# Patient Record
Sex: FEMALE | Race: WHITE | Employment: FULL TIME | URBAN - METROPOLITAN AREA
[De-identification: names, ages, dates, MRNs, and addresses within clinical notes are randomized per-mention and may not be internally consistent; named-entity substitution may affect disease eponyms.]

---

## 2017-03-07 ENCOUNTER — TELEPHONE (OUTPATIENT)
Dept: FAMILY MEDICINE | Facility: CLINIC | Age: 57
End: 2017-03-07

## 2017-03-07 NOTE — TELEPHONE ENCOUNTER
Panel Management Review        Composite cancer screening  Chart review shows that this patient is due/due soon for the following Pap Smear, Colonoscopy or Fecal Colorectal (FIT)  Summary:    Patient is due/failing the following:   FIT, PAP and PHYSICAL    Action needed:   Patient needs office visit for Physical.    Type of outreach:    Sent Mailsuite message.    Questions for provider review:    None                                                                                                                                    ALONZO Petit

## 2017-05-03 DIAGNOSIS — E78.5 HYPERLIPIDEMIA LDL GOAL <130: ICD-10-CM

## 2017-05-03 NOTE — TELEPHONE ENCOUNTER
Routing refill request to provider for review/approval because:  Drug interaction warning  Labs not current:  DERICK Jiang RN

## 2017-05-03 NOTE — TELEPHONE ENCOUNTER
atorvastatin (LIPITOR) 10 MG tablet     Last Written Prescription Date: 10/31/16  Last Fill Quantity: 90, # refills: 1  Last Office Visit with G, P or [facility] prescribing provider: 10/31/16 Nila       Lab Results   Component Value Date    CHOL 278 04/27/2016     Lab Results   Component Value Date    HDL 62 04/27/2016     Lab Results   Component Value Date     04/27/2016     Lab Results   Component Value Date    TRIG 102 04/27/2016     Lab Results   Component Value Date    CHOLHDLRATIO 2.9 05/29/2014     Jessica Pace

## 2017-05-04 RX ORDER — ATORVASTATIN CALCIUM 10 MG/1
10 TABLET, FILM COATED ORAL EVERY EVENING
Qty: 30 TABLET | Refills: 0 | Status: SHIPPED | OUTPATIENT
Start: 2017-05-04 | End: 2017-11-08 | Stop reason: ALTCHOICE

## 2017-06-03 DIAGNOSIS — I10 ESSENTIAL HYPERTENSION WITH GOAL BLOOD PRESSURE LESS THAN 140/90: ICD-10-CM

## 2017-06-03 NOTE — TELEPHONE ENCOUNTER
hydrochlorothiazide (HYDRODIURIL) 25 MG tablet      Last Written Prescription Date: 10/31/16  Last Fill Quantity: 90, # refills: 1  Last Office Visit with [facility], [facility] or [facility] prescribing provider: 10/31/16       Potassium   Date Value Ref Range Status   10/31/2016 3.8 3.4 - 5.3 mmol/L Final     Creatinine   Date Value Ref Range Status   10/31/2016 0.81 0.52 - 1.04 mg/dL Final     BP Readings from Last 3 Encounters:   10/31/16 138/86   04/27/16 134/82   03/04/15 122/82         Inessa Ibarra  [facility] Radiology

## 2017-06-06 RX ORDER — HYDROCHLOROTHIAZIDE 25 MG/1
TABLET ORAL
Qty: 30 TABLET | Refills: 0 | Status: SHIPPED | OUTPATIENT
Start: 2017-06-06 | End: 2017-06-16

## 2017-06-06 NOTE — TELEPHONE ENCOUNTER
Prescription approved per [facility]ill [facility].  ANNELISE Jewell, Clinical RN Madyson Gallo.

## 2017-06-10 ENCOUNTER — HEALTH MAINTENANCE LETTER (OUTPATIENT)
Age: 57
End: 2017-06-10

## 2017-06-16 ENCOUNTER — ALLIED HEALTH/NURSE VISIT (OUTPATIENT)
Dept: NURSING | Facility: CLINIC | Age: 57
End: 2017-06-16
Payer: COMMERCIAL

## 2017-06-16 VITALS — DIASTOLIC BLOOD PRESSURE: 82 MMHG | RESPIRATION RATE: 16 BRPM | SYSTOLIC BLOOD PRESSURE: 130 MMHG | HEART RATE: 72 BPM

## 2017-06-16 DIAGNOSIS — E78.5 HYPERLIPIDEMIA LDL GOAL <130: ICD-10-CM

## 2017-06-16 DIAGNOSIS — I10 ESSENTIAL HYPERTENSION WITH GOAL BLOOD PRESSURE LESS THAN 140/90: ICD-10-CM

## 2017-06-16 DIAGNOSIS — Z12.11 SCREEN FOR COLON CANCER: Primary | ICD-10-CM

## 2017-06-16 PROCEDURE — 99207 ZZC NO CHARGE NURSE ONLY: CPT

## 2017-06-16 RX ORDER — HYDROCHLOROTHIAZIDE 25 MG/1
25 TABLET ORAL EVERY MORNING
Qty: 90 TABLET | Refills: 0 | Status: SHIPPED | OUTPATIENT
Start: 2017-06-16 | End: 2017-11-08

## 2017-06-16 RX ORDER — HYDROCHLOROTHIAZIDE 25 MG/1
TABLET ORAL
Qty: 30 TABLET | Refills: 0 | Status: CANCELLED | OUTPATIENT
Start: 2017-06-16

## 2017-06-16 NOTE — TELEPHONE ENCOUNTER
Pending Prescriptions:                       Disp   Refills    hydrochlorothiazide (HYDRODIURIL) 25 MG t*30 tab*0           Patient was in clinc for a blood pressure check this am, reading is 130/82, with pulse of 72.   Pt. Does have only 1 tablet left for tomorrow.       Routing to provider, for review.

## 2017-06-16 NOTE — PROGRESS NOTES
Ancillary BP check    HTN Guidelines:  Age 18-59 BP range:  Less than 140/90  Age 60-85 with Diabetes:  Less than 140/90  Age 60-85 without Diabetes:  less than 150/90        Di Evnas is a 56 year old female who comes in today for a Blood Pressure check.    Patient is taking medication as prescribed  Patient is tolerating medications well.  Patient is monitoring Blood Pressure at home.  Average readings if yes are 114/78 before coming in today     BP goal: < 140/90mmHg (patient 18+ years of age with or without diabetes)    BP reading today: Passed     BP Readings from Last 1 Encounters:   06/16/17 130/82     A large cuff was used.  Pulse: 72    Vitals reviewed     Each reading recorded in vital signs section of chart: yes    Symptoms: none    Need for urgent appointment, based on criteria in ancillary BP workflow (elevated blood pressure and any of the following: dizziness, blurry vision, lightheadedness, severe shortness of breath, chest pain or visual disturbance): No       Plan: At goal follow up as directed by provider.      Completed by: Kami [facility]

## 2017-06-16 NOTE — MR AVS SNAPSHOT
After Visit Summary   6/16/2017    Di Evans    MRN: [id number]           Patient Information     Date Of Birth          1960        Visit Information        Provider Department      6/16/2017 11:00 AM BK ANCILLARY [facility]        Today's Diagnoses     Essential hypertension with goal blood pressure less than 140/90           Follow-ups after your visit        Who to contact     If you have questions or need follow up information about today's clinic visit or your schedule please contact [facility] directly at [phone].  Normal or non-critical lab and imaging results will be communicated to you by Navitahart, letter or phone within 4 business days after the clinic has received the results. If you do not hear from us within 7 days, please contact the clinic through [employer] or phone. If you have a critical or abnormal lab result, we will notify you by phone as soon as possible.  Submit refill requests through [employer] or call your pharmacy and they will forward the refill request to us. Please allow 3 business days for your refill to be completed.          Additional Information About Your Visit        MyChart Information     [employer] gives you secure access to your electronic health record. If you see a primary care provider, you can also send messages to your care team and make appointments. If you have questions, please call your primary care clinic.  If you do not have a primary care provider, please call [phone] and they will assist you.        Care EveryWhere ID     This is your Care EveryWhere ID. This could be used by other organizations to access your [place] medical records  [id number]        Your Vitals Were     Pulse Respirations                72 16           Blood Pressure from Last 3 Encounters:   06/16/17 130/82   10/31/16 138/86   04/27/16 134/82    Weight from Last 3 Encounters:   10/31/16 81.4 kg (179 lb 6.4 oz)   04/27/16  80.1 kg (176 lb 9.6 oz)   03/04/15 76.4 kg (168 lb 6.4 oz)              Today, you had the following     No orders found for display         Today's Medication Changes          These changes are accurate as of: 6/16/17 11:59 PM.  If you have any questions, ask your nurse or doctor.               These medicines have changed or have updated prescriptions.        Dose/Directions    hydrochlorothiazide 25 MG tablet   Commonly known as:  HYDRODIURIL   This may have changed:  See the new instructions.   Used for:  Essential hypertension with goal blood pressure less than 140/90   Changed by:  Sonja Davies MD        Dose:  25 mg   Take 1 tablet (25 mg) by mouth every morning   Quantity:  90 tablet   Refills:  0            Where to get your medicines      These medications were sent to [facility] [address] [place] [address] [place] - [address] [place] AT [facility] [address]  [address] [place] [address]     Phone:  [phone]     hydrochlorothiazide 25 MG tablet                Primary Care Provider Office Phone # Fax #    Sonja Hernandez [phone]       [facility] [address] [place]  [facility] [address]        Thank you!     Thank you for choosing [facility]  for your care. Our goal is always to provide you with excellent care. Hearing back from our patients is one way we can continue to improve our services. Please take a few minutes to complete the written survey that you may receive in the mail after your visit with us. Thank you!             Your Updated Medication List - Protect others around you: Learn how to safely use, store and throw away your medicines at www.disposemymeds.org.          This list is accurate as of: 6/16/17 11:59 PM.  Always use your most recent med list.                   Brand Name Dispense Instructions for use    aspirin 81 MG tablet      Take 1 tablet by mouth daily. *        atorvastatin 10 MG tablet    LIPITOR    30 tablet    Take 1 tablet (10 mg) by mouth every evening Needs to be seen for more.       hydrochlorothiazide 25 MG tablet    HYDRODIURIL    90 tablet    Take 1 tablet (25 mg) by mouth every morning       STATIN NOT PRESCRIBED (INTENTIONAL)     0 each    Statin not prescribed intentionally due to Allergy to statin

## 2017-06-19 ENCOUNTER — MYC MEDICAL ADVICE (OUTPATIENT)
Dept: FAMILY MEDICINE | Facility: CLINIC | Age: 57
End: 2017-06-19

## 2017-06-20 NOTE — TELEPHONE ENCOUNTER
Patient notified via [employer] RX was sent for 90 days on 6/16/17 so should check with her pharmacy.  Stephan JOY

## 2017-07-16 DIAGNOSIS — I10 ESSENTIAL HYPERTENSION WITH GOAL BLOOD PRESSURE LESS THAN 140/90: ICD-10-CM

## 2017-07-16 NOTE — TELEPHONE ENCOUNTER
hydrochlorothiazide (HYDRODIURIL) 25 MG tablet      Last Written Prescription Date: 6/16/17  Last Fill Quantity: 90, # refills: 0  Last Office Visit with [facility], [facility] or [facility] prescribing provider: 10/31/16       Potassium   Date Value Ref Range Status   10/31/2016 3.8 3.4 - 5.3 mmol/L Final     Creatinine   Date Value Ref Range Status   10/31/2016 0.81 0.52 - 1.04 mg/dL Final     BP Readings from Last 3 Encounters:   06/16/17 130/82   10/31/16 138/86   04/27/16 134/82         Inessa Ibarra  [facility] Radiology

## 2017-07-18 RX ORDER — HYDROCHLOROTHIAZIDE 25 MG/1
TABLET ORAL
Qty: 30 TABLET | Refills: 0 | Status: SHIPPED | OUTPATIENT
Start: 2017-07-18 | End: 2017-10-24

## 2017-07-18 NOTE — TELEPHONE ENCOUNTER
Medication is being filled for 1 time refill only due to:  Patient needs to be seen because overdue for 6 mo visit.   Cyndie Daniels RN

## 2017-10-19 DIAGNOSIS — I10 ESSENTIAL HYPERTENSION WITH GOAL BLOOD PRESSURE LESS THAN 140/90: ICD-10-CM

## 2017-10-23 ENCOUNTER — MYC MEDICAL ADVICE (OUTPATIENT)
Dept: FAMILY MEDICINE | Facility: CLINIC | Age: 57
End: 2017-10-23

## 2017-10-23 DIAGNOSIS — I10 ESSENTIAL HYPERTENSION WITH GOAL BLOOD PRESSURE LESS THAN 140/90: ICD-10-CM

## 2017-10-24 RX ORDER — HYDROCHLOROTHIAZIDE 25 MG/1
25 TABLET ORAL EVERY MORNING
Qty: 15 TABLET | Refills: 0 | Status: SHIPPED | OUTPATIENT
Start: 2017-10-24 | End: 2017-11-08

## 2017-10-24 RX ORDER — HYDROCHLOROTHIAZIDE 25 MG/1
TABLET ORAL
Qty: 90 TABLET | Refills: 0 | OUTPATIENT
Start: 2017-10-24

## 2017-11-06 DIAGNOSIS — I10 ESSENTIAL HYPERTENSION WITH GOAL BLOOD PRESSURE LESS THAN 140/90: ICD-10-CM

## 2017-11-07 ENCOUNTER — ALLIED HEALTH/NURSE VISIT (OUTPATIENT)
Dept: NURSING | Facility: CLINIC | Age: 57
End: 2017-11-07
Payer: COMMERCIAL

## 2017-11-07 VITALS — HEART RATE: 82 BPM | OXYGEN SATURATION: 98 % | DIASTOLIC BLOOD PRESSURE: 81 MMHG | SYSTOLIC BLOOD PRESSURE: 127 MMHG

## 2017-11-07 DIAGNOSIS — E78.5 HYPERLIPIDEMIA LDL GOAL <130: ICD-10-CM

## 2017-11-07 DIAGNOSIS — I10 ESSENTIAL HYPERTENSION WITH GOAL BLOOD PRESSURE LESS THAN 140/90: ICD-10-CM

## 2017-11-07 DIAGNOSIS — I10 HTN (HYPERTENSION): Primary | ICD-10-CM

## 2017-11-07 LAB
ANION GAP SERPL CALCULATED.3IONS-SCNC: 6 MMOL/L (ref 3–14)
BUN SERPL-MCNC: 13 MG/DL (ref 7–30)
CALCIUM SERPL-MCNC: 9.2 MG/DL (ref 8.5–10.1)
CHLORIDE SERPL-SCNC: 103 MMOL/L (ref 94–109)
CHOLEST SERPL-MCNC: 263 MG/DL
CO2 SERPL-SCNC: 31 MMOL/L (ref 20–32)
CREAT SERPL-MCNC: 0.77 MG/DL (ref 0.52–1.04)
GFR SERPL CREATININE-BSD FRML MDRD: 77 ML/MIN/1.7M2
GLUCOSE SERPL-MCNC: 95 MG/DL (ref 70–99)
HDLC SERPL-MCNC: 66 MG/DL
LDLC SERPL CALC-MCNC: 176 MG/DL
NONHDLC SERPL-MCNC: 197 MG/DL
POTASSIUM SERPL-SCNC: 3.7 MMOL/L (ref 3.4–5.3)
SODIUM SERPL-SCNC: 140 MMOL/L (ref 133–144)
TRIGL SERPL-MCNC: 107 MG/DL

## 2017-11-07 PROCEDURE — 99207 ZZC NO CHARGE NURSE ONLY: CPT

## 2017-11-07 PROCEDURE — 80048 BASIC METABOLIC PNL TOTAL CA: CPT | Performed by: FAMILY MEDICINE

## 2017-11-07 PROCEDURE — 36415 COLL VENOUS BLD VENIPUNCTURE: CPT | Performed by: FAMILY MEDICINE

## 2017-11-07 PROCEDURE — 80061 LIPID PANEL: CPT | Performed by: FAMILY MEDICINE

## 2017-11-07 RX ORDER — HYDROCHLOROTHIAZIDE 25 MG/1
TABLET ORAL
Qty: 15 TABLET | Refills: 0 | OUTPATIENT
Start: 2017-11-07

## 2017-11-07 NOTE — NURSING NOTE
"Pt came in for blood pressure check and it was 127/81.      Chief Complaint   Patient presents with     Hypertension       Initial /81 (BP Location: Left arm, Patient Position: Chair, Cuff Size: Adult Large)  Pulse 82  SpO2 98%  Breastfeeding? No Estimated body mass index is 28.83 kg/(m^2) as calculated from the following:    Height as of 10/31/16: 5' 6.14\" (1.68 m).    Weight as of 10/31/16: 179 lb 6.4 oz (81.4 kg).  Medication Reconciliation: complete   Christina Whyte MA        "

## 2017-11-07 NOTE — TELEPHONE ENCOUNTER
Called and spoke to patient and gave her Dr Dylan Hernandez's   Message. Scheduled an appointment with the provider for tomorrow.  Juleit Lujan MA/  For Teams Spirit and Indu

## 2017-11-07 NOTE — MR AVS SNAPSHOT
After Visit Summary   11/7/2017    Di Evans    MRN: [id number]           Patient Information     Date Of Birth          1960        Visit Information        Provider Department      11/7/2017 10:20 AM BK ANCILLARY [facility]        Today's Diagnoses     HTN (hypertension)    -  1       Follow-ups after your visit        Follow-up notes from your care team     Return for BP Recheck.      Who to contact     If you have questions or need follow up information about today's clinic visit or your schedule please contact [facility] directly at [phone].  Normal or non-critical lab and imaging results will be communicated to you by Astley Clarkehart, letter or phone within 4 business days after the clinic has received the results. If you do not hear from us within 7 days, please contact the clinic through [employer]t or phone. If you have a critical or abnormal lab result, we will notify you by phone as soon as possible.  Submit refill requests through [employer] or call your pharmacy and they will forward the refill request to us. Please allow 3 business days for your refill to be completed.          Additional Information About Your Visit        MyChart Information     [employer] gives you secure access to your electronic health record. If you see a primary care provider, you can also send messages to your care team and make appointments. If you have questions, please call your primary care clinic.  If you do not have a primary care provider, please call [phone] and they will assist you.        Care EveryWhere ID     This is your Care EveryWhere ID. This could be used by other organizations to access your [place] medical records  [id number]        Your Vitals Were     Pulse Pulse Oximetry Breastfeeding?             82 98% No          Blood Pressure from Last 3 Encounters:   11/07/17 127/81   06/16/17 130/82   10/31/16 138/86    Weight from Last 3 Encounters:    10/31/16 179 lb 6.4 oz (81.4 kg)   04/27/16 176 lb 9.6 oz (80.1 kg)   03/04/15 168 lb 6.4 oz (76.4 kg)              Today, you had the following     No orders found for display       Primary Care Provider Office Phone # Fax #    Sonja Collazoshahnaz Hernandez [phone]       [address]  [facility] [address]        Equal Access to Services     ROMAN MILLARD : Hadii aad ku hadasho Soomaali, waaxda luqadaha, qaybta kaalmada adeegyada, waxay idiin hayaan adeeg kharash lavipul . So [facility] [phone].    ATENCIÓN: Si habla español, tiene a perez disposición servicios gratuitos de asistencia lingüística. Llame al [phone].    We comply with applicable federal civil rights laws and [place] laws. We do not discriminate on the basis of race, color, national origin, age, disability, sex, sexual orientation, or gender identity.            Thank you!     Thank you for choosing [facility]  for your care. Our goal is always to provide you with excellent care. Hearing back from our patients is one way we can continue to improve our services. Please take a few minutes to complete the written survey that you may receive in the mail after your visit with us. Thank you!             Your Updated Medication List - Protect others around you: Learn how to safely use, store and throw away your medicines at www.disposemymeds.org.          This list is accurate as of: 11/7/17 11:14 AM.  Always use your most recent med list.                   Brand Name Dispense Instructions for use Diagnosis    aspirin 81 MG tablet      Take 1 tablet by mouth daily. *        atorvastatin 10 MG tablet    LIPITOR    30 tablet    Take 1 tablet (10 mg) by mouth every evening Needs to be seen for more.    Hyperlipidemia LDL goal <130       * hydrochlorothiazide 25 MG tablet    HYDRODIURIL    90 tablet    Take 1 tablet (25 mg) by mouth every morning    Essential hypertension with goal blood pressure less than  140/90       * hydrochlorothiazide 25 MG tablet    HYDRODIURIL    15 tablet    Take 1 tablet (25 mg) by mouth every morning Needs to be seen for more.    Essential hypertension with goal blood pressure less than 140/90       STATIN NOT PRESCRIBED (INTENTIONAL)     0 each    Statin not prescribed intentionally due to Allergy to statin    Hyperlipidemia LDL goal <130       * Notice:  This list has 2 medication(s) that are the same as other medications prescribed for you. Read the directions carefully, and ask your doctor or other care provider to review them with you.

## 2017-11-08 ENCOUNTER — OFFICE VISIT (OUTPATIENT)
Dept: FAMILY MEDICINE | Facility: CLINIC | Age: 57
End: 2017-11-08
Payer: COMMERCIAL

## 2017-11-08 VITALS
OXYGEN SATURATION: 97 % | BODY MASS INDEX: 28.45 KG/M2 | HEIGHT: 66 IN | WEIGHT: 177 LBS | SYSTOLIC BLOOD PRESSURE: 126 MMHG | DIASTOLIC BLOOD PRESSURE: 82 MMHG | HEART RATE: 78 BPM | TEMPERATURE: 97.6 F

## 2017-11-08 DIAGNOSIS — Z12.11 SCREEN FOR COLON CANCER: ICD-10-CM

## 2017-11-08 DIAGNOSIS — E78.5 HYPERLIPIDEMIA LDL GOAL <130: ICD-10-CM

## 2017-11-08 DIAGNOSIS — Z23 NEED FOR PROPHYLACTIC VACCINATION WITH TETANUS-DIPHTHERIA (TD): ICD-10-CM

## 2017-11-08 DIAGNOSIS — I10 HYPERTENSION GOAL BP (BLOOD PRESSURE) < 140/90: Primary | ICD-10-CM

## 2017-11-08 PROCEDURE — 99214 OFFICE O/P EST MOD 30 MIN: CPT | Mod: 25 | Performed by: FAMILY MEDICINE

## 2017-11-08 PROCEDURE — 90471 IMMUNIZATION ADMIN: CPT | Performed by: FAMILY MEDICINE

## 2017-11-08 PROCEDURE — 90715 TDAP VACCINE 7 YRS/> IM: CPT | Performed by: FAMILY MEDICINE

## 2017-11-08 RX ORDER — HYDROCHLOROTHIAZIDE 25 MG/1
25 TABLET ORAL EVERY MORNING
Qty: 90 TABLET | Refills: 1 | Status: SHIPPED | OUTPATIENT
Start: 2017-11-08 | End: 2018-05-02

## 2017-11-08 RX ORDER — PRAVASTATIN SODIUM 20 MG
20 TABLET ORAL EVERY EVENING
Qty: 90 TABLET | Refills: 3 | Status: SHIPPED | OUTPATIENT
Start: 2017-11-08 | End: 2018-01-01

## 2017-11-08 NOTE — MR AVS SNAPSHOT
After Visit Summary   11/8/2017    Di Evans    MRN: [id number]           Patient Information     Date Of Birth          1960        Visit Information        Provider Department      11/8/2017 9:20 AM Sonja Davies MD [facility]        Today's Diagnoses     Hypertension goal BP (blood pressure) < 140/90    -  1    Hyperlipidemia LDL goal <130        Screen for colon cancer        Need for prophylactic vaccination with tetanus-diphtheria (TD)          Care Instructions    Based on your medical history and these are the current health maintenance or preventive care services that you are due for (some may have been done at this visit)  Health Maintenance Due   Topic Date Due     ADVANCE DIRECTIVE PLANNING Q5 YRS  06/20/2014     FIT Q1 YR  03/16/2015     PAP SCREENING Q3 YR (SYSTEM ASSIGNED)  03/13/2016     TETANUS IMMUNIZATION (SYSTEM ASSIGNED)  01/01/2017     INFLUENZA VACCINE (SYSTEM ASSIGNED)  09/01/2017         At [facility], we strive to deliver an exceptional experience to you, every time we see you.    If you receive a survey in the mail, please send us back your thoughts. We really do value your feedback.    Your care team's suggested websites for health information:  Www.[employer].org : Up to date and easily searchable information on multiple topics.  Www.medlineplus.gov : medication info, interactive tutorials, watch real surgeries online  Www.familydoctor.org : good info from the Academy of Family Physicians  Www.cdc.gov : public health info, travel advisories, epidemics (H1N1)  Www.aap.org : children's health info, normal development, vaccinations  Www.health.[facility].[place].us : [place] dept of health, public health issues in [place], N1N1    How to contact your care team:   Team Indu/Spirit [phone]         Pharmacy [phone]    Dr. Dotson, Luz Marina Bethea PA-C, Kellee Beltran CNP, Rosamaria Manzo PA-C,  Dr. Forrest, and SULMA Pelaez CNP    Team RN: Millie      Clinic hours  M-Th 7 am-7 pm   Fri 7 am-5 pm.   Urgent care M-F 11 am-9 pm,   Sat/Sun 9 am-5 pm.  Pharmacy M-Th 8 am-8 pm Fri 8 am-6 pm  Sat/Sun 9 am-5 pm.     All password changes, disabled accounts, or ID changes in [employer]/MyHealth will be done by our Access Services Department.    If you need help with your account or password, call: [phone]. Clinic staff no longer has the ability to change passwords.             Follow-ups after your visit        Future tests that were ordered for you today     Open Future Orders        Priority Expected Expires Ordered    Fecal colorectal cancer screen (FIT) Routine 11/29/2017 1/31/2018 11/8/2017            Who to contact     If you have questions or need follow up information about today's clinic visit or your schedule please contact [facility] directly at [phone].  Normal or non-critical lab and imaging results will be communicated to you by Revaluatehart, letter or phone within 4 business days after the clinic has received the results. If you do not hear from us within 7 days, please contact the clinic through servtagt or phone. If you have a critical or abnormal lab result, we will notify you by phone as soon as possible.  Submit refill requests through [employer] or call your pharmacy and they will forward the refill request to us. Please allow 3 business days for your refill to be completed.          Additional Information About Your Visit        Cirrascale Information     [employer] gives you secure access to your electronic health record. If you see a primary care provider, you can also send messages to your care team and make appointments. If you have questions, please call your primary care clinic.  If you do not have a primary care provider, please call [phone] and they will assist you.        Care EveryWhere ID     This is your Care EveryWhere ID. This could be used by other  "organizations to access your [place] medical records  [id number]        Your Vitals Were     Pulse Temperature Height Pulse Oximetry Breastfeeding? BMI (Body Mass Index)    78 97.6  F (36.4  C) (Oral) 5' 5.75\" (1.67 m) 97% No 28.79 kg/m2       Blood Pressure from Last 3 Encounters:   11/08/17 126/82   11/07/17 127/81   06/16/17 130/82    Weight from Last 3 Encounters:   11/08/17 177 lb (80.3 kg)   10/31/16 179 lb 6.4 oz (81.4 kg)   04/27/16 176 lb 9.6 oz (80.1 kg)              We Performed the Following     TDAP (ADACEL)          Today's Medication Changes          These changes are accurate as of: 11/8/17  9:49 AM.  If you have any questions, ask your nurse or doctor.               Start taking these medicines.        Dose/Directions    pravastatin 20 MG tablet   Commonly known as:  PRAVACHOL   Used for:  Hyperlipidemia LDL goal <130   Started by:  Sonja Davies MD        Dose:  20 mg   Take 1 tablet (20 mg) by mouth every evening   Quantity:  90 tablet   Refills:  3         Stop taking these medicines if you haven't already. Please contact your care team if you have questions.     atorvastatin 10 MG tablet   Commonly known as:  LIPITOR   Stopped by:  Sonja Davies MD                Where to get your medicines      These medications were sent to [facility] - [place], [place] - [address]  [address], [facility] [address]     Phone:  [phone]     hydrochlorothiazide 25 MG tablet    pravastatin 20 MG tablet                Primary Care Provider Office Phone # Fax #    Sonja Hernandez [phone]       [address]  [facility] [address]        Equal Access to Services     ROMAN MILLARD : Lele restrepo Somari, waaxda luqadaha, qaybta kaalmada adeegyada, mae schulz. [facility] [phone].    ATENCIÓN: Si habla español, tiene a perez disposición servicios gratuitos de " asistencia lingüística. Sirena al [phone].    We comply with applicable federal civil rights laws and [place] laws. We do not discriminate on the basis of race, color, national origin, age, disability, sex, sexual orientation, or gender identity.            Thank you!     Thank you for choosing [facility]  for your care. Our goal is always to provide you with excellent care. Hearing back from our patients is one way we can continue to improve our services. Please take a few minutes to complete the written survey that you may receive in the mail after your visit with us. Thank you!             Your Updated Medication List - Protect others around you: Learn how to safely use, store and throw away your medicines at www.disposemymeds.org.          This list is accurate as of: 11/8/17  9:49 AM.  Always use your most recent med list.                   Brand Name Dispense Instructions for use Diagnosis    aspirin 81 MG tablet      Take 1 tablet by mouth daily. *        hydrochlorothiazide 25 MG tablet    HYDRODIURIL    90 tablet    Take 1 tablet (25 mg) by mouth every morning    Hypertension goal BP (blood pressure) < 140/90       pravastatin 20 MG tablet    PRAVACHOL    90 tablet    Take 1 tablet (20 mg) by mouth every evening    Hyperlipidemia LDL goal <130

## 2017-11-08 NOTE — PROGRESS NOTES
"  SUBJECTIVE:   Di Evans is a 57 year old female who presents to clinic today for the following health issues:      Hypertension Follow-up      Outpatient blood pressures are being checked at home.  Results are 123/79.    Low Salt Diet: no added salt        Amount of exercise or physical activity: 2-3 days/week for an average of 15-30 minutes    Problems taking medications regularly: No    Medication side effects: none    Diet: regular (no restrictions)        Hyperlipidemia Follow-Up      Rate your low fat/cholesterol diet?: good    Taking statin?  No    Other lipid medications/supplements?:  none          Problem list and histories reviewed & adjusted, as indicated.  Additional history: as documented    Patient Active Problem List   Diagnosis     CARDIOVASCULAR SCREENING; LDL GOAL LESS THAN 130     Hypertension goal BP (blood pressure) < 140/90     Hyperlipidemia LDL goal <130     Overweight     Changing skin lesion     Past Surgical History:   Procedure Laterality Date     SURGICAL HISTORY OF -   1980    left ankle surgery       Social History   Substance Use Topics     Smoking status: Never Smoker     Smokeless tobacco: Never Used     Alcohol use Yes      Comment: occasional     Family History   Problem Relation Age of Onset     Hypertension Mother      Hypertension Father      CEREBROVASCULAR DISEASE Father      polycythemia             Reviewed and updated as needed this visit by clinical staff     Reviewed and updated as needed this visit by Provider         ROS:  Constitutional, HEENT, cardiovascular, pulmonary, gi and gu systems are negative, except as otherwise noted.      OBJECTIVE:   /82 (BP Location: Left arm, Patient Position: Chair, Cuff Size: Adult Large)  Pulse 78  Temp 97.6  F (36.4  C) (Oral)  Ht 5' 5.75\" (1.67 m)  Wt 177 lb (80.3 kg)  SpO2 97%  Breastfeeding? No  BMI 28.79 kg/m2  Body mass index is 28.79 kg/(m^2).  GENERAL: healthy, alert and no distress  NECK: no adenopathy, " no asymmetry, masses, or scars and thyroid normal to palpation  RESP: lungs clear to auscultation - no rales, rhonchi or wheezes  CV: regular rate and rhythm, normal S1 S2, no S3 or S4, no murmur, click or rub, no peripheral edema and peripheral pulses strong  ABDOMEN: soft, nontender, no hepatosplenomegaly, no masses and bowel sounds normal  MS: no gross musculoskeletal defects noted, no edema  PSYCH: mentation appears normal, affect normal/bright    Diagnostic Test Results:  Results for orders placed or performed in visit on 11/07/17   Basic metabolic panel   Result Value Ref Range    Sodium 140 133 - 144 mmol/L    Potassium 3.7 3.4 - 5.3 mmol/L    Chloride 103 94 - 109 mmol/L    Carbon Dioxide 31 20 - 32 mmol/L    Anion Gap 6 3 - 14 mmol/L    Glucose 95 70 - 99 mg/dL    Urea Nitrogen 13 7 - 30 mg/dL    Creatinine 0.77 0.52 - 1.04 mg/dL    GFR Estimate 77 >60 mL/min/1.7m2    GFR Estimate If Black >90 >60 mL/min/1.7m2    Calcium 9.2 8.5 - 10.1 mg/dL   Lipid panel reflex to direct LDL   Result Value Ref Range    Cholesterol 263 (H) <200 mg/dL    Triglycerides 107 <150 mg/dL    HDL Cholesterol 66 >49 mg/dL    LDL Cholesterol Calculated 176 (H) <100 mg/dL    Non HDL Cholesterol 197 (H) <130 mg/dL       ASSESSMENT/PLAN:     1. Hypertension goal BP (blood pressure) < 140/90  Controlled - continue current treatment  - hydrochlorothiazide (HYDRODIURIL) 25 MG tablet; Take 1 tablet (25 mg) by mouth every morning  Dispense: 90 tablet; Refill: 1    2. Hyperlipidemia LDL goal <130  Not at goal - trial of pravastatin and recommended low carb eating.  - pravastatin (PRAVACHOL) 20 MG tablet; Take 1 tablet (20 mg) by mouth every evening  Dispense: 90 tablet; Refill: 3    3. Screen for colon cancer  Screening recommended  - Fecal colorectal cancer screen (FIT); Future    4. Need for prophylactic vaccination with tetanus-diphtheria (TD)    - TDAP (ADACEL)  - ADMIN 1st VACCINE    FUTURE APPOINTMENTS:       - Follow-up visit in 6  months but sooner for AFE.    Sonja Hernandez MD  [facility]

## 2017-11-08 NOTE — NURSING NOTE
"Chief Complaint   Patient presents with     Hypertension       Initial /82 (BP Location: Left arm, Patient Position: Chair, Cuff Size: Adult Large)  Pulse 78  Temp 97.6  F (36.4  C) (Oral)  Ht 5' 5.75\" (1.67 m)  Wt 177 lb (80.3 kg)  SpO2 97%  Breastfeeding? No  BMI 28.79 kg/m2 Estimated body mass index is 28.79 kg/(m^2) as calculated from the following:    Height as of this encounter: 5' 5.75\" (1.67 m).    Weight as of this encounter: 177 lb (80.3 kg).  Medication Reconciliation: complete   An,CMA (AMAA)      "

## 2017-11-08 NOTE — NURSING NOTE
Screening Questionnaire for Adult Immunization    Are you sick today?   Yes   Do you have allergies to medications, food, a vaccine component or latex?   No   Have you ever had a serious reaction after receiving a vaccination?   No   Do you have a long-term health problem with heart disease, lung disease, asthma, kidney disease, metabolic disease (e.g. diabetes), anemia, or other blood disorder?   No   Do you have cancer, leukemia, HIV/AIDS, or any other immune system problem?   No   In the past 3 months, have you taken medications that affect  your immune system, such as prednisone, other steroids, or anticancer drugs; drugs for the treatment of rheumatoid arthritis, Crohn s disease, or psoriasis; or have you had radiation treatments?   No   Have you had a seizure, or a brain or other nervous system problem?   No   During the past year, have you received a transfusion of blood or blood     products, or been given immune (gamma) globulin or antiviral drug?   No   For women: Are you pregnant or is there a chance you could become        pregnant during the next month?   No   Have you received any vaccinations in the past 4 weeks?   No     Immunization questionnaire answers were all negative.        Per orders of Dr. Dylan Hernandez, injection of Adacel given by Amalia Ortega. Patient instructed to remain in clinic for 15 minutes afterwards, and to report any adverse reaction to me immediately.       Screening performed by Amalia Ortega on 11/8/2017 at 9:51 AM.

## 2017-11-08 NOTE — PATIENT INSTRUCTIONS
Based on your medical history and these are the current health maintenance or preventive care services that you are due for (some may have been done at this visit)  Health Maintenance Due   Topic Date Due     ADVANCE DIRECTIVE PLANNING Q5 YRS  06/20/2014     FIT Q1 YR  03/16/2015     PAP SCREENING Q3 YR (SYSTEM ASSIGNED)  03/13/2016     TETANUS IMMUNIZATION (SYSTEM ASSIGNED)  01/01/2017     INFLUENZA VACCINE (SYSTEM ASSIGNED)  09/01/2017         At [facility], we strive to deliver an exceptional experience to you, every time we see you.    If you receive a survey in the mail, please send us back your thoughts. We really do value your feedback.    Your care team's suggested websites for health information:  Www.[employer].org : Up to date and easily searchable information on multiple topics.  Www.medlineplus.gov : medication info, interactive tutorials, watch real surgeries online  Www.familydoctor.org : good info from the Academy of Family Physicians  Www.cdc.gov : public health info, travel advisories, epidemics (H1N1)  Www.aap.org : children's health info, normal development, vaccinations  Www.health.[facility].[place].us : [place] dept of health, public health issues in [place], N1N1    How to contact your care team:   Team [employer] [phone]         Pharmacy [phone]    Dr. Dotson, Luz Marina Bethea PA-C, Dr. Madden, Kellee OZNUA CNP, Rosamaria Manzo PA-C, Dr. Forrest, and SULMA Pelaez CNP    Team RN: Millie      Clinic hours  M-Th 7 am-7 pm   Fri 7 am-5 pm.   Urgent care M-F 11 am-9 pm,   Sat/Sun 9 am-5 pm.  Pharmacy M-Th 8 am-8 pm Fri 8 am-6 pm  Sat/Sun 9 am-5 pm.     All password changes, disabled accounts, or ID changes in [employer]/MyHealth will be done by our Access Services Department.    If you need help with your account or password, call: [phone]. Clinic staff no longer has the ability to change passwords.

## 2017-12-05 ENCOUNTER — RADIANT APPOINTMENT (OUTPATIENT)
Dept: GENERAL RADIOLOGY | Facility: CLINIC | Age: 57
End: 2017-12-05
Attending: PHYSICIAN ASSISTANT
Payer: COMMERCIAL

## 2017-12-05 ENCOUNTER — OFFICE VISIT (OUTPATIENT)
Dept: FAMILY MEDICINE | Facility: CLINIC | Age: 57
End: 2017-12-05
Payer: COMMERCIAL

## 2017-12-05 VITALS
DIASTOLIC BLOOD PRESSURE: 89 MMHG | BODY MASS INDEX: 28.63 KG/M2 | TEMPERATURE: 97.9 F | WEIGHT: 176 LBS | SYSTOLIC BLOOD PRESSURE: 145 MMHG

## 2017-12-05 DIAGNOSIS — R14.3 FLATULENCE, ERUCTATION, AND GAS PAIN: ICD-10-CM

## 2017-12-05 DIAGNOSIS — R14.2 FLATULENCE, ERUCTATION, AND GAS PAIN: ICD-10-CM

## 2017-12-05 DIAGNOSIS — R14.2 FLATULENCE, ERUCTATION, AND GAS PAIN: Primary | ICD-10-CM

## 2017-12-05 DIAGNOSIS — R14.1 FLATULENCE, ERUCTATION, AND GAS PAIN: Primary | ICD-10-CM

## 2017-12-05 DIAGNOSIS — R14.3 FLATULENCE, ERUCTATION, AND GAS PAIN: Primary | ICD-10-CM

## 2017-12-05 DIAGNOSIS — Z12.11 SCREEN FOR COLON CANCER: ICD-10-CM

## 2017-12-05 DIAGNOSIS — K59.01 SLOW TRANSIT CONSTIPATION: ICD-10-CM

## 2017-12-05 DIAGNOSIS — R14.1 FLATULENCE, ERUCTATION, AND GAS PAIN: ICD-10-CM

## 2017-12-05 PROCEDURE — 99214 OFFICE O/P EST MOD 30 MIN: CPT | Performed by: PHYSICIAN ASSISTANT

## 2017-12-05 PROCEDURE — 74020 XR ABDOMEN 2 VW: CPT

## 2017-12-05 RX ORDER — POLYETHYLENE GLYCOL 3350 17 G/17G
1 POWDER, FOR SOLUTION ORAL DAILY
Qty: 510 G | Refills: 1 | Status: SHIPPED | OUTPATIENT
Start: 2017-12-05 | End: 2018-05-02

## 2017-12-05 NOTE — NURSING NOTE
"Chief Complaint   Patient presents with     Abdominal Pain     Stomach issues for about 3 weeks       Initial /89 (BP Location: Left arm, Patient Position: Chair, Cuff Size: Adult Regular)  Temp 97.9  F (36.6  C) (Oral)  Wt 176 lb (79.8 kg)  BMI 28.63 kg/m2 Estimated body mass index is 28.63 kg/(m^2) as calculated from the following:    Height as of 11/8/17: 5' 5.75\" (1.67 m).    Weight as of this encounter: 176 lb (79.8 kg).  Medication Reconciliation: complete   Barbara Foster MA      "

## 2017-12-05 NOTE — PROGRESS NOTES
SUBJECTIVE:   Di Evans is a 57 year old female who presents to clinic today for the following health issues:      ABDOMINAL   PAIN     Onset: 3 weeks    Description:   Character: Dull ache  Location: whole stomach  Radiation: None and Back    Intensity: moderate    Progression of Symptoms:  worsening    Accompanying Signs & Symptoms:  Fever/Chills?: no   Gas/Bloating: YES  Nausea: no   Vomitting: no   Diarrhea?: no   Constipation:YES  Dysuria or Hematuria: no    History:   Trauma: no   Previous similar pain: no    Previous tests done: none    Precipitating factors:   Does the pain change with:     Food: no      BM: no     Urination: no     Alleviating factors:      Therapies Tried and outcome:     LMP:       Patient goes from constipation to loser stools. No blood in stool, no vomiting, no diarrhea, no point tenderness.   Patient gets gas pains that move from side to side and stomach gurgles.         Problem list and histories reviewed & adjusted, as indicated.  Additional history: as documented    Patient Active Problem List   Diagnosis     CARDIOVASCULAR SCREENING; LDL GOAL LESS THAN 130     Hypertension goal BP (blood pressure) < 140/90     Hyperlipidemia LDL goal <130     Overweight     Changing skin lesion     Past Surgical History:   Procedure Laterality Date     SURGICAL HISTORY OF -   1980    left ankle surgery       Social History   Substance Use Topics     Smoking status: Never Smoker     Smokeless tobacco: Never Used     Alcohol use Yes      Comment: occasional     Family History   Problem Relation Age of Onset     Hypertension Mother      Hypertension Father      CEREBROVASCULAR DISEASE Father      polycythemia         Current Outpatient Prescriptions   Medication Sig Dispense Refill     polyethylene glycol (MIRALAX) powder Take 17 g (1 capful) by mouth daily 510 g 1     pravastatin (PRAVACHOL) 20 MG tablet Take 1 tablet (20 mg) by mouth every evening 90 tablet 3     hydrochlorothiazide  (HYDRODIURIL) 25 MG tablet Take 1 tablet (25 mg) by mouth every morning 90 tablet 1     aspirin 81 MG tablet Take 1 tablet by mouth daily. *       Allergies   Allergen Reactions     Gemfibrozil Muscle Pain (Myalgia)     Lovastatin      headaches     Pcn [Bicillin C-R,]          Reviewed and updated as needed this visit by clinical staffTobacco  Allergies  Meds  Med Hx  Surg Hx  Soc Hx      Reviewed and updated as needed this visit by Provider         ROS:  Constitutional, HEENT, cardiovascular, pulmonary, gi and gu systems are negative, except as otherwise noted.      OBJECTIVE:   /89 (BP Location: Left arm, Patient Position: Chair, Cuff Size: Adult Regular)  Temp 97.9  F (36.6  C) (Oral)  Wt 176 lb (79.8 kg)  BMI 28.63 kg/m2  Body mass index is 28.63 kg/(m^2).  GENERAL: healthy, alert and no distress  NECK: no adenopathy, no asymmetry, masses, or scars and thyroid normal to palpation  RESP: lungs clear to auscultation - no rales, rhonchi or wheezes  CV: regular rate and rhythm, normal S1 S2, no S3 or S4, no murmur, click or rub, no peripheral edema and peripheral pulses strong  ABDOMEN: bloated abdomen, tenderness mild generalized, no organomegaly or masses and bowel sounds normal. Negative Mcburney point and hunt sign  MS: no gross musculoskeletal defects noted, no edema    Diagnostic Test Results:  Results for orders placed or performed in visit on 11/07/17   Basic metabolic panel   Result Value Ref Range    Sodium 140 133 - 144 mmol/L    Potassium 3.7 3.4 - 5.3 mmol/L    Chloride 103 94 - 109 mmol/L    Carbon Dioxide 31 20 - 32 mmol/L    Anion Gap 6 3 - 14 mmol/L    Glucose 95 70 - 99 mg/dL    Urea Nitrogen 13 7 - 30 mg/dL    Creatinine 0.77 0.52 - 1.04 mg/dL    GFR Estimate 77 >60 mL/min/1.7m2    GFR Estimate If Black >90 >60 mL/min/1.7m2    Calcium 9.2 8.5 - 10.1 mg/dL   Lipid panel reflex to direct LDL   Result Value Ref Range    Cholesterol 263 (H) <200 mg/dL    Triglycerides 107 <150 mg/dL     HDL Cholesterol 66 >49 mg/dL    LDL Cholesterol Calculated 176 (H) <100 mg/dL    Non HDL Cholesterol 197 (H) <130 mg/dL     Abdominal xray- no air fluid levels, no free air. Moderate amount of gas and stool in colon.   ASSESSMENT/PLAN:       ICD-10-CM    1. Flatulence, eructation, and gas pain R14.3 XR Abdomen 2 Views    R14.1     R14.2    2. Slow transit constipation K59.01 polyethylene glycol (MIRALAX) powder   3. Screen for colon cancer Z12.11 Fecal colorectal cancer screen FIT - Future (S+30)   1. Xray   Miralax 17 grams daily for 3-5 days until stools are soft   Increase water   Try Metamucil fiber supplement or other fiber supplement  Eat more fruits and veggies   Follow up in 5 days if not better  Follow up sooner or go to ED if develop severe pain, vomiting or fever.     Jordana Bethea PA-C  [facility]

## 2017-12-05 NOTE — PATIENT INSTRUCTIONS
Miralax 17 grams daily for 3-5 days until stools are soft   Increase water     Try Metamucil fiber supplement or other fiber supplement  Eat more fruits and veggies   Constipation (Adult)  Constipation means that you have bowel movements that are less frequent than usual. Stools often become very hard and difficult to pass.  Constipation is very common. At some point in life it affects almost everyone. Since everyone's bowel habits are different, what is constipation to one person may not be to another. Your healthcare provider may do tests to diagnose constipation. It depends on what he or she finds when evaluating you.    Symptoms of constipation include:    Abdominal pain    Bloating    Vomiting    Painful bowel movements    Itching, swelling, bleeding, or pain around the anus  Causes  Constipation can have many causes. These include:    Diet low in fiber    Too much dairy    Not drinking enough liquids    Lack of exercise or physical activity. This is especially true for older adults.    Changes in lifestyle or daily routine, including pregnancy, aging, work, and travel    Frequent use or misuse of laxatives    Ignoring the urge to have a bowel movement or delaying it until later    Medicines, such as certain prescription pain medicines, iron supplements, antacids, certain antidepressants, and calcium supplements    Diseases like irritable bowel syndrome, bowel obstructions, stroke, diabetes, thyroid disease, Parkinson disease, hemorrhoids, and colon cancer  Complications  Potential complications of constipation can include:    Hemorrhoids    Rectal bleeding from hemorrhoids or anal fissures (skin tears)    Hernias    Dependency on laxatives    Chronic constipation    Fecal impaction    Bowel obstruction or perforation  Home care  All treatment should be done after talking with your healthcare provider. This is especially true if you have another medical problems, are taking prescription medicines, or are an  older adult. Treatment most often involves lifestyle changes. You may also need medicines. Your healthcare provider will tell you which will work best for you. Follow the advice below to help avoid this problem in the future.  Lifestyle changes  These lifestyle changes can help prevent constipation:    Diet. Eat a high-fiber diet, with fresh fruit and vegetables, and reduce dairy intake, meats, and processed foods    Fluids. It's important to get enough fluids each day. Drink plenty of water when you eat more fiber. If you are on diet that limits the amount of fluid you can have, talk about this with your healthcare provider.    Regular exercise. Check with your healthcare provider first.  Medications  Take any medicines as directed. Some laxatives are safe to use only every now and then. Others can be taken on a regular basis. Talk with your doctor or pharmacist if you have questions.  Prescription pain medicines can cause constipation. If you are taking this kind of medicine, ask your healthcare provider if you should also take a stool softener.  Medicines you may take to treat constipation include:    Fiber supplements    Stool softeners    Laxatives    Enemas    Rectal suppositories  Follow-up care  Follow up with your healthcare provider if symptoms don't get better in the next few days. You may need to have more tests or see a specialist.  Call 911  Call 911 if any of these occur:    Trouble breathing    Stiff, rigid abdomen that is severely painful to touch    Confusion    Fainting or loss of consciousness    Rapid heart rate    Chest pain  When to seek medical advice  Call your healthcare provider right away if any of these occur:    Fever over 100.4 F (38 C)    Failure to resume normal bowel movements    Pain in your abdomen or back gets worse    Nausea or vomiting    Swelling in your abdomen    Blood in the stool    Black, tarry stool    Involuntary weight loss    Weakness  Date Last Reviewed:  12/30/2015 2000-2017 [employer]. [address] [place] [address], [place] [address]. All rights reserved. This information is not intended as a substitute for professional medical care. Always follow your healthcare professional's instructions.        Treating Constipation    Constipation is a common and often uncomfortable problem. Constipation means you have bowel movements fewer than 3 times per week, or strain to pass hard, dry stool. It can last a short time. Or it can be a problem that never seems to go away. The good news is that it can often be treated and controlled.  Eat more fiber  One of the best ways to help treat constipation is to increase your fiber intake. You can do this either through diet or by using fiber supplements. Fiber (in whole grains, fruits, and vegetables) adds bulk and absorbs water to soften the stool. This helps the stool pass through the colon more easily. When you increase your fiber intake, do it slowly to avoid side effects such as bloating. Also increase the amount of water that you drink. Eating more of the following foods can add fiber to your diet.    High-fiber cereals    Whole grains, bran, and brown rice    Vegetables such as carrots, broccoli, and greens    Fresh fruits (especially apples, pears, and dried fruits like raisins and apricots)    Nuts and legumes (especially beans such as lentils, kidney beans, and lima beans)  Get physically active  Exercise helps improve the working of your colon which helps ease constipation. Try to get some physical activity every day. If you haven t been active for a while, talk to your healthcare provider before starting again.  Laxatives  Your healthcare provider may suggest an over-the-counter product to help ease your constipation. He or she may suggest the use of bulk-forming agents or laxatives. The use of laxatives, if used as directed, is common and safe. Follow directions carefully when using them. See your healthcare  provider for new-onset constipation, or long-term constipation, to rule out other causes such as medicines or thyroid disease.  Date Last Reviewed: 7/1/2016 2000-2017 [employer]. [address] [place] [address], [place] [address]. All rights reserved. This information is not intended as a substitute for professional medical care. Always follow your healthcare professional's instructions.

## 2017-12-05 NOTE — MR AVS SNAPSHOT
After Visit Summary   12/5/2017    Di Evans    MRN: [id number]           Patient Information     Date Of Birth          1960        Visit Information        Provider Department      12/5/2017 9:00 AM Jordana Bethea PA-C [facility]        Today's Diagnoses     Screen for colon cancer    -  1    Flatulence, eructation, and gas pain        Slow transit constipation          Care Instructions    Miralax 17 grams daily for 3-5 days until stools are soft   Increase water     Try Metamucil fiber supplement or other fiber supplement  Eat more fruits and veggies   Constipation (Adult)  Constipation means that you have bowel movements that are less frequent than usual. Stools often become very hard and difficult to pass.  Constipation is very common. At some point in life it affects almost everyone. Since everyone's bowel habits are different, what is constipation to one person may not be to another. Your healthcare provider may do tests to diagnose constipation. It depends on what he or she finds when evaluating you.    Symptoms of constipation include:    Abdominal pain    Bloating    Vomiting    Painful bowel movements    Itching, swelling, bleeding, or pain around the anus  Causes  Constipation can have many causes. These include:    Diet low in fiber    Too much dairy    Not drinking enough liquids    Lack of exercise or physical activity. This is especially true for older adults.    Changes in lifestyle or daily routine, including pregnancy, aging, work, and travel    Frequent use or misuse of laxatives    Ignoring the urge to have a bowel movement or delaying it until later    Medicines, such as certain prescription pain medicines, iron supplements, antacids, certain antidepressants, and calcium supplements    Diseases like irritable bowel syndrome, bowel obstructions, stroke, diabetes, thyroid disease, Parkinson disease, hemorrhoids, and colon  cancer  Complications  Potential complications of constipation can include:    Hemorrhoids    Rectal bleeding from hemorrhoids or anal fissures (skin tears)    Hernias    Dependency on laxatives    Chronic constipation    Fecal impaction    Bowel obstruction or perforation  Home care  All treatment should be done after talking with your healthcare provider. This is especially true if you have another medical problems, are taking prescription medicines, or are an older adult. Treatment most often involves lifestyle changes. You may also need medicines. Your healthcare provider will tell you which will work best for you. Follow the advice below to help avoid this problem in the future.  Lifestyle changes  These lifestyle changes can help prevent constipation:    Diet. Eat a high-fiber diet, with fresh fruit and vegetables, and reduce dairy intake, meats, and processed foods    Fluids. It's important to get enough fluids each day. Drink plenty of water when you eat more fiber. If you are on diet that limits the amount of fluid you can have, talk about this with your healthcare provider.    Regular exercise. Check with your healthcare provider first.  Medications  Take any medicines as directed. Some laxatives are safe to use only every now and then. Others can be taken on a regular basis. Talk with your doctor or pharmacist if you have questions.  Prescription pain medicines can cause constipation. If you are taking this kind of medicine, ask your healthcare provider if you should also take a stool softener.  Medicines you may take to treat constipation include:    Fiber supplements    Stool softeners    Laxatives    Enemas    Rectal suppositories  Follow-up care  Follow up with your healthcare provider if symptoms don't get better in the next few days. You may need to have more tests or see a specialist.  Call 911  Call 911 if any of these occur:    Trouble breathing    Stiff, rigid abdomen that is severely painful to  touch    Confusion    Fainting or loss of consciousness    Rapid heart rate    Chest pain  When to seek medical advice  Call your healthcare provider right away if any of these occur:    Fever over 100.4 F (38 C)    Failure to resume normal bowel movements    Pain in your abdomen or back gets worse    Nausea or vomiting    Swelling in your abdomen    Blood in the stool    Black, tarry stool    Involuntary weight loss    Weakness  Date Last Reviewed: 12/30/2015 2000-2017 [employer]. [address] [place] [address], [place] [address]. All rights reserved. This information is not intended as a substitute for professional medical care. Always follow your healthcare professional's instructions.        Treating Constipation    Constipation is a common and often uncomfortable problem. Constipation means you have bowel movements fewer than 3 times per week, or strain to pass hard, dry stool. It can last a short time. Or it can be a problem that never seems to go away. The good news is that it can often be treated and controlled.  Eat more fiber  One of the best ways to help treat constipation is to increase your fiber intake. You can do this either through diet or by using fiber supplements. Fiber (in whole grains, fruits, and vegetables) adds bulk and absorbs water to soften the stool. This helps the stool pass through the colon more easily. When you increase your fiber intake, do it slowly to avoid side effects such as bloating. Also increase the amount of water that you drink. Eating more of the following foods can add fiber to your diet.    High-fiber cereals    Whole grains, bran, and brown rice    Vegetables such as carrots, broccoli, and greens    Fresh fruits (especially apples, pears, and dried fruits like raisins and apricots)    Nuts and legumes (especially beans such as lentils, kidney beans, and lima beans)  Get physically active  Exercise helps improve the working of your colon which helps ease  constipation. Try to get some physical activity every day. If you haven t been active for a while, talk to your healthcare provider before starting again.  Laxatives  Your healthcare provider may suggest an over-the-counter product to help ease your constipation. He or she may suggest the use of bulk-forming agents or laxatives. The use of laxatives, if used as directed, is common and safe. Follow directions carefully when using them. See your healthcare provider for new-onset constipation, or long-term constipation, to rule out other causes such as medicines or thyroid disease.  Date Last Reviewed: 7/1/2016 2000-2017 [employer]. [address] [place] [address], [place] [address]. All rights reserved. This information is not intended as a substitute for professional medical care. Always follow your healthcare professional's instructions.                Follow-ups after your visit        Future tests that were ordered for you today     Open Future Orders        Priority Expected Expires Ordered    Fecal colorectal cancer screen FIT - Future (S+30) Routine 12/26/2017 1/4/2018 12/5/2017            Who to contact     If you have questions or need follow up information about today's clinic visit or your schedule please contact [facility] directly at [phone].  Normal or non-critical lab and imaging results will be communicated to you by Matchbookhart, letter or phone within 4 business days after the clinic has received the results. If you do not hear from us within 7 days, please contact the clinic through Matchbookhart or phone. If you have a critical or abnormal lab result, we will notify you by phone as soon as possible.  Submit refill requests through [employer] or call your pharmacy and they will forward the refill request to us. Please allow 3 business days for your refill to be completed.          Additional Information About Your Visit        [employer] Information     [employer] gives you secure  access to your electronic health record. If you see a primary care provider, you can also send messages to your care team and make appointments. If you have questions, please call your primary care clinic.  If you do not have a primary care provider, please call [phone] and they will assist you.        Care EveryWhere ID     This is your Care EveryWhere ID. This could be used by other organizations to access your [facility] medical records  [id number]        Your Vitals Were     Temperature BMI (Body Mass Index)                97.9  F (36.6  C) (Oral) 28.63 kg/m2           Blood Pressure from Last 3 Encounters:   12/05/17 145/89   11/08/17 126/82   11/07/17 127/81    Weight from Last 3 Encounters:   12/05/17 176 lb (79.8 kg)   11/08/17 177 lb (80.3 kg)   10/31/16 179 lb 6.4 oz (81.4 kg)                 Today's Medication Changes          These changes are accurate as of: 12/5/17 10:06 AM.  If you have any questions, ask your nurse or doctor.               Start taking these medicines.        Dose/Directions    polyethylene glycol powder   Commonly known as:  MIRALAX   Used for:  Slow transit constipation   Started by:  Jordana Bethea PA-C        Dose:  1 capful   Take 17 g (1 capful) by mouth daily   Quantity:  510 g   Refills:  1            Where to get your medicines      These medications were sent to [facility] [address] [place] [address] [address] AT [facility] [address]  [address] [place] [address]     Phone:  [phone]     polyethylene glycol powder                Primary Care Provider Office Phone # Fax #    Sonja Jeni Hernandez [phone] [address]  [place] [address]        Equal Access to Services     [facility] AH: Lele pendletono Sobelkisali, waaxda luqadaha, qaybta kaalmada adeegyada, waxay vahid schulz. So [facility] [phone].    ATENCIÓN: Si beverly jo  disposición servicios gratuitos de asistencia lingüística. Sirena nolasco [phone].    We comply with applicable federal civil rights laws and [place] laws. We do not discriminate on the basis of race, color, national origin, age, disability, sex, sexual orientation, or gender identity.            Thank you!     Thank you for choosing [facility]  for your care. Our goal is always to provide you with excellent care. Hearing back from our patients is one way we can continue to improve our services. Please take a few minutes to complete the written survey that you may receive in the mail after your visit with us. Thank you!             Your Updated Medication List - Protect others around you: Learn how to safely use, store and throw away your medicines at www.disposemymeds.org.          This list is accurate as of: 12/5/17 10:06 AM.  Always use your most recent med list.                   Brand Name Dispense Instructions for use Diagnosis    aspirin 81 MG tablet      Take 1 tablet by mouth daily. *        hydrochlorothiazide 25 MG tablet    HYDRODIURIL    90 tablet    Take 1 tablet (25 mg) by mouth every morning    Hypertension goal BP (blood pressure) < 140/90       polyethylene glycol powder    MIRALAX    510 g    Take 17 g (1 capful) by mouth daily    Slow transit constipation       pravastatin 20 MG tablet    PRAVACHOL    90 tablet    Take 1 tablet (20 mg) by mouth every evening    Hyperlipidemia LDL goal <130

## 2018-01-01 ENCOUNTER — INFUSION THERAPY VISIT (OUTPATIENT)
Dept: INFUSION THERAPY | Facility: CLINIC | Age: 58
End: 2018-01-01
Payer: COMMERCIAL

## 2018-01-01 ENCOUNTER — RADIANT APPOINTMENT (OUTPATIENT)
Dept: MAMMOGRAPHY | Facility: CLINIC | Age: 58
End: 2018-01-01
Payer: COMMERCIAL

## 2018-01-01 ENCOUNTER — CARE COORDINATION (OUTPATIENT)
Dept: ONCOLOGY | Facility: CLINIC | Age: 58
End: 2018-01-01

## 2018-01-01 ENCOUNTER — TELEPHONE (OUTPATIENT)
Dept: INFUSION THERAPY | Facility: CLINIC | Age: 58
End: 2018-01-01

## 2018-01-01 ENCOUNTER — PRE VISIT (OUTPATIENT)
Dept: ONCOLOGY | Facility: CLINIC | Age: 58
End: 2018-01-01

## 2018-01-01 ENCOUNTER — ONCOLOGY VISIT (OUTPATIENT)
Dept: ONCOLOGY | Facility: CLINIC | Age: 58
End: 2018-01-01
Payer: COMMERCIAL

## 2018-01-01 ENCOUNTER — TELEPHONE (OUTPATIENT)
Dept: ONCOLOGY | Facility: CLINIC | Age: 58
End: 2018-01-01

## 2018-01-01 ENCOUNTER — ANCILLARY PROCEDURE (OUTPATIENT)
Dept: GENERAL RADIOLOGY | Facility: CLINIC | Age: 58
End: 2018-01-01
Payer: COMMERCIAL

## 2018-01-01 ENCOUNTER — OFFICE VISIT (OUTPATIENT)
Dept: FAMILY MEDICINE | Facility: CLINIC | Age: 58
End: 2018-01-01
Payer: COMMERCIAL

## 2018-01-01 ENCOUNTER — MYC MEDICAL ADVICE (OUTPATIENT)
Dept: ONCOLOGY | Facility: CLINIC | Age: 58
End: 2018-01-01

## 2018-01-01 ENCOUNTER — TELEPHONE (OUTPATIENT)
Dept: FAMILY MEDICINE | Facility: CLINIC | Age: 58
End: 2018-01-01

## 2018-01-01 ENCOUNTER — RADIANT APPOINTMENT (OUTPATIENT)
Dept: CT IMAGING | Facility: CLINIC | Age: 58
End: 2018-01-01
Attending: OBSTETRICS & GYNECOLOGY
Payer: COMMERCIAL

## 2018-01-01 ENCOUNTER — MYC MEDICAL ADVICE (OUTPATIENT)
Dept: FAMILY MEDICINE | Facility: CLINIC | Age: 58
End: 2018-01-01

## 2018-01-01 ENCOUNTER — TRANSFERRED RECORDS (OUTPATIENT)
Dept: HEALTH INFORMATION MANAGEMENT | Facility: CLINIC | Age: 58
End: 2018-01-01

## 2018-01-01 VITALS — SYSTOLIC BLOOD PRESSURE: 166 MMHG | DIASTOLIC BLOOD PRESSURE: 102 MMHG

## 2018-01-01 VITALS
HEART RATE: 100 BPM | SYSTOLIC BLOOD PRESSURE: 150 MMHG | OXYGEN SATURATION: 97 % | BODY MASS INDEX: 25.56 KG/M2 | OXYGEN SATURATION: 97 % | HEART RATE: 99 BPM | TEMPERATURE: 98.1 F | WEIGHT: 153.44 LBS | WEIGHT: 155.13 LBS | HEIGHT: 65 IN | DIASTOLIC BLOOD PRESSURE: 88 MMHG | DIASTOLIC BLOOD PRESSURE: 84 MMHG | HEIGHT: 65 IN | BODY MASS INDEX: 25.85 KG/M2 | TEMPERATURE: 98.3 F | SYSTOLIC BLOOD PRESSURE: 150 MMHG | RESPIRATION RATE: 16 BRPM | RESPIRATION RATE: 18 BRPM

## 2018-01-01 VITALS
HEIGHT: 65 IN | WEIGHT: 152.13 LBS | TEMPERATURE: 98.4 F | BODY MASS INDEX: 25.34 KG/M2 | RESPIRATION RATE: 16 BRPM | OXYGEN SATURATION: 97 % | HEART RATE: 100 BPM | SYSTOLIC BLOOD PRESSURE: 168 MMHG | DIASTOLIC BLOOD PRESSURE: 100 MMHG

## 2018-01-01 VITALS
HEART RATE: 84 BPM | WEIGHT: 153.8 LBS | BODY MASS INDEX: 25.59 KG/M2 | SYSTOLIC BLOOD PRESSURE: 161 MMHG | TEMPERATURE: 97.1 F | DIASTOLIC BLOOD PRESSURE: 84 MMHG | OXYGEN SATURATION: 98 %

## 2018-01-01 VITALS — HEART RATE: 82 BPM | SYSTOLIC BLOOD PRESSURE: 167 MMHG | DIASTOLIC BLOOD PRESSURE: 91 MMHG

## 2018-01-01 VITALS
HEART RATE: 95 BPM | RESPIRATION RATE: 16 BRPM | TEMPERATURE: 97.8 F | WEIGHT: 145 LBS | SYSTOLIC BLOOD PRESSURE: 130 MMHG | OXYGEN SATURATION: 99 % | BODY MASS INDEX: 24.16 KG/M2 | HEIGHT: 65 IN | DIASTOLIC BLOOD PRESSURE: 85 MMHG

## 2018-01-01 VITALS
WEIGHT: 151 LBS | TEMPERATURE: 98 F | DIASTOLIC BLOOD PRESSURE: 86 MMHG | SYSTOLIC BLOOD PRESSURE: 134 MMHG | HEIGHT: 65 IN | HEART RATE: 102 BPM | BODY MASS INDEX: 25.16 KG/M2 | OXYGEN SATURATION: 98 %

## 2018-01-01 VITALS
DIASTOLIC BLOOD PRESSURE: 89 MMHG | SYSTOLIC BLOOD PRESSURE: 139 MMHG | OXYGEN SATURATION: 96 % | RESPIRATION RATE: 16 BRPM | HEART RATE: 80 BPM | TEMPERATURE: 98.3 F

## 2018-01-01 VITALS
TEMPERATURE: 98.2 F | DIASTOLIC BLOOD PRESSURE: 72 MMHG | SYSTOLIC BLOOD PRESSURE: 155 MMHG | HEART RATE: 119 BPM | BODY MASS INDEX: 25.43 KG/M2 | WEIGHT: 152.8 LBS | OXYGEN SATURATION: 98 %

## 2018-01-01 VITALS
TEMPERATURE: 98.2 F | WEIGHT: 153.5 LBS | SYSTOLIC BLOOD PRESSURE: 156 MMHG | DIASTOLIC BLOOD PRESSURE: 91 MMHG | BODY MASS INDEX: 25.54 KG/M2 | HEART RATE: 90 BPM | OXYGEN SATURATION: 98 %

## 2018-01-01 DIAGNOSIS — E78.5 HYPERLIPIDEMIA LDL GOAL <130: ICD-10-CM

## 2018-01-01 DIAGNOSIS — R73.09 ELEVATED GLUCOSE: ICD-10-CM

## 2018-01-01 DIAGNOSIS — E83.42 HYPOMAGNESEMIA: ICD-10-CM

## 2018-01-01 DIAGNOSIS — K59.03 DRUG-INDUCED CONSTIPATION: ICD-10-CM

## 2018-01-01 DIAGNOSIS — C56.9 OVARIAN CANCER, UNSPECIFIED LATERALITY (H): Primary | ICD-10-CM

## 2018-01-01 DIAGNOSIS — T45.1X5A ADVERSE EFFECT OF PACLITAXEL, INITIAL ENCOUNTER: Primary | ICD-10-CM

## 2018-01-01 DIAGNOSIS — I10 ESSENTIAL HYPERTENSION WITH GOAL BLOOD PRESSURE LESS THAN 140/90: Primary | ICD-10-CM

## 2018-01-01 DIAGNOSIS — I10 ESSENTIAL HYPERTENSION WITH GOAL BLOOD PRESSURE LESS THAN 140/90: ICD-10-CM

## 2018-01-01 DIAGNOSIS — R11.0 NAUSEA: ICD-10-CM

## 2018-01-01 DIAGNOSIS — Z80.3 FAMILY HISTORY OF MALIGNANT NEOPLASM OF BREAST: Primary | ICD-10-CM

## 2018-01-01 DIAGNOSIS — C56.9 OVARIAN CANCER, UNSPECIFIED LATERALITY (H): ICD-10-CM

## 2018-01-01 DIAGNOSIS — I10 HYPERTENSION GOAL BP (BLOOD PRESSURE) < 140/90: ICD-10-CM

## 2018-01-01 DIAGNOSIS — E87.6 HYPOKALEMIA: ICD-10-CM

## 2018-01-01 DIAGNOSIS — Z12.31 ENCOUNTER FOR SCREENING MAMMOGRAM FOR BREAST CANCER: ICD-10-CM

## 2018-01-01 DIAGNOSIS — K59.00 CONSTIPATION, UNSPECIFIED CONSTIPATION TYPE: ICD-10-CM

## 2018-01-01 LAB
ALBUMIN SERPL-MCNC: 3.2 G/DL (ref 3.4–5)
ALBUMIN SERPL-MCNC: 3.3 G/DL (ref 3.4–5)
ALBUMIN SERPL-MCNC: 3.3 G/DL (ref 3.4–5)
ALBUMIN SERPL-MCNC: 3.4 G/DL (ref 3.4–5)
ALP SERPL-CCNC: 68 U/L (ref 40–150)
ALP SERPL-CCNC: 72 U/L (ref 40–150)
ALP SERPL-CCNC: 88 U/L (ref 40–150)
ALP SERPL-CCNC: 90 U/L (ref 40–150)
ALP SERPL-CCNC: 91 U/L (ref 40–150)
ALP SERPL-CCNC: 93 U/L (ref 40–150)
ALT SERPL W P-5'-P-CCNC: 24 U/L (ref 0–50)
ALT SERPL W P-5'-P-CCNC: 24 U/L (ref 0–50)
ALT SERPL W P-5'-P-CCNC: 25 U/L (ref 0–50)
ALT SERPL W P-5'-P-CCNC: 27 U/L (ref 0–50)
ALT SERPL W P-5'-P-CCNC: 30 U/L (ref 0–50)
ALT SERPL W P-5'-P-CCNC: 36 U/L (ref 0–50)
ANION GAP SERPL CALCULATED.3IONS-SCNC: 5 MMOL/L (ref 3–14)
ANION GAP SERPL CALCULATED.3IONS-SCNC: 6 MMOL/L (ref 3–14)
ANION GAP SERPL CALCULATED.3IONS-SCNC: 6 MMOL/L (ref 3–14)
ANION GAP SERPL CALCULATED.3IONS-SCNC: 7 MMOL/L (ref 3–14)
ANION GAP SERPL CALCULATED.3IONS-SCNC: 8 MMOL/L (ref 3–14)
ANION GAP SERPL CALCULATED.3IONS-SCNC: 9 MMOL/L (ref 3–14)
AST SERPL W P-5'-P-CCNC: 20 U/L (ref 0–45)
AST SERPL W P-5'-P-CCNC: 22 U/L (ref 0–45)
AST SERPL W P-5'-P-CCNC: 23 U/L (ref 0–45)
AST SERPL W P-5'-P-CCNC: 24 U/L (ref 0–45)
AST SERPL W P-5'-P-CCNC: 24 U/L (ref 0–45)
AST SERPL W P-5'-P-CCNC: 27 U/L (ref 0–45)
BASOPHILS # BLD AUTO: 0 10E9/L (ref 0–0.2)
BASOPHILS # BLD AUTO: 0.1 10E9/L (ref 0–0.2)
BASOPHILS NFR BLD AUTO: 0 %
BASOPHILS NFR BLD AUTO: 0 %
BASOPHILS NFR BLD AUTO: 0.2 %
BASOPHILS NFR BLD AUTO: 0.8 %
BASOPHILS NFR BLD AUTO: 1.3 %
BILIRUB SERPL-MCNC: 0.2 MG/DL (ref 0.2–1.3)
BILIRUB SERPL-MCNC: 0.3 MG/DL (ref 0.2–1.3)
BILIRUB SERPL-MCNC: 0.4 MG/DL (ref 0.2–1.3)
BUN SERPL-MCNC: 11 MG/DL (ref 7–30)
BUN SERPL-MCNC: 14 MG/DL (ref 7–30)
BUN SERPL-MCNC: 14 MG/DL (ref 7–30)
BUN SERPL-MCNC: 15 MG/DL (ref 7–30)
BUN SERPL-MCNC: 20 MG/DL (ref 7–30)
BUN SERPL-MCNC: 21 MG/DL (ref 7–30)
CALCIUM SERPL-MCNC: 9 MG/DL (ref 8.5–10.1)
CALCIUM SERPL-MCNC: 9.2 MG/DL (ref 8.5–10.1)
CALCIUM SERPL-MCNC: 9.3 MG/DL (ref 8.5–10.1)
CALCIUM SERPL-MCNC: 9.5 MG/DL (ref 8.5–10.1)
CALCIUM SERPL-MCNC: 9.7 MG/DL (ref 8.5–10.1)
CALCIUM SERPL-MCNC: 9.9 MG/DL (ref 8.5–10.1)
CANCER AG125 SERPL-ACNC: 110 U/ML (ref 0–30)
CANCER AG125 SERPL-ACNC: 48 U/ML (ref 0–30)
CANCER AG125 SERPL-ACNC: 59 U/ML (ref 0–30)
CANCER AG125 SERPL-ACNC: 71 U/ML (ref 0–30)
CANCER AG125 SERPL-ACNC: 71 U/ML (ref 0–30)
CANCER AG125 SERPL-ACNC: 75 U/ML (ref 0–30)
CHLORIDE SERPL-SCNC: 100 MMOL/L (ref 94–109)
CHLORIDE SERPL-SCNC: 100 MMOL/L (ref 94–109)
CHLORIDE SERPL-SCNC: 101 MMOL/L (ref 94–109)
CHLORIDE SERPL-SCNC: 102 MMOL/L (ref 94–109)
CHLORIDE SERPL-SCNC: 104 MMOL/L (ref 94–109)
CHLORIDE SERPL-SCNC: 104 MMOL/L (ref 94–109)
CHOLEST SERPL-MCNC: 179 MG/DL
CO2 SERPL-SCNC: 28 MMOL/L (ref 20–32)
CO2 SERPL-SCNC: 28 MMOL/L (ref 20–32)
CO2 SERPL-SCNC: 29 MMOL/L (ref 20–32)
CO2 SERPL-SCNC: 30 MMOL/L (ref 20–32)
CO2 SERPL-SCNC: 30 MMOL/L (ref 20–32)
CO2 SERPL-SCNC: 31 MMOL/L (ref 20–32)
CREAT BLD-MCNC: 0.5 MG/DL (ref 0.52–1.04)
CREAT SERPL-MCNC: 0.54 MG/DL (ref 0.52–1.04)
CREAT SERPL-MCNC: 0.54 MG/DL (ref 0.52–1.04)
CREAT SERPL-MCNC: 0.55 MG/DL (ref 0.52–1.04)
CREAT SERPL-MCNC: 0.56 MG/DL (ref 0.52–1.04)
CREAT SERPL-MCNC: 0.6 MG/DL (ref 0.52–1.04)
CREAT SERPL-MCNC: 0.64 MG/DL (ref 0.52–1.04)
DIFFERENTIAL METHOD BLD: ABNORMAL
EOSINOPHIL # BLD AUTO: 0 10E9/L (ref 0–0.7)
EOSINOPHIL # BLD AUTO: 0.1 10E9/L (ref 0–0.7)
EOSINOPHIL # BLD AUTO: 0.2 10E9/L (ref 0–0.7)
EOSINOPHIL NFR BLD AUTO: 0 %
EOSINOPHIL NFR BLD AUTO: 2.3 %
EOSINOPHIL NFR BLD AUTO: 4.1 %
ERYTHROCYTE [DISTWIDTH] IN BLOOD BY AUTOMATED COUNT: 15.4 % (ref 10–15)
ERYTHROCYTE [DISTWIDTH] IN BLOOD BY AUTOMATED COUNT: 15.7 % (ref 10–15)
ERYTHROCYTE [DISTWIDTH] IN BLOOD BY AUTOMATED COUNT: 15.9 % (ref 10–15)
ERYTHROCYTE [DISTWIDTH] IN BLOOD BY AUTOMATED COUNT: 16.1 % (ref 10–15)
ERYTHROCYTE [DISTWIDTH] IN BLOOD BY AUTOMATED COUNT: 16.9 % (ref 10–15)
ERYTHROCYTE [DISTWIDTH] IN BLOOD BY AUTOMATED COUNT: 18.2 % (ref 10–15)
GFR SERPL CREATININE-BSD FRML MDRD: >90 ML/MIN/1.7M2
GLUCOSE SERPL-MCNC: 101 MG/DL (ref 70–99)
GLUCOSE SERPL-MCNC: 115 MG/DL (ref 70–99)
GLUCOSE SERPL-MCNC: 152 MG/DL (ref 70–99)
GLUCOSE SERPL-MCNC: 174 MG/DL (ref 70–99)
GLUCOSE SERPL-MCNC: 176 MG/DL (ref 70–99)
GLUCOSE SERPL-MCNC: 197 MG/DL (ref 70–99)
HBA1C MFR BLD: 6.3 % (ref 0–5.6)
HCT VFR BLD AUTO: 30.7 % (ref 35–47)
HCT VFR BLD AUTO: 31 % (ref 35–47)
HCT VFR BLD AUTO: 35.3 % (ref 35–47)
HCT VFR BLD AUTO: 35.3 % (ref 35–47)
HCT VFR BLD AUTO: 35.8 % (ref 35–47)
HCT VFR BLD AUTO: 36.3 % (ref 35–47)
HDLC SERPL-MCNC: 68 MG/DL
HGB BLD-MCNC: 10.2 G/DL (ref 11.7–15.7)
HGB BLD-MCNC: 10.2 G/DL (ref 11.7–15.7)
HGB BLD-MCNC: 11 G/DL (ref 11.7–15.7)
HGB BLD-MCNC: 11.6 G/DL (ref 11.7–15.7)
HGB BLD-MCNC: 11.6 G/DL (ref 11.7–15.7)
HGB BLD-MCNC: 12.2 G/DL (ref 11.7–15.7)
IMM GRANULOCYTES # BLD: 0 10E9/L (ref 0–0.4)
IMM GRANULOCYTES NFR BLD: 0.3 %
IMM GRANULOCYTES NFR BLD: 0.3 %
IMM GRANULOCYTES NFR BLD: 0.4 %
IMM GRANULOCYTES NFR BLD: 0.4 %
IMM GRANULOCYTES NFR BLD: 0.5 %
LAB SCANNED RESULT: NORMAL
LDLC SERPL CALC-MCNC: 96 MG/DL
LYMPHOCYTES # BLD AUTO: 0.4 10E9/L (ref 0.8–5.3)
LYMPHOCYTES # BLD AUTO: 0.5 10E9/L (ref 0.8–5.3)
LYMPHOCYTES # BLD AUTO: 0.5 10E9/L (ref 0.8–5.3)
LYMPHOCYTES # BLD AUTO: 0.6 10E9/L (ref 0.8–5.3)
LYMPHOCYTES # BLD AUTO: 1.2 10E9/L (ref 0.8–5.3)
LYMPHOCYTES # BLD AUTO: 1.5 10E9/L (ref 0.8–5.3)
LYMPHOCYTES NFR BLD AUTO: 11.1 %
LYMPHOCYTES NFR BLD AUTO: 12.5 %
LYMPHOCYTES NFR BLD AUTO: 16 %
LYMPHOCYTES NFR BLD AUTO: 23.7 %
LYMPHOCYTES NFR BLD AUTO: 37.1 %
LYMPHOCYTES NFR BLD AUTO: 8.7 %
MAGNESIUM SERPL-MCNC: 1.3 MG/DL (ref 1.6–2.3)
MAGNESIUM SERPL-MCNC: 1.4 MG/DL (ref 1.6–2.3)
MAGNESIUM SERPL-MCNC: 1.6 MG/DL (ref 1.6–2.3)
MAGNESIUM SERPL-MCNC: 1.7 MG/DL (ref 1.6–2.3)
MAGNESIUM SERPL-MCNC: 2 MG/DL (ref 1.6–2.3)
MAGNESIUM SERPL-MCNC: 2.1 MG/DL (ref 1.6–2.3)
MCH RBC QN AUTO: 27.3 PG (ref 26.5–33)
MCH RBC QN AUTO: 28.2 PG (ref 26.5–33)
MCH RBC QN AUTO: 28.6 PG (ref 26.5–33)
MCH RBC QN AUTO: 28.8 PG (ref 26.5–33)
MCH RBC QN AUTO: 29.4 PG (ref 26.5–33)
MCH RBC QN AUTO: 29.7 PG (ref 26.5–33)
MCHC RBC AUTO-ENTMCNC: 31.2 G/DL (ref 31.5–36.5)
MCHC RBC AUTO-ENTMCNC: 32.4 G/DL (ref 31.5–36.5)
MCHC RBC AUTO-ENTMCNC: 32.9 G/DL (ref 31.5–36.5)
MCHC RBC AUTO-ENTMCNC: 32.9 G/DL (ref 31.5–36.5)
MCHC RBC AUTO-ENTMCNC: 33.2 G/DL (ref 31.5–36.5)
MCHC RBC AUTO-ENTMCNC: 33.6 G/DL (ref 31.5–36.5)
MCV RBC AUTO: 86 FL (ref 78–100)
MCV RBC AUTO: 87 FL (ref 78–100)
MCV RBC AUTO: 87 FL (ref 78–100)
MCV RBC AUTO: 88 FL (ref 78–100)
MCV RBC AUTO: 89 FL (ref 78–100)
MCV RBC AUTO: 90 FL (ref 78–100)
MISCELLANEOUS TEST: NORMAL
MONOCYTES # BLD AUTO: 0 10E9/L (ref 0–1.3)
MONOCYTES # BLD AUTO: 0.1 10E9/L (ref 0–1.3)
MONOCYTES # BLD AUTO: 0.6 10E9/L (ref 0–1.3)
MONOCYTES # BLD AUTO: 0.6 10E9/L (ref 0–1.3)
MONOCYTES NFR BLD AUTO: 0.8 %
MONOCYTES NFR BLD AUTO: 0.9 %
MONOCYTES NFR BLD AUTO: 1 %
MONOCYTES NFR BLD AUTO: 1 %
MONOCYTES NFR BLD AUTO: 11.2 %
MONOCYTES NFR BLD AUTO: 15 %
NEUTROPHILS # BLD AUTO: 1.7 10E9/L (ref 1.6–8.3)
NEUTROPHILS # BLD AUTO: 2.7 10E9/L (ref 1.6–8.3)
NEUTROPHILS # BLD AUTO: 2.8 10E9/L (ref 1.6–8.3)
NEUTROPHILS # BLD AUTO: 3 10E9/L (ref 1.6–8.3)
NEUTROPHILS # BLD AUTO: 4.3 10E9/L (ref 1.6–8.3)
NEUTROPHILS # BLD AUTO: 5.7 10E9/L (ref 1.6–8.3)
NEUTROPHILS NFR BLD AUTO: 44 %
NEUTROPHILS NFR BLD AUTO: 59.8 %
NEUTROPHILS NFR BLD AUTO: 83 %
NEUTROPHILS NFR BLD AUTO: 86.3 %
NEUTROPHILS NFR BLD AUTO: 87.7 %
NEUTROPHILS NFR BLD AUTO: 89.6 %
NONHDLC SERPL-MCNC: 111 MG/DL
PLATELET # BLD AUTO: 112 10E9/L (ref 150–450)
PLATELET # BLD AUTO: 137 10E9/L (ref 150–450)
PLATELET # BLD AUTO: 151 10E9/L (ref 150–450)
PLATELET # BLD AUTO: 243 10E9/L (ref 150–450)
PLATELET # BLD AUTO: 415 10E9/L (ref 150–450)
PLATELET # BLD AUTO: 433 10E9/L (ref 150–450)
PLATELET # BLD EST: ABNORMAL 10*3/UL
POTASSIUM SERPL-SCNC: 3 MMOL/L (ref 3.4–5.3)
POTASSIUM SERPL-SCNC: 3.2 MMOL/L (ref 3.4–5.3)
POTASSIUM SERPL-SCNC: 3.4 MMOL/L (ref 3.4–5.3)
POTASSIUM SERPL-SCNC: 3.4 MMOL/L (ref 3.4–5.3)
POTASSIUM SERPL-SCNC: 3.5 MMOL/L (ref 3.4–5.3)
POTASSIUM SERPL-SCNC: 3.6 MMOL/L (ref 3.4–5.3)
PROT SERPL-MCNC: 6.8 G/DL (ref 6.8–8.8)
PROT SERPL-MCNC: 7.1 G/DL (ref 6.8–8.8)
PROT SERPL-MCNC: 7.7 G/DL (ref 6.8–8.8)
PROT SERPL-MCNC: 8.1 G/DL (ref 6.8–8.8)
PROT SERPL-MCNC: 8.3 G/DL (ref 6.8–8.8)
PROT SERPL-MCNC: 8.4 G/DL (ref 6.8–8.8)
RBC # BLD AUTO: 3.44 10E12/L (ref 3.8–5.2)
RBC # BLD AUTO: 3.47 10E12/L (ref 3.8–5.2)
RBC # BLD AUTO: 4.03 10E12/L (ref 3.8–5.2)
RBC # BLD AUTO: 4.06 10E12/L (ref 3.8–5.2)
RBC # BLD AUTO: 4.12 10E12/L (ref 3.8–5.2)
RBC # BLD AUTO: 4.23 10E12/L (ref 3.8–5.2)
RBC MORPH BLD: NORMAL
SODIUM SERPL-SCNC: 137 MMOL/L (ref 133–144)
SODIUM SERPL-SCNC: 138 MMOL/L (ref 133–144)
SODIUM SERPL-SCNC: 139 MMOL/L (ref 133–144)
SODIUM SERPL-SCNC: 140 MMOL/L (ref 133–144)
TRIGL SERPL-MCNC: 74 MG/DL
WBC # BLD AUTO: 3.2 10E9/L (ref 4–11)
WBC # BLD AUTO: 3.3 10E9/L (ref 4–11)
WBC # BLD AUTO: 3.9 10E9/L (ref 4–11)
WBC # BLD AUTO: 4.9 10E9/L (ref 4–11)
WBC # BLD AUTO: 5.1 10E9/L (ref 4–11)
WBC # BLD AUTO: 6.3 10E9/L (ref 4–11)

## 2018-01-01 PROCEDURE — 80053 COMPREHEN METABOLIC PANEL: CPT | Performed by: OBSTETRICS & GYNECOLOGY

## 2018-01-01 PROCEDURE — 83735 ASSAY OF MAGNESIUM: CPT | Performed by: OBSTETRICS & GYNECOLOGY

## 2018-01-01 PROCEDURE — 90472 IMMUNIZATION ADMIN EACH ADD: CPT | Performed by: OBSTETRICS & GYNECOLOGY

## 2018-01-01 PROCEDURE — 96413 CHEMO IV INFUSION 1 HR: CPT | Performed by: NURSE PRACTITIONER

## 2018-01-01 PROCEDURE — 99214 OFFICE O/P EST MOD 30 MIN: CPT | Mod: 25 | Performed by: NURSE PRACTITIONER

## 2018-01-01 PROCEDURE — 85025 COMPLETE CBC W/AUTO DIFF WBC: CPT | Performed by: OBSTETRICS & GYNECOLOGY

## 2018-01-01 PROCEDURE — 96415 CHEMO IV INFUSION ADDL HR: CPT | Performed by: NURSE PRACTITIONER

## 2018-01-01 PROCEDURE — 96413 CHEMO IV INFUSION 1 HR: CPT | Performed by: OBSTETRICS & GYNECOLOGY

## 2018-01-01 PROCEDURE — 99207 ZZC NO CHARGE LOS: CPT

## 2018-01-01 PROCEDURE — 96367 TX/PROPH/DG ADDL SEQ IV INF: CPT | Performed by: OBSTETRICS & GYNECOLOGY

## 2018-01-01 PROCEDURE — 83735 ASSAY OF MAGNESIUM: CPT | Performed by: NURSE PRACTITIONER

## 2018-01-01 PROCEDURE — 96417 CHEMO IV INFUS EACH ADDL SEQ: CPT | Performed by: OBSTETRICS & GYNECOLOGY

## 2018-01-01 PROCEDURE — 96375 TX/PRO/DX INJ NEW DRUG ADDON: CPT | Performed by: OBSTETRICS & GYNECOLOGY

## 2018-01-01 PROCEDURE — 96415 CHEMO IV INFUSION ADDL HR: CPT | Performed by: INTERNAL MEDICINE

## 2018-01-01 PROCEDURE — 99207 ZZC NO CHARGE NURSE ONLY: CPT

## 2018-01-01 PROCEDURE — 96367 TX/PROPH/DG ADDL SEQ IV INF: CPT | Performed by: NURSE PRACTITIONER

## 2018-01-01 PROCEDURE — 96415 CHEMO IV INFUSION ADDL HR: CPT | Performed by: OBSTETRICS & GYNECOLOGY

## 2018-01-01 PROCEDURE — 86304 IMMUNOASSAY TUMOR CA 125: CPT | Performed by: OBSTETRICS & GYNECOLOGY

## 2018-01-01 PROCEDURE — 90471 IMMUNIZATION ADMIN: CPT | Performed by: OBSTETRICS & GYNECOLOGY

## 2018-01-01 PROCEDURE — 90732 PPSV23 VACC 2 YRS+ SUBQ/IM: CPT | Performed by: OBSTETRICS & GYNECOLOGY

## 2018-01-01 PROCEDURE — 71260 CT THORAX DX C+: CPT | Performed by: RADIOLOGY

## 2018-01-01 PROCEDURE — 96375 TX/PRO/DX INJ NEW DRUG ADDON: CPT | Performed by: NURSE PRACTITIONER

## 2018-01-01 PROCEDURE — S0028 INJECTION, FAMOTIDINE, 20 MG: HCPCS | Performed by: INTERNAL MEDICINE

## 2018-01-01 PROCEDURE — 96040 ZZC GENETIC COUNSELING, EACH 30 MIN: CPT | Performed by: GENETIC COUNSELOR, MS

## 2018-01-01 PROCEDURE — 99214 OFFICE O/P EST MOD 30 MIN: CPT | Mod: 24 | Performed by: OBSTETRICS & GYNECOLOGY

## 2018-01-01 PROCEDURE — 83036 HEMOGLOBIN GLYCOSYLATED A1C: CPT | Performed by: NURSE PRACTITIONER

## 2018-01-01 PROCEDURE — 96368 THER/DIAG CONCURRENT INF: CPT | Performed by: NURSE PRACTITIONER

## 2018-01-01 PROCEDURE — 99214 OFFICE O/P EST MOD 30 MIN: CPT | Mod: 25 | Performed by: OBSTETRICS & GYNECOLOGY

## 2018-01-01 PROCEDURE — 85025 COMPLETE CBC W/AUTO DIFF WBC: CPT | Performed by: NURSE PRACTITIONER

## 2018-01-01 PROCEDURE — 99214 OFFICE O/P EST MOD 30 MIN: CPT | Performed by: NURSE PRACTITIONER

## 2018-01-01 PROCEDURE — 82565 ASSAY OF CREATININE: CPT | Performed by: RADIOLOGY

## 2018-01-01 PROCEDURE — 96413 CHEMO IV INFUSION 1 HR: CPT | Performed by: INTERNAL MEDICINE

## 2018-01-01 PROCEDURE — S0028 INJECTION, FAMOTIDINE, 20 MG: HCPCS | Performed by: OBSTETRICS & GYNECOLOGY

## 2018-01-01 PROCEDURE — 74177 CT ABD & PELVIS W/CONTRAST: CPT | Performed by: RADIOLOGY

## 2018-01-01 PROCEDURE — 99214 OFFICE O/P EST MOD 30 MIN: CPT | Performed by: FAMILY MEDICINE

## 2018-01-01 PROCEDURE — 86304 IMMUNOASSAY TUMOR CA 125: CPT | Performed by: NURSE PRACTITIONER

## 2018-01-01 PROCEDURE — 96417 CHEMO IV INFUS EACH ADDL SEQ: CPT | Performed by: INTERNAL MEDICINE

## 2018-01-01 PROCEDURE — S0028 INJECTION, FAMOTIDINE, 20 MG: HCPCS | Performed by: NURSE PRACTITIONER

## 2018-01-01 PROCEDURE — 77067 SCR MAMMO BI INCL CAD: CPT | Mod: TC

## 2018-01-01 PROCEDURE — 90686 IIV4 VACC NO PRSV 0.5 ML IM: CPT | Performed by: OBSTETRICS & GYNECOLOGY

## 2018-01-01 PROCEDURE — 96417 CHEMO IV INFUS EACH ADDL SEQ: CPT | Performed by: NURSE PRACTITIONER

## 2018-01-01 PROCEDURE — 96375 TX/PRO/DX INJ NEW DRUG ADDON: CPT | Performed by: INTERNAL MEDICINE

## 2018-01-01 PROCEDURE — 96367 TX/PROPH/DG ADDL SEQ IV INF: CPT | Performed by: INTERNAL MEDICINE

## 2018-01-01 PROCEDURE — 96368 THER/DIAG CONCURRENT INF: CPT | Performed by: OBSTETRICS & GYNECOLOGY

## 2018-01-01 PROCEDURE — 80053 COMPREHEN METABOLIC PANEL: CPT | Performed by: NURSE PRACTITIONER

## 2018-01-01 PROCEDURE — 80061 LIPID PANEL: CPT | Performed by: OBSTETRICS & GYNECOLOGY

## 2018-01-01 RX ORDER — HEPARIN SODIUM (PORCINE) LOCK FLUSH IV SOLN 100 UNIT/ML 100 UNIT/ML
500 SOLUTION INTRAVENOUS DAILY PRN
Status: DISCONTINUED | OUTPATIENT
Start: 2018-01-01 | End: 2018-01-01 | Stop reason: HOSPADM

## 2018-01-01 RX ORDER — PALONOSETRON 0.05 MG/ML
0.25 INJECTION, SOLUTION INTRAVENOUS ONCE
Status: COMPLETED | OUTPATIENT
Start: 2018-01-01 | End: 2018-01-01

## 2018-01-01 RX ORDER — LORAZEPAM 2 MG/ML
1 INJECTION INTRAMUSCULAR EVERY 6 HOURS PRN
Status: CANCELLED
Start: 2018-01-01

## 2018-01-01 RX ORDER — HEPARIN SODIUM (PORCINE) LOCK FLUSH IV SOLN 100 UNIT/ML 100 UNIT/ML
5 SOLUTION INTRAVENOUS ONCE
Status: CANCELLED
Start: 2018-01-01 | End: 2018-01-01

## 2018-01-01 RX ORDER — SODIUM CHLORIDE 9 MG/ML
1000 INJECTION, SOLUTION INTRAVENOUS CONTINUOUS PRN
Status: CANCELLED
Start: 2018-01-01

## 2018-01-01 RX ORDER — HEPARIN SODIUM (PORCINE) LOCK FLUSH IV SOLN 100 UNIT/ML 100 UNIT/ML
5 SOLUTION INTRAVENOUS
Status: DISCONTINUED | OUTPATIENT
Start: 2018-01-01 | End: 2018-01-01 | Stop reason: HOSPADM

## 2018-01-01 RX ORDER — POTASSIUM CHLORIDE 750 MG/1
40 TABLET, EXTENDED RELEASE ORAL ONCE
Status: COMPLETED | OUTPATIENT
Start: 2018-01-01 | End: 2018-01-01

## 2018-01-01 RX ORDER — MEPERIDINE HYDROCHLORIDE 25 MG/ML
25 INJECTION INTRAMUSCULAR; INTRAVENOUS; SUBCUTANEOUS EVERY 30 MIN PRN
Status: CANCELLED | OUTPATIENT
Start: 2018-01-01

## 2018-01-01 RX ORDER — ALBUTEROL SULFATE 0.83 MG/ML
2.5 SOLUTION RESPIRATORY (INHALATION)
Status: CANCELLED | OUTPATIENT
Start: 2018-01-01

## 2018-01-01 RX ORDER — PALONOSETRON 0.05 MG/ML
0.25 INJECTION, SOLUTION INTRAVENOUS ONCE
Status: CANCELLED
Start: 2018-01-01

## 2018-01-01 RX ORDER — METHYLPREDNISOLONE SODIUM SUCCINATE 125 MG/2ML
125 INJECTION, POWDER, LYOPHILIZED, FOR SOLUTION INTRAMUSCULAR; INTRAVENOUS
Status: CANCELLED
Start: 2018-01-01

## 2018-01-01 RX ORDER — EPINEPHRINE 1 MG/ML
0.3 INJECTION, SOLUTION INTRAMUSCULAR; SUBCUTANEOUS EVERY 5 MIN PRN
Status: CANCELLED | OUTPATIENT
Start: 2018-01-01

## 2018-01-01 RX ORDER — PROCHLORPERAZINE MALEATE 10 MG
10 TABLET ORAL EVERY 6 HOURS PRN
Qty: 30 TABLET | Refills: 5 | Status: SHIPPED | OUTPATIENT
Start: 2018-01-01 | End: 2019-01-01

## 2018-01-01 RX ORDER — DIPHENHYDRAMINE HYDROCHLORIDE 50 MG/ML
50 INJECTION INTRAMUSCULAR; INTRAVENOUS
Status: COMPLETED | OUTPATIENT
Start: 2018-01-01 | End: 2018-01-01

## 2018-01-01 RX ORDER — HYDROCHLOROTHIAZIDE 25 MG/1
25 TABLET ORAL EVERY MORNING
Qty: 90 TABLET | Refills: 1 | Status: SHIPPED | OUTPATIENT
Start: 2018-01-01 | End: 2019-01-01

## 2018-01-01 RX ORDER — METHYLPREDNISOLONE SODIUM SUCCINATE 125 MG/2ML
125 INJECTION, POWDER, LYOPHILIZED, FOR SOLUTION INTRAMUSCULAR; INTRAVENOUS ONCE
Status: COMPLETED | OUTPATIENT
Start: 2018-01-01 | End: 2018-01-01

## 2018-01-01 RX ORDER — DIPHENHYDRAMINE HYDROCHLORIDE 50 MG/ML
50 INJECTION INTRAMUSCULAR; INTRAVENOUS
Status: CANCELLED
Start: 2018-01-01

## 2018-01-01 RX ORDER — EPINEPHRINE 0.3 MG/.3ML
0.3 INJECTION SUBCUTANEOUS EVERY 5 MIN PRN
Status: CANCELLED | OUTPATIENT
Start: 2018-01-01

## 2018-01-01 RX ORDER — DIPHENHYDRAMINE HCL 25 MG
50 CAPSULE ORAL ONCE
Status: CANCELLED
Start: 2018-01-01

## 2018-01-01 RX ORDER — HEPARIN SODIUM (PORCINE) LOCK FLUSH IV SOLN 100 UNIT/ML 100 UNIT/ML
5 SOLUTION INTRAVENOUS ONCE
Status: COMPLETED | OUTPATIENT
Start: 2018-01-01 | End: 2018-01-01

## 2018-01-01 RX ORDER — ALBUTEROL SULFATE 90 UG/1
1-2 AEROSOL, METERED RESPIRATORY (INHALATION)
Status: CANCELLED
Start: 2018-01-01

## 2018-01-01 RX ORDER — LIDOCAINE/PRILOCAINE 2.5 %-2.5%
CREAM (GRAM) TOPICAL PRN
Qty: 30 G | Refills: 3 | Status: SHIPPED | OUTPATIENT
Start: 2018-01-01 | End: 2018-01-01

## 2018-01-01 RX ORDER — IOPAMIDOL 755 MG/ML
92 INJECTION, SOLUTION INTRAVASCULAR ONCE
Status: COMPLETED | OUTPATIENT
Start: 2018-01-01 | End: 2018-01-01

## 2018-01-01 RX ORDER — LORAZEPAM 1 MG/1
1 TABLET ORAL EVERY 6 HOURS PRN
Qty: 30 TABLET | Refills: 5 | Status: SHIPPED | OUTPATIENT
Start: 2018-01-01 | End: 2018-01-01

## 2018-01-01 RX ORDER — LIDOCAINE/PRILOCAINE 2.5 %-2.5%
CREAM (GRAM) TOPICAL PRN
Qty: 30 G | Refills: 1 | Status: SHIPPED | OUTPATIENT
Start: 2018-01-01 | End: 2018-01-01

## 2018-01-01 RX ORDER — PRAVASTATIN SODIUM 20 MG
20 TABLET ORAL EVERY EVENING
Qty: 90 TABLET | Refills: 1 | Status: SHIPPED | OUTPATIENT
Start: 2018-01-01

## 2018-01-01 RX ORDER — PROCHLORPERAZINE MALEATE 10 MG
10 TABLET ORAL EVERY 6 HOURS PRN
Qty: 30 TABLET | Refills: 5 | Status: SHIPPED | OUTPATIENT
Start: 2018-01-01 | End: 2018-01-01

## 2018-01-01 RX ORDER — IBUPROFEN 600 MG/1
600 TABLET, FILM COATED ORAL EVERY 6 HOURS PRN
Qty: 120 TABLET | Refills: 3 | Status: SHIPPED | OUTPATIENT
Start: 2018-01-01 | End: 2019-01-01

## 2018-01-01 RX ORDER — LORAZEPAM 1 MG/1
1 TABLET ORAL EVERY 6 HOURS PRN
Qty: 30 TABLET | Refills: 5 | Status: SHIPPED | OUTPATIENT
Start: 2018-01-01 | End: 2019-01-01

## 2018-01-01 RX ORDER — AMOXICILLIN 250 MG
2 CAPSULE ORAL DAILY
Qty: 100 TABLET | Refills: 3 | Status: SHIPPED | OUTPATIENT
Start: 2018-01-01 | End: 2019-01-01

## 2018-01-01 RX ORDER — HYDROCHLOROTHIAZIDE 25 MG/1
TABLET ORAL
Qty: 90 TABLET | Refills: 0 | OUTPATIENT
Start: 2018-01-01

## 2018-01-01 RX ORDER — DEXAMETHASONE 4 MG/1
20 TABLET ORAL SEE ADMIN INSTRUCTIONS
Qty: 60 TABLET | Refills: 0 | Status: SHIPPED | OUTPATIENT
Start: 2018-01-01 | End: 2019-01-01

## 2018-01-01 RX ORDER — HEPARIN SODIUM (PORCINE) LOCK FLUSH IV SOLN 100 UNIT/ML 100 UNIT/ML
5 SOLUTION INTRAVENOUS ONCE
Status: DISCONTINUED | OUTPATIENT
Start: 2018-01-01 | End: 2018-01-01 | Stop reason: HOSPADM

## 2018-01-01 RX ORDER — AMOXICILLIN 250 MG
2 CAPSULE ORAL DAILY
Qty: 100 TABLET | Refills: 3 | Status: SHIPPED | OUTPATIENT
Start: 2018-01-01 | End: 2018-01-01

## 2018-01-01 RX ORDER — AMOXICILLIN 250 MG
CAPSULE ORAL DAILY
COMMUNITY
End: 2018-01-01

## 2018-01-01 RX ADMIN — HEPARIN SODIUM (PORCINE) LOCK FLUSH IV SOLN 100 UNIT/ML 5 ML: 100 SOLUTION at 07:46

## 2018-01-01 RX ADMIN — Medication 250 ML: at 08:50

## 2018-01-01 RX ADMIN — POTASSIUM CHLORIDE 40 MEQ: 750 TABLET, EXTENDED RELEASE ORAL at 08:52

## 2018-01-01 RX ADMIN — Medication 250 ML: at 09:36

## 2018-01-01 RX ADMIN — DIPHENHYDRAMINE HYDROCHLORIDE 50 MG: 50 INJECTION INTRAMUSCULAR; INTRAVENOUS at 09:43

## 2018-01-01 RX ADMIN — PALONOSETRON 0.25 MG: 0.05 INJECTION, SOLUTION INTRAVENOUS at 09:39

## 2018-01-01 RX ADMIN — METHYLPREDNISOLONE SODIUM SUCCINATE 125 MG: 125 INJECTION INTRAMUSCULAR; INTRAVENOUS at 09:42

## 2018-01-01 RX ADMIN — HEPARIN SODIUM (PORCINE) LOCK FLUSH IV SOLN 100 UNIT/ML 5 ML: 100 SOLUTION at 10:04

## 2018-01-01 RX ADMIN — Medication 250 ML: at 10:27

## 2018-01-01 RX ADMIN — PALONOSETRON 0.25 MG: 0.05 INJECTION, SOLUTION INTRAVENOUS at 08:55

## 2018-01-01 RX ADMIN — HEPARIN SODIUM (PORCINE) LOCK FLUSH IV SOLN 100 UNIT/ML 500 UNITS: 100 SOLUTION at 09:12

## 2018-01-01 RX ADMIN — Medication 250 ML: at 08:32

## 2018-01-01 RX ADMIN — HEPARIN SODIUM (PORCINE) LOCK FLUSH IV SOLN 100 UNIT/ML 5 ML: 100 SOLUTION at 14:41

## 2018-01-01 RX ADMIN — PALONOSETRON 0.25 MG: 0.05 INJECTION, SOLUTION INTRAVENOUS at 09:36

## 2018-01-01 RX ADMIN — PALONOSETRON 0.25 MG: 0.05 INJECTION, SOLUTION INTRAVENOUS at 08:49

## 2018-01-01 RX ADMIN — POTASSIUM CHLORIDE 40 MEQ: 750 TABLET, EXTENDED RELEASE ORAL at 09:52

## 2018-01-01 RX ADMIN — PALONOSETRON 0.25 MG: 0.05 INJECTION, SOLUTION INTRAVENOUS at 10:30

## 2018-01-01 RX ADMIN — IOPAMIDOL 92 ML: 755 INJECTION, SOLUTION INTRAVASCULAR at 10:57

## 2018-01-01 RX ADMIN — PALONOSETRON 0.25 MG: 0.05 INJECTION, SOLUTION INTRAVENOUS at 10:02

## 2018-01-01 RX ADMIN — Medication 250 ML: at 09:23

## 2018-01-01 RX ADMIN — HEPARIN SODIUM (PORCINE) LOCK FLUSH IV SOLN 100 UNIT/ML 500 UNITS: 100 SOLUTION at 08:28

## 2018-01-01 RX ADMIN — HEPARIN SODIUM (PORCINE) LOCK FLUSH IV SOLN 100 UNIT/ML 5 ML: 100 SOLUTION at 08:56

## 2018-01-01 RX ADMIN — HEPARIN SODIUM (PORCINE) LOCK FLUSH IV SOLN 100 UNIT/ML 5 ML: 100 SOLUTION at 14:04

## 2018-01-01 RX ADMIN — HEPARIN SODIUM (PORCINE) LOCK FLUSH IV SOLN 100 UNIT/ML 5 ML: 100 SOLUTION at 08:22

## 2018-01-01 RX ADMIN — HEPARIN SODIUM (PORCINE) LOCK FLUSH IV SOLN 100 UNIT/ML 5 ML: 100 SOLUTION at 13:53

## 2018-01-01 RX ADMIN — Medication 250 ML: at 08:57

## 2018-01-01 RX ADMIN — HEPARIN SODIUM (PORCINE) LOCK FLUSH IV SOLN 100 UNIT/ML 5 ML: 100 SOLUTION at 15:08

## 2018-01-01 RX ADMIN — HEPARIN SODIUM (PORCINE) LOCK FLUSH IV SOLN 100 UNIT/ML 5 ML: 100 SOLUTION at 07:49

## 2018-01-01 RX ADMIN — HEPARIN SODIUM (PORCINE) LOCK FLUSH IV SOLN 100 UNIT/ML 5 ML: 100 SOLUTION at 15:02

## 2018-01-01 RX ADMIN — HEPARIN SODIUM (PORCINE) LOCK FLUSH IV SOLN 100 UNIT/ML 5 ML: 100 SOLUTION at 13:44

## 2018-01-01 ASSESSMENT — PAIN SCALES - GENERAL
PAINLEVEL: NO PAIN (0)
PAINLEVEL: MILD PAIN (2)
PAINLEVEL: NO PAIN (0)

## 2018-05-02 ENCOUNTER — OFFICE VISIT (OUTPATIENT)
Dept: FAMILY MEDICINE | Facility: CLINIC | Age: 58
End: 2018-05-02
Payer: COMMERCIAL

## 2018-05-02 VITALS
HEART RATE: 97 BPM | OXYGEN SATURATION: 98 % | BODY MASS INDEX: 27.82 KG/M2 | TEMPERATURE: 98.3 F | DIASTOLIC BLOOD PRESSURE: 83 MMHG | SYSTOLIC BLOOD PRESSURE: 129 MMHG | HEIGHT: 65 IN | WEIGHT: 167 LBS

## 2018-05-02 DIAGNOSIS — R05.8 POST-VIRAL COUGH SYNDROME: ICD-10-CM

## 2018-05-02 DIAGNOSIS — Z12.11 SCREEN FOR COLON CANCER: ICD-10-CM

## 2018-05-02 DIAGNOSIS — Z12.4 SCREENING FOR MALIGNANT NEOPLASM OF CERVIX: ICD-10-CM

## 2018-05-02 DIAGNOSIS — I10 HYPERTENSION GOAL BP (BLOOD PRESSURE) < 140/90: Primary | ICD-10-CM

## 2018-05-02 DIAGNOSIS — E78.5 HYPERLIPIDEMIA LDL GOAL <130: ICD-10-CM

## 2018-05-02 PROCEDURE — 99214 OFFICE O/P EST MOD 30 MIN: CPT | Performed by: PREVENTIVE MEDICINE

## 2018-05-02 RX ORDER — HYDROCHLOROTHIAZIDE 25 MG/1
25 TABLET ORAL EVERY MORNING
Qty: 90 TABLET | Refills: 1 | Status: SHIPPED | OUTPATIENT
Start: 2018-05-02 | End: 2018-01-01

## 2018-05-02 ASSESSMENT — PAIN SCALES - GENERAL: PAINLEVEL: NO PAIN (0)

## 2018-05-02 NOTE — PATIENT INSTRUCTIONS
At [facility], we strive to deliver an exceptional experience to you, every time we see you.  If you receive a survey in the mail, please send us back your thoughts. We really do value your feedback.    Based on your medical history, these are the current health maintenance/preventive care services that you are due for (some may have been done at this visit.)  Health Maintenance Due   Topic Date Due     HIV SCREEN (SYSTEM ASSIGNED)  06/20/1977     FIT Q1 YR  03/16/2015     PAP SCREENING Q3 YR (SYSTEM ASSIGNED)  03/13/2016     BMP Q6 MOS  05/07/2018       Suggested websites for health information:  Www.Jamison.org : Up to date and easily searchable information on multiple topics.  Www.medlineplus.gov : medication info, interactive tutorials, watch real surgeries online  Www.familydoctor.org : good info from the Academy of Family Physicians  Www.cdc.gov : public health info, travel advisories, epidemics (H1N1)  Www.aap.org : children's health info, normal development, vaccinations  Www.health.[facility].[place].us : [place] dept of health, public health issues in [place], N1N1    Your care team:                            Family Medicine Internal Medicine   MD Edmund Crawford MD Shantel Branch-Fleming, MD Katya Georgiev PA-C Megan Hill, APRSOCRATES Rothman MD Pediatrics   ANDRIA Mayer, MD Kellee Waters APRN CNP   MD Sara Skinner MD Deborah Mielke, MD Kim Thein, APRN [facility]      Clinic hours: Monday - Thursday 7 am-7 pm; Fridays 7 am-5 pm.   Urgent care: Monday - Friday 11 am-9 pm; Saturday and Sunday 9 am-5 pm.  Pharmacy : Monday -Thursday 8 am-8 pm; Friday 8 am-6 pm; Saturday and Sunday 9 am-5 pm.     Clinic: [phone]   Pharmacy: [phone]

## 2018-05-02 NOTE — MR AVS SNAPSHOT
After Visit Summary   5/2/2018    Di Evans    MRN: [id number]           Patient Information     Date Of Birth          1960        Visit Information        Provider Department      5/2/2018 10:00 AM Jocelyn Madden MD [facility]        Today's Diagnoses     Hypertension goal BP (blood pressure) < 140/90    -  1    Hyperlipidemia LDL goal <130        Screen for colon cancer        Screening for malignant neoplasm of cervix        Post-viral cough syndrome          Care Instructions    At [facility], we strive to deliver an exceptional experience to you, every time we see you.  If you receive a survey in the mail, please send us back your thoughts. We really do value your feedback.    Based on your medical history, these are the current health maintenance/preventive care services that you are due for (some may have been done at this visit.)  Health Maintenance Due   Topic Date Due     HIV SCREEN (SYSTEM ASSIGNED)  06/20/1977     FIT Q1 YR  03/16/2015     PAP SCREENING Q3 YR (SYSTEM ASSIGNED)  03/13/2016     BMP Q6 MOS  05/07/2018       Suggested websites for health information:  Www.CardioFocus.WhatsOpen : Up to date and easily searchable information on multiple topics.  Www.medlineplus.gov : medication info, interactive tutorials, watch real surgeries online  Www.familydoctor.org : good info from the Academy of Family Physicians  Www.cdc.gov : public health info, travel advisories, epidemics (H1N1)  Www.aap.org : children's health info, normal development, vaccinations  Www.health.state.[place].us : [place] dept of health, public health issues in [place], N1N1    Your care team:                            Family Medicine Internal Medicine   MD Edmund Crawford MD Shantel Branch-Fleming, MD Katya Georgiev PA-C Megan Hill, APRN LATRELL Rothman MD Pediatrics   Nick Berry, ANDRIA Rosa, CNP MD Kellee Singh APRN CNP   Jocelyn Madden,  MD Carolynn Singh MD Kim Thein, SULMA CNP      Clinic hours: Monday - Thursday 7 am-7 pm; Fridays 7 am-5 pm.   Urgent care: Monday - Friday 11 am-9 pm; Saturday and Sunday 9 am-5 pm.  Pharmacy : Monday -Thursday 8 am-8 pm; Friday 8 am-6 pm; Saturday and Sunday 9 am-5 pm.     Clinic: [phone]   Pharmacy: [phone]              Follow-ups after your visit        Follow-up notes from your care team     Return in about 6 months (around 11/2/2018) for Routine Visit, Lab Work, Physical Exam, BP Recheck.      Future tests that were ordered for you today     Open Future Orders        Priority Expected Expires Ordered    Fecal colorectal cancer screen (FIT) Routine 5/23/2018 7/25/2018 5/2/2018            Who to contact     If you have questions or need follow up information about today's clinic visit or your schedule please contact [facility] directly at [phone].  Normal or non-critical lab and imaging results will be communicated to you by MyChart, letter or phone within 4 business days after the clinic has received the results. If you do not hear from us within 7 days, please contact the clinic through BetterYout or phone. If you have a critical or abnormal lab result, we will notify you by phone as soon as possible.  Submit refill requests through [employer] or call your pharmacy and they will forward the refill request to us. Please allow 3 business days for your refill to be completed.          Additional Information About Your Visit        MyChart Information     [employer] gives you secure access to your electronic health record. If you see a primary care provider, you can also send messages to your care team and make appointments. If you have questions, please call your primary care clinic.  If you do not have a primary care provider, please call [phone] and they will assist you.        Care EveryWhere ID     This is your Care EveryWhere ID. This could be used  "by other organizations to access your [facility] medical records  [id number]        Your Vitals Were     Pulse Temperature Height Pulse Oximetry Breastfeeding? BMI (Body Mass Index)    97 98.3  F (36.8  C) (Oral) 5' 5\" (1.651 m) 98% No 27.79 kg/m2       Blood Pressure from Last 3 Encounters:   05/02/18 129/83   12/05/17 145/89   11/08/17 126/82    Weight from Last 3 Encounters:   05/02/18 167 lb (75.8 kg)   12/05/17 176 lb (79.8 kg)   11/08/17 177 lb (80.3 kg)                 Today's Medication Changes          These changes are accurate as of 5/2/18 10:21 AM.  If you have any questions, ask your nurse or doctor.               Stop taking these medicines if you haven't already. Please contact your care team if you have questions.     polyethylene glycol powder   Commonly known as:  MIRALAX   Stopped by:  Jocelyn Madden MD                Where to get your medicines      These medications were sent to [facility] [address] [place] [address] [place] - [address] AT [facility] [address]  [address] [place] [address]     Phone:  [phone]     hydrochlorothiazide 25 MG tablet                Primary Care Provider Office Phone # Fax #    Sonja Jeni Hernandez [phone]       [address] [place]  [facility] [address]        Equal Access to Services     [facility] AH: Hadii aad ku hadasho Soomaali, waaxda luqadaha, qaybta kaalmada adeegyada, mae العلي . So [facility] [phone].    ATENCIÓN: Si habla terri, tiene a perez disposición servicios gratuitos de asistencia lingüística. Llame al [phone].    We comply with applicable federal civil rights laws and [place] laws. We do not discriminate on the basis of race, color, national origin, age, disability, sex, sexual orientation, or gender identity.            Thank you!     Thank you for choosing [facility]  for your care. Our goal is always to provide you with " excellent care. Hearing back from our patients is one way we can continue to improve our services. Please take a few minutes to complete the written survey that you may receive in the mail after your visit with us. Thank you!             Your Updated Medication List - Protect others around you: Learn how to safely use, store and throw away your medicines at www.disposemymeds.org.          This list is accurate as of 5/2/18 10:21 AM.  Always use your most recent med list.                   Brand Name Dispense Instructions for use Diagnosis    aspirin 81 MG tablet      Take 1 tablet by mouth daily. *        hydrochlorothiazide 25 MG tablet    HYDRODIURIL    90 tablet    Take 1 tablet (25 mg) by mouth every morning    Hypertension goal BP (blood pressure) < 140/90       pravastatin 20 MG tablet    PRAVACHOL    90 tablet    Take 1 tablet (20 mg) by mouth every evening    Hyperlipidemia LDL goal <130

## 2018-05-02 NOTE — PROGRESS NOTES
SUBJECTIVE:   Di Evans is a 57 year old female who presents to clinic today for the following health issues:      Hypertension Follow-up      Outpatient blood pressures are being checked at home.  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  Yes, no muscle aches from statin    Other lipid medications/supplements?:  none      Problem list and histories reviewed & adjusted, as indicated.  Additional history: as documented    Patient Active Problem List   Diagnosis     CARDIOVASCULAR SCREENING; LDL GOAL LESS THAN 130     Hypertension goal BP (blood pressure) < 140/90     Hyperlipidemia LDL goal <130     Overweight     Changing skin lesion     Past Surgical History:   Procedure Laterality Date     SURGICAL HISTORY OF -   1980    left ankle surgery       Social History   Substance Use Topics     Smoking status: Never Smoker     Smokeless tobacco: Never Used     Alcohol use Yes      Comment: occasional     Family History   Problem Relation Age of Onset     Hypertension Mother      Hypertension Father      CEREBROVASCULAR DISEASE Father      polycythemia         Current Outpatient Prescriptions   Medication Sig Dispense Refill     aspirin 81 MG tablet Take 1 tablet by mouth daily. *       hydrochlorothiazide (HYDRODIURIL)  "25 MG tablet Take 1 tablet (25 mg) by mouth every morning 90 tablet 1     pravastatin (PRAVACHOL) 20 MG tablet Take 1 tablet (20 mg) by mouth every evening 90 tablet 3     [DISCONTINUED] hydrochlorothiazide (HYDRODIURIL) 25 MG tablet Take 1 tablet (25 mg) by mouth every morning 90 tablet 1     Allergies   Allergen Reactions     Gemfibrozil Muscle Pain (Myalgia)     Lovastatin      headaches     Pcn [Bicillin C-R,]      BP Readings from Last 3 Encounters:   05/02/18 129/83   12/05/17 145/89   11/08/17 126/82    Wt Readings from Last 3 Encounters:   05/02/18 167 lb (75.8 kg)   12/05/17 176 lb (79.8 kg)   11/08/17 177 lb (80.3 kg)                  Labs reviewed in EPIC    Reviewed and updated as needed this visit by clinical staff  Allergies  Meds       Reviewed and updated as needed this visit by Provider         ROS:  Constitutional, HEENT, cardiovascular, pulmonary, gi and gu systems are negative, except as otherwise noted.    OBJECTIVE:                                                    /83  Pulse 97  Temp 98.3  F (36.8  C) (Oral)  Ht 5' 5\" (1.651 m)  Wt 167 lb (75.8 kg)  SpO2 98%  Breastfeeding? No  BMI 27.79 kg/m2  Body mass index is 27.79 kg/(m^2).  GENERAL APPEARANCE: healthy, alert and no distress  EYES: Eyes grossly normal to inspection, PERRL and conjunctivae and sclerae normal  NECK: trachea midline and normal to palpation  RESP: lungs clear to auscultation - no rales, rhonchi or wheezes  CV: regular rates and rhythm, normal S1 S2, no S3 or S4 and no murmur, click or rub  ABDOMEN: soft, non-tender  MS: extremities normal- no gross deformities noted and trace bilateral pedal edema noted  SKIN: no suspicious lesions or rashes  NEURO: Normal strength and tone, mentation intact and speech normal  PSYCH: mentation appears normal    Diagnostic test results:  Diagnostic Test Results:  No results found for this or any previous visit (from the past 24 hour(s)).     ASSESSMENT/PLAN:                     "                                1. Hypertension goal BP (blood pressure) < 140/90  -refills on medication provided  -Blood pressure at goal  -continue current medication  -Has lost weight since last check   -BMP was normal 11/17, defer rechecking at this time   - hydrochlorothiazide (HYDRODIURIL) 25 MG tablet; Take 1 tablet (25 mg) by mouth every morning  Dispense: 90 tablet; Refill: 1    2. Hyperlipidemia LDL goal <130  -Continue statin  -Last LDL was 176    The 10-year ASCVD risk score (Griffinjero CHAVIS Jr, et al., 2013) is: 3%    Values used to calculate the score:      Age: 57 years      Sex: Female      Is Non- : No      Diabetic: No      Tobacco smoker: No      Systolic Blood Pressure: 129 mmHg      Is BP treated: No      HDL Cholesterol: 66 mg/dL      Total Cholesterol: 263 mg/dL      3. Screen for colon cancer  - Fecal colorectal cancer screen (FIT); Future    Colon cancer screening guidelines and methods reviewed:    Colonoscopy. This test can be used to find and remove polyps anywhere in the colon or rectum. The day before the test, you will do a bowel prep. This is a liquid diet plus a strong laxative solution or an enema. The bowel prep will cleanse your colon. You will be given instructions for this. Just before the test, you are given a medicine to make you sleepy. Then, a long, flexible, lighted tube called a colonoscope is gently inserted into the rectum and guided through the entire colon. Images of the colon are viewed on a video screen. Any polyps that are found are removed and sent to a lab for testing. If a polyp can t be removed, a sample of tissue is taken and the polyp might be removed later during surgery. You will need to bring someone with you to drive you home after this test.   Colonoscopy is the only screening test that lets your healthcare provider see the entire colon and rectum. This test also lets your healthcare provider remove any pieces of tissue that need to be  looked at by a lab. If something suspicious is found using any other tests, you will likely need a colonoscopy.  Fecal occult blood test (FOBT) or fecal immunochemical test (FIT)These tests check for occult blood in stool (blood you can t see). Hidden blood may be a sign of colon polyps or cancer. A small sample of stool is tested for blood in a laboratory. Most often, you collect this sample at home using a kit your healthcare provider gives you. Follow the instructions carefully for using this kit. You might need to avoid certain foods and medicines before the test, as directed.      4. Screening for malignant neoplasm of cervix  -Due for pap smear  -will schedule at a later time    5. Post-viral cough syndrome  -Cough is improving  -Defer chest X ray at this time, exam normal  -If not resolved in the next 3-4 weeks then needs further evaluation       Follow up with Provider - 6 months      Jocelyn Madden MD MPH    [facility]

## 2018-05-21 ENCOUNTER — OFFICE VISIT (OUTPATIENT)
Dept: FAMILY MEDICINE | Facility: CLINIC | Age: 58
End: 2018-05-21
Payer: COMMERCIAL

## 2018-05-21 VITALS
BODY MASS INDEX: 27.46 KG/M2 | WEIGHT: 165 LBS | DIASTOLIC BLOOD PRESSURE: 80 MMHG | HEART RATE: 100 BPM | SYSTOLIC BLOOD PRESSURE: 140 MMHG | TEMPERATURE: 98.5 F | OXYGEN SATURATION: 98 %

## 2018-05-21 DIAGNOSIS — J30.2 ACUTE SEASONAL ALLERGIC RHINITIS, UNSPECIFIED TRIGGER: Primary | ICD-10-CM

## 2018-05-21 PROCEDURE — 99213 OFFICE O/P EST LOW 20 MIN: CPT | Performed by: NURSE PRACTITIONER

## 2018-05-21 RX ORDER — FLUTICASONE PROPIONATE 50 MCG
2 SPRAY, SUSPENSION (ML) NASAL DAILY
Qty: 1 BOTTLE | Refills: 11 | Status: SHIPPED | OUTPATIENT
Start: 2018-05-21 | End: 2018-07-16 | Stop reason: ALTCHOICE

## 2018-05-21 RX ORDER — LORATADINE 10 MG/1
10 TABLET ORAL DAILY
Qty: 30 TABLET | Refills: 3 | Status: SHIPPED | OUTPATIENT
Start: 2018-05-21 | End: 2018-07-16 | Stop reason: ALTCHOICE

## 2018-05-21 RX ORDER — GUAIFENESIN 600 MG/1
600 TABLET, EXTENDED RELEASE ORAL 2 TIMES DAILY
Qty: 30 TABLET | Refills: 0 | Status: SHIPPED | OUTPATIENT
Start: 2018-05-21 | End: 2018-07-16

## 2018-05-21 ASSESSMENT — PAIN SCALES - GENERAL: PAINLEVEL: NO PAIN (0)

## 2018-05-21 NOTE — MR AVS SNAPSHOT
After Visit Summary   5/21/2018    Di Evans    MRN: [id number]           Patient Information     Date Of Birth          1960        Visit Information        Provider Department      5/21/2018 11:20 AM Karen Rosa APRN CNP [facility]        Today's Diagnoses     Acute seasonal allergic rhinitis, unspecified trigger    -  1      Care Instructions    For your allergies:  -Take an antihistamine allegra (cetirizine) or loratadine (claritin) once daily  -Use a nasal steroid like Flonase (fluticasone) or Nasacort (triamcinolone) two sprays each side once daily  -Can use Mucinex (gauifenesin) 600-1200 mg twice a day for congestion (non-stimulating)  -Sudafed (pseudophedrine) can be purchased with your I.D. From the pharmacy without a prescription.  It is stimulating so try to avoid taking it at night or you may have trouble sleeping  -You can take Benadryl 12.5-50 mg(diphenhydramine) at night if allergies are severe.  Will also help with sleep.  It will make you very drowsy, however, so make sure you have at least 8 hours to sleep.  -Saline spray can be helpful as well to clear your nasal passages of irritating allergens  -I recommend avoiding nasal spray like Afrin as this can cause severe rebound congestion  -Please follow up if you have not noted improvement in 1-2 weeks.      At [facility], we strive to deliver an exceptional experience to you, every time we see you.  If you receive a survey in the mail, please send us back your thoughts. We really do value your feedback.    Based on your medical history, these are the current health maintenance/preventive care services that you are due for (some may have been done at this visit.)  Health Maintenance Due   Topic Date Due     HIV SCREEN (SYSTEM ASSIGNED)  06/20/1977     FIT Q1 YR  03/16/2015     PAP SCREENING Q3 YR (SYSTEM ASSIGNED)  03/13/2016     BMP Q6 MOS  05/07/2018       Suggested  websites for health information:  Www.[employer].org : Up to date and easily searchable information on multiple topics.  Www.medlineplus.gov : medication info, interactive tutorials, watch real surgeries online  Www.familydoctor.org : good info from the Academy of Family Physicians  Www.cdc.gov : public health info, travel advisories, epidemics (H1N1)  Www.aap.org : children's health info, normal development, vaccinations  Www.health.[facility].[place].us : [place] dept of health, public health issues in [place], N1N1    Your care team:                            Family Medicine Internal Medicine   MD Edmund Crawford MD Shantel Branch-Fleming, MD Katya Georgiev PA-C Megan Hill, APRN CNP    Demetrio Rothman MD Pediatrics   Nick Berry, ANDRIA Rosa, MD Kellee Waters APRN CNP   MD Sara Skinner MD Deborah Mielke, MD Hallie Fox, APRN [facility]      Clinic hours: Monday - Thursday 7 am-7 pm; Fridays 7 am-5 pm.   Urgent care: Monday - Friday 11 am-9 pm; Saturday and Sunday 9 am-5 pm.  Pharmacy : Monday -Thursday 8 am-8 pm; Friday 8 am-6 pm; Saturday and Sunday 9 am-5 pm.     Clinic: [phone]   Pharmacy: [phone]              Follow-ups after your visit        Who to contact     If you have questions or need follow up information about today's clinic visit or your schedule please contact [facility] directly at [phone].  Normal or non-critical lab and imaging results will be communicated to you by MyChart, letter or phone within 4 business days after the clinic has received the results. If you do not hear from us within 7 days, please contact the clinic through MyChart or phone. If you have a critical or abnormal lab result, we will notify you by phone as soon as possible.  Submit refill requests through [employer] or call your pharmacy and they will forward the refill request to us. Please allow 3 business days for your refill to be completed.           Additional Information About Your Visit        [employer] Information     [employer] gives you secure access to your electronic health record. If you see a primary care provider, you can also send messages to your care team and make appointments. If you have questions, please call your primary care clinic.  If you do not have a primary care provider, please call [phone] and they will assist you.        Care EveryWhere ID     This is your Care EveryWhere ID. This could be used by other organizations to access your [place] medical records  [id number]        Your Vitals Were     Pulse Temperature Pulse Oximetry BMI (Body Mass Index)          100 98.5  F (36.9  C) (Oral) 98% 27.46 kg/m2         Blood Pressure from Last 3 Encounters:   05/21/18 140/80   05/02/18 129/83   12/05/17 145/89    Weight from Last 3 Encounters:   05/21/18 165 lb (74.8 kg)   05/02/18 167 lb (75.8 kg)   12/05/17 176 lb (79.8 kg)              Today, you had the following     No orders found for display         Today's Medication Changes          These changes are accurate as of 5/21/18 11:38 AM.  If you have any questions, ask your nurse or doctor.               Start taking these medicines.        Dose/Directions    fluticasone 50 MCG/ACT spray   Commonly known as:  FLONASE   Used for:  Acute seasonal allergic rhinitis, unspecified trigger   Started by:  Karen Rosa APRN CNP        Dose:  2 spray   Spray 2 sprays into both nostrils daily   Quantity:  1 Bottle   Refills:  11       guaiFENesin 600 MG 12 hr tablet   Commonly known as:  MUCINEX   Used for:  Acute seasonal allergic rhinitis, unspecified trigger   Started by:  Karen Rosa APRN CNP        Dose:  600 mg   Take 1 tablet (600 mg) by mouth 2 times daily   Quantity:  30 tablet   Refills:  0       loratadine 10 MG tablet   Commonly known as:  CLARITIN   Used for:  Acute seasonal allergic rhinitis, unspecified trigger   Started by:  Karen Rosa  APRN CNP        Dose:  10 mg   Take 1 tablet (10 mg) by mouth daily   Quantity:  30 tablet   Refills:  3            Where to get your medicines      [place] [facility] [place] [address]  [address], [place] [address]     Phone:  [phone]     fluticasone 50 MCG/ACT spray    guaiFENesin 600 MG 12 hr tablet    loratadine 10 MG tablet                Primary Care Provider Office Phone # Fax #    Sonja Vilchiselkin Hernandez [phone]       [address]  [facility] [address]        Equal Access to Services     [facility] : Hadii nakita dubose hadasho Soomaali, waaxda luqadaha, qaybta kaalmada adeegyada, mae العلي . So [facility] [phone].    ATENCIÓN: Si habla español, tiene a perez disposición servicios gratuitos de asistencia lingüística. Llame al [phone].    We comply with applicable federal civil rights laws and [place] laws. We do not discriminate on the basis of race, color, national origin, age, disability, sex, sexual orientation, or gender identity.            Thank you!     Thank you for choosing [facility]  for your care. Our goal is always to provide you with excellent care. Hearing back from our patients is one way we can continue to improve our services. Please take a few minutes to complete the written survey that you may receive in the mail after your visit with us. Thank you!             Your Updated Medication List - Protect others around you: Learn how to safely use, store and throw away your medicines at www.disposemymeds.org.          This list is accurate as of 5/21/18 11:38 AM.  Always use your most recent med list.                   Brand Name Dispense Instructions for use Diagnosis    aspirin 81 MG tablet      Take 1 tablet by mouth daily. *        fluticasone 50 MCG/ACT spray    FLONASE    1 Bottle    Spray 2 sprays into both nostrils  daily    Acute seasonal allergic rhinitis, unspecified trigger       guaiFENesin 600 MG 12 hr tablet    MUCINEX    30 tablet    Take 1 tablet (600 mg) by mouth 2 times daily    Acute seasonal allergic rhinitis, unspecified trigger       hydrochlorothiazide 25 MG tablet    HYDRODIURIL    90 tablet    Take 1 tablet (25 mg) by mouth every morning    Hypertension goal BP (blood pressure) < 140/90       loratadine 10 MG tablet    CLARITIN    30 tablet    Take 1 tablet (10 mg) by mouth daily    Acute seasonal allergic rhinitis, unspecified trigger       pravastatin 20 MG tablet    PRAVACHOL    90 tablet    Take 1 tablet (20 mg) by mouth every evening    Hyperlipidemia LDL goal <130

## 2018-05-21 NOTE — PATIENT INSTRUCTIONS
For your allergies:  -Take an antihistamine allegra (cetirizine) or loratadine (claritin) once daily  -Use a nasal steroid like Flonase (fluticasone) or Nasacort (triamcinolone) two sprays each side once daily  -Can use Mucinex (gauifenesin) 600-1200 mg twice a day for congestion (non-stimulating)  -Sudafed (pseudophedrine) can be purchased with your I.D. From the pharmacy without a prescription.  It is stimulating so try to avoid taking it at night or you may have trouble sleeping  -You can take Benadryl 12.5-50 mg(diphenhydramine) at night if allergies are severe.  Will also help with sleep.  It will make you very drowsy, however, so make sure you have at least 8 hours to sleep.  -Saline spray can be helpful as well to clear your nasal passages of irritating allergens  -I recommend avoiding nasal spray like Afrin as this can cause severe rebound congestion  -Please follow up if you have not noted improvement in 1-2 weeks.      At [facility], we strive to deliver an exceptional experience to you, every time we see you.  If you receive a survey in the mail, please send us back your thoughts. We really do value your feedback.    Based on your medical history, these are the current health maintenance/preventive care services that you are due for (some may have been done at this visit.)  Health Maintenance Due   Topic Date Due     HIV SCREEN (SYSTEM ASSIGNED)  06/20/1977     FIT Q1 YR  03/16/2015     PAP SCREENING Q3 YR (SYSTEM ASSIGNED)  03/13/2016     BMP Q6 MOS  05/07/2018       Suggested websites for health information:  Www.[facility].org : Up to date and easily searchable information on multiple topics.  Www.medlineplus.gov : medication info, interactive tutorials, watch real surgeries online  Www.familydoctor.org : good info from the Academy of Family Physicians  Www.cdc.gov : public health info, travel advisories, epidemics (H1N1)  Www.aap.org : children's health info, normal development,  vaccinations  Www.health.[place].us : [place] dept of health, public health issues in [place], N1N1    Your care team:                            Family Medicine Internal Medicine   MD Edmund Crawford MD Shantel Branch-Fleming, MD Katya Georgiev PA-C Megan Hill, APRN LATRELL Rothman MD Pediatrics   ANDRIA Mayer, MD Kellee Waters APRN CNP   MD Sara Skinner MD Deborah Mielke, MD Kim Thein, APRN [facility]      Clinic hours: Monday - Thursday 7 am-7 pm; Fridays 7 am-5 pm.   Urgent care: Monday - Friday 11 am-9 pm; Saturday and Sunday 9 am-5 pm.  Pharmacy : Monday -Thursday 8 am-8 pm; Friday 8 am-6 pm; Saturday and Sunday 9 am-5 pm.     Clinic: [phone]   Pharmacy: [phone]

## 2018-06-06 ENCOUNTER — OFFICE VISIT (OUTPATIENT)
Dept: FAMILY MEDICINE | Facility: CLINIC | Age: 58
End: 2018-06-06
Payer: COMMERCIAL

## 2018-06-06 VITALS
HEIGHT: 65 IN | TEMPERATURE: 98.1 F | HEART RATE: 97 BPM | BODY MASS INDEX: 27.49 KG/M2 | WEIGHT: 165 LBS | OXYGEN SATURATION: 97 % | SYSTOLIC BLOOD PRESSURE: 136 MMHG | DIASTOLIC BLOOD PRESSURE: 80 MMHG

## 2018-06-06 DIAGNOSIS — Z12.4 SCREENING FOR MALIGNANT NEOPLASM OF CERVIX: ICD-10-CM

## 2018-06-06 DIAGNOSIS — I10 HYPERTENSION GOAL BP (BLOOD PRESSURE) < 140/90: ICD-10-CM

## 2018-06-06 DIAGNOSIS — Z00.00 ROUTINE GENERAL MEDICAL EXAMINATION AT A HEALTH CARE FACILITY: Primary | ICD-10-CM

## 2018-06-06 DIAGNOSIS — Z12.31 ENCOUNTER FOR SCREENING MAMMOGRAM FOR BREAST CANCER: ICD-10-CM

## 2018-06-06 DIAGNOSIS — Z11.4 SCREENING FOR HIV (HUMAN IMMUNODEFICIENCY VIRUS): ICD-10-CM

## 2018-06-06 DIAGNOSIS — Z13.29 SCREENING FOR THYROID DISORDER: ICD-10-CM

## 2018-06-06 DIAGNOSIS — Z12.11 SCREEN FOR COLON CANCER: ICD-10-CM

## 2018-06-06 DIAGNOSIS — E78.5 HYPERLIPIDEMIA LDL GOAL <130: ICD-10-CM

## 2018-06-06 LAB
ANION GAP SERPL CALCULATED.3IONS-SCNC: 8 MMOL/L (ref 3–14)
BUN SERPL-MCNC: 9 MG/DL (ref 7–30)
CALCIUM SERPL-MCNC: 9.4 MG/DL (ref 8.5–10.1)
CHLORIDE SERPL-SCNC: 99 MMOL/L (ref 94–109)
CHOLEST SERPL-MCNC: 161 MG/DL
CO2 SERPL-SCNC: 29 MMOL/L (ref 20–32)
CREAT SERPL-MCNC: 0.64 MG/DL (ref 0.52–1.04)
CREAT UR-MCNC: 86 MG/DL
GFR SERPL CREATININE-BSD FRML MDRD: >90 ML/MIN/1.7M2
GLUCOSE SERPL-MCNC: 108 MG/DL (ref 70–99)
HDLC SERPL-MCNC: 38 MG/DL
LDLC SERPL CALC-MCNC: 104 MG/DL
MICROALBUMIN UR-MCNC: 6 MG/L
MICROALBUMIN/CREAT UR: 7.28 MG/G CR (ref 0–25)
NONHDLC SERPL-MCNC: 123 MG/DL
POTASSIUM SERPL-SCNC: 3.6 MMOL/L (ref 3.4–5.3)
SODIUM SERPL-SCNC: 136 MMOL/L (ref 133–144)
T4 FREE SERPL-MCNC: 1.47 NG/DL (ref 0.76–1.46)
TRIGL SERPL-MCNC: 94 MG/DL
TSH SERPL DL<=0.005 MIU/L-ACNC: 4.95 MU/L (ref 0.4–4)

## 2018-06-06 PROCEDURE — 84439 ASSAY OF FREE THYROXINE: CPT | Performed by: PREVENTIVE MEDICINE

## 2018-06-06 PROCEDURE — 36415 COLL VENOUS BLD VENIPUNCTURE: CPT | Performed by: PREVENTIVE MEDICINE

## 2018-06-06 PROCEDURE — G0145 SCR C/V CYTO,THINLAYER,RESCR: HCPCS | Performed by: PREVENTIVE MEDICINE

## 2018-06-06 PROCEDURE — 80061 LIPID PANEL: CPT | Performed by: PREVENTIVE MEDICINE

## 2018-06-06 PROCEDURE — 82043 UR ALBUMIN QUANTITATIVE: CPT | Performed by: PREVENTIVE MEDICINE

## 2018-06-06 PROCEDURE — 87624 HPV HI-RISK TYP POOLED RSLT: CPT | Performed by: PREVENTIVE MEDICINE

## 2018-06-06 PROCEDURE — 87389 HIV-1 AG W/HIV-1&-2 AB AG IA: CPT | Performed by: PREVENTIVE MEDICINE

## 2018-06-06 PROCEDURE — 80048 BASIC METABOLIC PNL TOTAL CA: CPT | Performed by: PREVENTIVE MEDICINE

## 2018-06-06 PROCEDURE — 84443 ASSAY THYROID STIM HORMONE: CPT | Performed by: PREVENTIVE MEDICINE

## 2018-06-06 PROCEDURE — 99396 PREV VISIT EST AGE 40-64: CPT | Performed by: PREVENTIVE MEDICINE

## 2018-06-06 ASSESSMENT — PAIN SCALES - GENERAL: PAINLEVEL: NO PAIN (0)

## 2018-06-06 NOTE — PATIENT INSTRUCTIONS
At [facility], we strive to deliver an exceptional experience to you, every time we see you.  If you receive a survey in the mail, please send us back your thoughts. We really do value your feedback.    Based on your medical history, these are the current health maintenance/preventive care services that you are due for (some may have been done at this visit.)  Health Maintenance Due   Topic Date Due     HIV SCREEN (SYSTEM ASSIGNED)  06/20/1977     FIT Q1 YR  03/16/2015     PAP SCREENING Q3 YR (SYSTEM ASSIGNED)  03/13/2016     BMP Q6 MOS  05/07/2018       Suggested websites for health information:  Www.[place].org : Up to date and easily searchable information on multiple topics.  Www.medlineplus.gov : medication info, interactive tutorials, watch real surgeries online  Www.familydoctor.org : good info from the Academy of Family Physicians  Www.cdc.gov : public health info, travel advisories, epidemics (H1N1)  Www.aap.org : children's health info, normal development, vaccinations  Www.health.[facility].us : [place] dept of health, public health issues in [place], N1N1    Your care team:                            Family Medicine Internal Medicine   MD Edmund Crawford MD Shantel Branch-Fleming, MD Katya Georgiev PA-C Megan Hill, APRN LATRELL Rothman MD Pediatrics   ANDRIA Mayer, MD Kellee Waters APRN CNP   MD Sara Skinner MD Deborah Mielke, MD Kim Thein, APRN [facility]      Clinic hours: Monday - Thursday 7 am-7 pm; Fridays 7 am-5 pm.   Urgent care: Monday - Friday 11 am-9 pm; Saturday and Sunday 9 am-5 pm.  Pharmacy : Monday -Thursday 8 am-8 pm; Friday 8 am-6 pm; Saturday and Sunday 9 am-5 pm.     Clinic: [phone]   Pharmacy: [phone]        Preventive Health Recommendations  Female Ages 50 - 64    Yearly exam: See your health care provider every year in order to  o Review health changes.   o Discuss preventive care.     o Review your medicines if your doctor has prescribed any.      Get a Pap test every three years (unless you have an abnormal result and your provider advises testing more often).    If you get Pap tests with HPV test, you only need to test every 5 years, unless you have an abnormal result.     You do not need a Pap test if your uterus was removed (hysterectomy) and you have not had cancer.    You should be tested each year for STDs (sexually transmitted diseases) if you're at risk.     Have a mammogram every 1 to 2 years.    Have a colonoscopy at age 50, or have a yearly FIT test (stool test). These exams screen for colon cancer.      Have a cholesterol test every 5 years, or more often if advised.    Have a diabetes test (fasting glucose) every three years. If you are at risk for diabetes, you should have this test more often.     If you are at risk for osteoporosis (brittle bone disease), think about having a bone density scan (DEXA).    Shots: Get a flu shot each year. Get a tetanus shot every 10 years.    Nutrition:     Eat at least 5 servings of fruits and vegetables each day.    Eat whole-grain bread, whole-wheat pasta and brown rice instead of white grains and rice.    Talk to your provider about Calcium and Vitamin D.     Lifestyle    Exercise at least 150 minutes a week (30 minutes a day, 5 days a week). This will help you control your weight and prevent disease.    Limit alcohol to one drink per day.    No smoking.     Wear sunscreen to prevent skin cancer.     See your dentist every six months for an exam and cleaning.    See your eye doctor every 1 to 2 years.

## 2018-06-06 NOTE — PROGRESS NOTES
SUBJECTIVE:   CC: Di Evans is an 57 year old woman who presents for preventive health visit.     Healthy Habits:    Do you get at least three servings of calcium containing foods daily (dairy, green leafy vegetables, etc.)? yes    Amount of exercise or daily activities, outside of work: 2 day(s) per week    Problems taking medications regularly No    Medication side effects: No    Have you had an eye exam in the past two years? no    Do you see a dentist twice per year? yes    Do you have sleep apnea, excessive snoring or daytime drowsiness?no      Hypertension Follow-up      Outpatient blood pressures are being checked at home.  Results are less than 140/90.    Low Salt Diet: low salt      Today's PHQ-2 Score:   PHQ-2 ( 1999 Pfizer) 11/8/2017 4/27/2016   Q1: Little interest or pleasure in doing things 0 0   Q2: Feeling down, depressed or hopeless 0 0   PHQ-2 Score 0 0       Abuse: Current or Past(Physical, Sexual or Emotional)- No  Do you feel safe in your environment - Yes    Social History   Substance Use Topics     Smoking status: Never Smoker     Smokeless tobacco: Never Used     Alcohol use Yes      Comment: occasional     If you drink alcohol do you typically have >3 drinks per day or >7 drinks per week? No                     Reviewed orders with patient.  Reviewed health maintenance and updated orders accordingly - Yes  Labs reviewed in EPIC  BP Readings from Last 3 Encounters:   06/06/18 136/80   05/21/18 140/80   05/02/18 129/83    Wt Readings from Last 3 Encounters:   06/06/18 165 lb (74.8 kg)   05/21/18 165 lb (74.8 kg)   05/02/18 167 lb (75.8 kg)                  Patient Active Problem List   Diagnosis     CARDIOVASCULAR SCREENING; LDL GOAL LESS THAN 130     Hypertension goal BP (blood pressure) < 140/90     Hyperlipidemia LDL goal <130     Overweight     Changing skin lesion     Past Surgical History:   Procedure Laterality Date     SURGICAL HISTORY OF -   1980    left ankle surgery        Social History   Substance Use Topics     Smoking status: Never Smoker     Smokeless tobacco: Never Used     Alcohol use Yes      Comment: occasional     Family History   Problem Relation Age of Onset     Hypertension Mother      Hypertension Father      CEREBROVASCULAR DISEASE Father      polycythemia         Current Outpatient Prescriptions   Medication Sig Dispense Refill     aspirin 81 MG tablet Take 1 tablet by mouth daily. *       fluticasone (FLONASE) 50 MCG/ACT spray Spray 2 sprays into both nostrils daily 1 Bottle 11     guaiFENesin (MUCINEX) 600 MG 12 hr tablet Take 1 tablet (600 mg) by mouth 2 times daily 30 tablet 0     hydrochlorothiazide (HYDRODIURIL) 25 MG tablet Take 1 tablet (25 mg) by mouth every morning 90 tablet 1     loratadine (CLARITIN) 10 MG tablet Take 1 tablet (10 mg) by mouth daily 30 tablet 3     pravastatin (PRAVACHOL) 20 MG tablet Take 1 tablet (20 mg) by mouth every evening 90 tablet 3     Allergies   Allergen Reactions     Gemfibrozil Muscle Pain (Myalgia)     Lovastatin      headaches     Penicillins        Patient over age 50, mutual decision to screen reflected in health maintenance.    Pertinent mammograms are reviewed under the imaging tab.  History of abnormal Pap smear: NO - age 30-65 PAP every 5 years with negative HPV co-testing recommended    Reviewed and updated as needed this visit by clinical staff  Tobacco  Allergies  Meds         Reviewed and updated as needed this visit by Provider        Past Medical History:   Diagnosis Date     Hypertension      Hypertension goal BP (blood pressure) < 140/90 4/25/2011      Past Surgical History:   Procedure Laterality Date     SURGICAL HISTORY OF -   1980    left ankle surgery       ROS:  CONSTITUTIONAL: NEGATIVE for fever, chills, change in weight  INTEGUMENTARY/SKIN: NEGATIVE for worrisome rashes, moles or lesions  EYES: NEGATIVE for vision changes or irritation  ENT: NEGATIVE for ear, mouth and throat problems  RESP:  "NEGATIVE for significant cough or SOB  BREAST: NEGATIVE for masses, tenderness or discharge  : NEGATIVE for unusual urinary or vaginal symptoms. No vaginal bleeding.  MUSCULOSKELETAL: NEGATIVE for significant arthralgias or myalgia  NEURO: NEGATIVE for weakness, dizziness or paresthesias  ENDOCRINE: NEGATIVE for temperature intolerance, skin/hair changes  HEME/ALLERGY/IMMUNE: NEGATIVE for bleeding problems  PSYCHIATRIC: NEGATIVE for changes in mood or affect   Has noticed heart rate has been higher, per her FitBit  Chronic constipation    OBJECTIVE:   /87  Pulse 97  Temp 98.1  F (36.7  C) (Oral)  Ht 5' 5\" (1.651 m)  Wt 165 lb (74.8 kg)  SpO2 97%  Breastfeeding? No  BMI 27.46 kg/m2  EXAM:  GENERAL APPEARANCE: healthy, alert and no distress  EYES: Eyes grossly normal to inspection, PERRL and conjunctivae and sclerae normal  HENT: ear canals and TM's normal, nose and mouth without ulcers or lesions, oropharynx clear and oral mucous membranes moist  NECK: no adenopathy, no asymmetry, masses  RESP: lungs clear to auscultation - no rales, rhonchi or wheezes  BREAST: normal without masses, tenderness or nipple discharge and no palpable axillary masses or adenopathy  CV: regular rate and rhythm, normal S1 S2, no S3 or S4, no murmur, click or rub, no peripheral edema and peripheral pulses strong  ABDOMEN: No rebound or guarding    (female): normal female external genitalia, normal urethral meatus, vaginal mucosal atrophy noted, normal cervix, adnexae, and uterus without masses or abnormal discharge. External hemorrhoids+   MS: no musculoskeletal defects are noted and gait is age appropriate without ataxia  SKIN: no suspicious lesions or rashes  NEURO: Normal strength and tone, sensory exam grossly normal, mentation intact and speech normal  PSYCH: mentation appears normal and affect normal/bright    ASSESSMENT/PLAN:   Di was seen today for physical.    Diagnoses and all orders for this visit:    Routine " "general medical examination at a health care facility  -     Lipid panel reflex to direct LDL Fasting  -     TSH with free T4 reflex    Screen for colon cancer  -     GASTROENTEROLOGY ADULT REF PROCEDURE ONLY    Screening for malignant neoplasm of cervix  -     Pap imaged thin layer screen with HPV - recommended age 30 - 65 years (select HPV order below)  -     HPV High Risk Types DNA Cervical  Screening guidelines reviewed     Screening for HIV (human immunodeficiency virus)  -     HIV Screening    Hypertension goal BP (blood pressure) < 140/90  -     Albumin Random Urine Quantitative with Creat Ratio  -     Basic metabolic panel    Hyperlipidemia LDL goal <130    Encounter for screening mammogram for breast cancer  -     *MA Screening Digital Bilateral; Future    Screening for thyroid disorder  -     TSH with free T4 reflex        COUNSELING:   Reviewed preventive health counseling, as reflected in patient instructions       Regular exercise       Healthy diet/nutrition       Colon cancer screening       Consider Hep C screening for patients born between 1945 and 1965       HIV screeninx in teen years, 1x in adult years, and at intervals if high risk         reports that she has never smoked. She has never used smokeless tobacco.    Estimated body mass index is 27.46 kg/(m^2) as calculated from the following:    Height as of this encounter: 5' 5\" (1.651 m).    Weight as of this encounter: 165 lb (74.8 kg).   Weight management plan: Discussed healthy diet and exercise guidelines and patient will follow up in 12 months in clinic to re-evaluate.    Counseling Resources:  ATP IV Guidelines  Pooled Cohorts Equation Calculator  Breast Cancer Risk Calculator  FRAX Risk Assessment  ICSI Preventive Guidelines  Dietary Guidelines for Americans,   USDA's MyPlate  ASA Prophylaxis  Lung CA Screening    Jocelyn Madden MD MPH    [facility]  "

## 2018-06-06 NOTE — MR AVS SNAPSHOT
After Visit Summary   6/6/2018    Di Evans    MRN: [id number]           Patient Information     Date Of Birth          1960        Visit Information        Provider Department      6/6/2018 10:40 AM Jocelyn Madden MD [facility]        Today's Diagnoses     Routine general medical examination at a health care facility    -  1    Screen for colon cancer        Screening for malignant neoplasm of cervix        Screening for HIV (human immunodeficiency virus)        Hypertension goal BP (blood pressure) < 140/90        Hyperlipidemia LDL goal <130        Encounter for screening mammogram for breast cancer        Screening for thyroid disorder          Care Instructions    At [facility], we strive to deliver an exceptional experience to you, every time we see you.  If you receive a survey in the mail, please send us back your thoughts. We really do value your feedback.    Based on your medical history, these are the current health maintenance/preventive care services that you are due for (some may have been done at this visit.)  Health Maintenance Due   Topic Date Due     HIV SCREEN (SYSTEM ASSIGNED)  06/20/1977     FIT Q1 YR  03/16/2015     PAP SCREENING Q3 YR (SYSTEM ASSIGNED)  03/13/2016     BMP Q6 MOS  05/07/2018       Suggested websites for health information:  Www.Ztory.org : Up to date and easily searchable information on multiple topics.  Www.medlineplus.gov : medication info, interactive tutorials, watch real surgeries online  Www.familydoctor.org : good info from the Academy of Family Physicians  Www.cdc.gov : public health info, travel advisories, epidemics (H1N1)  Www.aap.org : children's health info, normal development, vaccinations  Www.health.state.[place].us : [place] dept of health, public health issues in [place], N1N1    Your care team:                            Family Medicine Internal Medicine   MD Edmund Crawford MD Shantel  MD Luz Marina Dotson PA-C, APRN [facility]    Demetrio Rothman MD Pediatrics   ANDRIA Mayer, MD Kellee Waters APRN CNP   MD Sara Skinner MD Deborah Mielke, MD Kim Thein, APRN [facility]      Clinic hours: Monday - Thursday 7 am-7 pm; Fridays 7 am-5 pm.   Urgent care: Monday - Friday 11 am-9 pm; Saturday and Sunday 9 am-5 pm.  Pharmacy : Monday -Thursday 8 am-8 pm; Friday 8 am-6 pm; Saturday and Sunday 9 am-5 pm.     Clinic: [phone]   Pharmacy: [phone]        Preventive Health Recommendations  Female Ages 50 - 64    Yearly exam: See your health care provider every year in order to  o Review health changes.   o Discuss preventive care.    o Review your medicines if your doctor has prescribed any.      Get a Pap test every three years (unless you have an abnormal result and your provider advises testing more often).    If you get Pap tests with HPV test, you only need to test every 5 years, unless you have an abnormal result.     You do not need a Pap test if your uterus was removed (hysterectomy) and you have not had cancer.    You should be tested each year for STDs (sexually transmitted diseases) if you're at risk.     Have a mammogram every 1 to 2 years.    Have a colonoscopy at age 50, or have a yearly FIT test (stool test). These exams screen for colon cancer.      Have a cholesterol test every 5 years, or more often if advised.    Have a diabetes test (fasting glucose) every three years. If you are at risk for diabetes, you should have this test more often.     If you are at risk for osteoporosis (brittle bone disease), think about having a bone density scan (DEXA).    Shots: Get a flu shot each year. Get a tetanus shot every 10 years.    Nutrition:     Eat at least 5 servings of fruits and vegetables each day.    Eat whole-grain bread, whole-wheat pasta and brown rice instead of white grains and rice.    Talk to your provider  about Calcium and Vitamin D.     Lifestyle    Exercise at least 150 minutes a week (30 minutes a day, 5 days a week). This will help you control your weight and prevent disease.    Limit alcohol to one drink per day.    No smoking.     Wear sunscreen to prevent skin cancer.     See your dentist every six months for an exam and cleaning.    See your eye doctor every 1 to 2 years.            Follow-ups after your visit        Additional Services     GASTROENTEROLOGY ADULT REF PROCEDURE ONLY       Last Lab Result: Creatinine (mg/dL)       Date                     Value                 11/07/2017               0.77             ----------  Body mass index is 27.46 kg/(m^2).     Needed:  No  Language:  English    Patient will be contacted to schedule procedure.     Please be aware that coverage of these services is subject to the terms and limitations of your health insurance plan.  Call member services at your health plan with any benefit or coverage questions.  Any procedures must be performed at a [place] facility OR coordinated by your clinic's referral office.    Please bring the following with you to your appointment:    (1) Any X-Rays, CTs or MRIs which have been performed.  Contact the facility where they were done to arrange for  prior to your scheduled appointment.    (2) List of current medications   (3) This referral request   (4) Any documents/labs given to you for this referral                  Follow-up notes from your care team     Return in about 1 year (around 6/6/2019) for Routine Visit.      Your next 10 appointments already scheduled     Nov 09, 2018 11:00 AM NILSA   MA SCREENING DIGITAL BILATERAL with [id number]   [facility] ([facility]    [address] [place] [id number] [phone]           Do not use any powder, lotion or deodorant under your arms or on your breast. If you do, we will ask you to remove it before your  "exam.  Wear comfortable, two-piece clothing.  If you have any allergies, tell your care team.  Bring any previous mammograms from other facilities or have them mailed to the breast center. Three-dimensional (3D) mammograms are available at [place] locations in [facility], [facility], [facility], [place], [place], and [place]. [facility] locations include [place] and [facility] in [place]. Benefits of 3D mammograms include: - Improved rate of cancer detection - Decreases your chance of having to go back for more tests, which means fewer: - \"False-positive\" results (This means that there is an abnormal area but it isn't cancer.) - Invasive testing procedures, such as a biopsy or surgery - Can provide clearer images of the breast if you have dense breast tissue. 3D mammography is an optional exam that anyone can have with a 2D mammogram. It doesn't replace or take the place of a 2D mammogram. 2D mammograms remain an effective screening test for all women.  Not all insurance companies cover the cost of a 3D mammogram. Check with your insurance.              Future tests that were ordered for you today     Open Future Orders        Priority Expected Expires Ordered    *MA Screening Digital Bilateral Routine  6/6/2019 6/6/2018            Who to contact     If you have questions or need follow up information about today's clinic visit or your schedule please contact [facility] directly at [phone].  Normal or non-critical lab and imaging results will be communicated to you by MyChart, letter or phone within 4 business days after the clinic has received the results. If you do not hear from us within 7 days, please contact the clinic through [employer] or phone. If you have a critical or abnormal lab result, we will notify you by phone as soon as possible.  Submit refill requests through [employer] or call your pharmacy and they will forward the refill request to us. " "Please allow 3 business days for your refill to be completed.          Additional Information About Your Visit        MyChart Information     [employer] gives you secure access to your electronic health record. If you see a primary care provider, you can also send messages to your care team and make appointments. If you have questions, please call your primary care clinic.  If you do not have a primary care provider, please call [phone] and they will assist you.        Care EveryWhere ID     This is your Care EveryWhere ID. This could be used by other organizations to access your [facility] medical records  [id number]        Your Vitals Were     Pulse Temperature Height Pulse Oximetry Breastfeeding? BMI (Body Mass Index)    97 98.1  F (36.7  C) (Oral) 5' 5\" (1.651 m) 97% No 27.46 kg/m2       Blood Pressure from Last 3 Encounters:   06/06/18 136/80   05/21/18 140/80   05/02/18 129/83    Weight from Last 3 Encounters:   06/06/18 165 lb (74.8 kg)   05/21/18 165 lb (74.8 kg)   05/02/18 167 lb (75.8 kg)              We Performed the Following     Albumin Random Urine Quantitative with Creat Ratio     Basic metabolic panel     GASTROENTEROLOGY ADULT REF PROCEDURE ONLY     HIV Screening     HPV High Risk Types DNA Cervical     Lipid panel reflex to direct LDL Fasting     Pap imaged thin layer screen with HPV - recommended age 30 - 65 years (select HPV order below)     TSH with free T4 reflex        Primary Care Provider Office Phone # Fax #    Sonja Jenielkin Hernandez [phone]       [address]  [facility] [place] [address]        Equal Access to Services     [facility]: Hadii aad ku hadasho Soomaali, waaxda luqadaha, qaybta kaalmada ademae jc . So [facility] [phone].    ATENCIÓN: Si habla español, tiene a perez disposición servicios gratuitos de asistencia lingüística. Llame al [phone].    We comply with applicable federal civil rights laws and " [place] laws. We do not discriminate on the basis of race, color, national origin, age, disability, sex, sexual orientation, or gender identity.            Thank you!     Thank you for choosing [facility]  for your care. Our goal is always to provide you with excellent care. Hearing back from our patients is one way we can continue to improve our services. Please take a few minutes to complete the written survey that you may receive in the mail after your visit with us. Thank you!             Your Updated Medication List - Protect others around you: Learn how to safely use, store and throw away your medicines at www.disposemymeds.org.          This list is accurate as of 6/6/18 11:32 AM.  Always use your most recent med list.                   Brand Name Dispense Instructions for use Diagnosis    aspirin 81 MG tablet      Take 1 tablet by mouth daily. *        fluticasone 50 MCG/ACT spray    FLONASE    1 Bottle    Spray 2 sprays into both nostrils daily    Acute seasonal allergic rhinitis, unspecified trigger       guaiFENesin 600 MG 12 hr tablet    MUCINEX    30 tablet    Take 1 tablet (600 mg) by mouth 2 times daily    Acute seasonal allergic rhinitis, unspecified trigger       hydrochlorothiazide 25 MG tablet    HYDRODIURIL    90 tablet    Take 1 tablet (25 mg) by mouth every morning    Hypertension goal BP (blood pressure) < 140/90       loratadine 10 MG tablet    CLARITIN    30 tablet    Take 1 tablet (10 mg) by mouth daily    Acute seasonal allergic rhinitis, unspecified trigger       pravastatin 20 MG tablet    PRAVACHOL    90 tablet    Take 1 tablet (20 mg) by mouth every evening    Hyperlipidemia LDL goal <130

## 2018-06-07 LAB — HIV 1+2 AB+HIV1 P24 AG SERPL QL IA: NONREACTIVE

## 2018-06-08 ENCOUNTER — HOSPITAL ENCOUNTER (OUTPATIENT)
Facility: AMBULATORY SURGERY CENTER | Age: 58
End: 2018-06-08
Attending: SURGERY | Admitting: SURGERY
Payer: COMMERCIAL

## 2018-06-08 LAB
COPATH REPORT: NORMAL
PAP: NORMAL

## 2018-06-12 LAB
FINAL DIAGNOSIS: NORMAL
HPV HR 12 DNA CVX QL NAA+PROBE: NEGATIVE
HPV16 DNA SPEC QL NAA+PROBE: NEGATIVE
HPV18 DNA SPEC QL NAA+PROBE: NEGATIVE
SPECIMEN DESCRIPTION: NORMAL
SPECIMEN SOURCE CVX/VAG CYTO: NORMAL

## 2018-06-12 NOTE — PROGRESS NOTES
Dear Di Evans    Here are your cholesterol results:    Your LDL is: Lab Results       Component                Value               Date                       LDL                      104                 06/06/2018          Your LDL goal is to be less than 130  Your HDL is: Lab Results       Component                Value               Date                       HDL                      38                  06/06/2018           Goal HDL is Greater than 40 (for men) or 50 (for women).  Your Triglycerides are: Lab Results       Component                Value               Date                       TRIG                     94                  06/06/2018          Goal TRIGLYCERIDES are less than 150.     Glucose is in the impaired range, this means that you do not have diabetes but are at an increased risk of developing diabetes.     Here are some ways to improve your glucose without medication:    Try to get at least 45 minutes of aerobic exercise 5-6 days a week  Maintain a healthy body weight  Eat less saturated fats  Buy lean cuts of meat, reduce your portions of red meat or substitute poultry or fish  Avoid fried or fast foods that are high in fat  Eat more fruits and vegetables    Urine sample did not show any abnormal protein.  Screening test for HIV is negative.  Electrolytes and kidney function are normal.  Thyroid function was borderline abnormal. I would suggest we recheck this in 1 month. I have placed future lab orders and you can come in anytime for this.     Please do not hesitate to call us at [phone] if you have any questions or concerns.    Thank you,    Jocelyn Madden MD MPH

## 2018-06-19 ENCOUNTER — MYC MEDICAL ADVICE (OUTPATIENT)
Dept: FAMILY MEDICINE | Facility: CLINIC | Age: 58
End: 2018-06-19

## 2018-06-22 DIAGNOSIS — R94.6 ABNORMAL FINDING ON THYROID FUNCTION TEST: ICD-10-CM

## 2018-06-22 LAB
T3FREE SERPL-MCNC: 2.2 PG/ML (ref 2.3–4.2)
T4 FREE SERPL-MCNC: 1.44 NG/DL (ref 0.76–1.46)
TSH SERPL DL<=0.005 MIU/L-ACNC: 4.64 MU/L (ref 0.4–4)

## 2018-06-22 PROCEDURE — 84439 ASSAY OF FREE THYROXINE: CPT | Performed by: PREVENTIVE MEDICINE

## 2018-06-22 PROCEDURE — 84443 ASSAY THYROID STIM HORMONE: CPT | Performed by: PREVENTIVE MEDICINE

## 2018-06-22 PROCEDURE — 36415 COLL VENOUS BLD VENIPUNCTURE: CPT | Performed by: PREVENTIVE MEDICINE

## 2018-06-22 PROCEDURE — 84481 FREE ASSAY (FT-3): CPT | Performed by: PREVENTIVE MEDICINE

## 2018-06-26 NOTE — PROGRESS NOTES
Di,     Thyroid function labs are improved from last check. I would recommend we recheck these with your next physical.    Please do not hesitate to call us at [phone] if you have any questions or concerns.    Thank you,    Jocelyn Madden MD MPH

## 2018-07-03 ENCOUNTER — OFFICE VISIT (OUTPATIENT)
Dept: FAMILY MEDICINE | Facility: CLINIC | Age: 58
End: 2018-07-03
Payer: COMMERCIAL

## 2018-07-03 ENCOUNTER — RADIANT APPOINTMENT (OUTPATIENT)
Dept: GENERAL RADIOLOGY | Facility: CLINIC | Age: 58
End: 2018-07-03
Attending: PHYSICIAN ASSISTANT
Payer: COMMERCIAL

## 2018-07-03 VITALS
SYSTOLIC BLOOD PRESSURE: 134 MMHG | TEMPERATURE: 97.7 F | HEIGHT: 65 IN | WEIGHT: 163.1 LBS | HEART RATE: 96 BPM | DIASTOLIC BLOOD PRESSURE: 84 MMHG | BODY MASS INDEX: 27.17 KG/M2 | OXYGEN SATURATION: 100 % | RESPIRATION RATE: 20 BRPM

## 2018-07-03 DIAGNOSIS — R05.9 COUGH: ICD-10-CM

## 2018-07-03 DIAGNOSIS — J01.90 ACUTE SINUSITIS WITH SYMPTOMS > 10 DAYS: Primary | ICD-10-CM

## 2018-07-03 DIAGNOSIS — R79.89 ELEVATED TSH: ICD-10-CM

## 2018-07-03 PROCEDURE — 71046 X-RAY EXAM CHEST 2 VIEWS: CPT | Mod: FY

## 2018-07-03 PROCEDURE — 99214 OFFICE O/P EST MOD 30 MIN: CPT | Performed by: PHYSICIAN ASSISTANT

## 2018-07-03 RX ORDER — AZITHROMYCIN 250 MG/1
TABLET, FILM COATED ORAL
Qty: 6 TABLET | Refills: 1 | Status: SHIPPED | OUTPATIENT
Start: 2018-07-03 | End: 2018-07-16

## 2018-07-03 ASSESSMENT — PAIN SCALES - GENERAL: PAINLEVEL: NO PAIN (0)

## 2018-07-03 NOTE — PATIENT INSTRUCTIONS
Azithromycin 2 tablet today then 1 tablet daily for 4 more days, may refill and treat again if not fully resolved after the first pack   Increase fluids  Nasal wash  Mucinex DM 1 tablet twice a day for 10-14 days  Follow up if not better after the antibiotic course.     Continue Flonase spray   Sinusitis (Antibiotic Treatment)    The sinuses are air-filled spaces within the bones of the face. They connect to the inside of the nose. Sinusitis is an inflammation of the tissue that lines the sinuses. Sinusitis can occur during a cold. It can also happen due to allergies to pollens and other particles in the air. Sinusitis can cause symptoms of sinus congestion and a feeling of fullness. A sinus infection causes fever, headache, and facial pain. There is often green or yellow fluid draining from the nose or into the back of the throat (post-nasal drip). You have been given antibiotics to treat this condition.  Home care    Take the full course of antibiotics as instructed. Do not stop taking them, even when you feel better.    Drink plenty of water, hot tea, and other liquids. This may help thin nasal mucus. It also may help your sinuses drain fluids.    Heat may help soothe painful areas of your face. Use a towel soaked in hot water. Or,  the shower and direct the warm spray onto your face. Using a vaporizer along with a menthol rub at night may also help soothe symptoms.     An expectorant with guaifenesin may help thin nasal mucus and help your sinuses drain fluids.    You can use an over-the-counter decongestant, unless a similar medicine was prescribed to you. Nasal sprays work the fastest. Use one that contains phenylephrine or oxymetazoline. First blow your nose gently. Then use the spray. Do not use these medicines more often than directed on the label. If you do, your symptoms may get worse. You may also take pills that contain pseudoephedrine. Don t use products that combine multiple medicines. This is  because side effects may be increased. Read labels. You can also ask the pharmacist for help. (People with high blood pressure should not use decongestants. They can raise blood pressure.)    Over-the-counter antihistamines may help if allergies contributed to your sinusitis.      Do not use nasal rinses or irrigation during an acute sinus infection, unless your healthcare provider tells you to. Rinsing may spread the infection to other areas in your sinuses.    Use acetaminophen or ibuprofen to control pain, unless another pain medicine was prescribed to you. If you have chronic liver or kidney disease or ever had a stomach ulcer, talk with your healthcare provider before using these medicines. (Aspirin should never be taken by anyone under age 18 who is ill with a fever. It may cause severe liver damage.)    Don't smoke. This can make symptoms worse.  Follow-up care  Follow up with your healthcare provider or our staff if you are better in 1 week.  When to seek medical advice  Call your healthcare provider if any of these occur:    Facial pain or headache that gets worse    Stiff neck    Unusual drowsiness or confusion    Swelling of your forehead or eyelids    Vision problems, such as blurred or double vision    Fever of 100.4 F (38 C) or higher, or as directed by your healthcare provider    Seizure    Breathing problems    Symptoms don't go away in 10 days  Prevention  Here are steps you can take to help prevent an infection:    Keep good hand washing habits.    Don t have close contact with people who have sore throats, colds, or other upper respiratory infections.    Don t smoke, and stay away from secondhand smoke.    Stay up to date with of your vaccines.  Date Last Reviewed: 11/1/2017 2000-2017 [employer] [address] [place] [address], [place] [address]. All rights reserved. This information is not intended as a substitute for professional medical care. Always follow your healthcare  professional's instructions.

## 2018-07-03 NOTE — MR AVS SNAPSHOT
After Visit Summary   7/3/2018    Di Evans    MRN: [id number]           Patient Information     Date Of Birth          1960        Visit Information        Provider Department      7/3/2018 11:20 AM Jordana Bethea PA-C [facility]        Today's Diagnoses     Acute sinusitis with symptoms > 10 days    -  1    Screen for colon cancer        Cough          Care Instructions    Azithromycin 2 tablet today then 1 tablet daily for 4 more days, may refill and treat again if not fully resolved after the first pack   Increase fluids  Nasal wash  Mucinex DM 1 tablet twice a day for 10-14 days  Follow up if not better after the antibiotic course.     Continue Flonase spray   Sinusitis (Antibiotic Treatment)    The sinuses are air-filled spaces within the bones of the face. They connect to the inside of the nose. Sinusitis is an inflammation of the tissue that lines the sinuses. Sinusitis can occur during a cold. It can also happen due to allergies to pollens and other particles in the air. Sinusitis can cause symptoms of sinus congestion and a feeling of fullness. A sinus infection causes fever, headache, and facial pain. There is often green or yellow fluid draining from the nose or into the back of the throat (post-nasal drip). You have been given antibiotics to treat this condition.  Home care    Take the full course of antibiotics as instructed. Do not stop taking them, even when you feel better.    Drink plenty of water, hot tea, and other liquids. This may help thin nasal mucus. It also may help your sinuses drain fluids.    Heat may help soothe painful areas of your face. Use a towel soaked in hot water. Or,  the shower and direct the warm spray onto your face. Using a vaporizer along with a menthol rub at night may also help soothe symptoms.     An expectorant with guaifenesin may help thin nasal mucus and help your sinuses drain fluids.    You can  use an over-the-counter decongestant, unless a similar medicine was prescribed to you. Nasal sprays work the fastest. Use one that contains phenylephrine or oxymetazoline. First blow your nose gently. Then use the spray. Do not use these medicines more often than directed on the label. If you do, your symptoms may get worse. You may also take pills that contain pseudoephedrine. Don t use products that combine multiple medicines. This is because side effects may be increased. Read labels. You can also ask the pharmacist for help. (People with high blood pressure should not use decongestants. They can raise blood pressure.)    Over-the-counter antihistamines may help if allergies contributed to your sinusitis.      Do not use nasal rinses or irrigation during an acute sinus infection, unless your healthcare provider tells you to. Rinsing may spread the infection to other areas in your sinuses.    Use acetaminophen or ibuprofen to control pain, unless another pain medicine was prescribed to you. If you have chronic liver or kidney disease or ever had a stomach ulcer, talk with your healthcare provider before using these medicines. (Aspirin should never be taken by anyone under age 18 who is ill with a fever. It may cause severe liver damage.)    Don't smoke. This can make symptoms worse.  Follow-up care  Follow up with your healthcare provider or our staff if you are better in 1 week.  When to seek medical advice  Call your healthcare provider if any of these occur:    Facial pain or headache that gets worse    Stiff neck    Unusual drowsiness or confusion    Swelling of your forehead or eyelids    Vision problems, such as blurred or double vision    Fever of 100.4 F (38 C) or higher, or as directed by your healthcare provider    Seizure    Breathing problems    Symptoms don't go away in 10 days  Prevention  Here are steps you can take to help prevent an infection:    Keep good hand washing habits.    Don t have close  "contact with people who have sore throats, colds, or other upper respiratory infections.    Don t smoke, and stay away from secondhand smoke.    Stay up to date with of your vaccines.  Date Last Reviewed: 11/1/2017 2000-2017 [employer] [address] [place] [address]. All rights reserved. This information is not intended as a substitute for professional medical care. Always follow your healthcare professional's instructions.                Follow-ups after your visit        Your next 10 appointments already scheduled     Jul 23, 2018   Procedure with Rayshawn Foreman, DO   [facility] (--)    [address]  [facility] [address]   [phone]            Nov 09, 2018 11:00 AM CST   MA SCREENING DIGITAL BILATERAL with [id number]   [facility]    [address] [facility] [id number] [phone]           Do not use any powder, lotion or deodorant under your arms or on your breast. If you do, we will ask you to remove it before your exam.  Wear comfortable, two-piece clothing.  If you have any allergies, tell your care team.  Bring any previous mammograms from other facilities or have them mailed to the breast center. Three-dimensional (3D) mammograms are available at [place] locations in [facility], [facility], [facility], and [place]. [facility] locations include [place] [facility] in [place]. Benefits of 3D mammograms include: - Improved rate of cancer detection - Decreases your chance of having to go back for more tests, which means fewer: - \"False-positive\" results (This means that there is an abnormal area but it isn't cancer.) - Invasive testing procedures, such as a biopsy or surgery - Can provide clearer images of the breast if you have dense breast tissue. 3D mammography is an optional exam that anyone can have with a 2D mammogram. " "It doesn't replace or take the place of a 2D mammogram. 2D mammograms remain an effective screening test for all women.  Not all insurance companies cover the cost of a 3D mammogram. Check with your insurance.              Who to contact     If you have questions or need follow up information about today's clinic visit or your schedule please contact [facility] directly at [phone].  Normal or non-critical lab and imaging results will be communicated to you by [employer]er[employer]art, letter or phone within 4 business days after the clinic has received the results. If you do not hear from us within 7 days, please contact the clinic through Eng[employer] or phone. If you have a critical or abnormal lab result, we will notify you by phone as soon as possible.  Submit refill requests through Engiver or call your pharmacy and they will forward the refill request to us. Please allow 3 business days for your refill to be completed.          Additional Information About Your Visit        Aut[employer]EverCloud Information     [employer] gives you secure access to your electronic health record. If you see a primary care provider, you can also send messages to your care team and make appointments. If you have questions, please call your primary care clinic.  If you do not have a primary care provider, please call [phone] and they will assist you.        Care EveryWhere ID     This is your Care EveryWhere ID. This could be used by other organizations to access your [place] medical records  [id number]        Your Vitals Were     Pulse Temperature Respirations Height Pulse Oximetry BMI (Body Mass Index)    101 97.7  F (36.5  C) (Oral) 20 5' 5\" (1.651 m) 100% 27.14 kg/m2       Blood Pressure from Last 3 Encounters:   07/03/18 134/84   06/06/18 136/80   05/21/18 140/80    Weight from Last 3 Encounters:   07/03/18 163 lb 1.6 oz (74 kg)   06/06/18 165 lb (74.8 kg)   05/21/18 165 lb (74.8 kg)                 Today's Medication Changes       "    These changes are accurate as of 7/3/18 12:16 PM.  If you have any questions, ask your nurse or doctor.               Start taking these medicines.        Dose/Directions    azithromycin 250 MG tablet   Commonly known as:  ZITHROMAX   Used for:  Acute sinusitis with symptoms > 10 days   Started by:  Jordana Bethea PA-C        Two tablets first day, then one tablet daily for four days.   Quantity:  6 tablet   Refills:  1            Where to get your medicines      These medications were sent to [facility] [address] [place] [address] [place] - [address] [place] AT [facility] [address]  [address] [place] [address]     Phone:  [phone]     azithromycin 250 MG tablet                Primary Care Provider Office Phone # Fax #    Sonja Jeni Hernandez [phone]       [address]  [facility] [address]        Equal Access to Services     VIANCA MILLARD AH: Hadii aad ku hadasho Soomaali, waaxda luqadaha, qaybta kaalmada adeegyada, waxay idiin hayaan isabelleg kharajessica العلي . So [facility] [phone].    ATENCIÓN: Si habla español, tiene a perez disposición servicios gratuitos de asistencia lingüística. Llame al [phone].    We comply with applicable federal civil rights laws and [place] laws. We do not discriminate on the basis of race, color, national origin, age, disability, sex, sexual orientation, or gender identity.            Thank you!     Thank you for choosing [facility]  for your care. Our goal is always to provide you with excellent care. Hearing back from our patients is one way we can continue to improve our services. Please take a few minutes to complete the written survey that you may receive in the mail after your visit with us. Thank you!             Your Updated Medication List - Protect others around you: Learn how to safely use, store and throw away your medicines at www.disposemymeds.org.          This list is  accurate as of 7/3/18 12:16 PM.  Always use your most recent med list.                   Brand Name Dispense Instructions for use Diagnosis    aspirin 81 MG tablet      Take 1 tablet by mouth daily. *        azithromycin 250 MG tablet    ZITHROMAX    6 tablet    Two tablets first day, then one tablet daily for four days.    Acute sinusitis with symptoms > 10 days       fluticasone 50 MCG/ACT spray    FLONASE    1 Bottle    Spray 2 sprays into both nostrils daily    Acute seasonal allergic rhinitis, unspecified trigger       guaiFENesin 600 MG 12 hr tablet    MUCINEX    30 tablet    Take 1 tablet (600 mg) by mouth 2 times daily    Acute seasonal allergic rhinitis, unspecified trigger       hydrochlorothiazide 25 MG tablet    HYDRODIURIL    90 tablet    Take 1 tablet (25 mg) by mouth every morning    Hypertension goal BP (blood pressure) < 140/90       loratadine 10 MG tablet    CLARITIN    30 tablet    Take 1 tablet (10 mg) by mouth daily    Acute seasonal allergic rhinitis, unspecified trigger       pravastatin 20 MG tablet    PRAVACHOL    90 tablet    Take 1 tablet (20 mg) by mouth every evening    Hyperlipidemia LDL goal <130

## 2018-07-03 NOTE — PROGRESS NOTES
SUBJECTIVE:   Di Evans is a 58 year old female who presents to clinic today for the following health issues:    Acute Illness   Acute illness concerns: cough  Onset: 1 month ago Dx allergies     Fever: no    Chills/Sweats: no    Headache (location?): no    Sinus Pressure:no    Conjunctivitis:  no    Ear Pain: no    Rhinorrhea: no    Congestion: no    Sore Throat: no    Heart rate going up: YES      Cough: YES-productive of clear sputum    Wheeze: no    Decreased Appetite: YES    Nausea: no    Vomiting: no    Diarrhea:  no    Dysuria/Freq.: no    Fatigue/Achiness: YES    Sick/Strep Exposure: no     Therapies Tried and outcome: Flonase, mucinex, and Claritin; no relief     TSH was  High on 6/22/18, patient wants recheck.         Problem list and histories reviewed & adjusted, as indicated.  Additional history: as documented    Patient Active Problem List   Diagnosis     CARDIOVASCULAR SCREENING; LDL GOAL LESS THAN 130     Hypertension goal BP (blood pressure) < 140/90     Hyperlipidemia LDL goal <130     Overweight     Changing skin lesion     Elevated TSH     Past Surgical History:   Procedure Laterality Date     SURGICAL HISTORY OF -   1980    left ankle surgery       Social History   Substance Use Topics     Smoking status: Never Smoker     Smokeless tobacco: Never Used     Alcohol use Yes      Comment: occasional     Family History   Problem Relation Age of Onset     Hypertension Mother      Hypertension Father      Cerebrovascular Disease Father      polycythemia         Current Outpatient Prescriptions   Medication Sig Dispense Refill     aspirin 81 MG tablet Take 1 tablet by mouth daily. *       azithromycin (ZITHROMAX) 250 MG tablet Two tablets first day, then one tablet daily for four days. 6 tablet 1     fluticasone (FLONASE) 50 MCG/ACT spray Spray 2 sprays into both nostrils daily 1 Bottle 11     guaiFENesin (MUCINEX) 600 MG 12 hr tablet Take 1 tablet (600 mg) by mouth 2 times daily 30 tablet 0  "    hydrochlorothiazide (HYDRODIURIL) 25 MG tablet Take 1 tablet (25 mg) by mouth every morning 90 tablet 1     loratadine (CLARITIN) 10 MG tablet Take 1 tablet (10 mg) by mouth daily 30 tablet 3     pravastatin (PRAVACHOL) 20 MG tablet Take 1 tablet (20 mg) by mouth every evening 90 tablet 3     Allergies   Allergen Reactions     Gemfibrozil Muscle Pain (Myalgia)     Lovastatin      headaches     Penicillins        Reviewed and updated as needed this visit by clinical staff  Tobacco  Allergies  Meds  Problems  Med Hx  Surg Hx  Fam Hx  Soc Hx        Reviewed and updated as needed this visit by Provider  Allergies  Meds  Problems         ROS:  Constitutional, HEENT, cardiovascular, pulmonary, GI, , musculoskeletal, neuro, skin, endocrine and psych systems are negative, except as otherwise noted.    OBJECTIVE:     /84 (BP Location: Right arm, Patient Position: Sitting, Cuff Size: Adult Large)  Pulse 96  Temp 97.7  F (36.5  C) (Oral)  Resp 20  Ht 5' 5\" (1.651 m)  Wt 163 lb 1.6 oz (74 kg)  SpO2 100%  BMI 27.14 kg/m2  Body mass index is 27.14 kg/(m^2).  GENERAL: healthy, alert and no distress  EYES: Eyes grossly normal to inspection, PERRL and conjunctivae and sclerae normal  HENT: normal cephalic/atraumatic, ear canals and TM's normal, nasal mucosa edematous , oral mucous membranes moist and there is yellow postnasal drainage present  NECK: no adenopathy, no asymmetry, masses, or scars and thyroid normal to palpation  RESP: lungs clear to auscultation - no rales, rhonchi or wheezes  CV: regular rate and rhythm, normal S1 S2, no S3 or S4, no murmur, click or rub, no peripheral edema and peripheral pulses strong  ABDOMEN: soft, nontender, no hepatosplenomegaly, no masses and bowel sounds normal  MS: no gross musculoskeletal defects noted, no edema    Diagnostic Test Results:  CXR - negative    ASSESSMENT/PLAN:       ICD-10-CM    1. Acute sinusitis with symptoms > 10 days J01.90 XR Chest 2 Views    "  azithromycin (ZITHROMAX) 250 MG tablet   2. Elevated TSH R94.6      1.Azithromycin 2 tablet today then 1 tablet daily for 4 more days, may refill and treat again if not fully resolved after the first pack   Increase fluids  Nasal wash  Mucinex DM 1 tablet twice a day for 10-14 days  Follow up if not better after the antibiotic course.     Continue Flonase spray     2.Repeat TSH in 6-8 weeks         Jordana Bethea PA-C  [facility]

## 2018-07-16 ENCOUNTER — OFFICE VISIT (OUTPATIENT)
Dept: FAMILY MEDICINE | Facility: CLINIC | Age: 58
End: 2018-07-16
Payer: COMMERCIAL

## 2018-07-16 VITALS
DIASTOLIC BLOOD PRESSURE: 70 MMHG | OXYGEN SATURATION: 98 % | WEIGHT: 165.2 LBS | TEMPERATURE: 98.1 F | SYSTOLIC BLOOD PRESSURE: 124 MMHG | BODY MASS INDEX: 27.52 KG/M2 | RESPIRATION RATE: 16 BRPM | HEART RATE: 110 BPM | HEIGHT: 65 IN

## 2018-07-16 DIAGNOSIS — R19.00 PELVIC MASS: Primary | ICD-10-CM

## 2018-07-16 DIAGNOSIS — J30.2 CHRONIC SEASONAL ALLERGIC RHINITIS, UNSPECIFIED TRIGGER: ICD-10-CM

## 2018-07-16 PROCEDURE — 99214 OFFICE O/P EST MOD 30 MIN: CPT | Performed by: FAMILY MEDICINE

## 2018-07-16 RX ORDER — MOMETASONE FUROATE MONOHYDRATE 50 UG/1
2 SPRAY, METERED NASAL DAILY
Qty: 1 BOX | Refills: 11 | Status: SHIPPED | OUTPATIENT
Start: 2018-07-16 | End: 2018-01-01

## 2018-07-16 RX ORDER — ONDANSETRON 4 MG/1
TABLET, ORALLY DISINTEGRATING ORAL
COMMUNITY
Start: 2018-07-16 | End: 2018-07-24

## 2018-07-16 ASSESSMENT — PAIN SCALES - GENERAL: PAINLEVEL: MILD PAIN (2)

## 2018-07-16 NOTE — MR AVS SNAPSHOT
After Visit Summary   7/16/2018    Di Evans    MRN: [id number]           Patient Information     Date Of Birth          1960        Visit Information        Provider Department      7/16/2018 2:00 PM Sonja Davies MD [facility]        Today's Diagnoses     Pelvic mass    -  1    Chronic seasonal allergic rhinitis, unspecified trigger          Care Instructions    Ketogenic eating plan    Take a look at the Paleo diet as it is a milder version of the ketogenic diet, also.    Watch this video to see why just eating less calories does not work: https://www.SofGenieube.com/watch?v=mNYlIcXynwE.    This eating plan is recommended to you to lower you bad cholesterol, diabetes risk, blood sugars and potential liver damage.    This plan resolves around low carbohydrate/starchy food intake with moderate protein intake and high fat intake.  I recommend natural, minimally processed foods.  You can have eggs, butter, chin, full fat cheese full fat cream cheese and heavy cream.  Most nuts and seeds are benecial as well.    If you use a calories track renate, like my fitness pal or similar, I recommend 70% of calories come from fat, 25% of calories come from protein and 5% of your calories come from non starchy veggies.  Non-starchy veggies would include the vegetables that grow above ground.  If you plan to take in fruit, stick to berries and treat as a dessert. Avoid sugar, high fructose corn syrup, and corn syrup sweeteners.  It should be okay to use Splenda and stevia sweeteners. Avoid corn (this is more of a grain than a veggie), regular soda, potatoes, rice, wheat and pasta.    Please look at www.lowIMImobilebdKDPOFunder.com.au, www.VoIP Logic.Button, KetogenicAdama MaterialsdietAdama Materialsresource.com, Google Ketogenic diets and check out smash the fat on YouTube.    Http://www.ncbi.nlm.nih.gov/pmc/articles/BJA6636307/ is a study which shows long term ketogenic diets are safe and work for weight  "loss and cholesterol improvement.    These recommendations are general and we should discuss your response to this on a regular basis so we can adjust these recommendations for you.    Note of caution: it will take about 2 weeks for the ketogenic diet to shift you into fat burning as a primary fuel source.  You may feel fatigued and have slight trouble thinking over the first 2 weeks as you shift from carbohydrate as a primary fuel source to fat as a primary source.  Taking 250 mg of Magnesium and increasing your salt intake every day will help.  (Yes one of the few times you provider will tell you to eat more salt!)            Follow-ups after your visit        Your next 10 appointments already scheduled     Jul 24, 2018  9:00 AM CDT   New Visit with Jammie Salgado MD   [facility] ([facility])    [facility]  [address]  [facility] [address]   [phone]            Nov 09, 2018 11:00 AM CST   MA SCREENING DIGITAL BILATERAL with BKMA1   [facility]    [address] [facility] [id number] [phone]           Do not use any powder, lotion or deodorant under your arms or on your breast. If you do, we will ask you to remove it before your exam.  Wear comfortable, two-piece clothing.  If you have any allergies, tell your care team.  Bring any previous mammograms from other facilities or have them mailed to the breast center. Three-dimensional (3D) mammograms are available at [place] locations in [facility], [facility], [facility], and [place]. [facility] locations include [place] and [facility] in [place]. Benefits of 3D mammograms include: - Improved rate of cancer detection - Decreases your chance of having to go back for more tests, which means fewer: - \"False-positive\" results (This means that there " "is an abnormal area but it isn't cancer.) - Invasive testing procedures, such as a biopsy or surgery - Can provide clearer images of the breast if you have dense breast tissue. 3D mammography is an optional exam that anyone can have with a 2D mammogram. It doesn't replace or take the place of a 2D mammogram. 2D mammograms remain an effective screening test for all women.  Not all insurance companies cover the cost of a 3D mammogram. Check with your insurance.              Who to contact     If you have questions or need follow up information about today's clinic visit or your schedule please contact [facility] directly at [phone].  Normal or non-critical lab and imaging results will be communicated to you by [employer] Capital Investment Technologyhart, letter or phone within 4 business days after the clinic has received the results. If you do not hear from us within 7 days, please contact the clinic through Utopiat or phone. If you have a critical or abnormal lab result, we will notify you by phone as soon as possible.  Submit refill requests through Casper or call your pharmacy and they will forward the refill request to us. Please allow 3 business days for your refill to be completed.          Additional Information About Your Visit        [employer] Technology[employer]ly Information     Casper gives you secure access to your electronic health record. If you see a primary care provider, you can also send messages to your care team and make appointments. If you have questions, please call your primary care clinic.  If you do not have a primary care provider, please call [phone] and they will assist you.        Care EveryWhere ID     This is your Care EveryWhere ID. This could be used by other organizations to access your [place] medical records  [id number]        Your Vitals Were     Pulse Temperature Respirations Height Pulse Oximetry Breastfeeding?    110 98.1  F (36.7  C) (Oral) 16 5' 5\" (1.651 m) 98% No    BMI (Body Mass Index)          "          27.49 kg/m2            Blood Pressure from Last 3 Encounters:   07/16/18 141/84   07/03/18 134/84   06/06/18 136/80    Weight from Last 3 Encounters:   07/16/18 165 lb 3.2 oz (74.9 kg)   07/03/18 163 lb 1.6 oz (74 kg)   06/06/18 165 lb (74.8 kg)              Today, you had the following     No orders found for display         Today's Medication Changes          These changes are accurate as of 7/16/18  2:48 PM.  If you have any questions, ask your nurse or doctor.               Start taking these medicines.        Dose/Directions    mometasone 50 MCG/ACT spray   Commonly known as:  NASONEX   Used for:  Chronic seasonal allergic rhinitis, unspecified trigger   Started by:  Sonja Davies MD        Dose:  2 spray   Spray 2 sprays into both nostrils daily   Quantity:  1 Box   Refills:  11            Where to get your medicines      These medications were sent to [facility] [address] [place] [address] [place] - [address] [place] AT [facility] [address]  [address] [place] [address]     Phone:  [phone]     mometasone 50 MCG/ACT spray                Primary Care Provider Office Phone # Fax #    Sonja Hernandez [phone]       [address] [place]  [facility] [address]        Equal Access to Services     [facility] AH: Hadii aad ku hadasho Soomaali, waaxda luqadaha, qaybta kaalmada adeegyada, mae redding hayelizabeth العلي . So [facility] [phone].    ATENCIÓN: Si habla español, tiene a perez disposición servicios gratuitos de asistencia lingüística. Llame al [phone].    We comply with applicable federal civil rights laws and [place] laws. We do not discriminate on the basis of race, color, national origin, age, disability, sex, sexual orientation, or gender identity.            Thank you!     Thank you for choosing [facility]  for your care. Our goal is always to provide you with excellent care. Hearing  back from our patients is one way we can continue to improve our services. Please take a few minutes to complete the written survey that you may receive in the mail after your visit with us. Thank you!             Your Updated Medication List - Protect others around you: Learn how to safely use, store and throw away your medicines at www.disposemymeds.org.          This list is accurate as of 7/16/18  2:48 PM.  Always use your most recent med list.                   Brand Name Dispense Instructions for use Diagnosis    aspirin 81 MG tablet      Take 1 tablet by mouth daily. *        azithromycin 250 MG tablet    ZITHROMAX    6 tablet    Two tablets first day, then one tablet daily for four days.    Acute sinusitis with symptoms > 10 days       fluticasone 50 MCG/ACT spray    FLONASE    1 Bottle    Spray 2 sprays into both nostrils daily    Acute seasonal allergic rhinitis, unspecified trigger       hydrochlorothiazide 25 MG tablet    HYDRODIURIL    90 tablet    Take 1 tablet (25 mg) by mouth every morning    Hypertension goal BP (blood pressure) < 140/90       loratadine 10 MG tablet    CLARITIN    30 tablet    Take 1 tablet (10 mg) by mouth daily    Acute seasonal allergic rhinitis, unspecified trigger       mometasone 50 MCG/ACT spray    NASONEX    1 Box    Spray 2 sprays into both nostrils daily    Chronic seasonal allergic rhinitis, unspecified trigger       ondansetron 4 MG ODT tab    ZOFRAN-ODT          pravastatin 20 MG tablet    PRAVACHOL    90 tablet    Take 1 tablet (20 mg) by mouth every evening    Hyperlipidemia LDL goal <130

## 2018-07-16 NOTE — TELEPHONE ENCOUNTER
1.  Date of consult: 8/16/18    2.  Reason for consult: Ovarian CA    3.  Referring provider/facility:  [facility]    4. Scheduled by: patient       5.  Outside records requested from:  [facility] (Care Everywhere)    6.  Additional Information:

## 2018-07-16 NOTE — PATIENT INSTRUCTIONS
Ketogenic eating plan    Take a look at the Paleo diet as it is a milder version of the ketogenic diet, also.    Watch this video to see why just eating less calories does not work: https://www.youKimerick Technologiesube.com/watch?v=mNYlIcXynwE.    This eating plan is recommended to you to lower you bad cholesterol, diabetes risk, blood sugars and potential liver damage.    This plan resolves around low carbohydrate/starchy food intake with moderate protein intake and high fat intake.  I recommend natural, minimally processed foods.  You can have eggs, butter, chin, full fat cheese full fat cream cheese and heavy cream.  Most nuts and seeds are benecial as well.    If you use a calories track renate, like my fitness pal or similar, I recommend 70% of calories come from fat, 25% of calories come from protein and 5% of your calories come from non starchy veggies.  Non-starchy veggies would include the vegetables that grow above ground.  If you plan to take in fruit, stick to berries and treat as a dessert. Avoid sugar, high fructose corn syrup, and corn syrup sweeteners.  It should be okay to use Splenda and stevia sweeteners. Avoid corn (this is more of a grain than a veggie), regular soda, potatoes, rice, wheat and pasta.    Please look at www.DeepDyve.Trellis Technology.au, www.Medudem, KetogenicSpark DiagnosticsdietSpark Diagnosticsresource.Trellis Technology, Google Ketogenic diets and check out smash the fat on YouTube.    Http://www.ncbi.nlm.nih.gov/pmc/articles/ZWO1218243/ is a study which shows long term ketogenic diets are safe and work for weight loss and cholesterol improvement.    These recommendations are general and we should discuss your response to this on a regular basis so we can adjust these recommendations for you.    Note of caution: it will take about 2 weeks for the ketogenic diet to shift you into fat burning as a primary fuel source.  You may feel fatigued and have slight trouble thinking over the first 2 weeks as you shift from carbohydrate as a primary  fuel source to fat as a primary source.  Taking 250 mg of Magnesium and increasing your salt intake every day will help.  (Yes one of the few times you provider will tell you to eat more salt!)

## 2018-07-16 NOTE — PROGRESS NOTES
SUBJECTIVE:   Di Evans is a 58 year old female who presents to clinic today for the following health issues:    ED/UC Followup:    Facility:  [facility]  Date of visit: 7/15/18  Reason for visit: Abdominal Pain  Current Status: Not very good. Nausea     ABDOMINAL PAIN     Onset: 9 months    Description:   Character: Dull ache  Location: right upper quadrant  Radiation: None    Intensity: mild    Progression of Symptoms:  worsening    Accompanying Signs & Symptoms:  Fever/Chills?: no   Gas/Bloating: YES  Nausea: YES  Vomitting: no   Diarrhea?: no   Constipation:YES  Dysuria or Hematuria: no     CT showed cyst and concerns for cancer.  Has appt with oncology next week.   History:   Trauma: no   Previous similar pain: no    Previous tests done: CT, and blood work at the ED    Precipitating factors:   Does the pain change with:     Food: no      BM: no     Urination: no     Alleviating factors:  n/a    Therapies Tried and outcome: Zofran 4mg    LMP:  not applicable        Runny nose, nasal congestion and post nasal drip causing cough for weeks. Not controlled with claritin and flonase.      Problem list and histories reviewed & adjusted, as indicated.  Additional history: as documented    Patient Active Problem List   Diagnosis     CARDIOVASCULAR SCREENING; LDL GOAL LESS THAN 130     Hypertension goal BP (blood pressure) < 140/90     Hyperlipidemia LDL goal <130     Overweight     Changing skin lesion     Elevated TSH     Pelvic mass     Past Surgical History:   Procedure Laterality Date     SURGICAL HISTORY OF -   1980    left ankle surgery       Social History   Substance Use Topics     Smoking status: Never Smoker     Smokeless tobacco: Never Used     Alcohol use Yes      Comment: occasional     Family History   Problem Relation Age of Onset     Hypertension Mother      Hypertension Father      Cerebrovascular Disease Father      polycythemia           Reviewed and updated as needed this visit by  "clinical staff  Tobacco  Allergies  Meds  Problems       Reviewed and updated as needed this visit by Provider  Allergies  Meds  Problems         ROS:  Constitutional, HEENT, cardiovascular, pulmonary, GI, , musculoskeletal, neuro, skin, endocrine and psych systems are negative, except as otherwise noted.    OBJECTIVE:     /70  Pulse 110  Temp 98.1  F (36.7  C) (Oral)  Resp 16  Ht 5' 5\" (1.651 m)  Wt 165 lb 3.2 oz (74.9 kg)  SpO2 98%  Breastfeeding? No  BMI 27.49 kg/m2  Body mass index is 27.49 kg/(m^2).  GENERAL: healthy, alert and no distress  EYES: Eyes grossly normal to inspection, PERRL and conjunctivae and sclerae normal  HENT: normal cephalic/atraumatic, ear canals and TM's normal, nasal mucosa edematous , oropharynx clear and oral mucous membranes moist  NECK: no adenopathy, no asymmetry, masses, or scars and thyroid normal to palpation  RESP: lungs clear to auscultation - no rales, rhonchi or wheezes  CV: regular rate and rhythm, normal S1 S2, no S3 or S4, no murmur, click or rub, no peripheral edema and peripheral pulses strong  ABDOMEN: soft, nontender, no hepatosplenomegaly, no masses and bowel sounds normal  MS: no gross musculoskeletal defects noted, no edema    Diagnostic Test Results:  Care Everywhere CT reviewed    ASSESSMENT/PLAN:     1. Pelvic mass  As per oncology and recommended ketogenic diet.    2. Chronic seasonal allergic rhinitis, unspecified trigger  Change flonase to nasonex and clartin to zyrtec.  - mometasone (NASONEX) 50 MCG/ACT spray; Spray 2 sprays into both nostrils daily  Dispense: 1 Box; Refill: 11    The uses and side effects, including black box warnings as appropriate, were discussed in detail.  All patient questions were answered.  The patient was instructed to call immediately if any side effects developed.     FUTURE APPOINTMENTS:       - Follow-up visit in 2 - 4 weeks if allergies not improved or as needed.    Sonja Hernandez MD  [place] " [facility]

## 2018-07-19 ENCOUNTER — TELEPHONE (OUTPATIENT)
Dept: FAMILY MEDICINE | Facility: CLINIC | Age: 58
End: 2018-07-19

## 2018-07-19 NOTE — TELEPHONE ENCOUNTER
Panel Management Review      BP Readings from Last 1 Encounters:   07/16/18 124/70    , No results found for: A1C, 7/16/2018  Last Office Visit with this department: 7/16/2018    Fail List measure: colon Cancer       Patient is due/failing the following:   COLONOSCOPY or FIT    Action needed:   none    Type of outreach:    Sent SupplyBettert message.    Questions for provider review:    None                                                                                                                                    Samantha Branch      Chart routed to n/a .

## 2018-07-20 ENCOUNTER — CARE COORDINATION (OUTPATIENT)
Dept: ONCOLOGY | Facility: CLINIC | Age: 58
End: 2018-07-20

## 2018-07-20 NOTE — PROGRESS NOTES
Phoned patient today for Pre-visit welcome call.  Patient is not available but left detailed message on personal VM.      Scheduled for:  Date:  7/24/18  Time: 9 am  with :  Neto    Patient requested:  Please check in at [address]  [facility].  Please Arrive 15 minutes before your appointment time.    Please Bring:  Insurance Card(s)  Photo ID      Pt records are in care everywhere from [facility].  If you have any questions, please don't hesitate to call and ask for the Patient Care Coordinator @ [phone] option 4.      Noa Dubon, RN, BSN, OCN

## 2018-07-24 ENCOUNTER — ONCOLOGY VISIT (OUTPATIENT)
Dept: ONCOLOGY | Facility: CLINIC | Age: 58
End: 2018-07-24
Attending: OBSTETRICS & GYNECOLOGY
Payer: COMMERCIAL

## 2018-07-24 ENCOUNTER — HOSPITAL ENCOUNTER (OUTPATIENT)
Facility: CLINIC | Age: 58
Setting detail: SPECIMEN
Discharge: HOME | End: 2018-07-24
Attending: OBSTETRICS & GYNECOLOGY | Admitting: OBSTETRICS & GYNECOLOGY
Payer: COMMERCIAL

## 2018-07-24 VITALS
RESPIRATION RATE: 16 BRPM | HEART RATE: 116 BPM | SYSTOLIC BLOOD PRESSURE: 148 MMHG | HEIGHT: 65 IN | OXYGEN SATURATION: 98 % | WEIGHT: 160 LBS | DIASTOLIC BLOOD PRESSURE: 86 MMHG | BODY MASS INDEX: 26.66 KG/M2 | TEMPERATURE: 97.5 F

## 2018-07-24 DIAGNOSIS — R19.00 PELVIC MASS: Primary | ICD-10-CM

## 2018-07-24 LAB
ABO + RH BLD: NORMAL
ABO + RH BLD: NORMAL
ALBUMIN SERPL-MCNC: 2.7 G/DL (ref 3.4–5)
ALP SERPL-CCNC: 63 U/L (ref 40–150)
ALT SERPL W P-5'-P-CCNC: 13 U/L (ref 0–50)
ANION GAP SERPL CALCULATED.3IONS-SCNC: 11 MMOL/L (ref 3–14)
AST SERPL W P-5'-P-CCNC: 15 U/L (ref 0–45)
BASOPHILS # BLD AUTO: 0 10E9/L (ref 0–0.2)
BASOPHILS NFR BLD AUTO: 0.6 %
BILIRUB SERPL-MCNC: 0.4 MG/DL (ref 0.2–1.3)
BLD GP AB SCN SERPL QL: NORMAL
BLOOD BANK CMNT PATIENT-IMP: NORMAL
BUN SERPL-MCNC: 7 MG/DL (ref 7–30)
CALCIUM SERPL-MCNC: 9.1 MG/DL (ref 8.5–10.1)
CANCER AG125 SERPL-ACNC: 598 U/ML (ref 0–30)
CEA SERPL-MCNC: 1.1 UG/L (ref 0–2.5)
CHLORIDE SERPL-SCNC: 91 MMOL/L (ref 94–109)
CO2 SERPL-SCNC: 30 MMOL/L (ref 20–32)
CREAT SERPL-MCNC: 0.57 MG/DL (ref 0.52–1.04)
DIFFERENTIAL METHOD BLD: ABNORMAL
EOSINOPHIL # BLD AUTO: 0 10E9/L (ref 0–0.7)
EOSINOPHIL NFR BLD AUTO: 0.1 %
ERYTHROCYTE [DISTWIDTH] IN BLOOD BY AUTOMATED COUNT: 13.5 % (ref 10–15)
GFR SERPL CREATININE-BSD FRML MDRD: >90 ML/MIN/1.7M2
GLUCOSE SERPL-MCNC: 99 MG/DL (ref 70–99)
HCT VFR BLD AUTO: 36.5 % (ref 35–47)
HGB BLD-MCNC: 11.4 G/DL (ref 11.7–15.7)
IMM GRANULOCYTES # BLD: 0 10E9/L (ref 0–0.4)
IMM GRANULOCYTES NFR BLD: 0.3 %
LYMPHOCYTES # BLD AUTO: 0.8 10E9/L (ref 0.8–5.3)
LYMPHOCYTES NFR BLD AUTO: 11.9 %
MCH RBC QN AUTO: 26 PG (ref 26.5–33)
MCHC RBC AUTO-ENTMCNC: 31.2 G/DL (ref 31.5–36.5)
MCV RBC AUTO: 83 FL (ref 78–100)
MONOCYTES # BLD AUTO: 0.7 10E9/L (ref 0–1.3)
MONOCYTES NFR BLD AUTO: 10.2 %
NEUTROPHILS # BLD AUTO: 5.3 10E9/L (ref 1.6–8.3)
NEUTROPHILS NFR BLD AUTO: 76.9 %
NRBC # BLD AUTO: 0 10*3/UL
NRBC BLD AUTO-RTO: 0 /100
PLATELET # BLD AUTO: 677 10E9/L (ref 150–450)
POTASSIUM SERPL-SCNC: 2.8 MMOL/L (ref 3.4–5.3)
PROT SERPL-MCNC: 7.4 G/DL (ref 6.8–8.8)
RBC # BLD AUTO: 4.39 10E12/L (ref 3.8–5.2)
SODIUM SERPL-SCNC: 132 MMOL/L (ref 133–144)
SPECIMEN EXP DATE BLD: NORMAL
WBC # BLD AUTO: 6.9 10E9/L (ref 4–11)

## 2018-07-24 PROCEDURE — 86900 BLOOD TYPING SEROLOGIC ABO: CPT | Performed by: OBSTETRICS & GYNECOLOGY

## 2018-07-24 PROCEDURE — 86850 RBC ANTIBODY SCREEN: CPT | Performed by: OBSTETRICS & GYNECOLOGY

## 2018-07-24 PROCEDURE — 82378 CARCINOEMBRYONIC ANTIGEN: CPT | Performed by: OBSTETRICS & GYNECOLOGY

## 2018-07-24 PROCEDURE — 36415 COLL VENOUS BLD VENIPUNCTURE: CPT

## 2018-07-24 PROCEDURE — 93005 ELECTROCARDIOGRAM TRACING: CPT | Performed by: OBSTETRICS & GYNECOLOGY

## 2018-07-24 PROCEDURE — 86304 IMMUNOASSAY TUMOR CA 125: CPT | Performed by: OBSTETRICS & GYNECOLOGY

## 2018-07-24 PROCEDURE — 85025 COMPLETE CBC W/AUTO DIFF WBC: CPT | Performed by: OBSTETRICS & GYNECOLOGY

## 2018-07-24 PROCEDURE — 80053 COMPREHEN METABOLIC PANEL: CPT | Performed by: OBSTETRICS & GYNECOLOGY

## 2018-07-24 PROCEDURE — 86901 BLOOD TYPING SEROLOGIC RH(D): CPT | Performed by: OBSTETRICS & GYNECOLOGY

## 2018-07-24 PROCEDURE — 99205 OFFICE O/P NEW HI 60 MIN: CPT | Mod: 57 | Performed by: OBSTETRICS & GYNECOLOGY

## 2018-07-24 PROCEDURE — G0463 HOSPITAL OUTPT CLINIC VISIT: HCPCS

## 2018-07-24 PROCEDURE — 93005 ELECTROCARDIOGRAM TRACING: CPT

## 2018-07-24 RX ORDER — CETIRIZINE HYDROCHLORIDE 10 MG/1
10 TABLET ORAL DAILY
COMMUNITY
End: 2018-01-01

## 2018-07-24 RX ORDER — HEPARIN SODIUM 10000 [USP'U]/ML
5000 INJECTION, SOLUTION INTRAVENOUS; SUBCUTANEOUS
Status: CANCELLED | OUTPATIENT
Start: 2018-07-24 | End: 2038-07-25

## 2018-07-24 RX ORDER — CEFAZOLIN SODIUM 1 G
1 VIAL (EA) INJECTION SEE ADMIN INSTRUCTIONS
Status: CANCELLED | OUTPATIENT
Start: 2018-07-24 | End: 2019-01-01

## 2018-07-24 RX ORDER — PHENAZOPYRIDINE HYDROCHLORIDE 100 MG/1
200 TABLET, FILM COATED ORAL ONCE
Status: CANCELLED | OUTPATIENT
Start: 2018-07-24 | End: 2018-07-24

## 2018-07-24 ASSESSMENT — PAIN SCALES - GENERAL: PAINLEVEL: NO PAIN (0)

## 2018-07-24 NOTE — NURSING NOTE
"Oncology Rooming Note    July 24, 2018 8:44 AM   Di Evans is a 58 year old female who presents for:    Chief Complaint   Patient presents with     Oncology Clinic Visit     New Patient - consult     Initial Vitals: /86  Pulse 116  Temp 97.5  F (36.4  C) (Tympanic)  Resp 16  Ht 1.651 m (5' 5\")  Wt 72.6 kg (160 lb)  SpO2 98%  BMI 26.63 kg/m2 Estimated body mass index is 26.63 kg/(m^2) as calculated from the following:    Height as of this encounter: 1.651 m (5' 5\").    Weight as of this encounter: 72.6 kg (160 lb). Body surface area is 1.82 meters squared.  No Pain (0) Comment: Data Unavailable   No LMP recorded. Patient is postmenopausal.  Allergies reviewed: Yes  Medications reviewed: Yes    Medications: Medication refills not needed today.  Pharm[facility] into [employer] [address] [place] [address] [address] AT [facility] [address]    Clinical concerns: New Patient consult     8 minutes for nursing intake (face to face time)     Millie Salazar CMA     DISCHARGE PLAN:  Next appointments: See patient instruction section  Departure Mode: Ambulatory  Accompanied by: self  0 minutes for nursing discharge (face to face time)   Millie Salazar CMA                  "

## 2018-07-24 NOTE — MR AVS SNAPSHOT
After Visit Summary   7/24/2018    Di Evans    MRN: [id number]           Patient Information     Date Of Birth          1960        Visit Information        Provider Department      7/24/2018 9:00 AM Jammie Dueñas MD [facility]        Today's Diagnoses     Pelvic mass    -  1      Care Instructions    You have been scheduled for surgery on: 7/30/18    Diagnosis:  Pelvic mass, carcinomatosis, concern for ovarian cancer    The surgical procedure is: Exploratory laparotomy, removal of uterus, cervix, both ovaries and fallopian tubes, possible cancer surgery, possible bowel resection, possible ostomy, possible IP port placement    Anticipated length of surgery: 2-5 hrs.  You will be in the recovery room for approximately 2-3 hrs after surgery.  Your family will not be able to see you until after you leave the recovery room.    Length of hospital stay:  You will need to be in the hospital 2-3 days.  ______________________________________________________________________    Preparation for Surgery:    To Schedule   1.  Labs:  CBC, CMP, T/S, , CEA, orders placed, please perform today - drawn ljt   2.  EKG:  Order placed, please perform today - done ljt   3.  CT Chest prior to surgery-Scheduled for July 25TH @ 1:15.  Nothing to eat or drink 2 hours prior to exam.  This will be done in [facility].  Karen Roach    4.  Post-operative exam with me 1-2 weeks following surgery at - You are scheduled for a followup on August here in [place] to see Dr Duque.  - Scheduled in [facility] on August 16th @ 8:45. Karen Roach        Postoperative Restrictions:  No heavy lifting >20lbs for six weeks, nothing in the vagina (no tampons, no intercourse, no douching) for eight weeks.     Postoperative visit:  Return to clinic 1-2 weeks after surgery for post operative visit.      Please contact the clinic with any questions or concerns.    Jammie Duque  MD  Gynecologic Oncology  [facility]               Follow-ups after your visit        Your next 10 appointments already scheduled     Jul 25, 2018  1:30 PM CDT   CT CHEST W/O & W CONTRAST with MGCT1   [facility]    [address] [place] [id number] [phone]           Please bring any scans or X-rays taken at other hospitals, if similar tests were done. Also bring a list of your medicines, including vitamins, minerals and over-the-counter drugs. It is safest to leave personal items at home.  Be sure to tell your doctor:   If you have any allergies.   If there s any chance you are pregnant.   If you are breastfeeding.    If you have diabetes as your medication may need to be adjusted for this exam.  You will have contrast for this exam. To prepare:   Do not eat or drink for 2 hours before your exam. If you need to take medicine, you may take it with small sips of water. (We may ask you to take liquid medicine as well.)   The day before your exam, drink extra fluids at least six 8-ounce glasses (unless your doctor tells you to restrict your fluids).  Patients over 70 or patients with diabetes or kidney problems:   If you haven t had a blood test (creatinine test) within the last 30 days, the Cardiologist/Radiologist may require you to get this test prior to your exam.  Please wear loose clothing, such as a sweat suit or jogging clothes. Avoid snaps, zippers and other metal. We may ask you to undress and put on a hospital gown.  If you have any questions, please call the Imaging Department where you will have your exam.            Aug 16, 2018  8:45 AM CDT   Post Op with Jammie Salgado MD   [facility]    [address] [address] [place] [id number] [phone]            Nov 09, 2018 11:00 AM CST   MA SCREENING DIGITAL BILATERAL with BKMA1   [facility]  "[facility]    [address] [facility] [id number] [phone]           Do not use any powder, lotion or deodorant under your arms or on your breast. If you do, we will ask you to remove it before your exam.  Wear comfortable, two-piece clothing.  If you have any allergies, tell your care team.  Bring any previous mammograms from other facilities or have them mailed to the breast center. Three-dimensional (3D) mammograms are available at [place] locations in [facility], [facility], [facility], and [place]. [facility] locations include [facility] in [place]. Benefits of 3D mammograms include: - Improved rate of cancer detection - Decreases your chance of having to go back for more tests, which means fewer: - \"False-positive\" results (This means that there is an abnormal area but it isn't cancer.) - Invasive testing procedures, such as a biopsy or surgery - Can provide clearer images of the breast if you have dense breast tissue. 3D mammography is an optional exam that anyone can have with a 2D mammogram. It doesn't replace or take the place of a 2D mammogram. 2D mammograms remain an effective screening test for all women.  Not all insurance companies cover the cost of a 3D mammogram. Check with your insurance.              Future tests that were ordered for you today     Open Future Orders        Priority Expected Expires Ordered    CT Chest w/o & w Contrast Routine  7/24/2019 7/24/2018            Who to contact     If you have questions or need follow up information about today's clinic visit or your schedule please contact [facility] directly at [phone].  Normal or non-critical lab and imaging results will be communicated to you by MyChart, letter or phone within 4 business days after the clinic has received the results. If you do not hear from us within 7 days, please " "contact the clinic through [employer] or phone. If you have a critical or abnormal lab result, we will notify you by phone as soon as possible.  Submit refill requests through [employer] or call your pharmacy and they will forward the refill request to us. Please allow 3 business days for your refill to be completed.          Additional Information About Your Visit        [employer] Information     [employer] gives you secure access to your electronic health record. If you see a primary care provider, you can also send messages to your care team and make appointments. If you have questions, please call your primary care clinic.  If you do not have a primary care provider, please call [phone] and they will assist you.        Care EveryWhere ID     This is your Care EveryWhere ID. This could be used by other organizations to access your [facility] medical records  [id number]        Your Vitals Were     Pulse Temperature Respirations Height Pulse Oximetry BMI (Body Mass Index)    116 97.5  F (36.4  C) (Tympanic) 16 1.651 m (5' 5\") 98% 26.63 kg/m2       Blood Pressure from Last 3 Encounters:   07/24/18 148/86   07/16/18 124/70   07/03/18 134/84    Weight from Last 3 Encounters:   07/24/18 72.6 kg (160 lb)   07/16/18 74.9 kg (165 lb 3.2 oz)   07/03/18 74 kg (163 lb 1.6 oz)              We Performed the Following     ABO/Rh Type and Screen          CBC with platelets differential     CEA     Comprehensive metabolic panel     EKG 12-Lead Complete w/read - Clinics     Aria-Operative Worksheet (Blank)          Today's Medication Changes          These changes are accurate as of 7/24/18 10:47 AM.  If you have any questions, ask your nurse or doctor.               Stop taking these medicines if you haven't already. Please contact your care team if you have questions.     ondansetron 4 MG ODT tab   Commonly known as:  ZOFRAN-ODT   Stopped by:  Jammie Dueñas MD                    Primary Care Provider Office Phone # " Fax #    Sonja Ngo Dylan Hernandez [phone]       [address] [facility] [address]        Equal Access to Services     ROMAN MILLARD : Hadii nakita ku hadsherrillo Soomaali, waaxda luqadaha, qaybta kaalmada adeegyada, mae cunninghamelkin whytejian gerber severiano schulz. So [facility] [phone].    ATENCIÓN: Si habla español, tiene a perez disposición servicios gratuitos de asistencia lingüística. Llame al [phone].    We comply with applicable federal civil rights laws and [place] laws. We do not discriminate on the basis of race, color, national origin, age, disability, sex, sexual orientation, or gender identity.            Thank you!     Thank you for choosing [facility]  for your care. Our goal is always to provide you with excellent care. Hearing back from our patients is one way we can continue to improve our services. Please take a few minutes to complete the written survey that you may receive in the mail after your visit with us. Thank you!             Your Updated Medication List - Protect others around you: Learn how to safely use, store and throw away your medicines at www.disposemymeds.org.          This list is accurate as of 7/24/18 10:47 AM.  Always use your most recent med list.                   Brand Name Dispense Instructions for use Diagnosis    aspirin 81 MG tablet      Take 1 tablet by mouth daily. *        cetirizine 10 MG tablet    zyrTEC     Take 10 mg by mouth daily        hydrochlorothiazide 25 MG tablet    HYDRODIURIL    90 tablet    Take 1 tablet (25 mg) by mouth every morning    Hypertension goal BP (blood pressure) < 140/90       mometasone 50 MCG/ACT spray    NASONEX    1 Box    Spray 2 sprays into both nostrils daily    Chronic seasonal allergic rhinitis, unspecified trigger       pravastatin 20 MG tablet    PRAVACHOL    90 tablet    Take 1 tablet (20 mg) by mouth every evening    Hyperlipidemia LDL goal <130

## 2018-07-24 NOTE — PATIENT INSTRUCTIONS
You have been scheduled for surgery on: 7/30/18    Diagnosis:  Pelvic mass, carcinomatosis, concern for ovarian cancer    The surgical procedure is: Exploratory laparotomy, removal of uterus, cervix, both ovaries and fallopian tubes, possible cancer surgery, possible bowel resection, possible ostomy, possible IP port placement    Anticipated length of surgery: 2-5 hrs.  You will be in the recovery room for approximately 2-3 hrs after surgery.  Your family will not be able to see you until after you leave the recovery room.    Length of hospital stay:  You will need to be in the hospital 2-3 days.  ______________________________________________________________________    Preparation for Surgery:    To Schedule   1.  Labs:  CBC, CMP, T/S, , CEA, orders placed, please perform today - drawn ljt   2.  EKG:  Order placed, please perform today - done ljt   3.  CT Chest prior to surgery-Scheduled for July 25TH @ 1:15.  Nothing to eat or drink 2 hours prior to exam.  This will be done in [facility].  Karen Roach    4.  Post-operative exam with me 1-2 weeks following surgery at - You are scheduled for a followup on August here in [place] to see Dr Duque.  - Scheduled in [facility] on August 16th @ 8:45. Karen Roach        Postoperative Restrictions:  No heavy lifting >20lbs for six weeks, nothing in the vagina (no tampons, no intercourse, no douching) for eight weeks.     Postoperative visit:  Return to clinic 1-2 weeks after surgery for post operative visit.      Please contact the clinic with any questions or concerns.    Jammie Duque MD  Gynecologic Oncology  [facility]

## 2018-07-24 NOTE — PROGRESS NOTES
Consult Notes on Referred Patient        Dr. Polk [facility]  [address] [place] [address]       RE: Di Evans  : 1960  ROMAN: 2018    Dear Dr. Jam [facility] *:    I had the pleasure of seeing your patient Di Evans [facility]  As you know she is a very pleasant 58 year old woman with a recent diagnosis of pelvic mass, carcinomatosis, concern for ovarian cancer.  Given these findings she was subsequently sent to the Gynecologic Cancer Clinic for new patient consultation.  She is accompanied today by three of her good friends, one of whom is her roommate.    She reports she has been having abdominal symptoms for the past 8 months.  She has been feeling bloated and distended for several months.  She has also had decreased appetite and early satiety.  She does not note any major changes in her bowel or bladder function.  She has not had significant pain.  She finally presented to the ED and demanded a CT which was suspicious for ovarian cancer.    18:  CT A/P:  Large cystic/solid mass within the pelvis highly suspicious for ovarian malignancy. 2.  Omental thickening suspicious for metastatic disease. 3.  Large volume of ascites.  Malignant ascites cannot be excluded. 4.  Small left pleural effusion and left lower lobe atelectasis. 5.  Diverticulosis, small hiatal hernia, and gallbladder sludge.      Review of Systems:  Systemic           no weight changes; no fever; no chills; no night sweats; + appetite changes  Skin           no rashes, or lesions  Eye           no irritation; no changes in vision  Janice-Laryngeal           no dysphagia; no hoarseness   Pulmonary    no cough; no shortness of breath  Cardiovascular    no chest pain; no palpitations  Gastrointestinal    no diarrhea; no constipation; no abdominal pain; no changes in bowel  habits; no blood in  stool  Genitourinary   no urinary frequency; no urinary urgency; no dysuria; no pain; no abnormal vaginal discharge; no abnormal vaginal bleeding  Breast    no breast discharge; no breast changes; no breast pain  Musculoskeletal    no myalgias; no arthralgias; no back pain  Psychiatric           no depressed mood; no anxiety    Hematologic            no tender lymph nodes; no noticeable swellings or lumps   Endocrine    no hot flashes; no heat/cold intolerance         Neurological   no tremor; no numbness and tingling; no headaches; no difficulty sleeping    Obstetrics and Gynecology History:  G0  Menopause age 44, no HRT      Past Medical History:  Past Medical History:   Diagnosis Date     Hypertension        Past Surgical History:  Past Surgical History:   Procedure Laterality Date     SURGICAL HISTORY OF -   1980    left ankle surgery       Health Maintenance:  Last Pap Smear: 6/6/18              Result: negative, HPV negative  She has not had a history of abnormal Pap smears.    Last Mammogram: 11/15/16              Result: normal      She has not had a history of abnormal mammograms.    Last Colonoscopy: Never      Current Medications:   has a current medication list which includes the following prescription(s): aspirin, cetirizine, hydrochlorothiazide, mometasone, and pravastatin.     Allergies:   Gemfibrozil; Lovastatin; and Penicillins-unknown reaction as a child      Social History:  Social History     Social History     Marital status: Single     Spouse name: N/A     Number of children: 0     Years of education: N/A     Occupational History     manufacturing circuit boards [employer]     Social History Main Topics     Smoking status: Never Smoker     Smokeless tobacco: Never Used     Alcohol use Yes      Comment: occasional     Drug use: No     Sexual activity: No     Other Topics Concern     Parent/Sibling W/ Cabg, Mi Or Angioplasty Before 65f 55m? No      Service No     Blood Transfusions  "No     Caffeine Concern Yes     Occupational Exposure No     Hobby Hazards No     Sleep Concern No     Stress Concern No     Weight Concern Yes     Special Diet No     Back Care No     Exercise Yes     Bike Helmet No     Yes in the future     Seat Belt Yes     Self-Exams Yes     Social History Narrative       Lives with a roommate, feels safe at home.  Works as a .  Enjoys gambling.  Does not have an advanced directive on file and would like her roommateAlivia to be her POA.  Would like full resuscitation if reversible cause is identified, however would not like to be kept on life sustaining measures long-term.     Family History:   The patient's family history is significant for.  Family History   Problem Relation Age of Onset     Hypertension Mother      Hypertension Father      Cerebrovascular Disease Father      polycythemia     Breast Cancer Maternal Aunt      older age         Physical Exam:   /86  Pulse 116  Temp 97.5  F (36.4  C) (Tympanic)  Resp 16  Ht 1.651 m (5' 5\")  Wt 72.6 kg (160 lb)  SpO2 98%  BMI 26.63 kg/m2  Body mass index is 26.63 kg/(m^2).    General Appearance: healthy and alert, no distress     HEENT:  no thyromegaly, no palpable nodules or masses        Cardiovascular: regular rate and rhythm, no gallops, rubs or murmurs     Respiratory: lungs clear, no rales, rhonchi or wheezes, normal diaphragmatic excursion    Musculoskeletal: extremities non tender and with 2+ pitting edema bilaterally    Skin: no lesions or rashes     Neurological: normal gait, no gross defects     Psychiatric: appropriate mood and affect                               Hematological: normal cervical, supraclavicular and inguinal lymph nodes     Gastrointestinal:       abdomen is markedly distended and tense to palpation.  There is not a discrete palpable mass and no tenderness to palpation    Genitourinary: External genitalia and urethral meatus appears normal.  Vagina is smooth without " nodularity or masses.  Cervix appears normal and without lesions.  Bimanual exam reveal no masses, nodularity or fullness.  Recto-vaginal exam confirms these findings.      Assessment:    Di Evans is a 58 year old woman with a new diagnosis of pelvic mass, carcinomatosis concerning for ovarian cancer.     A total of 60 minutes was spent with the patient, >50% of which were spent in counseling the patient and/or treatment planning.      Plan:     1.)    Pelvic mass and carcinomatosis concerning for ovarian cancer.  We reviewed her CT scan showing a pelvic mass, carcinomatosis and a large amount of ascites.  We discussed that this is highly concerning for ovarian cancer.  We discussed the  and will obtain this today. We will also obtain a CT chest to ensure no disease outside the abdomen.  We discussed the treatment of ovarian cancer that includes a debulking procedure followed by chemotherapy.  We discussed the role of frozen section analysis.  We discussed that the goal of surgery is to remove all visible tumor and that this may include bowel resection and even ostomy formation which may be permanent or reversible.  We discussed options for post-operative chemotherapy and my recommendation for IP chemotherapy if she has an optimal debulking. Risks, benefits, indications and alternatives were discussed with the patient.  All her questions were answered and consents were signed for Exploratory laparotomy, removal of uterus, cervix, both ovaries and fallopian tubes, possible cancer surgery, possible bowel resection, possible ostomy, possible IP port placement on 7/30     2.) Genetic risk factors were assessed and the patient does not meet the qualifications for a referral unless malignancy is confirmed.      3.) Labs and/or tests ordered include:  CBC, CMP, T/S, , CEA, EKG, CT Chest.     4.) Health maintenance issues addressed today include pt is due for a mammogram and colonoscopy which will be  addressed after treatment of her likely ovarian cancer.    5.) Pre-op teaching was completed today.        Thank you for allowing us to participate in the care of your patient.         Sincerely,    Jammie Duque MD  Gynecologic Oncology  [facility]         CENTER, [facility]

## 2018-07-24 NOTE — LETTER
2018         RE: Di Evans  [address] [place] [address]        Dear Colleague,    Thank you for referring your patient, Di Evans, to the [facility]. Please see a copy of my visit note below.    Consult Notes on Referred Patient        Dr. Polk [facility]  [address]  [place] [address]       RE: Di Evans  : 1960  ROMAN: 2018    Dear Dr. Jam Sandoval [facility] *:    I had the pleasure of seeing your patient Di Evans here at the Gynecologic Cancer Clinic at the [facility] on 2018.  As you know she is a very pleasant 58 year old woman with a recent diagnosis of pelvic mass, carcinomatosis, concern for ovarian cancer.  Given these findings she was subsequently sent to the Gynecologic Cancer Clinic for new patient consultation.  She is accompanied today by three of her good friends, one of whom is her roommate.    She reports she has been having abdominal symptoms for the past 8 months.  She has been feeling bloated and distended for several months.  She has also had decreased appetite and early satiety.  She does not note any major changes in her bowel or bladder function.  She has not had significant pain.  She finally presented to the ED and demanded a CT which was suspicious for ovarian cancer.    18:  CT A/P:  Large cystic/solid mass within the pelvis highly suspicious for ovarian malignancy. 2.  Omental thickening suspicious for metastatic disease. 3.  Large volume of ascites.  Malignant ascites cannot be excluded. 4.  Small left pleural effusion and left lower lobe atelectasis. 5.  Diverticulosis, small hiatal hernia, and gallbladder sludge.      Review of Systems:  Systemic           no weight changes; no fever; no chills; no night sweats; + appetite changes  Skin           no rashes, or lesions  Eye           no irritation; no changes in vision  Janice-Laryngeal            no dysphagia; no hoarseness   Pulmonary    no cough; no shortness of breath  Cardiovascular    no chest pain; no palpitations  Gastrointestinal    no diarrhea; no constipation; no abdominal pain; no changes in bowel  habits; no blood in stool  Genitourinary   no urinary frequency; no urinary urgency; no dysuria; no pain; no abnormal vaginal discharge; no abnormal vaginal bleeding  Breast    no breast discharge; no breast changes; no breast pain  Musculoskeletal    no myalgias; no arthralgias; no back pain  Psychiatric           no depressed mood; no anxiety    Hematologic            no tender lymph nodes; no noticeable swellings or lumps   Endocrine    no hot flashes; no heat/cold intolerance         Neurological   no tremor; no numbness and tingling; no headaches; no difficulty sleeping    Obstetrics and Gynecology History:  G0  Menopause age 44, no HRT      Past Medical History:  Past Medical History:   Diagnosis Date     Hypertension        Past Surgical History:  Past Surgical History:   Procedure Laterality Date     SURGICAL HISTORY OF -   1980    left ankle surgery       Health Maintenance:  Last Pap Smear: 6/6/18              Result: negative, HPV negative  She has not had a history of abnormal Pap smears.    Last Mammogram: 11/15/16              Result: normal      She has not had a history of abnormal mammograms.    Last Colonoscopy: Never      Current Medications:   has a current medication list which includes the following prescription(s): aspirin, cetirizine, hydrochlorothiazide, mometasone, and pravastatin.     Allergies:   Gemfibrozil; Lovastatin; and Penicillins-unknown reaction as a child      Social History:  Social History     Social History     Marital status: Single     Spouse name: N/A     Number of children: 0     Years of education: N/A     Occupational History     manufacturing circuit boards [employer]     Social History Main Topics     Smoking status: Never Smoker     Smokeless  "tobacco: Never Used     Alcohol use Yes      Comment: occasional     Drug use: No     Sexual activity: No     Other Topics Concern     Parent/Sibling W/ Cabg, Mi Or Angioplasty Before 65f 55m? No      Service No     Blood Transfusions No     Caffeine Concern Yes     Occupational Exposure No     Hobby Hazards No     Sleep Concern No     Stress Concern No     Weight Concern Yes     Special Diet No     Back Care No     Exercise Yes     Bike Helmet No     Yes in the future     Seat Belt Yes     Self-Exams Yes     Social History Narrative       Lives with a roommate, feels safe at home.  Works as a .  Enjoys gambling.  Does not have an advanced directive on file and would like her roommateAlivia to be her POA.  Would like full resuscitation if reversible cause is identified, however would not like to be kept on life sustaining measures long-term.     Family History:   The patient's family history is significant for.  Family History   Problem Relation Age of Onset     Hypertension Mother      Hypertension Father      Cerebrovascular Disease Father      polycythemia     Breast Cancer Maternal Aunt      older age         Physical Exam:   /86  Pulse 116  Temp 97.5  F (36.4  C) (Tympanic)  Resp 16  Ht 1.651 m (5' 5\")  Wt 72.6 kg (160 lb)  SpO2 98%  BMI 26.63 kg/m2  Body mass index is 26.63 kg/(m^2).    General Appearance: healthy and alert, no distress     HEENT:  no thyromegaly, no palpable nodules or masses        Cardiovascular: regular rate and rhythm, no gallops, rubs or murmurs     Respiratory: lungs clear, no rales, rhonchi or wheezes, normal diaphragmatic excursion    Musculoskeletal: extremities non tender and with 2+ pitting edema bilaterally    Skin: no lesions or rashes     Neurological: normal gait, no gross defects     Psychiatric: appropriate mood and affect                               Hematological: normal cervical, supraclavicular and inguinal lymph " nodes     Gastrointestinal:       abdomen is markedly distended and tense to palpation.  There is not a discrete palpable mass and no tenderness to palpation    Genitourinary: External genitalia and urethral meatus appears normal.  Vagina is smooth without nodularity or masses.  Cervix appears normal and without lesions.  Bimanual exam reveal no masses, nodularity or fullness.  Recto-vaginal exam confirms these findings.      Assessment:    Di Evans is a 58 year old woman with a new diagnosis of pelvic mass, carcinomatosis concerning for ovarian cancer.     A total of 60 minutes was spent with the patient, >50% of which were spent in counseling the patient and/or treatment planning.      Plan:     1.)    Pelvic mass and carcinomatosis concerning for ovarian cancer.  We reviewed her CT scan showing a pelvic mass, carcinomatosis and a large amount of ascites.  We discussed that this is highly concerning for ovarian cancer.  We discussed the  and will obtain this today. We will also obtain a CT chest to ensure no disease outside the abdomen.  We discussed the treatment of ovarian cancer that includes a debulking procedure followed by chemotherapy.  We discussed the role of frozen section analysis.  We discussed that the goal of surgery is to remove all visible tumor and that this may include bowel resection and even ostomy formation which may be permanent or reversible.  We discussed options for post-operative chemotherapy and my recommendation for IP chemotherapy if she has an optimal debulking. Risks, benefits, indications and alternatives were discussed with the patient.  All her questions were answered and consents were signed for Exploratory laparotomy, removal of uterus, cervix, both ovaries and fallopian tubes, possible cancer surgery, possible bowel resection, possible ostomy, possible IP port placement on 7/30     2.) Genetic risk factors were assessed and the patient does not meet the  qualifications for a referral unless malignancy is confirmed.      3.) Labs and/or tests ordered include:  CBC, CMP, T/S, , CEA, EKG, CT Chest.     4.) Health maintenance issues addressed today include pt is due for a mammogram and colonoscopy which will be addressed after treatment of her likely ovarian cancer.    5.) Pre-op teaching was completed today.        Thank you for allowing us to participate in the care of your patient.         Sincerely,    Jammie Duque MD  Gynecologic Oncology  [facility]       [facility], [facility], thank you for allowing me to participate in the care of your patient.        Sincerely,        Damian Duque MD

## 2018-07-25 ENCOUNTER — RADIANT APPOINTMENT (OUTPATIENT)
Dept: CT IMAGING | Facility: CLINIC | Age: 58
End: 2018-07-25
Attending: OBSTETRICS & GYNECOLOGY
Payer: COMMERCIAL

## 2018-07-25 DIAGNOSIS — R19.00 PELVIC MASS: ICD-10-CM

## 2018-07-25 PROCEDURE — 71260 CT THORAX DX C+: CPT | Performed by: OBSTETRICS & GYNECOLOGY

## 2018-07-25 RX ORDER — IOPAMIDOL 755 MG/ML
79 INJECTION, SOLUTION INTRAVASCULAR ONCE
Status: COMPLETED | OUTPATIENT
Start: 2018-07-25 | End: 2018-07-25

## 2018-07-25 RX ADMIN — IOPAMIDOL 79 ML: 755 INJECTION, SOLUTION INTRAVASCULAR at 13:34

## 2018-07-27 NOTE — NURSING NOTE
Late entry from 7/24/18:  Met with pt and family in clinic after visit with Dr Duque for surgical education.  Pt scheduled for XLAP, TLh, BSO, possible cancer surgery on 7/30/18.  See pt education for detailed note.  Noa Dubon, RN, BSN, OCN

## 2018-07-28 ENCOUNTER — ANESTHESIA EVENT (OUTPATIENT)
Dept: SURGERY | Facility: CLINIC | Age: 58
DRG: 737 | End: 2018-07-28
Payer: COMMERCIAL

## 2018-07-29 NOTE — ANESTHESIA PREPROCEDURE EVALUATION
Anesthesia Evaluation     . Pt has had prior anesthetic. Type: General    No history of anesthetic complications          ROS/MED HX    ENT/Pulmonary:  - neg pulmonary ROS     Neurologic:  - neg neurologic ROS     Cardiovascular:     (+) Dyslipidemia, hypertension----. : . . . :. . Previous cardiac testing date:results:date: results:ECG reviewed date:7/24/2018 results:Sinusal Tachycardia 105/min. date: results:          METS/Exercise Tolerance:     Hematologic:  - neg hematologic  ROS       Musculoskeletal:  - neg musculoskeletal ROS       GI/Hepatic:         Renal/Genitourinary:  - ROS Renal section negative       Endo:  - neg endo ROS       Psychiatric:  - neg psychiatric ROS       Infectious Disease:  - neg infectious disease ROS       Malignancy:   (+) Malignancy History of Other  Other CA Pelvic Mass highly suspicious for malignancy. status post         Other:    (+) No chance of pregnancy C-spine cleared: Yes, no H/O Chronic Pain,                   Physical Exam  Normal systems: cardiovascular and pulmonary    Airway   Mallampati: II  TM distance: >3 FB  Neck ROM: full    Dental   (+) caps and missing    Cardiovascular   Rhythm and rate: regular and normal      Pulmonary    breath sounds clear to auscultation                    Anesthesia Plan      History & Physical Review  History and physical reviewed and following examination; no interval change.    ASA Status:  3 .    NPO Status:  > 8 hours    Plan for General and ETT with Intravenous and Propofol induction. Maintenance will be Balanced.    PONV prophylaxis:  Ondansetron (or other 5HT-3) and Dexamethasone or Solumedrol  Additional equipment: 2nd IV, Videolaryngoscope, Arterial Line and Central Line      Postoperative Care  Postoperative pain management:  IV analgesics, Oral pain medications and Multi-modal analgesia.      Consents  Anesthetic plan, risks, benefits and alternatives discussed with:  Patient.  Use of blood products discussed: Yes.   Use of  blood products discussed with Patient.  Consented to blood products.  .                          .

## 2018-07-30 ENCOUNTER — HOSPITAL ENCOUNTER (INPATIENT)
Facility: CLINIC | Age: 58
LOS: 3 days | Discharge: HOME | DRG: 737 | End: 2018-08-02
Attending: OBSTETRICS & GYNECOLOGY | Admitting: OBSTETRICS & GYNECOLOGY
Payer: COMMERCIAL

## 2018-07-30 ENCOUNTER — ANESTHESIA (OUTPATIENT)
Dept: SURGERY | Facility: CLINIC | Age: 58
DRG: 737 | End: 2018-07-30
Payer: COMMERCIAL

## 2018-07-30 ENCOUNTER — SURGERY (OUTPATIENT)
Age: 58
End: 2018-07-30
Payer: COMMERCIAL

## 2018-07-30 DIAGNOSIS — Z90.710 S/P HYSTERECTOMY: Primary | ICD-10-CM

## 2018-07-30 LAB
ABO + RH BLD: NORMAL
ABO + RH BLD: NORMAL
ANION GAP SERPL CALCULATED.3IONS-SCNC: 8 MMOL/L (ref 3–14)
BASE EXCESS BLDV CALC-SCNC: 7.9 MMOL/L
BLD GP AB SCN SERPL QL: NORMAL
BLOOD BANK CMNT PATIENT-IMP: NORMAL
BUN SERPL-MCNC: 10 MG/DL (ref 7–30)
CA-I BLD-MCNC: 4.7 MG/DL (ref 4.4–5.2)
CALCIUM SERPL-MCNC: 8 MG/DL (ref 8.5–10.1)
CHLORIDE SERPL-SCNC: 96 MMOL/L (ref 94–109)
CO2 SERPL-SCNC: 29 MMOL/L (ref 20–32)
CREAT SERPL-MCNC: 0.66 MG/DL (ref 0.52–1.04)
GFR SERPL CREATININE-BSD FRML MDRD: >90 ML/MIN/1.7M2
GLUCOSE BLD-MCNC: 113 MG/DL (ref 70–99)
GLUCOSE BLDC GLUCOMTR-MCNC: 112 MG/DL (ref 70–99)
GLUCOSE SERPL-MCNC: 207 MG/DL (ref 70–99)
HCO3 BLDV-SCNC: 33 MMOL/L (ref 21–28)
HGB BLD-MCNC: 10 G/DL (ref 11.7–15.7)
HGB BLD-MCNC: 11.1 G/DL (ref 11.7–15.7)
MAGNESIUM SERPL-MCNC: 1.6 MG/DL (ref 1.6–2.3)
O2/TOTAL GAS SETTING VFR VENT: ABNORMAL %
PCO2 BLDV: 51 MM HG (ref 40–50)
PH BLDV: 7.43 PH (ref 7.32–7.43)
PO2 BLDV: 36 MM HG (ref 25–47)
POTASSIUM BLD-SCNC: 3 MMOL/L (ref 3.4–5.3)
POTASSIUM SERPL-SCNC: 3.5 MMOL/L (ref 3.4–5.3)
SODIUM BLD-SCNC: 135 MMOL/L (ref 133–144)
SODIUM SERPL-SCNC: 133 MMOL/L (ref 133–144)
SPECIMEN EXP DATE BLD: NORMAL

## 2018-07-30 PROCEDURE — 86900 BLOOD TYPING SEROLOGIC ABO: CPT | Performed by: OBSTETRICS & GYNECOLOGY

## 2018-07-30 PROCEDURE — P9041 ALBUMIN (HUMAN),5%, 50ML: HCPCS | Performed by: NURSE ANESTHETIST, CERTIFIED REGISTERED

## 2018-07-30 PROCEDURE — 0UT20ZZ RESECTION OF BILATERAL OVARIES, OPEN APPROACH: ICD-10-PCS | Performed by: OBSTETRICS & GYNECOLOGY

## 2018-07-30 PROCEDURE — 86850 RBC ANTIBODY SCREEN: CPT | Performed by: OBSTETRICS & GYNECOLOGY

## 2018-07-30 PROCEDURE — 40000556 ZZH STATISTIC PERIPHERAL IV START W US GUIDANCE

## 2018-07-30 PROCEDURE — 25000132 ZZH RX MED GY IP 250 OP 250 PS 637: Performed by: OBSTETRICS & GYNECOLOGY

## 2018-07-30 PROCEDURE — 12000001 ZZH R&B MED SURG/OB UMMC

## 2018-07-30 PROCEDURE — 25000132 ZZH RX MED GY IP 250 OP 250 PS 637: Performed by: STUDENT IN AN ORGANIZED HEALTH CARE EDUCATION/TRAINING PROGRAM

## 2018-07-30 PROCEDURE — 86901 BLOOD TYPING SEROLOGIC RH(D): CPT | Performed by: OBSTETRICS & GYNECOLOGY

## 2018-07-30 PROCEDURE — 84132 ASSAY OF SERUM POTASSIUM: CPT | Performed by: OBSTETRICS & GYNECOLOGY

## 2018-07-30 PROCEDURE — 25000128 H RX IP 250 OP 636: Performed by: OBSTETRICS & GYNECOLOGY

## 2018-07-30 PROCEDURE — 88112 CYTOPATH CELL ENHANCE TECH: CPT | Performed by: OBSTETRICS & GYNECOLOGY

## 2018-07-30 PROCEDURE — 93005 ELECTROCARDIOGRAM TRACING: CPT

## 2018-07-30 PROCEDURE — 85018 HEMOGLOBIN: CPT

## 2018-07-30 PROCEDURE — 80048 BASIC METABOLIC PNL TOTAL CA: CPT

## 2018-07-30 PROCEDURE — 88307 TISSUE EXAM BY PATHOLOGIST: CPT | Performed by: OBSTETRICS & GYNECOLOGY

## 2018-07-30 PROCEDURE — 93010 ELECTROCARDIOGRAM REPORT: CPT | Performed by: INTERNAL MEDICINE

## 2018-07-30 PROCEDURE — 82947 ASSAY GLUCOSE BLOOD QUANT: CPT | Performed by: OBSTETRICS & GYNECOLOGY

## 2018-07-30 PROCEDURE — 88305 TISSUE EXAM BY PATHOLOGIST: CPT | Performed by: OBSTETRICS & GYNECOLOGY

## 2018-07-30 PROCEDURE — 25000128 H RX IP 250 OP 636

## 2018-07-30 PROCEDURE — 84295 ASSAY OF SERUM SODIUM: CPT | Performed by: OBSTETRICS & GYNECOLOGY

## 2018-07-30 PROCEDURE — 88331 PATH CONSLTJ SURG 1 BLK 1SPC: CPT | Performed by: OBSTETRICS & GYNECOLOGY

## 2018-07-30 PROCEDURE — 07BD0ZZ EXCISION OF AORTIC LYMPHATIC, OPEN APPROACH: ICD-10-PCS | Performed by: OBSTETRICS & GYNECOLOGY

## 2018-07-30 PROCEDURE — 88309 TISSUE EXAM BY PATHOLOGIST: CPT | Performed by: OBSTETRICS & GYNECOLOGY

## 2018-07-30 PROCEDURE — 25000128 H RX IP 250 OP 636: Performed by: ANESTHESIOLOGY

## 2018-07-30 PROCEDURE — 37000009 ZZH ANESTHESIA TECHNICAL FEE, EACH ADDTL 15 MIN: Performed by: OBSTETRICS & GYNECOLOGY

## 2018-07-30 PROCEDURE — 88332 PATH CONSLTJ SURG EA ADD BLK: CPT | Performed by: OBSTETRICS & GYNECOLOGY

## 2018-07-30 PROCEDURE — 37000008 ZZH ANESTHESIA TECHNICAL FEE, 1ST 30 MIN: Performed by: OBSTETRICS & GYNECOLOGY

## 2018-07-30 PROCEDURE — 0UT90ZZ RESECTION OF UTERUS, OPEN APPROACH: ICD-10-PCS | Performed by: OBSTETRICS & GYNECOLOGY

## 2018-07-30 PROCEDURE — 0DBU0ZZ EXCISION OF OMENTUM, OPEN APPROACH: ICD-10-PCS | Performed by: OBSTETRICS & GYNECOLOGY

## 2018-07-30 PROCEDURE — 25000128 H RX IP 250 OP 636: Performed by: NURSE ANESTHETIST, CERTIFIED REGISTERED

## 2018-07-30 PROCEDURE — 82803 BLOOD GASES ANY COMBINATION: CPT | Performed by: OBSTETRICS & GYNECOLOGY

## 2018-07-30 PROCEDURE — 40000275 ZZH STATISTIC RCP TIME EA 10 MIN

## 2018-07-30 PROCEDURE — 82330 ASSAY OF CALCIUM: CPT | Performed by: OBSTETRICS & GYNECOLOGY

## 2018-07-30 PROCEDURE — 25000125 ZZHC RX 250: Performed by: NURSE ANESTHETIST, CERTIFIED REGISTERED

## 2018-07-30 PROCEDURE — 07BC0ZZ EXCISION OF PELVIS LYMPHATIC, OPEN APPROACH: ICD-10-PCS | Performed by: OBSTETRICS & GYNECOLOGY

## 2018-07-30 PROCEDURE — 58954 TAH RAD DEBULK/LYMPH REMOVE: CPT | Mod: GC | Performed by: OBSTETRICS & GYNECOLOGY

## 2018-07-30 PROCEDURE — 25000125 ZZHC RX 250: Performed by: OBSTETRICS & GYNECOLOGY

## 2018-07-30 PROCEDURE — 71000015 ZZH RECOVERY PHASE 1 LEVEL 2 EA ADDTL HR: Performed by: OBSTETRICS & GYNECOLOGY

## 2018-07-30 PROCEDURE — 0UT70ZZ RESECTION OF BILATERAL FALLOPIAN TUBES, OPEN APPROACH: ICD-10-PCS | Performed by: OBSTETRICS & GYNECOLOGY

## 2018-07-30 PROCEDURE — 83735 ASSAY OF MAGNESIUM: CPT

## 2018-07-30 PROCEDURE — 00000155 ZZHCL STATISTIC H-CELL BLOCK W/STAIN: Performed by: OBSTETRICS & GYNECOLOGY

## 2018-07-30 PROCEDURE — 71000014 ZZH RECOVERY PHASE 1 LEVEL 2 FIRST HR: Performed by: OBSTETRICS & GYNECOLOGY

## 2018-07-30 PROCEDURE — 00000146 ZZHCL STATISTIC GLUCOSE BY METER IP

## 2018-07-30 PROCEDURE — 40000170 ZZH STATISTIC PRE-PROCEDURE ASSESSMENT II: Performed by: OBSTETRICS & GYNECOLOGY

## 2018-07-30 PROCEDURE — C9399 UNCLASSIFIED DRUGS OR BIOLOG: HCPCS

## 2018-07-30 PROCEDURE — 0UTC0ZZ RESECTION OF CERVIX, OPEN APPROACH: ICD-10-PCS | Performed by: OBSTETRICS & GYNECOLOGY

## 2018-07-30 PROCEDURE — 36000062 ZZH SURGERY LEVEL 4 1ST 30 MIN - UMMC: Performed by: OBSTETRICS & GYNECOLOGY

## 2018-07-30 PROCEDURE — 00000102 ZZHCL STATISTIC CYTO WRIGHT STAIN TC: Performed by: OBSTETRICS & GYNECOLOGY

## 2018-07-30 PROCEDURE — 25000566 ZZH SEVOFLURANE, EA 15 MIN: Performed by: OBSTETRICS & GYNECOLOGY

## 2018-07-30 PROCEDURE — 0DJD8ZZ INSPECTION OF LOWER INTESTINAL TRACT, VIA NATURAL OR ARTIFICIAL OPENING ENDOSCOPIC: ICD-10-PCS | Performed by: OBSTETRICS & GYNECOLOGY

## 2018-07-30 PROCEDURE — 36000064 ZZH SURGERY LEVEL 4 EA 15 ADDTL MIN - UMMC: Performed by: OBSTETRICS & GYNECOLOGY

## 2018-07-30 PROCEDURE — 27210794 ZZH OR GENERAL SUPPLY STERILE: Performed by: OBSTETRICS & GYNECOLOGY

## 2018-07-30 PROCEDURE — 40000014 ZZH STATISTIC ARTERIAL MONITORING DAILY

## 2018-07-30 RX ORDER — LIDOCAINE 40 MG/G
CREAM TOPICAL
Status: DISCONTINUED | OUTPATIENT
Start: 2018-07-30 | End: 2018-07-30 | Stop reason: HOSPADM

## 2018-07-30 RX ORDER — AMOXICILLIN 250 MG
1 CAPSULE ORAL 2 TIMES DAILY PRN
Status: DISCONTINUED | OUTPATIENT
Start: 2018-07-30 | End: 2018-08-01

## 2018-07-30 RX ORDER — POTASSIUM CHLORIDE 1.5 G/1.58G
20-40 POWDER, FOR SOLUTION ORAL
Status: DISCONTINUED | OUTPATIENT
Start: 2018-07-30 | End: 2018-08-02 | Stop reason: HOSPADM

## 2018-07-30 RX ORDER — POTASSIUM CHLORIDE 750 MG/1
20-40 TABLET, EXTENDED RELEASE ORAL
Status: DISCONTINUED | OUTPATIENT
Start: 2018-07-30 | End: 2018-08-02 | Stop reason: HOSPADM

## 2018-07-30 RX ORDER — HYDROMORPHONE HYDROCHLORIDE 1 MG/ML
INJECTION, SOLUTION INTRAMUSCULAR; INTRAVENOUS; SUBCUTANEOUS
Status: COMPLETED
Start: 2018-07-30 | End: 2018-07-30

## 2018-07-30 RX ORDER — MAGNESIUM SULFATE HEPTAHYDRATE 40 MG/ML
4 INJECTION, SOLUTION INTRAVENOUS EVERY 4 HOURS PRN
Status: DISCONTINUED | OUTPATIENT
Start: 2018-07-30 | End: 2018-08-02 | Stop reason: HOSPADM

## 2018-07-30 RX ORDER — ACETAMINOPHEN 325 MG/1
650 TABLET ORAL EVERY 6 HOURS
Status: DISCONTINUED | OUTPATIENT
Start: 2018-07-30 | End: 2018-08-01

## 2018-07-30 RX ORDER — FLUTICASONE PROPIONATE 50 MCG
1 SPRAY, SUSPENSION (ML) NASAL DAILY
Status: DISCONTINUED | OUTPATIENT
Start: 2018-07-31 | End: 2018-08-02 | Stop reason: HOSPADM

## 2018-07-30 RX ORDER — OXYCODONE HYDROCHLORIDE 5 MG/1
5-10 TABLET ORAL
Status: DISCONTINUED | OUTPATIENT
Start: 2018-07-30 | End: 2018-08-02 | Stop reason: HOSPADM

## 2018-07-30 RX ORDER — ONDANSETRON 2 MG/ML
4 INJECTION INTRAMUSCULAR; INTRAVENOUS EVERY 30 MIN PRN
Status: DISCONTINUED | OUTPATIENT
Start: 2018-07-30 | End: 2018-07-30 | Stop reason: HOSPADM

## 2018-07-30 RX ORDER — HEPARIN SODIUM 5000 [USP'U]/.5ML
5000 INJECTION, SOLUTION INTRAVENOUS; SUBCUTANEOUS
Status: COMPLETED | OUTPATIENT
Start: 2018-07-30 | End: 2018-07-30

## 2018-07-30 RX ORDER — NALOXONE HYDROCHLORIDE 0.4 MG/ML
.1-.4 INJECTION, SOLUTION INTRAMUSCULAR; INTRAVENOUS; SUBCUTANEOUS
Status: DISCONTINUED | OUTPATIENT
Start: 2018-07-30 | End: 2018-07-30 | Stop reason: HOSPADM

## 2018-07-30 RX ORDER — DIPHENHYDRAMINE HYDROCHLORIDE 50 MG/ML
25 INJECTION INTRAMUSCULAR; INTRAVENOUS EVERY 6 HOURS PRN
Status: DISCONTINUED | OUTPATIENT
Start: 2018-07-30 | End: 2018-08-02 | Stop reason: HOSPADM

## 2018-07-30 RX ORDER — NALOXONE HYDROCHLORIDE 0.4 MG/ML
.1-.4 INJECTION, SOLUTION INTRAMUSCULAR; INTRAVENOUS; SUBCUTANEOUS
Status: ACTIVE | OUTPATIENT
Start: 2018-07-30 | End: 2018-07-31

## 2018-07-30 RX ORDER — ONDANSETRON 4 MG/1
4 TABLET, ORALLY DISINTEGRATING ORAL EVERY 8 HOURS
Status: DISPENSED | OUTPATIENT
Start: 2018-07-30 | End: 2018-07-31

## 2018-07-30 RX ORDER — SODIUM CHLORIDE, SODIUM LACTATE, POTASSIUM CHLORIDE, CALCIUM CHLORIDE 600; 310; 30; 20 MG/100ML; MG/100ML; MG/100ML; MG/100ML
INJECTION, SOLUTION INTRAVENOUS CONTINUOUS
Status: DISCONTINUED | OUTPATIENT
Start: 2018-07-30 | End: 2018-07-30 | Stop reason: HOSPADM

## 2018-07-30 RX ORDER — POTASSIUM CHLORIDE 7.45 MG/ML
10 INJECTION INTRAVENOUS
Status: DISCONTINUED | OUTPATIENT
Start: 2018-07-30 | End: 2018-08-02 | Stop reason: HOSPADM

## 2018-07-30 RX ORDER — DIPHENHYDRAMINE HCL 25 MG
25 CAPSULE ORAL EVERY 6 HOURS PRN
Status: DISCONTINUED | OUTPATIENT
Start: 2018-07-30 | End: 2018-08-02 | Stop reason: HOSPADM

## 2018-07-30 RX ORDER — HYDROMORPHONE HCL/0.9% NACL/PF 0.2MG/0.2
0.2 SYRINGE (ML) INTRAVENOUS
Status: DISCONTINUED | OUTPATIENT
Start: 2018-07-30 | End: 2018-08-02 | Stop reason: HOSPADM

## 2018-07-30 RX ORDER — ALBUTEROL SULFATE 0.83 MG/ML
2.5 SOLUTION RESPIRATORY (INHALATION) EVERY 4 HOURS PRN
Status: DISCONTINUED | OUTPATIENT
Start: 2018-07-30 | End: 2018-07-30 | Stop reason: HOSPADM

## 2018-07-30 RX ORDER — POTASSIUM CHLORIDE 29.8 MG/ML
20 INJECTION INTRAVENOUS
Status: DISCONTINUED | OUTPATIENT
Start: 2018-07-30 | End: 2018-08-02 | Stop reason: HOSPADM

## 2018-07-30 RX ORDER — ALBUMIN, HUMAN INJ 5% 5 %
12.5 SOLUTION INTRAVENOUS ONCE
Status: COMPLETED | OUTPATIENT
Start: 2018-07-30 | End: 2018-07-31

## 2018-07-30 RX ORDER — ACETAMINOPHEN 325 MG/1
650 TABLET ORAL EVERY 6 HOURS
Status: DISCONTINUED | OUTPATIENT
Start: 2018-07-30 | End: 2018-07-30

## 2018-07-30 RX ORDER — CETIRIZINE HYDROCHLORIDE 5 MG/1
5 TABLET ORAL AT BEDTIME
Status: DISCONTINUED | OUTPATIENT
Start: 2018-07-30 | End: 2018-08-02 | Stop reason: HOSPADM

## 2018-07-30 RX ORDER — HYDROMORPHONE HYDROCHLORIDE 1 MG/ML
.3-.5 INJECTION, SOLUTION INTRAMUSCULAR; INTRAVENOUS; SUBCUTANEOUS EVERY 5 MIN PRN
Status: DISCONTINUED | OUTPATIENT
Start: 2018-07-30 | End: 2018-07-30 | Stop reason: HOSPADM

## 2018-07-30 RX ORDER — ALBUMIN, HUMAN INJ 5% 5 %
SOLUTION INTRAVENOUS CONTINUOUS PRN
Status: DISCONTINUED | OUTPATIENT
Start: 2018-07-30 | End: 2018-07-30

## 2018-07-30 RX ORDER — MEPERIDINE HYDROCHLORIDE 50 MG/ML
12.5 INJECTION INTRAMUSCULAR; INTRAVENOUS; SUBCUTANEOUS
Status: DISCONTINUED | OUTPATIENT
Start: 2018-07-30 | End: 2018-07-30 | Stop reason: HOSPADM

## 2018-07-30 RX ORDER — POTASSIUM CHLORIDE 7.45 MG/ML
INJECTION INTRAVENOUS PRN
Status: DISCONTINUED | OUTPATIENT
Start: 2018-07-30 | End: 2018-07-30

## 2018-07-30 RX ORDER — FENTANYL CITRATE 50 UG/ML
INJECTION, SOLUTION INTRAMUSCULAR; INTRAVENOUS PRN
Status: DISCONTINUED | OUTPATIENT
Start: 2018-07-30 | End: 2018-07-30

## 2018-07-30 RX ORDER — HYDROMORPHONE HYDROCHLORIDE 1 MG/ML
.3-.5 INJECTION, SOLUTION INTRAMUSCULAR; INTRAVENOUS; SUBCUTANEOUS EVERY 10 MIN PRN
Status: DISCONTINUED | OUTPATIENT
Start: 2018-07-30 | End: 2018-07-30 | Stop reason: HOSPADM

## 2018-07-30 RX ORDER — NALOXONE HYDROCHLORIDE 0.4 MG/ML
.1-.4 INJECTION, SOLUTION INTRAMUSCULAR; INTRAVENOUS; SUBCUTANEOUS
Status: DISCONTINUED | OUTPATIENT
Start: 2018-07-31 | End: 2018-08-02 | Stop reason: HOSPADM

## 2018-07-30 RX ORDER — SODIUM CHLORIDE, SODIUM LACTATE, POTASSIUM CHLORIDE, CALCIUM CHLORIDE 600; 310; 30; 20 MG/100ML; MG/100ML; MG/100ML; MG/100ML
INJECTION, SOLUTION INTRAVENOUS CONTINUOUS
Status: DISCONTINUED | OUTPATIENT
Start: 2018-07-30 | End: 2018-07-31

## 2018-07-30 RX ORDER — PROPOFOL 10 MG/ML
INJECTION, EMULSION INTRAVENOUS PRN
Status: DISCONTINUED | OUTPATIENT
Start: 2018-07-30 | End: 2018-07-30

## 2018-07-30 RX ORDER — SODIUM CHLORIDE, SODIUM LACTATE, POTASSIUM CHLORIDE, CALCIUM CHLORIDE 600; 310; 30; 20 MG/100ML; MG/100ML; MG/100ML; MG/100ML
500 INJECTION, SOLUTION INTRAVENOUS CONTINUOUS
Status: DISCONTINUED | OUTPATIENT
Start: 2018-07-30 | End: 2018-07-30 | Stop reason: HOSPADM

## 2018-07-30 RX ORDER — IBUPROFEN 600 MG/1
600 TABLET, FILM COATED ORAL EVERY 6 HOURS
Status: DISCONTINUED | OUTPATIENT
Start: 2018-07-30 | End: 2018-08-01

## 2018-07-30 RX ORDER — SODIUM CHLORIDE, SODIUM LACTATE, POTASSIUM CHLORIDE, CALCIUM CHLORIDE 600; 310; 30; 20 MG/100ML; MG/100ML; MG/100ML; MG/100ML
INJECTION, SOLUTION INTRAVENOUS CONTINUOUS PRN
Status: DISCONTINUED | OUTPATIENT
Start: 2018-07-30 | End: 2018-07-30

## 2018-07-30 RX ORDER — ONDANSETRON 4 MG/1
4 TABLET, ORALLY DISINTEGRATING ORAL EVERY 30 MIN PRN
Status: DISCONTINUED | OUTPATIENT
Start: 2018-07-30 | End: 2018-07-30 | Stop reason: HOSPADM

## 2018-07-30 RX ORDER — PHENAZOPYRIDINE HYDROCHLORIDE 200 MG/1
200 TABLET, FILM COATED ORAL ONCE
Status: COMPLETED | OUTPATIENT
Start: 2018-07-30 | End: 2018-07-30

## 2018-07-30 RX ORDER — FENTANYL CITRATE 50 UG/ML
25-50 INJECTION, SOLUTION INTRAMUSCULAR; INTRAVENOUS
Status: DISCONTINUED | OUTPATIENT
Start: 2018-07-30 | End: 2018-07-30 | Stop reason: HOSPADM

## 2018-07-30 RX ORDER — CEFAZOLIN SODIUM 2 G/100ML
2 INJECTION, SOLUTION INTRAVENOUS
Status: COMPLETED | OUTPATIENT
Start: 2018-07-30 | End: 2018-07-30

## 2018-07-30 RX ORDER — CEFAZOLIN SODIUM 1 G/3ML
1 INJECTION, POWDER, FOR SOLUTION INTRAMUSCULAR; INTRAVENOUS SEE ADMIN INSTRUCTIONS
Status: DISCONTINUED | OUTPATIENT
Start: 2018-07-30 | End: 2018-07-30 | Stop reason: HOSPADM

## 2018-07-30 RX ORDER — HYDROCHLOROTHIAZIDE 25 MG/1
25 TABLET ORAL EVERY MORNING
Status: DISCONTINUED | OUTPATIENT
Start: 2018-07-31 | End: 2018-08-02 | Stop reason: HOSPADM

## 2018-07-30 RX ORDER — LIDOCAINE HYDROCHLORIDE 20 MG/ML
INJECTION, SOLUTION INFILTRATION; PERINEURAL PRN
Status: DISCONTINUED | OUTPATIENT
Start: 2018-07-30 | End: 2018-07-30

## 2018-07-30 RX ORDER — POTASSIUM CL/LIDO/0.9 % NACL 10MEQ/0.1L
10 INTRAVENOUS SOLUTION, PIGGYBACK (ML) INTRAVENOUS
Status: DISCONTINUED | OUTPATIENT
Start: 2018-07-30 | End: 2018-08-02 | Stop reason: HOSPADM

## 2018-07-30 RX ORDER — BISACODYL 10 MG
10 SUPPOSITORY, RECTAL RECTAL DAILY
Status: DISCONTINUED | OUTPATIENT
Start: 2018-07-30 | End: 2018-08-02 | Stop reason: HOSPADM

## 2018-07-30 RX ORDER — ONDANSETRON 4 MG/1
4 TABLET, ORALLY DISINTEGRATING ORAL EVERY 8 HOURS PRN
Status: DISCONTINUED | OUTPATIENT
Start: 2018-07-31 | End: 2018-08-01

## 2018-07-30 RX ORDER — METOPROLOL TARTRATE 1 MG/ML
INJECTION, SOLUTION INTRAVENOUS PRN
Status: DISCONTINUED | OUTPATIENT
Start: 2018-07-30 | End: 2018-07-30

## 2018-07-30 RX ORDER — AMOXICILLIN 250 MG
2 CAPSULE ORAL 2 TIMES DAILY PRN
Status: DISCONTINUED | OUTPATIENT
Start: 2018-07-30 | End: 2018-08-01

## 2018-07-30 RX ORDER — OXYCODONE HYDROCHLORIDE 5 MG/1
5-10 TABLET ORAL
Status: DISCONTINUED | OUTPATIENT
Start: 2018-07-30 | End: 2018-07-30

## 2018-07-30 RX ORDER — LIDOCAINE 40 MG/G
CREAM TOPICAL
Status: DISCONTINUED | OUTPATIENT
Start: 2018-07-30 | End: 2018-08-02 | Stop reason: HOSPADM

## 2018-07-30 RX ORDER — DEXAMETHASONE SODIUM PHOSPHATE 4 MG/ML
INJECTION, SOLUTION INTRA-ARTICULAR; INTRALESIONAL; INTRAMUSCULAR; INTRAVENOUS; SOFT TISSUE PRN
Status: DISCONTINUED | OUTPATIENT
Start: 2018-07-30 | End: 2018-07-30

## 2018-07-30 RX ORDER — SIMETHICONE 80 MG
80 TABLET,CHEWABLE ORAL 4 TIMES DAILY PRN
Status: DISCONTINUED | OUTPATIENT
Start: 2018-07-30 | End: 2018-08-02 | Stop reason: HOSPADM

## 2018-07-30 RX ORDER — ONDANSETRON 2 MG/ML
INJECTION INTRAMUSCULAR; INTRAVENOUS PRN
Status: DISCONTINUED | OUTPATIENT
Start: 2018-07-30 | End: 2018-07-30

## 2018-07-30 RX ORDER — LABETALOL HYDROCHLORIDE 5 MG/ML
10 INJECTION, SOLUTION INTRAVENOUS
Status: DISCONTINUED | OUTPATIENT
Start: 2018-07-30 | End: 2018-07-30 | Stop reason: HOSPADM

## 2018-07-30 RX ORDER — FENTANYL CITRATE 50 UG/ML
25-50 INJECTION, SOLUTION INTRAMUSCULAR; INTRAVENOUS EVERY 5 MIN PRN
Status: DISCONTINUED | OUTPATIENT
Start: 2018-07-30 | End: 2018-07-30 | Stop reason: HOSPADM

## 2018-07-30 RX ORDER — KETAMINE HYDROCHLORIDE 10 MG/ML
INJECTION INTRAMUSCULAR; INTRAVENOUS PRN
Status: DISCONTINUED | OUTPATIENT
Start: 2018-07-30 | End: 2018-07-30

## 2018-07-30 RX ADMIN — FENTANYL CITRATE 25 MCG: 50 INJECTION INTRAMUSCULAR; INTRAVENOUS at 12:29

## 2018-07-30 RX ADMIN — OXYCODONE HYDROCHLORIDE 5 MG: 5 TABLET ORAL at 21:07

## 2018-07-30 RX ADMIN — HYDROMORPHONE HYDROCHLORIDE 0.2 MG: 1 INJECTION, SOLUTION INTRAMUSCULAR; INTRAVENOUS; SUBCUTANEOUS at 10:49

## 2018-07-30 RX ADMIN — SODIUM CHLORIDE, POTASSIUM CHLORIDE, SODIUM LACTATE AND CALCIUM CHLORIDE: 600; 310; 30; 20 INJECTION, SOLUTION INTRAVENOUS at 08:00

## 2018-07-30 RX ADMIN — METOPROLOL TARTRATE 1 MG: 5 INJECTION INTRAVENOUS at 10:20

## 2018-07-30 RX ADMIN — SODIUM CHLORIDE, POTASSIUM CHLORIDE, SODIUM LACTATE AND CALCIUM CHLORIDE: 600; 310; 30; 20 INJECTION, SOLUTION INTRAVENOUS at 12:15

## 2018-07-30 RX ADMIN — ROCURONIUM BROMIDE 20 MG: 10 INJECTION INTRAVENOUS at 08:35

## 2018-07-30 RX ADMIN — Medication 0.2 MG: at 14:40

## 2018-07-30 RX ADMIN — ALBUMIN HUMAN: 0.05 INJECTION, SOLUTION INTRAVENOUS at 09:27

## 2018-07-30 RX ADMIN — PHENYLEPHRINE HYDROCHLORIDE 100 MCG: 10 INJECTION, SOLUTION INTRAMUSCULAR; INTRAVENOUS; SUBCUTANEOUS at 10:28

## 2018-07-30 RX ADMIN — KETAMINE HCL-NACL SOLN PREF SY 50 MG/5ML-0.9% (10MG/ML) 10 MG: 10 SOLUTION PREFILLED SYRINGE at 08:59

## 2018-07-30 RX ADMIN — HYDROMORPHONE HYDROCHLORIDE 0.2 MG: 1 INJECTION, SOLUTION INTRAMUSCULAR; INTRAVENOUS; SUBCUTANEOUS at 10:42

## 2018-07-30 RX ADMIN — METRONIDAZOLE 500 MG: 500 INJECTION, SOLUTION INTRAVENOUS at 09:01

## 2018-07-30 RX ADMIN — KETAMINE HCL-NACL SOLN PREF SY 50 MG/5ML-0.9% (10MG/ML) 10 MG: 10 SOLUTION PREFILLED SYRINGE at 10:00

## 2018-07-30 RX ADMIN — ALBUMIN HUMAN: 0.05 INJECTION, SOLUTION INTRAVENOUS at 08:30

## 2018-07-30 RX ADMIN — PHENYLEPHRINE HYDROCHLORIDE 100 MCG: 10 INJECTION, SOLUTION INTRAMUSCULAR; INTRAVENOUS; SUBCUTANEOUS at 09:42

## 2018-07-30 RX ADMIN — PHENYLEPHRINE HYDROCHLORIDE 100 MCG: 10 INJECTION, SOLUTION INTRAMUSCULAR; INTRAVENOUS; SUBCUTANEOUS at 09:22

## 2018-07-30 RX ADMIN — MAGNESIUM HYDROXIDE 15 ML: 400 SUSPENSION ORAL at 18:38

## 2018-07-30 RX ADMIN — MIDAZOLAM 2 MG: 1 INJECTION INTRAMUSCULAR; INTRAVENOUS at 07:31

## 2018-07-30 RX ADMIN — SODIUM CHLORIDE, POTASSIUM CHLORIDE, SODIUM LACTATE AND CALCIUM CHLORIDE: 600; 310; 30; 20 INJECTION, SOLUTION INTRAVENOUS at 06:50

## 2018-07-30 RX ADMIN — PHENYLEPHRINE HYDROCHLORIDE 100 MCG: 10 INJECTION, SOLUTION INTRAMUSCULAR; INTRAVENOUS; SUBCUTANEOUS at 09:45

## 2018-07-30 RX ADMIN — FENTANYL CITRATE 25 MCG: 50 INJECTION INTRAMUSCULAR; INTRAVENOUS at 11:33

## 2018-07-30 RX ADMIN — DEXAMETHASONE SODIUM PHOSPHATE 8 MG: 4 INJECTION, SOLUTION INTRA-ARTICULAR; INTRALESIONAL; INTRAMUSCULAR; INTRAVENOUS; SOFT TISSUE at 09:45

## 2018-07-30 RX ADMIN — FENTANYL CITRATE 25 MCG: 50 INJECTION INTRAMUSCULAR; INTRAVENOUS at 12:00

## 2018-07-30 RX ADMIN — HYDROMORPHONE HYDROCHLORIDE 0.3 MG: 1 INJECTION, SOLUTION INTRAMUSCULAR; INTRAVENOUS; SUBCUTANEOUS at 10:45

## 2018-07-30 RX ADMIN — ACETAMINOPHEN 650 MG: 325 TABLET, FILM COATED ORAL at 16:03

## 2018-07-30 RX ADMIN — CEFAZOLIN SODIUM 2 G: 2 INJECTION, SOLUTION INTRAVENOUS at 08:12

## 2018-07-30 RX ADMIN — PHENYLEPHRINE HYDROCHLORIDE 100 MCG: 10 INJECTION, SOLUTION INTRAMUSCULAR; INTRAVENOUS; SUBCUTANEOUS at 09:54

## 2018-07-30 RX ADMIN — PHENYLEPHRINE HYDROCHLORIDE 100 MCG: 10 INJECTION, SOLUTION INTRAMUSCULAR; INTRAVENOUS; SUBCUTANEOUS at 10:18

## 2018-07-30 RX ADMIN — FENTANYL CITRATE 50 MCG: 50 INJECTION, SOLUTION INTRAMUSCULAR; INTRAVENOUS at 08:39

## 2018-07-30 RX ADMIN — POTASSIUM CHLORIDE 10 MEQ: 7.46 INJECTION, SOLUTION INTRAVENOUS at 08:50

## 2018-07-30 RX ADMIN — LIDOCAINE HYDROCHLORIDE 100 MG: 20 INJECTION, SOLUTION INFILTRATION; PERINEURAL at 07:49

## 2018-07-30 RX ADMIN — ONDANSETRON 4 MG: 4 TABLET, ORALLY DISINTEGRATING ORAL at 16:03

## 2018-07-30 RX ADMIN — PHENYLEPHRINE HYDROCHLORIDE 200 MCG: 10 INJECTION, SOLUTION INTRAMUSCULAR; INTRAVENOUS; SUBCUTANEOUS at 10:12

## 2018-07-30 RX ADMIN — KETAMINE HCL-NACL SOLN PREF SY 50 MG/5ML-0.9% (10MG/ML) 30 MG: 10 SOLUTION PREFILLED SYRINGE at 07:49

## 2018-07-30 RX ADMIN — PHENYLEPHRINE HYDROCHLORIDE 150 MCG: 10 INJECTION, SOLUTION INTRAMUSCULAR; INTRAVENOUS; SUBCUTANEOUS at 09:27

## 2018-07-30 RX ADMIN — OXYCODONE HYDROCHLORIDE 5 MG: 5 TABLET ORAL at 17:18

## 2018-07-30 RX ADMIN — PHENYLEPHRINE HYDROCHLORIDE 100 MCG: 10 INJECTION, SOLUTION INTRAMUSCULAR; INTRAVENOUS; SUBCUTANEOUS at 10:00

## 2018-07-30 RX ADMIN — PROPOFOL 70 MG: 10 INJECTION, EMULSION INTRAVENOUS at 07:49

## 2018-07-30 RX ADMIN — Medication 0.3 MG: at 12:37

## 2018-07-30 RX ADMIN — ROCURONIUM BROMIDE 20 MG: 10 INJECTION INTRAVENOUS at 09:23

## 2018-07-30 RX ADMIN — FENTANYL CITRATE 100 MCG: 50 INJECTION, SOLUTION INTRAMUSCULAR; INTRAVENOUS at 07:51

## 2018-07-30 RX ADMIN — SODIUM CHLORIDE, POTASSIUM CHLORIDE, SODIUM LACTATE AND CALCIUM CHLORIDE: 600; 310; 30; 20 INJECTION, SOLUTION INTRAVENOUS at 19:50

## 2018-07-30 RX ADMIN — ROCURONIUM BROMIDE 50 MG: 10 INJECTION INTRAVENOUS at 07:49

## 2018-07-30 RX ADMIN — ONDANSETRON 4 MG: 2 INJECTION INTRAMUSCULAR; INTRAVENOUS at 10:17

## 2018-07-30 RX ADMIN — SUGAMMADEX 150 MG: 100 INJECTION, SOLUTION INTRAVENOUS at 10:37

## 2018-07-30 RX ADMIN — PHENAZOPYRIDINE HYDROCHLORIDE 200 MG: 200 TABLET, FILM COATED ORAL at 06:43

## 2018-07-30 RX ADMIN — HEPARIN SODIUM 5000 UNITS: 5000 INJECTION, SOLUTION INTRAVENOUS; SUBCUTANEOUS at 06:43

## 2018-07-30 RX ADMIN — ALBUMIN HUMAN: 0.05 INJECTION, SOLUTION INTRAVENOUS at 08:13

## 2018-07-30 RX ADMIN — IBUPROFEN 600 MG: 600 TABLET ORAL at 17:18

## 2018-07-30 RX ADMIN — FENTANYL CITRATE 50 MCG: 50 INJECTION, SOLUTION INTRAMUSCULAR; INTRAVENOUS at 08:09

## 2018-07-30 RX ADMIN — CETIRIZINE HYDROCHLORIDE 5 MG: 5 TABLET ORAL at 21:07

## 2018-07-30 RX ADMIN — FENTANYL CITRATE 25 MCG: 50 INJECTION INTRAMUSCULAR; INTRAVENOUS at 11:46

## 2018-07-30 ASSESSMENT — PAIN DESCRIPTION - DESCRIPTORS
DESCRIPTORS: ACHING
DESCRIPTORS: SHARP
DESCRIPTORS: SORE
DESCRIPTORS: SORE

## 2018-07-30 ASSESSMENT — ACTIVITIES OF DAILY LIVING (ADL)
ADLS_ACUITY_SCORE: 9
ADLS_ACUITY_SCORE: 9

## 2018-07-30 NOTE — ANESTHESIA POSTPROCEDURE EVALUATION
Patient: Di Evans    Procedure(s):  Exploratory Laparotomy, Modified Radical Hysterectomy, Removal of Cervix, Bilateral Salpingo - Oophorectomy, Omentectomy, Bilateral Para Aortic and Left Pelvic Lymph Node Dissection, Tumor Debulking, Proctoscopy - Wound Class: II-Clean Contaminated   - Wound Class: II-Clean Contaminated    Diagnosis:Pelvic Mass   Diagnosis Additional Information: No value filed.    Anesthesia Type:  General, ETT    Note:  Anesthesia Post Evaluation    Patient location during evaluation: PACU  Patient participation: Able to fully participate in evaluation  Level of consciousness: awake and alert  Pain management: adequate  Airway patency: patent  Cardiovascular status: acceptable  Respiratory status: acceptable  Hydration status: acceptable  PONV: none     Anesthetic complications: None          Last vitals:  Vitals:    07/30/18 0529 07/30/18 1106 07/30/18 1115   BP: (!) 144/97 112/54 110/71   Pulse: 111     Resp: 18 12 12   Temp: 37  C (98.6  F) 35.8  C (96.4  F) 35.8  C (96.4  F)   SpO2: 100% 100% 100%         Electronically Signed By: Guru Nguyen MD  July 30, 2018  11:30 AM

## 2018-07-30 NOTE — PROGRESS NOTES
Gynecologic Oncology Postoperative Check Note  7/30/2018    S: Patient notes some pain and is ready for pain medication. Denies CP, SOB, HA, dizziness, or N/V.     O:  Vitals:    07/30/18 1245 07/30/18 1256 07/30/18 1300 07/30/18 1339   BP: 118/76   113/66   BP Location:    Right arm   Cuff Size:       Pulse:       Resp: 14 22 25 20   Temp: 98.1  F (36.7  C)  97.9  F (36.6  C) 96.7  F (35.9  C)   TempSrc: Axillary   Axillary   SpO2: 100% 100% 100% 100%   Weight:       Height:             Gen: alert and oriented, resting in bed  Cardio: Tachy  Resp: lungs CTAB anteriorly.   Abdomen: soft, appropriately tender to palpation.   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 Final path pending.  FEN: Regular diet, NS@125 ml/hr. ERP for K and Mag replacement.  Pain: Tylenol, ibuprofen, oxycodone, IV dilaudid  Heme: 11.4>EBL 300cc>HGB in am.  CV:  Hx of HTN continue PTA hydrochlorothiazide in am. Hx of tachycardia - HR at baseline prior to admit.   Pulm: Encourage IS every hour. Wean O2 to keep sats >90%. No acute issues.  GI: Antiemetics, Scheduled stool softeners  : Suarez overnight. Remove suarez in AM.   ID:s/p ancef/flagyl preop.  Endo: No issues  Psych/Neuro/MSK/Other:No issues  PPX: SCDs. Will plan to start ppx dosing of lovenox tomorrow pending hgb in am.  Dispo: pending post op goals    Maggie Mcintyre CNP  7/30/2018 3:23 PM

## 2018-07-30 NOTE — DISCHARGE SUMMARY
Gynecologic Oncology Discharge Summary    Divivi Evans  [id number]    Admit Date: 7/30/2018  Discharge Date: 8/2/2018  Admitting Provider: Dr. Duque  Discharge Provider: Dr. 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, final pathology pending at time of discharge  - Elevated Ca125  - Ascites  - Hypertension    Patient Active Problem List   Diagnosis     CARDIOVASCULAR SCREENING; LDL GOAL LESS THAN 130     Hypertension goal BP (blood pressure) < 140/90     Hyperlipidemia LDL goal <130     Overweight     Changing skin lesion     Elevated TSH     Pelvic mass       Procedures: Exploratory laparotomy, modified radical hysterectomy, bilateral salpingo-oophorectomy, omentectomy, right pelvic and bilateral para-aortic lymph node dissection, CUSA tumor debulking, mobilization of the entire colon, stripping of left pelvic peritoneum, proctoscopy, optimal tumor debulking to no gross residual disease    Prior to Admission Medications:  Prescriptions Prior to Admission   Medication Sig Dispense Refill Last Dose     hydrochlorothiazide (HYDRODIURIL) 25 MG tablet Take 1 tablet (25 mg) by mouth every morning 90 tablet 1 7/30/2018 at 0400     mometasone (NASONEX) 50 MCG/ACT spray Spray 2 sprays into both nostrils daily 1 Box 11 7/30/2018 at 0400     pravastatin (PRAVACHOL) 20 MG tablet Take 1 tablet (20 mg) by mouth every evening 90 tablet 3 7/29/2018 at 1930     aspirin 81 MG tablet Take 1 tablet by mouth daily. *   7/23/2018     cetirizine (ZYRTEC) 10 MG tablet Take 10 mg by mouth daily   7/29/2018 at 1930       Discharge Medications:   Di Evans   Home Medication Instructions BRANDIN:[id number]    Printed on:08/02/18 0903   Medication Information                      acetaminophen (TYLENOL) 325 MG tablet  Take 2 tablets (650 mg) by mouth every 6 hours as needed for mild pain or fever             aspirin 81 MG  tablet  Take 1 tablet by mouth daily. *             cetirizine (ZYRTEC) 10 MG tablet  Take 10 mg by mouth daily             enoxaparin (LOVENOX) 40 MG/0.4ML injection  Inject 0.4 mLs (40 mg) Subcutaneous every 24 hours for 26 days             hydrochlorothiazide (HYDRODIURIL) 25 MG tablet  Take 1 tablet (25 mg) by mouth every morning             ibuprofen (ADVIL/MOTRIN) 600 MG tablet  Take 1 tablet (600 mg) by mouth every 6 hours as needed for moderate pain             mometasone (NASONEX) 50 MCG/ACT spray  Spray 2 sprays into both nostrils daily             oxyCODONE IR (ROXICODONE) 5 MG tablet  Take 1-2 tablets (5-10 mg) by mouth every 6 hours as needed for other (pain control or improvement in physical function.  Hold dose for analgesics side effects.)             pravastatin (PRAVACHOL) 20 MG tablet  Take 1 tablet (20 mg) by mouth every evening             senna-docusate (SENOKOT-S;PERICOLACE) 8.6-50 MG per tablet  Take 2 tablets by mouth 2 times daily                 Consultations:   None    Brief History of Illness:  Di Evans is a 58 year old who noted abdominal symptoms for the previous 8 months.  She presented to the ED and CT showed a large cystic and solid mass in the pelvis as well as omental thickening suspicious for metastatic disease and a large volume of ascites.    Hospital Course:  Dz:   - Preoperative diagnosis was peritoneal carcinomatosis.  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  Final pathology is pending at the time of discharge.  She will follow-up postoperatively for a care plan.  FEN:   - She was maintained on IVF until POD#1, when her diet was slowly advanced.  By discharge, she was tolerating a regular diet without nausea and vomiting and able to maintain her hydration without IVF supplementation.  Pain:   - Her pain was initially controlled with PO medications and IV dilaudid as needed. Her  pain was well controlled on this and she was discharged home with PO medications.  CV:   - She has a history of hypertension and was restarted on her home hydrochlorothiazide on POD#1.  Her vital signs were stable while in house and she had no acute CV issues.  PULM:   - She has no history of pulmonary issues.  She was initially given O2 supplementation in to maintain her O2 sats in the immediate postop period and was transitioned off of this without difficulty.  By discharge, her O2 sats were greater than 94% on RA.  She was encouraged to use her bedside IS while in house.  She had no acute pulmonary issues while in house.  HEME:   - Her preoperative Hgb was 11.4.  Estimated blood loss from the procedure was 300 mL. Her hgb dropped to 7.8 postoperatively and was stable at 8.3 at the time of discharge.  She had no other acute heme issues while in house.  GI:   - She was made NPO prior to the procedure.  On POD#0, her diet was advanced slowly as tolerated.  At the time of discharge, she was tolerating a regular diet without nausea and vomiting.  She will be discharged with a bowel regimen to prevent constipation in the postoperative period.  She had no acute GI issues while in house.  :    - A suarez catheter was placed at the time of the surgery.  Once ambulating unassisted, the suarez catheter was removed.  Prior to discharge, the patient was voiding spontaneously without difficulty.  She had no acute  issues while in house.  ID:   - The patient was AF during her hospitalization.  She received standard preoperative antibiotics without incident.    ENDO:   - no issues  PSYCH/NEURO:   - no issues  PPX:    - She was given SCDs, IS, and lovenox during her hospital course.  She tolerated these prophylactic interventions without incident. Lovenox was ordered to be continued for a total 28d course.    Discharge Instructions and Follow up:  Ms. Di Evans was discharged from the hospital with follow up with   Neto on 8/16 for a postoperative check.    Discharge Diet: Regular  Discharge Activity: No lifting, driving, or strenuous exercise for 6 week(s)  No driving or operating machinery while on narcotic analgesics  Pelvic rest: abstain from intercourse and do not use tampons for 6 week(s)  Discharge Follow up: 8/16/18 with Dr. Duque     Discharge Disposition:  Discharged to home    Discharge Staff: Dr. Neto Mcintyre CNP  8/2/2018 9:10 AM

## 2018-07-30 NOTE — ANESTHESIA CARE TRANSFER NOTE
Patient: Di Evans    Procedure(s):  Exploratory Laparotomy, Modified Radical Hysterectomy, Removal of Cervix, Bilateral Salpingo - Oophorectomy, Omentectomy, Bilateral Para Aortic and Left Pelvic Lymph Node Dissection, Tumor Debulking, Proctoscopy - Wound Class: II-Clean Contaminated   - Wound Class: II-Clean Contaminated    Diagnosis: Pelvic Mass   Diagnosis Additional Information: No value filed.    Anesthesia Type:   General, ETT     Note:  Airway :Face Mask  Patient transferred to:PACU  Comments: Patient extubated in the OR previously observing patient following simple commands and head lift >5 secs.  Patient's oropharynx suction; placed on O2 via facemask. Transferred to PACU. Sign out to PACU RN.       Jonn HILLS-1   UMN      Vitals: (Last set prior to Anesthesia Care Transfer)    CRNA VITALS  7/30/2018 1032 - 7/30/2018 1106      7/30/2018             Pulse: 117    SpO2: 100 %                Electronically Signed By: SULMA Henao CRNA  July 30, 2018  11:06 AM

## 2018-07-30 NOTE — PLAN OF CARE
Problem: Surgery Nonspecified (Adult)  Goal: Signs and Symptoms of Listed Potential Problems Will be Absent, Minimized or Managed (Surgery Nonspecified)  Signs and symptoms of listed potential problems will be absent, minimized or managed by discharge/transition of care (reference Surgery Nonspecified (Adult) CPG).  Outcome: No Change  Pt arrived to station at about 1340,was able to ambulate to bed from letter with sba.S/P x lap,modified radical hysterectomy,BSO,omentectomyPPLND,tumor debulking for ovarian cancer.EBL was 300 ml's.Is alert and oriented.[place] with pink tinged urine;Lung sounds clear,diminished lower lobes.Incision with derma bond is cdi.Has been tachycardic since arrival to station.Pt states the tachycardia has been going on for several months,her MD is aware of this.NP notified of tachycardia.Other vss for pt.O2 at 1-2 liters.Capno in place,WNL.Has received dilaudid IV x1 with stated decrease in pain.Denies nausea but states she isn't very hungry.Has been taking ice chips without difficulty.Friends and family are here.

## 2018-07-30 NOTE — IP AVS SNAPSHOT
[address] [address]    Phone:  [phone]                                       After Visit Summary   7/30/2018    Di Evans    MRN: [id number]           After Visit Summary Signature Page     I have received my discharge instructions, and my questions have been answered. I have discussed any challenges I see with this plan with the nurse or doctor.    ..........................................................................................................................................  Patient/Patient Representative Signature      ..........................................................................................................................................  Patient Representative Print Name and Relationship to Patient    ..................................................               ................................................  Date                                            Time    ..........................................................................................................................................  Reviewed by Signature/Title    ...................................................              ..............................................  Date                                                            Time

## 2018-07-30 NOTE — PLAN OF CARE
Problem: Patient Care Overview  Goal: Plan of Care/Patient Progress Review  Outcome: Improving  Post op day 0. VSS. -120's. Gyn Onc resident notified, no new orders. Continue to notify of HR >120, monitor urine volumes. Patient reports heart rate has been elevated since pre-op. On 2L Oxygen via nasal canula. CAPNO stable. Abdominal pain worsened with deep breathing/repositioning. Tylenol, Advil and Oxycodone given - continue to monitor pain control. Ambulated with assistance of 1. Educated on IS use, demonstrated understanding. [place] patient with adequate urine volumes. Ate 50% of full liquid diet tray without complaints of nausea. Denies passing gas.     Continue with post-op POC.

## 2018-07-30 NOTE — IP AVS SNAPSHOT
MRN:[id number]                      After Visit Summary   7/30/2018    Di Evans    MRN: [id number]           Thank you!     Thank you for choosing [facility] for your care. Our goal is always to provide you with excellent care. Hearing back from our patients is one way we can continue to improve our services. Please take a few minutes to complete the written survey that you may receive in the mail after you visit with us. Thank you!        Patient Information     Date Of Birth          1960        Designated Caregiver       Most Recent Value    Caregiver    Will someone help with your care after discharge? yes    Name of designated caregiver Alivia Balladr    Phone number of caregiver [phone]    Caregiver address Same as patient      About your hospital stay     You were admitted on:  July 30, 2018 You last received care in the:  [address] [facility]    You were discharged on:  August 2, 2018        Reason for your hospital stay       Surgery                  Who to Call     For medical emergencies, please call 911.  For non-urgent questions about your medical care, please call your primary care provider or clinic, [phone]  For questions related to your surgery, please call your surgery clinic        Attending Provider     Provider Specialty    Jammie Dueñas MD Gyn-Onc       Primary Care Provider Office Phone # Fax #    Sonja Jeni Hernandez [phone]       When to contact your care team       GENERAL POST-OPERATIVE  PATIENT INSTRUCTIONS      FOLLOW-UP:    Call Surgeon if you have:  Temperature greater than 100.4  Persistent nausea and vomiting  Severe uncontrolled pain  Redness, tenderness, or signs of infection (pain, swelling, redness, odor or green/yellow discharge around the site)  Difficulty breathing, headache or visual disturbances  Hives  Persistent dizziness or light-headedness  Extreme fatigue  Any other questions or concerns  you may have after discharge    In an emergency, call 911 or go to an Emergency Department at a nearby hospital       WOUND CARE INSTRUCTIONS:  Keep a dry clean dressing on the wound if there is drainage. If you had a bandage initially, it may be removed after 24 hours.  Once the wound has quit draining you may leave it open to air.  If clothing rubs against the wound or causes irritation and the wound is not draining you may cover it with a dry dressing during the daytime.  Try to keep the wound dry and avoid ointments on the wound unless directed to do so.  If the wound becomes bright red and painful or starts to drain infected material that is not clear, please contact your physician immediately.    1.  You may shower 24 hrs after surgery   2.  No soaking in the tub for 4 weeks       DIET:  There are no dietary restrictions.  You may eat any foods that you can tolerate unless instructed otherwise.  It is a good idea to eat a high fiber diet and take in plenty of fluids to prevent constipation.  If you become constipated, please follow the instructions below.    ACTIVITY:  You are encouraged to cough, deep breath and use your incentive spirometer if you were given one, every 15-30 minutes when awake.  This will help prevent respiratory complications and low grade fevers post-operatively.  You may want to hug a pillow when coughing and sneezing to add additional support to the surgical area, if you had abdominal surgery, which will decrease pain during these times.      1.  No heavy lifting >20lbs or strenuous exercise for six-eight weeks.  No exercise in which you are using core muscles (yoga, pilates, swimming, weight lifting)  2.  You may walk as much as you wish.  You are encouraged to increase your activity each day after surgery.  Stairs are okay.   3.  Nothing per vagina for eight weeks.  No tampons, no intercourse, no douching.  You can expect some light vaginal spotting and discharge for up to six weeks.   If bleeding becomes heavy, please contact the office.     MEDICATIONS:  Try to take narcotic medications and anti-inflammatory medications, such as tylenol, ibuprofen, naprosyn, etc., with food.  This will minimize stomach upset from the medication.  Should you develop nausea and vomiting from the pain medication, or develop a rash, please discontinue the medication and contact your physician.  You should not drive, make important decisions, or operate machinery when taking narcotic pain medication.    OTHER:  Patients are often constipated after general anesthesia and surgery.  The patient should continue to take stool softeners (for example, Senokot-S) for the next six weeks (unless diarrhea develops) and consume adequate amounts of water.  If the patient remains constipated or unable to pass stool, please try one or all of the following measures:  1.  Milk of Magnesia 30cc twice a day as needed by mouth  2.  Metamucil 2 tablespoons in 12 ounces of fluid  3.  Dulcolax oral or suppositories  4.  Prunes or prune juice  5.  Miralax daily      QUESTIONS:  Please feel free to call your physician or the hospital  if you have any questions, and they will be glad to assist you.                  After Care Instructions     Activity       Your activity upon discharge: activity as tolerated            Diet       Follow this diet upon discharge: Orders Placed This Encounter      Regular Diet Adult                  Follow-up Appointments     Adult [facility] Follow-up and recommended labs and tests       8/16/18 at 8:45am with Dr Duque. Please call prior to then for questions or concerns [phone]    Appointments on [facility] and/or [facility] (with [facility] or [facility] provider or service). Call [phone] if you haven't heard regarding these appointments within 7 days of discharge.                  Your next 10 appointments already scheduled     Aug 16, 2018  8:45 AM CDT   Post Op with Jammie Salgado MD  "  [facility]    [address] [address] [place] [id number] [phone]            Nov 09, 2018 11:00 AM NILSA JOY SCREENING DIGITAL BILATERAL with [id number]   [facility]    [address] [address] [id number] [phone]           Do not use any powder, lotion or deodorant under your arms or on your breast. If you do, we will ask you to remove it before your exam.  Wear comfortable, two-piece clothing.  If you have any allergies, tell your care team.  Bring any previous mammograms from other facilities or have them mailed to the breast center. Three-dimensional (3D) mammograms are available at [facility] locations in [facility], [facility], [facility], and [place]. [facility] locations include [facility] in [place]. Benefits of 3D mammograms include: - Improved rate of cancer detection - Decreases your chance of having to go back for more tests, which means fewer: - \"False-positive\" results (This means that there is an abnormal area but it isn't cancer.) - Invasive testing procedures, such as a biopsy or surgery - Can provide clearer images of the breast if you have dense breast tissue. 3D mammography is an optional exam that anyone can have with a 2D mammogram. It doesn't replace or take the place of a 2D mammogram. 2D mammograms remain an effective screening test for all women.  Not all insurance companies cover the cost of a 3D mammogram. Check with your insurance.              Additional Information     If you use hormonal birth control (such as the pill, patch, ring or implants): You'll need a second form of birth control for 7 days (condoms, a diaphragm or contraceptive foam). While in the hospital, you received a medicine called Bridion. Your normal birth control will not work as well for a week after taking this " "medicine.          Pending Results     Date and Time Order Name Status Description    7/30/2018 0902 Surgical pathology exam Preliminary             Statement of Approval     Ordered          08/02/18 0910  I have reviewed and agree with all the recommendations and orders detailed in this document.  EFFECTIVE NOW     Approved and electronically signed by:  Maggie Acevedo APRN CNP             Admission Information     Date & Time Provider Department Dept. Phone    7/30/2018 Jammie Dueñas MD [address] [facility] [phone]      Your Vitals Were     Blood Pressure Pulse Temperature Respirations Height Weight    123/56 (BP Location: Right arm) 99 97.7  F (36.5  C) 18 1.651 m (5' 5\") 70.9 kg (156 lb 4.8 oz)    Pulse Oximetry BMI (Body Mass Index)                98% 26.01 kg/m2          MyChart Information     [facility] gives you secure access to your electronic health record. If you see a primary care provider, you can also send messages to your care team and make appointments. If you have questions, please call your primary care clinic.  If you do not have a primary care provider, please call [phone] and they will assist you.        Care EveryWhere ID     This is your Care EveryWhere ID. This could be used by other organizations to access your [facility] medical records  [id number]        Equal Access to Services     ROMAN MILLARD : Lele pendletono Sobelkisali, waaxda luqadaha, qaybta kaalmada adeegyada, mae schulz. So [facility] [phone].    ATENCIÓN: Si habla español, tiene a perez disposición servicios gratuitos de asistencia lingüística. Llame al [phone].    We comply with applicable federal civil rights laws and [place] laws. We do not discriminate on the basis of race, color, national origin, age, disability, sex, sexual orientation, or gender identity.               Review of your medicines      START taking        Dose / Directions    acetaminophen 325 MG " tablet   Commonly known as:  TYLENOL        Dose:  650 mg   Take 2 tablets (650 mg) by mouth every 6 hours as needed for mild pain or fever   Quantity:  100 tablet   Refills:  0       enoxaparin 40 MG/0.4ML injection   Commonly known as:  LOVENOX        Dose:  40 mg   Inject 0.4 mLs (40 mg) Subcutaneous every 24 hours for 26 days   Quantity:  10.4 mL   Refills:  0       ibuprofen 600 MG tablet   Commonly known as:  ADVIL/MOTRIN        Dose:  600 mg   Take 1 tablet (600 mg) by mouth every 6 hours as needed for moderate pain   Quantity:  60 tablet   Refills:  0       oxyCODONE IR 5 MG tablet   Commonly known as:  ROXICODONE        Dose:  5-10 mg   Take 1-2 tablets (5-10 mg) by mouth every 6 hours as needed for other (pain control or improvement in physical function.??Hold dose for analgesics side effects.)   Quantity:  25 tablet   Refills:  0       senna-docusate 8.6-50 MG per tablet   Commonly known as:  SENOKOT-S;PERICOLACE        Dose:  2 tablet   Take 2 tablets by mouth 2 times daily   Quantity:  100 tablet   Refills:  0         CONTINUE these medicines which have NOT CHANGED        Dose / Directions    aspirin 81 MG tablet        Dose:  1 tablet   Take 1 tablet by mouth daily. *   Refills:  0       cetirizine 10 MG tablet   Commonly known as:  zyrTEC        Dose:  10 mg   Take 10 mg by mouth daily   Refills:  0       hydrochlorothiazide 25 MG tablet   Commonly known as:  HYDRODIURIL   Used for:  Hypertension goal BP (blood pressure) < 140/90        Dose:  25 mg   Take 1 tablet (25 mg) by mouth every morning   Quantity:  90 tablet   Refills:  1       mometasone 50 MCG/ACT spray   Commonly known as:  NASONEX   Used for:  Chronic seasonal allergic rhinitis, unspecified trigger        Dose:  2 spray   Spray 2 sprays into both nostrils daily   Quantity:  1 Box   Refills:  11       pravastatin 20 MG tablet   Commonly known as:  PRAVACHOL   Used for:  Hyperlipidemia LDL goal <130        Dose:  20 mg   Take 1 tablet (20  mg) by mouth every evening   Quantity:  90 tablet   Refills:  3            Where to get your medicines      These medications were sent to [facility] - [place], [place] - [address]  [address], [facility] [address]     Phone:  [phone]     acetaminophen 325 MG tablet    enoxaparin 40 MG/0.4ML injection    ibuprofen 600 MG tablet    senna-docusate 8.6-50 MG per tablet         Some of these will need a paper prescription and others can be bought over the counter. Ask your nurse if you have questions.     Bring a paper prescription for each of these medications     oxyCODONE IR 5 MG tablet                Protect others around you: Learn how to safely use, store and throw away your medicines at www.disposemymeds.org.        Information about OPIOIDS     PRESCRIPTION OPIOIDS: WHAT YOU NEED TO KNOW   We gave you an opioid (narcotic) pain medicine. It is important to manage your pain, but opioids are not always the best choice. You should first try all the other options your care team gave you. Take this medicine for as short a time (and as few doses) as possible.     These medicines have risks:    DO NOT drive when on new or higher doses of pain medicine. These medicines can affect your alertness and reaction times, and you could be arrested for driving under the influence (DUI). If you need to use opioids long-term, talk to your care team about driving.    DO NOT operate heave machinery    DO NOT do any other dangerous activities while taking these medicines.     DO NOT drink any alcohol while taking these medicines.      If the opioid prescribed includes acetaminophen, DO NOT take with any other medicines that contain acetaminophen. Read all labels carefully. Look for the word  acetaminophen  or  Tylenol.  Ask your pharmacist if you have questions or are unsure.    You can get addicted to pain medicines, especially if you have a history of addiction (chemical, alcohol or substance  dependence). Talk to your care team about ways to reduce this risk.    Store your pills in a secure place, locked if possible. We will not replace any lost or stolen medicine. If you don t finish your medicine, please throw away (dispose) as directed by your pharmacist. The [place] Pollution Control Agency has more information about safe disposal: https://www.pca.[facility].mn.us/living-green/managing-unwanted-medications.     All opioids tend to cause constipation. Drink plenty of water and eat foods that have a lot of fiber, such as fruits, vegetables, prune juice, apple juice and high-fiber cereal. Take a laxative (Miralax, milk of magnesia, Colace, Senna) if you don t move your bowels at least every other day.              Medication List: This is a list of all your medications and when to take them. Check marks below indicate your daily home schedule. Keep this list as a reference.      Medications           Morning Afternoon Evening Bedtime As Needed    acetaminophen 325 MG tablet   Commonly known as:  TYLENOL   Take 2 tablets (650 mg) by mouth every 6 hours as needed for mild pain or fever   Last time this was given:  650 mg on 8/2/2018  6:27 AM                                aspirin 81 MG tablet   Take 1 tablet by mouth daily. *                                cetirizine 10 MG tablet   Commonly known as:  zyrTEC   Take 10 mg by mouth daily   Last time this was given:  5 mg on 8/1/2018  7:48 PM                                enoxaparin 40 MG/0.4ML injection   Commonly known as:  LOVENOX   Inject 0.4 mLs (40 mg) Subcutaneous every 24 hours for 26 days   Last time this was given:  40 mg on 8/1/2018  2:42 PM                                hydrochlorothiazide 25 MG tablet   Commonly known as:  HYDRODIURIL   Take 1 tablet (25 mg) by mouth every morning   Last time this was given:  25 mg on 8/2/2018  7:59 AM                                ibuprofen 600 MG tablet   Commonly known as:  ADVIL/MOTRIN   Take 1 tablet (600  mg) by mouth every 6 hours as needed for moderate pain   Last time this was given:  600 mg on 8/1/2018 10:18 PM                                mometasone 50 MCG/ACT spray   Commonly known as:  NASONEX   Spray 2 sprays into both nostrils daily                                oxyCODONE IR 5 MG tablet   Commonly known as:  ROXICODONE   Take 1-2 tablets (5-10 mg) by mouth every 6 hours as needed for other (pain control or improvement in physical function.??Hold dose for analgesics side effects.)   Last time this was given:  5 mg on 7/31/2018 11:56 PM                                pravastatin 20 MG tablet   Commonly known as:  PRAVACHOL   Take 1 tablet (20 mg) by mouth every evening                                senna-docusate 8.6-50 MG per tablet   Commonly known as:  SENOKOT-S;PERICOLACE   Take 2 tablets by mouth 2 times daily

## 2018-07-30 NOTE — OP NOTE
Gynecologic Oncology Operative Report    Di Evans  [id number]  7/30/2018    Pre-operative Diagnosis:  Pelvic mass, ascites, carcinomatosis, elevated , concern for ovarian cancer    Post-operative Diagnosis:  Endometrioid ovarian cancer    Surgeon:  Jammie Duque MD    Assistants:  Rachel Jacobsen MD, Fellow, Natalee Adame MD, PGY-4    Anesthesia:  General endotracheal     Procedure:  Exploratory laparotomy, modified radical hysterectomy, bilateral salpingo-oophorectomy, omentectomy, right pelvic and bilateral para-aortic lymph node dissection, CUSA tumor debulking, mobilization of the entire colon, stripping of left pelvic peritoneum, proctoscopy, optimal tumor debulking to no gross residual disease    EBL:  300 cc    IVF:  1100 cc crystalloid, 750 ml albumin    UOP:  340 cc clear yellow urine    Specimens:  1.  Ascites  2.  Uterus, cervix, bilateral ovaries and fallopian tubes  3.  Omentum  4.  Right pelvic lymph nodes  5.  Right and left para-aortic lymph nodes  6.  Left pelvic sidewall peritoneum    Complications:  None apparent    Indications for procedure:  Di Evans is a 58 year old who noted abdominal symptoms for the previous 8 months.  She presented to the ED and demanded a CT which showed a large cystic and solid mass in the pelvis as well as omental thickening suspicious for metastatic disease and a large volume of ascites.  Following a thorough discussion of the risks, benefits, indications and alternatives she consented to the above procedure.    Operative Findings:  EUA revealed normal external genitalia with a small introitus and a palpable pelvic mass that filled the pelvis.  She also had a distended and firm abdomen filled with ascites.  On laparotomy her right ovary was replaced with a cystic and solid mass that was adherent to the pelvic sidewall and posterior cul-de-sac.  The uterus was small and grossly normal as was the left adnexa aside from some adhesions to the left  pelvic sidewall.  The rectosigmoid colon was adherent to the posterior uterus.  The peritoneal surfaces in the pelvis were thickened.  There were two 2 cm nodules along the sigmoid colon mesentery and serosa.  There was some tumor studding of the bilateral pelvic peritoneum as well as the bladder peritoneum.  The omentum was also thickened, however there were no discrete tumor nodules palpable.  The bilateral hema-diaphragms were smooth and without evidence of disease.  There were palpably enlarged right pelvic and bilateral para-aortic lymph nodes.  Frozen section analysis was consistent with endometrioid adenocarcinoma with a possible Shiva tumor component.  A proctoscopy bubble test was negative for a leak at the completion of the case.  At the completion of the procedure there was optimal tumor debulking to no gross residual disease.    Operative Procedure:  The consent was reviewed with the patient in the preoperative setting. She received prophylactic antibiotics. In addition, she received heparin for venous thrombosis prevention.  She was subsequently transferred to the operating room for the above-mentioned procedure. The patient was placed in dorsal lithotomy position. General anesthetic was obtained without noted difficulties. Corrales catheter was placed under sterile techniques. The patient was then prepped and draped for an abdominal procedure. Timeout was called at which point the patient's name, operative procedure and site was confirmed by the operative team. A midline vertical skin incision was then made from the level of pubic symphysis and ultimately extending well above the umbilicus. Incision was performed sharply and carried through the subcutaneous layer with cautery. Fascia was incised and extended superiorly and inferiorly. Peritoneum was then elevated and entered sharply. Peritoneal incision was extended without any injury to nearby structures. At this point, exploration of the pelvis and  upper abdomen was performed. The Bookwalter retractor was positioned and utilized throughout the course of the procedure. Ascites was first evacuated and sent for pathology.  The entirety of the bowels were run without evidence of metastatic disease.  Bowels were packed up into the upper abdomen and appropriate retractors positioned. Uterus was then elevated with clamps placed on both uterine cornua.  Initially on the left side, the round ligament was isolated, transected with cautery and then suture ligated.  We extended the peritoneal incision of the left aspect of the psoas muscle and the left retroperitoneal space was entered.  The left ureter was identified deep in the pelvis and a window was made in the medial leaf of the broad ligament ensuring to stay well above the level of the ureter.  The left ovarian vessels were then isolated, doubly clamped, cut and suture ligated.  Attention was then turned to the right and the round ligament was isolated, transected with cautery and suture ligated. We extended the peritoneal incision over the right aspect of the psoas muscle. Right retroperitoneal exploration was performed. We identified the ureter deep in the pelvis. Well above that site, a window was made in the medial leaf of the broad ligament and the right ovarian vessels were then isolated, clamped x2, cut and suture ligated. The peritoneal incision over the lower uterine segment was incised. The vesicouterine peritoneum was then fully developed. The bladder was retracted to the level of the upper vagina. On the right we did need to perform complete ureterolysis in order to free the ureter from the mass as the mass was adherent to the right pelvic sidewall.  We then carefully dissected the rectosigmoid colon off the posterior uterus as it was adherent.  We then carefully dissected the right ovary off the pelvic sidewall ensuring that the ureter was safely out of the way.  Bilateral uterine arteries were now  isolated.  The bilateral uterine arteries were then clamped in a serial manner. We then cut and suture ligated with Vicryl.  The bilateral cardinal ligaments were next clamped and cut and suture ligated with Vicryl. We proceeded until we could isolate out the uterosacral ligaments, which were similarly cut and suture ligated.  The apex of the vagina was then clamped and the ann scissors was used to perform the colpotomy and the full cervix was resected along with the uterus and the bilateral fallopian tubes which was sent for pathology which did return as malignant. The vaginal cuff was now grasped and suture ligated with interrupted figure of eight Vicryl sutures with excellent reapproximation and good hemostasis. At this point, the pelvis was hemostatic.     Attention was then turned to the omentectomy.  We initially had to perform complete mobilization of the colon from the ascending to the hepatic flexure as well as from the splenic flexure to the descending colon.  This was done with cautery in the usual fashion.  This was necessary as the omentum was adherent to the colon mesentery in several locations.  Complete omentectomy was performed extending from the hepatic to the splenic flexure and extending to the underside of the greater curvature of the stomach. Total omentectomy was performed by mobilizing the omentum off of the transverse colon up to the gastroepiploic arcade where the feeding vessels were divided and ligated with the cautery devices as well as several Vicryl sutures.    Given that there were enlarged lymph nodes were began on the right para-aortic lymph noes.  Initiating from the bifurcation of the common iliac artery along the course of the aorta approximately 3 cm along the course of the ureter and lateral to the vena cava. There was an enlarged lymph node approximately 1 cm above the level of the bifurcation of the aorta which was removed.  Hemostasis was assured after removing the  lymph node bundle.  We then turned to the right pelvic lymph node which was enlarged in the obturator space.  We isolated out the iliac vessels as well as the obturator nerve.  The lymph node was then isolated with care taken to preserve the nearby structures.  We then moved to the left para-aortic lymph node dissection.  We removed an enlarged lymph node just above the bifurcation of the aorta.  All lymph nodes were sent for pathologic examination.     We then used the CUSA device to remove the two nodules along the sigmoid colon as well as the other tumor studding in the pelvis along the pelvic peritoneum and bladder peritoneum.  We then ran the bowel again and there was no evidence of metastatic disease.  We then resected the left pelvic sidewall peritoneum in the usual fashion as there was still some tumor studding even after using the CUSA device.        At this point, we performed irrigation of the pelvis and upper abdomen with 4 liters of warm saline. All laparotomy sponges were next removed. Fascia was closed with 0 looped PDS in 2 segments meeting in the middle. Subcutaneous tissue was reapproximated and skin closed with two running subcuticular sutures meeting in the middle and Dermabond applied.    Sponge, lap, instrument and needle counts were reported as correct x2. The patient was then repositioned appropriately, recalled from the general anesthetic and was transferred to recovery unit in stable condition.       Jammie Duque MD  Gynecologic Oncology  [facility]

## 2018-07-31 LAB
ANION GAP SERPL CALCULATED.3IONS-SCNC: 4 MMOL/L (ref 3–14)
BUN SERPL-MCNC: 10 MG/DL (ref 7–30)
CALCIUM SERPL-MCNC: 8.1 MG/DL (ref 8.5–10.1)
CHLORIDE SERPL-SCNC: 97 MMOL/L (ref 94–109)
CO2 SERPL-SCNC: 32 MMOL/L (ref 20–32)
COPATH REPORT: NORMAL
CREAT SERPL-MCNC: 0.54 MG/DL (ref 0.52–1.04)
ERYTHROCYTE [DISTWIDTH] IN BLOOD BY AUTOMATED COUNT: 14.3 % (ref 10–15)
GFR SERPL CREATININE-BSD FRML MDRD: >90 ML/MIN/1.7M2
GLUCOSE SERPL-MCNC: 130 MG/DL (ref 70–99)
HCT VFR BLD AUTO: 24.9 % (ref 35–47)
HGB BLD-MCNC: 7.8 G/DL (ref 11.7–15.7)
HGB BLD-MCNC: 8.3 G/DL (ref 11.7–15.7)
INTERPRETATION ECG - MUSE: NORMAL
MCH RBC QN AUTO: 25.7 PG (ref 26.5–33)
MCHC RBC AUTO-ENTMCNC: 31.3 G/DL (ref 31.5–36.5)
MCV RBC AUTO: 82 FL (ref 78–100)
PLATELET # BLD AUTO: 465 10E9/L (ref 150–450)
POTASSIUM SERPL-SCNC: 4.1 MMOL/L (ref 3.4–5.3)
RBC # BLD AUTO: 3.04 10E12/L (ref 3.8–5.2)
SODIUM SERPL-SCNC: 132 MMOL/L (ref 133–144)
WBC # BLD AUTO: 9.1 10E9/L (ref 4–11)

## 2018-07-31 PROCEDURE — 80048 BASIC METABOLIC PNL TOTAL CA: CPT | Performed by: OBSTETRICS & GYNECOLOGY

## 2018-07-31 PROCEDURE — 25000128 H RX IP 250 OP 636: Performed by: NURSE PRACTITIONER

## 2018-07-31 PROCEDURE — 85018 HEMOGLOBIN: CPT | Performed by: NURSE PRACTITIONER

## 2018-07-31 PROCEDURE — 85027 COMPLETE CBC AUTOMATED: CPT | Performed by: OBSTETRICS & GYNECOLOGY

## 2018-07-31 PROCEDURE — 25000132 ZZH RX MED GY IP 250 OP 250 PS 637: Performed by: STUDENT IN AN ORGANIZED HEALTH CARE EDUCATION/TRAINING PROGRAM

## 2018-07-31 PROCEDURE — 36415 COLL VENOUS BLD VENIPUNCTURE: CPT | Performed by: NURSE PRACTITIONER

## 2018-07-31 PROCEDURE — P9041 ALBUMIN (HUMAN),5%, 50ML: HCPCS | Performed by: STUDENT IN AN ORGANIZED HEALTH CARE EDUCATION/TRAINING PROGRAM

## 2018-07-31 PROCEDURE — 12000001 ZZH R&B MED SURG/OB UMMC

## 2018-07-31 PROCEDURE — 25000128 H RX IP 250 OP 636: Performed by: STUDENT IN AN ORGANIZED HEALTH CARE EDUCATION/TRAINING PROGRAM

## 2018-07-31 PROCEDURE — 25000132 ZZH RX MED GY IP 250 OP 250 PS 637: Performed by: OBSTETRICS & GYNECOLOGY

## 2018-07-31 PROCEDURE — 36415 COLL VENOUS BLD VENIPUNCTURE: CPT | Performed by: OBSTETRICS & GYNECOLOGY

## 2018-07-31 PROCEDURE — 25000128 H RX IP 250 OP 636: Performed by: OBSTETRICS & GYNECOLOGY

## 2018-07-31 PROCEDURE — 25000125 ZZHC RX 250: Performed by: OBSTETRICS & GYNECOLOGY

## 2018-07-31 PROCEDURE — 99024 POSTOP FOLLOW-UP VISIT: CPT | Mod: GC | Performed by: OBSTETRICS & GYNECOLOGY

## 2018-07-31 RX ADMIN — ACETAMINOPHEN 650 MG: 325 TABLET, FILM COATED ORAL at 15:59

## 2018-07-31 RX ADMIN — MAGNESIUM HYDROXIDE 15 ML: 400 SUSPENSION ORAL at 08:14

## 2018-07-31 RX ADMIN — OXYCODONE HYDROCHLORIDE 5 MG: 5 TABLET ORAL at 15:59

## 2018-07-31 RX ADMIN — SODIUM CHLORIDE, POTASSIUM CHLORIDE, SODIUM LACTATE AND CALCIUM CHLORIDE: 600; 310; 30; 20 INJECTION, SOLUTION INTRAVENOUS at 04:00

## 2018-07-31 RX ADMIN — ACETAMINOPHEN 650 MG: 325 TABLET, FILM COATED ORAL at 04:00

## 2018-07-31 RX ADMIN — ONDANSETRON 4 MG: 4 TABLET, ORALLY DISINTEGRATING ORAL at 00:41

## 2018-07-31 RX ADMIN — FLUTICASONE PROPIONATE 1 SPRAY: 50 SPRAY, METERED NASAL at 08:13

## 2018-07-31 RX ADMIN — ALBUMIN HUMAN 12.5 G: 0.05 INJECTION, SOLUTION INTRAVENOUS at 00:44

## 2018-07-31 RX ADMIN — OXYCODONE HYDROCHLORIDE 5 MG: 5 TABLET ORAL at 23:56

## 2018-07-31 RX ADMIN — ENOXAPARIN SODIUM 40 MG: 100 INJECTION SUBCUTANEOUS at 15:19

## 2018-07-31 RX ADMIN — IBUPROFEN 600 MG: 600 TABLET ORAL at 19:07

## 2018-07-31 RX ADMIN — ACETAMINOPHEN 650 MG: 325 TABLET, FILM COATED ORAL at 10:29

## 2018-07-31 RX ADMIN — BISACODYL 10 MG: 10 SUPPOSITORY RECTAL at 10:23

## 2018-07-31 RX ADMIN — OXYCODONE HYDROCHLORIDE 5 MG: 5 TABLET ORAL at 06:47

## 2018-07-31 RX ADMIN — CETIRIZINE HYDROCHLORIDE 5 MG: 5 TABLET ORAL at 20:07

## 2018-07-31 RX ADMIN — OXYCODONE HYDROCHLORIDE 5 MG: 5 TABLET ORAL at 04:00

## 2018-07-31 RX ADMIN — HYDROCHLOROTHIAZIDE 25 MG: 25 TABLET ORAL at 08:14

## 2018-07-31 RX ADMIN — SODIUM CHLORIDE, POTASSIUM CHLORIDE, SODIUM LACTATE AND CALCIUM CHLORIDE: 600; 310; 30; 20 INJECTION, SOLUTION INTRAVENOUS at 12:00

## 2018-07-31 RX ADMIN — ACETAMINOPHEN 650 MG: 325 TABLET, FILM COATED ORAL at 22:44

## 2018-07-31 RX ADMIN — IBUPROFEN 600 MG: 600 TABLET ORAL at 06:45

## 2018-07-31 RX ADMIN — IBUPROFEN 600 MG: 600 TABLET ORAL at 00:41

## 2018-07-31 RX ADMIN — OXYCODONE HYDROCHLORIDE 5 MG: 5 TABLET ORAL at 00:41

## 2018-07-31 RX ADMIN — IBUPROFEN 600 MG: 600 TABLET ORAL at 12:45

## 2018-07-31 ASSESSMENT — ACTIVITIES OF DAILY LIVING (ADL)
ADLS_ACUITY_SCORE: 9

## 2018-07-31 NOTE — PLAN OF CARE
Problem: Surgery Nonspecified (Adult)  Goal: Signs and Symptoms of Listed Potential Problems Will be Absent, Minimized or Managed (Surgery Nonspecified)  Signs and symptoms of listed potential problems will be absent, minimized or managed by discharge/transition of care (reference Surgery Nonspecified (Adult) CPG).   Outcome: Improving  POD1. VSS, HR irregular at times, but no longer tachy. Pt reports abdominal pain, controlled with Oxy x2, Tylenol x1, & Ibuprofen x1. Incision with dermabond SUZETTE, no redness or drainage. Tolerating clears, BS active x4, no gas yet per pt report. Albumin x1 for low urine output, great output after - 300 ml last 2 hours, suarez removed @ 0630. Pt up with assist of 1, friend at bedside overnight. Recheck labs today.

## 2018-07-31 NOTE — PROGRESS NOTES
Brief Progress Note    Notified by RN that HR seems irregular. Tachy to 111 since pre-op and to 120s post-op.    Patient denies symptoms. No CP or SOB, no palpitations. She is feeling well, pain well controlled, mild pain at superior aspect of incision.    Patient Vitals for the past 12 hrs:   BP Temp Temp src Heart Rate Resp SpO2   07/30/18 2000 - - - 118 - -   07/30/18 1950 - - - 114 21 98 %   07/30/18 1910 128/70 97.3  F (36.3  C) Oral 121 19 99 %   07/30/18 1840 - - - 121 - -   07/30/18 1829 - - - 110 24 97 %   07/30/18 1736 126/82 96.4  F (35.8  C) Oral 109 21 99 %   07/30/18 1640 126/65 - - 114 22 99 %   07/30/18 1540 121/78 - - 107 20 98 %   07/30/18 1510 120/69 - - 109 20 99 %   07/30/18 1440 119/79 - - 119 18 100 %   07/30/18 1425 107/58 - - 117 17 99 %   07/30/18 1410 109/56 - - 93 18 100 %   07/30/18 1355 110/73 - - 120 20 99 %   07/30/18 1339 113/66 96.7  F (35.9  C) Axillary 117 20 100 %   07/30/18 1300 - 97.9  F (36.6  C) - 115 25 100 %   07/30/18 1256 - - - - 22 100 %   07/30/18 1245 118/76 98.1  F (36.7  C) Axillary 120 14 100 %   07/30/18 1230 115/77 97.3  F (36.3  C) Temporal 117 14 100 %   07/30/18 1215 111/79 97.2  F (36.2  C) Temporal 110 16 100 %   07/30/18 1200 115/60 96.8  F (36  C) Temporal 116 16 100 %   07/30/18 1145 - - - 112 14 100 %   07/30/18 1130 112/53 97.6  F (36.4  C) Oral 113 14 100 %   07/30/18 1115 110/71 96.4  F (35.8  C) Temporal 110 12 100 %   07/30/18 1106 112/54 96.4  F (35.8  C) Temporal 111 12 100 %     HR does palpate irregular, seems to be quicker rate when exhaling    Stat EKG shows sinus arrhythmia. Curbside cardiology who confirmed this diagnosis.    Given persistent tachycardia post-op, drainage of ascites intra-op and borderline UOP will resuscitate wth 250cc albumin. Cont mIVF at 125cc/hr.  Reassured patient of cardiology input and findings.     Betty Montes MD PGY3

## 2018-07-31 NOTE — PROVIDER NOTIFICATION
Notified gyn onc resident of UOP 20cc/hr from [id number].  Spoke with Betty Gomez MD, she's ok with UOP of 20cc/hr, will continue to monitor.

## 2018-07-31 NOTE — PLAN OF CARE
Problem: Patient Care Overview  Goal: Plan of Care/Patient Progress Review  Outcome: Therapy, progress towards functional goals is fair    Cared for pt from [id number].  POD 0, capno WNL, -121 and irregular, asymptomatic.  MD notified, EKG done, sinus arrhythmia. Cardiology reviewed EKG, will continue to monitor pt and notify of any changes. Currently HR in 90s with no interventions, awaiting albumin from pharmacy.  OVSS.  Pain managed with oxycodone 5mg prn.  Tolerating small amt clear liquids, reg diet ordered, denies nausea.  Not passing gas yet.  Corrales avg 20cc/hr, MD aware, will continue to monitor.  Friend at bedside, supportive.

## 2018-07-31 NOTE — PROGRESS NOTES
Gynecologic Oncology Progress Note  7/31/2018    24 hr events:  - irregular tachycardia to 120s, EKG w/ sinus arrhythmia  - UOP @ 35cc/hr  - 250mL albumin given    S: Doing well this morning. Denies any pain currently. Tolerating PO without nausea or vomiting.  Ambulated last night without issue.  Corrales just removed this AM. Denies any chest pain, palpitations or shortness of breath.    O:  Vitals:    07/31/18 0045 07/31/18 0145 07/31/18 0357 07/31/18 0445   BP:   120/60    BP Location:   Right arm    Cuff Size:       Pulse:       Resp: 16 14 14 16   Temp:   97.2  F (36.2  C)    TempSrc:   Oral    SpO2:   98%    Weight:       Height:         Gen: alert and oriented, resting in bed  Cardio: RRR  Resp: lungs CTAB anteriorly  Abdomen: soft, appropriately tender to palpation.   Incision: CDI - dermabond in place  Extremities: BLEs non-tender with SCDs in place       A/P: 58 year old POD#1 s/p XL, modified radical hyst, BSO, omentectomy, right pelvic and bilateral PALND, CUSA tumor debulking, mobilization of the entire colon, stripping of left pelvic peritoneum, procto, optimal tumor debulking to no gross residual disease.      Dz: Ovarian adenocarcinoma, final pathology pending.  FEN: Regular diet, NS@125 ml/hr and will hep lock this AM once tolerating diet. ERP for K and Mag replacement.  Pain: Tylenol, ibuprofen, oxycodone, IV dilaudid  Heme:  Asymptomatic from acute blood loss anemia. Will recheck Hb later today.  CV:  Hx of HTN, home hydrochlorothiazide to start today. Irregular tachycardia overnight c/w sinus arrhythmia on EKG.  No further workup needed at this time.  Pulm: NI  GI: Bowel regimen  : NI  ID: NI  Endo: No issues  Psych/Neuro/MSK/Other:No issues  PPX: SCDs. Consider lovenox pending stable hgb.  Dispo: pending post op goals    Natalee Adame MD  OB/GYN PGY4    I, Damian Duque personally examined and evaluated this patient on 07/31/18.  I discussed the patient with the resident and care team,  and agree with the assessment and plan of care as documented in the residents note above.    I personally reviewed vital signs, laboratory values and imaging results.    Pt s/p debulking procedure.  Plan routine post-operative cares    Jammie Duque MD  Gynecologic Oncology  [facility]

## 2018-07-31 NOTE — PLAN OF CARE
Problem: Patient Care Overview  Goal: Plan of Care/Patient Progress Review  Outcome: Therapy, progress toward functional goals is gradual  POD1  s/p XL, modified radical hyst, BSO, omentectomy, right pelvic and bilateral PALND, CUSA tumor debulking, mobilization of the entire colon, stripping of left pelvic peritoneum, procto, optimal tumor debulking to no gross residual disease for Ovarian adenocarcinoma, final pathology pending(per MD note).  VS: AVSS except DBP in the 40-50s, MD notified-no change in orders.  Neuro: A+Ox4  Cardiac: wnl              Respiratory: wnl  GI/: +BS, denies flatus, voiding good amounts.  Diet/appetite: tolerating regular diet.  Activity: Ambulated with SBA.  Pain: denies pain when not moving, no pain meds given.  Skin/drains: M/L incision with intact derma bond, no drainage noted.  Lines: left piv infusing, rt piv's sl'd.  P: continue to monitor pain, VS, incision and ROBF and gen status and continue with POC.

## 2018-08-01 LAB
ALBUMIN UR-MCNC: ABNORMAL MG/DL
APPEARANCE UR: ABNORMAL
BILIRUB UR QL STRIP: ABNORMAL
COLOR UR AUTO: ABNORMAL
GLUCOSE UR STRIP-MCNC: ABNORMAL MG/DL
HGB UR QL STRIP: ABNORMAL
KETONES UR STRIP-MCNC: ABNORMAL MG/DL
LEUKOCYTE ESTERASE UR QL STRIP: ABNORMAL
NITRATE UR QL: ABNORMAL
PH UR STRIP: ABNORMAL PH (ref 5–7)
RBC #/AREA URNS AUTO: ABNORMAL /HPF (ref 0–2)
SOURCE: ABNORMAL
SP GR UR STRIP: ABNORMAL (ref 1–1.03)
UROBILINOGEN UR STRIP-MCNC: ABNORMAL MG/DL (ref 0–2)
WBC #/AREA URNS AUTO: ABNORMAL /HPF

## 2018-08-01 PROCEDURE — 99024 POSTOP FOLLOW-UP VISIT: CPT | Mod: GC | Performed by: OBSTETRICS & GYNECOLOGY

## 2018-08-01 PROCEDURE — 25000132 ZZH RX MED GY IP 250 OP 250 PS 637: Performed by: STUDENT IN AN ORGANIZED HEALTH CARE EDUCATION/TRAINING PROGRAM

## 2018-08-01 PROCEDURE — 25000125 ZZHC RX 250: Performed by: OBSTETRICS & GYNECOLOGY

## 2018-08-01 PROCEDURE — 25000132 ZZH RX MED GY IP 250 OP 250 PS 637: Performed by: OBSTETRICS & GYNECOLOGY

## 2018-08-01 PROCEDURE — 25000128 H RX IP 250 OP 636: Performed by: NURSE PRACTITIONER

## 2018-08-01 PROCEDURE — 12000001 ZZH R&B MED SURG/OB UMMC

## 2018-08-01 RX ORDER — IBUPROFEN 600 MG/1
600 TABLET, FILM COATED ORAL EVERY 6 HOURS PRN
Status: DISCONTINUED | OUTPATIENT
Start: 2018-08-01 | End: 2018-08-02 | Stop reason: HOSPADM

## 2018-08-01 RX ORDER — AMOXICILLIN 250 MG
2 CAPSULE ORAL 2 TIMES DAILY
Status: DISCONTINUED | OUTPATIENT
Start: 2018-08-01 | End: 2018-08-02 | Stop reason: HOSPADM

## 2018-08-01 RX ORDER — ONDANSETRON 4 MG/1
4 TABLET, ORALLY DISINTEGRATING ORAL EVERY 8 HOURS PRN
Status: DISCONTINUED | OUTPATIENT
Start: 2018-08-01 | End: 2018-08-02 | Stop reason: HOSPADM

## 2018-08-01 RX ORDER — IBUPROFEN 600 MG/1
600 TABLET, FILM COATED ORAL EVERY 6 HOURS
Status: DISCONTINUED | OUTPATIENT
Start: 2018-08-01 | End: 2018-08-01

## 2018-08-01 RX ORDER — IBUPROFEN 600 MG/1
600 TABLET, FILM COATED ORAL EVERY 6 HOURS
Qty: 40 TABLET | Refills: 0 | Status: CANCELLED | OUTPATIENT
Start: 2018-08-01

## 2018-08-01 RX ORDER — ACETAMINOPHEN 325 MG/1
650 TABLET ORAL EVERY 6 HOURS PRN
Status: DISCONTINUED | OUTPATIENT
Start: 2018-08-01 | End: 2018-08-02 | Stop reason: HOSPADM

## 2018-08-01 RX ADMIN — ACETAMINOPHEN 650 MG: 325 TABLET, FILM COATED ORAL at 19:48

## 2018-08-01 RX ADMIN — HYDROCHLOROTHIAZIDE 25 MG: 25 TABLET ORAL at 07:55

## 2018-08-01 RX ADMIN — IBUPROFEN 600 MG: 600 TABLET ORAL at 22:18

## 2018-08-01 RX ADMIN — ACETAMINOPHEN 650 MG: 325 TABLET, FILM COATED ORAL at 04:04

## 2018-08-01 RX ADMIN — IBUPROFEN 600 MG: 600 TABLET ORAL at 14:42

## 2018-08-01 RX ADMIN — CETIRIZINE HYDROCHLORIDE 5 MG: 5 TABLET ORAL at 19:48

## 2018-08-01 RX ADMIN — FLUTICASONE PROPIONATE 1 SPRAY: 50 SPRAY, METERED NASAL at 07:54

## 2018-08-01 RX ADMIN — IBUPROFEN 600 MG: 600 TABLET ORAL at 07:55

## 2018-08-01 RX ADMIN — ACETAMINOPHEN 650 MG: 325 TABLET, FILM COATED ORAL at 11:36

## 2018-08-01 RX ADMIN — ENOXAPARIN SODIUM 40 MG: 100 INJECTION SUBCUTANEOUS at 14:42

## 2018-08-01 RX ADMIN — IBUPROFEN 600 MG: 600 TABLET ORAL at 01:14

## 2018-08-01 RX ADMIN — ONDANSETRON 4 MG: 4 TABLET, ORALLY DISINTEGRATING ORAL at 09:31

## 2018-08-01 ASSESSMENT — PAIN DESCRIPTION - DESCRIPTORS
DESCRIPTORS: ACHING;SORE

## 2018-08-01 ASSESSMENT — ACTIVITIES OF DAILY LIVING (ADL)
ADLS_ACUITY_SCORE: 9

## 2018-08-01 NOTE — PLAN OF CARE
Problem: Patient Care Overview  Goal: Plan of Care/Patient Progress Review  Outcome: Improving  VSS. Pain managed with scheduled tylenol, ibuprofen and PRN oxycodone. Abdominal incision open to air with no drainage. Up with SBA ambulating to bathroom. Voiding adequate amounts. No BM or gas. Tolerating regular diet. PIVs x2 saline locked. Continue with POC.

## 2018-08-01 NOTE — PLAN OF CARE
Problem: Patient Care Overview  Goal: Plan of Care/Patient Progress Review  Outcome: Improving  Assumed cares at 1500:  Pt had small soft yellow stool, voiding but not saved. Tolerated dinner. No c/o's pain this shit. UAL in room. Friend at bedside, very supportive. Continue with POC. Possible DC to home tomorrow.

## 2018-08-01 NOTE — PROGRESS NOTES
Gynecologic Oncology Progress Note  8/1/2018    24 hr events:  No acute events    S: Doing well. Ambulating. Pain is well controlled.  Denies any flatus or BM.  Tolerating PO without nausea. Voiding spontaneously.    O:  Vitals:    07/31/18 1448 07/31/18 1829 07/31/18 2248 08/01/18 0710   BP: 107/52 114/44 134/61 132/75   BP Location: Right arm Right arm Right arm Right arm   Cuff Size:       Pulse:       Resp: 17 18 20 20   Temp:  97.8  F (36.6  C) 98.5  F (36.9  C) 98  F (36.7  C)   TempSrc:  Oral Axillary Axillary   SpO2: 98% 97% 96% 95%   Weight:       Height:         Gen: alert and oriented, resting in bed  Cardio: RRR  Resp: lungs CTAB anteriorly  Abdomen: soft, appropriately tender to palpation.   Incision: CDI - dermabond in place  Extremities: BLEs non-tender with SCDs in place     A/P: 58 year old POD#2 s/p XL, modified radical hyst, BSO, omentectomy, right pelvic and bilateral PALND, CUSA tumor debulking, mobilization of the entire colon, stripping of left pelvic peritoneum, procto, optimal tumor debulking to no gross residual disease. Doing well postoperatively.     Dz: Ovarian adenocarcinoma, final pathology pending.  FEN: Regular diet  Pain: Tylenol, ibuprofen, oxycodone, IV dilaudid  Heme: Asymptomatic from acute blood loss anemia with stable Hgb  CV:  Hx of HTN, home hydrochlorothiazide. Sinus arrhythmia on EKG, no further workup needed at this time.  Pulm: NI  GI: Bowel regimen  : NI  ID: NI  Endo: No issues  Psych/Neuro/MSK/Other: No issues  PPX: SCDs, lovenox  Dispo: When passing flatus, possibly today    Natalee Adame MD  OB/GYN PGY4    I, Damian Duque personally examined and evaluated this patient on 08/01/18.  I discussed the patient with the resident and care team, and agree with the assessment and plan of care as documented in the residents note above.    I personally reviewed vital signs, laboratory values and imaging results.    Pt post-op from debulking procedure.  Plan routine  post-operative cares    Jammie Duque MD  Gynecologic Oncology  [facility]

## 2018-08-01 NOTE — PLAN OF CARE
Problem: Patient Care Overview  Goal: Plan of Care/Patient Progress Review  Outcome: Improving  VSS. Had two loose bowel movements today. Had one episode of nausea that was resolved with Zofran. Had poor appetite this morning but tolerated regular diet for lunch. Pain controlled with tylenol and ibuprofen. Oxycodone available but not given this shift. Incision clean, dry, and intact. Ambulating in room and sahu with stand-by assist. Incentive spirometer encouraged. Education on Lovenox provided and friend demonstrated administering the injection.     Ruthie Moeller RN

## 2018-08-01 NOTE — PLAN OF CARE
Problem: Patient Care Overview  Goal: Plan of Care/Patient Progress Review  Outcome: No Change    Patient reports good pain control, denies nausea, tolerating Regular diet, PIV x 2 saline locked. Pt is voiding and ambulating without difficulty, reports negative flatus, no BM, using IS, PCDs in place, call light in reach.

## 2018-08-02 VITALS
HEART RATE: 99 BPM | DIASTOLIC BLOOD PRESSURE: 56 MMHG | WEIGHT: 156.3 LBS | TEMPERATURE: 97.7 F | BODY MASS INDEX: 26.04 KG/M2 | OXYGEN SATURATION: 98 % | RESPIRATION RATE: 18 BRPM | SYSTOLIC BLOOD PRESSURE: 123 MMHG | HEIGHT: 65 IN

## 2018-08-02 LAB — COPATH REPORT: NORMAL

## 2018-08-02 PROCEDURE — 25000132 ZZH RX MED GY IP 250 OP 250 PS 637: Performed by: STUDENT IN AN ORGANIZED HEALTH CARE EDUCATION/TRAINING PROGRAM

## 2018-08-02 PROCEDURE — 25000132 ZZH RX MED GY IP 250 OP 250 PS 637: Performed by: OBSTETRICS & GYNECOLOGY

## 2018-08-02 PROCEDURE — 25000128 H RX IP 250 OP 636: Performed by: NURSE PRACTITIONER

## 2018-08-02 RX ORDER — IBUPROFEN 600 MG/1
600 TABLET, FILM COATED ORAL EVERY 6 HOURS PRN
Qty: 60 TABLET | Refills: 0 | Status: SHIPPED | OUTPATIENT
Start: 2018-08-02 | End: 2018-01-01

## 2018-08-02 RX ORDER — OXYCODONE HYDROCHLORIDE 5 MG/1
5-10 TABLET ORAL EVERY 6 HOURS PRN
Qty: 25 TABLET | Refills: 0 | Status: SHIPPED | OUTPATIENT
Start: 2018-08-02 | End: 2018-01-01

## 2018-08-02 RX ORDER — AMOXICILLIN 250 MG
2 CAPSULE ORAL 2 TIMES DAILY
Qty: 100 TABLET | Refills: 0 | Status: SHIPPED | OUTPATIENT
Start: 2018-08-02 | End: 2018-01-01

## 2018-08-02 RX ORDER — ACETAMINOPHEN 325 MG/1
650 TABLET ORAL EVERY 6 HOURS PRN
Qty: 100 TABLET | Refills: 0 | Status: SHIPPED | OUTPATIENT
Start: 2018-08-02 | End: 2018-01-01

## 2018-08-02 RX ADMIN — ENOXAPARIN SODIUM 40 MG: 100 INJECTION SUBCUTANEOUS at 13:52

## 2018-08-02 RX ADMIN — ACETAMINOPHEN 650 MG: 325 TABLET, FILM COATED ORAL at 06:27

## 2018-08-02 RX ADMIN — HYDROCHLOROTHIAZIDE 25 MG: 25 TABLET ORAL at 07:59

## 2018-08-02 RX ADMIN — IBUPROFEN 600 MG: 600 TABLET ORAL at 14:18

## 2018-08-02 RX ADMIN — FLUTICASONE PROPIONATE 1 SPRAY: 50 SPRAY, METERED NASAL at 08:02

## 2018-08-02 ASSESSMENT — PAIN DESCRIPTION - DESCRIPTORS: DESCRIPTORS: ACHING

## 2018-08-02 ASSESSMENT — ACTIVITIES OF DAILY LIVING (ADL)
ADLS_ACUITY_SCORE: 9

## 2018-08-02 NOTE — PLAN OF CARE
Problem: Patient Care Overview  Goal: Plan of Care/Patient Progress Review  Outcome: Therapy, progress toward functional goals as expected  AVSS on room air. Abdominal pain managed with Tylenol and Ibuprofen. On a regular diet, denied nausea but not much of an appetite. Midline incision with liquid bandage - two small areas with scant serosanguinous drainage noted, dressings were changed. Voiding spontaneously. Passing gas, having soft/loose bowel movements. Showered. Abdominal binder on for comfort. Friend at the bedside. Continue with plan of care.

## 2018-08-02 NOTE — PROGRESS NOTES
Gynecologic Oncology Progress Note  8/2/2018    24 hr events:  No acute events    S: Doing well. Ambulating. Pain is well controlled. Having flatus and BM.  Tolerating PO without nausea. Voiding spontaneously.    O:  Vitals:    08/01/18 1100 08/01/18 1413 08/01/18 2211 08/02/18 0627   BP:  120/64 145/75 176/73   BP Location:  Right arm Left arm    Cuff Size:       Pulse:   90 93   Resp:  20 20 18   Temp:  98.1  F (36.7  C) 98.3  F (36.8  C) 97.7  F (36.5  C)   TempSrc:  Oral     SpO2:  95% 96% 98%   Weight: 70.9 kg (156 lb 4.8 oz)      Height:         Gen: alert and oriented, resting in bed  Cardio: RRR  Resp: lungs CTAB anteriorly  Abdomen: soft, appropriately tender to palpation.   Incision: CDI - dermabond in place  Extremities: BLEs non-tender with SCDs in place     A/P: 58 year old POD#3 s/p XL, modified radical hyst, BSO, omentectomy, right pelvic and bilateral PALND, CUSA tumor debulking, mobilization of the entire colon, stripping of left pelvic peritoneum, procto, optimal tumor debulking to no gross residual disease. Doing well postoperatively.     Dz: Ovarian adenocarcinoma, final pathology pending.  FEN: Regular diet  Pain: Tylenol, ibuprofen, oxycodone, IV dilaudid  Heme: Asymptomatic from acute blood loss anemia with stable Hgb  CV:  Hx of HTN, home hydrochlorothiazide. Sinus arrhythmia on EKG, no further workup needed at this time.  Pulm: NI  GI: Bowel regimen  : NI  ID: NI  Endo: No issues  Psych/Neuro/MSK/Other: No issues  PPX: SCDs, lovenox  Dispo: Home today    Betty Montes MD PGY3  08/02/18 6:56 AM

## 2018-08-02 NOTE — PLAN OF CARE
Problem: Surgery Nonspecified (Adult)  Goal: Signs and Symptoms of Listed Potential Problems Will be Absent, Minimized or Managed (Surgery Nonspecified)  Signs and symptoms of listed potential problems will be absent, minimized or managed by discharge/transition of care (reference Surgery Nonspecified (Adult) CPG).   Outcome: Improving  POD3. VSS. Pt reports abdominal pain, declined intervention. Incision with dermabond SUZETTE. Tolerating reg diet, BS active x4, pt reports passing gas. Pt up independently, voiding fine per pt report. Possible D/C home today with support from friend.

## 2018-08-02 NOTE — PLAN OF CARE
Problem: Patient Care Overview  Goal: Individualization & Mutuality  7C-- home left via w/c ~ 2: 15 pm  with her friend. Lovenox subcutaneous dose given by that friend with good technic and expressed confidence to follow thru with this at home. I have given her written and verbal discharge instructions and answered their questions. She has already stopped at 3rd floor pharm and have the home medications (RX's) already. She has an abd binder on.

## 2018-08-03 ENCOUNTER — TELEPHONE (OUTPATIENT)
Dept: FAMILY MEDICINE | Facility: CLINIC | Age: 58
End: 2018-08-03

## 2018-08-03 NOTE — TELEPHONE ENCOUNTER
Patient discharged from [facility] IP  ( Inpatient or ER).    Discharge location: [facility]  Discharge date: 8/2/18  Diagnosis: S/P Hysterectomy  Patient has been in the ER/IP 0/1 times.  Care Coord:  NA  Please follow up as appropriate. If no follow up required, please close encounter.

## 2018-08-03 NOTE — TELEPHONE ENCOUNTER
No call place to the patient as she is scheduled as follows:  follow up with Dr. Duque on 8/16.    Millie Oswald RN, [facility]

## 2018-08-09 NOTE — TELEPHONE ENCOUNTER
Follow up call to pt post op, 7/30/18 with Dr Duque.  XLAP, Modified Radical Hysterectomy, BSO, tumor debulking.  Pt states she is doing well, occasional mild discomfort but not requiring narcotics.  Denies s/s infection on abdomen and fevers, or dysuria.  Normal BM and eating and drinking well per pt.  No complaints and pt instructed to call back with further questions.  Pt has post op scheduled with MD 8/16/18.    Noa Dubon, RN, BSN, OCN

## 2018-08-16 NOTE — MR AVS SNAPSHOT
After Visit Summary   8/16/2018    Di Evans    MRN: [id number]           Patient Information     Date Of Birth          1960        Visit Information        Provider Department      8/16/2018 8:45 AM Jammie Dueñas MD [facility]        Today's Diagnoses     Ovarian cancer, unspecified laterality (H)    -  1      Care Instructions    Port placement    Genetic counseling    Chemotherapy on 8/24    Next visit with ANDERSON Richards and chemotherapy on 9/14          Follow-ups after your visit        Additional Services     CANCER RISK MGMT/CANCER GENETIC COUNSELING REFERRAL       Your provider has referred you to the Cancer Risk Management Program - Cancer Genetic Counseling.    Reason for Referral: ovarian cancer    We have a sent a notice to a staff member of the Cancer Risk Management Program to give you a call to assist with scheduling your appointment.  You may also call  [phone] ([phone]) to initiate scheduling.    Please be aware that coverage of these services is subject to the terms and limitations of your health insurance plan.  Call member services at your health plan with any benefit or coverage questions.      Please bring the completed family history sheet to your appointment in addition to any available outside medical records documenting your cancer diagnosis.                  Your next 10 appointments already scheduled     Aug 24, 2018  7:30 AM CDT   Return Visit with NURSE ONLY CANCER CENTER   [facility] ([facility])    [address] [place] [id number] [phone]            Aug 24, 2018  8:00 AM CDT   Level 6 with BAY 1 [facility])    [address] [place] [address]   [phone]            Sep 13, 2018  9:15 AM CDT   Return Visit with NURSE ONLY CANCER CENTER   [facility] ([facility]  "[facility])    [address] [place] [address]   [phone]            Sep 13, 2018  9:45 AM CDT   Return Visit with SULMA Holmna CNP   [email or web address] ([facility] [[email or web address]    [address] [place] [address]   [phone]            Sep 14, 2018  8:30 AM CDT   [facility] ([email or web address]    [address] [place] [address]   [phone]            Nov 07, 2018  2:15 PM CST   New Visit with Jessica Walton GC   [email or web address] ([facility] [[email or web address]    [address] [place] [address]   [phone]            Nov 09, 2018 11:00 AM CST   MA SCREENING DIGITAL BILATERAL with [email or web address]   [facility]    [address] [facility] [id number] [phone]           Do not use any powder, lotion or deodorant under your arms or on your breast. If you do, we will ask you to remove it before your exam.  Wear comfortable, two-piece clothing.  If you have any allergies, tell your care team.  Bring any previous mammograms from other facilities or have them mailed to the breast center. Three-dimensional (3D) mammograms are available at [facility] locations in [facility], [facility], [facility], [place], [place], and [place]. [facility] locations include [place] and [facility] in [place]. Benefits of 3D mammograms include: - Improved rate of cancer detection - Decreases your chance of having to go back for more tests, which means fewer: - \"False-positive\" results (This means that there is an abnormal area but it isn't cancer.) - Invasive testing procedures, such as a biopsy or surgery - Can provide clearer images of the breast if you have dense breast tissue. 3D mammography is an optional exam that anyone can have with a 2D mammogram. It doesn't " replace or take the place of a 2D mammogram. 2D mammograms remain an effective screening test for all women.  Not all insurance companies cover the cost of a 3D mammogram. Check with your insurance.              Future tests that were ordered for you today     Open Future Orders        Priority Expected Expires Ordered    CANCER RISK MGMT/CANCER GENETIC COUNSELING REFERRAL Routine  8/15/2019 8/15/2018    IR Chest Port Placement > 5 Yrs of Age Routine  8/15/2019 8/15/2018            Who to contact     If you have questions or need follow up information about today's clinic visit or your schedule please contact [facility] directly at [phone].  Normal or non-critical lab and imaging results will be communicated to you by [employer] [facility], letter or phone within 4 business days after the clinic has received the results. If you do not hear from us within 7 days, please contact the clinic through [employer] or phone. If you have a critical or abnormal lab result, we will notify you by phone as soon as possible.  Submit refill requests through [employer] or call your pharmacy and they will forward the refill request to us. Please allow 3 business days for your refill to be completed.          Additional Information About Your Visit        [employer] Information     [employer] gives you secure access to your electronic health record. If you see a primary care provider, you can also send messages to your care team and make appointments. If you have questions, please call your primary care clinic.  If you do not have a primary care provider, please call [phone] and they will assist you.      [employer] is an electronic gateway that provides easy, online access to your medical records. With [employer], you can request a clinic appointment, read your test results, renew a prescription or communicate with your care team.     To access your existing account, please contact your [facility] or call  "[phone] for assistance.        Care EveryWhere ID     This is your Care EveryWhere ID. This could be used by other organizations to access your [facility] medical records  [id number]        Your Vitals Were     Pulse Temperature Respirations Height Pulse Oximetry BMI (Body Mass Index)    95 97.8  F (36.6  C) 16 1.651 m (5' 5\") 99% 24.13 kg/m2       Blood Pressure from Last 3 Encounters:   08/16/18 130/85   08/02/18 123/56   07/24/18 148/86    Weight from Last 3 Encounters:   08/16/18 65.8 kg (145 lb)   08/01/18 70.9 kg (156 lb 4.8 oz)   07/24/18 72.6 kg (160 lb)                 Today's Medication Changes          These changes are accurate as of 8/16/18 10:36 AM.  If you have any questions, ask your nurse or doctor.               Start taking these medicines.        Dose/Directions    lidocaine-prilocaine cream   Commonly known as:  EMLA   Used for:  Ovarian cancer, unspecified laterality (H)   Started by:  Jammie Dueñas MD        Apply topically as needed for moderate pain   Quantity:  30 g   Refills:  1            Where to get your medicines      These medications were sent to [facility] [address] [place] [address] [address] AT [facility] [address]  [address] [address] [address]     Phone:  [phone]     lidocaine-prilocaine cream                Primary Care Provider Office Phone # Fax #    Stgdvia Jeni Hernandez [phone]       [address]  [place] [facility] [address]        Equal Access to Services     [facility]: Hadii nakita dubose hadasho Somari, waaxda luqadaha, qaybta kaalmada adeegyada, mae العلي . So [facility] [phone].    ATENCIÓN: Si habla español, tiene a perez disposición servicios gratuitos de asistencia lingüística. Llame al [phone].    We comply with applicable federal civil rights laws and [place] laws. We do not discriminate on the basis of race, color, national origin, age, " disability, sex, sexual orientation, or gender identity.            Thank you!     Thank you for choosing [facility]  for your care. Our goal is always to provide you with excellent care. Hearing back from our patients is one way we can continue to improve our services. Please take a few minutes to complete the written survey that you may receive in the mail after your visit with us. Thank you!             Your Updated Medication List - Protect others around you: Learn how to safely use, store and throw away your medicines at www.disposemymeds.org.          This list is accurate as of 8/16/18 10:36 AM.  Always use your most recent med list.                   Brand Name Dispense Instructions for use Diagnosis    acetaminophen 325 MG tablet    TYLENOL    100 tablet    Take 2 tablets (650 mg) by mouth every 6 hours as needed for mild pain or fever    S/P hysterectomy       aspirin 81 MG tablet      Take 1 tablet by mouth daily. *        cetirizine 10 MG tablet    zyrTEC     Take 10 mg by mouth daily        enoxaparin 40 MG/0.4ML injection    LOVENOX    10.4 mL    Inject 0.4 mLs (40 mg) Subcutaneous every 24 hours for 26 days    S/P hysterectomy       hydrochlorothiazide 25 MG tablet    HYDRODIURIL    90 tablet    Take 1 tablet (25 mg) by mouth every morning    Hypertension goal BP (blood pressure) < 140/90       ibuprofen 600 MG tablet    ADVIL/MOTRIN    60 tablet    Take 1 tablet (600 mg) by mouth every 6 hours as needed for moderate pain    S/P hysterectomy       lidocaine-prilocaine cream    EMLA    30 g    Apply topically as needed for moderate pain    Ovarian cancer, unspecified laterality (H)       mometasone 50 MCG/ACT spray    NASONEX    1 Box    Spray 2 sprays into both nostrils daily    Chronic seasonal allergic rhinitis, unspecified trigger       oxyCODONE IR 5 MG tablet    ROXICODONE    25 tablet    Take 1-2 tablets (5-10 mg) by mouth every 6 hours as needed for other (pain control or  improvement in physical function.??Hold dose for analgesics side effects.)    S/P hysterectomy       pravastatin 20 MG tablet    PRAVACHOL    90 tablet    Take 1 tablet (20 mg) by mouth every evening    Hyperlipidemia LDL goal <130       senna-docusate 8.6-50 MG per tablet    SENOKOT-S;PERICOLACE    100 tablet    Take 2 tablets by mouth 2 times daily    S/P hysterectomy

## 2018-08-16 NOTE — LETTER
2018         RE: Di Evans  [address] [place] [address]        Dear Colleague,    Thank you for referring your patient, Di Evans, to the [facility]. Please see a copy of my visit note below.    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  She is accompanied today by her mother and roommate.  She is doing well post-operatively.  Eating and drinking normally.  Normal bowel and bladder function.  Minimal pain.  No concerns at this time    Cancer Course:    She reports she has been having abdominal symptoms for the past 8 months.  She has been feeling bloated and distended for several months.  She has also had decreased appetite and early satiety.  She does not note any major changes in her bowel or bladder function.  She has not had significant pain.  She finally presented to the ED and demanded a CT which was suspicious for ovarian cancer.    18:  CT A/P:  Large cystic/solid mass within the pelvis highly suspicious for ovarian malignancy. 2.  Omental thickening suspicious for metastatic disease. 3.  Large volume of ascites.  Malignant ascites cannot be excluded. 4.  Small left pleural effusion and left lower lobe atelectasis. 5.  Diverticulosis, small hiatal hernia, and gallbladder sludge.    18:  Exploratory laparotomy, modified radical hysterectomy, bilateral salpingo-oophorectomy, omentectomy, right pelvic and bilateral para-aortic lymph node dissection, CUSA tumor debulking, mobilization of the entire colon, stripping of left pelvic peritoneum, proctoscopy, optimal tumor debulking to no gross residual disease   Pathology:  Stage IIIB mixed clear cell (80%) and endometrioid (20%) adenocarcinoma, + pelvic  LN, + PA LN, + omentum      Review of Systems:  Systemic           no weight changes; no fever; no chills; no night sweats; no appetite changes  Skin           no rashes, or lesions  Eye           no irritation; no changes in vision  Janiec-Laryngeal           no dysphagia; no hoarseness   Pulmonary    no cough; no shortness of breath  Cardiovascular    no chest pain; no palpitations  Gastrointestinal    no diarrhea; no constipation; no abdominal pain; no changes in bowel  habits; no blood in stool  Genitourinary   no urinary frequency; no urinary urgency; no dysuria; no pain; no abnormal vaginal discharge; no abnormal vaginal bleeding  Breast    no breast discharge; no breast changes; no breast pain  Musculoskeletal    no myalgias; no arthralgias; no back pain  Psychiatric           no depressed mood; no anxiety    Hematologic            no tender lymph nodes; no noticeable swellings or lumps   Endocrine    no hot flashes; no heat/cold intolerance         Neurological   no tremor; no numbness and tingling; no headaches; no difficulty sleeping    Obstetrics and Gynecology History:  G0  Menopause age 44, no HRT      Past Medical History:  Past Medical History:   Diagnosis Date     Hypertension      Ovarian cancer (H)        Past Surgical History:  Past Surgical History:   Procedure Laterality Date     HYSTERECTOMY TOTAL ABDOMINAL, BILATERAL SALPINGO-OOPHORECTOMY, COMBINED Bilateral 7/30/2018    Procedure: COMBINED HYSTERECTOMY TOTAL ABDOMINAL, SALPINGO-OOPHORECTOMY;;  Surgeon: Jammie Dueñas MD;  Location:  OR     LAPAROTOMY, TUMOR DEBULKING, COMBINED Bilateral 7/30/2018    Procedure: COMBINED LAPAROTOMY, TUMOR DEBULKING;  Exploratory Laparotomy, Modified Radical Hysterectomy, Removal of Cervix, Bilateral Salpingo - Oophorectomy, Omentectomy, Bilateral Para Aortic and Left Pelvic Lymph Node Dissection, Tumor Debulking, Proctoscopy;  Surgeon: Jammie Dueñas MD;  Location: [facility]     SURGICAL  HISTORY OF -   1980    left ankle surgery       Health Maintenance:  Last Pap Smear: No need for further pap smear exams  She has not had a history of abnormal Pap smears.    Last Mammogram: 11/15/16              Result: normal      She has not had a history of abnormal mammograms.    Last Colonoscopy: Never      Current Medications:   has a current medication list which includes the following prescription(s): cetirizine, enoxaparin, hydrochlorothiazide, lidocaine-prilocaine, mometasone, and pravastatin.     Allergies:   Gemfibrozil; Lovastatin; and Penicillins-unknown reaction as a child      Social History:  Social History     Social History     Marital status: Single     Spouse name: N/A     Number of children: 0     Years of education: N/A     Occupational History     manufacturing circuit [employer]     Social History Main Topics     Smoking status: Never Smoker     Smokeless tobacco: Never Used     Alcohol use Yes      Comment: occasional     Drug use: No     Sexual activity: No     Other Topics Concern     Parent/Sibling W/ Cabg, Mi Or Angioplasty Before 65f 55m? No      Service No     Blood Transfusions No     Caffeine Concern Yes     Occupational Exposure No     Hobby Hazards No     Sleep Concern No     Stress Concern No     Weight Concern Yes     Special Diet No     Back Care No     Exercise Yes     Bike Helmet No     Yes in the future     Seat Belt Yes     Self-Exams Yes     Social History Narrative       Lives with a roommate, feels safe at home.  Works as a .  Enjoys gambling.  Does not have an advanced directive on file and would like her roommateAlivia to be her POA.  Would like full resuscitation if reversible cause is identified, however would not like to be kept on life sustaining measures long-term.     Family History:   The patient's family history is significant for.  Family History   Problem Relation Age of Onset     Hypertension Mother       "Hypertension Father      Cerebrovascular Disease Father      polycythemia     Breast Cancer Maternal Aunt      older age         Physical Exam:   /85  Pulse 95  Temp 97.8  F (36.6  C)  Resp 16  Ht 1.651 m (5' 5\")  Wt 65.8 kg (145 lb)  SpO2 99%  BMI 24.13 kg/m2  Body mass index is 24.13 kg/(m^2).    General Appearance: healthy and alert, no distress     Gastrointestinal:       Abdomen is soft and minimally tender to palpation.  Incision is healing well without erythema/induration or drainage    Genitourinary: External genitalia and urethral meatus appears normal.  Vagina is smooth with a well healing vaginal cuff      Assessment:    Di Evans is a 58 year old woman with a new diagnosis of pelvic mass, carcinomatosis concerning for ovarian cancer.     A total of 30 minutes was spent with the patient, >50% of which were spent in counseling the patient and/or treatment planning, which was separate from the 5 minutes spent on post-operative cares.      Plan:     1.)    Stage IIIB mixed clear cell and endometrioid ovarian cancer.  We reviewed her pathology and she was provided with a copy of her results.  We discussed options for post-operative chemotherapy including IP, dose dense and q3 week chemotherapy.  Given the clear cell histology, we have decided to pursue q3 week carbo/taxol.  She will have a port placed and will begin therapy on 8/24.  She will return on 9/14 for cycle #2.  She will complete her 28 day course of post-operative lovenox therapy for DVT ppx     2.) Genetic risk factors were assessed and the patient will schedule a visit      3.) Labs and/or tests ordered include:  Port     4.) Health maintenance issues addressed today include pt is due for a mammogram and colonoscopy which will be addressed after treatment of her ovarian cancer.    5.) Chemotherapy teaching was completed today.        Thank you for allowing us to participate in the care of your patient.   "       Sincerely,    Jammie Duque MD  Gynecologic Oncology  [facility]       CC  CENTER, [facility]

## 2018-08-16 NOTE — PROGRESS NOTES
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  She is accompanied today by her mother and roommate.  She is doing well post-operatively.  Eating and drinking normally.  Normal bowel and bladder function.  Minimal pain.  No concerns at this time    Cancer Course:    She reports she has been having abdominal symptoms for the past 8 months.  She has been feeling bloated and distended for several months.  She has also had decreased appetite and early satiety.  She does not note any major changes in her bowel or bladder function.  She has not had significant pain.  She finally presented to the ED and demanded a CT which was suspicious for ovarian cancer.    18:  CT A/P:  Large cystic/solid mass within the pelvis highly suspicious for ovarian malignancy. 2.  Omental thickening suspicious for metastatic disease. 3.  Large volume of ascites.  Malignant ascites cannot be excluded. 4.  Small left pleural effusion and left lower lobe atelectasis. 5.  Diverticulosis, small hiatal hernia, and gallbladder sludge.    18:  Exploratory laparotomy, modified radical hysterectomy, bilateral salpingo-oophorectomy, omentectomy, right pelvic and bilateral para-aortic lymph node dissection, CUSA tumor debulking, mobilization of the entire colon, stripping of left pelvic peritoneum, proctoscopy, optimal tumor debulking to no gross residual disease   Pathology:  Stage IIIB mixed clear cell (80%) and endometrioid (20%) adenocarcinoma, + pelvic LN, + PA LN, + omentum      Review of Systems:  Systemic           no weight changes; no fever; no chills; no night sweats; no appetite changes  Skin           no rashes, or lesions  Eye           no irritation; no changes in vision  Janice-Laryngeal            no dysphagia; no hoarseness   Pulmonary    no cough; no shortness of breath  Cardiovascular    no chest pain; no palpitations  Gastrointestinal    no diarrhea; no constipation; no abdominal pain; no changes in bowel  habits; no blood in stool  Genitourinary   no urinary frequency; no urinary urgency; no dysuria; no pain; no abnormal vaginal discharge; no abnormal vaginal bleeding  Breast    no breast discharge; no breast changes; no breast pain  Musculoskeletal    no myalgias; no arthralgias; no back pain  Psychiatric           no depressed mood; no anxiety    Hematologic            no tender lymph nodes; no noticeable swellings or lumps   Endocrine    no hot flashes; no heat/cold intolerance         Neurological   no tremor; no numbness and tingling; no headaches; no difficulty sleeping    Obstetrics and Gynecology History:  G0  Menopause age 44, no HRT      Past Medical History:  Past Medical History:   Diagnosis Date     Hypertension      Ovarian cancer (H)        Past Surgical History:  Past Surgical History:   Procedure Laterality Date     HYSTERECTOMY TOTAL ABDOMINAL, BILATERAL SALPINGO-OOPHORECTOMY, COMBINED Bilateral 7/30/2018    Procedure: COMBINED HYSTERECTOMY TOTAL ABDOMINAL, SALPINGO-OOPHORECTOMY;;  Surgeon: Jammie Dueñas MD;  Location: [facility] OR     LAPAROTOMY, TUMOR DEBULKING, COMBINED Bilateral 7/30/2018    Procedure: COMBINED LAPAROTOMY, TUMOR DEBULKING;  Exploratory Laparotomy, Modified Radical Hysterectomy, Removal of Cervix, Bilateral Salpingo - Oophorectomy, Omentectomy, Bilateral Para Aortic and Left Pelvic Lymph Node Dissection, Tumor Debulking, Proctoscopy;  Surgeon: Jammie Dueñas MD;  Location:  OR     SURGICAL HISTORY OF -   1980    left ankle surgery       Health Maintenance:  Last Pap Smear: No need for further pap smear exams  She has not had a history of abnormal Pap smears.    Last Mammogram: 11/15/16              Result: normal      She has not had a history  "of abnormal mammograms.    Last Colonoscopy: Never      Current Medications:   has a current medication list which includes the following prescription(s): cetirizine, enoxaparin, hydrochlorothiazide, lidocaine-prilocaine, mometasone, and pravastatin.     Allergies:   Gemfibrozil; Lovastatin; and Penicillins-unknown reaction as a child      Social History:  Social History     Social History     Marital status: Single     Spouse name: N/A     Number of children: 0     Years of education: N/A     Occupational History     manufacturing [employer]     Social History Main Topics     Smoking status: Never Smoker     Smokeless tobacco: Never Used     Alcohol use Yes      Comment: occasional     Drug use: No     Sexual activity: No     Other Topics Concern     Parent/Sibling W/ Cabg, Mi Or Angioplasty Before 65f 55m? No      Service No     Blood Transfusions No     Caffeine Concern Yes     Occupational Exposure No     Hobby Hazards No     Sleep Concern No     Stress Concern No     Weight Concern Yes     Special Diet No     Back Care No     Exercise Yes     Bike Helmet No     Yes in the future     Seat Belt Yes     Self-Exams Yes     Social History Narrative       Lives with a roommate, feels safe at home.  Works as a .  Enjoys gambling.  Does not have an advanced directive on file and would like her roommateAlivia to be her POA.  Would like full resuscitation if reversible cause is identified, however would not like to be kept on life sustaining measures long-term.     Family History:   The patient's family history is significant for.  Family History   Problem Relation Age of Onset     Hypertension Mother      Hypertension Father      Cerebrovascular Disease Father      polycythemia     Breast Cancer Maternal Aunt      older age         Physical Exam:   /85  Pulse 95  Temp 97.8  F (36.6  C)  Resp 16  Ht 1.651 m (5' 5\")  Wt 65.8 kg (145 lb)  SpO2 99%  BMI 24.13 " kg/m2  Body mass index is 24.13 kg/(m^2).    General Appearance: healthy and alert, no distress     Gastrointestinal:       Abdomen is soft and minimally tender to palpation.  Incision is healing well without erythema/induration or drainage    Genitourinary: External genitalia and urethral meatus appears normal.  Vagina is smooth with a well healing vaginal cuff      Assessment:    Di Evans is a 58 year old woman with a new diagnosis of pelvic mass, carcinomatosis concerning for ovarian cancer.     A total of 30 minutes was spent with the patient, >50% of which were spent in counseling the patient and/or treatment planning, which was separate from the 5 minutes spent on post-operative cares.      Plan:     1.)    Stage IIIB mixed clear cell and endometrioid ovarian cancer.  We reviewed her pathology and she was provided with a copy of her results.  We discussed options for post-operative chemotherapy including IP, dose dense and q3 week chemotherapy.  Given the clear cell histology, we have decided to pursue q3 week carbo/taxol.  She will have a port placed and will begin therapy on 8/24.  She will return on 9/14 for cycle #2.  She will complete her 28 day course of post-operative lovenox therapy for DVT ppx     2.) Genetic risk factors were assessed and the patient will schedule a visit      3.) Labs and/or tests ordered include:  Port     4.) Health maintenance issues addressed today include pt is due for a mammogram and colonoscopy which will be addressed after treatment of her ovarian cancer.    5.) Chemotherapy teaching was completed today.        Thank you for allowing us to participate in the care of your patient.         Sincerely,    Jammie Duque MD  Gynecologic Oncology  [facility]       [facility], [facility]

## 2018-08-16 NOTE — PATIENT INSTRUCTIONS
Port placement    Genetic counseling    Chemotherapy on 8/24    Next visit with ANDERSON Richards and chemotherapy on 9/14

## 2018-08-16 NOTE — NURSING NOTE
"Chemotherapy Education Notes    RN met with patient,friend and mother in clinic for chemotherapy education on Carboplatin and Paclitaxol. [facility] handouts dated 8/16/18 were discussed and given to patient to take home.  Reviewed the following with the patient and their support person:     Treatment Goal/Regimen/Duration: rationale for strict adherence, specific medication names and medication delivery methods; possible chemotherapy side effects and management of side effects, including: skin changes/nail changes, anemia, neutropenia, thrombocytopenia, diarrhea/constipation, nausea/vomiting, hair loss, memory changes, mouth sores, taste changes, appetite changes, peripheral neuropathy, fatigue, and myelosuppression.  Infection prevention, and monitoring of lab values, what lab tests and what changes of these values meant, along with the possibility of IV hydration or blood product transfusion, or the need to defer or hold treatment.  Also reviewed signs/symptoms that should be reported to care team or on-call provider immediately, including: temperature of 100.4 degrees or higher, shortness of breath, chest pain, unusual bruising, bleeding symptoms, extreme fatigue, >4-6 episodes of diarrhea in 24 hours, uncontrolled nausea/vomiting, symptoms of dehydration (severe dry mouth/severe thirst, rapid heart beat, dizziness, dark or decreased urination, and lethargy), signs of an allergic reaction, or symptoms of DVT (sudden onset redness, swelling, tenderness, and warmth of extremity).      General Chemotherapy Information, including: ways it is excreted from the body and cleaning and containment of vomitus or other bodily fluid, use of the bathroom, sexual health and intimacy, what to do if needing to miss a treatment, and when to call the provider.  Importance of Central line care (port-a-cath) or IV site care.  Reviewed Port book, lucía Schroeder handout \"Vascular Access Port Implantation,\" discussed port placement " "procedure and rationale for port placement.           Written Information: Patient was provided with the [facility] \"My Cancer Guidebook\" binder, which includes information on the following: \"Getting Ready for Chemotherapy: What to Expect, Before, During, and After your Treatment,\" printouts from [facility] on specific chemotherapy drugs, \"Eating Hints: Before, During, and After Cancer Treatment,\" printouts on possible chemotherapy side effects/ways to cope with side effects, \"When to Call the Doctor,\" and \"Self-Care Tips During Cancer Treatment.\"  Also, information regarding various programs offered at [facility], and our business card with contact information given for the following: Oncology Clinic/scheduling, RN Care Coordinator, and the after-hours Nurse Advice Line.    No barriers to learning identified. Patient, friend and mother verbalized understanding of all written and verbal information. All questions answered patient s satisfaction.  Learning barriers and method preference are documented in the patient education flowsheet.     General Orientation to the Medical Oncology department, Infusion Services department, scheduling, bathrooms, and usual flow of the treatment day provided as well as introduction to the Infusion nurses.     Patient instructed to call with further questions or concerns.  Patient states understanding and is in agreement with this plan.  Copy of future appointments, and after visit summary (AVS) given to patient. Patient discharged 1050.     Face to Face Time with Patient: 30 minutes    Radha Wyman  RN, BSN, OCN    [facility]      "

## 2018-08-16 NOTE — NURSING NOTE
"Oncology Rooming Note    August 16, 2018 9:01 AM   Di Evans is a 58 year old female who presents for:    Chief Complaint   Patient presents with     Oncology Clinic Visit     post op -surgery 7/30     Initial Vitals: /85  Pulse 95  Temp 97.8  F (36.6  C)  Resp 16  Ht 1.651 m (5' 5\")  Wt 65.8 kg (145 lb)  SpO2 99%  BMI 24.13 kg/m2 Estimated body mass index is 24.13 kg/(m^2) as calculated from the following:    Height as of this encounter: 1.651 m (5' 5\").    Weight as of this encounter: 65.8 kg (145 lb). Body surface area is 1.74 meters squared.  No Pain (0) Comment: Data Unavailable   No LMP recorded. Patient is postmenopausal.  Allergies reviewed: Yes  Medications reviewed: Yes    Medications: Medication refills not needed today.  Pharmacy name entered into [facility]: [facility] [address] [place] [address] [address] AT [facility]  [address]         5 minutes for nursing intake (face to face time)     Maritza Samaniego LPN              "

## 2018-08-17 NOTE — PROGRESS NOTES
Form Request Documentation    Date Received in Clinic:  8/16/18  Mode Received:  Patient Drop Off  Name/Type of Form: STD Supplemental Report of Disability ([facility])  Date Completed: 8/17/18  Date patient notified: 8/17/18  Disposition of Form:  Completed form faxed to the [employer] at fax number [phone].  Form labeled for EMR scanning.  Copy of form mailed to patient's home address for her records.  Notes: Patient reports plan to return to work on 8/27/18 full-time.    Mihir King, RN, BSN, OCN  Oncology Care Coordinator  [facility]

## 2018-08-21 NOTE — PROGRESS NOTES
Received faxed port placement report from [facility]. Information added to flowsheet and report labeled for EMR scanning.  Radha Wyman  RN, BSN, OCN

## 2018-08-24 NOTE — MR AVS SNAPSHOT
After Visit Summary   8/24/2018    Di Evans    MRN: [id number]           Patient Information     Date Of Birth          1960        Visit Information        Provider Department      8/24/2018 8:00 AM BAY 1 INFUSION [facility]        Today's Diagnoses     Ovarian cancer, unspecified laterality (H)    -  1       Follow-ups after your visit        Your next 10 appointments already scheduled     Sep 13, 2018  9:15 AM CDT   Return Visit with [facility]   [facility] ([facility]    [address] [facility] [address]   [phone]            Sep 13, 2018  9:45 AM CDT   Return Visit with SULMA Holman CNP   [facility] ([facility])    [address] [facility] [address]   [phone]            Sep 14, 2018  8:30 AM CDT   Level 6 with BAY 10 INFUSION   [facility] ([facility])    [address] [facility] [address]   [phone]            Oct 05, 2018  8:45 AM CDT   Return Visit with [facility]   [facility] ([facility])    [address] [facility] [address]   [phone]            Oct 05, 2018  9:30 AM CDT   Return Visit with Jammie Salgado MD   [facility] ([facility])    [address] [facility] [address]   [phone]            Oct 05, 2018 10:00 AM CDT   Level 6 with BAY 6 [facility] ([facility])    [address] [facility] [address]   [phone]            Oct 26, 2018  7:30 AM CDT   Return Visit with [facility]   [facility] ([facility])    [address] [facility] [address]   [phone]            Oct 26, 2018  8:00 AM CDT   Return Visit with Jammie Duque  MD Damian   [facility] ([facility])    [address] [place] [id number] [phone]            Oct 26, 2018  8:30 AM CDT   Level 6 with BAY 3 INFUSION   [facility] ([facility])    [address] [place] [id number] [phone]            Nov 07, 2018  2:15 PM CST   New Visit with Jessica Walton GC   [facility] ([facility])    [address] [place] [address] [id number] [phone]              Who to contact     If you have questions or need follow up information about today's clinic visit or your schedule please contact [facility] directly at [phone].  Normal or non-critical lab and imaging results will be communicated to you by [employer], letter or phone within 4 business days after the clinic has received the results. If you do not hear from us within 7 days, please contact the clinic through [employer] or phone. If you have a critical or abnormal lab result, we will notify you by phone as soon as possible.  Submit refill requests through [employer] or call your pharmacy and they will forward the refill request to us. Please allow 3 business days for your refill to be completed.          Additional Information About Your Visit        MyChart Information     [employer] gives you secure access to your electronic health record. If you see a primary care provider, you can also send messages to your care team and make appointments. If you have questions, please call your primary care clinic.  If you do not have a primary care provider, please call [phone] and they will assist you.      [employer] is an electronic gateway that provides easy, online access to your medical records. With [employer], you can request a clinic appointment, read your test results, renew a prescription or communicate with your care team.     To access your existing account, please contact  your [facility] or call [phone] for assistance.        Care EveryWhere ID     This is your Care EveryWhere ID. This could be used by other organizations to access your [facility] medical records  [id number]        Your Vitals Were     Pulse Temperature Respirations Pulse Oximetry          80 98.3  F (36.8  C) (Oral) 16 96%         Blood Pressure from Last 3 Encounters:   08/24/18 139/89   08/16/18 130/85   08/02/18 123/56    Weight from Last 3 Encounters:   08/16/18 65.8 kg (145 lb)   08/01/18 70.9 kg (156 lb 4.8 oz)   07/24/18 72.6 kg (160 lb)              Today, you had the following     No orders found for display         Today's Medication Changes          These changes are accurate as of 8/24/18  3:28 PM.  If you have any questions, ask your nurse or doctor.               Start taking these medicines.        Dose/Directions    dexamethasone 4 MG tablet   Commonly known as:  DECADRON   Used for:  Adverse effect of paclitaxel, initial encounter   Started by:  Jammie Dueñas MD        Dose:  20 mg   Take 5 tablets (20 mg) by mouth See Admin Instructions   Quantity:  60 tablet   Refills:  0            Where to get your medicines      These medications were sent to [facility] - [place], [place] - [address]  [address], [facility] [address]     Phone:  [phone]     dexamethasone 4 MG tablet                Primary Care Provider Office Phone # Fax #    Sonja Vilchiselkin Hernandez [phone]       [address]  [facility] [address]        Equal Access to Services     VIANCA MILLARD : Hadii aad ku hadasho Soomaali, waaxda luqadaha, qaybta kaalmada adeegyada, mae schulz. So [facility] [phone].    ATENCIÓN: Si habla español, tiene a perez disposición servicios gratuitos de asistencia lingüística. Llame al [phone].    We comply with applicable [facility] civil  rights laws and [place] laws. We do not discriminate on the basis of race, color, national origin, age, disability, sex, sexual orientation, or gender identity.            Thank you!     Thank you for choosing [facility]  for your care. Our goal is always to provide you with excellent care. Hearing back from our patients is one way we can continue to improve our services. Please take a few minutes to complete the written survey that you may receive in the mail after your visit with us. Thank you!             Your Updated Medication List - Protect others around you: Learn how to safely use, store and throw away your medicines at www.disposemymeds.org.          This list is accurate as of 8/24/18  3:28 PM.  Always use your most recent med list.                   Brand Name Dispense Instructions for use Diagnosis    cetirizine 10 MG tablet    zyrTEC     Take 10 mg by mouth daily        dexamethasone 4 MG tablet    DECADRON    60 tablet    Take 5 tablets (20 mg) by mouth See Admin Instructions    Adverse effect of paclitaxel, initial encounter       enoxaparin 40 MG/0.4ML injection    LOVENOX    10.4 mL    Inject 0.4 mLs (40 mg) Subcutaneous every 24 hours for 26 days    S/P hysterectomy       hydrochlorothiazide 25 MG tablet    HYDRODIURIL    90 tablet    Take 1 tablet (25 mg) by mouth every morning    Hypertension goal BP (blood pressure) < 140/90       lidocaine-prilocaine cream    EMLA    30 g    Apply topically as needed for moderate pain    Ovarian cancer, unspecified laterality (H)       LORazepam 1 MG tablet    ATIVAN    30 tablet    Take 1 tablet (1 mg) by mouth every 6 hours as needed (nausea/vomiting, anxiety or sleep)    Ovarian cancer, unspecified laterality (H)       mometasone 50 MCG/ACT spray    NASONEX    1 Box    Spray 2 sprays into both nostrils daily    Chronic seasonal allergic rhinitis, unspecified trigger       pravastatin 20 MG tablet    PRAVACHOL    90 tablet    Take 1 tablet  (20 mg) by mouth every evening    Hyperlipidemia LDL goal <130       prochlorperazine 10 MG tablet    COMPAZINE    30 tablet    Take 1 tablet (10 mg) by mouth every 6 hours as needed (nausea/vomiting)    Ovarian cancer, unspecified laterality (H)

## 2018-08-24 NOTE — PROGRESS NOTES
Infusion Nursing Note:  Di Evans presents today for C1 D1 Taxol/Carbo.    Patient seen by provider today: No   present during visit today: Not Applicable.    Note: Approx 8 minutes into taxol pt reported a burning in her throat and then immediately became red from head down to her chest. Taxol stopped. 125 mg Steroids and 50 mg IV benadryl given.  Colored improved quickly and thorat sensation went away.  Spoke with Dr. Duque and OK to resume taxol. Dexamethasone take home prescription orders placed for next cycle. Pt will pick them up prior to next cycle.    Intravenous Access:  Implanted Port.    Treatment Conditions:  Lab Results   Component Value Date    HGB 11.0 08/24/2018     Lab Results   Component Value Date    WBC 5.1 08/24/2018      Lab Results   Component Value Date    ANEU 3.0 08/24/2018     Lab Results   Component Value Date     08/24/2018      Lab Results   Component Value Date     08/24/2018                   Lab Results   Component Value Date    POTASSIUM 3.5 08/24/2018           Lab Results   Component Value Date    MAG 2.1 08/24/2018            Lab Results   Component Value Date    CR 0.56 08/24/2018                   Lab Results   Component Value Date    TRACEY 9.5 08/24/2018                Lab Results   Component Value Date    BILITOTAL 0.2 08/24/2018           Lab Results   Component Value Date    ALBUMIN 3.4 08/24/2018                    Lab Results   Component Value Date    ALT 36 08/24/2018           Lab Results   Component Value Date    AST 27 08/24/2018       Results reviewed, labs MET treatment parameters, ok to proceed with treatment.      Post Infusion Assessment:  Patient tolerated infusion without incident.  Site patent and intact, free from redness, edema or discomfort.  No evidence of extravasations.  Access discontinued per protocol.    Discharge Plan:   Discharge instructions reviewed with: Patient and Family.  Patient and/or family verbalized  understanding of discharge instructions and all questions answered.  Patient discharged in stable condition accompanied by: mother and friend.  Departure Mode: Ambulatory.    Ethel Read RN

## 2018-08-24 NOTE — PROGRESS NOTES
SPIRITUAL HEALTH SERVICES  SPIRITUAL ASSESSMENT Progress Note  [facility]       REFERRAL SOURCE: Nursing referral as this is a new patient to the center today.     Visited with Courtney in the infusion area, she had her mom and a friend with her.  They shared how stressful this has been on everyone. I introduced  services that include spiritual support and integrative modalities.  Left them with written  introduction information.   Patient did not express a need at this time, but I let her know of my availability.     PLAN: Patient knows that she can request a  visit at any time.     Maria Elena Whitley  Staff   Pager [phone]

## 2018-08-24 NOTE — MR AVS SNAPSHOT
After Visit Summary   8/24/2018    Di Evans    MRN: [id number]           Patient Information     Date Of Birth          1960        Visit Information        Provider Department      8/24/2018 7:30 AM NURSE ONLY CANCER CENTER [facility]        Today's Diagnoses     Ovarian cancer, unspecified laterality (H)    -  1       Follow-ups after your visit        Your next 10 appointments already scheduled     Aug 24, 2018  8:00 AM CDT   Level 6 with BAY 1 INFUSION   [facility]    [address] [facility] [address]   [phone]            Sep 13, 2018  9:15 AM CDT   Return Visit with NURSE ONLY CANCER CENTER   [facility] ([facility]    [address] [facility] [address]   [phone]            Sep 13, 2018  9:45 AM CDT   Return Visit with SULMA Holman CNP   [facility] ([facility]    [address] [facility] [address]   [phone]            Sep 14, 2018  8:30 AM CDT   Level 6 with BAY 10 INFUSION   [facility]    [address] [facility] [address]   [phone]            Oct 05, 2018  8:45 AM CDT   Return Visit with NURSE ONLY CANCER CENTER   [facility] ([facility])    [address] [facility] [address]   [phone]            Oct 05, 2018  9:30 AM CDT   Return Visit with Jammie Salgado MD   [facility] ([facility])    [address] [facility] [address]   [phone]            Oct 05, 2018 10:00 AM CDT   Level 6 with BAY 6 INFUSION   [facility] ([facility])    [address] [facility] [address]   [phone]            Oct 26, 2018  7:30 AM CDT   Return Visit with NURSE ONLY CANCER CENTER    [facility]    [address] [place] [id number] [phone]            Oct 26, 2018  8:00 AM CDT   Return Visit with Jammie Salgado MD   [facility] ([facility])    [id number] [place] [id number] [phone]            Oct 26, 2018  8:30 AM CDT   Level 6 with BAY 3 INFUSION   [facility] ([facility])    [address] [place] [id number] [phone]              Who to contact     If you have questions or need follow up information about today's clinic visit or your schedule please contact [facility] directly at [phone].  Normal or non-critical lab and imaging results will be communicated to you by StrongSteamhart, letter or phone within 4 business days after the clinic has received the results. If you do not hear from us within 7 days, please contact the clinic through StrongSteamhart or phone. If you have a critical or abnormal lab result, we will notify you by phone as soon as possible.  Submit refill requests through Curbside or call your pharmacy and they will forward the refill request to us. Please allow 3 business days for your refill to be completed.          Additional Information About Your Visit        MyChart Information     Curbside gives you secure access to your electronic health record. If you see a primary care provider, you can also send messages to your care team and make appointments. If you have questions, please call your primary care clinic.  If you do not have a primary care provider, please call [phone] and they will assist you.      Curbside is an electronic gateway that provides easy, online access to your medical records. With Curbside, you can request a clinic appointment, read your test results, renew a prescription or communicate with your care team.     To access your existing account, please  contact your [facility] or call [phone] for assistance.        Care EveryWhere ID     This is your Care EveryWhere ID. This could be used by other organizations to access your [facility] medical records  [id number]         Blood Pressure from Last 3 Encounters:   08/16/18 130/85   08/02/18 123/56   07/24/18 148/86    Weight from Last 3 Encounters:   08/16/18 65.8 kg (145 lb)   08/01/18 70.9 kg (156 lb 4.8 oz)   07/24/18 72.6 kg (160 lb)              We Performed the Following          CBC with platelets differential     Comprehensive metabolic panel     Magnesium        Primary Care Provider Office Phone # Fax #    Sonja Jeni Hernandez [phone]       [address] [place]  [facility] [address]        Equal Access to Services     [facility]: Hadii aad ku hadasho Soomaali, waaxda luqadaha, qaybta kaalmada adeegyada, waxay idiin hayaan lynda kharajessica العلي . So [facility] [phone].    ATENCIÓN: Si habla español, tiene a perez disposición servicios gratuitos de asistencia lingüística. Llame al [phone].    We comply with applicable federal civil rights laws and [place] laws. We do not discriminate on the basis of race, color, national origin, age, disability, sex, sexual orientation, or gender identity.            Thank you!     Thank you for choosing [facility]  for your care. Our goal is always to provide you with excellent care. Hearing back from our patients is one way we can continue to improve our services. Please take a few minutes to complete the written survey that you may receive in the mail after your visit with us. Thank you!             Your Updated Medication List - Protect others around you: Learn how to safely use, store and throw away your medicines at www.disposemymeds.org.          This list is accurate as of 8/24/18  7:56 AM.  Always use your most recent med list.                   Brand Name Dispense  Instructions for use Diagnosis    cetirizine 10 MG tablet    zyrTEC     Take 10 mg by mouth daily        enoxaparin 40 MG/0.4ML injection    LOVENOX    10.4 mL    Inject 0.4 mLs (40 mg) Subcutaneous every 24 hours for 26 days    S/P hysterectomy       hydrochlorothiazide 25 MG tablet    HYDRODIURIL    90 tablet    Take 1 tablet (25 mg) by mouth every morning    Hypertension goal BP (blood pressure) < 140/90       lidocaine-prilocaine cream    EMLA    30 g    Apply topically as needed for moderate pain    Ovarian cancer, unspecified laterality (H)       LORazepam 1 MG tablet    ATIVAN    30 tablet    Take 1 tablet (1 mg) by mouth every 6 hours as needed (nausea/vomiting, anxiety or sleep)    Ovarian cancer, unspecified laterality (H)       mometasone 50 MCG/ACT spray    NASONEX    1 Box    Spray 2 sprays into both nostrils daily    Chronic seasonal allergic rhinitis, unspecified trigger       pravastatin 20 MG tablet    PRAVACHOL    90 tablet    Take 1 tablet (20 mg) by mouth every evening    Hyperlipidemia LDL goal <130       prochlorperazine 10 MG tablet    COMPAZINE    30 tablet    Take 1 tablet (10 mg) by mouth every 6 hours as needed (nausea/vomiting)    Ovarian cancer, unspecified laterality (H)

## 2018-08-29 NOTE — PROGRESS NOTES
Call placed to patient to follow up on how she has been doing since starting chemotherapy.  Patient started Cycle 1 Taxol/Carbo on 8/24.  Noted at the time of her infusion, patient had a reaction to the Taxol with throat burning and skin flushing.  Patient did not answer telephone call.  Voicemail message was left on home phone, asking her to return call when able.  Direct callback number for this RN[facility] was provided in message.      Mihir King, RN, BSN, OCN  Oncology Care Coordinator  [facility]

## 2018-09-06 NOTE — TELEPHONE ENCOUNTER
Patient calling to report mild nose bleed yesterday. Started with a little noted in tissue when she blew her nose. Then in the shower she had small amount drip from her nose and felt blood drain in her throat. Resolved quickly with no intervention. States she feels her nose is very dry. Denies any other bleeding-no petechiae or bleeding gums. Discussed using nasal saline spray. Advised if she has a nose bleed that will not stop after applying pressure and ice for 15-20 min, she should go to ED. Patient has appointment next week with a provider. Encouraged to call with any further questions or concerns.    Radha Wyman  RN, BSN, OCN

## 2018-09-13 NOTE — PROGRESS NOTES
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 Imaging proved Pelvic mass with omental thickening and ascites. Surgical pathology proved stage IIIB mixed clear cell (80%) and endometrioid (20%) adenocarcinoma, + pelvic LN, + PA LN, + omentum.   Treatment:   7/30/2018 Exploratory laparotomy, modified radical hysterectomy, bilateral salpingo-oophorectomy, omentectomy, right pelvic and bilateral para-aortic lymph node dissection, CUSA tumor debulking, mobilization of the entire colon, stripping of left pelvic peritoneum, proctoscopy, optimal tumor debulking to no gross residual disease  8/24/2018 began carboplatin( AUC 6) and Taxol 175mg/m2)    Interval History: Ms. Evans comes to clinic alone for toxicity review prior to cycle 2 of carboplatin/Taxol.  Patient sure she feels well with the first cycle down and noted very few side effects to her treatment but does include alopecia and some constipation and noting the need for nausea coverage on days 2-3.  Patient states she has had plenty of energy and has had an increased appetite and actually has gained weight but was using Ativan and Compazine for those couple days after treatment.  She has had no pain mouth sores shortness of breath fever chills or weakness.  Did see her blood pressure elevated today but shares her home blood pressures have been less than 140/90 on a regular basis and feels she is just a little more anxious about treatment.  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  Rest of comprehensive and complete ROS is reviewed and is negative.   Past Medical History:   Diagnosis Date     Hypertension      Ovarian cancer (H)       Current Outpatient Prescriptions   Medication     cetirizine (ZYRTEC) 10 MG tablet     dexamethasone (DECADRON) 4 MG tablet     hydrochlorothiazide (HYDRODIURIL) 25 MG tablet     lidocaine-prilocaine (EMLA) cream     LORazepam (ATIVAN) 1 MG tablet     mometasone (NASONEX) 50 MCG/ACT spray     pravastatin (PRAVACHOL) 20 MG tablet     prochlorperazine (COMPAZINE) 10 MG tablet     No current facility-administered medications for this visit.      Allergies   Allergen Reactions     Gemfibrozil Muscle Pain (Myalgia)     Lovastatin      headaches     Penicillins        Physical Exam:BP (!) 156/91  Pulse 90  Temp 98.2  F (36.8  C) (Oral)  Wt 69.6 kg (153 lb 8 oz)  SpO2 98%  BMI 25.54 kg/m2   ECOG PS- 0  Constitutional: Alert, healthy, and in no distress.   ENT: Eyes bright , No mouth sores  Neck: Supple, No adenopathy.Thyroid symmetric, normal size  Cardiac: Heart rate and rhythm is regular and strong without murmur  Respiratory: Breathing easy. Lung sounds clear to auscultation  GI: Abdomen is soft, non-tender-good healing noted to central abdominal incision site with no drainage or redness or other signs of infection, BS normal. No masses or organomegaly  MS: Muscle tone normal, extremities normal with no edema.   Skin: No suspicious lesions or rashes  Neuro: Sensory grossly WNL, gait normal.   Lymph: Normal ant/post cervical, axillary, supraclavicular nodes  Psych: Mentation appears normal and affect normal/bright with good conversation    Laboratory Results:   Results for orders placed or performed in visit on 09/13/18   CBC with platelets differential   Result Value Ref Range    WBC 3.9 (L) 4.0 - 11.0 10e9/L    RBC Count 4.12 3.8 - 5.2 10e12/L    Hemoglobin 11.6 (L) 11.7 - 15.7 g/dL    Hematocrit 35.8 35.0 - 47.0 %    MCV 87 78 - 100 fl    MCH 28.2 26.5 - 33.0 pg    MCHC 32.4 31.5 - 36.5 g/dL    RDW 16.9 (H) 10.0 - 15.0 %    Platelet Count 151 150 - 450 10e9/L    % Neutrophils 44.0 %    % Lymphocytes 37.1 %     % Monocytes 15.0 %    % Eosinophils 2.3 %    % Basophils 1.3 %    % Immature Granulocytes 0.3 %    Absolute Neutrophil 1.7 1.6 - 8.3 10e9/L    Absolute Lymphocytes 1.5 0.8 - 5.3 10e9/L    Absolute Monocytes 0.6 0.0 - 1.3 10e9/L    Absolute Eosinophils 0.1 0.0 - 0.7 10e9/L    Absolute Basophils 0.1 0.0 - 0.2 10e9/L    Abs Immature Granulocytes 0.0 0 - 0.4 10e9/L    Diff Method Automated Method    Comprehensive metabolic panel   Result Value Ref Range    Sodium 140 133 - 144 mmol/L    Potassium 3.6 3.4 - 5.3 mmol/L    Chloride 104 94 - 109 mmol/L    Carbon Dioxide 30 20 - 32 mmol/L    Anion Gap 6 3 - 14 mmol/L    Glucose 101 (H) 70 - 99 mg/dL    Urea Nitrogen 11 7 - 30 mg/dL    Creatinine 0.64 0.52 - 1.04 mg/dL    GFR Estimate >90 >60 mL/min/1.7m2    GFR Estimate If Black >90 >60 mL/min/1.7m2    Calcium 9.2 8.5 - 10.1 mg/dL    Bilirubin Total 0.2 0.2 - 1.3 mg/dL    Albumin 3.3 (L) 3.4 - 5.0 g/dL    Protein Total 6.8 6.8 - 8.8 g/dL    Alkaline Phosphatase 72 40 - 150 U/L    ALT 30 0 - 50 U/L    AST 22 0 - 45 U/L   Magnesium   Result Value Ref Range    Magnesium 2.0 1.6 - 2.3 mg/dL   - awaiting cancer marker results.     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  Today she shares she did have some side effects of constipation treated with stool softeners and early nausea in cycle which was controlled with antiemetics.  Review exam and labs show she is eligible to continue with cycle 2 as planned without additional changes to plan.  Patient will return in 3 weeks for cycle 3 and will be seeing Dr. Duque prior to infusion for review with treatment labs.  Elevated blood pressure with known hypertension-blood pressure reading at 156/91 today the patient is anxious about treatment and this is the first time seeing this provider.  Her home blood pressures remain within normal goal of  140/90.  We will have this rechecked prior to  infusion but did warn her that we need to continue to follow And keep her in the normal range. She continues with use of hydrochlorothiazide 25 mg daily and we are recommending regular exercise and low-salt diet.  Nutrition-Steroids have stimulated diet and we have seen mild weight gain. Encouraged increasd protein in diet and fluid intake of 6-8 glasses of fluids daily.   This was a 25 min visit with > 50% in counseling and coordinating care including education and management of concerns.    Susie Martinez,CNP

## 2018-09-13 NOTE — PROGRESS NOTES
Port accessed using 20 G 3/4 inch needle.  Labs collected from port.  Line flushed with NS and Heparin.  Pt tolerated procedure.  Port de-accessed per protocol.    Alethea Jeter RN-BSN, PHN, OCN

## 2018-09-13 NOTE — MR AVS SNAPSHOT
After Visit Summary   9/13/2018    Di [place]    MRN: [id number]           Patient Information     Date Of Birth          1960        Visit Information        Provider Department      9/13/2018 9:45 AM Susie [place] APRN CNP [facility]        Today's Diagnoses     Ovarian cancer, unspecified laterality (H)    -  1    Constipation, unspecified constipation type        Nausea        Hypertension goal BP (blood pressure) < 140/90           Follow-ups after your visit        Your next 10 appointments already scheduled     Oct 05, 2018  8:45 AM CDT   Return Visit with NURSE ONLY CANCER CENTER   [facility] ([facility])    [address] [facility] [address]   [phone]            Oct 05, 2018  9:30 AM CDT   Return Visit with Jammie Salgado MD   [facility])    [address] [facility] [address]   [phone]            Oct 05, 2018 10:00 AM CDT   Level 6 with BAY 6 INFUSION   [facility])    [address] [place] [address]   [phone]            Oct 26, 2018  7:30 AM CDT   Return Visit with NURSE ONLY CANCER CENTER   [facility] ([facility])    [address] [facility] [address]   [phone]            Oct 26, 2018  8:00 AM CDT   Return Visit with Jammie Salgado MD   [facility])    [address] [facility] [address]   [phone]            Oct 26, 2018  8:30 AM CDT   Level 6 with BAY 3 INFUSION   [facility])    [address] [facility] [address]   [phone]            Nov 07, 2018  2:15 PM CST   New Visit with Jessica Walton GC   [facility])    [address] [place] [address]  "N  [facility] [address]   [phone]            Nov 09, 2018 11:00 AM CST   MA SCREENING DIGITAL BILATERAL with BKMA1   [facility]    [address] [facility] [id number] [phone]           Do not use any powder, lotion or deodorant under your arms or on your breast. If you do, we will ask you to remove it before your exam.  Wear comfortable, two-piece clothing.  If you have any allergies, tell your care team.  Bring any previous mammograms from other facilities or have them mailed to the breast center. Three-dimensional (3D) mammograms are available at [place] locations in [facility], [facility], and [place]. [facility] locations include [facility] in [place]. Benefits of 3D mammograms include: - Improved rate of cancer detection - Decreases your chance of having to go back for more tests, which means fewer: - \"False-positive\" results (This means that there is an abnormal area but it isn't cancer.) - Invasive testing procedures, such as a biopsy or surgery - Can provide clearer images of the breast if you have dense breast tissue. 3D mammography is an optional exam that anyone can have with a 2D mammogram. It doesn't replace or take the place of a 2D mammogram. 2D mammograms remain an effective screening test for all women.  Not all insurance companies cover the cost of a 3D mammogram. Check with your insurance.            Nov 16, 2018  8:45 AM CST   Return Visit with SULMA Holman [facility] ([facility])    [address] [place] [address] [phone]            Nov 16, 2018  9:30 AM CST   Level 6 with BAY [address] [place] [address] ([facility])    [address] [place] [address] [phone]              Who to contact     If you " have questions or need follow up information about today's clinic visit or your schedule please contact [facility] directly at [phone].  Normal or non-critical lab and imaging results will be communicated to you by [employer], letter or phone within 4 business days after the clinic has received the results. If you do not hear from us within 7 days, please contact the clinic through [employer] or phone. If you have a critical or abnormal lab result, we will notify you by phone as soon as possible.  Submit refill requests through [employer] or call your pharmacy and they will forward the refill request to us. Please allow 3 business days for your refill to be completed.          Additional Information About Your Visit        [employer] Information     [employer] gives you secure access to your electronic health record. If you see a primary care provider, you can also send messages to your care team and make appointments. If you have questions, please call your primary care clinic.  If you do not have a primary care provider, please call [phone] and they will assist you.      [employer] is an electronic gateway that provides easy, online access to your medical records. With [employer], you can request a clinic appointment, read your test results, renew a prescription or communicate with your care team.     To access your existing account, please contact your [facility] or call [phone] for assistance.        Care EveryWhere ID     This is your Care EveryWhere ID. This could be used by other organizations to access your [place] medical records  [id number]        Your Vitals Were     Pulse Temperature Pulse Oximetry BMI (Body Mass Index)          90 98.2  F (36.8  C) (Oral) 98% 25.54 kg/m2         Blood Pressure from Last 3 Encounters:   09/14/18 161/84   09/13/18 (!) 156/91   08/24/18 139/89    Weight from Last 3 Encounters:   09/14/18 69.8 kg (153 lb 12.8 oz)   09/13/18 69.6  kg (153 lb 8 oz)   08/16/18 65.8 kg (145 lb)              Today, you had the following     No orders found for display       Primary Care Provider Office Phone # Fax #    Sonja Vilchiselkin Hernandez [phone]       [address]  [facility] [address]        Equal Access to Services     [facility]: Hadii aad ku hadasho Soomaali, waaxda luqadaha, qaybta kaalmada adeegyada, waxay idiin hayaan adeeg kharash la'aan . So [facility] [phone].    ATENCIÓN: Si habla español, tiene a perez disposición servicios gratuitos de asistencia lingüística. Llame al [phone].    We comply with applicable federal civil rights laws and [place] laws. We do not discriminate on the basis of race, color, national origin, age, disability, sex, sexual orientation, or gender identity.            Thank you!     Thank you for choosing [facility]  for your care. Our goal is always to provide you with excellent care. Hearing back from our patients is one way we can continue to improve our services. Please take a few minutes to complete the written survey that you may receive in the mail after your visit with us. Thank you!             Your Updated Medication List - Protect others around you: Learn how to safely use, store and throw away your medicines at www.disposemymeds.org.          This list is accurate as of 9/13/18 11:59 PM.  Always use your most recent med list.                   Brand Name Dispense Instructions for use Diagnosis    cetirizine 10 MG tablet    zyrTEC     Take 10 mg by mouth daily        dexamethasone 4 MG tablet    DECADRON    60 tablet    Take 5 tablets (20 mg) by mouth See Admin Instructions    Adverse effect of paclitaxel, initial encounter       hydrochlorothiazide 25 MG tablet    HYDRODIURIL    90 tablet    Take 1 tablet (25 mg) by mouth every morning    Hypertension goal BP (blood pressure) < 140/90       lidocaine-prilocaine cream    EMLA    30 g    Apply topically  as needed for moderate pain    Ovarian cancer, unspecified laterality (H)       LORazepam 1 MG tablet    ATIVAN    30 tablet    Take 1 tablet (1 mg) by mouth every 6 hours as needed (nausea/vomiting, anxiety or sleep)    Ovarian cancer, unspecified laterality (H)       mometasone 50 MCG/ACT spray    NASONEX    1 Box    Spray 2 sprays into both nostrils daily    Chronic seasonal allergic rhinitis, unspecified trigger       pravastatin 20 MG tablet    PRAVACHOL    90 tablet    Take 1 tablet (20 mg) by mouth every evening    Hyperlipidemia LDL goal <130       prochlorperazine 10 MG tablet    COMPAZINE    30 tablet    Take 1 tablet (10 mg) by mouth every 6 hours as needed (nausea/vomiting)    Ovarian cancer, unspecified laterality (H)

## 2018-09-13 NOTE — LETTER
9/13/2018         RE: Di Evans  [address] [place] [address]        Dear Colleague,    Thank you for referring your patient, Di Evans, to the [facility]. Please see a copy of my visit note below.    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 Imaging proved Pelvic mass with omental thickening and ascites. Surgical pathology proved stage IIIB mixed clear cell (80%) and endometrioid (20%) adenocarcinoma, + pelvic LN, + PA LN, + omentum.   Treatment:   7/30/2018 Exploratory laparotomy, modified radical hysterectomy, bilateral salpingo-oophorectomy, omentectomy, right pelvic and bilateral para-aortic lymph node dissection, CUSA tumor debulking, mobilization of the entire colon, stripping of left pelvic peritoneum, proctoscopy, optimal tumor debulking to no gross residual disease  8/24/2018 began carboplatin( AUC 6) and Taxol 175mg/m2)    Interval History: Ms. Evans comes to clinic alone for toxicity review prior to cycle 2 of carboplatin/Taxol.  Patient sure she feels well with the first cycle down and noted very few side effects to her treatment but does include alopecia and some constipation and noting the need for nausea coverage on days 2-3.  Patient states she has had plenty of energy and has had an increased appetite and actually has gained weight but was using Ativan and Compazine for those couple days after treatment.  She has had no pain mouth sores shortness of breath fever chills or weakness.  Did see her blood pressure elevated today but shares her home blood pressures have been less than 140/90 on a regular basis and feels she is just a little more anxious about  treatment.  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  Rest of comprehensive and complete ROS is reviewed and is negative.   Past Medical History:   Diagnosis Date     Hypertension      Ovarian cancer (H)      Current Outpatient Prescriptions   Medication     cetirizine (ZYRTEC) 10 MG tablet     dexamethasone (DECADRON) 4 MG tablet     hydrochlorothiazide (HYDRODIURIL) 25 MG tablet     lidocaine-prilocaine (EMLA) cream     LORazepam (ATIVAN) 1 MG tablet     mometasone (NASONEX) 50 MCG/ACT spray     pravastatin (PRAVACHOL) 20 MG tablet     prochlorperazine (COMPAZINE) 10 MG tablet     No current facility-administered medications for this visit.      Allergies   Allergen Reactions     Gemfibrozil Muscle Pain (Myalgia)     Lovastatin      headaches     Penicillins        Physical Exam:BP (!) 156/91  Pulse 90  Temp 98.2  F (36.8  C) (Oral)  Wt 69.6 kg (153 lb 8 oz)  SpO2 98%  BMI 25.54 kg/m2   ECOG PS- 0  Constitutional: Alert, healthy, and in no distress.   ENT: Eyes bright , No mouth sores  Neck: Supple, No adenopathy.Thyroid symmetric, normal size  Cardiac: Heart rate and rhythm is regular and strong without murmur  Respiratory: Breathing easy. Lung sounds clear to auscultation  GI: Abdomen is soft, non-tender-good healing noted to central abdominal incision site with no drainage or redness or other signs of infection, BS normal. No masses or organomegaly  MS: Muscle tone normal, extremities normal with no edema.   Skin: No suspicious lesions or rashes  Neuro: Sensory grossly WNL, gait normal.   Lymph: Normal ant/post cervical, axillary, supraclavicular nodes  Psych: Mentation appears normal and affect normal/bright with good conversation    Laboratory Results:   Results for orders placed or performed in visit on 09/13/18   CBC with platelets differential   Result Value Ref Range    WBC 3.9 (L) 4.0 - 11.0 10e9/L    RBC Count 4.12 3.8 - 5.2 10e12/L    Hemoglobin 11.6 (L)  11.7 - 15.7 g/dL    Hematocrit 35.8 35.0 - 47.0 %    MCV 87 78 - 100 fl    MCH 28.2 26.5 - 33.0 pg    MCHC 32.4 31.5 - 36.5 g/dL    RDW 16.9 (H) 10.0 - 15.0 %    Platelet Count 151 150 - 450 10e9/L    % Neutrophils 44.0 %    % Lymphocytes 37.1 %    % Monocytes 15.0 %    % Eosinophils 2.3 %    % Basophils 1.3 %    % Immature Granulocytes 0.3 %    Absolute Neutrophil 1.7 1.6 - 8.3 10e9/L    Absolute Lymphocytes 1.5 0.8 - 5.3 10e9/L    Absolute Monocytes 0.6 0.0 - 1.3 10e9/L    Absolute Eosinophils 0.1 0.0 - 0.7 10e9/L    Absolute Basophils 0.1 0.0 - 0.2 10e9/L    Abs Immature Granulocytes 0.0 0 - 0.4 10e9/L    Diff Method Automated Method    Comprehensive metabolic panel   Result Value Ref Range    Sodium 140 133 - 144 mmol/L    Potassium 3.6 3.4 - 5.3 mmol/L    Chloride 104 94 - 109 mmol/L    Carbon Dioxide 30 20 - 32 mmol/L    Anion Gap 6 3 - 14 mmol/L    Glucose 101 (H) 70 - 99 mg/dL    Urea Nitrogen 11 7 - 30 mg/dL    Creatinine 0.64 0.52 - 1.04 mg/dL    GFR Estimate >90 >60 mL/min/1.7m2    GFR Estimate If Black >90 >60 mL/min/1.7m2    Calcium 9.2 8.5 - 10.1 mg/dL    Bilirubin Total 0.2 0.2 - 1.3 mg/dL    Albumin 3.3 (L) 3.4 - 5.0 g/dL    Protein Total 6.8 6.8 - 8.8 g/dL    Alkaline Phosphatase 72 40 - 150 U/L    ALT 30 0 - 50 U/L    AST 22 0 - 45 U/L   Magnesium   Result Value Ref Range    Magnesium 2.0 1.6 - 2.3 mg/dL   - awaiting cancer marker results.     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  Today she shares she did have some side effects of constipation treated with stool softeners and early nausea in cycle which was controlled with antiemetics.  Review exam and labs show she is eligible to continue with cycle 2 as planned without additional changes to plan.  Patient will return in 3 weeks for cycle 3 and will be seeing Dr. Duque prior to infusion for review with  treatment labs.  Elevated blood pressure with known hypertension-blood pressure reading at 156/91 today the patient is anxious about treatment and this is the first time seeing this provider.  Her home blood pressures remain within normal goal of 140/90.  We will have this rechecked prior to  infusion but did warn her that we need to continue to follow And keep her in the normal range. She continues with use of hydrochlorothiazide 25 mg daily and we are recommending regular exercise and low-salt diet.  Nutrition-Steroids have stimulated diet and we have seen mild weight gain. Encouraged increasd protein in diet and fluid intake of 6-8 glasses of fluids daily.   This was a 25 min visit with > 50% in counseling and coordinating care including education and management of concerns.    Susie [place],CNP      Again, thank you for allowing me to participate in the care of your patient.        Sincerely,        Susie [place], ANDERSON, APRN CNP

## 2018-09-13 NOTE — NURSING NOTE
"Oncology Rooming Note    September 13, 2018 9:30 AM   Di Evans is a 58 year old female who presents for:    Chief Complaint   Patient presents with     Oncology Clinic Visit     prior to tx on 9/14     Initial Vitals: BP (!) 156/91  Pulse 90  Temp 98.2  F (36.8  C) (Oral)  Wt 69.6 kg (153 lb 8 oz)  SpO2 98%  BMI 25.54 kg/m2 Estimated body mass index is 25.54 kg/(m^2) as calculated from the following:    Height as of 8/16/18: 1.651 m (5' 5\").    Weight as of this encounter: 69.6 kg (153 lb 8 oz). Body surface area is 1.79 meters squared.  No Pain (0) Comment: Data Unavailable   No LMP recorded. Patient is postmenopausal.  Allergies reviewed: Yes  Medications reviewed: Yes    Medications: MEDICATION REFILLS NEEDED TODAY. Provider was notified.  Pharmacy name entered into [employer]: [facility] [address] [place] [address] [address] [facility] [address]    Clinical concerns: lightheaded after last chemo (2nd and 3rd day after chemo) Susie [place] NP was notified.    5 minutes for nursing intake (face to face time)     Kingsley Mason RN              "

## 2018-09-13 NOTE — MR AVS SNAPSHOT
After Visit Summary   9/13/2018    Di Evans    MRN: [id number]           Patient Information     Date Of Birth          1960        Visit Information        Provider Department      9/13/2018 9:15 AM NURSE ONLY CANCER CENTER [facility]        Today's Diagnoses     Ovarian cancer, unspecified laterality (H)    -  1       Follow-ups after your visit        Your next 10 appointments already scheduled     Sep 13, 2018  9:45 AM CDT   Return Visit with SULMA Holman CNP   [facility])    [address] [facility] [address]   [phone]            Sep 14, 2018  8:30 AM CDT   Level 6 with BAY 10 INFUSION   [facility])    [address] [facility] [address]   [phone]            Oct 05, 2018  8:45 AM CDT   Return Visit with NURSE ONLY CANCER CENTER   [facility] ([facility])    [address] [facility] [address]   [phone]            Oct 05, 2018  9:30 AM CDT   Return Visit with Jammie Salgado MD   [facility])    [address] [facility] [address]   [phone]            Oct 05, 2018 10:00 AM CDT   Level 6 with BAY 6 INFUSION   [facility])    [address] [facility] [address]   [phone]            Oct 26, 2018  7:30 AM CDT   Return Visit with NURSE ONLY CANCER CENTER   [facility] ([facility])    [address] [facility] [address]   [phone]            Oct 26, 2018  8:00 AM CDT   Return Visit with Jammie Salgado MD   [facility])    [address] [facility] [address]   [phone]            Oct 26, 2018  8:30 AM CDT   Level 6 with BAY 3 INFUSION  "  [facility] ([facility])    [address] [facility] [address]   [phone]            Nov 07, 2018  2:15 PM CST   New Visit with Jessica Walton GC   [facility] ([facility])    [address] [place] [address]   [phone]            Nov 09, 2018 11:00 AM CST   MA SCREENING DIGITAL BILATERAL with BKMA1   [facility] ([facility])    [address] [facility] [address] [phone]           Do not use any powder, lotion or deodorant under your arms or on your breast. If you do, we will ask you to remove it before your exam.  Wear comfortable, two-piece clothing.  If you have any allergies, tell your care team.  Bring any previous mammograms from other facilities or have them mailed to the breast center. Three-dimensional (3D) mammograms are available at [place] locations in [facility], [facility], and [place]. [facility] locations include [place] and [facility] [place] in [place]. Benefits of 3D mammograms include: - Improved rate of cancer detection - Decreases your chance of having to go back for more tests, which means fewer: - \"False-positive\" results (This means that there is an abnormal area but it isn't cancer.) - Invasive testing procedures, such as a biopsy or surgery - Can provide clearer images of the breast if you have dense breast tissue. 3D mammography is an optional exam that anyone can have with a 2D mammogram. It doesn't replace or take the place of a 2D mammogram. 2D mammograms remain an effective screening test for all women.  Not all insurance companies cover the cost of a 3D mammogram. Check with your insurance.              Who to contact     If you have questions or need follow up information about today's clinic visit or your schedule please contact [facility] " CLINICS directly at [phone].  Normal or non-critical lab and imaging results will be communicated to you by Carticept Medicalhart, letter or phone within 4 business days after the clinic has received the results. If you do not hear from us within 7 days, please contact the clinic through Carticept Medicalhart or phone. If you have a critical or abnormal lab result, we will notify you by phone as soon as possible.  Submit refill requests through daPulse or call your pharmacy and they will forward the refill request to us. Please allow 3 business days for your refill to be completed.          Additional Information About Your Visit        Carticept MedicalharSolarReserve Information     daPulse gives you secure access to your electronic health record. If you see a primary care provider, you can also send messages to your care team and make appointments. If you have questions, please call your primary care clinic.  If you do not have a primary care provider, please call [phone] and they will assist you.      daPulse is an electronic gateway that provides easy, online access to your medical records. With daPulse, you can request a clinic appointment, read your test results, renew a prescription or communicate with your care team.     To access your existing account, please contact your [facility] or call [phone] for assistance.        Care EveryWhere ID     This is your Care EveryWhere ID. This could be used by other organizations to access your [place] medical records  [id number]         Blood Pressure from Last 3 Encounters:   08/24/18 139/89   08/16/18 130/85   08/02/18 123/56    Weight from Last 3 Encounters:   08/16/18 65.8 kg (145 lb)   08/01/18 70.9 kg (156 lb 4.8 oz)   07/24/18 72.6 kg (160 lb)              We Performed the Following          CBC with platelets differential     Comprehensive metabolic panel     Magnesium        Primary Care Provider Office Phone # Fax #    Sonja Hernandez MD  [phone]       [address] [facility] [address]        Equal Access to Services     ROMAN MILLARD : Hadii aad ku hadsherrillsilvia Somari, waedlmyda luqfantaha, qaybta kaalmada bonnie, mae irmain hayaaelkin whytejian gerber severiano schulz. So [facility] [phone].    ATENCIÓN: Si habla español, tiene a perez disposición servicios gratuitos de asistencia lingüística. Llame al [phone].    We comply with applicable federal civil rights laws and [place] laws. We do not discriminate on the basis of race, color, national origin, age, disability, sex, sexual orientation, or gender identity.            Thank you!     Thank you for choosing [facility]  for your care. Our goal is always to provide you with excellent care. Hearing back from our patients is one way we can continue to improve our services. Please take a few minutes to complete the written survey that you may receive in the mail after your visit with us. Thank you!             Your Updated Medication List - Protect others around you: Learn how to safely use, store and throw away your medicines at www.disposemymeds.org.          This list is accurate as of 9/13/18  9:21 AM.  Always use your most recent med list.                   Brand Name Dispense Instructions for use Diagnosis    cetirizine 10 MG tablet    zyrTEC     Take 10 mg by mouth daily        dexamethasone 4 MG tablet    DECADRON    60 tablet    Take 5 tablets (20 mg) by mouth See Admin Instructions    Adverse effect of paclitaxel, initial encounter       hydrochlorothiazide 25 MG tablet    HYDRODIURIL    90 tablet    Take 1 tablet (25 mg) by mouth every morning    Hypertension goal BP (blood pressure) < 140/90       lidocaine-prilocaine cream    EMLA    30 g    Apply topically as needed for moderate pain    Ovarian cancer, unspecified laterality (H)       LORazepam 1 MG tablet    ATIVAN    30 tablet    Take 1 tablet (1 mg) by mouth every 6 hours as needed (nausea/vomiting,  anxiety or sleep)    Ovarian cancer, unspecified laterality (H)       mometasone 50 MCG/ACT spray    NASONEX    1 Box    Spray 2 sprays into both nostrils daily    Chronic seasonal allergic rhinitis, unspecified trigger       pravastatin 20 MG tablet    PRAVACHOL    90 tablet    Take 1 tablet (20 mg) by mouth every evening    Hyperlipidemia LDL goal <130       prochlorperazine 10 MG tablet    COMPAZINE    30 tablet    Take 1 tablet (10 mg) by mouth every 6 hours as needed (nausea/vomiting)    Ovarian cancer, unspecified laterality (H)

## 2018-09-14 NOTE — MR AVS SNAPSHOT
After Visit Summary   9/14/2018    Di Evans    MRN: [id number]           Patient Information     Date Of Birth          1960        Visit Information        Provider Department      9/14/2018 8:30 AM BAY 10 INFUSION [facility]        Today's Diagnoses     Ovarian cancer, unspecified laterality (H)    -  1       Follow-ups after your visit        Your next 10 appointments already scheduled     Oct 05, 2018  8:45 AM CDT   Return Visit with NURSE ONLY CANCER CENTER   [facility] ([facility])    [address] [place] [address]   [phone]            Oct 05, 2018  9:30 AM CDT   Return Visit with Jammie Salgado MD   [facility] ([facility])    [address] [place] [address]   [phone]            Oct 05, 2018 10:00 AM CDT   Level 6 with BAY 6 INFUSION   [facility] ([facility])    [address] [facility] [address]   [phone]            Oct 26, 2018  7:30 AM CDT   Return Visit with NURSE ONLY CANCER CENTER   [facility] ([facility])    [address] [facility] [address]   [phone]            Oct 26, 2018  8:00 AM CDT   Return Visit with Jammie Salgado MD   [facility])    [address] [facility] [address]   [phone]            Oct 26, 2018  8:30 AM CDT   Level 6 with BAY [facility] ([facility])    [address] [facility] [address]   [phone]            Nov 07, 2018  2:15 PM CST   New Visit with Jessica Walton GC   [facility] ([facility])    [address] [facility] [address]   [phone]            Nov 09, 2018 11:00 AM CST   MA SCREENING DIGITAL BILATERAL with BKMA1  "  [facility] ([facility])    [address] [facility] [address] [phone]           Do not use any powder, lotion or deodorant under your arms or on your breast. If you do, we will ask you to remove it before your exam.  Wear comfortable, two-piece clothing.  If you have any allergies, tell your care team.  Bring any previous mammograms from other facilities or have them mailed to the breast center. Three-dimensional (3D) mammograms are available at [facility] in [facility]], [facility], and [place]. [facility] locations include [place] and [facility] in [place]. Benefits of 3D mammograms include: - Improved rate of cancer detection - Decreases your chance of having to go back for more tests, which means fewer: - \"False-positive\" results (This means that there is an abnormal area but it isn't cancer.) - Invasive testing procedures, such as a biopsy or surgery - Can provide clearer images of the breast if you have dense breast tissue. 3D mammography is an optional exam that anyone can have with a 2D mammogram. It doesn't replace or take the place of a 2D mammogram. 2D mammograms remain an effective screening test for all women.  Not all insurance companies cover the cost of a 3D mammogram. Check with your insurance.            Nov 16, 2018  8:45 AM CST   Return Visit with SULMA Holman CNP   [facility])    [address] [place] [id number] [phone]            Nov 16, 2018  9:30 AM CST   Level 6 with BAY [address] [place] [address] ([facility])    [address] [place] [id number] [phone]              Who to contact     If you have questions or need follow up information about today's clinic visit or your schedule please contact [facility] " CLINICS directly at [phone].  Normal or non-critical lab and imaging results will be communicated to you by DearJanehart, letter or phone within 4 business days after the clinic has received the results. If you do not hear from us within 7 days, please contact the clinic through DearJanehart or phone. If you have a critical or abnormal lab result, we will notify you by phone as soon as possible.  Submit refill requests through DeNovaMed or call your pharmacy and they will forward the refill request to us. Please allow 3 business days for your refill to be completed.          Additional Information About Your Visit        DearJaneharMiArch Information     DeNovaMed gives you secure access to your electronic health record. If you see a primary care provider, you can also send messages to your care team and make appointments. If you have questions, please call your primary care clinic.  If you do not have a primary care provider, please call [phone] and they will assist you.      DeNovaMed is an electronic gateway that provides easy, online access to your medical records. With DeNovaMed, you can request a clinic appointment, read your test results, renew a prescription or communicate with your care team.     To access your existing account, please contact your [facility] or call [phone] for assistance.        Care EveryWhere ID     This is your Care EveryWhere ID. This could be used by other organizations to access your [place] medical records  [id number]        Your Vitals Were     Pulse Temperature Pulse Oximetry BMI (Body Mass Index)          84 97.1  F (36.2  C) (Oral) 98% 25.59 kg/m2         Blood Pressure from Last 3 Encounters:   09/14/18 161/84   09/13/18 (!) 156/91   08/24/18 139/89    Weight from Last 3 Encounters:   09/14/18 69.8 kg (153 lb 12.8 oz)   09/13/18 69.6 kg (153 lb 8 oz)   08/16/18 65.8 kg (145 lb)              Today, you had the following     No orders found for display        Primary Care Provider Office Phone # Fax #    Sonja Vilchiselkin Hernandez [phone]       [address]  [place] [facility] [address]        Equal Access to Services     ROMAN MILLARD : Hadii aad ku hadsherrillo Soomaali, waaxda luqadaha, qaybta kaalmada adeegyada, waxrohan idiin marisan lynda gerber severiano schulz. So [facility] [phone].    ATENCIÓN: Si habla español, tiene a perez disposición servicios gratuitos de asistencia lingüística. Llame al [phone].    We comply with applicable federal civil rights laws and [place] laws. We do not discriminate on the basis of race, color, national origin, age, disability, sex, sexual orientation, or gender identity.            Thank you!     Thank you for choosing [facility]  for your care. Our goal is always to provide you with excellent care. Hearing back from our patients is one way we can continue to improve our services. Please take a few minutes to complete the written survey that you may receive in the mail after your visit with us. Thank you!             Your Updated Medication List - Protect others around you: Learn how to safely use, store and throw away your medicines at www.disposemymeds.org.          This list is accurate as of 9/14/18  2:05 PM.  Always use your most recent med list.                   Brand Name Dispense Instructions for use Diagnosis    cetirizine 10 MG tablet    zyrTEC     Take 10 mg by mouth daily        dexamethasone 4 MG tablet    DECADRON    60 tablet    Take 5 tablets (20 mg) by mouth See Admin Instructions    Adverse effect of paclitaxel, initial encounter       hydrochlorothiazide 25 MG tablet    HYDRODIURIL    90 tablet    Take 1 tablet (25 mg) by mouth every morning    Hypertension goal BP (blood pressure) < 140/90       lidocaine-prilocaine cream    EMLA    30 g    Apply topically as needed for moderate pain    Ovarian cancer, unspecified laterality (H)       LORazepam 1 MG tablet    ATIVAN    30 tablet     Take 1 tablet (1 mg) by mouth every 6 hours as needed (nausea/vomiting, anxiety or sleep)    Ovarian cancer, unspecified laterality (H)       mometasone 50 MCG/ACT spray    NASONEX    1 Box    Spray 2 sprays into both nostrils daily    Chronic seasonal allergic rhinitis, unspecified trigger       pravastatin 20 MG tablet    PRAVACHOL    90 tablet    Take 1 tablet (20 mg) by mouth every evening    Hyperlipidemia LDL goal <130       prochlorperazine 10 MG tablet    COMPAZINE    30 tablet    Take 1 tablet (10 mg) by mouth every 6 hours as needed (nausea/vomiting)    Ovarian cancer, unspecified laterality (H)

## 2018-09-14 NOTE — PROGRESS NOTES
Infusion Nursing Note:  Di Evans presents today for C2D1 Taxol/Carboplatin.    Patient seen by provider today: Yes, Susie Martinez NP   present during visit today: No     Note: N/A    Intravenous Access:  Implanted Port.    Treatment Conditions:  Lab Results   Component Value Date    HGB 11.6 09/13/2018     Lab Results   Component Value Date    WBC 3.9 09/13/2018      Lab Results   Component Value Date    ANEU 1.7 09/13/2018     Lab Results   Component Value Date     09/13/2018      Results reviewed, labs MET treatment parameters, ok to proceed with treatment.    Post Infusion Assessment:  Patient tolerated infusion without incident.  Blood return noted pre and post infusion.  Site patent and intact, free from redness, edema or discomfort.  Access discontinued per protocol.    Discharge Plan:   Patient will return 10/5/18 for next appointment.   Patient discharged in stable condition accompanied by: friend.  Departure Mode: Ambulatory.    Mihaela Ochoa RN

## 2018-10-04 NOTE — PROGRESS NOTES
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  She is accompanied today by her mother and roommate.  She is doing quite well with chemotherapy.  She notes some nausea for the first 3 days after chemotherapy but this is controlled with her lorazepam and compazine.  She notes some new heartburn for which she has started taking omeprazole.  Denies neuropathy.    Cancer Course:    She reports she has been having abdominal symptoms for the past 8 months.  She has been feeling bloated and distended for several months.  She has also had decreased appetite and early satiety.  She does not note any major changes in her bowel or bladder function.  She has not had significant pain.  She finally presented to the ED and demanded a CT which was suspicious for ovarian cancer.    18:  CT A/P:  Large cystic/solid mass within the pelvis highly suspicious for ovarian malignancy. 2.  Omental thickening suspicious for metastatic disease. 3.  Large volume of ascites.  Malignant ascites cannot be excluded. 4.  Small left pleural effusion and left lower lobe atelectasis. 5.  Diverticulosis, small hiatal hernia, and gallbladder sludge.    18:  Exploratory laparotomy, modified radical hysterectomy, bilateral salpingo-oophorectomy, omentectomy, right pelvic and bilateral para-aortic lymph node dissection, CUSA tumor debulking, mobilization of the entire colon, stripping of left pelvic peritoneum, proctoscopy, optimal tumor debulking to no gross residual disease   Pathology:  Stage IIIB mixed clear cell (80%) and endometrioid (20%) adenocarcinoma, + pelvic LN, + PA LN, + omentum    18:  Cycle #1 carboplatin AUC6 and paclitaxel 175 mg/m2    18:  Cycle #2 carboplatin AUC6 and  paclitaxel 175 mg/m2    Review of Systems:  Systemic           no weight changes; no fever; no chills; no night sweats; no appetite changes; + fatigue  Skin           no rashes, or lesions; + hair loss  Eye           no irritation; no changes in vision  Janice-Laryngeal           no dysphagia; no hoarseness   Pulmonary    no cough; no shortness of breath  Cardiovascular    no chest pain; no palpitations  Gastrointestinal    no diarrhea; no constipation; + heartburn; no abdominal pain; no changes in bowel  habits; no blood in stool  Genitourinary   no urinary frequency; no urinary urgency; no dysuria; no pain; no abnormal vaginal discharge; no abnormal vaginal bleeding  Breast    no breast discharge; no breast changes; no breast pain  Musculoskeletal    no myalgias; no arthralgias; no back pain  Psychiatric           no depressed mood; no anxiety    Hematologic            no tender lymph nodes; no noticeable swellings or lumps   Endocrine    no hot flashes; no heat/cold intolerance         Neurological   no tremor; no numbness and tingling; no headaches; no difficulty sleeping    Obstetrics and Gynecology History:  G0  Menopause age 44, no HRT      Past Medical History:  Past Medical History:   Diagnosis Date     Hypertension      Ovarian cancer (H)        Past Surgical History:  Past Surgical History:   Procedure Laterality Date     HYSTERECTOMY TOTAL ABDOMINAL, BILATERAL SALPINGO-OOPHORECTOMY, COMBINED Bilateral 7/30/2018    Procedure: COMBINED HYSTERECTOMY TOTAL ABDOMINAL, SALPINGO-OOPHORECTOMY;;  Surgeon: Jammie Dueñas MD;  Location: [facility] OR     LAPAROTOMY, TUMOR DEBULKING, COMBINED Bilateral 7/30/2018    Procedure: COMBINED LAPAROTOMY, TUMOR DEBULKING;  Exploratory Laparotomy, Modified Radical Hysterectomy, Removal of Cervix, Bilateral Salpingo - Oophorectomy, Omentectomy, Bilateral Para Aortic and Left Pelvic Lymph Node Dissection, Tumor Debulking, Proctoscopy;  Surgeon: Jammie Dueñas MD;   Location: [facility] OR     SURGICAL HISTORY OF -   1980    left ankle surgery       Health Maintenance:  Last Pap Smear: No need for further pap smear exams  She has not had a history of abnormal Pap smears.    Last Mammogram: 11/15/16              Result: normal      She has not had a history of abnormal mammograms.    Last Colonoscopy: Never      Current Medications:   has a current medication list which includes the following prescription(s): cetirizine, dexamethasone, hydrochlorothiazide, lidocaine-prilocaine, lorazepam, mometasone, pravastatin, and prochlorperazine, and the following Facility-Administered Medications: heparin and sodium chloride (pf).     Allergies:   Gemfibrozil; Lovastatin; and Penicillins-unknown reaction as a child      Social History:  Social History     Social History     Marital status: Single     Spouse name: N/A     Number of children: 0     Years of education: N/A     Occupational History     manufacturing [employer]     Social History Main Topics     Smoking status: Never Smoker     Smokeless tobacco: Never Used     Alcohol use Yes      Comment: occasional     Drug use: No     Sexual activity: No     Other Topics Concern     Parent/Sibling W/ Cabg, Mi Or Angioplasty Before 65f 55m? No      Service No     Blood Transfusions No     Caffeine Concern Yes     Occupational Exposure No     Hobby Hazards No     Sleep Concern No     Stress Concern No     Weight Concern Yes     Special Diet No     Back Care No     Exercise Yes     Bike Helmet No     Yes in the future     Seat Belt Yes     Self-Exams Yes     Social History Narrative       Lives with a roommate, feels safe at home.  Works as a .  Enjoys gambling.  Does not have an advanced directive on file and would like her roommateAlivia to be her POA.  Would like full resuscitation if reversible cause is identified, however would not like to be kept on life sustaining measures long-term.      Family History:   The patient's family history is significant for.  Family History   Problem Relation Age of Onset     Hypertension Mother      Hypertension Father      Cerebrovascular Disease Father      polycythemia     Breast Cancer Maternal Aunt      older age         Physical Exam:   /72  Pulse 119  Temp 98.2  F (36.8  C) (Oral)  Wt 69.3 kg (152 lb 12.8 oz)  SpO2 98%  BMI 25.43 kg/m2  Body mass index is 25.43 kg/(m^2).    General Appearance: healthy and alert, no distress     HEENT:  no thyromegaly, no palpable nodules or masses        Cardiovascular: regular rate and rhythm, no gallops, rubs or murmurs     Respiratory: lungs clear, no rales, rhonchi or wheezes, normal diaphragmatic excursion    Musculoskeletal: extremities non tender and without edema    Skin: no lesions or rashes     Neurological: normal gait, no gross defects     Psychiatric: appropriate mood and affect                               Hematological: normal cervical, supraclavicular and inguinal lymph nodes     Gastrointestinal:       abdomen soft, non-tender, non-distended, no organomegaly or masses    Genitourinary: Deferred    Labs:  WBC 3.3 with ANC 2.8.  Hemoglobin 11.6.  Platelets 433.  Creatinine 0.54.  Potassium 3.4.  Magnesium 1.7.  Remainder of electrolytes within normal limits.  AST 20, ALT 24, alkaline phosphatase 88, total bilirubin 0.2.  Albumin 3.4.       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     A total of 30 minutes was spent with the patient, >50% of which were spent in counseling the patient and/or treatment planning.      Plan:     1.)    Stage IIIB mixed clear cell and endometrioid ovarian cancer.  Plan for 6 cycles of carboplatin and paclitaxel. Ok to proceed with cycle #3 today.  She will return on 10/26 for cycle #4.    2.) Chemotherapy side effects    -Heartburn-Cont omeprazole, call if symptoms not  improving   -nausea-cont lorazepam and compazine PRN     3.) Genetic risk factors were assessed and the patient has an appointment scheduled for 11/7    4.) Labs and/or tests ordered include:  Pre-chemotherapy labs reviewed and ok to proceed     5.) Health maintenance issues addressed today include pt is due for a mammogram and colonoscopy which will be addressed after treatment of her ovarian cancer.           Thank you for allowing us to participate in the care of your patient.         Sincerely,    Jammie Duque MD  Gynecologic Oncology  [facility]         CENTER, [facility]

## 2018-10-05 NOTE — MR AVS SNAPSHOT
After Visit Summary   10/5/2018    Di Evans    MRN: [id number]           Patient Information     Date Of Birth          1960        Visit Information        Provider Department      10/5/2018 10:15 AM [place] [facility]        Today's Diagnoses     Ovarian cancer, unspecified laterality (H)    -  1       Follow-ups after your visit        Your next 10 appointments already scheduled     Oct 26, 2018  7:30 AM CDT   Return Visit with [facility]   [facility]    [address] [place] [address]   [phone]            Oct 26, 2018  8:00 AM CDT   Return Visit with Jammie Salgado MD   [facility])    [address] [place] [address]   [phone]            Oct 26, 2018  8:30 AM CDT   Level 5 with [address])    [address] [place] [address]   [phone]            Nov 07, 2018  2:15 PM CST   New Visit with Jessica Walton GC   [facility])    [address] [place] [address]   [phone]            Nov 09, 2018 11:00 AM CST   MA SCREENING DIGITAL BILATERAL with [id number]   [facility] ([facility])    [address] [facility] [id number] [phone]           How do I prepare for my exam? (Food and drink instructions) No Food and Drink Restrictions.  How do I prepare for my exam? (Other instructions) Do not use any powder, lotion or deodorant under your arms or on your breast. If you do, we will ask you to remove it before your exam.  What should I wear: Wear comfortable, two-piece clothing.  How long does the exam take: Most scans will take 15 minutes.  What should I bring: Bring any previous mammograms from other facilities  "or have them mailed to the breast center.  Do I need a :  No  is needed.  What do I need to tell my doctor: If you have any allergies, tell your care team.  What should I do after the exam: No restrictions, You may resume normal activities.  What is this test: This test is an x-ray of the breast to look for breast disease. The breast is pressed between two plates to flatten and spread the tissue. An X-ray is taken of the breast from different angles.  Who should I call with questions: If you have any questions, please call the Imaging Department where you will have your exam. Directions, parking instructions, and other information is available on our website, [email or web address] [email or web address].  Other information about my exam Three-dimensional (3D) mammograms are available at [facility] locations in [facility], [facility], [facility] and [place]. [facility] locations include [place] and the [facility] in [place].  Benefits of 3D mammograms include * Improved rate of cancer detection * Decreases your chance of having to go back for more tests, which means fewer: * \"False-positive\" results (This means that there is an abnormal area but it isn't cancer.) * Invasive testing procedures, such as a biopsy or surgery * Can provide clearer images of the breast if you have dense breast tissue.  *3D mammography is an optional exam that anyone can have with a 2D mammogram. It doesn't replace or take the place of a 2D mammogram. 2D mammograms remain an effective screening test for all women.  Not all insurance companies cover the cost of a 3D mammogram. Check with your insurance. Three-dimensional (3D) mammograms are available at [facility] locations in [facility], [facility], [facility], and [place]. Health locations include [place] and [facility] in [place]. Benefits of 3D mammograms include: - Improved " "rate of cancer detection - Decreases your chance of having to go back for more tests, which means fewer: - \"False-positive\" results (This means that there is an abnormal area but it isn't cancer.) - Invasive testing procedures, such as a biopsy or surgery - Can provide clearer images of the breast if you have dense breast tissue. 3D mammography is an optional exam that anyone can have with a 2D mammogram. It doesn't replace or take the place of a 2D mammogram. 2D mammograms remain an effective screening test for all women.  Not all insurance companies cover the cost of a 3D mammogram. Check with your insurance.            Nov 16, 2018  8:15 AM CST   Return Visit with [facility]   [facility])    [address] [place] [address] [phone]            Nov 16, 2018  8:45 AM CST   Return Visit with SULMA Holman CNP   [facility])    [address] [place] [address] [phone]            Nov 16, 2018  9:30 AM CST   Level 5 with BAY 3 INFUSION   [facility])    [address] [facility] [address] [phone]            Dec 07, 2018  9:00 AM CST   Return Visit with Jammie Salgado MD   [facility])    [address] [address] [address] [phone]            Dec 07, 2018  9:30 AM CST   Level 5 with BAY 5 INFUSION   [facility])    [address] [facility] [address] [phone]              Who to contact     If you have questions or need follow up information about today's clinic visit or your schedule please contact [facility] directly at [phone].  Normal or non-critical lab and imaging results will be communicated to you by MyChart, letter or phone within " 4 business days after the clinic has received the results. If you do not hear from us within 7 days, please contact the clinic through [employer] or phone. If you have a critical or abnormal lab result, we will notify you by phone as soon as possible.  Submit refill requests through [employer] or call your pharmacy and they will forward the refill request to us. Please allow 3 business days for your refill to be completed.          Additional Information About Your Visit        [employer] Information     [employer] gives you secure access to your electronic health record. If you see a primary care provider, you can also send messages to your care team and make appointments. If you have questions, please call your primary care clinic.  If you do not have a primary care provider, please call [phone] and they will assist you.      [employer] is an electronic gateway that provides easy, online access to your medical records. With [employer], you can request a clinic appointment, read your test results, renew a prescription or communicate with your care team.     To access your existing account, please contact your [facility] or call [phone] for assistance.        Care EveryWhere ID     This is your Care EveryWhere ID. This could be used by other organizations to access your [facility] medical records  [id number]        Your Vitals Were     Pulse                   82            Blood Pressure from Last 3 Encounters:   10/05/18 (!) 167/91   10/05/18 155/72   09/14/18 161/84    Weight from Last 3 Encounters:   10/05/18 69.3 kg (152 lb 12.8 oz)   09/14/18 69.8 kg (153 lb 12.8 oz)   09/13/18 69.6 kg (153 lb 8 oz)              Today, you had the following     No orders found for display       Primary Care Provider Office Phone # Fax #    Sonja Jeni Hernandez [phone]       [address]  [facility] [address]        Equal Access to Services     ROMAN FLORES: Lele  nakita Toure, wadelmyda luqadaha, qaybta kaalmada bonnie, waxrohan vahid jonesryanjessica العلي zeus. So [facility] [phone].    ATENCIÓN: Si sam murray, tiene a perez disposición servicios gratuitos de asistencia lingüística. Sirena al [phone].    We comply with applicable federal civil rights laws and [place] laws. We do not discriminate on the basis of race, color, national origin, age, disability, sex, sexual orientation, or gender identity.            Thank you!     Thank you for choosing [facility]  for your care. Our goal is always to provide you with excellent care. Hearing back from our patients is one way we can continue to improve our services. Please take a few minutes to complete the written survey that you may receive in the mail after your visit with us. Thank you!             Your Updated Medication List - Protect others around you: Learn how to safely use, store and throw away your medicines at www.disposemymeds.org.          This list is accurate as of 10/5/18  3:31 PM.  Always use your most recent med list.                   Brand Name Dispense Instructions for use Diagnosis    cetirizine 10 MG tablet    zyrTEC     Take 10 mg by mouth daily        dexamethasone 4 MG tablet    DECADRON    60 tablet    Take 5 tablets (20 mg) by mouth See Admin Instructions    Adverse effect of paclitaxel, initial encounter       hydrochlorothiazide 25 MG tablet    HYDRODIURIL    90 tablet    Take 1 tablet (25 mg) by mouth every morning    Hypertension goal BP (blood pressure) < 140/90       lidocaine-prilocaine cream    EMLA    30 g    Apply topically as needed for moderate pain    Ovarian cancer, unspecified laterality (H)       LORazepam 1 MG tablet    ATIVAN    30 tablet    Take 1 tablet (1 mg) by mouth every 6 hours as needed (nausea/vomiting, anxiety or sleep)    Ovarian cancer, unspecified laterality (H)       mometasone 50 MCG/ACT spray    NASONEX    1 Box    Spray 2 sprays  into both nostrils daily    Chronic seasonal allergic rhinitis, unspecified trigger       pravastatin 20 MG tablet    PRAVACHOL    90 tablet    Take 1 tablet (20 mg) by mouth every evening    Hyperlipidemia LDL goal <130       prochlorperazine 10 MG tablet    COMPAZINE    30 tablet    Take 1 tablet (10 mg) by mouth every 6 hours as needed (nausea/vomiting)    Ovarian cancer, unspecified laterality (H)

## 2018-10-05 NOTE — MR AVS SNAPSHOT
After Visit Summary   10/5/2018    Di Evans    MRN: [id number]           Patient Information     Date Of Birth          1960        Visit Information        Provider Department      10/5/2018 8:45 AM NURSE ONLY CANCER CENTER [facility]        Today's Diagnoses     Ovarian cancer, unspecified laterality (H)    -  1       Follow-ups after your visit        Your next 10 appointments already scheduled     Oct 05, 2018  9:30 AM CDT   Return Visit with Jammie Salgado MD   [facility])    [address] [place] [address]   [phone]            Oct 05, 2018 10:15 AM CDT   Level 5 with BAY 6 INFUSION   [facility] ([facility])    [address] [place] [address]   [phone]            Oct 26, 2018  7:30 AM CDT   Return Visit with NURSE ONLY CANCER CENTER   [facility] ([facility])    [address] [place] [address]   [phone]            Oct 26, 2018  8:00 AM CDT   Return Visit with Jammie Salgado MD   [facility] ([facility])    [address] [facility] [address]   [phone]            Oct 26, 2018  8:30 AM CDT   Level 5 with BAY 3 INFUSION   [facility] ([facility])    [address] [place] [address]   [phone]            Nov 07, 2018  2:15 PM CST   New Visit with Jessica Walton GC   [facility] ([facility])    [address] [facility] [address]   [phone]            Nov 09, 2018 11:00 AM CST   [place] SCREENING DIGITAL BILATERAL with [id number]   [facility])    [address] [facility] [id number] [phone]           How do I prepare for my exam? (Food and drink instructions)  "No Food and Drink Restrictions.  How do I prepare for my exam? (Other instructions) Do not use any powder, lotion or deodorant under your arms or on your breast. If you do, we will ask you to remove it before your exam.  What should I wear: Wear comfortable, two-piece clothing.  How long does the exam take: Most scans will take 15 minutes.  What should I bring: Bring any previous mammograms from other facilities or have them mailed to the breast center.  Do I need a :  No  is needed.  What do I need to tell my doctor: If you have any allergies, tell your care team.  What should I do after the exam: No restrictions, You may resume normal activities.  What is this test: This test is an x-ray of the breast to look for breast disease. The breast is pressed between two plates to flatten and spread the tissue. An X-ray is taken of the breast from different angles.  Who should I call with questions: If you have any questions, please call the Imaging Department where you will have your exam. Directions, parking instructions, and other information is available on our website, [email or web address].  Other information about my exam Three-dimensional (3D) mammograms are available at [facility] locations in [facility], [facility], [facility], [place], [facility] and [place]. [facility] locations include [place] and the [facility] in [place].  Benefits of 3D mammograms include * Improved rate of cancer detection * Decreases your chance of having to go back for more tests, which means fewer: * \"False-positive\" results (This means that there is an abnormal area but it isn't cancer.) * Invasive testing procedures, such as a biopsy or surgery * Can provide clearer images of the breast if you have dense breast tissue.  *3D mammography is an optional exam that anyone can have with a 2D mammogram. It doesn't replace or take the place of a 2D mammogram. 2D mammograms remain an " "effective screening test for all women.  Not all insurance companies cover the cost of a 3D mammogram. Check with your insurance. Three-dimensional (3D) mammograms are available at [facility] locations in [facility], [place], [place], [facility], [place], [place], and [place]. [facility] locations include [place] and [facility] in [place]. Benefits of 3D mammograms include: - Improved rate of cancer detection - Decreases your chance of having to go back for more tests, which means fewer: - \"False-positive\" results (This means that there is an abnormal area but it isn't cancer.) - Invasive testing procedures, such as a biopsy or surgery - Can provide clearer images of the breast if you have dense breast tissue. 3D mammography is an optional exam that anyone can have with a 2D mammogram. It doesn't replace or take the place of a 2D mammogram. 2D mammograms remain an effective screening test for all women.  Not all insurance companies cover the cost of a 3D mammogram. Check with your insurance.            Nov 16, 2018  8:15 AM CST   Return Visit with NURSE ONLY CANCER CENTER   [facility])    [address] [place] [address] [phone]            Nov 16, 2018  8:45 AM CST   Return Visit with SULMA Holman CNP   [facility])    [address] [place] [address] [phone]            Nov 16, 2018  9:30 AM CST   Level 5 with BAY 3 [facility])    [address] [place] [address] [phone]              Who to contact     If you have questions or need follow up information about today's clinic visit or your schedule please contact [facility] directly at [phone].  Normal or non-critical lab and imaging results will be communicated to you by [facility], letter " or phone within 4 business days after the clinic has received the results. If you do not hear from us within 7 days, please contact the clinic through [employer] or phone. If you have a critical or abnormal lab result, we will notify you by phone as soon as possible.  Submit refill requests through [employer] or call your pharmacy and they will forward the refill request to us. Please allow 3 business days for your refill to be completed.          Additional Information About Your Visit        Demo [employer] Information     [facility] gives you secure access to your electronic health record. If you see a primary care provider, you can also send messages to your care team and make appointments. If you have questions, please call your primary care clinic.  If you do not have a primary care provider, please call [phone] and they will assist you.      [facility] is an electronic gateway that provides easy, online access to your medical records. With [facility], you can request a clinic appointment, read your test results, renew a prescription or communicate with your care team.     To access your existing account, please contact your [facility] or call [phone] for assistance.        Care EveryWhere ID     This is your Care EveryWhere ID. This could be used by other organizations to access your [facility] medical records  [id number]         Blood Pressure from Last 3 Encounters:   09/14/18 161/84   09/13/18 (!) 156/91   08/24/18 139/89    Weight from Last 3 Encounters:   09/14/18 69.8 kg (153 lb 12.8 oz)   09/13/18 69.6 kg (153 lb 8 oz)   08/16/18 65.8 kg (145 lb)              We Performed the Following     CBC with platelets differential     Comprehensive metabolic panel     Magnesium        Primary Care Provider Office Phone # Fax #    Sonja Hernandez [phone]       [address]  [place] [facility] [address]        Equal Access to Services     ROMAN MILLARD AH:  Hadii aad ku hadsherrillo Sobelkisali, waaxda luqadaha, qaybta kaalmada bonnie, mae irmain hayaan isabelljian gerber ladkelkin zeus. So [facility] [phone].    ATENCIÓN: Si sam murray, tiene a perez disposición servicios gratuitos de asistencia lingüística. Llame al [phone].    We comply with applicable federal civil rights laws and [place] laws. We do not discriminate on the basis of race, color, national origin, age, disability, sex, sexual orientation, or gender identity.            Thank you!     Thank you for choosing [facility]  for your care. Our goal is always to provide you with excellent care. Hearing back from our patients is one way we can continue to improve our services. Please take a few minutes to complete the written survey that you may receive in the mail after your visit with us. Thank you!             Your Updated Medication List - Protect others around you: Learn how to safely use, store and throw away your medicines at www.disposemymeds.org.          This list is accurate as of 10/5/18  8:58 AM.  Always use your most recent med list.                   Brand Name Dispense Instructions for use Diagnosis    cetirizine 10 MG tablet    zyrTEC     Take 10 mg by mouth daily        dexamethasone 4 MG tablet    DECADRON    60 tablet    Take 5 tablets (20 mg) by mouth See Admin Instructions    Adverse effect of paclitaxel, initial encounter       hydrochlorothiazide 25 MG tablet    HYDRODIURIL    90 tablet    Take 1 tablet (25 mg) by mouth every morning    Hypertension goal BP (blood pressure) < 140/90       lidocaine-prilocaine cream    EMLA    30 g    Apply topically as needed for moderate pain    Ovarian cancer, unspecified laterality (H)       LORazepam 1 MG tablet    ATIVAN    30 tablet    Take 1 tablet (1 mg) by mouth every 6 hours as needed (nausea/vomiting, anxiety or sleep)    Ovarian cancer, unspecified laterality (H)       mometasone 50 MCG/ACT spray    NASONEX    1 Box    Spray 2  sprays into both nostrils daily    Chronic seasonal allergic rhinitis, unspecified trigger       pravastatin 20 MG tablet    PRAVACHOL    90 tablet    Take 1 tablet (20 mg) by mouth every evening    Hyperlipidemia LDL goal <130       prochlorperazine 10 MG tablet    COMPAZINE    30 tablet    Take 1 tablet (10 mg) by mouth every 6 hours as needed (nausea/vomiting)    Ovarian cancer, unspecified laterality (H)

## 2018-10-05 NOTE — PROGRESS NOTES
"Patient's name and  were verified.  See Doc Flowsheet - IV assess for details.  IVAD accessed with 20G 3\" tatum gripper plus needle  blood return positive: YES  Site without redness, tenderness or swelling: YES  flushed with 20cc NS and 5cc 100u/ml heparin  Needle: left accessed for chemotherapy  Comments: Labs drawn.  Patient tolerated procedure without incident.    Kingsley Mason  RN, BSN      "

## 2018-10-05 NOTE — PROGRESS NOTES
Infusion Nursing Note:  Di Evans presents today for C3 D1 Taxol/Carboplatin, Flu shot and Pneumonia shot.    Patient seen by provider today: Yes: Dr Duque   present during visit today: Not Applicable.    Note: Patient prefers IV Benadryl premed, vs oral, as she had a reaction with the first Taxol infusion.    Intravenous Access:  Implanted Port.    Treatment Conditions:  Lab Results   Component Value Date    HGB 11.6 10/05/2018     Lab Results   Component Value Date    WBC 3.3 10/05/2018      Lab Results   Component Value Date    ANEU 2.8 10/05/2018     Lab Results   Component Value Date     10/05/2018      Lab Results   Component Value Date     10/05/2018                   Lab Results   Component Value Date    POTASSIUM 3.4 10/05/2018           Lab Results   Component Value Date    MAG 1.7 10/05/2018            Lab Results   Component Value Date    CR 0.54 10/05/2018                   Lab Results   Component Value Date    TRACEY 9.9 10/05/2018                Lab Results   Component Value Date    BILITOTAL 0.2 10/05/2018           Lab Results   Component Value Date    ALBUMIN 3.4 10/05/2018                    Lab Results   Component Value Date    ALT 24 10/05/2018           Lab Results   Component Value Date    AST 20 10/05/2018       Results reviewed, labs MET treatment parameters, ok to proceed with treatment.    Injectable Influenza Immunization Documentation    1.  Has the patient received the information for the injectable influenza vaccine? YES     2. Is the patient 6 months of age or older? YES     3. Does the patient have any of the following contraindications?         Severe allergy to eggs?  No     Severe allergic reaction to previous influenza vaccines?  No   Severe allergy to latex?  No       History of Guillain-[place] syndrome?  No     Currently have a temperature greater than 100.4F?  No        4.  Severely egg allergic patients should have flu vaccine eligibility assessed  "by an MD, RN, or pharmacist, and those who received flu vaccine should be observed for 15 min by an MD, RN, Pharmacist, Medical Technician, or member of clinic staff.\": Not applicable    5. Latex-allergic patients should be given latex-free influenza vaccine Not applicable. Please reference the Vaccine latex table to determine if your clinic s product is latex-containing.       Vaccination given by Cyn Kim RN        Post Infusion Assessment:  Patient tolerated infusion without incident.  Patient tolerated injections without incident.  Blood return noted pre and post infusion.  Site patent and intact, free from redness, edema or discomfort.  No evidence of extravasations.  Access discontinued per protocol.    Discharge Plan:   Patient will return 10/26/18 for next appointment.   Patient discharged in stable condition accompanied by: friend.  Departure Mode: Ambulatory.    Cyn Kim RN                        "

## 2018-10-05 NOTE — LETTER
10/5/2018         RE: Di Evans  [address] [place] [address]        Dear Colleague,    Thank you for referring your patient, Di Evans, to the [facility]. Please see a copy of my visit note below.    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  She is accompanied today by her mother and roommate.  She is doing quite well with chemotherapy.  She notes some nausea for the first 3 days after chemotherapy but this is controlled with her lorazepam and compazine.  She notes some new heartburn for which she has started taking omeprazole.  Denies neuropathy.    Cancer Course:    She reports she has been having abdominal symptoms for the past 8 months.  She has been feeling bloated and distended for several months.  She has also had decreased appetite and early satiety.  She does not note any major changes in her bowel or bladder function.  She has not had significant pain.  She finally presented to the ED and demanded a CT which was suspicious for ovarian cancer.    18:  CT A/P:  Large cystic/solid mass within the pelvis highly suspicious for ovarian malignancy. 2.  Omental thickening suspicious for metastatic disease. 3.  Large volume of ascites.  Malignant ascites cannot be excluded. 4.  Small left pleural effusion and left lower lobe atelectasis. 5.  Diverticulosis, small hiatal hernia, and gallbladder sludge.    18:  Exploratory laparotomy, modified radical hysterectomy, bilateral salpingo-oophorectomy, omentectomy, right pelvic and bilateral para-aortic lymph node dissection, CUSA tumor debulking, mobilization of the entire colon, stripping of left pelvic peritoneum, proctoscopy, optimal tumor debulking to no gross  residual disease   Pathology:  Stage IIIB mixed clear cell (80%) and endometrioid (20%) adenocarcinoma, + pelvic LN, + PA LN, + omentum    8/24/18:  Cycle #1 carboplatin AUC6 and paclitaxel 175 mg/m2    9/14/18:  Cycle #2 carboplatin AUC6 and paclitaxel 175 mg/m2    Review of Systems:  Systemic           no weight changes; no fever; no chills; no night sweats; no appetite changes; + fatigue  Skin           no rashes, or lesions; + hair loss  Eye           no irritation; no changes in vision  Janice-Laryngeal           no dysphagia; no hoarseness   Pulmonary    no cough; no shortness of breath  Cardiovascular    no chest pain; no palpitations  Gastrointestinal    no diarrhea; no constipation; + heartburn; no abdominal pain; no changes in bowel  habits; no blood in stool  Genitourinary   no urinary frequency; no urinary urgency; no dysuria; no pain; no abnormal vaginal discharge; no abnormal vaginal bleeding  Breast    no breast discharge; no breast changes; no breast pain  Musculoskeletal    no myalgias; no arthralgias; no back pain  Psychiatric           no depressed mood; no anxiety    Hematologic            no tender lymph nodes; no noticeable swellings or lumps   Endocrine    no hot flashes; no heat/cold intolerance         Neurological   no tremor; no numbness and tingling; no headaches; no difficulty sleeping    Obstetrics and Gynecology History:  G0  Menopause age 44, no HRT      Past Medical History:  Past Medical History:   Diagnosis Date     Hypertension      Ovarian cancer (H)        Past Surgical History:  Past Surgical History:   Procedure Laterality Date     HYSTERECTOMY TOTAL ABDOMINAL, BILATERAL SALPINGO-OOPHORECTOMY, COMBINED Bilateral 7/30/2018    Procedure: COMBINED HYSTERECTOMY TOTAL ABDOMINAL, SALPINGO-OOPHORECTOMY;;  Surgeon: Jammie Dueñas MD;  Location: [facility] OR     LAPAROTOMY, TUMOR DEBULKING, COMBINED Bilateral 7/30/2018    Procedure: COMBINED LAPAROTOMY, TUMOR DEBULKING;  Exploratory  Laparotomy, Modified Radical Hysterectomy, Removal of Cervix, Bilateral Salpingo - Oophorectomy, Omentectomy, Bilateral Para Aortic and Left Pelvic Lymph Node Dissection, Tumor Debulking, Proctoscopy;  Surgeon: Jammie Dueñas MD;  Location:  OR     SURGICAL HISTORY OF -   1980    left ankle surgery       Health Maintenance:  Last Pap Smear: No need for further pap smear exams  She has not had a history of abnormal Pap smears.    Last Mammogram: 11/15/16              Result: normal      She has not had a history of abnormal mammograms.    Last Colonoscopy: Never      Current Medications:   has a current medication list which includes the following prescription(s): cetirizine, dexamethasone, hydrochlorothiazide, lidocaine-prilocaine, lorazepam, mometasone, pravastatin, and prochlorperazine, and the following Facility-Administered Medications: heparin and sodium chloride (pf).     Allergies:   Gemfibrozil; Lovastatin; and Penicillins-unknown reaction as a child      Social History:  Social History     Social History     Marital status: Single     Spouse name: N/A     Number of children: 0     Years of education: N/A     Occupational History     manufacturing circuit boards [employer]     Social History Main Topics     Smoking status: Never Smoker     Smokeless tobacco: Never Used     Alcohol use Yes      Comment: occasional     Drug use: No     Sexual activity: No     Other Topics Concern     Parent/Sibling W/ Cabg, Mi Or Angioplasty Before 65f 55m? No      Service No     Blood Transfusions No     Caffeine Concern Yes     Occupational Exposure No     Hobby Hazards No     Sleep Concern No     Stress Concern No     Weight Concern Yes     Special Diet No     Back Care No     Exercise Yes     Bike Helmet No     Yes in the future     Seat Belt Yes     Self-Exams Yes     Social History Narrative       Lives with a roommate, feels safe at home.  Works as a .  Enjoys gambling.  Does  not have an advanced directive on file and would like her roommate, Alivia Hayes to be her POA.  Would like full resuscitation if reversible cause is identified, however would not like to be kept on life sustaining measures long-term.     Family History:   The patient's family history is significant for.  Family History   Problem Relation Age of Onset     Hypertension Mother      Hypertension Father      Cerebrovascular Disease Father      polycythemia     Breast Cancer Maternal Aunt      older age         Physical Exam:   /72  Pulse 119  Temp 98.2  F (36.8  C) (Oral)  Wt 69.3 kg (152 lb 12.8 oz)  SpO2 98%  BMI 25.43 kg/m2  Body mass index is 25.43 kg/(m^2).    General Appearance: healthy and alert, no distress     HEENT:  no thyromegaly, no palpable nodules or masses        Cardiovascular: regular rate and rhythm, no gallops, rubs or murmurs     Respiratory: lungs clear, no rales, rhonchi or wheezes, normal diaphragmatic excursion    Musculoskeletal: extremities non tender and without edema    Skin: no lesions or rashes     Neurological: normal gait, no gross defects     Psychiatric: appropriate mood and affect                               Hematological: normal cervical, supraclavicular and inguinal lymph nodes     Gastrointestinal:       abdomen soft, non-tender, non-distended, no organomegaly or masses    Genitourinary: Deferred    Labs:  WBC 3.3 with ANC 2.8.  Hemoglobin 11.6.  Platelets 433.  Creatinine 0.54.  Potassium 3.4.  Magnesium 1.7.  Remainder of electrolytes within normal limits.  AST 20, ALT 24, alkaline phosphatase 88, total bilirubin 0.2.  Albumin 3.4.       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     A total of 30 minutes was spent with the patient, >50% of which were spent in counseling the patient and/or treatment planning.      Plan:     1.)    Stage IIIB mixed clear cell and endometrioid  ovarian cancer.  Plan for 6 cycles of carboplatin and paclitaxel. Ok to proceed with cycle #3 today.  She will return on 10/26 for cycle #4.    2.) Chemotherapy side effects    -Heartburn-Cont omeprazole, call if symptoms not improving   -nausea-cont lorazepam and compazine PRN     3.) Genetic risk factors were assessed and the patient has an appointment scheduled for 11/7    4.) Labs and/or tests ordered include:  Pre-chemotherapy labs reviewed and ok to proceed     5.) Health maintenance issues addressed today include pt is due for a mammogram and colonoscopy which will be addressed after treatment of her ovarian cancer.           Thank you for allowing us to participate in the care of your patient.         Sincerely,    Jammie Duque MD  Gynecologic Oncology  [facility]       CC  CENTER, [facility]

## 2018-10-05 NOTE — MR AVS SNAPSHOT
After Visit Summary   10/5/2018    Di Evans    MRN: [id number]           Patient Information     Date Of Birth          1960        Visit Information        Provider Department      10/5/2018 9:30 AM Jammie Dueñas MD [facility]        Today's Diagnoses     Ovarian cancer, unspecified laterality (H)    -  1      Care Instructions    Next visit and chemotherapy with Dr Duque on 10/26          Follow-ups after your visit        Your next 10 appointments already scheduled     Nov 16, 2018  8:15 AM CST   Return Visit with [facility]   [facility] ([facility]    [address] [place] [address]   [phone]            Nov 16, 2018  8:45 AM CST   Return Visit with SULMA [facility] ([facility])    [address] [facility] [address]   [phone]            Nov 16, 2018  9:30 AM CST   Level 5 with BAY 3 INFUSION   [facility])    [address] [place] [address]   [phone]            Dec 07, 2018  8:15 AM CST   Return Visit with [facility]   [facility] ([facility])    [address] [facility] [address]   [phone]            Dec 07, 2018  9:00 AM CST   Return Visit with Jammie Salgado MD   [facility])    [address] [facility] [address]   [phone]            Dec 07, 2018  9:30 AM CST   Level 5 with BAY 5 INFUSION   [facility] ([facility]    [address] [facility] [address]   [phone]            Jan 02, 2019  1:15 PM CST   Return Visit with Jessica Walton GC   [facility] ([facility])    [address] [facility] [address]    [phone]              Who to contact     If you have questions or need follow up information about today's clinic visit or your schedule please contact [facility] directly at [phone].  Normal or non-critical lab and imaging results will be communicated to you by MyChart, letter or phone within 4 business days after the clinic has received the results. If you do not hear from us within 7 days, please contact the clinic through MyChart or phone. If you have a critical or abnormal lab result, we will notify you by phone as soon as possible.  Submit refill requests through [employer] or call your pharmacy and they will forward the refill request to us. Please allow 3 business days for your refill to be completed.          Additional Information About Your Visit        [employer] Information     [employer] gives you secure access to your electronic health record. If you see a primary care provider, you can also send messages to your care team and make appointments. If you have questions, please call your primary care clinic.  If you do not have a primary care provider, please call [phone] and they will assist you.      [employer] is an electronic gateway that provides easy, online access to your medical records. With [employer], you can request a clinic appointment, read your test results, renew a prescription or communicate with your care team.     To access your existing account, please contact your [facility] or call [phone] for assistance.        Care EveryWhere ID     This is your Care EveryWhere ID. This could be used by other organizations to access your [facility] medical records  [id number]        Your Vitals Were     Pulse Temperature Pulse Oximetry BMI (Body Mass Index)          119 98.2  F (36.8  C) (Oral) 98% 25.43 kg/m2         Blood Pressure from Last 3 Encounters:   11/05/18 134/86   10/26/18 (!) 166/102   10/26/18 (!) 168/100    Weight from Last 3  Encounters:   11/05/18 68.5 kg (151 lb)   10/26/18 69 kg (152 lb 2 oz)   10/05/18 69.3 kg (152 lb 12.8 oz)              Today, you had the following     No orders found for display       Primary Care Provider Office Phone # Fax Clive Ngo Dylan Hernandez [phone]       [address]  [place] [address]        Equal Access to Services     [facility] : Hadii aad ku hadasho Soomaali, waaxda luqadaha, qaybta kaalmada adeegyada, waxay idiin hayaan adeeg kharash la'elizabeth . So [facility] [phone].    ATENCIÓN: Si habla español, tiene a perez disposición servicios gratuitos de asistencia lingüística. [facility] [phone].    We comply with applicable federal civil rights laws and [place] laws. We do not discriminate on the basis of race, color, national origin, age, disability, sex, sexual orientation, or gender identity.            Thank you!     Thank you for choosing [facility]  for your care. Our goal is always to provide you with excellent care. Hearing back from our patients is one way we can continue to improve our services. Please take a few minutes to complete the written survey that you may receive in the mail after your visit with us. Thank you!             Your Updated Medication List - Protect others around you: Learn how to safely use, store and throw away your medicines at www.disposemymeds.org.          This list is accurate as of 10/5/18 11:59 PM.  Always use your most recent med list.                   Brand Name Dispense Instructions for use Diagnosis    dexamethasone 4 MG tablet    DECADRON    60 tablet    Take 5 tablets (20 mg) by mouth See Admin Instructions    Adverse effect of paclitaxel, initial encounter

## 2018-10-23 NOTE — TELEPHONE ENCOUNTER
Patient calling to report she chipped a tooth and is wondering if she can go to the dentist tomorrow. Next chemo is scheduled in 3 days. Per Dr Duque, it is ok to have tooth fixed tomorrow.  Radha Wyman  RN, BSN, OCN

## 2018-10-25 NOTE — PATIENT INSTRUCTIONS
Next visit and chemotherapy with ANDERSON Richards on 11/16    Cycle #6 and visit with Dr Duque on 12/7

## 2018-10-25 NOTE — PROGRESS NOTES
Consult Notes on Referred Patient         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  She is accompanied today by her mother and roommate.  She is doing quite well with chemotherapy.  She notes some constipation approximately 4 days after chemotherapy lasting 3 days.  Denies neuropathy.     Cancer Course:    She reports she has been having abdominal symptoms for the past 8 months.  She has been feeling bloated and distended for several months.  She has also had decreased appetite and early satiety.  She does not note any major changes in her bowel or bladder function.  She has not had significant pain.  She finally presented to the ED and demanded a CT which was suspicious for ovarian cancer.    18:  CT A/P:  Large cystic/solid mass within the pelvis highly suspicious for ovarian malignancy. 2.  Omental thickening suspicious for metastatic disease. 3.  Large volume of ascites.  Malignant ascites cannot be excluded. 4.  Small left pleural effusion and left lower lobe atelectasis. 5.  Diverticulosis, small hiatal hernia, and gallbladder sludge.    18:  = 598    18:  CT Chest:  1. No definite metastatic disease in the chest. 2. Small left pleural effusion. 3. Moderate ascites with mesenteric and omental edema/nodularity, better evaluated on CT 2018:  Exploratory laparotomy, modified radical hysterectomy, bilateral salpingo-oophorectomy, omentectomy, right pelvic and bilateral para-aortic lymph node dissection, CUSA tumor debulking, mobilization of the entire colon, stripping of left pelvic peritoneum, proctoscopy, optimal tumor debulking to no gross residual disease   Pathology:  Stage IIIB mixed clear cell (80%) and endometrioid (20%) adenocarcinoma, + pelvic LN, + PA LN, +  omentum    8/24/18:  Cycle #1 carboplatin AUC6 and paclitaxel 175 mg/m2,  = 110    9/14/18:  Cycle #2 carboplatin AUC6 and paclitaxel 175 mg/m2,  = 48    10/5/18:  Cycle #3 carboplatin AUC 6 and paclitaxel 175 mg/m2,  = 59    Review of Systems:  Systemic           no weight changes; no fever; no chills; no night sweats; no appetite changes; + fatigue; + weakness  Skin           no rashes, or lesions; + hair loss  Eye           no irritation; no changes in vision  Janice-Laryngeal           no dysphagia; no hoarseness   Pulmonary    no cough; no shortness of breath  Cardiovascular    no chest pain; no palpitations  Gastrointestinal    no diarrhea; + constipation; no heartburn; no abdominal pain; no changes in bowel  habits; no blood in stool  Genitourinary   no urinary frequency; no urinary urgency; no dysuria; no pain; no abnormal vaginal discharge; no abnormal vaginal bleeding  Breast    no breast discharge; no breast changes; no breast pain  Musculoskeletal    no myalgias; no arthralgias; no back pain  Psychiatric           no depressed mood; no anxiety    Hematologic            no tender lymph nodes; no noticeable swellings or lumps   Endocrine    no hot flashes; no heat/cold intolerance         Neurological   no tremor; no numbness and tingling; no headaches; no difficulty sleeping    Obstetrics and Gynecology History:  G0  Menopause age 44, no HRT      Past Medical History:  Past Medical History:   Diagnosis Date     Hypertension      Ovarian cancer (H)        Past Surgical History:  Past Surgical History:   Procedure Laterality Date     HYSTERECTOMY TOTAL ABDOMINAL, BILATERAL SALPINGO-OOPHORECTOMY, COMBINED Bilateral 7/30/2018    Procedure: COMBINED HYSTERECTOMY TOTAL ABDOMINAL, SALPINGO-OOPHORECTOMY;;  Surgeon: Jammie Dueñas MD;  Location: [facility] OR     LAPAROTOMY, TUMOR DEBULKING, COMBINED Bilateral 7/30/2018    Procedure: COMBINED LAPAROTOMY, TUMOR DEBULKING;  Exploratory Laparotomy,  Modified Radical Hysterectomy, Removal of Cervix, Bilateral Salpingo - Oophorectomy, Omentectomy, Bilateral Para Aortic and Left Pelvic Lymph Node Dissection, Tumor Debulking, Proctoscopy;  Surgeon: Jammie Dueñas MD;  Location:  OR     SURGICAL HISTORY OF -   1980    left ankle surgery       Health Maintenance:  Last Pap Smear: No need for further pap smear exams  She has not had a history of abnormal Pap smears.    Last Mammogram: 11/15/16              Result: normal      She has not had a history of abnormal mammograms.    Last Colonoscopy: Never      Current Medications:   has a current medication list which includes the following prescription(s): cetirizine, dexamethasone, hydrochlorothiazide, lidocaine-prilocaine, lorazepam, mometasone, pravastatin, and prochlorperazine.     Allergies:   Gemfibrozil; Lovastatin; and Penicillins-unknown reaction as a child      Social History:  Social History     Social History     Marital status: Single     Spouse name: N/A     Number of children: 0     Years of education: N/A     Occupational History     manufacturing [employer]     Social History Main Topics     Smoking status: Never Smoker     Smokeless tobacco: Never Used     Alcohol use Yes      Comment: occasional     Drug use: No     Sexual activity: No     Other Topics Concern     Parent/Sibling W/ Cabg, Mi Or Angioplasty Before 65f 55m? No      Service No     Blood Transfusions No     Caffeine Concern Yes     Occupational Exposure No     Hobby Hazards No     Sleep Concern No     Stress Concern No     Weight Concern Yes     Special Diet No     Back Care No     Exercise Yes     Bike Helmet No     Yes in the future     Seat Belt Yes     Self-Exams Yes     Social History Narrative       Lives with a roommate, feels safe at home.  Works as a .  Enjoys gambling.  Does not have an advanced directive on file and would like her roommate, Alivia Hayes to be her POA.   "Would like full resuscitation if reversible cause is identified, however would not like to be kept on life sustaining measures long-term.     Family History:   The patient's family history is significant for.  Family History   Problem Relation Age of Onset     Hypertension Mother      Hypertension Father      Cerebrovascular Disease Father      polycythemia     Breast Cancer Maternal Aunt      older age         Physical Exam:   BP (!) 168/100 (BP Location: Right arm)  Pulse 100  Temp 98.4  F (36.9  C) (Oral)  Resp 16  Ht 1.651 m (5' 5\")  Wt 69 kg (152 lb 2 oz)  SpO2 97%  BMI 25.31 kg/m2  Body mass index is 25.31 kg/(m^2).    General Appearance: healthy and alert, no distress     HEENT:  no thyromegaly, no palpable nodules or masses        Cardiovascular: regular rate and rhythm, no gallops, rubs or murmurs     Respiratory: lungs clear, no rales, rhonchi or wheezes, normal diaphragmatic excursion    Musculoskeletal: extremities non tender and without edema    Skin: no lesions or rashes     Neurological: normal gait, no gross defects     Psychiatric: appropriate mood and affect                               Hematological: normal cervical, supraclavicular and inguinal lymph nodes     Gastrointestinal:       abdomen soft, non-tender, non-distended, no organomegaly or masses    Genitourinary: Deferred    Labs:  WBC 6.3 with ANC 5.7.  Hemoglobin 12.2.  Platelets 137.  Creatinine 0.54.  Potassium 3.2.  Magnesium 1.6.  Remainder of electrolytes within normal limits.  AST 24, ALT 27, alkaline phosphatase 93, total bilirubin 0.2.  Albumin 3.4.        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     A total of 30 minutes was spent with the patient, >50% of which were spent in counseling the patient and/or treatment planning.      Plan:     1.)    Stage IIIB mixed clear cell and endometrioid ovarian cancer.  Plan for 6 cycles of " carboplatin and paclitaxel. Ok to proceed with cycle #4 today.  She will return on 11/16 for cycle #5.    2.) Chemotherapy side effects    -Heartburn-Cont omeprazole, call if symptoms not improving   -nausea-cont lorazepam and compazine PRN   -Constipation.  She has been taking stool softeners which I recommend continuing.  I have also recommended adding Miralax daily or twice daily on the days where her constipation is worst.     3.) Genetic risk factors were assessed and the patient has an appointment scheduled for 11/7    4.) Labs and/or tests ordered include:  Pre-chemotherapy labs reviewed and ok to proceed     5.) Health maintenance issues addressed today include pt is due for a mammogram and colonoscopy which will be addressed after treatment of her ovarian cancer.    6.) Elevated BP.  Pt asymptomatic.  Have asked her to see her PCP for possible medication management           Thank you for allowing us to participate in the care of your patient.         Sincerely,    Jammie Duque MD  Gynecologic Oncology  [facility]         CENTER, [facility]

## 2018-10-26 NOTE — MR AVS SNAPSHOT
After Visit Summary   10/26/2018    Di Evans    MRN: [id number]           Patient Information     Date Of Birth          1960        Visit Information        Provider Department      10/26/2018 8:30 AM [address]        Today's Diagnoses     Ovarian cancer, unspecified laterality (H)    -  1    Hypokalemia           Follow-ups after your visit        Your next 10 appointments already scheduled     Oct 30, 2018 11:40 AM CDT   [facility]    [address] [facility] [id number] [phone]           Bring a current list of meds and any records pertaining to this visit. For Physicals, please bring immunization records and any forms needing to be filled out. Please arrive 10 minutes early to complete paperwork.            Nov 07, 2018  2:15 PM CST   New Visit with Jessica Walton GC   [facility]    [address] [place] [id number] [phone]            Nov 09, 2018 11:00 AM CST   MA SCREENING DIGITAL BILATERAL with [id number]   [facility]    [address] [facility] [id number] [phone]           How do I prepare for my exam? (Food and drink instructions) No Food and Drink Restrictions.  How do I prepare for my exam? (Other instructions) Do not use any powder, lotion or deodorant under your arms or on your breast. If you do, we will ask you to remove it before your exam.  What should I wear: Wear comfortable, two-piece clothing.  How long does the exam take: Most scans will take 15 minutes.  What should I bring: Bring any previous mammograms from other facilities or have them mailed to the breast center.  Do I need a :  No  is needed.  What do I need to tell my doctor: If you have any allergies, tell  "your care team.  What should I do after the exam: No restrictions, You may resume normal activities.  What is this test: This test is an x-ray of the breast to look for breast disease. The breast is pressed between two plates to flatten and spread the tissue. An X-ray is taken of the breast from different angles.  Who should I call with questions: If you have any questions, please call the Imaging Department where you will have your exam. Directions, parking instructions, and other information is available on our website, [email or web address].  Other information about my exam Three-dimensional (3D) mammograms are available at [facility] locations in [facility], [facility], [place], [facility] and [place]. [facility] locations include [place] and [facility] in [place].  Benefits of 3D mammograms include * Improved rate of cancer detection * Decreases your chance of having to go back for more tests, which means fewer: * \"False-positive\" results (This means that there is an abnormal area but it isn't cancer.) * Invasive testing procedures, such as a biopsy or surgery * Can provide clearer images of the breast if you have dense breast tissue.  *3D mammography is an optional exam that anyone can have with a 2D mammogram. It doesn't replace or take the place of a 2D mammogram. 2D mammograms remain an effective screening test for all women.  Not all insurance companies cover the cost of a 3D mammogram. Check with your insurance. Three-dimensional (3D) mammograms are available at [facility] locations in [facility], [facility], [facility], and [place]. Health locations include [place] and [facility] in [place]. Benefits of 3D mammograms include: - Improved rate of cancer detection - Decreases your chance of having to go back for more tests, which means fewer: - \"False-positive\" results (This means that there " is an abnormal area but it isn't cancer.) - Invasive testing procedures, such as a biopsy or surgery - Can provide clearer images of the breast if you have dense breast tissue. 3D mammography is an optional exam that anyone can have with a 2D mammogram. It doesn't replace or take the place of a 2D mammogram. 2D mammograms remain an effective screening test for all women.  Not all insurance companies cover the cost of a 3D mammogram. Check with your insurance.            Nov 16, 2018  8:15 AM CST   Return Visit with [facility]   [facility] ([facility])    [address] [place] [address]   [phone]            Nov 16, 2018  8:45 AM CST   Return Visit with SULMA Holman CNP   [facility])    [address] [place] [address]   [phone]            Nov 16, 2018  9:30 AM CST   Level 5 with BAY 3 INFUSION   [facility])    [address] [place] [address]   [phone]            Dec 07, 2018  8:15 AM CST   Return Visit with [facility] ONLY [facility]   [facility] ([facility])    [address] [place] [address]   [phone]            Dec 07, 2018  9:00 AM CST   Return Visit with Jammie Salgado MD   [facility])    [address] [place] [address]   [phone]            Dec 07, 2018  9:30 AM CST   Level 5 with BAY 5 INFUSION   [facility])    [address] [facility] [address]   [phone]              Who to contact     If you have questions or need follow up information about today's clinic visit or your schedule please contact [facility] directly at [phone].  Normal or non-critical lab and imaging results will be  communicated to you by [id number], letter or phone within 4 business days after the clinic has received the results. If you do not hear from us within 7 days, please contact the clinic through [employer] or phone. If you have a critical or abnormal lab result, we will notify you by phone as soon as possible.  Submit refill requests through [employer] or call your pharmacy and they will forward the refill request to us. Please allow 3 business days for your refill to be completed.          Additional Information About Your Visit        [employer] Information     [employer] gives you secure access to your electronic health record. If you see a primary care provider, you can also send messages to your care team and make appointments. If you have questions, please call your primary care clinic.  If you do not have a primary care provider, please call [phone] and they will assist you.      [employer] is an electronic gateway that provides easy, online access to your medical records. With [employer], you can request a clinic appointment, read your test results, renew a prescription or communicate with your care team.     To access your existing account, please contact your [facility] or call [phone] for assistance.        Care EveryWhere ID     This is your Care EveryWhere ID. This could be used by other organizations to access your [facility] medical records  [id number]         Blood Pressure from Last 3 Encounters:   10/26/18 (!) 166/102   10/26/18 (!) 168/100   10/05/18 (!) 167/91    Weight from Last 3 Encounters:   10/26/18 69 kg (152 lb 2 oz)   10/05/18 69.3 kg (152 lb 12.8 oz)   09/14/18 69.8 kg (153 lb 12.8 oz)              Today, you had the following     No orders found for display         Today's Medication Changes          These changes are accurate as of 10/26/18  4:15 PM.  If you have any questions, ask your nurse or doctor.               Start taking these medicines.         Dose/Directions    LORazepam 1 MG tablet   Commonly known as:  ATIVAN   Used for:  Ovarian cancer, unspecified laterality (H)   Started by:  Sidney Mason, [facility]        Dose:  1 mg   Take 1 tablet (1 mg) by mouth every 6 hours as needed (nausea/vomiting, anxiety or sleep)   Quantity:  30 tablet   Refills:  5         These medicines have changed or have updated prescriptions.        Dose/Directions    senna-docusate 8.6-50 MG per tablet   Commonly known as:  SENOKOT-S;PERICOLACE   This may have changed:  how much to take   Used for:  Drug-induced constipation   Changed by:  Jammie Dueñas MD        Dose:  2 tablet   Take 2 tablets by mouth daily   Quantity:  100 tablet   Refills:  3            Where to get your medicines      These medications were sent to [facility] [id number]  [place] - [address] [place] AT [facility] [address]  [address] [place] [address]     Phone:  [phone]     prochlorperazine 10 MG tablet    senna-docusate 8.6-50 MG per tablet         Some of these will need a paper prescription and others can be bought over the counter.  Ask your nurse if you have questions.     Bring a paper prescription for each of these medications     LORazepam 1 MG tablet                Primary Care Provider Office Phone # Fax #    Sonja Jeni Hernandez [phone] [address]  [place] [facility] [address]        Equal Access to Services     [facility] AH: Hadii nakita dubose hadasho Sobelkisali, waaxda luqadaha, qaybta kaalmada adeegyada, mae schulz. So [facility] [phone].    ATENCIÓN: Si habla español, tiene a perez disposición servicios gratuitos de asistencia lingüística. Llame al [phone].    We comply with applicable federal civil rights laws and [place] laws. We do not discriminate on the basis of race, color, national origin, age, disability, sex, sexual orientation, or gender identity.            Thank  you!     Thank you for choosing [facility]  for your care. Our goal is always to provide you with excellent care. Hearing back from our patients is one way we can continue to improve our services. Please take a few minutes to complete the written survey that you may receive in the mail after your visit with us. Thank you!             Your Updated Medication List - Protect others around you: Learn how to safely use, store and throw away your medicines at www.disposemymeds.org.          This list is accurate as of 10/26/18  4:15 PM.  Always use your most recent med list.                   Brand Name Dispense Instructions for use Diagnosis    cetirizine 10 MG tablet    zyrTEC     Take 10 mg by mouth daily        dexamethasone 4 MG tablet    DECADRON    60 tablet    Take 5 tablets (20 mg) by mouth See Admin Instructions    Adverse effect of paclitaxel, initial encounter       hydrochlorothiazide 25 MG tablet    HYDRODIURIL    90 tablet    Take 1 tablet (25 mg) by mouth every morning    Hypertension goal BP (blood pressure) < 140/90       lidocaine-prilocaine cream    EMLA    30 g    Apply topically as needed for moderate pain    Ovarian cancer, unspecified laterality (H)       LORazepam 1 MG tablet    ATIVAN    30 tablet    Take 1 tablet (1 mg) by mouth every 6 hours as needed (nausea/vomiting, anxiety or sleep)    Ovarian cancer, unspecified laterality (H)       mometasone 50 MCG/ACT spray    NASONEX    1 Box    Spray 2 sprays into both nostrils daily    Chronic seasonal allergic rhinitis, unspecified trigger       pravastatin 20 MG tablet    PRAVACHOL    90 tablet    Take 1 tablet (20 mg) by mouth every evening    Hyperlipidemia LDL goal <130       prochlorperazine 10 MG tablet    COMPAZINE    30 tablet    Take 1 tablet (10 mg) by mouth every 6 hours as needed (nausea/vomiting)    Ovarian cancer, unspecified laterality (H)       senna-docusate 8.6-50 MG per tablet    SENOKOT-S;PERICOLACE    100  tablet    Take 2 tablets by mouth daily    Drug-induced constipation

## 2018-10-26 NOTE — LETTER
10/26/2018         RE: Di Evans  [address] [place] [address]        Dear Colleague,    Thank you for referring your patient, Di Evans, to the [facility]. Please see a copy of my visit note below.    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  She is accompanied today by her mother and roommate.  She is doing quite well with chemotherapy.  She notes some constipation approximately 4 days after chemotherapy lasting 3 days.  Denies neuropathy.     Cancer Course:    She reports she has been having abdominal symptoms for the past 8 months.  She has been feeling bloated and distended for several months.  She has also had decreased appetite and early satiety.  She does not note any major changes in her bowel or bladder function.  She has not had significant pain.  She finally presented to the ED and demanded a CT which was suspicious for ovarian cancer.    18:  CT A/P:  Large cystic/solid mass within the pelvis highly suspicious for ovarian malignancy. 2.  Omental thickening suspicious for metastatic disease. 3.  Large volume of ascites.  Malignant ascites cannot be excluded. 4.  Small left pleural effusion and left lower lobe atelectasis. 5.  Diverticulosis, small hiatal hernia, and gallbladder sludge.    18:  = 598    18:  CT Chest:  1. No definite metastatic disease in the chest. 2. Small left pleural effusion. 3. Moderate ascites with mesenteric and omental edema/nodularity, better evaluated on CT 2018:  Exploratory laparotomy, modified radical hysterectomy, bilateral salpingo-oophorectomy, omentectomy, right pelvic and bilateral para-aortic lymph node dissection, CUSA tumor debulking,  mobilization of the entire colon, stripping of left pelvic peritoneum, proctoscopy, optimal tumor debulking to no gross residual disease   Pathology:  Stage IIIB mixed clear cell (80%) and endometrioid (20%) adenocarcinoma, + pelvic LN, + PA LN, + omentum    8/24/18:  Cycle #1 carboplatin AUC6 and paclitaxel 175 mg/m2,  = 110    9/14/18:  Cycle #2 carboplatin AUC6 and paclitaxel 175 mg/m2,  = 48    10/5/18:  Cycle #3 carboplatin AUC 6 and paclitaxel 175 mg/m2,  = 59    Review of Systems:  Systemic           no weight changes; no fever; no chills; no night sweats; no appetite changes; + fatigue; + weakness  Skin           no rashes, or lesions; + hair loss  Eye           no irritation; no changes in vision  Janice-Laryngeal           no dysphagia; no hoarseness   Pulmonary    no cough; no shortness of breath  Cardiovascular    no chest pain; no palpitations  Gastrointestinal    no diarrhea; + constipation; no heartburn; no abdominal pain; no changes in bowel  habits; no blood in stool  Genitourinary   no urinary frequency; no urinary urgency; no dysuria; no pain; no abnormal vaginal discharge; no abnormal vaginal bleeding  Breast    no breast discharge; no breast changes; no breast pain  Musculoskeletal    no myalgias; no arthralgias; no back pain  Psychiatric           no depressed mood; no anxiety    Hematologic            no tender lymph nodes; no noticeable swellings or lumps   Endocrine    no hot flashes; no heat/cold intolerance         Neurological   no tremor; no numbness and tingling; no headaches; no difficulty sleeping    Obstetrics and Gynecology History:  G0  Menopause age 44, no HRT      Past Medical History:  Past Medical History:   Diagnosis Date     Hypertension      Ovarian cancer (H)        Past Surgical History:  Past Surgical History:   Procedure Laterality Date     HYSTERECTOMY TOTAL ABDOMINAL, BILATERAL SALPINGO-OOPHORECTOMY, COMBINED Bilateral 7/30/2018    Procedure: COMBINED  HYSTERECTOMY TOTAL ABDOMINAL, SALPINGO-OOPHORECTOMY;;  Surgeon: Jammie Dueñas MD;  Location: [facility] OR     LAPAROTOMY, TUMOR DEBULKING, COMBINED Bilateral 7/30/2018    Procedure: COMBINED LAPAROTOMY, TUMOR DEBULKING;  Exploratory Laparotomy, Modified Radical Hysterectomy, Removal of Cervix, Bilateral Salpingo - Oophorectomy, Omentectomy, Bilateral Para Aortic and Left Pelvic Lymph Node Dissection, Tumor Debulking, Proctoscopy;  Surgeon: Jammie Dueñas MD;  Location: [facility] OR     SURGICAL HISTORY OF -   1980    left ankle surgery       Health Maintenance:  Last Pap Smear: No need for further pap smear exams  She has not had a history of abnormal Pap smears.    Last Mammogram: 11/15/16              Result: normal      She has not had a history of abnormal mammograms.    Last Colonoscopy: Never      Current Medications:   has a current medication list which includes the following prescription(s): cetirizine, dexamethasone, hydrochlorothiazide, lidocaine-prilocaine, lorazepam, mometasone, pravastatin, and prochlorperazine.     Allergies:   Gemfibrozil; Lovastatin; and Penicillins-unknown reaction as a child      Social History:  Social History     Social History     Marital status: Single     Spouse name: N/A     Number of children: 0     Years of education: N/A     Occupational History     manufacturing circuit boards [employer]     Social History Main Topics     Smoking status: Never Smoker     Smokeless tobacco: Never Used     Alcohol use Yes      Comment: occasional     Drug use: No     Sexual activity: No     Other Topics Concern     Parent/Sibling W/ Cabg, Mi Or Angioplasty Before 65f 55m? No      Service No     Blood Transfusions No     Caffeine Concern Yes     Occupational Exposure No     Hobby Hazards No     Sleep Concern No     Stress Concern No     Weight Concern Yes     Special Diet No     Back Care No     Exercise Yes     Bike Helmet No     Yes in the future     Seat Belt Yes  "    Self-Exams Yes     Social History Narrative       Lives with a roommate, feels safe at home.  Works as a .  Enjoys gambling.  Does not have an advanced directive on file and would like her roommate, Alivia Hayes to be her POA.  Would like full resuscitation if reversible cause is identified, however would not like to be kept on life sustaining measures long-term.     Family History:   The patient's family history is significant for.  Family History   Problem Relation Age of Onset     Hypertension Mother      Hypertension Father      Cerebrovascular Disease Father      polycythemia     Breast Cancer Maternal Aunt      older age         Physical Exam:   BP (!) 168/100 (BP Location: Right arm)  Pulse 100  Temp 98.4  F (36.9  C) (Oral)  Resp 16  Ht 1.651 m (5' 5\")  Wt 69 kg (152 lb 2 oz)  SpO2 97%  BMI 25.31 kg/m2  Body mass index is 25.31 kg/(m^2).    General Appearance: healthy and alert, no distress     HEENT:  no thyromegaly, no palpable nodules or masses        Cardiovascular: regular rate and rhythm, no gallops, rubs or murmurs     Respiratory: lungs clear, no rales, rhonchi or wheezes, normal diaphragmatic excursion    Musculoskeletal: extremities non tender and without edema    Skin: no lesions or rashes     Neurological: normal gait, no gross defects     Psychiatric: appropriate mood and affect                               Hematological: normal cervical, supraclavicular and inguinal lymph nodes     Gastrointestinal:       abdomen soft, non-tender, non-distended, no organomegaly or masses    Genitourinary: Deferred    Labs:  WBC 6.3 with ANC 5.7.  Hemoglobin 12.2.  Platelets 137.  Creatinine 0.54.  Potassium 3.2.  Magnesium 1.6.  Remainder of electrolytes within normal limits.  AST 24, ALT 27, alkaline phosphatase 93, total bilirubin 0.2.  Albumin 3.4.        Assessment:    Di Evans is a 58 year old woman with a diagnosis of stage IIIB mixed clear cell and endometrioid ovarian " cancer.     A total of 30 minutes was spent with the patient, >50% of which were spent in counseling the patient and/or treatment planning.      Plan:     1.)    Stage IIIB mixed clear cell and endometrioid ovarian cancer.  Plan for 6 cycles of carboplatin and paclitaxel. Ok to proceed with cycle #4 today.  She will return on 11/16 for cycle #5.    2.) Chemotherapy side effects    -Heartburn-Cont omeprazole, call if symptoms not improving   -nausea-cont lorazepam and compazine PRN   -Constipation.  She has been taking stool softeners which I recommend continuing.  I have also recommended adding Miralax daily or twice daily on the days where her constipation is worst.     3.) Genetic risk factors were assessed and the patient has an appointment scheduled for 11/7    4.) Labs and/or tests ordered include:  Pre-chemotherapy labs reviewed and ok to proceed     5.) Health maintenance issues addressed today include pt is due for a mammogram and colonoscopy which will be addressed after treatment of her ovarian cancer.    6.) Elevated BP.  Pt asymptomatic.  Have asked her to see her PCP for possible medication management           Thank you for allowing us to participate in the care of your patient.         Sincerely,    Jammie Duque MD  Gynecologic Oncology  [facility]         CENTER, [facility]

## 2018-10-26 NOTE — NURSING NOTE
"Oncology Rooming Note    October 26, 2018 8:09 AM   Di Evans is a 58 year old female who presents for:    Chief Complaint   Patient presents with     Oncology Clinic Visit     Follow up/ Prior to Treatment     Initial Vitals: BP (!) 168/100 (BP Location: Right arm)  Pulse 100  Temp 98.4  F (36.9  C) (Oral)  Resp 16  Ht 1.651 m (5' 5\")  Wt 69 kg (152 lb 2 oz)  SpO2 97%  BMI 25.31 kg/m2 Estimated body mass index is 25.31 kg/(m^2) as calculated from the following:    Height as of this encounter: 1.651 m (5' 5\").    Weight as of this encounter: 69 kg (152 lb 2 oz). Body surface area is 1.78 meters squared.  No Pain (0) Comment: Data Unavailable   No LMP recorded. Patient is postmenopausal.  Allergies reviewed: Yes  Medications reviewed: Yes    Medications: MEDICATION REFILLS NEEDED TODAY. Provider was notified.  Pharmacy name entered into [facility] [address] [place] [address] [address] [facility]  [address]    5 minutes for nursing intake (face to face time)     Gloria Rivas LPN              "

## 2018-10-26 NOTE — PROGRESS NOTES
"Patient's name and  were verified.  See Doc Flowsheet - IV assess for details.  IVAD accessed with 20G 3/4\" tatum gripper plus needle  blood return positive: YES  Site without redness, tenderness or swelling: YES  flushed with 20cc NS and 5cc 100u/ml heparin  Needle: left intact for chemotherapy  Comments: Labs drawn.  Patient tolerated procedure without incident.    Kingsley Mason  RN, BSN      "

## 2018-10-26 NOTE — PROGRESS NOTES
Infusion Nursing Note:  Di Evans presents today for C4 D1 Taxol/CArboplatin/POtassium replacement.    Patient seen by provider today: Yes: Dr Duque   present during visit today: Not Applicable.    Note: Post chemo /102.  Dr Duque notified, ok to discharge, but patient should see her PCP.  Patient verbalizes understanding and agrees to see PCP Tuesday next week.        Intravenous Access:  Implanted Port.    Treatment Conditions:  Lab Results   Component Value Date    HGB 12.2 10/26/2018     Lab Results   Component Value Date    WBC 6.3 10/26/2018      Lab Results   Component Value Date    ANEU 5.7 10/26/2018     Lab Results   Component Value Date     10/26/2018      Lab Results   Component Value Date     10/26/2018                   Lab Results   Component Value Date    POTASSIUM 3.2 10/26/2018           Lab Results   Component Value Date    MAG 1.6 10/26/2018            Lab Results   Component Value Date    CR 0.54 10/26/2018                   Lab Results   Component Value Date    TRACEY 9.7 10/26/2018                Lab Results   Component Value Date    BILITOTAL 0.2 10/26/2018           Lab Results   Component Value Date    ALBUMIN 3.4 10/26/2018                    Lab Results   Component Value Date    ALT 27 10/26/2018           Lab Results   Component Value Date    AST 24 10/26/2018       Results reviewed, labs MET treatment parameters, ok to proceed with treatment.      Post Infusion Assessment:  Patient tolerated infusion without incident.  Site patent and intact, free from redness, edema or discomfort.  Access discontinued per protocol.    Discharge Plan:    Patient will return 11/16/18 for next appointment.   Patient discharged in stable condition accompanied by: mother and friend.  Departure Mode: Ambulatory.    Karine Rubio RN

## 2018-11-05 NOTE — PROGRESS NOTES
"  SUBJECTIVE:   Di Evans is a 58 year old female who presents to clinic today for the following health issues:      Hypertension Follow-up      Outpatient blood pressures are being checked at home.  Results are up & down.    Low Salt Diet: no added salt      Amount of exercise or physical activity: very little    Problems taking medications regularly: No    Medication side effects: none    Diet: regular (no restrictions)    Past medical, family, and social histories, medications, and allergies are reviewed and updated in [facility].    ROS:  CONSTITUTIONAL: NEGATIVE for fever, chills, change in weight  ENT/MOUTH: NEGATIVE for ear, mouth and throat problems  RESP: NEGATIVE for significant cough or SOB  CV: NEGATIVE for chest pain, palpitations or peripheral edema  DERM: she has had some hair loss (and other side effects) associated with her chemotherapy.     OBJECTIVE:     /86  Pulse 102  Temp 98  F (36.7  C) (Oral)  Ht 1.651 m (5' 5\")  Wt 68.5 kg (151 lb)  SpO2 98%  BMI 25.13 kg/m2  Body mass index is 25.13 kg/(m^2).  GENERAL: healthy, alert and no distress  EYES: Eyes grossly normal to inspection, PERRL, EOMI, sclerae white and conjunctivae normal  RESP: lungs clear to auscultation - no crackles or wheezes, no areas of dullness, no tachypnea  CV: Heart regular rate and rhythm without murmur, click or rub. No peripheral edema and peripheral pulses strong  MS: no gross musculoskeletal defects noted, no edema  SKIN: no suspicious lesions or rashes  NEURO: Normal strength and tone, sensory exam grossly normal, mentation intact, oriented times 3 and cranial nerves 2-12 intact  PSYCH: mentation appears normal, affect normal/bright    ASSESSMENT/PLAN:     (I10) Essential hypertension with goal blood pressure less than 140/90  (primary encounter diagnosis)  Comment: Well controlled  Plan: hydrochlorothiazide (HYDRODIURIL) 25 MG tablet        Recheck in 6 months at a full physical    (E78.5) Hyperlipidemia LDL " goal <130  Comment: Historically at goal, not fasting today.   Plan: pravastatin (PRAVACHOL) 20 MG tablet, Lipid         panel reflex to direct LDL Non-fasting        Future orders to be drawn next time she has her blood drawn during chemo    (R73.09) Elevated glucose  Comment: suspect secondary to Decadron or other effect of chemotherapy  Plan: Hemoglobin A1c        We will continue to monitor this, but hopefully the problems will resolve once she has completed her rounds of chemo.       Rayshawn Reaves MD  [facility]

## 2018-11-05 NOTE — MR AVS SNAPSHOT
After Visit Summary   11/5/2018    Di Evans    MRN: [id number]           Patient Information     Date Of Birth          1960        Visit Information        Provider Department      11/5/2018 11:20 AM Rayshawn Reaves MD [facility]        Today's Diagnoses     Essential hypertension with goal blood pressure less than 140/90    -  1    Hyperlipidemia LDL goal <130        Elevated glucose          Care Instructions    At [facility], we strive to deliver an exceptional experience to you, every time we see you.  If you receive a survey in the mail, please send us back your thoughts. We really do value your feedback.    Your care team:                            Family Medicine Internal Medicine   MD Edmund Crawford MD Shantel Branch-Fleming, MD Katya Georgiev PA-C Megan Hill, APRSOCRATES Rothman MD Pediatrics   Nick Berry, ANDRIA Rosa, MD Kellee Waters APRN CNP   MD Sara Skinner MD Deborah Mielke, MD Kim Thein, APRN [facility]      Clinic hours: Monday - Thursday 7 am-7 pm; Fridays 7 am-5 pm.   Urgent care: Monday - Friday 11 am-9 pm; Saturday and Sunday 9 am-5 pm.  Pharmacy : Monday -Thursday 8 am-8 pm; Friday 8 am-6 pm; Saturday and Sunday 9 am-5 pm.     Clinic: [phone]   Pharmacy: [phone]                Follow-ups after your visit        Follow-up notes from your care team     Return in about 6 months (around 4/29/2019) for full physical, cholesterol, blood pressure check.      Your next 10 appointments already scheduled     Nov 07, 2018  2:15 PM CST   New Visit with Jessica Walton GC   [facility]    [address] [place] [address]   [phone]            Nov 09, 2018 11:00 AM CST   MA SCREENING DIGITAL BILATERAL with BKMA1   [facility] ([facility])    [address]  "[facility] [address]   [phone]           How do I prepare for my exam? (Food and drink instructions) No Food and Drink Restrictions.  How do I prepare for my exam? (Other instructions) Do not use any powder, lotion or deodorant under your arms or on your breast. If you do, we will ask you to remove it before your exam.  What should I wear: Wear comfortable, two-piece clothing.  How long does the exam take: Most scans will take 15 minutes.  What should I bring: Bring any previous mammograms from other facilities or have them mailed to the breast center.  Do I need a :  No  is needed.  What do I need to tell my doctor: If you have any allergies, tell your care team.  What should I do after the exam: No restrictions, You may resume normal activities.  What is this test: This test is an x-ray of the breast to look for breast disease. The breast is pressed between two plates to flatten and spread the tissue. An X-ray is taken of the breast from different angles.  Who should I call with questions: If you have any questions, please call the Imaging Department where you will have your exam. Directions, parking instructions, and other information is available on our website, [email or web address].  Other information about my exam Three-dimensional (3D) mammograms are available at [place] locations in [place], [facility], [facility], [place], [facility] and [place].  Health locations include [place] and the [facility] in [place].  Benefits of 3D mammograms include * Improved rate of cancer detection * Decreases your chance of having to go back for more tests, which means fewer: * \"False-positive\" results (This means that there is an abnormal area but it isn't cancer.) * Invasive testing procedures, such as a biopsy or surgery * Can provide clearer images of the breast if you have dense breast tissue.  *3D mammography is an optional " "exam that anyone can have with a 2D mammogram. It doesn't replace or take the place of a 2D mammogram. 2D mammograms remain an effective screening test for all women.  Not all insurance companies cover the cost of a 3D mammogram. Check with your insurance. Three-dimensional (3D) mammograms are available at [place] locations in [facility], [place], [place], [facility], [place], [place], and [place]. [facility] locations include [place] and [facility] in [place]. Benefits of 3D mammograms include: - Improved rate of cancer detection - Decreases your chance of having to go back for more tests, which means fewer: - \"False-positive\" results (This means that there is an abnormal area but it isn't cancer.) - Invasive testing procedures, such as a biopsy or surgery - Can provide clearer images of the breast if you have dense breast tissue. 3D mammography is an optional exam that anyone can have with a 2D mammogram. It doesn't replace or take the place of a 2D mammogram. 2D mammograms remain an effective screening test for all women.  Not all insurance companies cover the cost of a 3D mammogram. Check with your insurance.            Nov 16, 2018  8:15 AM CST   Return Visit with [facility]   [facility])    [address] [facility] [address]   [phone]            Nov 16, 2018  8:45 AM CST   Return Visit with SULMA Holman CNP   [facility])    [address] [facility] [address]   [phone]            Nov 16, 2018  9:30 AM CST   Level 5 with BAY 3 INFUSION   [facility])    [address] [facility] [address]   [phone]            Dec 07, 2018  8:15 AM CST   [facility]    [address] " [facility] [address]   [phone]            Dec 07, 2018  9:00 AM CST   Return Visit with Jammie Salgado MD   [facility]    [address] [facility] [address]   [phone]            Dec 07, 2018  9:30 AM CST   Level 5 with BAY 5 INFUSION   [facility])    [address] [facility] [address] [phone]              Future tests that were ordered for you today     Open Future Orders        Priority Expected Expires Ordered    Lipid panel reflex to direct LDL Non-fasting Routine 11/16/2018 2/5/2019 11/5/2018    Hemoglobin A1c Routine 11/16/2018 2/5/2019 11/5/2018            Who to contact     If you have questions or need follow up information about today's clinic visit or your schedule please contact [facility] directly at [phone].  Normal or non-critical lab and imaging results will be communicated to you by MyChart, letter or phone within 4 business days after the clinic has received the results. If you do not hear from us within 7 days, please contact the clinic through [employer] or phone. If you have a critical or abnormal lab result, we will notify you by phone as soon as possible.  Submit refill requests through [email or web address] or call your pharmacy and they will forward the refill request to us. Please allow 3 business days for your refill to be completed.          Additional Information About Your Visit        [employer] Information     [email or web address] gives you secure access to your electronic health record. If you see a primary care provider, you can also send messages to your care team and make appointments. If you have questions, please call your primary care clinic.  If you do not have a primary care provider, please call [phone] and they will assist you.        Care EveryWhere ID     This is your Care EveryWhere ID. This could be used by other  "organizations to access your [facility] medical records  [id number]        Your Vitals Were     Pulse Temperature Height Pulse Oximetry BMI (Body Mass Index)       102 98  F (36.7  C) (Oral) 5' 5\" (1.651 m) 98% 25.13 kg/m2        Blood Pressure from Last 3 Encounters:   11/05/18 134/86   10/26/18 (!) 166/102   10/26/18 (!) 168/100    Weight from Last 3 Encounters:   11/05/18 151 lb (68.5 kg)   10/26/18 152 lb 2 oz (69 kg)   10/05/18 152 lb 12.8 oz (69.3 kg)                 Today's Medication Changes          These changes are accurate as of 11/5/18 11:51 AM.  If you have any questions, ask your nurse or doctor.               These medicines have changed or have updated prescriptions.        Dose/Directions    hydrochlorothiazide 25 MG tablet   Commonly known as:  HYDRODIURIL   This may have changed:  additional instructions   Used for:  Essential hypertension with goal blood pressure less than 140/90   Changed by:  Rayshawn Reaves MD        Dose:  25 mg   Take 1 tablet (25 mg) by mouth every morning for blood pressure.   Quantity:  90 tablet   Refills:  1       pravastatin 20 MG tablet   Commonly known as:  PRAVACHOL   This may have changed:  additional instructions   Used for:  Hyperlipidemia LDL goal <130   Changed by:  Rayshawn Reaves MD        Dose:  20 mg   Take 1 tablet (20 mg) by mouth every evening for cholesterol.   Quantity:  90 tablet   Refills:  1            Where to get your medicines      These medications were sent to [facility] [address] [place] [address] [address] AT [facility] [address]  [address] [place] [address]     Phone:  [phone]     hydrochlorothiazide 25 MG tablet    pravastatin 20 MG tablet                Primary Care Provider Office Phone # Fax #    Sonja Jeni Hernandez [phone]       [address]  [facility] [address]        Equal Access to Services     ROMAN MILLARD AH: Hadii nakita Toure, " waaxda luryanadaha, qaybta kaalmada bonnie, mae ramelkin ah. So [facility] [phone].    ATENCIÓN: Si sam murray, tiene a perez disposición servicios gratuitos de asistencia lingüística. Sirena al [phone].    We comply with applicable federal civil rights laws and [place] laws. We do not discriminate on the basis of race, color, national origin, age, disability, sex, sexual orientation, or gender identity.            Thank you!     Thank you for choosing [facility]  for your care. Our goal is always to provide you with excellent care. Hearing back from our patients is one way we can continue to improve our services. Please take a few minutes to complete the written survey that you may receive in the mail after your visit with us. Thank you!             Your Updated Medication List - Protect others around you: Learn how to safely use, store and throw away your medicines at www.disposemymeds.org.          This list is accurate as of 11/5/18 11:51 AM.  Always use your most recent med list.                   Brand Name Dispense Instructions for use Diagnosis    dexamethasone 4 MG tablet    DECADRON    60 tablet    Take 5 tablets (20 mg) by mouth See Admin Instructions    Adverse effect of paclitaxel, initial encounter       hydrochlorothiazide 25 MG tablet    HYDRODIURIL    90 tablet    Take 1 tablet (25 mg) by mouth every morning for blood pressure.    Essential hypertension with goal blood pressure less than 140/90       LORazepam 1 MG tablet    ATIVAN    30 tablet    Take 1 tablet (1 mg) by mouth every 6 hours as needed (nausea/vomiting, anxiety or sleep)    Ovarian cancer, unspecified laterality (H)       pravastatin 20 MG tablet    PRAVACHOL    90 tablet    Take 1 tablet (20 mg) by mouth every evening for cholesterol.    Hyperlipidemia LDL goal <130       prochlorperazine 10 MG tablet    COMPAZINE    30 tablet    Take 1 tablet (10 mg) by mouth every 6 hours as needed  (nausea/vomiting)    Ovarian cancer, unspecified laterality (H)       senna-docusate 8.6-50 MG per tablet    SENOKOT-S;PERICOLACE    100 tablet    Take 2 tablets by mouth daily    Drug-induced constipation

## 2018-11-05 NOTE — PATIENT INSTRUCTIONS
At [facility], we strive to deliver an exceptional experience to you, every time we see you.  If you receive a survey in the mail, please send us back your thoughts. We really do value your feedback.    Your care team:                            Family Medicine Internal Medicine   MD Edmund Crawford MD Shantel Branch-Fleming, MD Katya Georgiev PA-C Megan Hill, APRN LATRELL Rothman MD Pediatrics   Nick Berry, ANDRIA Rosa, MD Kellee Waters APRN CNP   MD Sara Skinner MD Deborah Mielke, MD Hallie Fox, APRN [facility]      Clinic hours: Monday - Thursday 7 am-7 pm; Fridays 7 am-5 pm.   Urgent care: Monday - Friday 11 am-9 pm; Saturday and Sunday 9 am-5 pm.  Pharmacy : Monday -Thursday 8 am-8 pm; Friday 8 am-6 pm; Saturday and Sunday 9 am-5 pm.     Clinic: [phone]   Pharmacy: [phone]

## 2018-11-07 NOTE — LETTER
2018         RE: Di Evans  [address] [place] [address]        Dear Colleague,    Thank you for referring your patient, Di Evans, to the [facility]. Please see a copy of my visit note below.    2018    Referring Provider: Jammie Salgado MD    Presenting Information:   I met with Di Evans ( Deb ) today for genetic counseling at the Cancer Risk Management Program at the [facility] to discuss her personal and family history of cancer.  She is here today with her roommate to review this history, cancer screening recommendations, and available genetic testing options.    Personal History:  Courtney is a 58 year old female.  She was recently diagnosed with ovarian cancer. 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 She underwent a ISAIAS/BSO and debulking surgery, and is being treated with chemotherapy.       Courtney has no biological children and went into menopause at age 44. She states she did not use hormone replacement therapy. Courtney had her last mammogram in 2016 which was negative, and reports no past abnormal mammograms. She reports she has another mammogram scheduled for Friday. She has not had a colonoscopy. She states that she was scheduled to have one the day of her diagnosis, but due to this the colonoscopy was canceled. She reports no concerning skin findings. Courtney reported no tobacco use.     Family History: (Please see scanned pedigree for detailed family history information)    Mother is 78 and has not had cancer. She retains her uterus and ovaries.     Maternal aunt had breast cancer in her 50s-60s and passed away. She did not have treatment for her cancer.     Maternal aunt, age 65, was diagnosed with breast cancer at 55. Courtney is unsure what type of treatment she had.     Father  at 62 from polycythemia.     Paternal grandmother had leukemia in her 80s  and passed away. Courtney is unsure what type of leukemia.    No other family history of cancer is known.     Her maternal ethnicity is Portuguese. Her paternal ethnicity is .  There is no known Ashkenazi Nondenominational ancestry on either side of her family. There is no reported consanguinity.    Discussion:    Courtney's ovarian cancer and family history of breast cancer is suggestive of a possible cancer susceptibility gene in the family. Given her diagnosis of ovarian cancer, we discussed Murphy syndrome and Hereditary Breast and Ovarian Cancer syndrome as the two most likely hereditary explanations.    We discussed the natural history and genetics of Hereditary Breast and Ovarian cancer syndrome due to BRCA1 and BRCA2 gene mutations. We also discussed the natural history and genetics of Murphy syndrome due to mismatch repair gene mutations (MLH1, MSH2, MSH6, PMS2, EPCAM).      A detailed handout regarding hereditary gynecologic cancer risk genes and the information we discussed was provided to Courtney at the end of our appointment today and can be found in the after visit summary.  Topics included: inheritance pattern, cancer risks, cancer screening recommendations, and also risks, benefits and limitations of testing.    Based on her personal history, Courtney meets current National Comprehensive Cancer Network (NCCN) criteria for genetic testing of BRCA1 and BRCA2.    We discussed that there are additional genes that could cause increased risk for breast and ovarian cancer.  As many of these genes present with overlapping features in a family and accurate cancer risk cannot always be established based upon the pedigree analysis alone, it would be reasonable for Courtney to consider panel genetic testing to analyze multiple genes at once.    We reviewed genetic testing options for hereditary breast and gynecologic cancers: actionable high/moderate risk breast and gynecologic cancer risk custom panel (CustomNext-Cancer, 19 genes, a combination  of GynPlus and BRCAplus + NBN, STK11, and NF1) and expanded high and moderate risk panel testing (OvaNext, 25 genes).  Courtney expressed an interest in only the actionable genes.  She opted for the CustomNext-Cancer 19-gene panel.     The CustomNext-Cancer high/moderate risk breast and gynecologic panel includes the following 19 genes: FADI, BRCA1, BRCA2, BRIP1, CDH1, CHEK2, EPCAM, MLH1, MSH2, MSH6, NBN, NF1, PALB2,  PMS2, PTEN, RAD51C, RAD51D, STK11, and TP53.      Some of the genes on this custom panel are associated with specific hereditary cancer syndromes and have published management guidelines:  Hereditary Breast and Ovarian Cancer syndrome (BRCA1, BRCA2), Murphy syndrome (EPCAM, MLH1, MSH2, MSH6, PMS2), Hereditary Diffuse Gastric Cancer (CDH1), Cowden Syndrome (PTEN), Peutz-Jeghers syndrome, neurofibromatosis type 1 (NF1), and Li Fraumeni syndrome (TP53).       Risk-reducing salpingo-oophorectomy can be considered in women with mutations in BRIP1, RAD51C, or RAD51D.  FADI, CHEK2, NBN, and PALB2 are associated with breast cancer risk and have published screening guidelines.    Medical Management: For Courtney, we reviewed that the information from genetic testing may determine:    additional cancer screening for which Courtney may qualify (i.e. mammogram and breast MRI, more frequent colonoscopies, more frequent dermatologic exams, etc.),    options for risk reducing surgeries Courtney could consider (i.e. bilateral mastectomy),      and targeted chemotherapies under certain circumstances (i.e. immunotherapy for individuals with Murphy syndrome, PARP inhibitors, etc.).     These recommendations and possible targeted chemotherapies will be discussed in detail once genetic testing is completed.     Plan:  1) Today Courtney elected to proceed with CustomNext-Cancer.  2) This information should be available in 4-5 weeks.  3) Courtney will return to clinic to discuss the results    Face to face time: 40 minutes    Jessica Walton MS, [facility]  Licensed  Genetic Counselor  P. [phone]  F. [phone]            Again, thank you for allowing me to participate in the care of your patient.        Sincerely,        Jessica Walton, GC

## 2018-11-07 NOTE — PROGRESS NOTES
"Patient's name and  were verified.  See Doc Flowsheet - IV assess for details.  IVAD accessed with 20G 3/4\" tatum gripper plus needle  blood return positive: YES  Site without redness, tenderness or swelling: YES  flushed with 20cc NS and 5cc 100u/ml heparin  Needle: de-accessed  Comments: Labs drawn.  Patient tolerated procedure without incident.    Kingsley Mason  RN, BSN      "

## 2018-11-07 NOTE — TELEPHONE ENCOUNTER
Requested Prescriptions   Pending Prescriptions Disp Refills     hydrochlorothiazide (HYDRODIURIL) 25 MG tablet [Pharmacy Med Name: HYDROCHLOROTHIAZIDE 25MG TABLETS]  Last Written Prescription Date:  01/05/18  Last Fill Quantity: 90,  # refills: 1   Last Office Visit with SIERRA, SYLVIA or [facility] prescribing provider:  11/05/18Ap   Future Office Visit:    Next 5 appointments (look out 90 days)     Nov 16, 2018  8:15 AM CST   Return Visit with [facility] ONLY [facility]ER [facility]   [facility])    [address] [place] [id number]   [phone]            Nov 16, 2018  8:45 AM CST   Return Visit with SULMA Holman CNP   [facility])    [address] [place] [id number]   [phone]            Dec 07, 2018  8:15 AM CST   Return Visit with [facility] ONLY [facility]   [facility])    [address] [place] [phone]   [phone]            Dec 07, 2018  9:00 AM CST   Return Visit with Jammie Salgado MD   [facility])    [address] [place] [id number]   [phone]                90 tablet 0     Sig: TAKE 1 TABLET(25 MG) BY MOUTH EVERY MORNING    Diuretics (Including Combos) Protocol Failed    11/7/2018  3:33 AM       Failed - Normal serum potassium on file in past 12 months    Recent Labs   Lab Test  10/26/18   0750   POTASSIUM  3.2*                   Passed - Blood pressure under 140/90 in past 12 months    BP Readings from Last 3 Encounters:   11/05/18 134/86   10/26/18 (!) 166/102   10/26/18 (!) 168/100                Passed - Recent (12 mo) or future (30 days) visit within the authorizing provider's specialty    Patient had office visit in the last 12 months or has a visit in the next 30 days with authorizing provider or within the authorizing provider's  "specialty.  See \"Patient Info\" tab in inbasket, or \"Choose Columns\" in Meds & Orders section of the refill encounter.             Passed - Patient is age 18 or older       Passed - No active pregancy on record       Passed - Normal serum creatinine on file in past 12 months    Recent Labs   Lab Test  10/26/18   0750   CR  0.54             Passed - Normal serum sodium on file in past 12 months    Recent Labs   Lab Test  10/26/18   0750   NA  137             Passed - No positive pregnancy test in past 12 months          "

## 2018-11-07 NOTE — LETTER
Cancer Risk Management  Program Locations    [facility]  [facility]  Mailing Address  Cancer Risk Management Program  [facility]  [address]  [place] [address]    New patient appointments  [phone]  November 9, 2018    Di Evans  [address] [place] [address]    Dear Di,    It was a pleasure meeting with you at the [facility] on 11/7.  Here is a copy of the progress note from your recent genetic counseling visit to the Cancer Risk Management Program.  If you have any additional questions, please feel free to call.    Referring Provider: Jammie Salgado MD    Presenting Information:   I met with Di ( Courtney Sutton Evans today for genetic counseling at the Cancer Risk Management Program at the [facility] to discuss her personal and family history of cancer.  She is here today with her roommate to review this history, cancer screening recommendations, and available genetic testing options.    Personal History:  Courtney is a 58 year old female.  She was recently diagnosed with ovarian cancer. 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 She underwent a ISAIAS/BSO and debulking surgery, and is being treated with chemotherapy.       Courtney has no biological children and went into menopause at age 44. She states she did not use hormone replacement therapy. Courtney had her last mammogram in November 2016 which was negative, and reports no past abnormal mammograms. She reports she has another mammogram scheduled for Friday. She has not had a colonoscopy. She states that she was scheduled to have one the day of her diagnosis, but due to this the colonoscopy was canceled. She reports no concerning skin findings.  Courtney reported no tobacco use.     Family History: (Please see scanned pedigree for detailed family history information)    Mother is 78 and has not had cancer. She retains her uterus and ovaries.     Maternal aunt had breast cancer in her 50s-60s and passed away. She did not have treatment for her cancer.     Maternal aunt, age 65, was diagnosed with breast cancer at 55. Courtney is unsure what type of treatment she had.     Father  at 62 from polycythemia.     Paternal grandmother had leukemia in her 80s and passed away. Courtney is unsure what type of leukemia.    No other family history of cancer is known.     Her maternal ethnicity is Jackie. Her paternal ethnicity is .  There is no known Ashkenazi Shinto ancestry on either side of her family. There is no reported consanguinity.    Discussion:    Courtney's ovarian cancer and family history of breast cancer is suggestive of a possible cancer susceptibility gene in the family. Given her diagnosis of ovarian cancer, we discussed Murphy syndrome and Hereditary Breast and Ovarian Cancer syndrome as the two most likely hereditary explanations.    We discussed the natural history and genetics of Hereditary Breast and Ovarian cancer syndrome due to BRCA1 and BRCA2 gene mutations. We also discussed the natural history and genetics of Murphy syndrome due to mismatch repair gene mutations (MLH1, MSH2, MSH6, PMS2, EPCAM).      A detailed handout regarding hereditary gynecologic cancer risk genes and the information we discussed was provided to Courtney at the end of our appointment today and can be found in the after visit summary.  Topics included: inheritance pattern, cancer risks, cancer screening recommendations, and also risks, benefits and limitations of testing.    Based on her personal history, Courtney meets current National Comprehensive Cancer Network (NCCN) criteria for genetic testing of BRCA1 and BRCA2.    We discussed that there are additional genes that could cause  increased risk for breast and ovarian cancer.  As many of these genes present with overlapping features in a family and accurate cancer risk cannot always be established based upon the pedigree analysis alone, it would be reasonable for Courtney to consider panel genetic testing to analyze multiple genes at once.    We reviewed genetic testing options for hereditary breast and gynecologic cancers: actionable high/moderate risk breast and gynecologic cancer risk custom panel (CustomNext-Cancer, 19 genes, a combination of GynPlus and BRCAplus + NBN, STK11, and NF1) and expanded high and moderate risk panel testing (OvaNext, 25 genes).  Courtney expressed an interest in only the actionable genes.  She opted for the CustomNext-Cancer 19-gene panel.     The CustomNext-Cancer high/moderate risk breast and gynecologic panel includes the following 19 genes: FADI, BRCA1, BRCA2, BRIP1, CDH1, CHEK2, EPCAM, MLH1, MSH2, MSH6, NBN, NF1, PALB2,  PMS2, PTEN, RAD51C, RAD51D, STK11, and TP53.      Some of the genes on this custom panel are associated with specific hereditary cancer syndromes and have published management guidelines:  Hereditary Breast and Ovarian Cancer syndrome (BRCA1, BRCA2), Murphy syndrome (EPCAM, MLH1, MSH2, MSH6, PMS2), Hereditary Diffuse Gastric Cancer (CDH1), Cowden Syndrome (PTEN), Peutz-Jeghers syndrome, neurofibromatosis type 1 (NF1), and Li Fraumeni syndrome (TP53).       Risk-reducing salpingo-oophorectomy can be considered in women with mutations in BRIP1, RAD51C, or RAD51D.  FADI, CHEK2, NBN, and PALB2 are associated with breast cancer risk and have published screening guidelines.    Medical Management: For Courtney, we reviewed that the information from genetic testing may determine:    additional cancer screening for which Courtney may qualify (i.e. mammogram and breast MRI, more frequent colonoscopies, more frequent dermatologic exams, etc.),    options for risk reducing surgeries Courtney could consider (i.e. bilateral  mastectomy),      and targeted chemotherapies under certain circumstances (i.e. immunotherapy for individuals with Murphy syndrome, PARP inhibitors, etc.).     These recommendations and possible targeted chemotherapies will be discussed in detail once genetic testing is completed.     Plan:  1) Today Courtney elected to proceed with CustomNext-Cancer.  2) This information should be available in 4-5 weeks.  3) Courtney will return to clinic to discuss the results    Face to face time: 40 minutes    Jessica Walton MS, [facility]  Licensed Genetic Counselor  P. [phone]  F. [phone]

## 2018-11-07 NOTE — PATIENT INSTRUCTIONS
Assessing Cancer Risk  Only about 5-10% of cancers are thought to be due to an inherited cancer susceptibility gene.    These families often have:    Several people with the same or related types of cancer    Cancers diagnosed at a young age (before age 50)    Individuals with more than one primary cancer    Multiple generations of the family affected with cancer    Some people may be candidates for genetic testing of more than one gene.  For these families, genetic testing using a cancer panel may be offered.  These panels will test different genes known to increase the risk for breast, ovarian, uterine, and/or other cancers. All of the genes discussed below have published clinical management guidelines for individuals who are found to carry a mutation. The purpose of this handout is to serve as a brief summary of the genes analyzed by the panels used to inquire about hereditary breast and gynecologic cancer:  FADI, BRCA1, BRCA2, BRIP1, CDH1, CHEK2, MLH1, MSH2, MSH6, PMS2, EPCAM, PTEN, PALB2, RAD51C, RAD51D, and TP53.  ______________________________________________________________________________  Hereditary Breast and Ovarian Cancer Syndrome   (BRCA1 and BRCA2)  A single mutation in one of the copies of BRCA1 or BRCA2 increases the risk for breast and ovarian cancer, among others.  The risk for pancreatic cancer and melanoma may also be slightly increased in some families.  The chart below shows the chance that someone with a BRCA mutation would develop cancer in his or her lifetime1,2,3,4.        A person s ethnic background is also important to consider, as individuals of Ashkenazi Congregation ancestry have a higher chance of having a BRCA gene mutation.  There are three BRCA mutations that occur more frequently in this population.    Murphy Syndrome   (MLH1, MSH2, MSH6, PMS2, and EPCAM)  Currently five genes are known to cause Murphy Syndrome: MLH1, MSH2, MSH6, PMS2, and EPCAM.  A single mutation in one of the  Murphy Syndrome genes increases the risk for colon, endometrial, ovarian, and stomach cancers.  Other cancers that occur less commonly in Murphy Syndrome include urinary tract, skin, and brain cancers.  The chart below shows the chance that a person with Murphy syndrome would develop cancer in his or her lifetime5.      *Cancer risk varies depending on Murphy syndrome gene found    Cowden Syndrome   (PTEN)  Cowden syndrome is a hereditary condition that increases the risk for breast, thyroid, endometrial, colon, and kidney cancer.  Cowden syndrome is caused by a mutation in the PTEN gene.  A single mutation in one of the copies of PTEN causes Cowden syndrome and increases cancer risk.  The chart below shows the chance that someone with a PTEN mutation would develop cancer in their lifetime6,7.  Other benign features seen in some individuals with Cowden syndrome include benign skin lesions (facial papules, keratoses, lipomas), learning disability, autism, thyroid nodules, colon polyps, and larger head size.      *One recent study found breast cancer risk to be increased to 85%    Li-Fraumeni Syndrome   (TP53)  Li-Fraumeni Syndrome (LFS) is a cancer predisposition syndrome caused by a mutation in the TP53 gene. A single mutation in one of the copies of TP53 increases the risk for multiple cancers. Individuals with LFS are at an increased risk for developing cancer at a young age. The lifetime risk for development of a LFS-associated cancer is 50% by age 30 and 90% by age 60.   Core Cancers: Sarcomas, Breast, Brain, Lung, Leukemias/Lymphomas, Adrenocortical carcinomas  Other Cancers: Gastrointestinal, Thyroid, Skin, Genitourinary    Hereditary Diffuse Gastric Cancer   (CDH1)  Currently, one gene is known to cause hereditary diffuse gastric cancer (HDGC): CDH1.  Individuals with HDGC are at increased risk for diffuse gastric cancer and lobular breast cancer. Of people diagnosed with HDGC, 30-50% have a mutation in the CDH1  gene.  This suggests there are likely other genes that may cause HDGC that have not been identified yet.      Lifetime Cancer Risks    General Population HDGC    Diffuse Gastric  <1% ~80%   Breast 12% 39-52%         Additional Genes  FADI  FADI is a moderate-risk breast cancer gene. Women who have a mutation in FADI can have between a 2-4 fold increased risk for breast cancer compared to the general population8. FADI mutations have also been associated with increased risk for pancreatic cancer, however an estimate of this cancer risk is not well understood9. Individuals who inherit two FADI mutations have a condition called ataxia-telangiectasia (AT).  This rare autosomal recessive condition affects the nervous system and immune system, and is associated with progressive cerebellar ataxia beginning in childhood.  Individuals with ataxia-telangiectasia often have a weakened immune system and have an increased risk for childhood cancers.    PALB2  Mutations in PALB2 have been shown to increase the risk of breast cancer up to 33-58% in some families; where individuals fall within this risk range is dependent upon family [id number]. PALB2 mutations have also been associated with increased risk for pancreatic cancer, although this risk has not been quantified yet.  Individuals who inherit two PALB2 mutations--one from their mother and one from their father--have a condition called Fanconi Anemia.  This rare autosomal recessive condition is associated with short stature, developmental delay, bone marrow failure, and increased risk for childhood cancers.    CHEK2   CHEK2 is a moderate-risk breast cancer gene.  Women who have a mutation in CHEK2 have around a 2-fold increased risk for breast cancer compared to the general population, and this risk may be higher depending upon family history.11,12,13 Mutations in CHEK2 have also been shown to increase the risk of a number of other cancers, including colon and prostate, however  these cancer risks are currently not well understood.    BRIP1, RAD51C and RAD51D  Mutations in BRIP1, RAD51C, and RAD51D have been shown to increase the risk of ovarian cancer and possibly female breast cancer as well14,15 .       Lifetime Cancer Risk    General Population BRIP1 RAD51C RAD51D   Ovarian 1-2% ~5-8% ~5-9% ~7-15%           Inheritance  All of the cancer syndromes reviewed above are inherited in an autosomal dominant pattern.  This means that if a parent has a mutation, each of his or her children will have a 50% chance of inheriting that same mutation.  Therefore, each child--male or female--would have a 50% chance of being at increased risk for developing cancer.      Image obtained from Genetics Home Reference, 2013     Mutations in some genes can occur de agata, which means that a person s mutation occurred for the first time in them and was not inherited from a parent.  Now that they have the mutation, however, it can be passed on to future generations.    Genetic Testing  Genetic testing involves a blood test and will look at the genetic information in the FADI, BRCA1, BRCA2, BRIP1, CDH1, CHEK2, MLH1, MSH2, MSH6, PMS2, EPCAM, PTEN, PALB2, RAD51C, RAD51D, and TP53 genes for any harmful mutations that are associated with increased cancer risk.  If possible, it is recommended that the person(s) who has had cancer be tested before other family members.  That person will give us the most useful information about whether or not a specific gene is associated with the cancer in the family.    Results  There are three possible results of genetic testing:    Positive--a harmful mutation was identified in one or more of the genes    Negative--no mutation was identified in any of the genes on this panel    Variant of unknown significance--a variation in one of the genes was identified, but it is unclear how this impacts cancer risk in the family    Advantages and Disadvantages   There are advantages and  disadvantages to genetic testing.    Advantages    May clarify your cancer risk    Can help you make medical decisions    May explain the cancers in your family    May give useful information to your family members (if you share your results)    Disadvantages    Possible negative emotional impact of learning about inherited cancer risk    Uncertainty in interpreting a negative test result in some situations    Possible genetic discrimination concerns (see below)    Genetic Information Nondiscrimination Act (MARINO)  MARINO is a federal law that protects individuals from health insurance or employment discrimination based on a genetic test result alone.  Although rare, there are currently no legal discrimination protections in terms of life insurance, long term care, or disability insurances.  Visit the [employer] [place] website to learn more.    Reducing Cancer Risk  All of the genes described above have nationally recognized cancer screening guidelines that would be recommended for individuals who test positive.  In addition to increased cancer screening, surgeries may be offered or recommended to reduce cancer risk.  Recommendations are based upon an individual s genetic test result as well as their personal and family history of cancer.    Questions to Think About Regarding Genetic Testing:    What effect will the test result have on me and my relationship with my family members if I have an inherited gene mutation?  If I don t have a gene mutation?    Should I share my test results, and how will my family react to this news, which may also affect them?    Are my children ready to learn new information that may one day affect their own health?    Hereditary Cancer Resources    FORCE: Facing Our Risk of Cancer Empowered facingourrisk.org   Bright Pink bebrightpink.org   Li-Fraumeni Syndrome Association lfsassociation.org   PTEN World PTENworld.com   No stomach for cancer, Inc.  nostomachforcancer.org   Stomach cancer relief network Scrnet.org   Collaborative Group of the Americas on Inherited Colorectal Cancer (CGA) cgaicc.com    Cancer Care cancercare.org   American Cancer Society (ACS) cancer.org   National Cancer Dayhoit (NCI) cancer.gov     Please call us if you have any questions or concerns.   Cancer Risk Management Program [phone] ([phone])  ? Jessica Walton, MS, formerly [facility]  [phone]  ? Cinthya Sherman, MS, formerly [facility]  [phone]  ? Thu Metcalf, MS, formerly [facility]  [phone]  ? Donny Mijares, MS, formerly [facility]  [phone]  ? Ernestine Chance, MS, formerly [facility] [phone]    References  1. Savanah BARRERA, Maureen PDP, Damaris S, Tammy MENDEZ, Leilani JE, Pepito JL, Shira N, Elsy H, Clair O, Pauline A, Milena B, Khang P, Lew S, Geno DM, Polo N, Sharlene E, Cary H, Jaleel E, Lubinski J, Gronwald J, Laura B, Tulinius H, Thorlacius S, Eerola H, Tommy H, Bonnie K, Delmis OP. Average risks of breast and ovarian cancer associated with BRCA1 or BRCA2 mutations detected in case series unselected for family history: a combined analysis of 222 studies. Am J Hum Debbie. 2003;72:1117-30.  2. Wil N, Dora M, Laura G.  BRCA1 and BRCA2 Hereditary Breast and Ovarian Cancer. Gene Reviews online. 2013.  3. José Miguel YC, Breana S, Yessy G, Howell S. Breast cancer risk among male BRCA1 and BRCA2 mutation carriers. J Natl Cancer Inst. 2007;99:1811-4.  4. Hilario FUENTES, Osmani I, Joe J, Zainab E, Rupal ER, Dwayne F. Risk of breast cancer in male BRCA2 carriers. J Med Debbie. 2010;47:710-1.  5. National Comprehensive Cancer Network. Clinical practice guidelines in oncology, colorectal cancer screening. Available online (registration required). 2015.  6. Jesse ARELLANO, Jesus J, Rukhsana J, Andrzej LA, Dacia MS, Eng C. Lifetime cancer risks in individuals with germline PTEN mutations. Clin Cancer Res. 2012;18:400-7.  7. Laura LOZA. Cowden Syndrome: A Critical Review of the Clinical Literature. J Debbie .  2009:18:13-27.  8. Jessy A, Rj D, Mehreen S, Cindy P, Vadim T, Kaity M, Jam B, Lakshmi H, Tracy R, Red K, Dora L, Hilario DG, Geno D, Nhan DF, Buck MR, The Breast Cancer Susceptibility Collaboration () & Robert CROWELL. FADI mutations that cause ataxia-telangiectasia are breast cancer susceptibility alleles. Nature Genetics. 2006;38:873-875  9. Richard N , Jefferson Y, Ju J, Latricia L, Jared GM , Saad ML, Gallinger S, Wiggins AG, Syngal S, Vinny ML, Ben J , Delmy R, Delores SZ, Kathy JR, Isaiah VE, Marco A M, Vobarbara B, Kirit N, Nick RH, Emmanuel KW, and Simon AP. FADI mutations in patients with hereditary pancreatic cancer. Cancer Discover. 2012;2:41-46  10. Savanah RUBALCAVA, et al. Breast-Cancer Risk in Families with Mutations in PALB2. NEJM. 2014; 371(6):497-506.  11. CHEK2 Breast Cancer Case-Control Consortium. CHEK2*1100delC and susceptibility to breast cancer: A collaborative analysis involving 10,860 breast cancer cases and 9,065 controls from 10 studies. Am J Hum Debbie, 74 (2004), pp. 4388-0567  12. Faizan T, Gonzalo S, Bere K, et al. Spectrum of Mutations in BRCA1, BRCA2, CHEK2, and TP53 in Families at High Risk of Breast Cancer. NAZIA. 2006;295(12):6569-3886.   13. Selena C, Vy D, Andrew A, et al. Risk of breast cancer in women with a CHEK2 mutation with and without a family history of breast cancer. J Clin Oncol. 2011;29:7563-1388.  14. Steffen LOWE, Felicia E, Derek SJ, et al. Contribution of germline mutations in the RAD51B, RAD51C, and RAD51D genes to ovarian cancer in the population. J Clin Oncol. 2015;33(26):1948-7829. Doi:10.1200/JCO.2015.61.2408.  15. Navid T, Ansley VINES, Marquise P, et al. Mutations in BRIP1 confer high risk of ovarian cancer. Brit Debbie. 2011;43(11):5939-8598. doi:10.1038/ng.955.

## 2018-11-07 NOTE — MR AVS SNAPSHOT
After Visit Summary   11/7/2018    Di Evans    MRN: [id number]           Patient Information     Date Of Birth          1960        Visit Information        Provider Department      11/7/2018 3:30 PM NURSE ONLY CANCER CENTER [facility]        Today's Diagnoses     Ovarian cancer, unspecified laterality (H)    -  1       Follow-ups after your visit        Your next 10 appointments already scheduled     Nov 07, 2018  3:30 PM CST   Return Visit with NURSE ONLY CANCER CENTER   [facility] ([facility]    [address] [place] [id number] [phone]            Nov 09, 2018 11:00 AM CST   MA SCREENING DIGITAL BILATERAL with BKMA1   [facility] ([facility])    [address] [facility] [address] [phone]           How do I prepare for my exam? (Food and drink instructions) No Food and Drink Restrictions.  How do I prepare for my exam? (Other instructions) Do not use any powder, lotion or deodorant under your arms or on your breast. If you do, we will ask you to remove it before your exam.  What should I wear: Wear comfortable, two-piece clothing.  How long does the exam take: Most scans will take 15 minutes.  What should I bring: Bring any previous mammograms from other facilities or have them mailed to the breast center.  Do I need a :  No  is needed.  What do I need to tell my doctor: If you have any allergies, tell your care team.  What should I do after the exam: No restrictions, You may resume normal activities.  What is this test: This test is an x-ray of the breast to look for breast disease. The breast is pressed between two plates to flatten and spread the tissue. An X-ray is taken of the breast from different angles.  Who should I call with questions: If you have any questions, please call the Imaging Department where you will have your exam.  "Directions, parking instructions, and other information is available on our website, [email or web address].  Other information about my exam Three-dimensional (3D) mammograms are available at [facility] locations in [facility], [facility], [place], [facility] and [place].  Health locations include [place] and the [facility] in [place].  Benefits of 3D mammograms include * Improved rate of cancer detection * Decreases your chance of having to go back for more tests, which means fewer: * \"False-positive\" results (This means that there is an abnormal area but it isn't cancer.) * Invasive testing procedures, such as a biopsy or surgery * Can provide clearer images of the breast if you have dense breast tissue.  *3D mammography is an optional exam that anyone can have with a 2D mammogram. It doesn't replace or take the place of a 2D mammogram. 2D mammograms remain an effective screening test for all women.  Not all insurance companies cover the cost of a 3D mammogram. Check with your insurance. Three-dimensional (3D) mammograms are available at [facility] locations in [facility], [facility], [facility], and [place]. Health locations include [place] and [facility] in [place]. Benefits of 3D mammograms include: - Improved rate of cancer detection - Decreases your chance of having to go back for more tests, which means fewer: - \"False-positive\" results (This means that there is an abnormal area but it isn't cancer.) - Invasive testing procedures, such as a biopsy or surgery - Can provide clearer images of the breast if you have dense breast tissue. 3D mammography is an optional exam that anyone can have with a 2D mammogram. It doesn't replace or take the place of a 2D mammogram. 2D mammograms remain an effective screening test for all women.  Not all insurance companies cover the cost of a 3D mammogram. Check " with your insurance.            Nov 16, 2018  8:15 AM CST   Return Visit with NURSE ONLY CANCER CENTER   [facility] ([facility])    [address] [facility] [address]   [phone]            Nov 16, 2018  8:45 AM CST   Return Visit with SULMA Holman CNP   [facility] ([facility])    [address] [facility] [address]   [phone]            Nov 16, 2018  9:30 AM CST   Level 5 with BAY 3 INFUSION   [facility] ([facility])    [address] [facility] [address]   [phone]            Dec 07, 2018  8:15 AM CST   Return Visit with NURSE ONLY CANCER CENTER   [facility] ([facility])    [address] [facility] [address]   [phone]            Dec 07, 2018  9:00 AM CST   Return Visit with Jammie Salgado MD   [facility] ([facility])    [address] [facility] [address]   [phone]            Dec 07, 2018  9:30 AM CST   Level 5 with BAY 5 INFUSION   [facility] ([facility])    [address] [facility] [address]   [phone]              Future tests that were ordered for you today     Open Future Orders        Priority Expected Expires Ordered    CT Chest/Abdomen/Pelvis w Contrast Routine  11/7/2019 11/7/2018            Who to contact     If you have questions or need follow up information about today's clinic visit or your schedule please contact [facility] directly at [phone].  Normal or non-critical lab and imaging results will be communicated to you by MyChart, letter or phone within 4 business days after the clinic has received the results. If you do not hear from us within 7 days, please contact the clinic through MyChart or phone. If you have a critical or abnormal lab result,  we will notify you by phone as soon as possible.  Submit refill requests through [facility] or call your pharmacy and they will forward the refill request to us. Please allow 3 business days for your refill to be completed.          Additional Information About Your Visit        [facility] Information     [facility] gives you secure access to your electronic health record. If you see a primary care provider, you can also send messages to your care team and make appointments. If you have questions, please call your primary care clinic.  If you do not have a primary care provider, please call [phone] and they will assist you.      [facility] is an electronic gateway that provides easy, online access to your medical records. With [facility], you can request a clinic appointment, read your test results, renew a prescription or communicate with your care team.     To access your existing account, please contact your [facility] or call [phone] for assistance.        Care EveryWhere ID     This is your Care EveryWhere ID. This could be used by other organizations to access your [facility] medical records  [id number]         Blood Pressure from Last 3 Encounters:   11/05/18 134/86   10/26/18 (!) 166/102   10/26/18 (!) 168/100    Weight from Last 3 Encounters:   11/05/18 68.5 kg (151 lb)   10/26/18 69 kg (152 lb 2 oz)   10/05/18 69.3 kg (152 lb 12.8 oz)              Today, you had the following     No orders found for display       Primary Care Provider Office Phone # Fax #    Sonja Jeni Hernandez [phone]       [address]  [facility] [address]        Equal Access to Services     [facility]: Hadii aad ku hadasho Soomaali, waaxda luqadaha, qaybta kaalmada lyndayamartin, mae schulz. So [facility] [phone].    ATENCIÓN: Si habla español, tiene a perez disposición servicios gratuitos de asistencia lingüística. Llame al  [phone].    We comply with applicable federal civil rights laws and [place] laws. We do not discriminate on the basis of race, color, national origin, age, disability, sex, sexual orientation, or gender identity.            Thank you!     Thank you for choosing [facility]  for your care. Our goal is always to provide you with excellent care. Hearing back from our patients is one way we can continue to improve our services. Please take a few minutes to complete the written survey that you may receive in the mail after your visit with us. Thank you!             Your Updated Medication List - Protect others around you: Learn how to safely use, store and throw away your medicines at www.disposemymeds.org.          This list is accurate as of 11/7/18  3:09 PM.  Always use your most recent med list.                   Brand Name Dispense Instructions for use Diagnosis    dexamethasone 4 MG tablet    DECADRON    60 tablet    Take 5 tablets (20 mg) by mouth See Admin Instructions    Adverse effect of paclitaxel, initial encounter       hydrochlorothiazide 25 MG tablet    HYDRODIURIL    90 tablet    Take 1 tablet (25 mg) by mouth every morning for blood pressure.    Essential hypertension with goal blood pressure less than 140/90       LORazepam 1 MG tablet    ATIVAN    30 tablet    Take 1 tablet (1 mg) by mouth every 6 hours as needed (nausea/vomiting, anxiety or sleep)    Ovarian cancer, unspecified laterality (H)       pravastatin 20 MG tablet    PRAVACHOL    90 tablet    Take 1 tablet (20 mg) by mouth every evening for cholesterol.    Hyperlipidemia LDL goal <130       prochlorperazine 10 MG tablet    COMPAZINE    30 tablet    Take 1 tablet (10 mg) by mouth every 6 hours as needed (nausea/vomiting)    Ovarian cancer, unspecified laterality (H)       senna-docusate 8.6-50 MG per tablet    SENOKOT-S;PERICOLACE    100 tablet    Take 2 tablets by mouth daily    Drug-induced constipation

## 2018-11-07 NOTE — MR AVS SNAPSHOT
After Visit Summary   11/7/2018    Di Evans    MRN: [id number]           Patient Information     Date Of Birth          1960        Visit Information        Provider Department      11/7/2018 2:15 PM Jessica Walton, [facility]        Today's Diagnoses     Family history of malignant neoplasm of breast    -  1    Ovarian cancer, unspecified laterality (H)          Care Instructions        Assessing Cancer Risk  Only about 5-10% of cancers are thought to be due to an inherited cancer susceptibility gene.    These families often have:    Several people with the same or related types of cancer    Cancers diagnosed at a young age (before age 50)    Individuals with more than one primary cancer    Multiple generations of the family affected with cancer    Some people may be candidates for genetic testing of more than one gene.  For these families, genetic testing using a cancer panel may be offered.  These panels will test different genes known to increase the risk for breast, ovarian, uterine, and/or other cancers. All of the genes discussed below have published clinical management guidelines for individuals who are found to carry a mutation. The purpose of this handout is to serve as a brief summary of the genes analyzed by the panels used to inquire about hereditary breast and gynecologic cancer:  FADI, BRCA1, BRCA2, BRIP1, CDH1, CHEK2, MLH1, MSH2, MSH6, PMS2, EPCAM, PTEN, PALB2, RAD51C, RAD51D, and TP53.  ______________________________________________________________________________  Hereditary Breast and Ovarian Cancer Syndrome   (BRCA1 and BRCA2)  A single mutation in one of the copies of BRCA1 or BRCA2 increases the risk for breast and ovarian cancer, among others.  The risk for pancreatic cancer and melanoma may also be slightly increased in some families.  The chart below shows the chance that someone with a BRCA mutation would develop cancer in his or her  lifetime1,2,3,4.        A person s ethnic background is also important to consider, as individuals of Ashkenazi [place] ancestry have a higher chance of having a BRCA gene mutation.  There are three BRCA mutations that occur more frequently in this population.    Murphy Syndrome   (MLH1, MSH2, MSH6, PMS2, and EPCAM)  Currently five genes are known to cause Murphy Syndrome: MLH1, MSH2, MSH6, PMS2, and EPCAM.  A single mutation in one of the Murphy Syndrome genes increases the risk for colon, endometrial, ovarian, and stomach cancers.  Other cancers that occur less commonly in Murphy Syndrome include urinary tract, skin, and brain cancers.  The chart below shows the chance that a person with Murphy syndrome would develop cancer in his or her lifetime5.      *Cancer risk varies depending on Murphy syndrome gene found    Cowden Syndrome   (PTEN)  Cowden syndrome is a hereditary condition that increases the risk for breast, thyroid, endometrial, colon, and kidney cancer.  Cowden syndrome is caused by a mutation in the PTEN gene.  A single mutation in one of the copies of PTEN causes Cowden syndrome and increases cancer risk.  The chart below shows the chance that someone with a PTEN mutation would develop cancer in their lifetime6,7.  Other benign features seen in some individuals with Cowden syndrome include benign skin lesions (facial papules, keratoses, lipomas), learning disability, autism, thyroid nodules, colon polyps, and larger head size.      *One recent study found breast cancer risk to be increased to 85%    Li-Fraumeni Syndrome   (TP53)  Li-Fraumeni Syndrome (LFS) is a cancer predisposition syndrome caused by a mutation in the TP53 gene. A single mutation in one of the copies of TP53 increases the risk for multiple cancers. Individuals with LFS are at an increased risk for developing cancer at a young age. The lifetime risk for development of a LFS-associated cancer is 50% by age 30 and 90% by age 60.   Core  Cancers: Sarcomas, Breast, Brain, Lung, Leukemias/Lymphomas, Adrenocortical carcinomas  Other Cancers: Gastrointestinal, Thyroid, Skin, Genitourinary    Hereditary Diffuse Gastric Cancer   (CDH1)  Currently, one gene is known to cause hereditary diffuse gastric cancer (HDGC): CDH1.  Individuals with HDGC are at increased risk for diffuse gastric cancer and lobular breast cancer. Of people diagnosed with HDGC, 30-50% have a mutation in the CDH1 gene.  This suggests there are likely other genes that may cause HDGC that have not been identified yet.      Lifetime Cancer Risks    General Population HDGC    Diffuse Gastric  <1% ~80%   Breast 12% 39-52%         Additional Genes  FADI  FADI is a moderate-risk breast cancer gene. Women who have a mutation in FADI can have between a 2-4 fold increased risk for breast cancer compared to the general population8. FADI mutations have also been associated with increased risk for pancreatic cancer, however an estimate of this cancer risk is not well understood9. Individuals who inherit two FADI mutations have a condition called ataxia-telangiectasia (AT).  This rare autosomal recessive condition affects the nervous system and immune system, and is associated with progressive cerebellar ataxia beginning in childhood.  Individuals with ataxia-telangiectasia often have a weakened immune system and have an increased risk for childhood cancers.    PALB2  Mutations in PALB2 have been shown to increase the risk of breast cancer up to 33-58% in some families; where individuals fall within this risk range is dependent upon family otyyirs41. PALB2 mutations have also been associated with increased risk for pancreatic cancer, although this risk has not been quantified yet.  Individuals who inherit two PALB2 mutations--one from their mother and one from their father--have a condition called Fanconi Anemia.  This rare autosomal recessive condition is associated with short stature, developmental  delay, bone marrow failure, and increased risk for childhood cancers.    CHEK2   CHEK2 is a moderate-risk breast cancer gene.  Women who have a mutation in CHEK2 have around a 2-fold increased risk for breast cancer compared to the general population, and this risk may be higher depending upon family history.11,12,13 Mutations in CHEK2 have also been shown to increase the risk of a number of other cancers, including colon and prostate, however these cancer risks are currently not well understood.    BRIP1, RAD51C and RAD51D  Mutations in BRIP1, RAD51C, and RAD51D have been shown to increase the risk of ovarian cancer and possibly female breast cancer as well14,15 .       Lifetime Cancer Risk    General Population BRIP1 RAD51C RAD51D   Ovarian 1-2% ~5-8% ~5-9% ~7-15%           Inheritance  All of the cancer syndromes reviewed above are inherited in an autosomal dominant pattern.  This means that if a parent has a mutation, each of his or her children will have a 50% chance of inheriting that same mutation.  Therefore, each child--male or female--would have a 50% chance of being at increased risk for developing cancer.      Image obtained from Genetics Home Reference, 2013     Mutations in some genes can occur de agata, which means that a person s mutation occurred for the first time in them and was not inherited from a parent.  Now that they have the mutation, however, it can be passed on to future generations.    Genetic Testing  Genetic testing involves a blood test and will look at the genetic information in the FADI, BRCA1, BRCA2, BRIP1, CDH1, CHEK2, MLH1, MSH2, MSH6, PMS2, EPCAM, PTEN, PALB2, RAD51C, RAD51D, and TP53 genes for any harmful mutations that are associated with increased cancer risk.  If possible, it is recommended that the person(s) who has had cancer be tested before other family members.  That person will give us the most useful information about whether or not a specific gene is associated with the  cancer in the family.    Results  There are three possible results of genetic testing:    Positive--a harmful mutation was identified in one or more of the genes    Negative--no mutation was identified in any of the genes on this panel    Variant of unknown significance--a variation in one of the genes was identified, but it is unclear how this impacts cancer risk in the family    Advantages and Disadvantages   There are advantages and disadvantages to genetic testing.    Advantages    May clarify your cancer risk    Can help you make medical decisions    May explain the cancers in your family    May give useful information to your family members (if you share your results)    Disadvantages    Possible negative emotional impact of learning about inherited cancer risk    Uncertainty in interpreting a negative test result in some situations    Possible genetic discrimination concerns (see below)    Genetic Information Nondiscrimination Act (MARINO)  MARINO is a federal law that protects individuals from health insurance or employment discrimination based on a genetic test result alone.  Although rare, there are currently no legal discrimination protections in terms of life insurance, long term care, or disability insurances.  Visit the National Human Genome Research [place] website to learn more.    Reducing Cancer Risk  All of the genes described above have nationally recognized cancer screening guidelines that would be recommended for individuals who test positive.  In addition to increased cancer screening, surgeries may be offered or recommended to reduce cancer risk.  Recommendations are based upon an individual s genetic test result as well as their personal and family history of cancer.    Questions to Think About Regarding Genetic Testing:    What effect will the test result have on me and my relationship with my family members if I have an inherited gene mutation?  If I don t have a gene mutation?    Should I  share my test results, and how will my family react to this news, which may also affect them?    Are my children ready to learn new information that may one day affect their own health?    Hereditary Cancer Resources    FORCE: Facing Our Risk of Cancer Empowered facingourrisk.org   Bright Pink bebrightpink.org   Li-Fraumeni Syndrome Association lfsassociation.org   PTEN World PTENworld.com   No stomach for cancer, Inc. nostomachforcancer.org   Stomach cancer relief network Scrnet.org   Collaborative Group of the Americas on Inherited Colorectal Cancer (CGA) cgaicc.com    Cancer Care cancercare.org   American Cancer Society (ACS) cancer.org   National Cancer Summit (NCI) cancer.gov     Please call us if you have any questions or concerns.   Cancer Risk Management Program [phone]-CANCER ([phone])  ? Jessica Walton, MS, [facility]  [phone]  ? Cinthya Sherman, MS, [facility]  [phone]  ? Thu Metcalf, MS, [facility]  [phone]  ? Donny Mijares, MS, [facility]  [phone]  ? Ernestine Fletcher, MS, [facility] [phone]    References  1. Savanah BARRERA, Maureen PDP, Damaris S, Tammy MENDEZ, Leilani JE, Pepito JL, Shira N, Elsy H, Clair O, Pauline A, Milena B, Khang P, Manhans S, Geno DM, Polo N, Sharlene E, Cary H, Jaleel E, Lubrachel J, Gronwald J, Laura B, Justin H, Thorlacius S, Eerola H, Tommy H, Bonnie K, Delmis OP. Average risks of breast and ovarian cancer associated with BRCA1 or BRCA2 mutations detected in case series unselected for family history: a combined analysis of 222 studies. Am J Hum Debbie. 2003;72:1117-30.  2. Wil CROWELL, Dora BRICEÑO, Laura G.  BRCA1 and BRCA2 Hereditary Breast and Ovarian Cancer. Gene Reviews online. 2013.  3. José Miguel YC, Breana S, Yessy G, Howell S. Breast cancer risk among male BRCA1 and BRCA2 mutation carriers. J Natl Cancer Inst. 2007;99:1811-4.  4. Hilario FUENTES, Osmani I, Joe J, Zainab E, Rupal GRUBER, Dwayne CHRIS. Risk of breast cancer in male BRCA2 carriers. J Med Debbie.  2010;47:710-1.  5. National Comprehensive Cancer Network. Clinical practice guidelines in oncology, colorectal cancer screening. Available online (registration required). 2015.  6. Jesse MH, Jesus J, Rukhsana J, Andrzej LA, Dacia MS, Serina C. Lifetime cancer risks in individuals with germline PTEN mutations. Clin Cancer Res. 2012;18:400-7.  7. Laura R. Cowden Syndrome: A Critical Review of the Clinical Literature. J Debbie . 2009:18:13-27.  8. Jessy A, Rj D, Mehreen S, Cindy P, Vadim T, Kaity M, Jam B, Lakshmi H, Tracy R, Red K, Dora L, Hilario DG, Geno D, Nhan DF, Buck MR, The Breast Cancer Susceptibility Collaboration (UK) & Robert N. FADI mutations that cause ataxia-telangiectasia are breast cancer susceptibility alleles. Nature Genetics. 2006;38:873-875  9. Richard N , Jefferson Y, Ju J, Latricia L, Jared GM , Saad ML, Gallinger S, Wiggins AG, Syngal S, Vinny ML, Ben J , Delmy R, Delores SZ, Escasi JR, Isaiah VE, Marco A M, Vogelstein B, Kirit N, Nick RH, Emmanuel KW, and Montes AP. FADI mutations in patients with hereditary pancreatic cancer. Cancer Discover. 2012;2:41-46  10. Savanah RUBALCAVA, et al. Breast-Cancer Risk in Families with Mutations in PALB2. NEJM. 2014; 371(6):497-506.  11. CHEK2 Breast Cancer Case-Control Consortium. CHEK2*1100delC and susceptibility to breast cancer: A collaborative analysis involving 10,860 breast cancer cases and 9,065 controls from 10 studies. Am J Hum Debbie, 74 (2004), pp. 8327-9278  12. Faizan T, Gonzalo S, Bere K, et al. Spectrum of Mutations in BRCA1, BRCA2, CHEK2, and TP53 in Families at High Risk of Breast Cancer. NAZIA. 2006;295(12):6963-4875.   13. Selena PHIPPS, Vy CINTRON, Andrew BARRERA, et al. Risk of breast cancer in women with a CHEK2 mutation with and without a family history of breast cancer. J Clin Oncol. 2011;29:8775-5754.  14. Steffen LOWE, Felicia E, Derek HOYOS, et al. Contribution of germline mutations in the RAD51B,  RAD51C, and RAD51D genes to ovarian cancer in the population. J Clin Oncol. 2015;33(26):6792-6842. Doi:10.1200/JCO.2015.61.2408.  15. Navid MURILLO, Ansley VINES, Marquise P, et al. Mutations in BRIP1 confer high risk of ovarian cancer. Brit Debbie. 2011;43(11):6134-3426. doi:10.1038/ng.955.            Follow-ups after your visit        Your next 10 appointments already scheduled     Nov 09, 2018 11:00 AM CST   [place] SCREENING DIGITAL BILATERAL with BKMA1   [facility]    [address] [place] [address] [phone]           How do I prepare for my exam? (Food and drink instructions) No Food and Drink Restrictions.  How do I prepare for my exam? (Other instructions) Do not use any powder, lotion or deodorant under your arms or on your breast. If you do, we will ask you to remove it before your exam.  What should I wear: Wear comfortable, two-piece clothing.  How long does the exam take: Most scans will take 15 minutes.  What should I bring: Bring any previous mammograms from other facilities or have them mailed to the breast center.  Do I need a :  No  is needed.  What do I need to tell my doctor: If you have any allergies, tell your care team.  What should I do after the exam: No restrictions, You may resume normal activities.  What is this test: This test is an x-ray of the breast to look for breast disease. The breast is pressed between two plates to flatten and spread the tissue. An X-ray is taken of the breast from different angles.  Who should I call with questions: If you have any questions, please call the Imaging Department where you will have your exam. Directions, parking instructions, and other information is available on our website, [email or web address].  Other information about my exam Three-dimensional (3D) mammograms are available at [facility] locations in [facility], [facility], [place],  "[facility].  Health locations include [facility] and the [facility] in [place].  Benefits of 3D mammograms include * Improved rate of cancer detection * Decreases your chance of having to go back for more tests, which means fewer: * \"False-positive\" results (This means that there is an abnormal area but it isn't cancer.) * Invasive testing procedures, such as a biopsy or surgery * Can provide clearer images of the breast if you have dense breast tissue.  *3D mammography is an optional exam that anyone can have with a 2D mammogram. It doesn't replace or take the place of a 2D mammogram. 2D mammograms remain an effective screening test for all women.  Not all insurance companies cover the cost of a 3D mammogram. Check with your insurance. Three-dimensional (3D) mammograms are available at [facility] locations in [facility], [facility], and [place]. Health locations include [facility] and [facility] in [place]. Benefits of 3D mammograms include: - Improved rate of cancer detection - Decreases your chance of having to go back for more tests, which means fewer: - \"False-positive\" results (This means that there is an abnormal area but it isn't cancer.) - Invasive testing procedures, such as a biopsy or surgery - Can provide clearer images of the breast if you have dense breast tissue. 3D mammography is an optional exam that anyone can have with a 2D mammogram. It doesn't replace or take the place of a 2D mammogram. 2D mammograms remain an effective screening test for all women.  Not all insurance companies cover the cost of a 3D mammogram. Check with your insurance.            Nov 16, 2018  8:15 AM CST   Return Visit with N[facility]SE [facility]ER C[facility]   [facility]    [address] [place] [address]   [phone]            Nov 16, 2018  8:45 AM CST   Return " Visit with SULMA Holman CNP   [facility] ([facility])    [address] [facility] [address] [phone]            Nov 16, 2018  9:30 AM CST   Level 5 with BAY 3 INFUSION   [facility] ([facility])    [address] [facility] [id number] [phone]            Dec 07, 2018  8:15 AM CST   Return Visit with [facility]   [facility] ([facility])    [address] [place] [id number] [phone]            Dec 07, 2018  9:00 AM CST   Return Visit with Jammie Salgado MD   [facility] ([facility])    [address] [place] [id number] [phone]            Dec 07, 2018  9:30 AM CST   Level 5 with BAY 5 INFUSION   [facility] ([facility])    [address] [place] [id number] [phone]              Who to contact     If you have questions or need follow up information about today's clinic visit or your schedule please contact [facility] directly at [phone].  Normal or non-critical lab and imaging results will be communicated to you by AthletePathhart, letter or phone within 4 business days after the clinic has received the results. If you do not hear from us within 7 days, please contact the clinic through AthletePathhart or phone. If you have a critical or abnormal lab result, we will notify you by phone as soon as possible.  Submit refill requests through Vilynx or call your pharmacy and they will forward the refill request to us. Please allow 3 business days for your refill to be completed.          Additional Information About Your Visit        AthletePathharRamen Information     Vilynx gives you secure access to your electronic health record. If you see a primary care provider, you can also send messages to your care team and make  appointments. If you have questions, please call your primary care clinic.  If you do not have a primary care provider, please call [phone] and they will assist you.      CommonFloor is an electronic gateway that provides easy, online access to your medical records. With CommonFloor, you can request a clinic appointment, read your test results, renew a prescription or communicate with your care team.     To access your existing account, please contact your [facility] or call [phone] for assistance.        Care EveryWhere ID     This is your Care EveryWhere ID. This could be used by other organizations to access your [facility] medical records  [id number]         Blood Pressure from Last 3 Encounters:   11/05/18 134/86   10/26/18 (!) 166/102   10/26/18 (!) 168/100    Weight from Last 3 Encounters:   11/05/18 68.5 kg (151 lb)   10/26/18 69 kg (152 lb 2 oz)   10/05/18 69.3 kg (152 lb 12.8 oz)              We Performed the Following     CANCER RISK MGMT/CANCER GENETIC COUNSELING REFERRAL        Primary Care Provider Office Phone # Fax #    Sonja Jeni Hernandez [phone]       [address]  [facility] [address]        Equal Access to Services     ROMAN MILLARD : Hadii aad ku hadasho Soomaali, waaxda luqadaha, qaybta kaalmada adeegyada, waxay idiin haynaomin lynda schulz. So [facility] [phone].    ATENCIÓN: Si habla español, tiene a perez disposición servicios gratuitos de asistencia lingüística. Llame al [phone].    We comply with applicable federal civil rights laws and [place] laws. We do not discriminate on the basis of race, color, national origin, age, disability, sex, sexual orientation, or gender identity.            Thank you!     Thank you for choosing [facility]  for your care. Our goal is always to provide you with excellent care. Hearing back from our patients is one way we can continue to improve our services.  Please take a few minutes to complete the written survey that you may receive in the mail after your visit with us. Thank you!             Your Updated Medication List - Protect others around you: Learn how to safely use, store and throw away your medicines at www.disposemymeds.org.          This list is accurate as of 11/7/18  2:37 PM.  Always use your most recent med list.                   Brand Name Dispense Instructions for use Diagnosis    dexamethasone 4 MG tablet    DECADRON    60 tablet    Take 5 tablets (20 mg) by mouth See Admin Instructions    Adverse effect of paclitaxel, initial encounter       hydrochlorothiazide 25 MG tablet    HYDRODIURIL    90 tablet    Take 1 tablet (25 mg) by mouth every morning for blood pressure.    Essential hypertension with goal blood pressure less than 140/90       LORazepam 1 MG tablet    ATIVAN    30 tablet    Take 1 tablet (1 mg) by mouth every 6 hours as needed (nausea/vomiting, anxiety or sleep)    Ovarian cancer, unspecified laterality (H)       pravastatin 20 MG tablet    PRAVACHOL    90 tablet    Take 1 tablet (20 mg) by mouth every evening for cholesterol.    Hyperlipidemia LDL goal <130       prochlorperazine 10 MG tablet    COMPAZINE    30 tablet    Take 1 tablet (10 mg) by mouth every 6 hours as needed (nausea/vomiting)    Ovarian cancer, unspecified laterality (H)       senna-docusate 8.6-50 MG per tablet    SENOKOT-S;PERICOLACE    100 tablet    Take 2 tablets by mouth daily    Drug-induced constipation

## 2018-11-07 NOTE — PROGRESS NOTES
2018    Referring Provider: Jammie Salgado MD    Presenting Information:   I met with Di Evans ( Deb ) today for genetic counseling at the Cancer Risk Management Program at the [facility] to discuss her personal and family history of cancer.  She is here today with her roommate to review this history, cancer screening recommendations, and available genetic testing options.    Personal History:  Courtney is a 58 year old female.  She was recently diagnosed with ovarian cancer. 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 She underwent a ISAIAS/BSO and debulking surgery, and is being treated with chemotherapy.       Courtney has no biological children and went into menopause at age 44. She states she did not use hormone replacement therapy. Courtney had her last mammogram in 2016 which was negative, and reports no past abnormal mammograms. She reports she has another mammogram scheduled for Friday. She has not had a colonoscopy. She states that she was scheduled to have one the day of her diagnosis, but due to this the colonoscopy was canceled. She reports no concerning skin findings. Courtney reported no tobacco use.     Family History: (Please see scanned pedigree for detailed family history information)    Mother is 78 and has not had cancer. She retains her uterus and ovaries.     Maternal aunt had breast cancer in her 50s-60s and passed away. She did not have treatment for her cancer.     Maternal aunt, age 65, was diagnosed with breast cancer at 55. Courtney is unsure what type of treatment she had.     Father  at 62 from polycythemia.     Paternal grandmother had leukemia in her 80s and passed away. Courtney is unsure what type of leukemia.    No other family history of cancer is known.     Her maternal ethnicity is Thai. Her paternal ethnicity is .  There is no known Ashkenazi Spiritism ancestry on either side of her family.  There is no reported consanguinity.    Discussion:    Courtney's ovarian cancer and family history of breast cancer is suggestive of a possible cancer susceptibility gene in the family. Given her diagnosis of ovarian cancer, we discussed Murphy syndrome and Hereditary Breast and Ovarian Cancer syndrome as the two most likely hereditary explanations.    We discussed the natural history and genetics of Hereditary Breast and Ovarian cancer syndrome due to BRCA1 and BRCA2 gene mutations. We also discussed the natural history and genetics of Murphy syndrome due to mismatch repair gene mutations (MLH1, MSH2, MSH6, PMS2, EPCAM).      A detailed handout regarding hereditary gynecologic cancer risk genes and the information we discussed was provided to Courtney at the end of our appointment today and can be found in the after visit summary.  Topics included: inheritance pattern, cancer risks, cancer screening recommendations, and also risks, benefits and limitations of testing.    Based on her personal history, Courtney meets current National Comprehensive Cancer Network (NCCN) criteria for genetic testing of BRCA1 and BRCA2.    We discussed that there are additional genes that could cause increased risk for breast and ovarian cancer.  As many of these genes present with overlapping features in a family and accurate cancer risk cannot always be established based upon the pedigree analysis alone, it would be reasonable for Courtney to consider panel genetic testing to analyze multiple genes at once.    We reviewed genetic testing options for hereditary breast and gynecologic cancers: actionable high/moderate risk breast and gynecologic cancer risk custom panel (CustomNext-Cancer, 19 genes, a combination of GynPlus and BRCAplus + NBN, STK11, and NF1) and expanded high and moderate risk panel testing (OvaNext, 25 genes).  Courtney expressed an interest in only the actionable genes.  She opted for the CustomNext-Cancer 19-gene panel.     The  CustomNext-Cancer high/moderate risk breast and gynecologic panel includes the following 19 genes: FADI, BRCA1, BRCA2, BRIP1, CDH1, CHEK2, EPCAM, MLH1, MSH2, MSH6, NBN, NF1, PALB2,  PMS2, PTEN, RAD51C, RAD51D, STK11, and TP53.      Some of the genes on this custom panel are associated with specific hereditary cancer syndromes and have published management guidelines:  Hereditary Breast and Ovarian Cancer syndrome (BRCA1, BRCA2), Murphy syndrome (EPCAM, MLH1, MSH2, MSH6, PMS2), Hereditary Diffuse Gastric Cancer (CDH1), Cowden Syndrome (PTEN), Peutz-Jeghers syndrome, neurofibromatosis type 1 (NF1), and Li Fraumeni syndrome (TP53).       Risk-reducing salpingo-oophorectomy can be considered in women with mutations in BRIP1, RAD51C, or RAD51D.  FADI, CHEK2, NBN, and PALB2 are associated with breast cancer risk and have published screening guidelines.    Medical Management: For Courtney, we reviewed that the information from genetic testing may determine:    additional cancer screening for which Courtney may qualify (i.e. mammogram and breast MRI, more frequent colonoscopies, more frequent dermatologic exams, etc.),    options for risk reducing surgeries Courtney could consider (i.e. bilateral mastectomy),      and targeted chemotherapies under certain circumstances (i.e. immunotherapy for individuals with Murphy syndrome, PARP inhibitors, etc.).     These recommendations and possible targeted chemotherapies will be discussed in detail once genetic testing is completed.     Plan:  1) Today Courtney elected to proceed with CustomNext-Cancer.  2) This information should be available in 4-5 weeks.  3) Courtney will return to clinic to discuss the results    Face to face time: 40 minutes    Jessica Walton MS, [facility]  Licensed Genetic Counselor  P. [phone]  F. [phone]

## 2018-11-07 NOTE — TELEPHONE ENCOUNTER
Called pt and spoke with her. I have actually been trying to reach her regarding her rising  and my plan for CT prior to her next cycle of chemotherapy.  Order was placed, if you could help arrange this for prior to her next treatment on 11/16 that would be great.  Thanks

## 2018-11-09 NOTE — TELEPHONE ENCOUNTER
Medication Detail      Disp Refills Start End CHUCK   hydrochlorothiazide (HYDRODIURIL) 25 MG tablet 90 tablet 1 11/5/2018  No   Sig - Route: Take 1 tablet (25 mg) by mouth every morning for blood pressure. - Oral   Class: E-Prescribe   Order: [id number]   E-Prescribing Status: Receipt confirmed by pharmacy (11/5/2018 11:48 AM CST)   Printout Tracking   External Result Report   Medication Administration Instructions   for blood pressure.   Pharmacy   [facility] [address] [place] [address] [address] AT [facility] [address]     [facility] message sent to patient advising that six month supply of Rx sent to pharmacy and advised she contact pharmacy.     Coco Alfredo RN

## 2018-11-13 NOTE — MR AVS SNAPSHOT
MRN:[id number]                      After Visit Summary   11/13/2018    Di Evans    MRN: [id number]           Visit Information        Provider Department      11/13/2018 10:15 AM MG IMAGING NURSE [facility]        Your next 10 appointments already scheduled     Nov 13, 2018 10:15 AM CST   Nurse Only with MG IMAGING NURSE   [facility] ([facility])    [address] [place] [id number] [phone]            Nov 13, 2018 10:30 AM CST   CT CHEST/ABDOMEN/PELVIS W CONTRAST with MGCT1   [facility] ([facility])    [address] [place] [id number] [phone]           How do I prepare for my exam? (Food and drink instructions) To prepare: Do not eat or drink for 2 hours before your exam. If you need to take medicine, you may take it with small sips of water. (We may ask you to take liquid medicine as well.)  How do I prepare for my exam? (Other instructions) Please arrive 30 minutes early for your CT.  Once in the department you might be asked to drink water 15-20 minutes prior to your exam.  If indicated you may be asked to drink an oral contrast in advance of your CT.  If this is the case, the imaging team will let you know or be in contact with you prior to your appointment  Patients over 70 or patients with diabetes or kidney problems: If you haven t had a blood test (creatinine test) within the last 30 days, the Cardiologist/Radiologist may require you to get this test prior to your exam.  If you have diabetes:  Continue to take your metformin medication on the day of your exam  What should I wear: Please wear loose clothing, such as a sweat suit or jogging clothes. Avoid snaps, zippers and other metal. We may ask you to undress and put on a hospital gown.  How long does the exam take: Most scans take less than 20 minutes.  What should I bring: Please bring any scans or  X-rays taken at other hospitals, if similar tests were done. Also bring a list of your medicines, including vitamins, minerals and over-the-counter drugs. It is safest to leave personal items at home.  Do I need a : No  is needed.  What do I need to tell my doctor? Be sure to tell your doctor: * If you have any allergies. * If there s any chance you are pregnant. * If you are breastfeeding.  What should I do after the exam: No restrictions, You may resume normal activities.  What is this test: A CT (computed tomography) scan is a series of pictures that allows us to look inside your body. The scanner creates images of the body in cross sections, much like slices of bread. This helps us see any problems more clearly. You may receive contrast (X-ray dye) before or during your scan. You will be asked to drink the contrast.  Who should I call with questions: If you have any questions, please call the Imaging Department where you will have your exam. Directions, parking instructions, and other information is available on our website, [email or web address].            Nov 16, 2018  8:15 AM CST   Return Visit with NURSE ONLY CANCER CENTER   [facility] [place]    [address] [place] [address]   [phone]            Nov 16, 2018  8:45 AM CST   Return Visit with SULMA Holman CNP   [facility] [place] [place]    [address] [facility] [address]   [phone]            Nov 16, 2018  9:30 AM CST   Level 5 with BAY 3 INFUSION   [facility] [place]    [address] [facility] [address]   [phone]            Dec 07, 2018  8:15 AM CST   Return Visit with NURSE ONLY CANCER CENTER   [facility] [place]    [address] [facility] [address]   [phone]            Dec 07, 2018  9:00 AM CST   Return  Visit with Jammie Salgado MD   [facility] ([facility])    [address] [place] [address] [phone]            Dec 07, 2018  9:30 AM CST   Level 5 with BAY 5 INFUSION   [facility] ([facility])    [address] [facility] [phone] [phone]              MyChart Information     [employer] gives you secure access to your electronic health record. If you see a primary care provider, you can also send messages to your care team and make appointments. If you have questions, please call your primary care clinic.  If you do not have a primary care provider, please call [phone] and they will assist you.      [employer] is an electronic gateway that provides easy, online access to your medical records. With [employer], you can request a clinic appointment, read your test results, renew a prescription or communicate with your care team.     To access your existing account, please contact your [facility] or call [phone] for assistance.        Care EveryWhere ID     This is your Care EveryWhere ID. This could be used by other organizations to access your [facility] medical records  [id number]        Equal Access to Services     ROMAN MILLARD : Lele Toure, wadelmyda russell, qaybta kaalmada bonnie, mae schulz. So [facility] [phone].    ATENCIÓN: Si habla español, tiene a perez disposición servicios gratuitos de asistencia lingüística. Llame al [phone].    We comply with applicable federal civil rights laws and [place] laws. We do not discriminate on the basis of race, color, national origin, age, disability, sex, sexual orientation, or gender identity.

## 2018-11-13 NOTE — PROGRESS NOTES
Power port identified by 3 raised dots, previous imaging  IVAD accessed with 20 gauge 3/4 inch tatum gripper plus needle.   Flushed with 10 ml Sterile 0.9% NaCl (NDC [address]-10) and dressed with sterile Tegaderm.  Flushed with 10 cc NS (NDC [address]-41)and 5 cc 100 unit/ml Heparin (NDC [address]-03)  Needle: d/c'd intact  Comments: blood drawn for POC testing

## 2018-11-16 NOTE — MR AVS SNAPSHOT
After Visit Summary   11/16/2018    Di Evans    MRN: [id number]           Patient Information     Date Of Birth          1960        Visit Information        Provider Department      11/16/2018 9:30 AM BAY 3 INFUSION [facility]        Today's Diagnoses     Ovarian cancer, unspecified laterality (H)    -  1    Hypomagnesemia           Follow-ups after your visit        Your next 10 appointments already scheduled     Dec 07, 2018  8:15 AM CST   Return Visit with NURSE ONLY CANCER CENTER   [facility])    [address] [place] [address] [phone]            Dec 07, 2018  9:00 AM CST   Return Visit with Jammie Salgado MD   [facility])    [address] [place] [address] [phone]            Dec 07, 2018  9:30 AM CST   Level 5 with BAY 5 INFUSION   [facility])    [address] [place] [address] [phone]            Jan 02, 2019  1:15 PM CST   Return Visit with Jessica Walton GC   [facility])    [address] [place] [address] [phone]              Who to contact     If you have questions or need follow up information about today's clinic visit or your schedule please contact [facility] directly at [phone].  Normal or non-critical lab and imaging results will be communicated to you by MyChart, letter or phone within 4 business days after the clinic has received the results. If you do not hear from us within 7 days, please contact the clinic through MyChart or phone. If you have a critical or abnormal lab result, we will notify you by phone as soon as possible.  Submit refill requests through [employer] or call your pharmacy and they will forward the refill request to us. Please  allow 3 business days for your refill to be completed.          Additional Information About Your Visit        [employer] Information     [employer] gives you secure access to your electronic health record. If you see a primary care provider, you can also send messages to your care team and make appointments. If you have questions, please call your primary care clinic.  If you do not have a primary care provider, please call [phone] and they will assist you.      [employer] is an electronic gateway that provides easy, online access to your medical records. With [employer], you can request a clinic appointment, read your test results, renew a prescription or communicate with your care team.     To access your existing account, please contact your [facility] or call [phone] for assistance.        Care EveryWhere ID     This is your Care EveryWhere ID. This could be used by other organizations to access your [facility] medical records  [id number]         Blood Pressure from Last 3 Encounters:   11/16/18 150/84   11/05/18 134/86   10/26/18 (!) 166/102    Weight from Last 3 Encounters:   11/16/18 70.4 kg (155 lb 2 oz)   11/05/18 68.5 kg (151 lb)   10/26/18 69 kg (152 lb 2 oz)              Today, you had the following     No orders found for display         Where to get your medicines      These medications were sent to [facility] [address] [place] [address] [address] AT [facility] [address]  [address] [address], [facility] [address]     Phone:  [phone]     senna-docusate 8.6-50 MG per tablet          Primary Care Provider Office Phone # Fax #    Sonja Jeni Hernandez [phone]       [address]  [place] [address]        Equal Access to Services     VIANCA MILLARD AH: Hadii aad ku hadasho Soomaali, waaxda luqadaha, qaybta kaalmada adeegyada, mae schulz. So [facility] [phone].    ATENCIÓN: Cony vargas  español, tiene a perez disposición servicios gratuitos de asistencia lingüística. Sirena nolasco [phone].    We comply with applicable federal civil rights laws and [place] laws. We do not discriminate on the basis of race, color, national origin, age, disability, sex, sexual orientation, or gender identity.            Thank you!     Thank you for choosing [facility]  for your care. Our goal is always to provide you with excellent care. Hearing back from our patients is one way we can continue to improve our services. Please take a few minutes to complete the written survey that you may receive in the mail after your visit with us. Thank you!             Your Updated Medication List - Protect others around you: Learn how to safely use, store and throw away your medicines at www.disposemymeds.org.          This list is accurate as of 11/16/18  3:13 PM.  Always use your most recent med list.                   Brand Name Dispense Instructions for use Diagnosis    dexamethasone 4 MG tablet    DECADRON    60 tablet    Take 5 tablets (20 mg) by mouth See Admin Instructions    Adverse effect of paclitaxel, initial encounter       hydrochlorothiazide 25 MG tablet    HYDRODIURIL    90 tablet    Take 1 tablet (25 mg) by mouth every morning for blood pressure.    Essential hypertension with goal blood pressure less than 140/90       LORazepam 1 MG tablet    ATIVAN    30 tablet    Take 1 tablet (1 mg) by mouth every 6 hours as needed (nausea/vomiting, anxiety or sleep)    Ovarian cancer, unspecified laterality (H)       pravastatin 20 MG tablet    PRAVACHOL    90 tablet    Take 1 tablet (20 mg) by mouth every evening for cholesterol.    Hyperlipidemia LDL goal <130       prochlorperazine 10 MG tablet    COMPAZINE    30 tablet    Take 1 tablet (10 mg) by mouth every 6 hours as needed (nausea/vomiting)    Ovarian cancer, unspecified laterality (H)       senna-docusate 8.6-50 MG per tablet    SENOKOT-S;PERICOLACE     100 tablet    Take 2 tablets by mouth daily    Drug-induced constipation

## 2018-11-16 NOTE — LETTER
11/16/2018         RE: Di [place]  [address] [facility] [id number]        Dear Colleague,    Thank you for referring your patient, Di Evans, to the [facility]. Please see a copy of my visit note below.    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 Imaging proved Pelvic mass with omental thickening and ascites. Surgical pathology proved stage IIIB mixed clear cell (80%) and endometrioid (20%) adenocarcinoma, + pelvic LN, + PA LN, + omentum.   Treatment:   7/30/2018 Exploratory laparotomy, modified radical hysterectomy, bilateral salpingo-oophorectomy, omentectomy, right pelvic and bilateral para-aortic lymph node dissection, CUSA tumor debulking, mobilization of the entire colon, stripping of left pelvic peritoneum, proctoscopy, optimal tumor debulking to no gross residual disease  8/24/2018 began carboplatin( AUC 6) and Taxol 175mg/m2)    Interval History: Ms. Evans comes to clinic alone for toxicity review prior to cycle 5 of carboplatin/Taxol. Pt shares she is has worked through some mild nausea just into cycles by using antiemetics and is able to eat well and not loosing weight. She is having no pain or weakness and bowel and bladder are normal. Denies neuropathy symptoms. 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  Rest of comprehensive and complete ROS is reviewed and is negative.   Past Medical History:   Diagnosis Date     Hypertension      Ovarian cancer (H)      Current Outpatient Prescriptions   Medication     dexamethasone (DECADRON) 4 MG tablet     hydrochlorothiazide (HYDRODIURIL) 25 MG tablet     LORazepam (ATIVAN) 1 MG tablet  "    pravastatin (PRAVACHOL) 20 MG tablet     prochlorperazine (COMPAZINE) 10 MG tablet     senna-docusate (SENOKOT-S;PERICOLACE) 8.6-50 MG per tablet     No current facility-administered medications for this visit.      Allergies   Allergen Reactions     Gemfibrozil Muscle Pain (Myalgia)     Lovastatin      headaches     Penicillins        Physical Exam: /84 (BP Location: Right arm)  Pulse 100  Temp 98.3  F (36.8  C) (Oral)  Resp 16  Ht 1.651 m (5' 5\")  Wt 70.4 kg (155 lb 2 oz)  SpO2 97%  BMI 25.81 kg/m2    ECOG PS- 0  Constitutional: Alert, healthy, and in no distress.   ENT: Eyes bright, No mouth sores  Neck: Supple, No adenopathy.Thyroid symmetric  Cardiac: Heart rate and rhythm is regular and strong without murmur  Respiratory: Breathing easy. Lung sounds clear to auscultation  GI: Abdomen is soft, non-tender-well healed surgical site on the abdomen without discomfort with palpation, BS normal. No masses or organomegaly  MS: Muscle tone normal, extremities normal with no edema.   Skin: No suspicious lesions or rashes  Neuro: Sensory grossly WNL, gait normal.   Lymph: Normal ant/post cervical, axillary, supraclavicular nodes  Psych: Mentation appears normal and affect normal/bright with good conversation    Laboratory Results:   Results for orders placed or performed in visit on 11/13/18   CT Chest/Abdomen/Pelvis w Contrast    Narrative    EXAMINATION: CT CHEST/ABDOMEN/PELVIS W CONTRAST, 11/13/2018 11:08 AM    TECHNIQUE:  Helical CT images from the thoracic inlet through the  symphysis pubis were obtained  with contrast. Contrast dose: 92 ml  isovue 370    COMPARISON: CT exams 7/25/2018; 7/16/2018.    HISTORY: Ovarian cancer s/p debulking and on chemotherapy with rising  ; Ovarian cancer, unspecified laterality (H)    FINDINGS:    LUNGS: Bilateral lower lobes dependent atelectasis. Minimal platelike  atelectasis in the left lingula. Scattered calcified and noncalcified  pulmonary nodules " measuring up to 4 mm, stable. No new or enlarging  pulmonary nodules.    Chest: Partially visualized thyroid gland is is slightly  heterogeneous, stable. Tiny hypodensities in both lobes, unchanged.  Right-sided chest wall Port-A-Cath with its tip at the superior  cavoatrial junction. Thoracic aorta and main pulmonary artery with  normal diameter. Mild atherosclerotic calcifications of the thoracic  aorta. Cardiac size within normal limits. No pericardial effusions. No  pleural effusions. No pneumothorax. Central tracheobronchial tree is  patent. No thoracic lymphadenopathy. Subcentimeter thoracic lymph  nodes, not enlarged by size criteria.    Abdomen and pelvis: Focal fat deposition along the falciform ligament.  No suspicious liver lesions. Patent hepatic vasculature. No biliary  tree dilation. Unremarkable gallbladder with a fundal fold. Partial  fatty replacement of the pancreatic parenchyma. Main pancreatic duct  is not dilated. The spleen is not enlarged. No focal splenic lesions.  Unremarkable adrenal glands. No renal stones or hydronephrosis.  Partially distended urinary bladder, unremarkable. No dilated loops of  bowel. No bowel wall thickening. Sigmoid colon diverticulosis. No  associated CT findings of acute diverticulitis.    Postoperative changes of prior hysterectomy and bilateral  salpingo-oophorectomy. No adnexal masses. Small amount of ascites,  improved from the prior study. Peritoneal nodularity and thickening,  improved from the prior study. Postoperative changes along the  anterior abdominal wall. Scattered retroperitoneal and mesenteric  lymph nodes, not enlarged by size criteria. No abdominal aortic  aneurysm. Minimal atherosclerotic calcifications of the abdominal  aorta and its major branches. Subcentimeter pelvic lymph nodes,  stable. No new or enlarging abdominal or pelvic lymphadenopathy. No  free air.    Bones: Enthesopathic changes off the pelvis and hips. Degenerative  changes of the  spine, hips, bilateral SI joints. Scattered Schmorl  nodes. No aggressive bone lesions.      Impression    IMPRESSION: In this patient with history of ovarian cancer, there are  postoperative changes of total abdominal hysterectomy, bilateral  salpingo-oophorectomy and debulking surgery:  1. Small amount of ascites, improved from the prior study.  2. Peritoneal nodularity and thickening, improved from the prior  study.  3. Stable bilateral pulmonary nodules measuring up to 4 mm. No new or  enlarging pulmonary nodules.    MATTHEW RUIZ MD   Creatinine POCT   Result Value Ref Range    Creatinine 0.5 (L) 0.52 - 1.04 mg/dL    GFR Estimate >90 >60 mL/min/1.7m2    GFR Estimate If Black >90 >60 mL/min/1.7m2   - awaiting cancer marker results.     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  It was noted to have elevating cancer marker and completed CT on 11/7 which we discussed today as seeing no changes of concern.   She does meet goals of treatment with review today and so will continue with cycle 5 of carboplatin/Taxol with goal of 6 cycles.  She will return in 3 weeks for cycle 6 and will be seeing Dr. Duque prior to infusion for review with treatment labs.. We will watch cancer marker closely and discuss further imaging for after completion of planned chemotherapy.   Nausea in cycle which was controlled with antiemetics.  Review exam and labs show she is eligible to continue with cycle 2 as planned without additional changes to plan.  Elevated blood pressure with known hypertension-blood pressure continues to stay elevated at start of each cycle. She continues with use of hydrochlorothiazide 25 mg daily and we are recommending regular exercise and low-salt diet.  Constipation- using senna s to help with drug induced concern. Reminded that she may increase dosing of the senna S  as needed to 2 bid. Suggested at least 64 ounces of fluids daily.   Genetics- met with genetic counselor and proceeded with [employer] screening- awaiting results.   This was a 25 min visit with > 50% in counseling and coordinating care including education and management of concerns.    Susie Martinez CNP      Again, thank you for allowing me to participate in the care of your patient.        Sincerely,        Susie Martinez, ANDERSON, APRN CNP

## 2018-11-16 NOTE — PROGRESS NOTES
Infusion Nursing Note:  Di Evans presents today for C5,D1 of Taxol/Carboplatin.    Patient seen by provider today: Yes: Susie Arceo,ANDERSON   present during visit today: Not Applicable.    Note: Magnesium replacement ordered for a 1.3 level today.  Pt requesting IV benadryl due to having a reaction to her first Taxol/Carbo.  Pt denies any problems with her last infusion, will treat as ordered.    Intravenous Access:  Implanted Port.    Treatment Conditions:  Lab Results   Component Value Date    HGB 10.2 11/16/2018     Lab Results   Component Value Date    WBC 3.2 11/16/2018      Lab Results   Component Value Date    ANEU 2.7 11/16/2018     Lab Results   Component Value Date     11/16/2018      Lab Results   Component Value Date     11/16/2018                   Lab Results   Component Value Date    POTASSIUM 3.4 11/16/2018           Lab Results   Component Value Date    MAG 1.3 11/16/2018            Lab Results   Component Value Date    CR 0.55 11/16/2018                   Lab Results   Component Value Date    TRACEY 9.0 11/16/2018                Lab Results   Component Value Date    BILITOTAL 0.3 11/16/2018           Lab Results   Component Value Date    ALBUMIN 3.2 11/16/2018                    Lab Results   Component Value Date    ALT 24 11/16/2018           Lab Results   Component Value Date    AST 23 11/16/2018       Results reviewed, labs MET treatment parameters, ok to proceed with treatment.      Post Infusion Assessment:  Patient tolerated infusion without incident.  Blood return noted pre and post infusion.  Site patent and intact, free from redness, edema or discomfort.  No evidence of extravasations.  Access discontinued per protocol.    Discharge Plan:   Return 12/07/18 with Dr Duque and treatment.  Discharge instructions reviewed with: Patient and Family.  Patient and/or family verbalized understanding of discharge instructions and all questions answered.  Patient  discharged in stable condition accompanied by: self.  Departure Mode: Ambulatory.    Zully Núñez RN

## 2018-11-16 NOTE — PROGRESS NOTES
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 Imaging proved Pelvic mass with omental thickening and ascites. Surgical pathology proved stage IIIB mixed clear cell (80%) and endometrioid (20%) adenocarcinoma, + pelvic LN, + PA LN, + omentum.   Treatment:   7/30/2018 Exploratory laparotomy, modified radical hysterectomy, bilateral salpingo-oophorectomy, omentectomy, right pelvic and bilateral para-aortic lymph node dissection, CUSA tumor debulking, mobilization of the entire colon, stripping of left pelvic peritoneum, proctoscopy, optimal tumor debulking to no gross residual disease  8/24/2018 began carboplatin( AUC 6) and Taxol 175mg/m2)    Interval History: Ms. Evans comes to clinic alone for toxicity review prior to cycle 5 of carboplatin/Taxol. Pt shares she is has worked through some mild nausea just into cycles by using antiemetics and is able to eat well and not loosing weight. She is having no pain or weakness and bowel and bladder are normal. Denies neuropathy symptoms. 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  Rest of comprehensive and complete ROS is reviewed and is negative.   Past Medical History:   Diagnosis Date     Hypertension      Ovarian cancer (H)      Current Outpatient Prescriptions   Medication     dexamethasone (DECADRON) 4 MG tablet     hydrochlorothiazide (HYDRODIURIL) 25 MG tablet     LORazepam (ATIVAN) 1 MG tablet     pravastatin (PRAVACHOL) 20 MG tablet     prochlorperazine (COMPAZINE) 10 MG tablet     senna-docusate (SENOKOT-S;PERICOLACE) 8.6-50 MG per tablet     No current facility-administered medications for this visit.      Allergies   Allergen Reactions  "    Gemfibrozil Muscle Pain (Myalgia)     Lovastatin      headaches     Penicillins        Physical Exam: /84 (BP Location: Right arm)  Pulse 100  Temp 98.3  F (36.8  C) (Oral)  Resp 16  Ht 1.651 m (5' 5\")  Wt 70.4 kg (155 lb 2 oz)  SpO2 97%  BMI 25.81 kg/m2    ECOG PS- 0  Constitutional: Alert, healthy, and in no distress.   ENT: Eyes bright, No mouth sores  Neck: Supple, No adenopathy.Thyroid symmetric  Cardiac: Heart rate and rhythm is regular and strong without murmur  Respiratory: Breathing easy. Lung sounds clear to auscultation  GI: Abdomen is soft, non-tender-well healed surgical site on the abdomen without discomfort with palpation, BS normal. No masses or organomegaly  MS: Muscle tone normal, extremities normal with no edema.   Skin: No suspicious lesions or rashes  Neuro: Sensory grossly WNL, gait normal.   Lymph: Normal ant/post cervical, axillary, supraclavicular nodes  Psych: Mentation appears normal and affect normal/bright with good conversation    Laboratory Results:   Results for orders placed or performed in visit on 11/13/18   CT Chest/Abdomen/Pelvis w Contrast    Narrative    EXAMINATION: CT CHEST/ABDOMEN/PELVIS W CONTRAST, 11/13/2018 11:08 AM    TECHNIQUE:  Helical CT images from the thoracic inlet through the  symphysis pubis were obtained  with contrast. Contrast dose: 92 ml  isovue 370    COMPARISON: CT exams 7/25/2018; 7/16/2018.    HISTORY: Ovarian cancer s/p debulking and on chemotherapy with rising  ; Ovarian cancer, unspecified laterality (H)    FINDINGS:    LUNGS: Bilateral lower lobes dependent atelectasis. Minimal platelike  atelectasis in the left lingula. Scattered calcified and noncalcified  pulmonary nodules measuring up to 4 mm, stable. No new or enlarging  pulmonary nodules.    Chest: Partially visualized thyroid gland is is slightly  heterogeneous, stable. Tiny hypodensities in both lobes, unchanged.  Right-sided chest wall Port-A-Cath with its tip at the " superior  cavoatrial junction. Thoracic aorta and main pulmonary artery with  normal diameter. Mild atherosclerotic calcifications of the thoracic  aorta. Cardiac size within normal limits. No pericardial effusions. No  pleural effusions. No pneumothorax. Central tracheobronchial tree is  patent. No thoracic lymphadenopathy. Subcentimeter thoracic lymph  nodes, not enlarged by size criteria.    Abdomen and pelvis: Focal fat deposition along the falciform ligament.  No suspicious liver lesions. Patent hepatic vasculature. No biliary  tree dilation. Unremarkable gallbladder with a fundal fold. Partial  fatty replacement of the pancreatic parenchyma. Main pancreatic duct  is not dilated. The spleen is not enlarged. No focal splenic lesions.  Unremarkable adrenal glands. No renal stones or hydronephrosis.  Partially distended urinary bladder, unremarkable. No dilated loops of  bowel. No bowel wall thickening. Sigmoid colon diverticulosis. No  associated CT findings of acute diverticulitis.    Postoperative changes of prior hysterectomy and bilateral  salpingo-oophorectomy. No adnexal masses. Small amount of ascites,  improved from the prior study. Peritoneal nodularity and thickening,  improved from the prior study. Postoperative changes along the  anterior abdominal wall. Scattered retroperitoneal and mesenteric  lymph nodes, not enlarged by size criteria. No abdominal aortic  aneurysm. Minimal atherosclerotic calcifications of the abdominal  aorta and its major branches. Subcentimeter pelvic lymph nodes,  stable. No new or enlarging abdominal or pelvic lymphadenopathy. No  free air.    Bones: Enthesopathic changes off the pelvis and hips. Degenerative  changes of the spine, hips, bilateral SI joints. Scattered Schmorl  nodes. No aggressive bone lesions.      Impression    IMPRESSION: In this patient with history of ovarian cancer, there are  postoperative changes of total abdominal hysterectomy,  bilateral  salpingo-oophorectomy and debulking surgery:  1. Small amount of ascites, improved from the prior study.  2. Peritoneal nodularity and thickening, improved from the prior  study.  3. Stable bilateral pulmonary nodules measuring up to 4 mm. No new or  enlarging pulmonary nodules.    MATTHEW RUIZ MD   Creatinine POCT   Result Value Ref Range    Creatinine 0.5 (L) 0.52 - 1.04 mg/dL    GFR Estimate >90 >60 mL/min/1.7m2    GFR Estimate If Black >90 >60 mL/min/1.7m2   - awaiting cancer marker results.     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  It was noted to have elevating cancer marker and completed CT on 11/7 which we discussed today as seeing no changes of concern.   She does meet goals of treatment with review today and so will continue with cycle 5 of carboplatin/Taxol with goal of 6 cycles.  She will return in 3 weeks for cycle 6 and will be seeing Dr. Duque prior to infusion for review with treatment labs.. We will watch cancer marker closely and discuss further imaging for after completion of planned chemotherapy.   Nausea in cycle which was controlled with antiemetics.  Review exam and labs show she is eligible to continue with cycle 2 as planned without additional changes to plan.  Elevated blood pressure with known hypertension-blood pressure continues to stay elevated at start of each cycle. She continues with use of hydrochlorothiazide 25 mg daily and we are recommending regular exercise and low-salt diet.  Constipation- using senna s to help with drug induced concern. Reminded that she may increase dosing of the senna S as needed to 2 bid. Suggested at least 64 ounces of fluids daily.   Genetics- met with genetic counselor and proceeded with [employer] screening- awaiting results.   This was a 25 min visit with > 50% in counseling and coordinating  care including education and management of concerns.    Susie Martinez,CNP

## 2018-11-16 NOTE — MR AVS SNAPSHOT
After Visit Summary   11/16/2018    Di Evans    MRN: [id number]           Patient Information     Date Of Birth          1960        Visit Information        Provider Department      11/16/2018 8:45 AM Susie Martinez APRN CNP [facility]        Today's Diagnoses     Ovarian cancer, unspecified laterality (H)    -  1    Drug-induced constipation        Nausea        Essential hypertension with goal blood pressure less than 140/90           Follow-ups after your visit        Your next 10 appointments already scheduled     Dec 07, 2018  8:15 AM CST   Return Visit with [facility]   [facility])    [address] [place] [id number] [phone]            Dec 07, 2018  9:00 AM CST   Return Visit with Jammie Salgado MD   [facility])    [address] [place] [address] [id number] [phone]            Dec 07, 2018  9:30 AM CST   Level 5 with BAY 5 INFUSION   [facility])    [address] [place] [id number] [phone]            Jan 02, 2019  1:15 PM CST   Return Visit with Jessica Walton GC   [facility])    [address] [place] [address] [id number] [phone]              Who to contact     If you have questions or need follow up information about today's clinic visit or your schedule please contact [facility] directly at [phone].  Normal or non-critical lab and imaging results will be communicated to you by MyChart, letter or phone within 4 business days after the clinic has received the results. If you do not hear from us within 7 days, please contact the clinic through MyChart or phone. If you have a critical or abnormal lab result, we will notify you by phone as soon as possible.  Submit  "refill requests through [employer] or call your pharmacy and they will forward the refill request to us. Please allow 3 business days for your refill to be completed.          Additional Information About Your Visit        InterResolvehart Information     [employer] gives you secure access to your electronic health record. If you see a primary care provider, you can also send messages to your care team and make appointments. If you have questions, please call your primary care clinic.  If you do not have a primary care provider, please call [phone] and they will assist you.      [employer] is an electronic gateway that provides easy, online access to your medical records. With [employer], you can request a clinic appointment, read your test results, renew a prescription or communicate with your care team.     To access your existing account, please contact your [facility] or call [phone] for assistance.        Care EveryWhere ID     This is your Care EveryWhere ID. This could be used by other organizations to access your [place] medical records  [id number]        Your Vitals Were     Pulse Temperature Respirations Height Pulse Oximetry BMI (Body Mass Index)    100 98.3  F (36.8  C) (Oral) 16 1.651 m (5' 5\") 97% 25.81 kg/m2       Blood Pressure from Last 3 Encounters:   11/16/18 150/84   11/05/18 134/86   10/26/18 (!) 166/102    Weight from Last 3 Encounters:   11/16/18 70.4 kg (155 lb 2 oz)   11/05/18 68.5 kg (151 lb)   10/26/18 69 kg (152 lb 2 oz)              Today, you had the following     No orders found for display         Where to get your medicines      These medications were sent to [id number] [place] - [address] AT [facility] [address] [place] [address]     Phone:  [phone]     senna-docusate 8.6-50 MG per tablet          Primary Care Provider Office Phone # Fax #    Sonja Jeni Hernandez [phone] " [phone]       [address]  [place] [address]        Equal Access to Services     ROMAN MILLARD : Hadii aad ku hadsherrillo Somari, waaxda luqadaha, qaybta kaalmada isabellhosea, waxrohan vahid marisaelkin jonesryanjessica schulz. So [facility] [phone].    ATENCIÓN: Si habla español, tiene a perez disposición servicios gratuitos de asistencia lingüística. Sirena al [phone].    We comply with applicable federal civil rights laws and [place] laws. We do not discriminate on the basis of race, color, national origin, age, disability, sex, sexual orientation, or gender identity.            Thank you!     Thank you for choosing [facility]  for your care. Our goal is always to provide you with excellent care. Hearing back from our patients is one way we can continue to improve our services. Please take a few minutes to complete the written survey that you may receive in the mail after your visit with us. Thank you!             Your Updated Medication List - Protect others around you: Learn how to safely use, store and throw away your medicines at www.disposemymeds.org.          This list is accurate as of 11/16/18 11:59 PM.  Always use your most recent med list.                   Brand Name Dispense Instructions for use Diagnosis    dexamethasone 4 MG tablet    DECADRON    60 tablet    Take 5 tablets (20 mg) by mouth See Admin Instructions    Adverse effect of paclitaxel, initial encounter       hydrochlorothiazide 25 MG tablet    HYDRODIURIL    90 tablet    Take 1 tablet (25 mg) by mouth every morning for blood pressure.    Essential hypertension with goal blood pressure less than 140/90       LORazepam 1 MG tablet    ATIVAN    30 tablet    Take 1 tablet (1 mg) by mouth every 6 hours as needed (nausea/vomiting, anxiety or sleep)    Ovarian cancer, unspecified laterality (H)       pravastatin 20 MG tablet    PRAVACHOL    90 tablet    Take 1 tablet (20 mg) by mouth every evening for cholesterol.     Hyperlipidemia LDL goal <130       prochlorperazine 10 MG tablet    COMPAZINE    30 tablet    Take 1 tablet (10 mg) by mouth every 6 hours as needed (nausea/vomiting)    Ovarian cancer, unspecified laterality (H)       senna-docusate 8.6-50 MG per tablet    SENOKOT-S;PERICOLACE    100 tablet    Take 2 tablets by mouth daily    Drug-induced constipation

## 2018-11-16 NOTE — MR AVS SNAPSHOT
After Visit Summary   11/16/2018    Di Evans    MRN: [id number]           Patient Information     Date Of Birth          1960        Visit Information        Provider Department      11/16/2018 8:15 AM [facility]        Today's Diagnoses     Ovarian cancer, unspecified laterality (H)    -  1    Elevated glucose           Follow-ups after your visit        Your next 10 appointments already scheduled     Nov 16, 2018  9:30 AM CST   Level 5 with BAY 3 INFUSION   [facility]    [address] [address] [place] [address]   [phone]            Dec 07, 2018  8:15 AM CST   Return Visit with [facility]   [facility]    [id number] [place] [id number] [phone]            Dec 07, 2018  9:00 AM CST   Return Visit with Jammie Salgado MD   [facility] ([facility])    [address] [place] [id number] [phone]            Dec 07, 2018  9:30 AM CST   Level 5 with BAY 5 INFUSION   [facility])    [id number] [place] [phone]            Jan 02, 2019  1:15 PM CST   Return Visit with Jessica Walton GC   [facility])    [address] [place] [phone]              Who to contact     If you have questions or need follow up information about today's clinic visit or your schedule please contact [facility] directly at [phone].  Normal or non-critical lab and imaging results will be communicated to you by MyChart, letter or phone within 4 business days after the clinic has received the results. If you do not hear from us within 7 days, please contact the clinic through MyChart or phone. If you  have a critical or abnormal lab result, we will notify you by phone as soon as possible.  Submit refill requests through [employer] or call your pharmacy and they will forward the refill request to us. Please allow 3 business days for your refill to be completed.          Additional Information About Your Visit        Askvisory.comhart Information     Vserv gives you secure access to your electronic health record. If you see a primary care provider, you can also send messages to your care team and make appointments. If you have questions, please call your primary care clinic.  If you do not have a primary care provider, please call [phone] and they will assist you.      Vserv is an electronic gateway that provides easy, online access to your medical records. With Vserv, you can request a clinic appointment, read your test results, renew a prescription or communicate with your care team.     To access your existing account, please contact your [facility] or call [phone] for assistance.        Care EveryWhere ID     This is your Care EveryWhere ID. This could be used by other organizations to access your [place] medical records  [id number]         Blood Pressure from Last 3 Encounters:   11/16/18 150/84   11/05/18 134/86   10/26/18 (!) 166/102    Weight from Last 3 Encounters:   11/16/18 70.4 kg (155 lb 2 oz)   11/05/18 68.5 kg (151 lb)   10/26/18 69 kg (152 lb 2 oz)              We Performed the Following          CBC with platelets differential     Comprehensive metabolic panel     Hemoglobin A1c     Magnesium          Where to get your medicines      These medications were sent to [facility] [address] [place], [place] [address] [address] AT [facility] [address] [place] [address]     Phone:  [phone]     senna-docusate 8.6-50 MG per tablet          Primary Care Provider Office Phone # Fax #    Sonja Hernandez MD  [phone]       [address] [facility] [address]        Equal Access to Services     ROMAN MILLARD : Hadii aad ku hadsherrillsilvia Somari, wadelmyda luqadaha, qaybta kareenada bonnie, mae irmain hayaan isabelljian gerber severiano schulz. So [facility] [phone].    ATENCIÓN: Si habla español, tiene a perez disposición servicios gratuitos de asistencia lingüística. Llame al [phone].    We comply with applicable federal civil rights laws and [place] laws. We do not discriminate on the basis of race, color, national origin, age, disability, sex, sexual orientation, or gender identity.            Thank you!     Thank you for choosing [facility]  for your care. Our goal is always to provide you with excellent care. Hearing back from our patients is one way we can continue to improve our services. Please take a few minutes to complete the written survey that you may receive in the mail after your visit with us. Thank you!             Your Updated Medication List - Protect others around you: Learn how to safely use, store and throw away your medicines at www.disposemymeds.org.          This list is accurate as of 11/16/18  9:04 AM.  Always use your most recent med list.                   Brand Name Dispense Instructions for use Diagnosis    dexamethasone 4 MG tablet    DECADRON    60 tablet    Take 5 tablets (20 mg) by mouth See Admin Instructions    Adverse effect of paclitaxel, initial encounter       hydrochlorothiazide 25 MG tablet    HYDRODIURIL    90 tablet    Take 1 tablet (25 mg) by mouth every morning for blood pressure.    Essential hypertension with goal blood pressure less than 140/90       LORazepam 1 MG tablet    ATIVAN    30 tablet    Take 1 tablet (1 mg) by mouth every 6 hours as needed (nausea/vomiting, anxiety or sleep)    Ovarian cancer, unspecified laterality (H)       pravastatin 20 MG tablet    PRAVACHOL    90 tablet    Take 1 tablet (20 mg) by mouth every evening for  cholesterol.    Hyperlipidemia LDL goal <130       prochlorperazine 10 MG tablet    COMPAZINE    30 tablet    Take 1 tablet (10 mg) by mouth every 6 hours as needed (nausea/vomiting)    Ovarian cancer, unspecified laterality (H)       senna-docusate 8.6-50 MG per tablet    SENOKOT-S;PERICOLACE    100 tablet    Take 2 tablets by mouth daily    Drug-induced constipation

## 2018-11-16 NOTE — PROGRESS NOTES
Patient forgot to fast for the lipid panel ordered by Dr. Reaves. She will fast for 8-10 hours prior to her next lab draw appointment. Port needle left accessed for treatment. Tolerated port access and draw without complaint. Port site scrubbed with Chloraprep for 30 seconds. Accessed using sterile technique. [place] drawn-Red/Green/Purple tubes. Double signed by patient and RN. See documentation flowsheet. Melissa Morgan RN, BSN, OCN

## 2018-11-16 NOTE — NURSING NOTE
"Oncology Rooming Note    November 16, 2018 8:42 AM   Di Evans is a 58 year old female who presents for:    Chief Complaint   Patient presents with     Oncology Clinic Visit     Follow Up/Prior to Treatment     Initial Vitals: /84 (BP Location: Right arm)  Pulse 100  Temp 98.3  F (36.8  C) (Oral)  Resp 16  Ht 1.651 m (5' 5\")  Wt 70.4 kg (155 lb 2 oz)  SpO2 97%  BMI 25.81 kg/m2 Estimated body mass index is 25.81 kg/(m^2) as calculated from the following:    Height as of this encounter: 1.651 m (5' 5\").    Weight as of this encounter: 70.4 kg (155 lb 2 oz). Body surface area is 1.8 meters squared.  No Pain (0) Comment: Data Unavailable   No LMP recorded. Patient is postmenopausal.  Allergies reviewed: Yes  Medications reviewed: Yes    Medications: MEDICATION REFILLS NEEDED TODAY. Provider was notified.  Pharmacy name entered into [facility]:    [facility] - [place], [place] - [address], [address]  [facility] [address] [place], [place] [id number] [address] AT [facility]  [address]      5 minutes for nursing intake (face to face time)     Gloria Rivas LPN              "

## 2018-12-06 NOTE — TELEPHONE ENCOUNTER
Panel Management Review      BP Readings from Last 1 Encounters:   11/16/18 150/84    ,   Lab Results   Component Value Date    A1C 6.3 11/16/2018   , 11/5/2018  Last Office Visit with this department: 11/5/2018    Fail List measure: colon cancer       Patient is due/failing the following:   FIT    Action needed:   none    Type of outreach:    [employer] message.    Questions for provider review:    None                                                                                                                                    Samantha Branch      Chart routed to n/a .

## 2018-12-06 NOTE — PROGRESS NOTES
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  She is accompanied today by her mother and roommate.  She is tolerating chemotherapy quite well overall.  She does note her fatigue and tiredness is increasing over the last 2 cycles.  She reports some constipation but is managing this well.  She does report some neuropathy but states that this comes and goes and does not interfere with her daily activities.  She is wondering when she can have her port removed as it rubs on her bra and is very bothersome.    Cancer Course:    She reports she has been having abdominal symptoms for the past 8 months.  She has been feeling bloated and distended for several months.  She has also had decreased appetite and early satiety.  She does not note any major changes in her bowel or bladder function.  She has not had significant pain.  She finally presented to the ED and demanded a CT which was suspicious for ovarian cancer.    18:  CT A/P:  Large cystic/solid mass within the pelvis highly suspicious for ovarian malignancy. 2.  Omental thickening suspicious for metastatic disease. 3.  Large volume of ascites.  Malignant ascites cannot be excluded. 4.  Small left pleural effusion and left lower lobe atelectasis. 5.  Diverticulosis, small hiatal hernia, and gallbladder sludge.    18:  = 598    18:  CT Chest:  1. No definite metastatic disease in the chest. 2. Small left pleural effusion. 3. Moderate ascites with mesenteric and omental edema/nodularity, better evaluated on CT 2018:  Exploratory laparotomy, modified radical hysterectomy, bilateral salpingo-oophorectomy, omentectomy, right pelvic and bilateral para-aortic lymph node dissection, CUSA tumor  debulking, mobilization of the entire colon, stripping of left pelvic peritoneum, proctoscopy, optimal tumor debulking to no gross residual disease   Pathology:  Stage IIIB mixed clear cell (80%) and endometrioid (20%) adenocarcinoma, + pelvic LN, + PA LN, + omentum    8/24/18:  Cycle #1 carboplatin AUC6 and paclitaxel 175 mg/m2,  = 110    9/14/18:  Cycle #2 carboplatin AUC6 and paclitaxel 175 mg/m2,  = 48    10/5/18:  Cycle #3 carboplatin AUC 6 and paclitaxel 175 mg/m2,  = 59    10/26/18:  Cycle #4 carboplatin AUC 6 and paclitaxel 175 mg/m2,  = 75    11/13/18:  CT C/A/P: In this patient with history of ovarian cancer, there are postoperative changes of total abdominal hysterectomy, bilateral salpingo-oophorectomy and debulking surgery: 1. Small amount of ascites, improved from the prior study. 2. Peritoneal nodularity and thickening, improved from the prior study. 3. Stable bilateral pulmonary nodules measuring up to 4 mm. No new or enlarging pulmonary nodules.    11/16/18:  Cycle #5 carboplatin AUC 6 and paclitaxel 175 mg/m2,  = 71    Review of Systems:  Systemic           no weight changes; no fever; no chills; no night sweats; no appetite changes; + fatigue; + weakness  Skin           no rashes, or lesions; + hair loss  Eye           no irritation; no changes in vision  Janice-Laryngeal           no dysphagia; no hoarseness   Pulmonary    no cough; no shortness of breath  Cardiovascular    no chest pain; no palpitations  Gastrointestinal    no diarrhea; + constipation; no heartburn; no abdominal pain; no changes in bowel  habits; no blood in stool  Genitourinary   no urinary frequency; no urinary urgency; no dysuria; no pain; no abnormal vaginal discharge; no abnormal vaginal bleeding  Breast    no breast discharge; no breast changes; no breast pain  Musculoskeletal    no myalgias; no arthralgias; no back pain  Psychiatric           no depressed mood; no anxiety    Hematologic            no  tender lymph nodes; no noticeable swellings or lumps   Endocrine    no hot flashes; no heat/cold intolerance         Neurological   no tremor; + numbness and tingling; no headaches; no difficulty sleeping    Obstetrics and Gynecology History:  G0  Menopause age 44, no HRT      Past Medical History:  Past Medical History:   Diagnosis Date     Hypertension      Ovarian cancer (H)        Past Surgical History:  Past Surgical History:   Procedure Laterality Date     HYSTERECTOMY TOTAL ABDOMINAL, BILATERAL SALPINGO-OOPHORECTOMY, COMBINED Bilateral 7/30/2018    Procedure: COMBINED HYSTERECTOMY TOTAL ABDOMINAL, SALPINGO-OOPHORECTOMY;;  Surgeon: Jammie Dueñas MD;  Location: [facility] OR     LAPAROTOMY, TUMOR DEBULKING, COMBINED Bilateral 7/30/2018    Procedure: COMBINED LAPAROTOMY, TUMOR DEBULKING;  Exploratory Laparotomy, Modified Radical Hysterectomy, Removal of Cervix, Bilateral Salpingo - Oophorectomy, Omentectomy, Bilateral Para Aortic and Left Pelvic Lymph Node Dissection, Tumor Debulking, Proctoscopy;  Surgeon: Jammie Dueñas MD;  Location:  OR     SURGICAL HISTORY OF -   1980    left ankle surgery       Health Maintenance:  Last Pap Smear: No need for further pap smear exams  She has not had a history of abnormal Pap smears.    Last Mammogram: 11/15/16              Result: normal      She has not had a history of abnormal mammograms.    Last Colonoscopy: Never      Current Medications:   has a current medication list which includes the following prescription(s): dexamethasone, hydrochlorothiazide, ibuprofen, lorazepam, pravastatin, prochlorperazine, and senna-docusate.     Allergies:   Gemfibrozil; Lovastatin; and Penicillins-unknown reaction as a child      Social History:  Social History     Social History     Marital status: Single     Spouse name: N/A     Number of children: 0     Years of education: N/A     Occupational History     manufacturing circuit boards [employer]     Social  "History Main Topics     Smoking status: Never Smoker     Smokeless tobacco: Never Used     Alcohol use Yes      Comment: occasional     Drug use: No     Sexual activity: No     Other Topics Concern     Parent/Sibling W/ Cabg, Mi Or Angioplasty Before 65f 55m? No      Service No     Blood Transfusions No     Caffeine Concern Yes     Occupational Exposure No     Hobby Hazards No     Sleep Concern No     Stress Concern No     Weight Concern Yes     Special Diet No     Back Care No     Exercise Yes     Bike Helmet No     Yes in the future     Seat Belt Yes     Self-Exams Yes     Social History Narrative       Lives with a roommate, feels safe at home.  Works as a .  Enjoys gambling.  Does not have an advanced directive on file and would like her roommateAlivia to be her POA.  Would like full resuscitation if reversible cause is identified, however would not like to be kept on life sustaining measures long-term.     Family History:   The patient's family history is significant for.  Family History   Problem Relation Age of Onset     Hypertension Mother      Hypertension Father      Cerebrovascular Disease Father      polycythemia     Breast Cancer Maternal Aunt      older age         Physical Exam:   /88 (BP Location: Right arm)  Pulse 99  Temp 98.1  F (36.7  C) (Oral)  Resp 18  Ht 1.651 m (5' 5\")  Wt 69.6 kg (153 lb 7 oz)  SpO2 97%  BMI 25.53 kg/m2  Body mass index is 25.53 kg/(m^2).    General Appearance: healthy and alert, no distress     HEENT:  no thyromegaly, no palpable nodules or masses        Cardiovascular: regular rate and rhythm, no gallops, rubs or murmurs     Respiratory: lungs clear, no rales, rhonchi or wheezes, normal diaphragmatic excursion    Musculoskeletal: extremities non tender and without edema    Skin: no lesions or rashes     Neurological: normal gait, no gross defects     Psychiatric: appropriate mood and affect                             "   Hematological: normal cervical, supraclavicular and inguinal lymph nodes     Gastrointestinal:       abdomen soft, non-tender, non-distended, no organomegaly or masses    Genitourinary: Deferred    Labs:  WBC 4.9 with ANC 4.3.  Hemoglobin 10.2.  Platelets 112.  Creatinine 0.6.  Potassium 3.  Magnesium 1.4.  Remainder of electrolytes within normal limits.  AST 24, ALT 25, alkaline phosphatase 90, total bilirubin 0.4.  Albumin 3.3.           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     A total of 30 minutes was spent with the patient, >50% of which were spent in counseling the patient and/or treatment planning.      Plan:     1.)    Stage IIIB mixed clear cell and endometrioid ovarian cancer.  Plan for 6 cycles of carboplatin and paclitaxel. Ok to proceed with cycle #6 today.  She will return in 1 month for a post-treatment visit.  Given her  elevation, we will plan to obtain a baseline CT.  Her mid-treatment scan was showing a good effect to treatment.  She is ok to proceed with a port removal    2.) Chemotherapy side effects    -Heartburn-improved with omeprazole   -nausea-cont lorazepam and compazine PRN, refill for lorazepam provided toay   -Constipation.  Stable on current bowel regimen   -Neuropathy-minimal and no intervention wanted at this time   -Port irritation.  We will plan to have this removed 2-3 weeks from now as it is very bothersome to her   -Body aches-script for ibuprofen provided today     3.) Genetic risk factors were assessed and the patient has undergone genetic testing which was negative    4.) Labs and/or tests ordered include:  Pre-chemotherapy labs reviewed and ok to proceed     5.) Health maintenance issues addressed today include pt is due for a mammogram and colonoscopy which will be addressed after treatment of her ovarian cancer.    6.) Elevated BP.  Pt asymptomatic.  She has started  hydrochlorothiazide with her PCP and this is improving, although still somewhat elevated.           Thank you for allowing us to participate in the care of your patient.         Sincerely,    Jammie Duque MD  Gynecologic Oncology  [facility]         CENTER, [facility]

## 2018-12-07 NOTE — PATIENT INSTRUCTIONS
CT C/A/P prior to next visit    Next visit with Dr Duque in 1 month    Port removal anytime after 12/25

## 2018-12-07 NOTE — PROGRESS NOTES
Port accessed using 20 G 3/4 inch needle.  Labs collected from port.  Line flushed with NS and Heparin.  Pt tolerated procedure.  Port left accessed for infusion.    Alethea Jeter, RN-BSN, PHN, OCN  [facility]

## 2018-12-07 NOTE — LETTER
2018         RE: Di Evans  [address] [place] [address]        Dear Colleague,    Thank you for referring your patient, Di Evans, to the [facility]. Please see a copy of my visit note below.    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  She is accompanied today by her mother and roommate.  She is tolerating chemotherapy quite well overall.  She does note her fatigue and tiredness is increasing over the last 2 cycles.  She reports some constipation but is managing this well.  She does report some neuropathy but states that this comes and goes and does not interfere with her daily activities.  She is wondering when she can have her port removed as it rubs on her bra and is very bothersome.    Cancer Course:    She reports she has been having abdominal symptoms for the past 8 months.  She has been feeling bloated and distended for several months.  She has also had decreased appetite and early satiety.  She does not note any major changes in her bowel or bladder function.  She has not had significant pain.  She finally presented to the ED and demanded a CT which was suspicious for ovarian cancer.    18:  CT A/P:  Large cystic/solid mass within the pelvis highly suspicious for ovarian malignancy. 2.  Omental thickening suspicious for metastatic disease. 3.  Large volume of ascites.  Malignant ascites cannot be excluded. 4.  Small left pleural effusion and left lower lobe atelectasis. 5.  Diverticulosis, small hiatal hernia, and gallbladder sludge.    18:  = 598    18:  CT Chest:  1. No definite metastatic disease in the chest. 2. Small left pleural effusion. 3. Moderate ascites with mesenteric and  omental edema/nodularity, better evaluated on CT 7/16/2018 7/30/18:  Exploratory laparotomy, modified radical hysterectomy, bilateral salpingo-oophorectomy, omentectomy, right pelvic and bilateral para-aortic lymph node dissection, CUSA tumor debulking, mobilization of the entire colon, stripping of left pelvic peritoneum, proctoscopy, optimal tumor debulking to no gross residual disease   Pathology:  Stage IIIB mixed clear cell (80%) and endometrioid (20%) adenocarcinoma, + pelvic LN, + PA LN, + omentum    8/24/18:  Cycle #1 carboplatin AUC6 and paclitaxel 175 mg/m2,  = 110    9/14/18:  Cycle #2 carboplatin AUC6 and paclitaxel 175 mg/m2,  = 48    10/5/18:  Cycle #3 carboplatin AUC 6 and paclitaxel 175 mg/m2,  = 59    10/26/18:  Cycle #4 carboplatin AUC 6 and paclitaxel 175 mg/m2,  = 75    11/13/18:  CT C/A/P: In this patient with history of ovarian cancer, there are postoperative changes of total abdominal hysterectomy, bilateral salpingo-oophorectomy and debulking surgery: 1. Small amount of ascites, improved from the prior study. 2. Peritoneal nodularity and thickening, improved from the prior study. 3. Stable bilateral pulmonary nodules measuring up to 4 mm. No new or enlarging pulmonary nodules.    11/16/18:  Cycle #5 carboplatin AUC 6 and paclitaxel 175 mg/m2,  = 71    Review of Systems:  Systemic           no weight changes; no fever; no chills; no night sweats; no appetite changes; + fatigue; + weakness  Skin           no rashes, or lesions; + hair loss  Eye           no irritation; no changes in vision  Janice-Laryngeal           no dysphagia; no hoarseness   Pulmonary    no cough; no shortness of breath  Cardiovascular    no chest pain; no palpitations  Gastrointestinal    no diarrhea; + constipation; no heartburn; no abdominal pain; no changes in bowel  habits; no blood in stool  Genitourinary   no urinary frequency; no urinary urgency; no dysuria; no pain; no abnormal vaginal  discharge; no abnormal vaginal bleeding  Breast    no breast discharge; no breast changes; no breast pain  Musculoskeletal    no myalgias; no arthralgias; no back pain  Psychiatric           no depressed mood; no anxiety    Hematologic            no tender lymph nodes; no noticeable swellings or lumps   Endocrine    no hot flashes; no heat/cold intolerance         Neurological   no tremor; + numbness and tingling; no headaches; no difficulty sleeping    Obstetrics and Gynecology History:  G0  Menopause age 44, no HRT      Past Medical History:  Past Medical History:   Diagnosis Date     Hypertension      Ovarian cancer (H)        Past Surgical History:  Past Surgical History:   Procedure Laterality Date     HYSTERECTOMY TOTAL ABDOMINAL, BILATERAL SALPINGO-OOPHORECTOMY, COMBINED Bilateral 7/30/2018    Procedure: COMBINED HYSTERECTOMY TOTAL ABDOMINAL, SALPINGO-OOPHORECTOMY;;  Surgeon: Jammie Dueñas MD;  Location: [facility] OR     LAPAROTOMY, TUMOR DEBULKING, COMBINED Bilateral 7/30/2018    Procedure: COMBINED LAPAROTOMY, TUMOR DEBULKING;  Exploratory Laparotomy, Modified Radical Hysterectomy, Removal of Cervix, Bilateral Salpingo - Oophorectomy, Omentectomy, Bilateral Para Aortic and Left Pelvic Lymph Node Dissection, Tumor Debulking, Proctoscopy;  Surgeon: Jammie Dueñas MD;  [facility]     SURGICAL HISTORY OF -   1980    left ankle surgery       Health Maintenance:  Last Pap Smear: No need for further pap smear exams  She has not had a history of abnormal Pap smears.    Last Mammogram: 11/15/16              Result: normal      She has not had a history of abnormal mammograms.    Last Colonoscopy: Never      Current Medications:   has a current medication list which includes the following prescription(s): dexamethasone, hydrochlorothiazide, ibuprofen, lorazepam, pravastatin, prochlorperazine, and senna-docusate.     Allergies:   Gemfibrozil; Lovastatin; and Penicillins-unknown reaction as a child  "     Social History:  Social History     Social History     Marital status: Single     Spouse name: N/A     Number of children: 0     Years of education: N/A     Occupational History     manufacturing circuit boards [employer]     Social History Main Topics     Smoking status: Never Smoker     Smokeless tobacco: Never Used     Alcohol use Yes      Comment: occasional     Drug use: No     Sexual activity: No     Other Topics Concern     Parent/Sibling W/ Cabg, Mi Or Angioplasty Before 65f 55m? No      Service No     Blood Transfusions No     Caffeine Concern Yes     Occupational Exposure No     Hobby Hazards No     Sleep Concern No     Stress Concern No     Weight Concern Yes     Special Diet No     Back Care No     Exercise Yes     Bike Helmet No     Yes in the future     Seat Belt Yes     Self-Exams Yes     Social History Narrative       Lives with a roommate, feels safe at home.  Works as a .  Enjoys gambling.  Does not have an advanced directive on file and would like her roommateAlivia to be her POA.  Would like full resuscitation if reversible cause is identified, however would not like to be kept on life sustaining measures long-term.     Family History:   The patient's family history is significant for.  Family History   Problem Relation Age of Onset     Hypertension Mother      Hypertension Father      Cerebrovascular Disease Father      polycythemia     Breast Cancer Maternal Aunt      older age         Physical Exam:   /88 (BP Location: Right arm)  Pulse 99  Temp 98.1  F (36.7  C) (Oral)  Resp 18  Ht 1.651 m (5' 5\")  Wt 69.6 kg (153 lb 7 oz)  SpO2 97%  BMI 25.53 kg/m2  Body mass index is 25.53 kg/(m^2).    General Appearance: healthy and alert, no distress     HEENT:  no thyromegaly, no palpable nodules or masses        Cardiovascular: regular rate and rhythm, no gallops, rubs or murmurs     Respiratory: lungs clear, no rales, rhonchi or wheezes, normal " diaphragmatic excursion    Musculoskeletal: extremities non tender and without edema    Skin: no lesions or rashes     Neurological: normal gait, no gross defects     Psychiatric: appropriate mood and affect                               Hematological: normal cervical, supraclavicular and inguinal lymph nodes     Gastrointestinal:       abdomen soft, non-tender, non-distended, no organomegaly or masses    Genitourinary: Deferred    Labs:  WBC 4.9 with ANC 4.3.  Hemoglobin 10.2.  Platelets 112.  Creatinine 0.6.  Potassium 3.  Magnesium 1.4.  Remainder of electrolytes within normal limits.  AST 24, ALT 25, alkaline phosphatase 90, total bilirubin 0.4.  Albumin 3.3.           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     A total of 30 minutes was spent with the patient, >50% of which were spent in counseling the patient and/or treatment planning.      Plan:     1.)    Stage IIIB mixed clear cell and endometrioid ovarian cancer.  Plan for 6 cycles of carboplatin and paclitaxel. Ok to proceed with cycle #6 today.  She will return in 1 month for a post-treatment visit.  Given her  elevation, we will plan to obtain a baseline CT.  Her mid-treatment scan was showing a good effect to treatment    2.) Chemotherapy side effects    -Heartburn-improved with omeprazole   -nausea-cont lorazepam and compazine PRN, refill for lorazepam provided toay   -Constipation.  Stable on current bowel regimen   -Neuropathy-minimal and no intervention wanted at this time   -Port irritation.  We will plan to have this removed 2-3 weeks from now as it is very bothersome to her   -Body aches-script for ibuprofen provided today     3.) Genetic risk factors were assessed and the patient has undergone genetic testing which was negative    4.) Labs and/or tests ordered include:  Pre-chemotherapy labs reviewed and ok to proceed     5.) Health maintenance  issues addressed today include pt is due for a mammogram and colonoscopy which will be addressed after treatment of her ovarian cancer.    6.) Elevated BP.  Pt asymptomatic.  She has started hydrochlorothiazide with her PCP and this is improving, although still somewhat elevated.      Thank you for allowing us to participate in the care of your patient.         Sincerely,    Jammie Duuqe MD  Gynecologic Oncology  [facility]         CENTER, [facility]

## 2018-12-07 NOTE — MR AVS SNAPSHOT
After Visit Summary   12/7/2018    Di Evans    MRN: [id number]           Patient Information     Date Of Birth          1960        Visit Information        Provider Department      12/7/2018 9:00 AM Jammie Dueñas MD [facility]        Today's Diagnoses     Ovarian cancer, unspecified laterality (H)    -  1      Care Instructions    CT C/A/P prior to next visit    Next visit with Dr Duque in 1 month    Port removal anytime after 12/25          Follow-ups after your visit        Your next 10 appointments already scheduled     Dec 07, 2018  9:30 AM CST   Level 5 with BAY 5 INFUSION   [facility] ([facility])    [id number] [place] [id number] [phone]            Jan 02, 2019  1:15 PM CST   Return Visit with Jessica Walton GC   [facility] ([facility])    [address] [place] [id number] [phone]              Future tests that were ordered for you today     Open Future Orders        Priority Expected Expires Ordered    IR Port Removal Right Routine  12/7/2019 12/7/2018    CT Chest/Abdomen/Pelvis w Contrast Routine  12/7/2019 12/7/2018            Who to contact     If you have questions or need follow up information about today's clinic visit or your schedule please contact [facility] directly at [phone].  Normal or non-critical lab and imaging results will be communicated to you by MyChart, letter or phone within 4 business days after the clinic has received the results. If you do not hear from us within 7 days, please contact the clinic through MyChart or phone. If you have a critical or abnormal lab result, we will notify you by phone as soon as possible.  Submit refill requests through [employer] or call your pharmacy and they will forward the refill request to us. Please allow 3 business days for your refill to be completed.     "      Additional Information About Your Visit        [employer] People[employer] Information     [employer] gives you secure access to your electronic health record. If you see a primary care provider, you can also send messages to your care team and make appointments. If you have questions, please call your primary care clinic.  If you do not have a primary care provider, please call [phone] and they will assist you.      [employer] is an electronic gateway that provides easy, online access to your medical records. With [employer], you can request a clinic appointment, read your test results, renew a prescription or communicate with your care team.     To access your existing account, please contact your [facility] or call [phone] for assistance.        Care EveryWhere ID     This is your Care EveryWhere ID. This could be used by other organizations to access your [facility] medical records  [id number]        Your Vitals Were     Pulse Temperature Respirations Height Pulse Oximetry BMI (Body Mass Index)    99 98.1  F (36.7  C) (Oral) 18 1.651 m (5' 5\") 97% 25.53 kg/m2       Blood Pressure from Last 3 Encounters:   12/07/18 150/88   11/16/18 150/84   11/05/18 134/86    Weight from Last 3 Encounters:   12/07/18 69.6 kg (153 lb 7 oz)   11/16/18 70.4 kg (155 lb 2 oz)   11/05/18 68.5 kg (151 lb)                 Today's Medication Changes          These changes are accurate as of 12/7/18  9:18 AM.  If you have any questions, ask your nurse or doctor.               Start taking these medicines.        Dose/Directions    ibuprofen 600 MG tablet   Commonly known as:  ADVIL/MOTRIN   Used for:  Ovarian cancer, unspecified laterality (H)   Started by:  Jammie Dueñas MD        Dose:  600 mg   Take 1 tablet (600 mg) by mouth every 6 hours as needed for moderate pain   Quantity:  120 tablet   Refills:  3            Where to get your medicines      These medications were sent to [facility] [place], [place] - [address], [address]  [address], [address], [facility] [address]     Phone:  [phone]     ibuprofen 600 MG tablet         Some of these will need a paper prescription and others can be bought over the counter.  Ask your nurse if you have questions.     Bring a paper prescription for each of these medications     LORazepam 1 MG tablet                Primary Care Provider Office Phone # Fax #    Sonja Vilchiselkin Hernandez [phone]       [address]  [facility] [address]        Equal Access to Services     [facility]: Hadii aad gracy hadasho Sobelkisali, waaxda luqadaha, qaybta kaalmada adeegyada, mae العلي . So [facility] [phone].    ATENCIÓN: Si habla español, tiene a perez disposición servicios gratuitos de asistencia lingüística. [facility] [phone].    We comply with applicable federal civil rights laws and [place] laws. We do not discriminate on the basis of race, color, national origin, age, disability, sex, sexual orientation, or gender identity.            Thank you!     Thank you for choosing [facility]  for your care. Our goal is always to provide you with excellent care. Hearing back from our patients is one way we can continue to improve our services. Please take a few minutes to complete the written survey that you may receive in the mail after your visit with us. Thank you!             Your Updated Medication List - Protect others around you: Learn how to safely use, store and throw away your medicines at www.disposemymeds.org.          This list is accurate as of 12/7/18  9:18 AM.  Always use your most recent med list.                   Brand Name Dispense Instructions for use Diagnosis    dexamethasone 4 MG tablet    DECADRON    60 tablet    Take 5 tablets (20 mg) by mouth See Admin Instructions    Adverse effect of paclitaxel, initial encounter       hydrochlorothiazide 25  MG tablet    HYDRODIURIL    90 tablet    Take 1 tablet (25 mg) by mouth every morning for blood pressure.    Essential hypertension with goal blood pressure less than 140/90       ibuprofen 600 MG tablet    ADVIL/MOTRIN    120 tablet    Take 1 tablet (600 mg) by mouth every 6 hours as needed for moderate pain    Ovarian cancer, unspecified laterality (H)       LORazepam 1 MG tablet    ATIVAN    30 tablet    Take 1 tablet (1 mg) by mouth every 6 hours as needed (nausea/vomiting, anxiety or sleep)    Ovarian cancer, unspecified laterality (H)       pravastatin 20 MG tablet    PRAVACHOL    90 tablet    Take 1 tablet (20 mg) by mouth every evening for cholesterol.    Hyperlipidemia LDL goal <130       prochlorperazine 10 MG tablet    COMPAZINE    30 tablet    Take 1 tablet (10 mg) by mouth every 6 hours as needed (nausea/vomiting)    Ovarian cancer, unspecified laterality (H)       senna-docusate 8.6-50 MG tablet    SENOKOT-S/PERICOLACE    100 tablet    Take 2 tablets by mouth daily    Drug-induced constipation

## 2018-12-07 NOTE — PROGRESS NOTES
Infusion Nursing Note:  Di Evans presents today for C6 D1 Taxol/Carbo. Magnesium + potassium  replacement    Patient seen by provider today: Yes: Dr. Duque   present during visit today: Not Applicable.    Note: Potassium and mgnesium replaced today per standing orders.    Intravenous Access:  Implanted Port.    Treatment Conditions:  Lab Results   Component Value Date    HGB 10.2 12/07/2018     Lab Results   Component Value Date    WBC 4.9 12/07/2018      Lab Results   Component Value Date    ANEU 4.3 12/07/2018     Lab Results   Component Value Date     12/07/2018      Lab Results   Component Value Date     12/07/2018                   Lab Results   Component Value Date    POTASSIUM 3.0 12/07/2018           Lab Results   Component Value Date    MAG 1.4 12/07/2018            Lab Results   Component Value Date    CR 0.60 12/07/2018                   Lab Results   Component Value Date    TRACEY 9.3 12/07/2018                Lab Results   Component Value Date    BILITOTAL 0.4 12/07/2018           Lab Results   Component Value Date    ALBUMIN 3.3 12/07/2018                    Lab Results   Component Value Date    ALT 25 12/07/2018           Lab Results   Component Value Date    AST 24 12/07/2018       Results reviewed, labs MET treatment parameters, ok to proceed with treatment.      Post Infusion Assessment:  Patient tolerated infusion without incident.  Site patent and intact, free from redness, edema or discomfort.  No evidence of extravasations.  Access discontinued per protocol.    Discharge Plan:   Discharge instructions reviewed with: Patient and Family.  Patient and/or family verbalized understanding of discharge instructions and all questions answered.  Patient discharged in stable condition accompanied by: mother and friend.  Departure Mode: Ambulatory.    Ethel Read RN

## 2018-12-07 NOTE — NURSING NOTE
"Oncology Rooming Note    December 7, 2018 8:42 AM   Di Evans is a 58 year old female who presents for:    Chief Complaint   Patient presents with     Oncology Clinic Visit     Follow Up/Prior to Treatment     Initial Vitals: /88 (BP Location: Right arm)  Pulse 99  Temp 98.1  F (36.7  C) (Oral)  Resp 18  Ht 1.651 m (5' 5\")  Wt 69.6 kg (153 lb 7 oz)  SpO2 97%  BMI 25.53 kg/m2 Estimated body mass index is 25.53 kg/(m^2) as calculated from the following:    Height as of this encounter: 1.651 m (5' 5\").    Weight as of this encounter: 69.6 kg (153 lb 7 oz). Body surface area is 1.79 meters squared.  No Pain (0) Comment: Data Unavailable   No LMP recorded. Patient is postmenopausal.  Allergies reviewed: Yes  Medications reviewed: Yes    Medications: MEDICATION REFILLS NEEDED TODAY. Provider was notified.  Pharmacy name entered into [facility]: [facility] - [place], [place] - [address], [address]      5 minutes for nursing intake (face to face time)     Gloria Rivas LPN              "

## 2018-12-07 NOTE — MR AVS SNAPSHOT
After Visit Summary   12/7/2018    Di Evans    MRN: [id number]           Patient Information     Date Of Birth          1960        Visit Information        Provider Department      12/7/2018 8:15 AM NURSE ONLY [facility]        Today's Diagnoses     Ovarian cancer, unspecified laterality (H)    -  1    Hyperlipidemia LDL goal <130           Follow-ups after your visit        Your next 10 appointments already scheduled     Dec 07, 2018  9:00 AM CST   Return Visit with Jammie Salgado MD   [facility])    [address] [place] [id number] [phone]            Dec 07, 2018  9:30 AM CST   Level 5 with BAY 5 INFUSION   [facility])    [address] [place] [address] [phone]            Jan 02, 2019  1:15 PM CST   Return Visit with Jessica Walton GC   [facility])    [address] [place] [id number] [phone]              Who to contact     If you have questions or need follow up information about today's clinic visit or your schedule please contact [facility] directly at [phone].  Normal or non-critical lab and imaging results will be communicated to you by MyChart, letter or phone within 4 business days after the clinic has received the results. If you do not hear from us within 7 days, please contact the clinic through MyChart or phone. If you have a critical or abnormal lab result, we will notify you by phone as soon as possible.  Submit refill requests through [employer] or call your pharmacy and they will forward the refill request to us. Please allow 3 business days for your refill to be completed.          Additional Information About Your Visit        [employer] Information     [employer] gives you secure access to your electronic  health record. If you see a primary care provider, you can also send messages to your care team and make appointments. If you have questions, please call your primary care clinic.  If you do not have a primary care provider, please call [phone] and they will assist you.      [employer] is an electronic gateway that provides easy, online access to your medical records. With [employer], you can request a clinic appointment, read your test results, renew a prescription or communicate with your care team.     To access your existing account, please contact your [facility] or call [phone] for assistance.        Care EveryWhere ID     This is your Care EveryWhere ID. This could be used by other organizations to access your [facility] medical records  [id number]         Blood Pressure from Last 3 Encounters:   11/16/18 150/84   11/05/18 134/86   10/26/18 (!) 166/102    Weight from Last 3 Encounters:   11/16/18 70.4 kg (155 lb 2 oz)   11/05/18 68.5 kg (151 lb)   10/26/18 69 kg (152 lb 2 oz)              We Performed the Following          CBC with platelets differential     Comprehensive metabolic panel     Lipid panel reflex to direct LDL Non-fasting     Magnesium        Primary Care Provider Office Phone # Fax #    Sonja Vilchiselkin Hernandez [phone]       [address]  [facility] [address]        Equal Access to Services     [facility] : Hadii aad ku hadasho Soomaali, waaxda luqadaha, qaybta kaalmada adeegyada, wax vahid العلي . So [facility] [phone].    ATENCIÓN: Si habla español, tiene a perez disposición servicios gratuitos de asistencia lingüística. Llame al [phone].    We comply with applicable federal civil rights laws and [place] laws. We do not discriminate on the basis of race, color, national origin, age, disability, sex, sexual orientation, or gender identity.            Thank you!     Thank you for  [facility]  for your care. Our goal is always to provide you with excellent care. Hearing back from our patients is one way we can continue to improve our services. Please take a few minutes to complete the written survey that you may receive in the mail after your visit with us. Thank you!             Your Updated Medication List - Protect others around you: Learn how to safely use, store and throw away your medicines at www.disposemymeds.org.          This list is accurate as of 12/7/18  8:35 AM.  Always use your most recent med list.                   Brand Name Dispense Instructions for use Diagnosis    dexamethasone 4 MG tablet    DECADRON    60 tablet    Take 5 tablets (20 mg) by mouth See Admin Instructions    Adverse effect of paclitaxel, initial encounter       hydrochlorothiazide 25 MG tablet    HYDRODIURIL    90 tablet    Take 1 tablet (25 mg) by mouth every morning for blood pressure.    Essential hypertension with goal blood pressure less than 140/90       LORazepam 1 MG tablet    ATIVAN    30 tablet    Take 1 tablet (1 mg) by mouth every 6 hours as needed (nausea/vomiting, anxiety or sleep)    Ovarian cancer, unspecified laterality (H)       pravastatin 20 MG tablet    PRAVACHOL    90 tablet    Take 1 tablet (20 mg) by mouth every evening for cholesterol.    Hyperlipidemia LDL goal <130       prochlorperazine 10 MG tablet    COMPAZINE    30 tablet    Take 1 tablet (10 mg) by mouth every 6 hours as needed (nausea/vomiting)    Ovarian cancer, unspecified laterality (H)       senna-docusate 8.6-50 MG tablet    SENOKOT-S/PERICOLACE    100 tablet    Take 2 tablets by mouth daily    Drug-induced constipation

## 2018-12-07 NOTE — MR AVS SNAPSHOT
After Visit Summary   12/7/2018    Di Evans    MRN: [id number]           Patient Information     Date Of Birth          1960        Visit Information        Provider Department      12/7/2018 9:30 AM BAY 5 INFUSION [facility]        Today's Diagnoses     Ovarian cancer, unspecified laterality (H)    -  1    Hypomagnesemia        Hypokalemia           Follow-ups after your visit        Your next 10 appointments already scheduled     Jan 02, 2019  1:15 PM CST   Return Visit with Jessica Walton GC   [facility] ([facility])    [address] [place] [address] [phone]              Future tests that were ordered for you today     Open Future Orders        Priority Expected Expires Ordered    IR Port Removal Right Routine  12/7/2019 12/7/2018    CT Chest/Abdomen/Pelvis w Contrast Routine  12/7/2019 12/7/2018            Who to contact     If you have questions or need follow up information about today's clinic visit or your schedule please contact [facility] directly at [phone].  Normal or non-critical lab and imaging results will be communicated to you by Concardhart, letter or phone within 4 business days after the clinic has received the results. If you do not hear from us within 7 days, please contact the clinic through Keystokt or phone. If you have a critical or abnormal lab result, we will notify you by phone as soon as possible.  Submit refill requests through [employer] or call your pharmacy and they will forward the refill request to us. Please allow 3 business days for your refill to be completed.          Additional Information About Your Visit        Con[employer]hart Information     [employer] gives you secure access to your electronic health record. If you see a primary care provider, you can also send messages to your care team and make appointments. If you have questions, please call your primary care  clinic.  If you do not have a primary care provider, please call [phone] and they will assist you.      [employer] is an electronic gateway that provides easy, online access to your medical records. With [employer], you can request a clinic appointment, read your test results, renew a prescription or communicate with your care team.     To access your existing account, please contact your [facility] or call [phone] for assistance.        Care EveryWhere ID     This is your Care EveryWhere ID. This could be used by other organizations to access your [facility] medical records  [id number]         Blood Pressure from Last 3 Encounters:   12/07/18 150/88   11/16/18 150/84   11/05/18 134/86    Weight from Last 3 Encounters:   12/07/18 69.6 kg (153 lb 7 oz)   11/16/18 70.4 kg (155 lb 2 oz)   11/05/18 68.5 kg (151 lb)              Today, you had the following     No orders found for display         Today's Medication Changes          These changes are accurate as of 12/7/18  3:24 PM.  If you have any questions, ask your nurse or doctor.               Start taking these medicines.        Dose/Directions    ibuprofen 600 MG tablet   Commonly known as:  ADVIL/MOTRIN   Used for:  Ovarian cancer, unspecified laterality (H)   Started by:  Jammie Dueñas MD        Dose:  600 mg   Take 1 tablet (600 mg) by mouth every 6 hours as needed for moderate pain   Quantity:  120 tablet   Refills:  3            Where to get your medicines      These medications were sent to [facility] - [place], [place] - [address], [address]  [address], [address], [facility] [address]     Phone:  [phone]     ibuprofen 600 MG tablet         Some of these will need a paper prescription and others can be bought over the counter.  Ask your nurse if you have questions.     Bring a paper prescription for each of these medications     LORazepam 1 MG tablet                 Primary Care Provider Office Phone # Fax #    Sonja Vilchiselkin Hernandez [phone] [address]  [facility] [address]        Equal Access to Services     ROMAN [facility] : Hadii nakita ku heldero Soomaali, waaxda luqadaha, qaybta kaalmada adeegyada, waxrohan idiin hayaan lynda gerber severiano schulz. So [facility] [phone].    ATENCIÓN: Si habla español, tiene a perez disposición servicios gratuitos de asistencia lingüística. Llame al [phone].    We comply with applicable federal civil rights laws and [place] laws. We do not discriminate on the basis of race, color, national origin, age, disability, sex, sexual orientation, or gender identity.            Thank you!     Thank you for choosing [facility]  for your care. Our goal is always to provide you with excellent care. Hearing back from our patients is one way we can continue to improve our services. Please take a few minutes to complete the written survey that you may receive in the mail after your visit with us. Thank you!             Your Updated Medication List - Protect others around you: Learn how to safely use, store and throw away your medicines at www.disposemymeds.org.          This list is accurate as of 12/7/18  3:24 PM.  Always use your most recent med list.                   Brand Name Dispense Instructions for use Diagnosis    dexamethasone 4 MG tablet    DECADRON    60 tablet    Take 5 tablets (20 mg) by mouth See Admin Instructions    Adverse effect of paclitaxel, initial encounter       hydrochlorothiazide 25 MG tablet    HYDRODIURIL    90 tablet    Take 1 tablet (25 mg) by mouth every morning for blood pressure.    Essential hypertension with goal blood pressure less than 140/90       ibuprofen 600 MG tablet    ADVIL/MOTRIN    120 tablet    Take 1 tablet (600 mg) by mouth every 6 hours as needed for moderate pain    Ovarian cancer, unspecified laterality (H)       LORazepam 1 MG tablet    ATIVAN     30 tablet    Take 1 tablet (1 mg) by mouth every 6 hours as needed (nausea/vomiting, anxiety or sleep)    Ovarian cancer, unspecified laterality (H)       pravastatin 20 MG tablet    PRAVACHOL    90 tablet    Take 1 tablet (20 mg) by mouth every evening for cholesterol.    Hyperlipidemia LDL goal <130       prochlorperazine 10 MG tablet    COMPAZINE    30 tablet    Take 1 tablet (10 mg) by mouth every 6 hours as needed (nausea/vomiting)    Ovarian cancer, unspecified laterality (H)       senna-docusate 8.6-50 MG tablet    SENOKOT-S/PERICOLACE    100 tablet    Take 2 tablets by mouth daily    Drug-induced constipation

## 2018-12-22 NOTE — PROGRESS NOTES
"Patient's name and  were verified.  See Doc Flowsheet - IV assess for details.  IVAD accessed with 20G 3/4\" tatum gripper plus needle  blood return positive: YES  Site without redness, tenderness or swelling: YES  flushed with 30cc NS and 5cc 100u/ml heparin  Needle: De-accessed    Comments: Labs drawn.  Patient tolerated procedure without incident.    Radha Wyman  RN, BSN, OCN        "
Universal Safety Interventions

## 2019-01-01 ENCOUNTER — ONCOLOGY VISIT (OUTPATIENT)
Dept: ONCOLOGY | Facility: CLINIC | Age: 59
End: 2019-01-01
Attending: OBSTETRICS & GYNECOLOGY
Payer: COMMERCIAL

## 2019-01-01 ENCOUNTER — ONCOLOGY VISIT (OUTPATIENT)
Dept: ONCOLOGY | Facility: CLINIC | Age: 59
End: 2019-01-01
Payer: COMMERCIAL

## 2019-01-01 ENCOUNTER — APPOINTMENT (OUTPATIENT)
Dept: INTERVENTIONAL RADIOLOGY/VASCULAR | Facility: CLINIC | Age: 59
End: 2019-01-01
Attending: OBSTETRICS & GYNECOLOGY
Payer: COMMERCIAL

## 2019-01-01 ENCOUNTER — HOSPITAL ENCOUNTER (INPATIENT)
Facility: CLINIC | Age: 59
LOS: 4 days | Discharge: HOME | DRG: 175 | End: 2019-06-15
Attending: EMERGENCY MEDICINE | Admitting: OBSTETRICS & GYNECOLOGY
Payer: COMMERCIAL

## 2019-01-01 ENCOUNTER — TELEPHONE (OUTPATIENT)
Dept: FAMILY MEDICINE | Facility: CLINIC | Age: 59
End: 2019-01-01

## 2019-01-01 ENCOUNTER — TELEPHONE (OUTPATIENT)
Dept: ONCOLOGY | Facility: CLINIC | Age: 59
End: 2019-01-01

## 2019-01-01 ENCOUNTER — CARE COORDINATION (OUTPATIENT)
Dept: ONCOLOGY | Facility: CLINIC | Age: 59
End: 2019-01-01

## 2019-01-01 ENCOUNTER — ANESTHESIA EVENT (OUTPATIENT)
Dept: SURGERY | Facility: CLINIC | Age: 59
End: 2019-01-01
Payer: COMMERCIAL

## 2019-01-01 ENCOUNTER — HOSPITAL ENCOUNTER (OUTPATIENT)
Facility: CLINIC | Age: 59
End: 2019-01-01
Attending: OBSTETRICS & GYNECOLOGY | Admitting: OBSTETRICS & GYNECOLOGY
Payer: COMMERCIAL

## 2019-01-01 ENCOUNTER — RESEARCH ENCOUNTER (OUTPATIENT)
Dept: ONCOLOGY | Facility: CLINIC | Age: 59
End: 2019-01-01

## 2019-01-01 ENCOUNTER — HOSPITAL ENCOUNTER (INPATIENT)
Facility: CLINIC | Age: 59
LOS: 2 days | Discharge: HOME | DRG: 092 | End: 2019-06-08
Attending: OBSTETRICS & GYNECOLOGY | Admitting: OBSTETRICS & GYNECOLOGY
Payer: COMMERCIAL

## 2019-01-01 ENCOUNTER — OFFICE VISIT (OUTPATIENT)
Dept: INFUSION THERAPY | Facility: CLINIC | Age: 59
End: 2019-01-01
Attending: OBSTETRICS & GYNECOLOGY
Payer: COMMERCIAL

## 2019-01-01 ENCOUNTER — DOCUMENTATION ONLY (OUTPATIENT)
Dept: RADIOLOGY | Facility: CLINIC | Age: 59
End: 2019-01-01

## 2019-01-01 ENCOUNTER — TELEPHONE (OUTPATIENT)
Dept: INTERVENTIONAL RADIOLOGY/VASCULAR | Facility: CLINIC | Age: 59
End: 2019-01-01

## 2019-01-01 ENCOUNTER — ANCILLARY PROCEDURE (OUTPATIENT)
Dept: ULTRASOUND IMAGING | Facility: CLINIC | Age: 59
End: 2019-01-01
Attending: OBSTETRICS & GYNECOLOGY
Payer: COMMERCIAL

## 2019-01-01 ENCOUNTER — APPOINTMENT (OUTPATIENT)
Dept: MRI IMAGING | Facility: CLINIC | Age: 59
DRG: 092 | End: 2019-01-01
Attending: OBSTETRICS & GYNECOLOGY
Payer: COMMERCIAL

## 2019-01-01 ENCOUNTER — APPOINTMENT (OUTPATIENT)
Dept: LAB | Facility: CLINIC | Age: 59
End: 2019-01-01
Attending: OBSTETRICS & GYNECOLOGY
Payer: COMMERCIAL

## 2019-01-01 ENCOUNTER — APPOINTMENT (OUTPATIENT)
Dept: LAB | Facility: CLINIC | Age: 59
End: 2019-01-01
Attending: NURSE PRACTITIONER
Payer: COMMERCIAL

## 2019-01-01 ENCOUNTER — APPOINTMENT (OUTPATIENT)
Dept: MEDSURG UNIT | Facility: CLINIC | Age: 59
End: 2019-01-01
Attending: OBSTETRICS & GYNECOLOGY
Payer: COMMERCIAL

## 2019-01-01 ENCOUNTER — ANCILLARY PROCEDURE (OUTPATIENT)
Dept: GENERAL RADIOLOGY | Facility: CLINIC | Age: 59
End: 2019-01-01
Attending: OBSTETRICS & GYNECOLOGY
Payer: COMMERCIAL

## 2019-01-01 ENCOUNTER — OFFICE VISIT (OUTPATIENT)
Dept: FAMILY MEDICINE | Facility: CLINIC | Age: 59
End: 2019-01-01
Payer: COMMERCIAL

## 2019-01-01 ENCOUNTER — APPOINTMENT (OUTPATIENT)
Dept: OCCUPATIONAL THERAPY | Facility: CLINIC | Age: 59
DRG: 092 | End: 2019-01-01
Attending: INTERNAL MEDICINE
Payer: COMMERCIAL

## 2019-01-01 ENCOUNTER — ANESTHESIA EVENT (OUTPATIENT)
Dept: SURGERY | Facility: AMBULATORY SURGERY CENTER | Age: 59
End: 2019-01-01

## 2019-01-01 ENCOUNTER — ANCILLARY PROCEDURE (OUTPATIENT)
Dept: RADIOLOGY | Facility: AMBULATORY SURGERY CENTER | Age: 59
End: 2019-01-01
Attending: OBSTETRICS & GYNECOLOGY
Payer: COMMERCIAL

## 2019-01-01 ENCOUNTER — PATIENT OUTREACH (OUTPATIENT)
Dept: ONCOLOGY | Facility: CLINIC | Age: 59
End: 2019-01-01

## 2019-01-01 ENCOUNTER — APPOINTMENT (OUTPATIENT)
Dept: ULTRASOUND IMAGING | Facility: CLINIC | Age: 59
DRG: 092 | End: 2019-01-01
Attending: OBSTETRICS & GYNECOLOGY
Payer: COMMERCIAL

## 2019-01-01 ENCOUNTER — APPOINTMENT (OUTPATIENT)
Dept: PHYSICAL THERAPY | Facility: CLINIC | Age: 59
DRG: 092 | End: 2019-01-01
Attending: INTERNAL MEDICINE
Payer: COMMERCIAL

## 2019-01-01 ENCOUNTER — APPOINTMENT (OUTPATIENT)
Dept: MRI IMAGING | Facility: CLINIC | Age: 59
DRG: 175 | End: 2019-01-01
Payer: COMMERCIAL

## 2019-01-01 ENCOUNTER — APPOINTMENT (OUTPATIENT)
Dept: CT IMAGING | Facility: CLINIC | Age: 59
DRG: 175 | End: 2019-01-01
Attending: EMERGENCY MEDICINE
Payer: COMMERCIAL

## 2019-01-01 ENCOUNTER — INFUSION THERAPY VISIT (OUTPATIENT)
Dept: TRANSPLANT | Facility: CLINIC | Age: 59
End: 2019-01-01
Attending: OBSTETRICS & GYNECOLOGY
Payer: COMMERCIAL

## 2019-01-01 ENCOUNTER — SURGERY (OUTPATIENT)
Age: 59
End: 2019-01-01
Payer: COMMERCIAL

## 2019-01-01 ENCOUNTER — OFFICE VISIT (OUTPATIENT)
Dept: URGENT CARE | Facility: URGENT CARE | Age: 59
End: 2019-01-01
Payer: COMMERCIAL

## 2019-01-01 ENCOUNTER — OFFICE VISIT (OUTPATIENT)
Dept: HEMATOLOGY | Facility: CLINIC | Age: 59
End: 2019-01-01
Attending: INTERNAL MEDICINE
Payer: COMMERCIAL

## 2019-01-01 ENCOUNTER — HOSPITAL ENCOUNTER (OUTPATIENT)
Facility: AMBULATORY SURGERY CENTER | Age: 59
Discharge: HOME | End: 2019-01-07
Attending: SURGERY | Admitting: SURGERY
Payer: COMMERCIAL

## 2019-01-01 ENCOUNTER — PRE VISIT (OUTPATIENT)
Dept: ONCOLOGY | Facility: CLINIC | Age: 59
End: 2019-01-01

## 2019-01-01 ENCOUNTER — DOCUMENTATION ONLY (OUTPATIENT)
Dept: OTHER | Facility: CLINIC | Age: 59
End: 2019-01-01

## 2019-01-01 ENCOUNTER — ONCOLOGY VISIT (OUTPATIENT)
Dept: ONCOLOGY | Facility: CLINIC | Age: 59
End: 2019-01-01
Attending: NURSE PRACTITIONER
Payer: COMMERCIAL

## 2019-01-01 ENCOUNTER — TRANSFERRED RECORDS (OUTPATIENT)
Dept: HEALTH INFORMATION MANAGEMENT | Facility: CLINIC | Age: 59
End: 2019-01-01

## 2019-01-01 ENCOUNTER — HOSPITAL ENCOUNTER (OUTPATIENT)
Facility: CLINIC | Age: 59
Discharge: HOME | End: 2019-02-07
Attending: OBSTETRICS & GYNECOLOGY | Admitting: OBSTETRICS & GYNECOLOGY
Payer: COMMERCIAL

## 2019-01-01 ENCOUNTER — MYC MEDICAL ADVICE (OUTPATIENT)
Dept: ONCOLOGY | Facility: CLINIC | Age: 59
End: 2019-01-01

## 2019-01-01 ENCOUNTER — ANESTHESIA (OUTPATIENT)
Dept: SURGERY | Facility: CLINIC | Age: 59
End: 2019-01-01
Payer: COMMERCIAL

## 2019-01-01 ENCOUNTER — OFFICE VISIT (OUTPATIENT)
Dept: SURGERY | Facility: CLINIC | Age: 59
End: 2019-01-01
Payer: COMMERCIAL

## 2019-01-01 ENCOUNTER — ANESTHESIA (OUTPATIENT)
Dept: SURGERY | Facility: AMBULATORY SURGERY CENTER | Age: 59
End: 2019-01-01

## 2019-01-01 ENCOUNTER — TELEPHONE (OUTPATIENT)
Dept: NUTRITION | Facility: CLINIC | Age: 59
End: 2019-01-01

## 2019-01-01 ENCOUNTER — HOSPITAL ENCOUNTER (OUTPATIENT)
Facility: CLINIC | Age: 59
Discharge: HOME | End: 2019-01-28
Attending: OBSTETRICS & GYNECOLOGY | Admitting: OBSTETRICS & GYNECOLOGY
Payer: COMMERCIAL

## 2019-01-01 ENCOUNTER — ANCILLARY PROCEDURE (OUTPATIENT)
Dept: GENERAL RADIOLOGY | Facility: CLINIC | Age: 59
End: 2019-01-01
Payer: COMMERCIAL

## 2019-01-01 ENCOUNTER — ANCILLARY PROCEDURE (OUTPATIENT)
Dept: CT IMAGING | Facility: CLINIC | Age: 59
End: 2019-01-01
Attending: OBSTETRICS & GYNECOLOGY
Payer: COMMERCIAL

## 2019-01-01 ENCOUNTER — HOSPITAL ENCOUNTER (OUTPATIENT)
Facility: CLINIC | Age: 59
Discharge: HOME | End: 2019-04-09
Attending: OBSTETRICS & GYNECOLOGY | Admitting: RADIOLOGY
Payer: COMMERCIAL

## 2019-01-01 ENCOUNTER — HOSPITAL ENCOUNTER (OUTPATIENT)
Facility: CLINIC | Age: 59
Discharge: HOME | End: 2019-02-05
Attending: OBSTETRICS & GYNECOLOGY | Admitting: OBSTETRICS & GYNECOLOGY
Payer: COMMERCIAL

## 2019-01-01 ENCOUNTER — APPOINTMENT (OUTPATIENT)
Dept: INTERVENTIONAL RADIOLOGY/VASCULAR | Facility: CLINIC | Age: 59
End: 2019-01-01
Attending: CLINICAL NURSE SPECIALIST
Payer: COMMERCIAL

## 2019-01-01 ENCOUNTER — ANESTHESIA (OUTPATIENT)
Dept: SURGERY | Facility: AMBULATORY SURGERY CENTER | Age: 59
End: 2019-01-01
Payer: COMMERCIAL

## 2019-01-01 ENCOUNTER — HOSPITAL ENCOUNTER (OUTPATIENT)
Facility: CLINIC | Age: 59
Discharge: HOME | End: 2019-06-28
Attending: OBSTETRICS & GYNECOLOGY | Admitting: OBSTETRICS & GYNECOLOGY
Payer: COMMERCIAL

## 2019-01-01 ENCOUNTER — HOSPITAL ENCOUNTER (OUTPATIENT)
Facility: AMBULATORY SURGERY CENTER | Age: 59
End: 2019-06-03
Attending: PHYSICIAN ASSISTANT
Payer: COMMERCIAL

## 2019-01-01 ENCOUNTER — APPOINTMENT (OUTPATIENT)
Dept: CT IMAGING | Facility: CLINIC | Age: 59
DRG: 092 | End: 2019-01-01
Attending: OBSTETRICS & GYNECOLOGY
Payer: COMMERCIAL

## 2019-01-01 ENCOUNTER — DOCUMENTATION ONLY (OUTPATIENT)
Dept: ONCOLOGY | Facility: CLINIC | Age: 59
End: 2019-01-01

## 2019-01-01 VITALS
DIASTOLIC BLOOD PRESSURE: 90 MMHG | HEART RATE: 90 BPM | BODY MASS INDEX: 25.29 KG/M2 | WEIGHT: 151.8 LBS | SYSTOLIC BLOOD PRESSURE: 144 MMHG | OXYGEN SATURATION: 99 % | TEMPERATURE: 99.1 F | HEIGHT: 65 IN | RESPIRATION RATE: 16 BRPM

## 2019-01-01 VITALS
SYSTOLIC BLOOD PRESSURE: 127 MMHG | BODY MASS INDEX: 25.88 KG/M2 | DIASTOLIC BLOOD PRESSURE: 88 MMHG | RESPIRATION RATE: 16 BRPM | TEMPERATURE: 97.3 F | WEIGHT: 155.5 LBS | HEART RATE: 122 BPM | OXYGEN SATURATION: 96 %

## 2019-01-01 VITALS
WEIGHT: 133 LBS | SYSTOLIC BLOOD PRESSURE: 115 MMHG | HEIGHT: 65 IN | OXYGEN SATURATION: 99 % | DIASTOLIC BLOOD PRESSURE: 76 MMHG | HEART RATE: 149 BPM | BODY MASS INDEX: 22.16 KG/M2 | RESPIRATION RATE: 12 BRPM

## 2019-01-01 VITALS
HEART RATE: 94 BPM | TEMPERATURE: 98.1 F | SYSTOLIC BLOOD PRESSURE: 148 MMHG | BODY MASS INDEX: 25.84 KG/M2 | DIASTOLIC BLOOD PRESSURE: 87 MMHG | OXYGEN SATURATION: 98 % | RESPIRATION RATE: 16 BRPM | WEIGHT: 155.3 LBS

## 2019-01-01 VITALS
WEIGHT: 137.6 LBS | BODY MASS INDEX: 22.9 KG/M2 | SYSTOLIC BLOOD PRESSURE: 118 MMHG | HEART RATE: 114 BPM | DIASTOLIC BLOOD PRESSURE: 69 MMHG

## 2019-01-01 VITALS
TEMPERATURE: 98.2 F | WEIGHT: 144.62 LBS | OXYGEN SATURATION: 99 % | DIASTOLIC BLOOD PRESSURE: 78 MMHG | HEART RATE: 107 BPM | SYSTOLIC BLOOD PRESSURE: 124 MMHG | BODY MASS INDEX: 24.07 KG/M2

## 2019-01-01 VITALS
TEMPERATURE: 98.5 F | WEIGHT: 150 LBS | SYSTOLIC BLOOD PRESSURE: 118 MMHG | HEART RATE: 105 BPM | BODY MASS INDEX: 24.99 KG/M2 | OXYGEN SATURATION: 96 % | RESPIRATION RATE: 16 BRPM | DIASTOLIC BLOOD PRESSURE: 70 MMHG | HEIGHT: 65 IN

## 2019-01-01 VITALS
DIASTOLIC BLOOD PRESSURE: 77 MMHG | HEART RATE: 117 BPM | WEIGHT: 153.2 LBS | TEMPERATURE: 98.3 F | OXYGEN SATURATION: 97 % | SYSTOLIC BLOOD PRESSURE: 116 MMHG | BODY MASS INDEX: 25.52 KG/M2 | RESPIRATION RATE: 16 BRPM | HEIGHT: 65 IN

## 2019-01-01 VITALS
WEIGHT: 140 LBS | RESPIRATION RATE: 16 BRPM | DIASTOLIC BLOOD PRESSURE: 86 MMHG | BODY MASS INDEX: 23.3 KG/M2 | TEMPERATURE: 98.5 F | OXYGEN SATURATION: 98 % | SYSTOLIC BLOOD PRESSURE: 132 MMHG | HEART RATE: 95 BPM

## 2019-01-01 VITALS
HEART RATE: 100 BPM | SYSTOLIC BLOOD PRESSURE: 138 MMHG | DIASTOLIC BLOOD PRESSURE: 85 MMHG | RESPIRATION RATE: 16 BRPM | OXYGEN SATURATION: 97 % | TEMPERATURE: 98.1 F

## 2019-01-01 VITALS
BODY MASS INDEX: 22.05 KG/M2 | TEMPERATURE: 98.1 F | DIASTOLIC BLOOD PRESSURE: 84 MMHG | WEIGHT: 132.5 LBS | TEMPERATURE: 97.8 F | WEIGHT: 149.3 LBS | OXYGEN SATURATION: 97 % | BODY MASS INDEX: 24.84 KG/M2 | SYSTOLIC BLOOD PRESSURE: 119 MMHG | OXYGEN SATURATION: 98 % | HEART RATE: 128 BPM | DIASTOLIC BLOOD PRESSURE: 71 MMHG | SYSTOLIC BLOOD PRESSURE: 115 MMHG

## 2019-01-01 VITALS
RESPIRATION RATE: 18 BRPM | OXYGEN SATURATION: 99 % | DIASTOLIC BLOOD PRESSURE: 84 MMHG | HEART RATE: 142 BPM | BODY MASS INDEX: 23.03 KG/M2 | TEMPERATURE: 98.2 F | WEIGHT: 138.4 LBS | SYSTOLIC BLOOD PRESSURE: 131 MMHG

## 2019-01-01 VITALS
RESPIRATION RATE: 16 BRPM | HEIGHT: 65 IN | DIASTOLIC BLOOD PRESSURE: 79 MMHG | WEIGHT: 152.34 LBS | TEMPERATURE: 99 F | OXYGEN SATURATION: 97 % | BODY MASS INDEX: 25.38 KG/M2 | SYSTOLIC BLOOD PRESSURE: 127 MMHG | HEART RATE: 90 BPM

## 2019-01-01 VITALS
RESPIRATION RATE: 16 BRPM | OXYGEN SATURATION: 97 % | WEIGHT: 142.8 LBS | BODY MASS INDEX: 23.76 KG/M2 | TEMPERATURE: 98.6 F | DIASTOLIC BLOOD PRESSURE: 69 MMHG | SYSTOLIC BLOOD PRESSURE: 123 MMHG | HEART RATE: 103 BPM

## 2019-01-01 VITALS
SYSTOLIC BLOOD PRESSURE: 132 MMHG | WEIGHT: 148.8 LBS | RESPIRATION RATE: 18 BRPM | BODY MASS INDEX: 24.76 KG/M2 | TEMPERATURE: 97.8 F | DIASTOLIC BLOOD PRESSURE: 72 MMHG | SYSTOLIC BLOOD PRESSURE: 124 MMHG | RESPIRATION RATE: 16 BRPM | OXYGEN SATURATION: 98 % | HEART RATE: 104 BPM | TEMPERATURE: 97.3 F | HEART RATE: 79 BPM | OXYGEN SATURATION: 98 % | DIASTOLIC BLOOD PRESSURE: 75 MMHG

## 2019-01-01 VITALS
DIASTOLIC BLOOD PRESSURE: 82 MMHG | RESPIRATION RATE: 16 BRPM | BODY MASS INDEX: 24.68 KG/M2 | HEART RATE: 101 BPM | WEIGHT: 148.3 LBS | SYSTOLIC BLOOD PRESSURE: 127 MMHG

## 2019-01-01 VITALS
BODY MASS INDEX: 23.6 KG/M2 | OXYGEN SATURATION: 99 % | TEMPERATURE: 98.5 F | DIASTOLIC BLOOD PRESSURE: 81 MMHG | RESPIRATION RATE: 16 BRPM | WEIGHT: 141.8 LBS | HEART RATE: 105 BPM | SYSTOLIC BLOOD PRESSURE: 130 MMHG

## 2019-01-01 VITALS
OXYGEN SATURATION: 97 % | BODY MASS INDEX: 23.68 KG/M2 | TEMPERATURE: 98.2 F | SYSTOLIC BLOOD PRESSURE: 139 MMHG | DIASTOLIC BLOOD PRESSURE: 83 MMHG | HEART RATE: 122 BPM | WEIGHT: 142.3 LBS | RESPIRATION RATE: 16 BRPM

## 2019-01-01 VITALS
HEART RATE: 132 BPM | WEIGHT: 142 LBS | OXYGEN SATURATION: 99 % | TEMPERATURE: 98.7 F | BODY MASS INDEX: 23.66 KG/M2 | SYSTOLIC BLOOD PRESSURE: 121 MMHG | DIASTOLIC BLOOD PRESSURE: 78 MMHG | HEIGHT: 65 IN

## 2019-01-01 VITALS
BODY MASS INDEX: 24.66 KG/M2 | HEIGHT: 65 IN | SYSTOLIC BLOOD PRESSURE: 120 MMHG | RESPIRATION RATE: 16 BRPM | DIASTOLIC BLOOD PRESSURE: 75 MMHG | WEIGHT: 148 LBS | TEMPERATURE: 98.5 F | OXYGEN SATURATION: 99 % | HEART RATE: 100 BPM

## 2019-01-01 VITALS
RESPIRATION RATE: 16 BRPM | TEMPERATURE: 98.3 F | SYSTOLIC BLOOD PRESSURE: 124 MMHG | BODY MASS INDEX: 23.35 KG/M2 | DIASTOLIC BLOOD PRESSURE: 85 MMHG | OXYGEN SATURATION: 100 % | WEIGHT: 140.3 LBS | HEART RATE: 105 BPM

## 2019-01-01 VITALS
BODY MASS INDEX: 22.58 KG/M2 | TEMPERATURE: 97.5 F | BODY MASS INDEX: 22.91 KG/M2 | DIASTOLIC BLOOD PRESSURE: 82 MMHG | RESPIRATION RATE: 16 BRPM | RESPIRATION RATE: 15 BRPM | WEIGHT: 135.7 LBS | WEIGHT: 137.7 LBS | SYSTOLIC BLOOD PRESSURE: 117 MMHG | SYSTOLIC BLOOD PRESSURE: 116 MMHG | OXYGEN SATURATION: 100 % | HEART RATE: 103 BPM | TEMPERATURE: 98.2 F | DIASTOLIC BLOOD PRESSURE: 80 MMHG | HEART RATE: 123 BPM

## 2019-01-01 VITALS
BODY MASS INDEX: 25.55 KG/M2 | DIASTOLIC BLOOD PRESSURE: 82 MMHG | HEIGHT: 65 IN | SYSTOLIC BLOOD PRESSURE: 134 MMHG | TEMPERATURE: 98.6 F | WEIGHT: 153.38 LBS | HEART RATE: 94 BPM | RESPIRATION RATE: 16 BRPM | OXYGEN SATURATION: 100 %

## 2019-01-01 VITALS
TEMPERATURE: 97.6 F | DIASTOLIC BLOOD PRESSURE: 67 MMHG | BODY MASS INDEX: 23.86 KG/M2 | HEART RATE: 94 BPM | RESPIRATION RATE: 16 BRPM | OXYGEN SATURATION: 100 % | WEIGHT: 143.4 LBS | SYSTOLIC BLOOD PRESSURE: 120 MMHG

## 2019-01-01 VITALS
DIASTOLIC BLOOD PRESSURE: 78 MMHG | WEIGHT: 138 LBS | HEIGHT: 65 IN | TEMPERATURE: 98.1 F | RESPIRATION RATE: 16 BRPM | BODY MASS INDEX: 22.99 KG/M2 | OXYGEN SATURATION: 99 % | SYSTOLIC BLOOD PRESSURE: 119 MMHG | HEART RATE: 95 BPM

## 2019-01-01 VITALS
TEMPERATURE: 98.9 F | HEART RATE: 115 BPM | DIASTOLIC BLOOD PRESSURE: 79 MMHG | HEIGHT: 65 IN | WEIGHT: 152 LBS | SYSTOLIC BLOOD PRESSURE: 128 MMHG | OXYGEN SATURATION: 99 % | BODY MASS INDEX: 25.33 KG/M2 | RESPIRATION RATE: 16 BRPM

## 2019-01-01 VITALS
SYSTOLIC BLOOD PRESSURE: 135 MMHG | BODY MASS INDEX: 23.61 KG/M2 | HEART RATE: 116 BPM | RESPIRATION RATE: 16 BRPM | DIASTOLIC BLOOD PRESSURE: 72 MMHG | OXYGEN SATURATION: 100 % | WEIGHT: 141.9 LBS | TEMPERATURE: 98.4 F

## 2019-01-01 VITALS
RESPIRATION RATE: 20 BRPM | DIASTOLIC BLOOD PRESSURE: 92 MMHG | WEIGHT: 151 LBS | TEMPERATURE: 98.2 F | OXYGEN SATURATION: 100 % | SYSTOLIC BLOOD PRESSURE: 140 MMHG | HEIGHT: 65 IN | BODY MASS INDEX: 25.16 KG/M2

## 2019-01-01 VITALS
OXYGEN SATURATION: 97 % | HEART RATE: 98 BPM | DIASTOLIC BLOOD PRESSURE: 80 MMHG | BODY MASS INDEX: 25.96 KG/M2 | WEIGHT: 156 LBS | SYSTOLIC BLOOD PRESSURE: 143 MMHG | RESPIRATION RATE: 16 BRPM | TEMPERATURE: 98.1 F

## 2019-01-01 VITALS — DIASTOLIC BLOOD PRESSURE: 84 MMHG | SYSTOLIC BLOOD PRESSURE: 128 MMHG | HEART RATE: 79 BPM | OXYGEN SATURATION: 99 %

## 2019-01-01 VITALS
TEMPERATURE: 97.9 F | SYSTOLIC BLOOD PRESSURE: 109 MMHG | HEART RATE: 135 BPM | OXYGEN SATURATION: 98 % | RESPIRATION RATE: 18 BRPM | DIASTOLIC BLOOD PRESSURE: 75 MMHG

## 2019-01-01 VITALS
BODY MASS INDEX: 25.66 KG/M2 | OXYGEN SATURATION: 98 % | HEART RATE: 115 BPM | TEMPERATURE: 98.7 F | HEIGHT: 65 IN | RESPIRATION RATE: 16 BRPM | SYSTOLIC BLOOD PRESSURE: 127 MMHG | DIASTOLIC BLOOD PRESSURE: 80 MMHG | WEIGHT: 154 LBS

## 2019-01-01 VITALS
HEIGHT: 65 IN | TEMPERATURE: 97.6 F | OXYGEN SATURATION: 99 % | DIASTOLIC BLOOD PRESSURE: 80 MMHG | BODY MASS INDEX: 24.22 KG/M2 | SYSTOLIC BLOOD PRESSURE: 121 MMHG | RESPIRATION RATE: 18 BRPM | WEIGHT: 145.4 LBS | HEART RATE: 105 BPM

## 2019-01-01 VITALS
RESPIRATION RATE: 16 BRPM | HEART RATE: 110 BPM | BODY MASS INDEX: 23.35 KG/M2 | WEIGHT: 140.3 LBS | SYSTOLIC BLOOD PRESSURE: 116 MMHG | DIASTOLIC BLOOD PRESSURE: 82 MMHG | OXYGEN SATURATION: 100 % | TEMPERATURE: 98.1 F

## 2019-01-01 VITALS
HEART RATE: 87 BPM | RESPIRATION RATE: 16 BRPM | SYSTOLIC BLOOD PRESSURE: 130 MMHG | DIASTOLIC BLOOD PRESSURE: 84 MMHG | OXYGEN SATURATION: 97 % | WEIGHT: 148 LBS | TEMPERATURE: 98 F | BODY MASS INDEX: 24.63 KG/M2

## 2019-01-01 VITALS
RESPIRATION RATE: 14 BRPM | DIASTOLIC BLOOD PRESSURE: 71 MMHG | OXYGEN SATURATION: 98 % | TEMPERATURE: 99.1 F | SYSTOLIC BLOOD PRESSURE: 127 MMHG

## 2019-01-01 VITALS
DIASTOLIC BLOOD PRESSURE: 85 MMHG | SYSTOLIC BLOOD PRESSURE: 147 MMHG | RESPIRATION RATE: 16 BRPM | BODY MASS INDEX: 25.53 KG/M2 | TEMPERATURE: 98 F | OXYGEN SATURATION: 98 % | WEIGHT: 153.4 LBS | HEART RATE: 98 BPM

## 2019-01-01 VITALS
WEIGHT: 144.7 LBS | DIASTOLIC BLOOD PRESSURE: 78 MMHG | HEART RATE: 107 BPM | OXYGEN SATURATION: 99 % | SYSTOLIC BLOOD PRESSURE: 124 MMHG | BODY MASS INDEX: 24.08 KG/M2 | TEMPERATURE: 98.2 F

## 2019-01-01 VITALS
RESPIRATION RATE: 16 BRPM | DIASTOLIC BLOOD PRESSURE: 84 MMHG | BODY MASS INDEX: 22.78 KG/M2 | TEMPERATURE: 97.8 F | HEART RATE: 109 BPM | WEIGHT: 136.9 LBS | SYSTOLIC BLOOD PRESSURE: 124 MMHG | OXYGEN SATURATION: 97 %

## 2019-01-01 VITALS
OXYGEN SATURATION: 98 % | SYSTOLIC BLOOD PRESSURE: 145 MMHG | HEART RATE: 116 BPM | HEIGHT: 65 IN | BODY MASS INDEX: 22.96 KG/M2 | RESPIRATION RATE: 16 BRPM | DIASTOLIC BLOOD PRESSURE: 89 MMHG | TEMPERATURE: 96.1 F

## 2019-01-01 DIAGNOSIS — C56.9 OVARIAN CANCER, UNSPECIFIED LATERALITY (H): Primary | ICD-10-CM

## 2019-01-01 DIAGNOSIS — I82.90 THROMBOSIS: ICD-10-CM

## 2019-01-01 DIAGNOSIS — C56.9 OVARIAN CANCER, UNSPECIFIED LATERALITY (H): ICD-10-CM

## 2019-01-01 DIAGNOSIS — R18.8 OTHER ASCITES: ICD-10-CM

## 2019-01-01 DIAGNOSIS — Z00.6 EXAMINATION OF PARTICIPANT OR CONTROL IN CLINICAL RESEARCH: ICD-10-CM

## 2019-01-01 DIAGNOSIS — R18.8 OTHER ASCITES: Primary | ICD-10-CM

## 2019-01-01 DIAGNOSIS — E87.6 HYPOKALEMIA: ICD-10-CM

## 2019-01-01 DIAGNOSIS — E87.1 HYPONATREMIA: ICD-10-CM

## 2019-01-01 DIAGNOSIS — I10 ESSENTIAL HYPERTENSION WITH GOAL BLOOD PRESSURE LESS THAN 140/90: ICD-10-CM

## 2019-01-01 DIAGNOSIS — K59.01 SLOW TRANSIT CONSTIPATION: ICD-10-CM

## 2019-01-01 DIAGNOSIS — Z00.6 EXAMINATION OF PARTICIPANT OR CONTROL IN CLINICAL RESEARCH: Primary | ICD-10-CM

## 2019-01-01 DIAGNOSIS — E87.1 HYPONATREMIA: Primary | ICD-10-CM

## 2019-01-01 DIAGNOSIS — K12.0 CANKER SORES ORAL: Primary | ICD-10-CM

## 2019-01-01 DIAGNOSIS — B37.0 THRUSH: ICD-10-CM

## 2019-01-01 DIAGNOSIS — K59.00 CONSTIPATION: ICD-10-CM

## 2019-01-01 DIAGNOSIS — Z53.9 ERRONEOUS ENCOUNTER--DISREGARD: Primary | ICD-10-CM

## 2019-01-01 DIAGNOSIS — D63.0 ANEMIA IN NEOPLASTIC DISEASE: ICD-10-CM

## 2019-01-01 DIAGNOSIS — R19.00 PELVIC MASS: Primary | ICD-10-CM

## 2019-01-01 DIAGNOSIS — I63.9 OCCIPITAL CEREBRAL INFARCTION (H): Primary | ICD-10-CM

## 2019-01-01 DIAGNOSIS — I26.99 OTHER ACUTE PULMONARY EMBOLISM WITHOUT ACUTE COR PULMONALE (H): ICD-10-CM

## 2019-01-01 DIAGNOSIS — E83.42 HYPOMAGNESEMIA: ICD-10-CM

## 2019-01-01 DIAGNOSIS — F51.04 PSYCHOPHYSIOLOGICAL INSOMNIA: ICD-10-CM

## 2019-01-01 DIAGNOSIS — Z92.21 AT HIGH RISK FOR INFECTION DUE TO CHEMOTHERAPY: ICD-10-CM

## 2019-01-01 DIAGNOSIS — R41.0 CONFUSION: ICD-10-CM

## 2019-01-01 DIAGNOSIS — R79.89 ABNORMAL TSH: ICD-10-CM

## 2019-01-01 DIAGNOSIS — M79.10 MUSCLE PAIN: ICD-10-CM

## 2019-01-01 DIAGNOSIS — I82.90 THROMBOSIS: Primary | ICD-10-CM

## 2019-01-01 DIAGNOSIS — D63.8 ANEMIA IN OTHER CHRONIC DISEASES CLASSIFIED ELSEWHERE: ICD-10-CM

## 2019-01-01 DIAGNOSIS — Z91.89 AT HIGH RISK FOR INFECTION DUE TO CHEMOTHERAPY: ICD-10-CM

## 2019-01-01 DIAGNOSIS — R25.2 LEG CRAMPS: ICD-10-CM

## 2019-01-01 DIAGNOSIS — I82.4Z1 ACUTE DEEP VEIN THROMBOSIS (DVT) OF DISTAL VEIN OF RIGHT LOWER EXTREMITY (H): Primary | ICD-10-CM

## 2019-01-01 DIAGNOSIS — J06.9 VIRAL URI: ICD-10-CM

## 2019-01-01 DIAGNOSIS — K59.00 CONSTIPATION: Primary | ICD-10-CM

## 2019-01-01 DIAGNOSIS — Z80.3 FAMILY HISTORY OF MALIGNANT NEOPLASM OF BREAST: ICD-10-CM

## 2019-01-01 DIAGNOSIS — K59.00 CONSTIPATION, UNSPECIFIED CONSTIPATION TYPE: ICD-10-CM

## 2019-01-01 DIAGNOSIS — G45.9 TIA (TRANSIENT ISCHEMIC ATTACK): ICD-10-CM

## 2019-01-01 DIAGNOSIS — I82.4Z1 ACUTE DEEP VEIN THROMBOSIS (DVT) OF DISTAL VEIN OF RIGHT LOWER EXTREMITY (H): ICD-10-CM

## 2019-01-01 DIAGNOSIS — E87.6 LOW BLOOD POTASSIUM: Primary | ICD-10-CM

## 2019-01-01 DIAGNOSIS — K59.09 OTHER CONSTIPATION: Primary | ICD-10-CM

## 2019-01-01 DIAGNOSIS — H83.02 LABYRINTHITIS OF LEFT EAR: Primary | ICD-10-CM

## 2019-01-01 DIAGNOSIS — R53.81 PHYSICAL DECONDITIONING: ICD-10-CM

## 2019-01-01 DIAGNOSIS — R59.9 ENLARGED LYMPH NODES: ICD-10-CM

## 2019-01-01 DIAGNOSIS — R59.9 ENLARGED LYMPH NODES: Primary | ICD-10-CM

## 2019-01-01 DIAGNOSIS — R07.0 THROAT PAIN: ICD-10-CM

## 2019-01-01 DIAGNOSIS — K59.03 DRUG-INDUCED CONSTIPATION: ICD-10-CM

## 2019-01-01 DIAGNOSIS — I26.99 OTHER ACUTE PULMONARY EMBOLISM WITHOUT ACUTE COR PULMONALE (H): Primary | ICD-10-CM

## 2019-01-01 DIAGNOSIS — Z98.890 S/P LAPAROSCOPIC PROCEDURE: Primary | ICD-10-CM

## 2019-01-01 LAB
ABO + RH BLD: NORMAL
ALBUMIN SERPL-MCNC: 1 G/DL (ref 3.4–5)
ALBUMIN SERPL-MCNC: 1.1 G/DL (ref 3.4–5)
ALBUMIN SERPL-MCNC: 1.3 G/DL (ref 3.4–5)
ALBUMIN SERPL-MCNC: 2 G/DL (ref 3.4–5)
ALBUMIN SERPL-MCNC: 2 G/DL (ref 3.4–5)
ALBUMIN SERPL-MCNC: 2.3 G/DL (ref 3.4–5)
ALBUMIN SERPL-MCNC: 2.4 G/DL (ref 3.4–5)
ALBUMIN SERPL-MCNC: 2.4 G/DL (ref 3.4–5)
ALBUMIN SERPL-MCNC: 2.5 G/DL (ref 3.4–5)
ALBUMIN SERPL-MCNC: 2.6 G/DL (ref 3.4–5)
ALBUMIN SERPL-MCNC: 2.7 G/DL (ref 3.4–5)
ALBUMIN SERPL-MCNC: 2.8 G/DL (ref 3.4–5)
ALBUMIN SERPL-MCNC: 3.1 G/DL (ref 3.4–5)
ALBUMIN SERPL-MCNC: 3.2 G/DL (ref 3.4–5)
ALBUMIN UR-MCNC: NEGATIVE MG/DL
ALP SERPL-CCNC: 105 U/L (ref 40–150)
ALP SERPL-CCNC: 105 U/L (ref 40–150)
ALP SERPL-CCNC: 106 U/L (ref 40–150)
ALP SERPL-CCNC: 107 U/L (ref 40–150)
ALP SERPL-CCNC: 108 U/L (ref 40–150)
ALP SERPL-CCNC: 118 U/L (ref 40–150)
ALP SERPL-CCNC: 119 U/L (ref 40–150)
ALP SERPL-CCNC: 165 U/L (ref 40–150)
ALP SERPL-CCNC: 176 U/L (ref 40–150)
ALP SERPL-CCNC: 202 U/L (ref 40–150)
ALP SERPL-CCNC: 235 U/L (ref 40–150)
ALP SERPL-CCNC: 393 U/L (ref 40–150)
ALP SERPL-CCNC: 421 U/L (ref 40–150)
ALP SERPL-CCNC: 84 U/L (ref 40–150)
ALP SERPL-CCNC: 85 U/L (ref 40–150)
ALP SERPL-CCNC: 88 U/L (ref 40–150)
ALP SERPL-CCNC: 90 U/L (ref 40–150)
ALP SERPL-CCNC: 93 U/L (ref 40–150)
ALP SERPL-CCNC: 95 U/L (ref 40–150)
ALP SERPL-CCNC: 98 U/L (ref 40–150)
ALP SERPL-CCNC: 99 U/L (ref 40–150)
ALT SERPL W P-5'-P-CCNC: 101 U/L (ref 0–50)
ALT SERPL W P-5'-P-CCNC: 12 U/L (ref 0–50)
ALT SERPL W P-5'-P-CCNC: 12 U/L (ref 0–50)
ALT SERPL W P-5'-P-CCNC: 13 U/L (ref 0–50)
ALT SERPL W P-5'-P-CCNC: 16 U/L (ref 0–50)
ALT SERPL W P-5'-P-CCNC: 17 U/L (ref 0–50)
ALT SERPL W P-5'-P-CCNC: 19 U/L (ref 0–50)
ALT SERPL W P-5'-P-CCNC: 20 U/L (ref 0–50)
ALT SERPL W P-5'-P-CCNC: 21 U/L (ref 0–50)
ALT SERPL W P-5'-P-CCNC: 22 U/L (ref 0–50)
ALT SERPL W P-5'-P-CCNC: 22 U/L (ref 0–50)
ALT SERPL W P-5'-P-CCNC: 25 U/L (ref 0–50)
ALT SERPL W P-5'-P-CCNC: 27 U/L (ref 0–50)
ALT SERPL W P-5'-P-CCNC: 38 U/L (ref 0–50)
ALT SERPL W P-5'-P-CCNC: 43 U/L (ref 0–50)
ALT SERPL W P-5'-P-CCNC: 46 U/L (ref 0–50)
ALT SERPL W P-5'-P-CCNC: 88 U/L (ref 0–50)
ANION GAP SERPL CALCULATED.3IONS-SCNC: 10 MMOL/L (ref 3–14)
ANION GAP SERPL CALCULATED.3IONS-SCNC: 5 MMOL/L (ref 3–14)
ANION GAP SERPL CALCULATED.3IONS-SCNC: 5 MMOL/L (ref 3–14)
ANION GAP SERPL CALCULATED.3IONS-SCNC: 6 MMOL/L (ref 3–14)
ANION GAP SERPL CALCULATED.3IONS-SCNC: 7 MMOL/L (ref 3–14)
ANION GAP SERPL CALCULATED.3IONS-SCNC: 7 MMOL/L (ref 3–14)
ANION GAP SERPL CALCULATED.3IONS-SCNC: 8 MMOL/L (ref 3–14)
ANION GAP SERPL CALCULATED.3IONS-SCNC: 9 MMOL/L (ref 3–14)
APPEARANCE UR: CLEAR
APTT PPP: 30 SEC (ref 22–37)
APTT PPP: 30 SEC (ref 22–37)
APTT PPP: 33 SEC (ref 22–37)
APTT PPP: 34 SEC (ref 22–37)
APTT PPP: 45 SEC (ref 22–37)
AST SERPL W P-5'-P-CCNC: 12 U/L (ref 0–45)
AST SERPL W P-5'-P-CCNC: 121 U/L (ref 0–45)
AST SERPL W P-5'-P-CCNC: 14 U/L (ref 0–45)
AST SERPL W P-5'-P-CCNC: 17 U/L (ref 0–45)
AST SERPL W P-5'-P-CCNC: 18 U/L (ref 0–45)
AST SERPL W P-5'-P-CCNC: 19 U/L (ref 0–45)
AST SERPL W P-5'-P-CCNC: 20 U/L (ref 0–45)
AST SERPL W P-5'-P-CCNC: 21 U/L (ref 0–45)
AST SERPL W P-5'-P-CCNC: 21 U/L (ref 0–45)
AST SERPL W P-5'-P-CCNC: 22 U/L (ref 0–45)
AST SERPL W P-5'-P-CCNC: 25 U/L (ref 0–45)
AST SERPL W P-5'-P-CCNC: 30 U/L (ref 0–45)
AST SERPL W P-5'-P-CCNC: 30 U/L (ref 0–45)
AST SERPL W P-5'-P-CCNC: 93 U/L (ref 0–45)
B-HCG FREE SERPL-ACNC: 1.4 [IU]/L (ref 0.86–1.14)
BACTERIA #/AREA URNS HPF: ABNORMAL /HPF
BACTERIA SPEC CULT: NORMAL
BASOPHILS # BLD AUTO: 0 10E9/L (ref 0–0.2)
BASOPHILS # BLD AUTO: 0.1 10E9/L (ref 0–0.2)
BASOPHILS NFR BLD AUTO: 0 %
BASOPHILS NFR BLD AUTO: 0.1 %
BASOPHILS NFR BLD AUTO: 0.2 %
BASOPHILS NFR BLD AUTO: 0.3 %
BASOPHILS NFR BLD AUTO: 0.4 %
BASOPHILS NFR BLD AUTO: 0.5 %
BASOPHILS NFR BLD AUTO: 0.5 %
BASOPHILS NFR BLD AUTO: 0.6 %
BASOPHILS NFR BLD AUTO: 0.7 %
BASOPHILS NFR BLD AUTO: 0.7 %
BASOPHILS NFR BLD AUTO: 1.1 %
BASOPHILS NFR BLD AUTO: 1.1 %
BILIRUB DIRECT SERPL-MCNC: <0.1 MG/DL (ref 0–0.2)
BILIRUB SERPL-MCNC: 0.2 MG/DL (ref 0.2–1.3)
BILIRUB SERPL-MCNC: 0.2 MG/DL (ref 0.2–1.3)
BILIRUB SERPL-MCNC: 0.3 MG/DL (ref 0.2–1.3)
BILIRUB SERPL-MCNC: 0.4 MG/DL (ref 0.2–1.3)
BILIRUB SERPL-MCNC: 0.5 MG/DL (ref 0.2–1.3)
BILIRUB SERPL-MCNC: 0.7 MG/DL (ref 0.2–1.3)
BILIRUB SERPL-MCNC: 1 MG/DL (ref 0.2–1.3)
BILIRUB UR QL STRIP: NEGATIVE
BLD GP AB SCN SERPL QL: NORMAL
BLD PROD TYP BPU: NORMAL
BLD UNIT ID BPU: 0
BLOOD BANK CMNT PATIENT-IMP: NORMAL
BLOOD PRODUCT CODE: NORMAL
BPU ID: NORMAL
BUN SERPL-MCNC: 10 MG/DL (ref 7–30)
BUN SERPL-MCNC: 11 MG/DL (ref 7–30)
BUN SERPL-MCNC: 12 MG/DL (ref 7–30)
BUN SERPL-MCNC: 13 MG/DL (ref 7–30)
BUN SERPL-MCNC: 15 MG/DL (ref 7–30)
BUN SERPL-MCNC: 18 MG/DL (ref 7–30)
BUN SERPL-MCNC: 19 MG/DL (ref 7–30)
BUN SERPL-MCNC: 6 MG/DL (ref 7–30)
BUN SERPL-MCNC: 6 MG/DL (ref 7–30)
BUN SERPL-MCNC: 7 MG/DL (ref 7–30)
BUN SERPL-MCNC: 8 MG/DL (ref 7–30)
BUN SERPL-MCNC: 9 MG/DL (ref 7–30)
CALCIUM SERPL-MCNC: 7.6 MG/DL (ref 8.5–10.1)
CALCIUM SERPL-MCNC: 8 MG/DL (ref 8.5–10.1)
CALCIUM SERPL-MCNC: 8.1 MG/DL (ref 8.5–10.1)
CALCIUM SERPL-MCNC: 8.2 MG/DL (ref 8.5–10.1)
CALCIUM SERPL-MCNC: 8.2 MG/DL (ref 8.5–10.1)
CALCIUM SERPL-MCNC: 8.3 MG/DL (ref 8.5–10.1)
CALCIUM SERPL-MCNC: 8.4 MG/DL (ref 8.5–10.1)
CALCIUM SERPL-MCNC: 8.4 MG/DL (ref 8.5–10.1)
CALCIUM SERPL-MCNC: 8.5 MG/DL (ref 8.5–10.1)
CALCIUM SERPL-MCNC: 8.7 MG/DL (ref 8.5–10.1)
CALCIUM SERPL-MCNC: 8.8 MG/DL (ref 8.5–10.1)
CALCIUM SERPL-MCNC: 9 MG/DL (ref 8.5–10.1)
CALCIUM SERPL-MCNC: 9 MG/DL (ref 8.5–10.1)
CALCIUM SERPL-MCNC: 9.1 MG/DL (ref 8.5–10.1)
CALCIUM SERPL-MCNC: 9.2 MG/DL (ref 8.5–10.1)
CALCIUM SERPL-MCNC: 9.3 MG/DL (ref 8.5–10.1)
CALCIUM SERPL-MCNC: 9.4 MG/DL (ref 8.5–10.1)
CALCIUM SERPL-MCNC: 9.6 MG/DL (ref 8.5–10.1)
CALCIUM SERPL-MCNC: 9.6 MG/DL (ref 8.5–10.1)
CALCIUM SERPL-MCNC: 9.9 MG/DL (ref 8.5–10.1)
CANCER AG125 SERPL-ACNC: 105 U/ML (ref 0–30)
CANCER AG125 SERPL-ACNC: 144 U/ML (ref 0–30)
CANCER AG125 SERPL-ACNC: 144 U/ML (ref 0–30)
CANCER AG125 SERPL-ACNC: 156 U/ML (ref 0–30)
CANCER AG125 SERPL-ACNC: 193 U/ML (ref 0–30)
CANCER AG125 SERPL-ACNC: 216 U/ML (ref 0–30)
CANCER AG125 SERPL-ACNC: 542 U/ML (ref 0–30)
CANCER AG125 SERPL-ACNC: 570 U/ML (ref 0–30)
CANCER AG125 SERPL-ACNC: 96 U/ML (ref 0–30)
CHLORIDE SERPL-SCNC: 100 MMOL/L (ref 94–109)
CHLORIDE SERPL-SCNC: 101 MMOL/L (ref 94–109)
CHLORIDE SERPL-SCNC: 101 MMOL/L (ref 94–109)
CHLORIDE SERPL-SCNC: 102 MMOL/L (ref 94–109)
CHLORIDE SERPL-SCNC: 87 MMOL/L (ref 94–109)
CHLORIDE SERPL-SCNC: 90 MMOL/L (ref 94–109)
CHLORIDE SERPL-SCNC: 92 MMOL/L (ref 94–109)
CHLORIDE SERPL-SCNC: 92 MMOL/L (ref 94–109)
CHLORIDE SERPL-SCNC: 93 MMOL/L (ref 94–109)
CHLORIDE SERPL-SCNC: 94 MMOL/L (ref 94–109)
CHLORIDE SERPL-SCNC: 94 MMOL/L (ref 94–109)
CHLORIDE SERPL-SCNC: 95 MMOL/L (ref 94–109)
CHLORIDE SERPL-SCNC: 95 MMOL/L (ref 94–109)
CHLORIDE SERPL-SCNC: 96 MMOL/L (ref 94–109)
CHLORIDE SERPL-SCNC: 97 MMOL/L (ref 94–109)
CHLORIDE SERPL-SCNC: 98 MMOL/L (ref 94–109)
CHLORIDE SERPL-SCNC: 98 MMOL/L (ref 94–109)
CHLORIDE SERPL-SCNC: 99 MMOL/L (ref 94–109)
CK SERPL-CCNC: 21 U/L (ref 30–225)
CO2 SERPL-SCNC: 22 MMOL/L (ref 20–32)
CO2 SERPL-SCNC: 22 MMOL/L (ref 20–32)
CO2 SERPL-SCNC: 23 MMOL/L (ref 20–32)
CO2 SERPL-SCNC: 24 MMOL/L (ref 20–32)
CO2 SERPL-SCNC: 25 MMOL/L (ref 20–32)
CO2 SERPL-SCNC: 25 MMOL/L (ref 20–32)
CO2 SERPL-SCNC: 26 MMOL/L (ref 20–32)
CO2 SERPL-SCNC: 27 MMOL/L (ref 20–32)
CO2 SERPL-SCNC: 27 MMOL/L (ref 20–32)
CO2 SERPL-SCNC: 28 MMOL/L (ref 20–32)
CO2 SERPL-SCNC: 29 MMOL/L (ref 20–32)
CO2 SERPL-SCNC: 29 MMOL/L (ref 20–32)
CO2 SERPL-SCNC: 30 MMOL/L (ref 20–32)
CO2 SERPL-SCNC: 32 MMOL/L (ref 20–32)
CO2 SERPL-SCNC: 32 MMOL/L (ref 20–32)
CO2 SERPL-SCNC: 33 MMOL/L (ref 20–32)
CO2 SERPL-SCNC: 34 MMOL/L (ref 20–32)
COLOR UR AUTO: ABNORMAL
COPATH REPORT: NORMAL
COPATH REPORT: NORMAL
CREAT BLD-MCNC: 0.2 MG/DL (ref 0.52–1.04)
CREAT BLD-MCNC: 0.6 MG/DL (ref 0.52–1.04)
CREAT SERPL-MCNC: 0.38 MG/DL (ref 0.52–1.04)
CREAT SERPL-MCNC: 0.4 MG/DL (ref 0.52–1.04)
CREAT SERPL-MCNC: 0.41 MG/DL (ref 0.52–1.04)
CREAT SERPL-MCNC: 0.42 MG/DL (ref 0.52–1.04)
CREAT SERPL-MCNC: 0.42 MG/DL (ref 0.52–1.04)
CREAT SERPL-MCNC: 0.43 MG/DL (ref 0.52–1.04)
CREAT SERPL-MCNC: 0.45 MG/DL (ref 0.52–1.04)
CREAT SERPL-MCNC: 0.46 MG/DL (ref 0.52–1.04)
CREAT SERPL-MCNC: 0.47 MG/DL (ref 0.52–1.04)
CREAT SERPL-MCNC: 0.48 MG/DL (ref 0.52–1.04)
CREAT SERPL-MCNC: 0.48 MG/DL (ref 0.52–1.04)
CREAT SERPL-MCNC: 0.49 MG/DL (ref 0.52–1.04)
CREAT SERPL-MCNC: 0.51 MG/DL (ref 0.52–1.04)
CREAT SERPL-MCNC: 0.52 MG/DL (ref 0.52–1.04)
CREAT SERPL-MCNC: 0.52 MG/DL (ref 0.52–1.04)
CREAT SERPL-MCNC: 0.53 MG/DL (ref 0.52–1.04)
CREAT SERPL-MCNC: 0.55 MG/DL (ref 0.52–1.04)
CREAT SERPL-MCNC: 0.58 MG/DL (ref 0.52–1.04)
CREAT SERPL-MCNC: 0.59 MG/DL (ref 0.52–1.04)
CREAT SERPL-MCNC: 0.6 MG/DL (ref 0.52–1.04)
CREAT SERPL-MCNC: 0.65 MG/DL (ref 0.52–1.04)
CREAT SERPL-MCNC: 0.65 MG/DL (ref 0.52–1.04)
CREAT SERPL-MCNC: 0.72 MG/DL (ref 0.52–1.04)
DEPRECATED S PYO AG THROAT QL EIA: NORMAL
DIFFERENTIAL METHOD BLD: ABNORMAL
EOSINOPHIL # BLD AUTO: 0 10E9/L (ref 0–0.7)
EOSINOPHIL # BLD AUTO: 0.1 10E9/L (ref 0–0.7)
EOSINOPHIL # BLD AUTO: 0.2 10E9/L (ref 0–0.7)
EOSINOPHIL NFR BLD AUTO: 0 %
EOSINOPHIL NFR BLD AUTO: 0.1 %
EOSINOPHIL NFR BLD AUTO: 0.2 %
EOSINOPHIL NFR BLD AUTO: 0.3 %
EOSINOPHIL NFR BLD AUTO: 0.3 %
EOSINOPHIL NFR BLD AUTO: 0.4 %
EOSINOPHIL NFR BLD AUTO: 0.5 %
EOSINOPHIL NFR BLD AUTO: 0.6 %
EOSINOPHIL NFR BLD AUTO: 0.8 %
EOSINOPHIL NFR BLD AUTO: 1 %
EOSINOPHIL NFR BLD AUTO: 1 %
EOSINOPHIL NFR BLD AUTO: 1.1 %
EOSINOPHIL NFR BLD AUTO: 1.2 %
EOSINOPHIL NFR BLD AUTO: 1.4 %
EOSINOPHIL NFR BLD AUTO: 1.5 %
EOSINOPHIL NFR BLD AUTO: 1.7 %
EOSINOPHIL NFR BLD AUTO: 1.7 %
EOSINOPHIL NFR BLD AUTO: 2.1 %
EOSINOPHIL NFR BLD AUTO: 3.2 %
ERYTHROCYTE [DISTWIDTH] IN BLOOD BY AUTOMATED COUNT: 14.7 % (ref 10–15)
ERYTHROCYTE [DISTWIDTH] IN BLOOD BY AUTOMATED COUNT: 15.2 % (ref 10–15)
ERYTHROCYTE [DISTWIDTH] IN BLOOD BY AUTOMATED COUNT: 15.4 % (ref 10–15)
ERYTHROCYTE [DISTWIDTH] IN BLOOD BY AUTOMATED COUNT: 16.2 % (ref 10–15)
ERYTHROCYTE [DISTWIDTH] IN BLOOD BY AUTOMATED COUNT: 16.3 % (ref 10–15)
ERYTHROCYTE [DISTWIDTH] IN BLOOD BY AUTOMATED COUNT: 17.1 % (ref 10–15)
ERYTHROCYTE [DISTWIDTH] IN BLOOD BY AUTOMATED COUNT: 17.7 % (ref 10–15)
ERYTHROCYTE [DISTWIDTH] IN BLOOD BY AUTOMATED COUNT: 17.7 % (ref 10–15)
ERYTHROCYTE [DISTWIDTH] IN BLOOD BY AUTOMATED COUNT: 17.8 % (ref 10–15)
ERYTHROCYTE [DISTWIDTH] IN BLOOD BY AUTOMATED COUNT: 17.9 % (ref 10–15)
ERYTHROCYTE [DISTWIDTH] IN BLOOD BY AUTOMATED COUNT: 18.1 % (ref 10–15)
ERYTHROCYTE [DISTWIDTH] IN BLOOD BY AUTOMATED COUNT: 18.6 % (ref 10–15)
ERYTHROCYTE [DISTWIDTH] IN BLOOD BY AUTOMATED COUNT: 18.7 % (ref 10–15)
ERYTHROCYTE [DISTWIDTH] IN BLOOD BY AUTOMATED COUNT: 18.8 % (ref 10–15)
ERYTHROCYTE [DISTWIDTH] IN BLOOD BY AUTOMATED COUNT: 19.4 % (ref 10–15)
ERYTHROCYTE [DISTWIDTH] IN BLOOD BY AUTOMATED COUNT: 19.4 % (ref 10–15)
ERYTHROCYTE [DISTWIDTH] IN BLOOD BY AUTOMATED COUNT: 19.5 % (ref 10–15)
ERYTHROCYTE [DISTWIDTH] IN BLOOD BY AUTOMATED COUNT: 19.6 % (ref 10–15)
ERYTHROCYTE [DISTWIDTH] IN BLOOD BY AUTOMATED COUNT: 19.7 % (ref 10–15)
ERYTHROCYTE [DISTWIDTH] IN BLOOD BY AUTOMATED COUNT: 19.8 % (ref 10–15)
ERYTHROCYTE [DISTWIDTH] IN BLOOD BY AUTOMATED COUNT: 19.9 % (ref 10–15)
ERYTHROCYTE [DISTWIDTH] IN BLOOD BY AUTOMATED COUNT: 19.9 % (ref 10–15)
ERYTHROCYTE [DISTWIDTH] IN BLOOD BY AUTOMATED COUNT: 20.1 % (ref 10–15)
ERYTHROCYTE [DISTWIDTH] IN BLOOD BY AUTOMATED COUNT: 20.6 % (ref 10–15)
ERYTHROCYTE [DISTWIDTH] IN BLOOD BY AUTOMATED COUNT: 20.7 % (ref 10–15)
ERYTHROCYTE [DISTWIDTH] IN BLOOD BY AUTOMATED COUNT: 22 % (ref 10–15)
ERYTHROCYTE [DISTWIDTH] IN BLOOD BY AUTOMATED COUNT: 22 % (ref 10–15)
ERYTHROCYTE [DISTWIDTH] IN BLOOD BY AUTOMATED COUNT: 22.6 % (ref 10–15)
ERYTHROCYTE [DISTWIDTH] IN BLOOD BY AUTOMATED COUNT: 23.4 % (ref 10–15)
GFR SERPL CREATININE-BSD FRML MDRD: >90 ML/MIN/{1.73_M2}
GLUCOSE BLDC GLUCOMTR-MCNC: 118 MG/DL (ref 70–99)
GLUCOSE BLDC GLUCOMTR-MCNC: 89 MG/DL (ref 70–99)
GLUCOSE SERPL-MCNC: 100 MG/DL (ref 70–99)
GLUCOSE SERPL-MCNC: 103 MG/DL (ref 70–99)
GLUCOSE SERPL-MCNC: 104 MG/DL (ref 70–99)
GLUCOSE SERPL-MCNC: 110 MG/DL (ref 70–99)
GLUCOSE SERPL-MCNC: 111 MG/DL (ref 70–99)
GLUCOSE SERPL-MCNC: 113 MG/DL (ref 70–99)
GLUCOSE SERPL-MCNC: 117 MG/DL (ref 70–99)
GLUCOSE SERPL-MCNC: 120 MG/DL (ref 70–99)
GLUCOSE SERPL-MCNC: 121 MG/DL (ref 70–99)
GLUCOSE SERPL-MCNC: 126 MG/DL (ref 70–99)
GLUCOSE SERPL-MCNC: 128 MG/DL (ref 70–99)
GLUCOSE SERPL-MCNC: 136 MG/DL (ref 70–99)
GLUCOSE SERPL-MCNC: 140 MG/DL (ref 70–99)
GLUCOSE SERPL-MCNC: 170 MG/DL (ref 70–99)
GLUCOSE SERPL-MCNC: 86 MG/DL (ref 70–99)
GLUCOSE SERPL-MCNC: 89 MG/DL (ref 70–99)
GLUCOSE SERPL-MCNC: 92 MG/DL (ref 70–99)
GLUCOSE SERPL-MCNC: 92 MG/DL (ref 70–99)
GLUCOSE SERPL-MCNC: 97 MG/DL (ref 70–99)
GLUCOSE SERPL-MCNC: 98 MG/DL (ref 70–99)
GLUCOSE UR STRIP-MCNC: NEGATIVE MG/DL
HCT VFR BLD AUTO: 24.4 % (ref 35–47)
HCT VFR BLD AUTO: 25.6 % (ref 35–47)
HCT VFR BLD AUTO: 26.8 % (ref 35–47)
HCT VFR BLD AUTO: 26.9 % (ref 35–47)
HCT VFR BLD AUTO: 27.1 % (ref 35–47)
HCT VFR BLD AUTO: 27.7 % (ref 35–47)
HCT VFR BLD AUTO: 27.7 % (ref 35–47)
HCT VFR BLD AUTO: 28.9 % (ref 35–47)
HCT VFR BLD AUTO: 29 % (ref 35–47)
HCT VFR BLD AUTO: 29.1 % (ref 35–47)
HCT VFR BLD AUTO: 29.4 % (ref 35–47)
HCT VFR BLD AUTO: 29.5 % (ref 35–47)
HCT VFR BLD AUTO: 29.6 % (ref 35–47)
HCT VFR BLD AUTO: 29.7 % (ref 35–47)
HCT VFR BLD AUTO: 30.6 % (ref 35–47)
HCT VFR BLD AUTO: 30.6 % (ref 35–47)
HCT VFR BLD AUTO: 30.9 % (ref 35–47)
HCT VFR BLD AUTO: 31 % (ref 35–47)
HCT VFR BLD AUTO: 31.1 % (ref 35–47)
HCT VFR BLD AUTO: 31.5 % (ref 35–47)
HCT VFR BLD AUTO: 31.5 % (ref 35–47)
HCT VFR BLD AUTO: 31.6 % (ref 35–47)
HCT VFR BLD AUTO: 31.9 % (ref 35–47)
HCT VFR BLD AUTO: 32.2 % (ref 35–47)
HCT VFR BLD AUTO: 32.8 % (ref 35–47)
HCT VFR BLD AUTO: 33.3 % (ref 35–47)
HCT VFR BLD AUTO: 33.4 % (ref 35–47)
HCT VFR BLD AUTO: 33.4 % (ref 35–47)
HCT VFR BLD AUTO: 34.4 % (ref 35–47)
HCT VFR BLD AUTO: 34.8 % (ref 35–47)
HGB BLD-MCNC: 10.1 G/DL (ref 11.7–15.7)
HGB BLD-MCNC: 10.1 G/DL (ref 11.7–15.7)
HGB BLD-MCNC: 10.2 G/DL (ref 11.7–15.7)
HGB BLD-MCNC: 10.3 G/DL (ref 11.7–15.7)
HGB BLD-MCNC: 10.8 G/DL (ref 11.7–15.7)
HGB BLD-MCNC: 11 G/DL (ref 11.7–15.7)
HGB BLD-MCNC: 7.1 G/DL (ref 11.7–15.7)
HGB BLD-MCNC: 7.3 G/DL (ref 11.7–15.7)
HGB BLD-MCNC: 7.9 G/DL (ref 11.7–15.7)
HGB BLD-MCNC: 8.3 G/DL (ref 11.7–15.7)
HGB BLD-MCNC: 8.4 G/DL (ref 11.7–15.7)
HGB BLD-MCNC: 8.6 G/DL (ref 11.7–15.7)
HGB BLD-MCNC: 8.7 G/DL (ref 11.7–15.7)
HGB BLD-MCNC: 9 G/DL (ref 11.7–15.7)
HGB BLD-MCNC: 9.1 G/DL (ref 11.7–15.7)
HGB BLD-MCNC: 9.2 G/DL (ref 11.7–15.7)
HGB BLD-MCNC: 9.3 G/DL (ref 11.7–15.7)
HGB BLD-MCNC: 9.4 G/DL (ref 11.7–15.7)
HGB BLD-MCNC: 9.4 G/DL (ref 11.7–15.7)
HGB BLD-MCNC: 9.5 G/DL (ref 11.7–15.7)
HGB BLD-MCNC: 9.6 G/DL (ref 11.7–15.7)
HGB BLD-MCNC: 9.7 G/DL (ref 11.7–15.7)
HGB UR QL STRIP: NEGATIVE
IMM GRANULOCYTES # BLD: 0 10E9/L (ref 0–0.4)
IMM GRANULOCYTES # BLD: 0.1 10E9/L (ref 0–0.4)
IMM GRANULOCYTES # BLD: 0.1 10E9/L (ref 0–0.4)
IMM GRANULOCYTES NFR BLD: 0.1 %
IMM GRANULOCYTES NFR BLD: 0.2 %
IMM GRANULOCYTES NFR BLD: 0.3 %
IMM GRANULOCYTES NFR BLD: 0.4 %
IMM GRANULOCYTES NFR BLD: 0.5 %
IMM GRANULOCYTES NFR BLD: 0.6 %
IMM GRANULOCYTES NFR BLD: 0.8 %
IMM GRANULOCYTES NFR BLD: 0.8 %
INR PPP: 0.97 (ref 0.86–1.14)
INR PPP: 1.02 (ref 0.86–1.14)
INR PPP: 1.06 (ref 0.86–1.14)
INR PPP: 1.12 (ref 0.86–1.14)
INR PPP: 1.15 (ref 0.86–1.14)
INR PPP: 1.27 (ref 0.86–1.14)
INR PPP: 1.32 (ref 0.86–1.14)
INR PPP: 1.39 (ref 0.86–1.14)
INR PPP: 1.41 (ref 0.86–1.14)
INTERPRETATION ECG - MUSE: NORMAL
INTERPRETATION ECG - MUSE: NORMAL
KETONES UR STRIP-MCNC: 5 MG/DL
LEUKOCYTE ESTERASE UR QL STRIP: NEGATIVE
LMWH PPP CHRO-ACNC: 0.15 IU/ML
LMWH PPP CHRO-ACNC: 0.4 IU/ML
LMWH PPP CHRO-ACNC: 0.48 IU/ML
LMWH PPP CHRO-ACNC: 0.64 IU/ML
LMWH PPP CHRO-ACNC: 1.33 IU/ML
LMWH PPP CHRO-ACNC: 1.43 IU/ML
LYMPHOCYTES # BLD AUTO: 0.1 10E9/L (ref 0.8–5.3)
LYMPHOCYTES # BLD AUTO: 0.3 10E9/L (ref 0.8–5.3)
LYMPHOCYTES # BLD AUTO: 0.4 10E9/L (ref 0.8–5.3)
LYMPHOCYTES # BLD AUTO: 0.5 10E9/L (ref 0.8–5.3)
LYMPHOCYTES # BLD AUTO: 0.6 10E9/L (ref 0.8–5.3)
LYMPHOCYTES # BLD AUTO: 0.7 10E9/L (ref 0.8–5.3)
LYMPHOCYTES # BLD AUTO: 0.8 10E9/L (ref 0.8–5.3)
LYMPHOCYTES # BLD AUTO: 0.8 10E9/L (ref 0.8–5.3)
LYMPHOCYTES # BLD AUTO: 0.9 10E9/L (ref 0.8–5.3)
LYMPHOCYTES # BLD AUTO: 0.9 10E9/L (ref 0.8–5.3)
LYMPHOCYTES # BLD AUTO: 1 10E9/L (ref 0.8–5.3)
LYMPHOCYTES # BLD AUTO: 1.1 10E9/L (ref 0.8–5.3)
LYMPHOCYTES NFR BLD AUTO: 10.1 %
LYMPHOCYTES NFR BLD AUTO: 10.1 %
LYMPHOCYTES NFR BLD AUTO: 10.3 %
LYMPHOCYTES NFR BLD AUTO: 10.4 %
LYMPHOCYTES NFR BLD AUTO: 10.7 %
LYMPHOCYTES NFR BLD AUTO: 10.7 %
LYMPHOCYTES NFR BLD AUTO: 10.9 %
LYMPHOCYTES NFR BLD AUTO: 11.5 %
LYMPHOCYTES NFR BLD AUTO: 11.8 %
LYMPHOCYTES NFR BLD AUTO: 11.9 %
LYMPHOCYTES NFR BLD AUTO: 11.9 %
LYMPHOCYTES NFR BLD AUTO: 12.4 %
LYMPHOCYTES NFR BLD AUTO: 12.5 %
LYMPHOCYTES NFR BLD AUTO: 12.6 %
LYMPHOCYTES NFR BLD AUTO: 13.1 %
LYMPHOCYTES NFR BLD AUTO: 13.2 %
LYMPHOCYTES NFR BLD AUTO: 13.2 %
LYMPHOCYTES NFR BLD AUTO: 13.8 %
LYMPHOCYTES NFR BLD AUTO: 14.5 %
LYMPHOCYTES NFR BLD AUTO: 15.4 %
LYMPHOCYTES NFR BLD AUTO: 2 %
LYMPHOCYTES NFR BLD AUTO: 20 %
LYMPHOCYTES NFR BLD AUTO: 25.5 %
LYMPHOCYTES NFR BLD AUTO: 3.6 %
LYMPHOCYTES NFR BLD AUTO: 5.5 %
LYMPHOCYTES NFR BLD AUTO: 6.4 %
LYMPHOCYTES NFR BLD AUTO: 7 %
LYMPHOCYTES NFR BLD AUTO: 7.8 %
LYMPHOCYTES NFR BLD AUTO: 8 %
LYMPHOCYTES NFR BLD AUTO: 8.3 %
LYMPHOCYTES NFR BLD AUTO: 9 %
LYMPHOCYTES NFR BLD AUTO: 9.3 %
MAGNESIUM SERPL-MCNC: 1.5 MG/DL (ref 1.6–2.3)
MAGNESIUM SERPL-MCNC: 1.6 MG/DL (ref 1.6–2.3)
MAGNESIUM SERPL-MCNC: 1.6 MG/DL (ref 1.6–2.3)
MAGNESIUM SERPL-MCNC: 1.7 MG/DL (ref 1.6–2.3)
MCH RBC QN AUTO: 22 PG (ref 26.5–33)
MCH RBC QN AUTO: 22.3 PG (ref 26.5–33)
MCH RBC QN AUTO: 22.5 PG (ref 26.5–33)
MCH RBC QN AUTO: 22.5 PG (ref 26.5–33)
MCH RBC QN AUTO: 22.7 PG (ref 26.5–33)
MCH RBC QN AUTO: 22.9 PG (ref 26.5–33)
MCH RBC QN AUTO: 23.1 PG (ref 26.5–33)
MCH RBC QN AUTO: 23.2 PG (ref 26.5–33)
MCH RBC QN AUTO: 23.4 PG (ref 26.5–33)
MCH RBC QN AUTO: 23.4 PG (ref 26.5–33)
MCH RBC QN AUTO: 23.5 PG (ref 26.5–33)
MCH RBC QN AUTO: 23.5 PG (ref 26.5–33)
MCH RBC QN AUTO: 23.6 PG (ref 26.5–33)
MCH RBC QN AUTO: 23.7 PG (ref 26.5–33)
MCH RBC QN AUTO: 23.8 PG (ref 26.5–33)
MCH RBC QN AUTO: 23.9 PG (ref 26.5–33)
MCH RBC QN AUTO: 23.9 PG (ref 26.5–33)
MCH RBC QN AUTO: 24.1 PG (ref 26.5–33)
MCH RBC QN AUTO: 24.1 PG (ref 26.5–33)
MCH RBC QN AUTO: 24.4 PG (ref 26.5–33)
MCH RBC QN AUTO: 24.5 PG (ref 26.5–33)
MCH RBC QN AUTO: 24.6 PG (ref 26.5–33)
MCH RBC QN AUTO: 24.9 PG (ref 26.5–33)
MCH RBC QN AUTO: 24.9 PG (ref 26.5–33)
MCH RBC QN AUTO: 25.5 PG (ref 26.5–33)
MCH RBC QN AUTO: 25.6 PG (ref 26.5–33)
MCH RBC QN AUTO: 26.8 PG (ref 26.5–33)
MCH RBC QN AUTO: 28.6 PG (ref 26.5–33)
MCH RBC QN AUTO: 29 PG (ref 26.5–33)
MCH RBC QN AUTO: 30.6 PG (ref 26.5–33)
MCHC RBC AUTO-ENTMCNC: 28.4 G/DL (ref 31.5–36.5)
MCHC RBC AUTO-ENTMCNC: 28.5 G/DL (ref 31.5–36.5)
MCHC RBC AUTO-ENTMCNC: 29.1 G/DL (ref 31.5–36.5)
MCHC RBC AUTO-ENTMCNC: 29.1 G/DL (ref 31.5–36.5)
MCHC RBC AUTO-ENTMCNC: 29.4 G/DL (ref 31.5–36.5)
MCHC RBC AUTO-ENTMCNC: 29.4 G/DL (ref 31.5–36.5)
MCHC RBC AUTO-ENTMCNC: 29.5 G/DL (ref 31.5–36.5)
MCHC RBC AUTO-ENTMCNC: 29.6 G/DL (ref 31.5–36.5)
MCHC RBC AUTO-ENTMCNC: 29.8 G/DL (ref 31.5–36.5)
MCHC RBC AUTO-ENTMCNC: 30 G/DL (ref 31.5–36.5)
MCHC RBC AUTO-ENTMCNC: 30.1 G/DL (ref 31.5–36.5)
MCHC RBC AUTO-ENTMCNC: 30.2 G/DL (ref 31.5–36.5)
MCHC RBC AUTO-ENTMCNC: 30.3 G/DL (ref 31.5–36.5)
MCHC RBC AUTO-ENTMCNC: 30.4 G/DL (ref 31.5–36.5)
MCHC RBC AUTO-ENTMCNC: 30.5 G/DL (ref 31.5–36.5)
MCHC RBC AUTO-ENTMCNC: 30.5 G/DL (ref 31.5–36.5)
MCHC RBC AUTO-ENTMCNC: 30.6 G/DL (ref 31.5–36.5)
MCHC RBC AUTO-ENTMCNC: 30.8 G/DL (ref 31.5–36.5)
MCHC RBC AUTO-ENTMCNC: 30.8 G/DL (ref 31.5–36.5)
MCHC RBC AUTO-ENTMCNC: 30.9 G/DL (ref 31.5–36.5)
MCHC RBC AUTO-ENTMCNC: 31 G/DL (ref 31.5–36.5)
MCHC RBC AUTO-ENTMCNC: 31.2 G/DL (ref 31.5–36.5)
MCHC RBC AUTO-ENTMCNC: 31.3 G/DL (ref 31.5–36.5)
MCHC RBC AUTO-ENTMCNC: 31.4 G/DL (ref 31.5–36.5)
MCHC RBC AUTO-ENTMCNC: 31.4 G/DL (ref 31.5–36.5)
MCHC RBC AUTO-ENTMCNC: 31.6 G/DL (ref 31.5–36.5)
MCHC RBC AUTO-ENTMCNC: 31.7 G/DL (ref 31.5–36.5)
MCHC RBC AUTO-ENTMCNC: 31.8 G/DL (ref 31.5–36.5)
MCV RBC AUTO: 75 FL (ref 78–100)
MCV RBC AUTO: 75 FL (ref 78–100)
MCV RBC AUTO: 76 FL (ref 78–100)
MCV RBC AUTO: 77 FL (ref 78–100)
MCV RBC AUTO: 78 FL (ref 78–100)
MCV RBC AUTO: 79 FL (ref 78–100)
MCV RBC AUTO: 80 FL (ref 78–100)
MCV RBC AUTO: 80 FL (ref 78–100)
MCV RBC AUTO: 81 FL (ref 78–100)
MCV RBC AUTO: 83 FL (ref 78–100)
MCV RBC AUTO: 84 FL (ref 78–100)
MCV RBC AUTO: 88 FL (ref 78–100)
MCV RBC AUTO: 90 FL (ref 78–100)
MCV RBC AUTO: 93 FL (ref 78–100)
MCV RBC AUTO: 97 FL (ref 78–100)
MONOCYTES # BLD AUTO: 0.1 10E9/L (ref 0–1.3)
MONOCYTES # BLD AUTO: 0.2 10E9/L (ref 0–1.3)
MONOCYTES # BLD AUTO: 0.3 10E9/L (ref 0–1.3)
MONOCYTES # BLD AUTO: 0.4 10E9/L (ref 0–1.3)
MONOCYTES # BLD AUTO: 0.4 10E9/L (ref 0–1.3)
MONOCYTES # BLD AUTO: 0.5 10E9/L (ref 0–1.3)
MONOCYTES # BLD AUTO: 0.6 10E9/L (ref 0–1.3)
MONOCYTES # BLD AUTO: 0.7 10E9/L (ref 0–1.3)
MONOCYTES # BLD AUTO: 0.8 10E9/L (ref 0–1.3)
MONOCYTES # BLD AUTO: 0.9 10E9/L (ref 0–1.3)
MONOCYTES # BLD AUTO: 1 10E9/L (ref 0–1.3)
MONOCYTES NFR BLD AUTO: 10 %
MONOCYTES NFR BLD AUTO: 10 %
MONOCYTES NFR BLD AUTO: 10.1 %
MONOCYTES NFR BLD AUTO: 10.7 %
MONOCYTES NFR BLD AUTO: 11 %
MONOCYTES NFR BLD AUTO: 11.5 %
MONOCYTES NFR BLD AUTO: 11.6 %
MONOCYTES NFR BLD AUTO: 11.7 %
MONOCYTES NFR BLD AUTO: 11.8 %
MONOCYTES NFR BLD AUTO: 11.9 %
MONOCYTES NFR BLD AUTO: 11.9 %
MONOCYTES NFR BLD AUTO: 12.8 %
MONOCYTES NFR BLD AUTO: 13.1 %
MONOCYTES NFR BLD AUTO: 13.4 %
MONOCYTES NFR BLD AUTO: 15.5 %
MONOCYTES NFR BLD AUTO: 16.2 %
MONOCYTES NFR BLD AUTO: 17.1 %
MONOCYTES NFR BLD AUTO: 18.5 %
MONOCYTES NFR BLD AUTO: 2 %
MONOCYTES NFR BLD AUTO: 2.3 %
MONOCYTES NFR BLD AUTO: 3.3 %
MONOCYTES NFR BLD AUTO: 3.5 %
MONOCYTES NFR BLD AUTO: 4.9 %
MONOCYTES NFR BLD AUTO: 5 %
MONOCYTES NFR BLD AUTO: 5.4 %
MONOCYTES NFR BLD AUTO: 5.4 %
MONOCYTES NFR BLD AUTO: 5.8 %
MONOCYTES NFR BLD AUTO: 7.5 %
MONOCYTES NFR BLD AUTO: 8.2 %
MONOCYTES NFR BLD AUTO: 9.8 %
NEUTROPHILS # BLD AUTO: 1.6 10E9/L (ref 1.6–8.3)
NEUTROPHILS # BLD AUTO: 2.5 10E9/L (ref 1.6–8.3)
NEUTROPHILS # BLD AUTO: 3.3 10E9/L (ref 1.6–8.3)
NEUTROPHILS # BLD AUTO: 3.3 10E9/L (ref 1.6–8.3)
NEUTROPHILS # BLD AUTO: 3.4 10E9/L (ref 1.6–8.3)
NEUTROPHILS # BLD AUTO: 3.4 10E9/L (ref 1.6–8.3)
NEUTROPHILS # BLD AUTO: 3.5 10E9/L (ref 1.6–8.3)
NEUTROPHILS # BLD AUTO: 3.6 10E9/L (ref 1.6–8.3)
NEUTROPHILS # BLD AUTO: 3.6 10E9/L (ref 1.6–8.3)
NEUTROPHILS # BLD AUTO: 4 10E9/L (ref 1.6–8.3)
NEUTROPHILS # BLD AUTO: 4.1 10E9/L (ref 1.6–8.3)
NEUTROPHILS # BLD AUTO: 4.2 10E9/L (ref 1.6–8.3)
NEUTROPHILS # BLD AUTO: 4.5 10E9/L (ref 1.6–8.3)
NEUTROPHILS # BLD AUTO: 4.6 10E9/L (ref 1.6–8.3)
NEUTROPHILS # BLD AUTO: 4.7 10E9/L (ref 1.6–8.3)
NEUTROPHILS # BLD AUTO: 4.7 10E9/L (ref 1.6–8.3)
NEUTROPHILS # BLD AUTO: 4.8 10E9/L (ref 1.6–8.3)
NEUTROPHILS # BLD AUTO: 4.8 10E9/L (ref 1.6–8.3)
NEUTROPHILS # BLD AUTO: 4.9 10E9/L (ref 1.6–8.3)
NEUTROPHILS # BLD AUTO: 5.1 10E9/L (ref 1.6–8.3)
NEUTROPHILS # BLD AUTO: 5.4 10E9/L (ref 1.6–8.3)
NEUTROPHILS # BLD AUTO: 5.4 10E9/L (ref 1.6–8.3)
NEUTROPHILS # BLD AUTO: 5.5 10E9/L (ref 1.6–8.3)
NEUTROPHILS # BLD AUTO: 5.5 10E9/L (ref 1.6–8.3)
NEUTROPHILS # BLD AUTO: 5.7 10E9/L (ref 1.6–8.3)
NEUTROPHILS # BLD AUTO: 5.8 10E9/L (ref 1.6–8.3)
NEUTROPHILS # BLD AUTO: 6.1 10E9/L (ref 1.6–8.3)
NEUTROPHILS # BLD AUTO: 6.2 10E9/L (ref 1.6–8.3)
NEUTROPHILS # BLD AUTO: 6.9 10E9/L (ref 1.6–8.3)
NEUTROPHILS # BLD AUTO: 8.1 10E9/L (ref 1.6–8.3)
NEUTROPHILS NFR BLD AUTO: 54.4 %
NEUTROPHILS NFR BLD AUTO: 67.9 %
NEUTROPHILS NFR BLD AUTO: 69.1 %
NEUTROPHILS NFR BLD AUTO: 70.8 %
NEUTROPHILS NFR BLD AUTO: 70.8 %
NEUTROPHILS NFR BLD AUTO: 71 %
NEUTROPHILS NFR BLD AUTO: 71.5 %
NEUTROPHILS NFR BLD AUTO: 72.9 %
NEUTROPHILS NFR BLD AUTO: 73.3 %
NEUTROPHILS NFR BLD AUTO: 74.2 %
NEUTROPHILS NFR BLD AUTO: 74.4 %
NEUTROPHILS NFR BLD AUTO: 75 %
NEUTROPHILS NFR BLD AUTO: 75 %
NEUTROPHILS NFR BLD AUTO: 75.9 %
NEUTROPHILS NFR BLD AUTO: 77 %
NEUTROPHILS NFR BLD AUTO: 77.9 %
NEUTROPHILS NFR BLD AUTO: 77.9 %
NEUTROPHILS NFR BLD AUTO: 78 %
NEUTROPHILS NFR BLD AUTO: 78 %
NEUTROPHILS NFR BLD AUTO: 78.1 %
NEUTROPHILS NFR BLD AUTO: 78.4 %
NEUTROPHILS NFR BLD AUTO: 80.4 %
NEUTROPHILS NFR BLD AUTO: 81.2 %
NEUTROPHILS NFR BLD AUTO: 81.5 %
NEUTROPHILS NFR BLD AUTO: 82 %
NEUTROPHILS NFR BLD AUTO: 82.5 %
NEUTROPHILS NFR BLD AUTO: 84 %
NEUTROPHILS NFR BLD AUTO: 85.6 %
NEUTROPHILS NFR BLD AUTO: 87.4 %
NEUTROPHILS NFR BLD AUTO: 90.5 %
NEUTROPHILS NFR BLD AUTO: 93.5 %
NEUTROPHILS NFR BLD AUTO: 96 %
NITRATE UR QL: NEGATIVE
NRBC # BLD AUTO: 0 10*3/UL
NRBC BLD AUTO-RTO: 0 /100
NUM BPU REQUESTED: 1
PH UR STRIP: 7 PH (ref 5–7)
PLATELET # BLD AUTO: 1013 10E9/L (ref 150–450)
PLATELET # BLD AUTO: 128 10E9/L (ref 150–450)
PLATELET # BLD AUTO: 134 10E9/L (ref 150–450)
PLATELET # BLD AUTO: 147 10E9/L (ref 150–450)
PLATELET # BLD AUTO: 212 10E9/L (ref 150–450)
PLATELET # BLD AUTO: 244 10E9/L (ref 150–450)
PLATELET # BLD AUTO: 293 10E9/L (ref 150–450)
PLATELET # BLD AUTO: 326 10E9/L (ref 150–450)
PLATELET # BLD AUTO: 330 10E9/L (ref 150–450)
PLATELET # BLD AUTO: 340 10E9/L (ref 150–450)
PLATELET # BLD AUTO: 349 10E9/L (ref 150–450)
PLATELET # BLD AUTO: 352 10E9/L (ref 150–450)
PLATELET # BLD AUTO: 357 10E9/L (ref 150–450)
PLATELET # BLD AUTO: 359 10E9/L (ref 150–450)
PLATELET # BLD AUTO: 375 10E9/L (ref 150–450)
PLATELET # BLD AUTO: 388 10E9/L (ref 150–450)
PLATELET # BLD AUTO: 393 10E9/L (ref 150–450)
PLATELET # BLD AUTO: 398 10E9/L (ref 150–450)
PLATELET # BLD AUTO: 415 10E9/L (ref 150–450)
PLATELET # BLD AUTO: 421 10E9/L (ref 150–450)
PLATELET # BLD AUTO: 422 10E9/L (ref 150–450)
PLATELET # BLD AUTO: 428 10E9/L (ref 150–450)
PLATELET # BLD AUTO: 438 10E9/L (ref 150–450)
PLATELET # BLD AUTO: 443 10E9/L (ref 150–450)
PLATELET # BLD AUTO: 469 10E9/L (ref 150–450)
PLATELET # BLD AUTO: 472 10E9/L (ref 150–450)
PLATELET # BLD AUTO: 476 10E9/L (ref 150–450)
PLATELET # BLD AUTO: 489 10E9/L (ref 150–450)
PLATELET # BLD AUTO: 493 10E9/L (ref 150–450)
PLATELET # BLD AUTO: 511 10E9/L (ref 150–450)
PLATELET # BLD AUTO: 511 10E9/L (ref 150–450)
PLATELET # BLD AUTO: 522 10E9/L (ref 150–450)
PLATELET # BLD AUTO: 525 10E9/L (ref 150–450)
PLATELET # BLD AUTO: 538 10E9/L (ref 150–450)
PLATELET # BLD AUTO: 671 10E9/L (ref 150–450)
PLATELET # BLD EST: ABNORMAL 10*3/UL
POTASSIUM SERPL-SCNC: 2.7 MMOL/L (ref 3.4–5.3)
POTASSIUM SERPL-SCNC: 3 MMOL/L (ref 3.4–5.3)
POTASSIUM SERPL-SCNC: 3.1 MMOL/L (ref 3.4–5.3)
POTASSIUM SERPL-SCNC: 3.2 MMOL/L (ref 3.4–5.3)
POTASSIUM SERPL-SCNC: 3.4 MMOL/L (ref 3.4–5.3)
POTASSIUM SERPL-SCNC: 3.5 MMOL/L (ref 3.4–5.3)
POTASSIUM SERPL-SCNC: 3.6 MMOL/L (ref 3.4–5.3)
POTASSIUM SERPL-SCNC: 3.6 MMOL/L (ref 3.4–5.3)
POTASSIUM SERPL-SCNC: 3.8 MMOL/L (ref 3.4–5.3)
POTASSIUM SERPL-SCNC: 3.9 MMOL/L (ref 3.4–5.3)
POTASSIUM SERPL-SCNC: 4 MMOL/L (ref 3.4–5.3)
POTASSIUM SERPL-SCNC: 4.1 MMOL/L (ref 3.4–5.3)
POTASSIUM SERPL-SCNC: 4.2 MMOL/L (ref 3.4–5.3)
POTASSIUM SERPL-SCNC: 4.3 MMOL/L (ref 3.4–5.3)
POTASSIUM SERPL-SCNC: 4.6 MMOL/L (ref 3.4–5.3)
PROT SERPL-MCNC: 5.8 G/DL (ref 6.8–8.8)
PROT SERPL-MCNC: 5.8 G/DL (ref 6.8–8.8)
PROT SERPL-MCNC: 5.9 G/DL (ref 6.8–8.8)
PROT SERPL-MCNC: 6.4 G/DL (ref 6.8–8.8)
PROT SERPL-MCNC: 6.4 G/DL (ref 6.8–8.8)
PROT SERPL-MCNC: 6.6 G/DL (ref 6.8–8.8)
PROT SERPL-MCNC: 6.8 G/DL (ref 6.8–8.8)
PROT SERPL-MCNC: 7.1 G/DL (ref 6.8–8.8)
PROT SERPL-MCNC: 7.2 G/DL (ref 6.8–8.8)
PROT SERPL-MCNC: 7.2 G/DL (ref 6.8–8.8)
PROT SERPL-MCNC: 7.3 G/DL (ref 6.8–8.8)
PROT SERPL-MCNC: 7.4 G/DL (ref 6.8–8.8)
PROT SERPL-MCNC: 7.4 G/DL (ref 6.8–8.8)
PROT SERPL-MCNC: 7.7 G/DL (ref 6.8–8.8)
PROT SERPL-MCNC: 7.7 G/DL (ref 6.8–8.8)
PROT SERPL-MCNC: 7.8 G/DL (ref 6.8–8.8)
PROT SERPL-MCNC: 7.8 G/DL (ref 6.8–8.8)
PROT SERPL-MCNC: 8 G/DL (ref 6.8–8.8)
PROT SERPL-MCNC: 8.1 G/DL (ref 6.8–8.8)
PROT SERPL-MCNC: 8.4 G/DL (ref 6.8–8.8)
PROT SERPL-MCNC: 8.4 G/DL (ref 6.8–8.8)
RADIOLOGIST FLAGS: ABNORMAL
RBC # BLD AUTO: 3.07 10E12/L (ref 3.8–5.2)
RBC # BLD AUTO: 3.19 10E12/L (ref 3.8–5.2)
RBC # BLD AUTO: 3.32 10E12/L (ref 3.8–5.2)
RBC # BLD AUTO: 3.51 10E12/L (ref 3.8–5.2)
RBC # BLD AUTO: 3.51 10E12/L (ref 3.8–5.2)
RBC # BLD AUTO: 3.52 10E12/L (ref 3.8–5.2)
RBC # BLD AUTO: 3.53 10E12/L (ref 3.8–5.2)
RBC # BLD AUTO: 3.7 10E12/L (ref 3.8–5.2)
RBC # BLD AUTO: 3.72 10E12/L (ref 3.8–5.2)
RBC # BLD AUTO: 3.72 10E12/L (ref 3.8–5.2)
RBC # BLD AUTO: 3.73 10E12/L (ref 3.8–5.2)
RBC # BLD AUTO: 3.74 10E12/L (ref 3.8–5.2)
RBC # BLD AUTO: 3.74 10E12/L (ref 3.8–5.2)
RBC # BLD AUTO: 3.75 10E12/L (ref 3.8–5.2)
RBC # BLD AUTO: 3.77 10E12/L (ref 3.8–5.2)
RBC # BLD AUTO: 3.79 10E12/L (ref 3.8–5.2)
RBC # BLD AUTO: 3.8 10E12/L (ref 3.8–5.2)
RBC # BLD AUTO: 3.81 10E12/L (ref 3.8–5.2)
RBC # BLD AUTO: 3.81 10E12/L (ref 3.8–5.2)
RBC # BLD AUTO: 3.82 10E12/L (ref 3.8–5.2)
RBC # BLD AUTO: 3.84 10E12/L (ref 3.8–5.2)
RBC # BLD AUTO: 3.85 10E12/L (ref 3.8–5.2)
RBC # BLD AUTO: 3.87 10E12/L (ref 3.8–5.2)
RBC # BLD AUTO: 3.89 10E12/L (ref 3.8–5.2)
RBC # BLD AUTO: 3.93 10E12/L (ref 3.8–5.2)
RBC # BLD AUTO: 3.95 10E12/L (ref 3.8–5.2)
RBC # BLD AUTO: 3.96 10E12/L (ref 3.8–5.2)
RBC # BLD AUTO: 3.98 10E12/L (ref 3.8–5.2)
RBC # BLD AUTO: 3.99 10E12/L (ref 3.8–5.2)
RBC # BLD AUTO: 4.03 10E12/L (ref 3.8–5.2)
RBC # BLD AUTO: 4.03 10E12/L (ref 3.8–5.2)
RBC # BLD AUTO: 4.04 10E12/L (ref 3.8–5.2)
RBC # BLD AUTO: 4.08 10E12/L (ref 3.8–5.2)
RBC # BLD AUTO: 4.09 10E12/L (ref 3.8–5.2)
RBC # BLD AUTO: 4.21 10E12/L (ref 3.8–5.2)
RBC #/AREA URNS AUTO: 1 /HPF (ref 0–2)
RBC MORPH BLD: NORMAL
RETICS # AUTO: 52.8 10E9/L (ref 25–95)
RETICS/RBC NFR AUTO: 1.5 % (ref 0.5–2)
SODIUM SERPL-SCNC: 125 MMOL/L (ref 133–144)
SODIUM SERPL-SCNC: 126 MMOL/L (ref 133–144)
SODIUM SERPL-SCNC: 126 MMOL/L (ref 133–144)
SODIUM SERPL-SCNC: 127 MMOL/L (ref 133–144)
SODIUM SERPL-SCNC: 127 MMOL/L (ref 133–144)
SODIUM SERPL-SCNC: 128 MMOL/L (ref 133–144)
SODIUM SERPL-SCNC: 129 MMOL/L (ref 133–144)
SODIUM SERPL-SCNC: 129 MMOL/L (ref 133–144)
SODIUM SERPL-SCNC: 130 MMOL/L (ref 133–144)
SODIUM SERPL-SCNC: 131 MMOL/L (ref 133–144)
SODIUM SERPL-SCNC: 132 MMOL/L (ref 133–144)
SODIUM SERPL-SCNC: 133 MMOL/L (ref 133–144)
SODIUM SERPL-SCNC: 133 MMOL/L (ref 133–144)
SODIUM SERPL-SCNC: 134 MMOL/L (ref 133–144)
SODIUM SERPL-SCNC: 139 MMOL/L (ref 133–144)
SODIUM SERPL-SCNC: 139 MMOL/L (ref 133–144)
SOURCE: ABNORMAL
SP GR UR STRIP: 1 (ref 1–1.03)
SPECIMEN EXP DATE BLD: NORMAL
SPECIMEN SOURCE: NORMAL
SPECIMEN SOURCE: NORMAL
SQUAMOUS #/AREA URNS AUTO: <1 /HPF (ref 0–1)
T4 FREE SERPL-MCNC: 1.26 NG/DL (ref 0.76–1.46)
T4 FREE SERPL-MCNC: 1.56 NG/DL (ref 0.76–1.46)
TRANSFUSION STATUS PATIENT QL: NORMAL
TROPONIN I SERPL-MCNC: <0.015 UG/L (ref 0–0.04)
TROPONIN I SERPL-MCNC: <0.015 UG/L (ref 0–0.04)
TSH SERPL DL<=0.005 MIU/L-ACNC: 1.33 MU/L (ref 0.4–4)
TSH SERPL DL<=0.005 MIU/L-ACNC: 11.29 MU/L (ref 0.4–4)
TSH SERPL DL<=0.005 MIU/L-ACNC: 2.27 MU/L (ref 0.4–4)
TSH SERPL DL<=0.005 MIU/L-ACNC: 2.44 MU/L (ref 0.4–4)
TSH SERPL DL<=0.005 MIU/L-ACNC: 2.51 MU/L (ref 0.4–4)
TSH SERPL DL<=0.005 MIU/L-ACNC: 2.6 MU/L (ref 0.4–4)
TSH SERPL DL<=0.005 MIU/L-ACNC: 2.6 MU/L (ref 0.4–4)
TSH SERPL DL<=0.005 MIU/L-ACNC: 2.79 MU/L (ref 0.4–4)
TSH SERPL DL<=0.005 MIU/L-ACNC: 2.8 MU/L (ref 0.4–4)
TSH SERPL DL<=0.005 MIU/L-ACNC: 2.93 MU/L (ref 0.4–4)
TSH SERPL DL<=0.005 MIU/L-ACNC: 2.93 MU/L (ref 0.4–4)
TSH SERPL DL<=0.005 MIU/L-ACNC: 3.02 MU/L (ref 0.4–4)
TSH SERPL DL<=0.005 MIU/L-ACNC: 3.09 MU/L (ref 0.4–4)
TSH SERPL DL<=0.005 MIU/L-ACNC: 3.38 MU/L (ref 0.4–4)
TSH SERPL DL<=0.005 MIU/L-ACNC: 3.63 MU/L (ref 0.4–4)
TSH SERPL DL<=0.005 MIU/L-ACNC: 3.71 MU/L (ref 0.4–4)
TSH SERPL DL<=0.005 MIU/L-ACNC: 3.93 MU/L (ref 0.4–4)
TSH SERPL DL<=0.005 MIU/L-ACNC: 3.98 MU/L (ref 0.4–4)
TSH SERPL DL<=0.005 MIU/L-ACNC: 4.04 MU/L (ref 0.4–4)
TSH SERPL DL<=0.005 MIU/L-ACNC: 4.39 MU/L (ref 0.4–4)
TSH SERPL DL<=0.005 MIU/L-ACNC: 5.65 MU/L (ref 0.4–4)
TSH SERPL DL<=0.005 MIU/L-ACNC: 5.98 MU/L (ref 0.4–4)
TSH SERPL DL<=0.005 MIU/L-ACNC: 8.56 MU/L (ref 0.4–4)
UROBILINOGEN UR STRIP-MCNC: NORMAL MG/DL (ref 0–2)
WBC # BLD AUTO: 2.9 10E9/L (ref 4–11)
WBC # BLD AUTO: 3.5 10E9/L (ref 4–11)
WBC # BLD AUTO: 4.5 10E9/L (ref 4–11)
WBC # BLD AUTO: 4.5 10E9/L (ref 4–11)
WBC # BLD AUTO: 4.7 10E9/L (ref 4–11)
WBC # BLD AUTO: 4.8 10E9/L (ref 4–11)
WBC # BLD AUTO: 4.8 10E9/L (ref 4–11)
WBC # BLD AUTO: 4.9 10E9/L (ref 4–11)
WBC # BLD AUTO: 5.1 10E9/L (ref 4–11)
WBC # BLD AUTO: 5.2 10E9/L (ref 4–11)
WBC # BLD AUTO: 5.2 10E9/L (ref 4–11)
WBC # BLD AUTO: 5.3 10E9/L (ref 4–11)
WBC # BLD AUTO: 5.3 10E9/L (ref 4–11)
WBC # BLD AUTO: 5.4 10E9/L (ref 4–11)
WBC # BLD AUTO: 5.6 10E9/L (ref 4–11)
WBC # BLD AUTO: 5.6 10E9/L (ref 4–11)
WBC # BLD AUTO: 5.7 10E9/L (ref 4–11)
WBC # BLD AUTO: 5.9 10E9/L (ref 4–11)
WBC # BLD AUTO: 5.9 10E9/L (ref 4–11)
WBC # BLD AUTO: 6 10E9/L (ref 4–11)
WBC # BLD AUTO: 6.3 10E9/L (ref 4–11)
WBC # BLD AUTO: 6.3 10E9/L (ref 4–11)
WBC # BLD AUTO: 6.6 10E9/L (ref 4–11)
WBC # BLD AUTO: 6.6 10E9/L (ref 4–11)
WBC # BLD AUTO: 6.8 10E9/L (ref 4–11)
WBC # BLD AUTO: 6.9 10E9/L (ref 4–11)
WBC # BLD AUTO: 7.1 10E9/L (ref 4–11)
WBC # BLD AUTO: 7.3 10E9/L (ref 4–11)
WBC # BLD AUTO: 7.4 10E9/L (ref 4–11)
WBC # BLD AUTO: 7.6 10E9/L (ref 4–11)
WBC # BLD AUTO: 7.6 10E9/L (ref 4–11)
WBC # BLD AUTO: 8 10E9/L (ref 4–11)
WBC # BLD AUTO: 8.6 10E9/L (ref 4–11)
WBC #/AREA URNS AUTO: <1 /HPF (ref 0–5)

## 2019-01-01 PROCEDURE — 85025 COMPLETE CBC W/AUTO DIFF WBC: CPT | Performed by: FAMILY MEDICINE

## 2019-01-01 PROCEDURE — 85025 COMPLETE CBC W/AUTO DIFF WBC: CPT | Performed by: OBSTETRICS & GYNECOLOGY

## 2019-01-01 PROCEDURE — 25000128 H RX IP 250 OP 636: Mod: ZF | Performed by: OBSTETRICS & GYNECOLOGY

## 2019-01-01 PROCEDURE — 36415 COLL VENOUS BLD VENIPUNCTURE: CPT

## 2019-01-01 PROCEDURE — 99232 SBSQ HOSP IP/OBS MODERATE 35: CPT | Mod: GC | Performed by: OBSTETRICS & GYNECOLOGY

## 2019-01-01 PROCEDURE — 84443 ASSAY THYROID STIM HORMONE: CPT | Performed by: OBSTETRICS & GYNECOLOGY

## 2019-01-01 PROCEDURE — 85025 COMPLETE CBC W/AUTO DIFF WBC: CPT | Performed by: NURSE PRACTITIONER

## 2019-01-01 PROCEDURE — 70498 CT ANGIOGRAPHY NECK: CPT

## 2019-01-01 PROCEDURE — 96366 THER/PROPH/DIAG IV INF ADDON: CPT | Performed by: EMERGENCY MEDICINE

## 2019-01-01 PROCEDURE — 36592 COLLECT BLOOD FROM PICC: CPT | Performed by: STUDENT IN AN ORGANIZED HEALTH CARE EDUCATION/TRAINING PROGRAM

## 2019-01-01 PROCEDURE — 25000132 ZZH RX MED GY IP 250 OP 250 PS 637: Performed by: STUDENT IN AN ORGANIZED HEALTH CARE EDUCATION/TRAINING PROGRAM

## 2019-01-01 PROCEDURE — 36415 COLL VENOUS BLD VENIPUNCTURE: CPT | Performed by: OBSTETRICS & GYNECOLOGY

## 2019-01-01 PROCEDURE — 25000125 ZZHC RX 250: Mod: ZF | Performed by: OBSTETRICS & GYNECOLOGY

## 2019-01-01 PROCEDURE — 99214 OFFICE O/P EST MOD 30 MIN: CPT | Mod: ZP | Performed by: OBSTETRICS & GYNECOLOGY

## 2019-01-01 PROCEDURE — 25000125 ZZHC RX 250: Performed by: NURSE ANESTHETIST, CERTIFIED REGISTERED

## 2019-01-01 PROCEDURE — 85610 PROTHROMBIN TIME: CPT | Performed by: OBSTETRICS & GYNECOLOGY

## 2019-01-01 PROCEDURE — 86304 IMMUNOASSAY TUMOR CA 125: CPT | Performed by: OBSTETRICS & GYNECOLOGY

## 2019-01-01 PROCEDURE — 86901 BLOOD TYPING SEROLOGIC RH(D): CPT | Performed by: OBSTETRICS & GYNECOLOGY

## 2019-01-01 PROCEDURE — 85730 THROMBOPLASTIN TIME PARTIAL: CPT | Performed by: NURSE PRACTITIONER

## 2019-01-01 PROCEDURE — 86850 RBC ANTIBODY SCREEN: CPT | Performed by: OBSTETRICS & GYNECOLOGY

## 2019-01-01 PROCEDURE — P9047 ALBUMIN (HUMAN), 25%, 50ML: HCPCS | Mod: ZF | Performed by: OBSTETRICS & GYNECOLOGY

## 2019-01-01 PROCEDURE — 36592 COLLECT BLOOD FROM PICC: CPT | Performed by: OBSTETRICS & GYNECOLOGY

## 2019-01-01 PROCEDURE — 36591 DRAW BLOOD OFF VENOUS DEVICE: CPT

## 2019-01-01 PROCEDURE — 97162 PT EVAL MOD COMPLEX 30 MIN: CPT | Mod: GP | Performed by: PHYSICAL THERAPIST

## 2019-01-01 PROCEDURE — 25000128 H RX IP 250 OP 636: Performed by: NURSE ANESTHETIST, CERTIFIED REGISTERED

## 2019-01-01 PROCEDURE — 99214 OFFICE O/P EST MOD 30 MIN: CPT | Mod: ZP | Performed by: NURSE PRACTITIONER

## 2019-01-01 PROCEDURE — 80053 COMPREHEN METABOLIC PANEL: CPT | Performed by: OBSTETRICS & GYNECOLOGY

## 2019-01-01 PROCEDURE — 96375 TX/PRO/DX INJ NEW DRUG ADDON: CPT | Mod: XU

## 2019-01-01 PROCEDURE — 25000128 H RX IP 250 OP 636: Performed by: ANESTHESIOLOGY

## 2019-01-01 PROCEDURE — 99222 1ST HOSP IP/OBS MODERATE 55: CPT | Mod: GC | Performed by: INTERNAL MEDICINE

## 2019-01-01 PROCEDURE — 99215 OFFICE O/P EST HI 40 MIN: CPT | Mod: ZP | Performed by: OBSTETRICS & GYNECOLOGY

## 2019-01-01 PROCEDURE — 0W9G3ZZ DRAINAGE OF PERITONEAL CAVITY, PERCUTANEOUS APPROACH: ICD-10-PCS | Performed by: INTERNAL MEDICINE

## 2019-01-01 PROCEDURE — 86923 COMPATIBILITY TEST ELECTRIC: CPT | Performed by: OBSTETRICS & GYNECOLOGY

## 2019-01-01 PROCEDURE — 25000128 H RX IP 250 OP 636: Performed by: STUDENT IN AN ORGANIZED HEALTH CARE EDUCATION/TRAINING PROGRAM

## 2019-01-01 PROCEDURE — 80076 HEPATIC FUNCTION PANEL: CPT | Performed by: STUDENT IN AN ORGANIZED HEALTH CARE EDUCATION/TRAINING PROGRAM

## 2019-01-01 PROCEDURE — 99291 CRITICAL CARE FIRST HOUR: CPT | Mod: 25 | Performed by: EMERGENCY MEDICINE

## 2019-01-01 PROCEDURE — 80053 COMPREHEN METABOLIC PANEL: CPT | Performed by: FAMILY MEDICINE

## 2019-01-01 PROCEDURE — 84484 ASSAY OF TROPONIN QUANT: CPT | Performed by: STUDENT IN AN ORGANIZED HEALTH CARE EDUCATION/TRAINING PROGRAM

## 2019-01-01 PROCEDURE — 85027 COMPLETE CBC AUTOMATED: CPT | Performed by: STUDENT IN AN ORGANIZED HEALTH CARE EDUCATION/TRAINING PROGRAM

## 2019-01-01 PROCEDURE — 37000009 ZZH ANESTHESIA TECHNICAL FEE, EACH ADDTL 15 MIN: Performed by: OBSTETRICS & GYNECOLOGY

## 2019-01-01 PROCEDURE — 25800030 ZZH RX IP 258 OP 636: Performed by: INTERNAL MEDICINE

## 2019-01-01 PROCEDURE — 25000128 H RX IP 250 OP 636: Performed by: RADIOLOGY

## 2019-01-01 PROCEDURE — G0463 HOSPITAL OUTPT CLINIC VISIT: HCPCS | Mod: 25

## 2019-01-01 PROCEDURE — 25000125 ZZHC RX 250: Performed by: STUDENT IN AN ORGANIZED HEALTH CARE EDUCATION/TRAINING PROGRAM

## 2019-01-01 PROCEDURE — 99232 SBSQ HOSP IP/OBS MODERATE 35: CPT | Mod: GC | Performed by: INTERNAL MEDICINE

## 2019-01-01 PROCEDURE — 25000132 ZZH RX MED GY IP 250 OP 250 PS 637: Performed by: OBSTETRICS & GYNECOLOGY

## 2019-01-01 PROCEDURE — 25800030 ZZH RX IP 258 OP 636: Performed by: STUDENT IN AN ORGANIZED HEALTH CARE EDUCATION/TRAINING PROGRAM

## 2019-01-01 PROCEDURE — 84443 ASSAY THYROID STIM HORMONE: CPT | Performed by: NURSE PRACTITIONER

## 2019-01-01 PROCEDURE — C1880 VENA CAVA FILTER: HCPCS

## 2019-01-01 PROCEDURE — 27210190 US PARACENTESIS

## 2019-01-01 PROCEDURE — 71260 CT THORAX DX C+: CPT | Mod: 51 | Performed by: RADIOLOGY

## 2019-01-01 PROCEDURE — 74177 CT ABD & PELVIS W/CONTRAST: CPT | Performed by: RADIOLOGY

## 2019-01-01 PROCEDURE — 96413 CHEMO IV INFUSION 1 HR: CPT

## 2019-01-01 PROCEDURE — 12000001 ZZH R&B MED SURG/OB UMMC

## 2019-01-01 PROCEDURE — G0463 HOSPITAL OUTPT CLINIC VISIT: HCPCS | Mod: ZF

## 2019-01-01 PROCEDURE — 84439 ASSAY OF FREE THYROXINE: CPT | Performed by: NURSE PRACTITIONER

## 2019-01-01 PROCEDURE — 85610 PROTHROMBIN TIME: CPT | Performed by: NURSE PRACTITIONER

## 2019-01-01 PROCEDURE — 71000027 ZZH RECOVERY PHASE 2 EACH 15 MINS: Performed by: OBSTETRICS & GYNECOLOGY

## 2019-01-01 PROCEDURE — 86304 IMMUNOASSAY TUMOR CA 125: CPT | Performed by: NURSE PRACTITIONER

## 2019-01-01 PROCEDURE — 27210903 ZZH KIT CR5

## 2019-01-01 PROCEDURE — 85520 HEPARIN ASSAY: CPT | Performed by: OBSTETRICS & GYNECOLOGY

## 2019-01-01 PROCEDURE — 40000166 ZZH STATISTIC PP CARE STAGE 1

## 2019-01-01 PROCEDURE — 36592 COLLECT BLOOD FROM PICC: CPT | Performed by: INTERNAL MEDICINE

## 2019-01-01 PROCEDURE — 85025 COMPLETE CBC W/AUTO DIFF WBC: CPT

## 2019-01-01 PROCEDURE — 25000566 ZZH SEVOFLURANE, EA 15 MIN: Performed by: OBSTETRICS & GYNECOLOGY

## 2019-01-01 PROCEDURE — 80048 BASIC METABOLIC PNL TOTAL CA: CPT | Performed by: EMERGENCY MEDICINE

## 2019-01-01 PROCEDURE — 36590 REMOVAL TUNNELED CV CATH: CPT | Performed by: SURGERY

## 2019-01-01 PROCEDURE — 99207 ZZC NO CHARGE LOS: CPT | Performed by: GENETIC COUNSELOR, MS

## 2019-01-01 PROCEDURE — 36430 TRANSFUSION BLD/BLD COMPNT: CPT

## 2019-01-01 PROCEDURE — 99214 OFFICE O/P EST MOD 30 MIN: CPT | Performed by: FAMILY MEDICINE

## 2019-01-01 PROCEDURE — 96361 HYDRATE IV INFUSION ADD-ON: CPT

## 2019-01-01 PROCEDURE — 85520 HEPARIN ASSAY: CPT | Performed by: STUDENT IN AN ORGANIZED HEALTH CARE EDUCATION/TRAINING PROGRAM

## 2019-01-01 PROCEDURE — 85045 AUTOMATED RETICULOCYTE COUNT: CPT | Performed by: INTERNAL MEDICINE

## 2019-01-01 PROCEDURE — 80053 COMPREHEN METABOLIC PANEL: CPT | Performed by: NURSE PRACTITIONER

## 2019-01-01 PROCEDURE — 70551 MRI BRAIN STEM W/O DYE: CPT

## 2019-01-01 PROCEDURE — 82962 GLUCOSE BLOOD TEST: CPT

## 2019-01-01 PROCEDURE — 99222 1ST HOSP IP/OBS MODERATE 55: CPT | Mod: GC | Performed by: OBSTETRICS & GYNECOLOGY

## 2019-01-01 PROCEDURE — 36590 REMOVAL TUNNELED CV CATH: CPT

## 2019-01-01 PROCEDURE — 85025 COMPLETE CBC W/AUTO DIFF WBC: CPT | Performed by: INTERNAL MEDICINE

## 2019-01-01 PROCEDURE — 83735 ASSAY OF MAGNESIUM: CPT | Performed by: OBSTETRICS & GYNECOLOGY

## 2019-01-01 PROCEDURE — 36415 COLL VENOUS BLD VENIPUNCTURE: CPT | Performed by: STUDENT IN AN ORGANIZED HEALTH CARE EDUCATION/TRAINING PROGRAM

## 2019-01-01 PROCEDURE — 25000128 H RX IP 250 OP 636: Performed by: INTERNAL MEDICINE

## 2019-01-01 PROCEDURE — 86850 RBC ANTIBODY SCREEN: CPT | Performed by: NURSE PRACTITIONER

## 2019-01-01 PROCEDURE — 99214 OFFICE O/P EST MOD 30 MIN: CPT | Performed by: INTERNAL MEDICINE

## 2019-01-01 PROCEDURE — 84484 ASSAY OF TROPONIN QUANT: CPT | Performed by: EMERGENCY MEDICINE

## 2019-01-01 PROCEDURE — 85004 AUTOMATED DIFF WBC COUNT: CPT | Performed by: STUDENT IN AN ORGANIZED HEALTH CARE EDUCATION/TRAINING PROGRAM

## 2019-01-01 PROCEDURE — 96361 HYDRATE IV INFUSION ADD-ON: CPT | Performed by: EMERGENCY MEDICINE

## 2019-01-01 PROCEDURE — 99214 OFFICE O/P EST MOD 30 MIN: CPT | Performed by: OBSTETRICS & GYNECOLOGY

## 2019-01-01 PROCEDURE — P9016 RBC LEUKOCYTES REDUCED: HCPCS | Performed by: OBSTETRICS & GYNECOLOGY

## 2019-01-01 PROCEDURE — 27210908 ZZH NEEDLE CR4

## 2019-01-01 PROCEDURE — 12000004 ZZH R&B IMCU UMMC

## 2019-01-01 PROCEDURE — 0W9G3ZZ DRAINAGE OF PERITONEAL CAVITY, PERCUTANEOUS APPROACH: ICD-10-PCS | Performed by: STUDENT IN AN ORGANIZED HEALTH CARE EDUCATION/TRAINING PROGRAM

## 2019-01-01 PROCEDURE — 80048 BASIC METABOLIC PNL TOTAL CA: CPT | Performed by: STUDENT IN AN ORGANIZED HEALTH CARE EDUCATION/TRAINING PROGRAM

## 2019-01-01 PROCEDURE — 49180 BIOPSY ABDOMINAL MASS: CPT

## 2019-01-01 PROCEDURE — 80048 BASIC METABOLIC PNL TOTAL CA: CPT | Performed by: FAMILY MEDICINE

## 2019-01-01 PROCEDURE — P9016 RBC LEUKOCYTES REDUCED: HCPCS | Performed by: NURSE PRACTITIONER

## 2019-01-01 PROCEDURE — 25000132 ZZH RX MED GY IP 250 OP 250 PS 637: Performed by: INTERNAL MEDICINE

## 2019-01-01 PROCEDURE — 49083 ABD PARACENTESIS W/IMAGING: CPT | Performed by: STUDENT IN AN ORGANIZED HEALTH CARE EDUCATION/TRAINING PROGRAM

## 2019-01-01 PROCEDURE — 96360 HYDRATION IV INFUSION INIT: CPT

## 2019-01-01 PROCEDURE — 99221 1ST HOSP IP/OBS SF/LOW 40: CPT | Mod: GC | Performed by: INTERNAL MEDICINE

## 2019-01-01 PROCEDURE — 99213 OFFICE O/P EST LOW 20 MIN: CPT | Mod: ZP | Performed by: NURSE PRACTITIONER

## 2019-01-01 PROCEDURE — 84132 ASSAY OF SERUM POTASSIUM: CPT | Performed by: OBSTETRICS & GYNECOLOGY

## 2019-01-01 PROCEDURE — 88305 TISSUE EXAM BY PATHOLOGIST: CPT | Performed by: OBSTETRICS & GYNECOLOGY

## 2019-01-01 PROCEDURE — 86900 BLOOD TYPING SEROLOGIC ABO: CPT | Performed by: NURSE PRACTITIONER

## 2019-01-01 PROCEDURE — 00000146 ZZHCL STATISTIC GLUCOSE BY METER IP

## 2019-01-01 PROCEDURE — 82550 ASSAY OF CK (CPK): CPT | Performed by: NURSE PRACTITIONER

## 2019-01-01 PROCEDURE — C1769 GUIDE WIRE: HCPCS

## 2019-01-01 PROCEDURE — 25000125 ZZHC RX 250: Performed by: RADIOLOGY

## 2019-01-01 PROCEDURE — 96367 TX/PROPH/DG ADDL SEQ IV INF: CPT

## 2019-01-01 PROCEDURE — 37191 INS ENDOVAS VENA CAVA FILTR: CPT

## 2019-01-01 PROCEDURE — 25800030 ZZH RX IP 258 OP 636: Performed by: PHYSICIAN ASSISTANT

## 2019-01-01 PROCEDURE — 82565 ASSAY OF CREATININE: CPT | Performed by: RADIOLOGY

## 2019-01-01 PROCEDURE — 83735 ASSAY OF MAGNESIUM: CPT | Performed by: NURSE PRACTITIONER

## 2019-01-01 PROCEDURE — 85730 THROMBOPLASTIN TIME PARTIAL: CPT | Performed by: EMERGENCY MEDICINE

## 2019-01-01 PROCEDURE — G8918 PT W/O PREOP ORDER IV AB PRO: HCPCS

## 2019-01-01 PROCEDURE — 83735 ASSAY OF MAGNESIUM: CPT | Performed by: INTERNAL MEDICINE

## 2019-01-01 PROCEDURE — 86900 BLOOD TYPING SEROLOGIC ABO: CPT | Performed by: OBSTETRICS & GYNECOLOGY

## 2019-01-01 PROCEDURE — 37000008 ZZH ANESTHESIA TECHNICAL FEE, 1ST 30 MIN: Performed by: OBSTETRICS & GYNECOLOGY

## 2019-01-01 PROCEDURE — 36415 COLL VENOUS BLD VENIPUNCTURE: CPT | Performed by: FAMILY MEDICINE

## 2019-01-01 PROCEDURE — 84443 ASSAY THYROID STIM HORMONE: CPT | Performed by: STUDENT IN AN ORGANIZED HEALTH CARE EDUCATION/TRAINING PROGRAM

## 2019-01-01 PROCEDURE — 84443 ASSAY THYROID STIM HORMONE: CPT | Mod: 91 | Performed by: OBSTETRICS & GYNECOLOGY

## 2019-01-01 PROCEDURE — 25800030 ZZH RX IP 258 OP 636: Mod: ZF | Performed by: OBSTETRICS & GYNECOLOGY

## 2019-01-01 PROCEDURE — 93005 ELECTROCARDIOGRAM TRACING: CPT | Performed by: EMERGENCY MEDICINE

## 2019-01-01 PROCEDURE — 25800030 ZZH RX IP 258 OP 636

## 2019-01-01 PROCEDURE — 49180 BIOPSY ABDOMINAL MASS: CPT | Mod: 74

## 2019-01-01 PROCEDURE — 96375 TX/PRO/DX INJ NEW DRUG ADDON: CPT

## 2019-01-01 PROCEDURE — 87880 STREP A ASSAY W/OPTIC: CPT | Performed by: FAMILY MEDICINE

## 2019-01-01 PROCEDURE — 80048 BASIC METABOLIC PNL TOTAL CA: CPT | Performed by: INTERNAL MEDICINE

## 2019-01-01 PROCEDURE — 97165 OT EVAL LOW COMPLEX 30 MIN: CPT | Mod: GO

## 2019-01-01 PROCEDURE — 99285 EMERGENCY DEPT VISIT HI MDM: CPT | Mod: 25 | Performed by: EMERGENCY MEDICINE

## 2019-01-01 PROCEDURE — 49321 LAPAROSCOPY BIOPSY: CPT | Mod: GC | Performed by: OBSTETRICS & GYNECOLOGY

## 2019-01-01 PROCEDURE — 85610 PROTHROMBIN TIME: CPT

## 2019-01-01 PROCEDURE — 84439 ASSAY OF FREE THYROXINE: CPT | Performed by: STUDENT IN AN ORGANIZED HEALTH CARE EDUCATION/TRAINING PROGRAM

## 2019-01-01 PROCEDURE — 25000128 H RX IP 250 OP 636: Mod: ZF | Performed by: NURSE PRACTITIONER

## 2019-01-01 PROCEDURE — 93010 ELECTROCARDIOGRAM REPORT: CPT | Mod: Z6 | Performed by: EMERGENCY MEDICINE

## 2019-01-01 PROCEDURE — 70553 MRI BRAIN STEM W/O & W/DYE: CPT

## 2019-01-01 PROCEDURE — 93010 ELECTROCARDIOGRAM REPORT: CPT | Performed by: INTERNAL MEDICINE

## 2019-01-01 PROCEDURE — 25000128 H RX IP 250 OP 636: Performed by: PHYSICIAN ASSISTANT

## 2019-01-01 PROCEDURE — 86923 COMPATIBILITY TEST ELECTRIC: CPT | Performed by: NURSE PRACTITIONER

## 2019-01-01 PROCEDURE — 27210905 ZZH KIT CR7

## 2019-01-01 PROCEDURE — 99215 OFFICE O/P EST HI 40 MIN: CPT | Mod: 24 | Performed by: OBSTETRICS & GYNECOLOGY

## 2019-01-01 PROCEDURE — 97116 GAIT TRAINING THERAPY: CPT | Mod: GP | Performed by: PHYSICAL THERAPIST

## 2019-01-01 PROCEDURE — 96365 THER/PROPH/DIAG IV INF INIT: CPT | Performed by: EMERGENCY MEDICINE

## 2019-01-01 PROCEDURE — 27210888 ZZH ACCESSORY CR7

## 2019-01-01 PROCEDURE — 81001 URINALYSIS AUTO W/SCOPE: CPT | Performed by: EMERGENCY MEDICINE

## 2019-01-01 PROCEDURE — 25000128 H RX IP 250 OP 636: Performed by: OBSTETRICS & GYNECOLOGY

## 2019-01-01 PROCEDURE — 85610 PROTHROMBIN TIME: CPT | Performed by: RADIOLOGY

## 2019-01-01 PROCEDURE — 25000128 H RX IP 250 OP 636: Performed by: EMERGENCY MEDICINE

## 2019-01-01 PROCEDURE — 85730 THROMBOPLASTIN TIME PARTIAL: CPT | Performed by: OBSTETRICS & GYNECOLOGY

## 2019-01-01 PROCEDURE — 25000132 ZZH RX MED GY IP 250 OP 250 PS 637: Mod: ZF | Performed by: OBSTETRICS & GYNECOLOGY

## 2019-01-01 PROCEDURE — 99153 MOD SED SAME PHYS/QHP EA: CPT

## 2019-01-01 PROCEDURE — 99207 ZZC NO CHARGE LOS: CPT

## 2019-01-01 PROCEDURE — 82565 ASSAY OF CREATININE: CPT

## 2019-01-01 PROCEDURE — G8907 PT DOC NO EVENTS ON DISCHARG: HCPCS

## 2019-01-01 PROCEDURE — 99152 MOD SED SAME PHYS/QHP 5/>YRS: CPT

## 2019-01-01 PROCEDURE — 96372 THER/PROPH/DIAG INJ SC/IM: CPT | Performed by: EMERGENCY MEDICINE

## 2019-01-01 PROCEDURE — 85025 COMPLETE CBC W/AUTO DIFF WBC: CPT | Performed by: STUDENT IN AN ORGANIZED HEALTH CARE EDUCATION/TRAINING PROGRAM

## 2019-01-01 PROCEDURE — 40000556 ZZH STATISTIC PERIPHERAL IV START W US GUIDANCE: Mod: ZF

## 2019-01-01 PROCEDURE — 86901 BLOOD TYPING SEROLOGIC RH(D): CPT | Performed by: NURSE PRACTITIONER

## 2019-01-01 PROCEDURE — G0463 HOSPITAL OUTPT CLINIC VISIT: HCPCS

## 2019-01-01 PROCEDURE — 88333 PATH CONSLTJ SURG CYTO XM 1: CPT | Performed by: OBSTETRICS & GYNECOLOGY

## 2019-01-01 PROCEDURE — 27210909 ZZH NEEDLE CR5

## 2019-01-01 PROCEDURE — 85610 PROTHROMBIN TIME: CPT | Performed by: INTERNAL MEDICINE

## 2019-01-01 PROCEDURE — 97535 SELF CARE MNGMENT TRAINING: CPT | Mod: GO

## 2019-01-01 PROCEDURE — 96375 TX/PRO/DX INJ NEW DRUG ADDON: CPT | Performed by: EMERGENCY MEDICINE

## 2019-01-01 PROCEDURE — 96365 THER/PROPH/DIAG IV INF INIT: CPT

## 2019-01-01 PROCEDURE — 88331 PATH CONSLTJ SURG 1 BLK 1SPC: CPT | Performed by: OBSTETRICS & GYNECOLOGY

## 2019-01-01 PROCEDURE — 85610 PROTHROMBIN TIME: CPT | Performed by: STUDENT IN AN ORGANIZED HEALTH CARE EDUCATION/TRAINING PROGRAM

## 2019-01-01 PROCEDURE — 99213 OFFICE O/P EST LOW 20 MIN: CPT | Performed by: FAMILY MEDICINE

## 2019-01-01 PROCEDURE — 71000014 ZZH RECOVERY PHASE 1 LEVEL 2 FIRST HR: Performed by: OBSTETRICS & GYNECOLOGY

## 2019-01-01 PROCEDURE — 96372 THER/PROPH/DIAG INJ SC/IM: CPT

## 2019-01-01 PROCEDURE — 40000170 ZZH STATISTIC PRE-PROCEDURE ASSESSMENT II: Performed by: OBSTETRICS & GYNECOLOGY

## 2019-01-01 PROCEDURE — 96367 TX/PROPH/DG ADDL SEQ IV INF: CPT | Mod: 59

## 2019-01-01 PROCEDURE — 71260 CT THORAX DX C+: CPT | Performed by: RADIOLOGY

## 2019-01-01 PROCEDURE — 27210732 ZZH ACCESSORY CR1

## 2019-01-01 PROCEDURE — 97530 THERAPEUTIC ACTIVITIES: CPT | Mod: GP | Performed by: PHYSICAL THERAPIST

## 2019-01-01 PROCEDURE — 36000057 ZZH SURGERY LEVEL 3 1ST 30 MIN - UMMC: Performed by: OBSTETRICS & GYNECOLOGY

## 2019-01-01 PROCEDURE — A9585 GADOBUTROL INJECTION: HCPCS | Performed by: OBSTETRICS & GYNECOLOGY

## 2019-01-01 PROCEDURE — 71260 CT THORAX DX C+: CPT

## 2019-01-01 PROCEDURE — 25500064 ZZH RX 255 OP 636: Performed by: OBSTETRICS & GYNECOLOGY

## 2019-01-01 PROCEDURE — 74150 CT ABDOMEN W/O CONTRAST: CPT

## 2019-01-01 PROCEDURE — 36000059 ZZH SURGERY LEVEL 3 EA 15 ADDTL MIN UMMC: Performed by: OBSTETRICS & GYNECOLOGY

## 2019-01-01 PROCEDURE — 27210794 ZZH OR GENERAL SUPPLY STERILE: Performed by: OBSTETRICS & GYNECOLOGY

## 2019-01-01 PROCEDURE — 49083 ABD PARACENTESIS W/IMAGING: CPT | Performed by: INTERNAL MEDICINE

## 2019-01-01 PROCEDURE — 87081 CULTURE SCREEN ONLY: CPT | Performed by: FAMILY MEDICINE

## 2019-01-01 PROCEDURE — 85025 COMPLETE CBC W/AUTO DIFF WBC: CPT | Performed by: EMERGENCY MEDICINE

## 2019-01-01 PROCEDURE — 93970 EXTREMITY STUDY: CPT

## 2019-01-01 PROCEDURE — 25000128 H RX IP 250 OP 636

## 2019-01-01 PROCEDURE — 27210738 ZZH ACCESSORY CR2

## 2019-01-01 DEVICE — CATH PORT POWERPORT CLEARVUE SLIM 6FR 5616000: Type: IMPLANTABLE DEVICE | Site: CHEST  WALL | Status: FUNCTIONAL

## 2019-01-01 RX ORDER — NICOTINE POLACRILEX 4 MG
15-30 LOZENGE BUCCAL
Status: DISCONTINUED | OUTPATIENT
Start: 2019-01-01 | End: 2019-01-01 | Stop reason: HOSPADM

## 2019-01-01 RX ORDER — PROPOFOL 10 MG/ML
INJECTION, EMULSION INTRAVENOUS CONTINUOUS PRN
Status: DISCONTINUED | OUTPATIENT
Start: 2019-01-01 | End: 2019-01-01

## 2019-01-01 RX ORDER — AMOXICILLIN 250 MG
4 CAPSULE ORAL 2 TIMES DAILY
Qty: 250 TABLET | Refills: 3 | Status: SHIPPED | OUTPATIENT
Start: 2019-01-01

## 2019-01-01 RX ORDER — METHYLPREDNISOLONE SODIUM SUCCINATE 125 MG/2ML
125 INJECTION, POWDER, LYOPHILIZED, FOR SOLUTION INTRAMUSCULAR; INTRAVENOUS
Status: CANCELLED
Start: 2019-08-08

## 2019-01-01 RX ORDER — DIPHENHYDRAMINE HYDROCHLORIDE 50 MG/ML
50 INJECTION INTRAMUSCULAR; INTRAVENOUS
Status: CANCELLED
Start: 2019-01-01

## 2019-01-01 RX ORDER — HEPARIN SODIUM,PORCINE 10 UNIT/ML
5 VIAL (ML) INTRAVENOUS
Status: DISCONTINUED | OUTPATIENT
Start: 2019-01-01 | End: 2019-01-01 | Stop reason: HOSPADM

## 2019-01-01 RX ORDER — LIDOCAINE 40 MG/G
CREAM TOPICAL
Status: DISCONTINUED | OUTPATIENT
Start: 2019-01-01 | End: 2019-01-01 | Stop reason: HOSPADM

## 2019-01-01 RX ORDER — POTASSIUM CHLORIDE 29.8 MG/ML
20 INJECTION INTRAVENOUS ONCE
Status: CANCELLED
Start: 2019-01-01 | End: 2019-01-01

## 2019-01-01 RX ORDER — ONDANSETRON 2 MG/ML
INJECTION INTRAMUSCULAR; INTRAVENOUS PRN
Status: DISCONTINUED | OUTPATIENT
Start: 2019-01-01 | End: 2019-01-01

## 2019-01-01 RX ORDER — PROPOFOL 10 MG/ML
INJECTION, EMULSION INTRAVENOUS PRN
Status: DISCONTINUED | OUTPATIENT
Start: 2019-01-01 | End: 2019-01-01

## 2019-01-01 RX ORDER — EPINEPHRINE 1 MG/ML
0.3 INJECTION, SOLUTION INTRAMUSCULAR; SUBCUTANEOUS EVERY 5 MIN PRN
Status: CANCELLED | OUTPATIENT
Start: 2019-01-01

## 2019-01-01 RX ORDER — LIDOCAINE HYDROCHLORIDE 10 MG/ML
20 INJECTION, SOLUTION EPIDURAL; INFILTRATION; INTRACAUDAL; PERINEURAL ONCE
Status: COMPLETED | OUTPATIENT
Start: 2019-01-01 | End: 2019-01-01

## 2019-01-01 RX ORDER — LORAZEPAM 1 MG/1
1 TABLET ORAL EVERY 6 HOURS PRN
Qty: 30 TABLET | Refills: 2 | Status: SHIPPED | OUTPATIENT
Start: 2019-01-01 | End: 2019-01-01

## 2019-01-01 RX ORDER — ALBUTEROL SULFATE 0.83 MG/ML
2.5 SOLUTION RESPIRATORY (INHALATION)
Status: CANCELLED | OUTPATIENT
Start: 2019-01-01

## 2019-01-01 RX ORDER — FLUMAZENIL 0.1 MG/ML
0.2 INJECTION, SOLUTION INTRAVENOUS
Status: DISCONTINUED | OUTPATIENT
Start: 2019-01-01 | End: 2019-01-01 | Stop reason: HOSPADM

## 2019-01-01 RX ORDER — HEPARIN SODIUM (PORCINE) LOCK FLUSH IV SOLN 100 UNIT/ML 100 UNIT/ML
5 SOLUTION INTRAVENOUS
Status: CANCELLED | OUTPATIENT
Start: 2019-01-01

## 2019-01-01 RX ORDER — ACETAMINOPHEN 325 MG/1
975 TABLET ORAL ONCE
Status: COMPLETED | OUTPATIENT
Start: 2019-01-01 | End: 2019-01-01

## 2019-01-01 RX ORDER — LORAZEPAM 2 MG/ML
1 INJECTION INTRAMUSCULAR EVERY 6 HOURS PRN
Status: CANCELLED
Start: 2019-01-01

## 2019-01-01 RX ORDER — LIDOCAINE HYDROCHLORIDE AND EPINEPHRINE 10; 10 MG/ML; UG/ML
INJECTION, SOLUTION INFILTRATION; PERINEURAL PRN
Status: DISCONTINUED | OUTPATIENT
Start: 2019-01-01 | End: 2019-01-01 | Stop reason: HOSPADM

## 2019-01-01 RX ORDER — ALBUTEROL SULFATE 90 UG/1
1-2 AEROSOL, METERED RESPIRATORY (INHALATION)
Status: CANCELLED
Start: 2019-01-01

## 2019-01-01 RX ORDER — SORBITOL SOLUTION 70 %
15 SOLUTION, ORAL MISCELLANEOUS DAILY
Qty: 450 ML | Refills: 3 | Status: ON HOLD | OUTPATIENT
Start: 2019-01-01 | End: 2019-01-01

## 2019-01-01 RX ORDER — IOPAMIDOL 755 MG/ML
86 INJECTION, SOLUTION INTRAVASCULAR ONCE
Status: COMPLETED | OUTPATIENT
Start: 2019-01-01 | End: 2019-01-01

## 2019-01-01 RX ORDER — DEXAMETHASONE SODIUM PHOSPHATE 10 MG/ML
INJECTION, SOLUTION INTRAMUSCULAR; INTRAVENOUS PRN
Status: DISCONTINUED | OUTPATIENT
Start: 2019-01-01 | End: 2019-01-01

## 2019-01-01 RX ORDER — NALOXONE HYDROCHLORIDE 0.4 MG/ML
.1-.4 INJECTION, SOLUTION INTRAMUSCULAR; INTRAVENOUS; SUBCUTANEOUS
Status: DISCONTINUED | OUTPATIENT
Start: 2019-01-01 | End: 2019-01-01 | Stop reason: HOSPADM

## 2019-01-01 RX ORDER — SODIUM CHLORIDE 9 MG/ML
INJECTION, SOLUTION INTRAVENOUS CONTINUOUS
Status: DISCONTINUED | OUTPATIENT
Start: 2019-01-01 | End: 2019-01-01 | Stop reason: HOSPADM

## 2019-01-01 RX ORDER — DIPHENHYDRAMINE HCL 25 MG
25 CAPSULE ORAL ONCE
Status: CANCELLED
Start: 2019-01-01

## 2019-01-01 RX ORDER — LORAZEPAM 1 MG/1
1 TABLET ORAL EVERY 6 HOURS PRN
Status: DISCONTINUED | OUTPATIENT
Start: 2019-01-01 | End: 2019-01-01 | Stop reason: HOSPADM

## 2019-01-01 RX ORDER — NYSTATIN 100000/ML
500000 SUSPENSION, ORAL (FINAL DOSE FORM) ORAL 4 TIMES DAILY
Qty: 473 ML | Refills: 0 | Status: SHIPPED | OUTPATIENT
Start: 2019-01-01 | End: 2019-01-01

## 2019-01-01 RX ORDER — ONDANSETRON 8 MG/1
TABLET, FILM COATED ORAL
Qty: 30 TABLET | Refills: 11 | Status: SHIPPED | OUTPATIENT
Start: 2019-01-01 | End: 2019-01-01

## 2019-01-01 RX ORDER — DIPHENHYDRAMINE HCL 25 MG
25 CAPSULE ORAL ONCE
Status: DISCONTINUED | OUTPATIENT
Start: 2019-01-01 | End: 2019-01-01 | Stop reason: HOSPADM

## 2019-01-01 RX ORDER — MIRTAZAPINE 15 MG/1
15 TABLET, FILM COATED ORAL AT BEDTIME
Status: DISCONTINUED | OUTPATIENT
Start: 2019-01-01 | End: 2019-01-01 | Stop reason: HOSPADM

## 2019-01-01 RX ORDER — LIDOCAINE HYDROCHLORIDE 10 MG/ML
20 INJECTION, SOLUTION EPIDURAL; INFILTRATION; INTRACAUDAL; PERINEURAL ONCE
Status: CANCELLED | OUTPATIENT
Start: 2019-01-01

## 2019-01-01 RX ORDER — POTASSIUM CHLORIDE 1500 MG/1
20 TABLET, EXTENDED RELEASE ORAL DAILY
Qty: 30 TABLET | Refills: 3 | Status: SHIPPED | OUTPATIENT
Start: 2019-01-01

## 2019-01-01 RX ORDER — LORAZEPAM 2 MG/ML
0.5 INJECTION INTRAMUSCULAR ONCE
Status: COMPLETED | OUTPATIENT
Start: 2019-01-01 | End: 2019-01-01

## 2019-01-01 RX ORDER — ALBUMIN (HUMAN) 12.5 G/50ML
12.5 SOLUTION INTRAVENOUS 4 TIMES DAILY PRN
Status: CANCELLED
Start: 2019-01-01

## 2019-01-01 RX ORDER — METHYLPREDNISOLONE SODIUM SUCCINATE 125 MG/2ML
125 INJECTION, POWDER, LYOPHILIZED, FOR SOLUTION INTRAMUSCULAR; INTRAVENOUS
Status: CANCELLED
Start: 2019-01-01

## 2019-01-01 RX ORDER — PHENAZOPYRIDINE HYDROCHLORIDE 200 MG/1
200 TABLET, FILM COATED ORAL ONCE
Status: COMPLETED | OUTPATIENT
Start: 2019-01-01 | End: 2019-01-01

## 2019-01-01 RX ORDER — BISACODYL 10 MG
10 SUPPOSITORY, RECTAL RECTAL DAILY PRN
Status: DISCONTINUED | OUTPATIENT
Start: 2019-01-01 | End: 2019-01-01 | Stop reason: HOSPADM

## 2019-01-01 RX ORDER — ACETAMINOPHEN 325 MG/1
650 TABLET ORAL EVERY 4 HOURS PRN
Status: DISCONTINUED | OUTPATIENT
Start: 2019-01-01 | End: 2019-01-01 | Stop reason: HOSPADM

## 2019-01-01 RX ORDER — NALOXONE HYDROCHLORIDE 0.4 MG/ML
.1-.4 INJECTION, SOLUTION INTRAMUSCULAR; INTRAVENOUS; SUBCUTANEOUS
Status: CANCELLED | OUTPATIENT
Start: 2019-01-01

## 2019-01-01 RX ORDER — HEPARIN SODIUM,PORCINE 10 UNIT/ML
5 VIAL (ML) INTRAVENOUS
Status: CANCELLED | OUTPATIENT
Start: 2019-01-01

## 2019-01-01 RX ORDER — HYDROMORPHONE HYDROCHLORIDE 1 MG/ML
.3-.5 INJECTION, SOLUTION INTRAMUSCULAR; INTRAVENOUS; SUBCUTANEOUS EVERY 10 MIN PRN
Status: DISCONTINUED | OUTPATIENT
Start: 2019-01-01 | End: 2019-01-01 | Stop reason: HOSPADM

## 2019-01-01 RX ORDER — AMOXICILLIN 250 MG
1 CAPSULE ORAL 2 TIMES DAILY
Status: DISCONTINUED | OUTPATIENT
Start: 2019-01-01 | End: 2019-01-01 | Stop reason: HOSPADM

## 2019-01-01 RX ORDER — FENTANYL CITRATE 50 UG/ML
25-50 INJECTION, SOLUTION INTRAMUSCULAR; INTRAVENOUS EVERY 5 MIN PRN
Status: DISCONTINUED | OUTPATIENT
Start: 2019-01-01 | End: 2019-01-01 | Stop reason: HOSPADM

## 2019-01-01 RX ORDER — ACETAMINOPHEN 500 MG
500 TABLET ORAL
Status: CANCELLED | OUTPATIENT
Start: 2019-01-01

## 2019-01-01 RX ORDER — POTASSIUM CHLORIDE 1500 MG/1
20 TABLET, EXTENDED RELEASE ORAL 2 TIMES DAILY
Qty: 2 TABLET | Refills: 0 | Status: SHIPPED | OUTPATIENT
Start: 2019-01-01 | End: 2019-01-01

## 2019-01-01 RX ORDER — FENTANYL CITRATE 50 UG/ML
25-50 INJECTION, SOLUTION INTRAMUSCULAR; INTRAVENOUS
Status: DISCONTINUED | OUTPATIENT
Start: 2019-01-01 | End: 2019-01-01 | Stop reason: HOSPADM

## 2019-01-01 RX ORDER — EPINEPHRINE 0.3 MG/.3ML
0.3 INJECTION SUBCUTANEOUS EVERY 5 MIN PRN
Status: CANCELLED | OUTPATIENT
Start: 2019-01-01

## 2019-01-01 RX ORDER — PROCHLORPERAZINE 25 MG
25 SUPPOSITORY, RECTAL RECTAL EVERY 12 HOURS PRN
Status: DISCONTINUED | OUTPATIENT
Start: 2019-01-01 | End: 2019-01-01 | Stop reason: HOSPADM

## 2019-01-01 RX ORDER — POTASSIUM CHLORIDE 29.8 MG/ML
20 INJECTION INTRAVENOUS
Status: DISCONTINUED | OUTPATIENT
Start: 2019-01-01 | End: 2019-01-01 | Stop reason: HOSPADM

## 2019-01-01 RX ORDER — MAGNESIUM SULFATE HEPTAHYDRATE 40 MG/ML
2 INJECTION, SOLUTION INTRAVENOUS ONCE
Status: COMPLETED | OUTPATIENT
Start: 2019-01-01 | End: 2019-01-01

## 2019-01-01 RX ORDER — HEPARIN SODIUM (PORCINE) LOCK FLUSH IV SOLN 100 UNIT/ML 100 UNIT/ML
5 SOLUTION INTRAVENOUS
Status: DISCONTINUED | OUTPATIENT
Start: 2019-01-01 | End: 2019-01-01 | Stop reason: HOSPADM

## 2019-01-01 RX ORDER — DIPHENHYDRAMINE HYDROCHLORIDE 50 MG/ML
50 INJECTION INTRAMUSCULAR; INTRAVENOUS
Status: CANCELLED
Start: 2019-08-08

## 2019-01-01 RX ORDER — SODIUM CHLORIDE 9 MG/ML
1000 INJECTION, SOLUTION INTRAVENOUS CONTINUOUS PRN
Status: CANCELLED
Start: 2019-01-01

## 2019-01-01 RX ORDER — DEXTROSE MONOHYDRATE 25 G/50ML
25-50 INJECTION, SOLUTION INTRAVENOUS
Status: DISCONTINUED | OUTPATIENT
Start: 2019-01-01 | End: 2019-01-01 | Stop reason: HOSPADM

## 2019-01-01 RX ORDER — MAGNESIUM OXIDE 400 MG/1
400 TABLET ORAL DAILY
Qty: 30 TABLET | Refills: 3 | Status: SHIPPED | OUTPATIENT
Start: 2019-01-01

## 2019-01-01 RX ORDER — MEPERIDINE HYDROCHLORIDE 25 MG/ML
25 INJECTION INTRAMUSCULAR; INTRAVENOUS; SUBCUTANEOUS EVERY 30 MIN PRN
Status: CANCELLED | OUTPATIENT
Start: 2019-01-01

## 2019-01-01 RX ORDER — SODIUM CHLORIDE, SODIUM LACTATE, POTASSIUM CHLORIDE, CALCIUM CHLORIDE 600; 310; 30; 20 MG/100ML; MG/100ML; MG/100ML; MG/100ML
INJECTION, SOLUTION INTRAVENOUS CONTINUOUS
Status: DISCONTINUED | OUTPATIENT
Start: 2019-01-01 | End: 2019-01-01 | Stop reason: HOSPADM

## 2019-01-01 RX ORDER — HEPARIN SODIUM,PORCINE 10 UNIT/ML
5 VIAL (ML) INTRAVENOUS EVERY 24 HOURS
Status: CANCELLED | OUTPATIENT
Start: 2019-01-01

## 2019-01-01 RX ORDER — OXYCODONE HYDROCHLORIDE 5 MG/1
5-10 TABLET ORAL EVERY 6 HOURS PRN
Qty: 10 TABLET | Refills: 0 | Status: SHIPPED | OUTPATIENT
Start: 2019-01-01 | End: 2019-01-01

## 2019-01-01 RX ORDER — DIPHENHYDRAMINE HCL 25 MG
50 CAPSULE ORAL
Status: CANCELLED | OUTPATIENT
Start: 2019-01-01

## 2019-01-01 RX ORDER — HEPARIN SOD,PORCINE/0.9 % NACL 5K/1000 ML
1000-10000 INTRAVENOUS SOLUTION INTRAVENOUS
Status: CANCELLED | OUTPATIENT
Start: 2019-01-01

## 2019-01-01 RX ORDER — ACETAMINOPHEN 325 MG/1
650 TABLET ORAL
Status: DISCONTINUED | OUTPATIENT
Start: 2019-01-01 | End: 2019-01-01 | Stop reason: HOSPADM

## 2019-01-01 RX ORDER — ONDANSETRON 2 MG/ML
4 INJECTION INTRAMUSCULAR; INTRAVENOUS EVERY 6 HOURS PRN
Status: DISCONTINUED | OUTPATIENT
Start: 2019-01-01 | End: 2019-01-01 | Stop reason: HOSPADM

## 2019-01-01 RX ORDER — LIDOCAINE HYDROCHLORIDE 20 MG/ML
INJECTION, SOLUTION INFILTRATION; PERINEURAL PRN
Status: DISCONTINUED | OUTPATIENT
Start: 2019-01-01 | End: 2019-01-01

## 2019-01-01 RX ORDER — ALBUTEROL SULFATE 90 UG/1
1-2 AEROSOL, METERED RESPIRATORY (INHALATION)
Status: CANCELLED
Start: 2019-08-08

## 2019-01-01 RX ORDER — ALBUMIN (HUMAN) 12.5 G/50ML
12.5 SOLUTION INTRAVENOUS 4 TIMES DAILY PRN
Status: DISCONTINUED | OUTPATIENT
Start: 2019-01-01 | End: 2019-01-01 | Stop reason: HOSPADM

## 2019-01-01 RX ORDER — MEPERIDINE HYDROCHLORIDE 25 MG/ML
12.5 INJECTION INTRAMUSCULAR; INTRAVENOUS; SUBCUTANEOUS
Status: DISCONTINUED | OUTPATIENT
Start: 2019-01-01 | End: 2019-01-01 | Stop reason: HOSPADM

## 2019-01-01 RX ORDER — IOPAMIDOL 755 MG/ML
92 INJECTION, SOLUTION INTRAVASCULAR ONCE
Status: COMPLETED | OUTPATIENT
Start: 2019-01-01 | End: 2019-01-01

## 2019-01-01 RX ORDER — MOMETASONE FUROATE MONOHYDRATE 50 UG/1
2 SPRAY, METERED NASAL DAILY PRN
COMMUNITY

## 2019-01-01 RX ORDER — HEPARIN SODIUM (PORCINE) LOCK FLUSH IV SOLN 100 UNIT/ML 100 UNIT/ML
5 SOLUTION INTRAVENOUS ONCE
Status: DISPENSED | OUTPATIENT
Start: 2019-01-01

## 2019-01-01 RX ORDER — SODIUM CHLORIDE 9 MG/ML
INJECTION, SOLUTION INTRAVENOUS CONTINUOUS
Status: DISCONTINUED | OUTPATIENT
Start: 2019-01-01 | End: 2019-01-01

## 2019-01-01 RX ORDER — POTASSIUM CHLORIDE 1500 MG/1
40 TABLET, EXTENDED RELEASE ORAL ONCE
Status: COMPLETED | OUTPATIENT
Start: 2019-01-01 | End: 2019-01-01

## 2019-01-01 RX ORDER — ASPIRIN 81 MG/1
81 TABLET, CHEWABLE ORAL DAILY
Qty: 30 TABLET | Refills: 3 | Status: SHIPPED | OUTPATIENT
Start: 2019-01-01

## 2019-01-01 RX ORDER — POTASSIUM CHLORIDE 1500 MG/1
20 TABLET, EXTENDED RELEASE ORAL DAILY
Qty: 30 TABLET | Refills: 3 | Status: SHIPPED | OUTPATIENT
Start: 2019-01-01 | End: 2019-01-01

## 2019-01-01 RX ORDER — IOPAMIDOL 755 MG/ML
93 INJECTION, SOLUTION INTRAVASCULAR ONCE
Status: COMPLETED | OUTPATIENT
Start: 2019-01-01 | End: 2019-01-01

## 2019-01-01 RX ORDER — PROCHLORPERAZINE MALEATE 5 MG
10 TABLET ORAL EVERY 6 HOURS PRN
Status: DISCONTINUED | OUTPATIENT
Start: 2019-01-01 | End: 2019-01-01

## 2019-01-01 RX ORDER — SODIUM CHLORIDE 9 MG/ML
1000 INJECTION, SOLUTION INTRAVENOUS CONTINUOUS PRN
Status: CANCELLED
Start: 2019-08-08

## 2019-01-01 RX ORDER — PROCHLORPERAZINE MALEATE 5 MG
10 TABLET ORAL EVERY 6 HOURS PRN
Status: DISCONTINUED | OUTPATIENT
Start: 2019-01-01 | End: 2019-01-01 | Stop reason: HOSPADM

## 2019-01-01 RX ORDER — EPINEPHRINE 1 MG/ML
0.3 INJECTION, SOLUTION INTRAMUSCULAR; SUBCUTANEOUS EVERY 5 MIN PRN
Status: CANCELLED | OUTPATIENT
Start: 2019-08-08

## 2019-01-01 RX ORDER — MOMETASONE FUROATE MONOHYDRATE 50 UG/1
2 SPRAY, METERED NASAL DAILY
Status: DISCONTINUED | OUTPATIENT
Start: 2019-01-01 | End: 2019-01-01 | Stop reason: CLARIF

## 2019-01-01 RX ORDER — IOPAMIDOL 755 MG/ML
53 INJECTION, SOLUTION INTRAVASCULAR ONCE
Status: COMPLETED | OUTPATIENT
Start: 2019-01-01 | End: 2019-01-01

## 2019-01-01 RX ORDER — POTASSIUM CHLORIDE 1500 MG/1
40 TABLET, EXTENDED RELEASE ORAL ONCE
Qty: 2 TABLET | Refills: 0 | Status: SHIPPED | OUTPATIENT
Start: 2019-01-01 | End: 2019-01-01

## 2019-01-01 RX ORDER — FENTANYL CITRATE 50 UG/ML
INJECTION, SOLUTION INTRAMUSCULAR; INTRAVENOUS PRN
Status: DISCONTINUED | OUTPATIENT
Start: 2019-01-01 | End: 2019-01-01

## 2019-01-01 RX ORDER — KETOROLAC TROMETHAMINE 30 MG/ML
30 INJECTION, SOLUTION INTRAMUSCULAR; INTRAVENOUS EVERY 6 HOURS PRN
Status: DISCONTINUED | OUTPATIENT
Start: 2019-01-01 | End: 2019-01-01 | Stop reason: HOSPADM

## 2019-01-01 RX ORDER — HYDROCHLOROTHIAZIDE 12.5 MG/1
25 CAPSULE ORAL DAILY
COMMUNITY
End: 2019-01-01

## 2019-01-01 RX ORDER — HEPARIN SODIUM,PORCINE 10 UNIT/ML
5-10 VIAL (ML) INTRAVENOUS EVERY 24 HOURS
Status: DISCONTINUED | OUTPATIENT
Start: 2019-01-01 | End: 2019-01-01 | Stop reason: HOSPADM

## 2019-01-01 RX ORDER — HEPARIN SODIUM (PORCINE) LOCK FLUSH IV SOLN 100 UNIT/ML 100 UNIT/ML
5 SOLUTION INTRAVENOUS EVERY 8 HOURS
Status: DISCONTINUED | OUTPATIENT
Start: 2019-01-01 | End: 2019-01-01 | Stop reason: HOSPADM

## 2019-01-01 RX ORDER — DIPHENHYDRAMINE HCL 25 MG
25 CAPSULE ORAL ONCE
Status: CANCELLED
Start: 2019-08-08

## 2019-01-01 RX ORDER — AMOXICILLIN 250 MG
2 CAPSULE ORAL DAILY
Qty: 100 TABLET | Refills: 3 | Status: ON HOLD | OUTPATIENT
Start: 2019-01-01 | End: 2019-01-01

## 2019-01-01 RX ORDER — ONDANSETRON 2 MG/ML
4 INJECTION INTRAMUSCULAR; INTRAVENOUS EVERY 30 MIN PRN
Status: DISCONTINUED | OUTPATIENT
Start: 2019-01-01 | End: 2019-01-01 | Stop reason: HOSPADM

## 2019-01-01 RX ORDER — PROCHLORPERAZINE MALEATE 10 MG
10 TABLET ORAL EVERY 6 HOURS PRN
Qty: 30 TABLET | Refills: 2 | Status: SHIPPED | OUTPATIENT
Start: 2019-01-01 | End: 2019-01-01

## 2019-01-01 RX ORDER — MECLIZINE HYDROCHLORIDE 25 MG/1
25 TABLET ORAL 3 TIMES DAILY PRN
Qty: 30 TABLET | Refills: 0 | Status: SHIPPED | OUTPATIENT
Start: 2019-01-01 | End: 2019-01-01

## 2019-01-01 RX ORDER — HEPARIN SODIUM (PORCINE) LOCK FLUSH IV SOLN 100 UNIT/ML 100 UNIT/ML
5 SOLUTION INTRAVENOUS ONCE
Status: COMPLETED | OUTPATIENT
Start: 2019-01-01 | End: 2019-01-01

## 2019-01-01 RX ORDER — POTASSIUM CHLORIDE 750 MG/1
20-40 TABLET, EXTENDED RELEASE ORAL
Status: DISCONTINUED | OUTPATIENT
Start: 2019-01-01 | End: 2019-01-01 | Stop reason: HOSPADM

## 2019-01-01 RX ORDER — OXYCODONE HYDROCHLORIDE 5 MG/1
5 TABLET ORAL EVERY 4 HOURS PRN
Status: DISCONTINUED | OUTPATIENT
Start: 2019-01-01 | End: 2019-01-01 | Stop reason: HOSPADM

## 2019-01-01 RX ORDER — LORAZEPAM 1 MG/1
1 TABLET ORAL EVERY 6 HOURS PRN
Qty: 30 TABLET | Refills: 2 | Status: SHIPPED | OUTPATIENT
Start: 2019-01-01

## 2019-01-01 RX ORDER — POTASSIUM CHLORIDE 1.5 G/1.58G
20-40 POWDER, FOR SOLUTION ORAL
Status: DISCONTINUED | OUTPATIENT
Start: 2019-01-01 | End: 2019-01-01 | Stop reason: HOSPADM

## 2019-01-01 RX ORDER — IOPAMIDOL 755 MG/ML
75 INJECTION, SOLUTION INTRAVASCULAR ONCE
Status: COMPLETED | OUTPATIENT
Start: 2019-01-01 | End: 2019-01-01

## 2019-01-01 RX ORDER — EPINEPHRINE 0.3 MG/.3ML
0.3 INJECTION SUBCUTANEOUS EVERY 5 MIN PRN
Status: CANCELLED | OUTPATIENT
Start: 2019-08-08

## 2019-01-01 RX ORDER — POLYETHYLENE GLYCOL 3350 17 G/17G
17 POWDER, FOR SOLUTION ORAL DAILY PRN
Status: DISCONTINUED | OUTPATIENT
Start: 2019-01-01 | End: 2019-01-01 | Stop reason: HOSPADM

## 2019-01-01 RX ORDER — PROCHLORPERAZINE MALEATE 10 MG
10 TABLET ORAL EVERY 6 HOURS PRN
Qty: 30 TABLET | Refills: 2 | Status: SHIPPED | OUTPATIENT
Start: 2019-01-01

## 2019-01-01 RX ORDER — FLUTICASONE PROPIONATE 50 MCG
2 SPRAY, SUSPENSION (ML) NASAL DAILY PRN
Status: DISCONTINUED | OUTPATIENT
Start: 2019-01-01 | End: 2019-01-01 | Stop reason: HOSPADM

## 2019-01-01 RX ORDER — AMOXICILLIN 250 MG
4 CAPSULE ORAL 2 TIMES DAILY
Qty: 250 TABLET | Refills: 3 | Status: SHIPPED | OUTPATIENT
Start: 2019-01-01 | End: 2019-01-01

## 2019-01-01 RX ORDER — HEPARIN SODIUM 10000 [USP'U]/100ML
0-3500 INJECTION, SOLUTION INTRAVENOUS CONTINUOUS
Status: DISCONTINUED | OUTPATIENT
Start: 2019-01-01 | End: 2019-01-01

## 2019-01-01 RX ORDER — HEPARIN SODIUM,PORCINE 10 UNIT/ML
5-10 VIAL (ML) INTRAVENOUS
Status: DISCONTINUED | OUTPATIENT
Start: 2019-01-01 | End: 2019-01-01 | Stop reason: HOSPADM

## 2019-01-01 RX ORDER — OXYCODONE HYDROCHLORIDE 5 MG/1
5 TABLET ORAL
Status: COMPLETED | OUTPATIENT
Start: 2019-01-01 | End: 2019-01-01

## 2019-01-01 RX ORDER — OXYCODONE HYDROCHLORIDE 5 MG/1
5-10 TABLET ORAL
Status: DISCONTINUED | OUTPATIENT
Start: 2019-01-01 | End: 2019-01-01 | Stop reason: HOSPADM

## 2019-01-01 RX ORDER — ACETAMINOPHEN 500 MG
500 TABLET ORAL EVERY 6 HOURS PRN
Status: DISCONTINUED | OUTPATIENT
Start: 2019-01-01 | End: 2019-01-01 | Stop reason: HOSPADM

## 2019-01-01 RX ORDER — LORAZEPAM 2 MG/ML
1 INJECTION INTRAMUSCULAR EVERY 6 HOURS PRN
Status: CANCELLED
Start: 2019-08-08

## 2019-01-01 RX ORDER — PRAVASTATIN SODIUM 10 MG
20 TABLET ORAL EVERY EVENING
Status: DISCONTINUED | OUTPATIENT
Start: 2019-01-01 | End: 2019-01-01 | Stop reason: HOSPADM

## 2019-01-01 RX ORDER — GADOBUTROL 604.72 MG/ML
0.1 INJECTION INTRAVENOUS ONCE
Status: COMPLETED | OUTPATIENT
Start: 2019-01-01 | End: 2019-01-01

## 2019-01-01 RX ORDER — SODIUM CHLORIDE, SODIUM LACTATE, POTASSIUM CHLORIDE, CALCIUM CHLORIDE 600; 310; 30; 20 MG/100ML; MG/100ML; MG/100ML; MG/100ML
INJECTION, SOLUTION INTRAVENOUS CONTINUOUS PRN
Status: DISCONTINUED | OUTPATIENT
Start: 2019-01-01 | End: 2019-01-01

## 2019-01-01 RX ORDER — CLINDAMYCIN PHOSPHATE 900 MG/50ML
900 INJECTION, SOLUTION INTRAVENOUS
Status: COMPLETED | OUTPATIENT
Start: 2019-01-01 | End: 2019-01-01

## 2019-01-01 RX ORDER — POTASSIUM CHLORIDE 14.9 MG/ML
20 INJECTION INTRAVENOUS ONCE
Status: COMPLETED | OUTPATIENT
Start: 2019-01-01 | End: 2019-01-01

## 2019-01-01 RX ORDER — POTASSIUM CHLORIDE 1500 MG/1
20 TABLET, EXTENDED RELEASE ORAL DAILY
Status: DISCONTINUED | OUTPATIENT
Start: 2019-01-01 | End: 2019-01-01 | Stop reason: HOSPADM

## 2019-01-01 RX ORDER — ASPIRIN 81 MG/1
81 TABLET ORAL DAILY
COMMUNITY
End: 2019-01-01

## 2019-01-01 RX ORDER — ONDANSETRON 4 MG/1
4 TABLET, ORALLY DISINTEGRATING ORAL EVERY 6 HOURS PRN
Status: DISCONTINUED | OUTPATIENT
Start: 2019-01-01 | End: 2019-01-01 | Stop reason: HOSPADM

## 2019-01-01 RX ORDER — POTASSIUM CHLORIDE 7.45 MG/ML
10 INJECTION INTRAVENOUS
Status: DISCONTINUED | OUTPATIENT
Start: 2019-01-01 | End: 2019-01-01 | Stop reason: HOSPADM

## 2019-01-01 RX ORDER — HEPARIN SODIUM (PORCINE) LOCK FLUSH IV SOLN 100 UNIT/ML 100 UNIT/ML
500 SOLUTION INTRAVENOUS ONCE
Status: COMPLETED | OUTPATIENT
Start: 2019-01-01 | End: 2019-01-01

## 2019-01-01 RX ORDER — OXYCODONE HYDROCHLORIDE 5 MG/1
5-10 CAPSULE ORAL EVERY 6 HOURS PRN
Qty: 90 CAPSULE | Refills: 0 | Status: CANCELLED | OUTPATIENT
Start: 2019-01-01

## 2019-01-01 RX ORDER — POTASSIUM CHLORIDE 1500 MG/1
40 TABLET, EXTENDED RELEASE ORAL ONCE
Qty: 2 TABLET | Refills: 0 | Status: ON HOLD | OUTPATIENT
Start: 2019-01-01 | End: 2019-01-01

## 2019-01-01 RX ORDER — DRONABINOL 2.5 MG/1
2.5 CAPSULE ORAL 2 TIMES DAILY
Status: DISCONTINUED | OUTPATIENT
Start: 2019-01-01 | End: 2019-01-01

## 2019-01-01 RX ORDER — MIRTAZAPINE 15 MG/1
15 TABLET, FILM COATED ORAL AT BEDTIME
Qty: 60 TABLET | Refills: 1 | Status: SHIPPED | OUTPATIENT
Start: 2019-01-01 | End: 2019-01-01

## 2019-01-01 RX ORDER — ALBUTEROL SULFATE 0.83 MG/ML
2.5 SOLUTION RESPIRATORY (INHALATION)
Status: CANCELLED | OUTPATIENT
Start: 2019-08-08

## 2019-01-01 RX ORDER — MULTIPLE VITAMINS W/ MINERALS TAB 9MG-400MCG
1 TAB ORAL DAILY
Status: DISCONTINUED | OUTPATIENT
Start: 2019-01-01 | End: 2019-01-01 | Stop reason: HOSPADM

## 2019-01-01 RX ORDER — ACETAMINOPHEN 500 MG
1000 TABLET ORAL EVERY 8 HOURS PRN
Status: DISCONTINUED | OUTPATIENT
Start: 2019-01-01 | End: 2019-01-01 | Stop reason: HOSPADM

## 2019-01-01 RX ORDER — ASPIRIN 81 MG/1
81 TABLET, CHEWABLE ORAL DAILY
Status: DISCONTINUED | OUTPATIENT
Start: 2019-01-01 | End: 2019-01-01 | Stop reason: HOSPADM

## 2019-01-01 RX ORDER — OXYCODONE HYDROCHLORIDE 5 MG/1
CAPSULE ORAL
Refills: 0 | COMMUNITY
Start: 2019-01-01 | End: 2019-01-01

## 2019-01-01 RX ORDER — HYDROCODONE BITARTRATE AND ACETAMINOPHEN 5; 325 MG/1; MG/1
1 TABLET ORAL
Status: CANCELLED | OUTPATIENT
Start: 2019-01-01

## 2019-01-01 RX ORDER — HYDROCHLOROTHIAZIDE 25 MG/1
TABLET ORAL
Qty: 90 TABLET | Refills: 0 | OUTPATIENT
Start: 2019-01-01

## 2019-01-01 RX ORDER — MECLIZINE HYDROCHLORIDE 25 MG/1
TABLET ORAL
Refills: 0 | COMMUNITY
Start: 2019-01-01 | End: 2019-01-01

## 2019-01-01 RX ORDER — HEPARIN SOD,PORCINE/0.9 % NACL 5K/1000 ML
1000-10000 INTRAVENOUS SOLUTION INTRAVENOUS
Status: COMPLETED | OUTPATIENT
Start: 2019-01-01 | End: 2019-01-01

## 2019-01-01 RX ORDER — ACETAMINOPHEN 325 MG/1
650 TABLET ORAL
Status: CANCELLED | OUTPATIENT
Start: 2019-01-01

## 2019-01-01 RX ORDER — CETIRIZINE HYDROCHLORIDE 5 MG/1
10 TABLET ORAL DAILY
Status: DISCONTINUED | OUTPATIENT
Start: 2019-01-01 | End: 2019-01-01 | Stop reason: HOSPADM

## 2019-01-01 RX ORDER — POLYETHYLENE GLYCOL 3350 17 G/17G
POWDER, FOR SOLUTION ORAL
Qty: 527 G | Refills: 0 | Status: SHIPPED | OUTPATIENT
Start: 2019-01-01 | End: 2019-01-01

## 2019-01-01 RX ORDER — AMOXICILLIN 250 MG
2 CAPSULE ORAL 2 TIMES DAILY
Status: DISCONTINUED | OUTPATIENT
Start: 2019-01-01 | End: 2019-01-01 | Stop reason: HOSPADM

## 2019-01-01 RX ORDER — ONDANSETRON 4 MG/1
4 TABLET, FILM COATED ORAL EVERY 6 HOURS PRN
Status: DISCONTINUED | OUTPATIENT
Start: 2019-01-01 | End: 2019-01-01 | Stop reason: HOSPADM

## 2019-01-01 RX ORDER — OXYCODONE HYDROCHLORIDE 5 MG/1
5-10 TABLET ORAL EVERY 6 HOURS PRN
Status: DISCONTINUED | OUTPATIENT
Start: 2019-01-01 | End: 2019-01-01 | Stop reason: HOSPADM

## 2019-01-01 RX ORDER — ONDANSETRON 4 MG/1
4 TABLET, ORALLY DISINTEGRATING ORAL EVERY 30 MIN PRN
Status: DISCONTINUED | OUTPATIENT
Start: 2019-01-01 | End: 2019-01-01 | Stop reason: HOSPADM

## 2019-01-01 RX ORDER — MAGNESIUM OXIDE 400 MG/1
400 TABLET ORAL DAILY
Status: DISCONTINUED | OUTPATIENT
Start: 2019-01-01 | End: 2019-01-01 | Stop reason: HOSPADM

## 2019-01-01 RX ORDER — MIRTAZAPINE 15 MG/1
15 TABLET, FILM COATED ORAL AT BEDTIME
COMMUNITY

## 2019-01-01 RX ORDER — OXYCODONE HYDROCHLORIDE 5 MG/1
5 CAPSULE ORAL EVERY 4 HOURS PRN
COMMUNITY
End: 2019-01-01

## 2019-01-01 RX ORDER — ACETAMINOPHEN 500 MG
500-1000 TABLET ORAL EVERY 6 HOURS PRN
COMMUNITY
End: 2019-01-01

## 2019-01-01 RX ORDER — TRAZODONE HYDROCHLORIDE 50 MG/1
25-50 TABLET, FILM COATED ORAL AT BEDTIME
Qty: 30 TABLET | Refills: 1 | Status: SHIPPED | OUTPATIENT
Start: 2019-01-01 | End: 2019-01-01

## 2019-01-01 RX ORDER — MEPERIDINE HYDROCHLORIDE 50 MG/ML
12.5 INJECTION INTRAMUSCULAR; INTRAVENOUS; SUBCUTANEOUS
Status: DISCONTINUED | OUTPATIENT
Start: 2019-01-01 | End: 2019-01-01 | Stop reason: HOSPADM

## 2019-01-01 RX ORDER — POTASSIUM CHLORIDE 29.8 MG/ML
20 INJECTION INTRAVENOUS ONCE
Status: CANCELLED
Start: 2019-01-01

## 2019-01-01 RX ORDER — ACETAMINOPHEN 500 MG
1000 TABLET ORAL EVERY 8 HOURS PRN
Qty: 180 TABLET | Refills: 3 | Status: SHIPPED | OUTPATIENT
Start: 2019-01-01

## 2019-01-01 RX ORDER — TRAZODONE HYDROCHLORIDE 50 MG/1
TABLET, FILM COATED ORAL
COMMUNITY
Start: 2019-01-01 | End: 2019-01-01

## 2019-01-01 RX ORDER — CETIRIZINE HYDROCHLORIDE 10 MG/1
10 TABLET ORAL DAILY
COMMUNITY

## 2019-01-01 RX ORDER — NALOXONE HYDROCHLORIDE 0.4 MG/ML
.1-.4 INJECTION, SOLUTION INTRAMUSCULAR; INTRAVENOUS; SUBCUTANEOUS
Status: CANCELLED | OUTPATIENT
Start: 2019-08-08

## 2019-01-01 RX ORDER — MEPERIDINE HYDROCHLORIDE 25 MG/ML
25 INJECTION INTRAMUSCULAR; INTRAVENOUS; SUBCUTANEOUS EVERY 30 MIN PRN
Status: CANCELLED | OUTPATIENT
Start: 2019-08-08

## 2019-01-01 RX ORDER — DIPHENHYDRAMINE HYDROCHLORIDE AND LIDOCAINE HYDROCHLORIDE AND ALUMINUM HYDROXIDE AND MAGNESIUM HYDRO
5-10 KIT EVERY 6 HOURS PRN
Qty: 237 ML | Refills: 0 | Status: SHIPPED | OUTPATIENT
Start: 2019-01-01

## 2019-01-01 RX ORDER — IODIXANOL 320 MG/ML
50 INJECTION, SOLUTION INTRAVASCULAR ONCE
Status: COMPLETED | OUTPATIENT
Start: 2019-01-01 | End: 2019-01-01

## 2019-01-01 RX ORDER — POTASSIUM CL/LIDO/0.9 % NACL 10MEQ/0.1L
10 INTRAVENOUS SOLUTION, PIGGYBACK (ML) INTRAVENOUS
Status: DISCONTINUED | OUTPATIENT
Start: 2019-01-01 | End: 2019-01-01 | Stop reason: HOSPADM

## 2019-01-01 RX ORDER — OXYCODONE HYDROCHLORIDE 5 MG/1
5-10 TABLET ORAL EVERY 6 HOURS PRN
COMMUNITY

## 2019-01-01 RX ORDER — OXYCODONE HYDROCHLORIDE 5 MG/1
5-10 CAPSULE ORAL EVERY 6 HOURS PRN
Qty: 90 CAPSULE | Refills: 0 | Status: SHIPPED | OUTPATIENT
Start: 2019-01-01 | End: 2019-01-01

## 2019-01-01 RX ORDER — DIPHENHYDRAMINE HYDROCHLORIDE AND LIDOCAINE HYDROCHLORIDE AND ALUMINUM HYDROXIDE AND MAGNESIUM HYDRO
5-10 KIT EVERY 6 HOURS PRN
Qty: 237 ML | Refills: 0 | Status: SHIPPED | OUTPATIENT
Start: 2019-01-01 | End: 2019-01-01

## 2019-01-01 RX ORDER — IOPAMIDOL 755 MG/ML
91 INJECTION, SOLUTION INTRAVASCULAR ONCE
Status: COMPLETED | OUTPATIENT
Start: 2019-01-01 | End: 2019-01-01

## 2019-01-01 RX ORDER — HEPARIN SODIUM,PORCINE 10 UNIT/ML
5-15 VIAL (ML) INTRAVENOUS EVERY 24 HOURS
Status: DISCONTINUED | OUTPATIENT
Start: 2019-01-01 | End: 2019-01-01 | Stop reason: HOSPADM

## 2019-01-01 RX ORDER — GABAPENTIN 300 MG/1
300 CAPSULE ORAL ONCE
Status: COMPLETED | OUTPATIENT
Start: 2019-01-01 | End: 2019-01-01

## 2019-01-01 RX ORDER — MAGNESIUM OXIDE 400 MG/1
400 TABLET ORAL DAILY
Qty: 30 TABLET | Refills: 3 | OUTPATIENT
Start: 2019-01-01

## 2019-01-01 RX ORDER — AMOXICILLIN 250 MG
4 CAPSULE ORAL 2 TIMES DAILY
Status: DISCONTINUED | OUTPATIENT
Start: 2019-01-01 | End: 2019-01-01 | Stop reason: HOSPADM

## 2019-01-01 RX ADMIN — LIDOCAINE HYDROCHLORIDE 5 ML: 10 INJECTION, SOLUTION EPIDURAL; INFILTRATION; INTRACAUDAL; PERINEURAL at 15:48

## 2019-01-01 RX ADMIN — SODIUM CHLORIDE: 9 INJECTION, SOLUTION INTRAVENOUS at 10:34

## 2019-01-01 RX ADMIN — IOPAMIDOL 91 ML: 755 INJECTION, SOLUTION INTRAVASCULAR at 11:23

## 2019-01-01 RX ADMIN — PACLITAXEL 138 MG: 6 INJECTION, SOLUTION INTRAVENOUS at 14:16

## 2019-01-01 RX ADMIN — SODIUM CHLORIDE 250 ML: 9 INJECTION, SOLUTION INTRAVENOUS at 13:24

## 2019-01-01 RX ADMIN — Medication 5 ML: at 12:20

## 2019-01-01 RX ADMIN — ACETAMINOPHEN 1000 MG: 500 TABLET, FILM COATED ORAL at 21:11

## 2019-01-01 RX ADMIN — LIDOCAINE HYDROCHLORIDE 8 ML: 10 INJECTION, SOLUTION INFILTRATION; PERINEURAL at 15:30

## 2019-01-01 RX ADMIN — ALBUMIN HUMAN 12.5 G: 0.25 SOLUTION INTRAVENOUS at 13:10

## 2019-01-01 RX ADMIN — SODIUM CHLORIDE 1000 ML: 9 INJECTION, SOLUTION INTRAVENOUS at 13:43

## 2019-01-01 RX ADMIN — POTASSIUM CHLORIDE 20 MEQ: 750 TABLET, EXTENDED RELEASE ORAL at 13:31

## 2019-01-01 RX ADMIN — DEXAMETHASONE SODIUM PHOSPHATE 12 MG: 10 INJECTION, SOLUTION INTRAMUSCULAR; INTRAVENOUS at 13:17

## 2019-01-01 RX ADMIN — SODIUM CHLORIDE 1000 ML: 9 INJECTION, SOLUTION INTRAVENOUS at 12:55

## 2019-01-01 RX ADMIN — Medication 500 UNITS: at 10:20

## 2019-01-01 RX ADMIN — CLINDAMYCIN PHOSPHATE 900 MG: 900 INJECTION, SOLUTION INTRAVENOUS at 07:55

## 2019-01-01 RX ADMIN — ALBUMIN HUMAN 12.5 G: 0.25 SOLUTION INTRAVENOUS at 15:08

## 2019-01-01 RX ADMIN — POTASSIUM CHLORIDE 20 MEQ: 200 INJECTION, SOLUTION INTRAVENOUS at 12:56

## 2019-01-01 RX ADMIN — ENOXAPARIN SODIUM 60 MG: 60 INJECTION SUBCUTANEOUS at 22:21

## 2019-01-01 RX ADMIN — MAGNESIUM OXIDE TAB 400 MG (241.3 MG ELEMENTAL MG) 400 MG: 400 (241.3 MG) TAB at 08:15

## 2019-01-01 RX ADMIN — LIDOCAINE HYDROCHLORIDE 10 ML: 10 INJECTION, SOLUTION EPIDURAL; INFILTRATION; INTRACAUDAL; PERINEURAL at 08:22

## 2019-01-01 RX ADMIN — IOPAMIDOL 75 ML: 755 INJECTION, SOLUTION INTRAVENOUS at 11:51

## 2019-01-01 RX ADMIN — HEPARIN 5 ML: 100 SYRINGE at 09:11

## 2019-01-01 RX ADMIN — POTASSIUM CHLORIDE 20 MEQ: 1500 TABLET, EXTENDED RELEASE ORAL at 08:50

## 2019-01-01 RX ADMIN — POTASSIUM CHLORIDE 20 MEQ: 1500 TABLET, EXTENDED RELEASE ORAL at 09:11

## 2019-01-01 RX ADMIN — CETIRIZINE HYDROCHLORIDE 10 MG: 10 TABLET, FILM COATED ORAL at 19:49

## 2019-01-01 RX ADMIN — MIDAZOLAM 1 MG: 1 INJECTION INTRAMUSCULAR; INTRAVENOUS at 08:30

## 2019-01-01 RX ADMIN — FENTANYL CITRATE 100 MCG: 50 INJECTION INTRAMUSCULAR; INTRAVENOUS at 15:48

## 2019-01-01 RX ADMIN — ENOXAPARIN SODIUM 80 MG: 80 INJECTION SUBCUTANEOUS at 14:30

## 2019-01-01 RX ADMIN — CETIRIZINE HYDROCHLORIDE 10 MG: 10 TABLET, FILM COATED ORAL at 08:50

## 2019-01-01 RX ADMIN — SODIUM CHLORIDE, SODIUM LACTATE, POTASSIUM CHLORIDE, CALCIUM CHLORIDE: 600; 310; 30; 20 INJECTION, SOLUTION INTRAVENOUS at 07:35

## 2019-01-01 RX ADMIN — SENNOSIDES AND DOCUSATE SODIUM 2 TABLET: 8.6; 5 TABLET ORAL at 08:29

## 2019-01-01 RX ADMIN — POTASSIUM CHLORIDE 20 MEQ: 1500 TABLET, EXTENDED RELEASE ORAL at 08:15

## 2019-01-01 RX ADMIN — PRAVASTATIN SODIUM 20 MG: 20 TABLET ORAL at 19:46

## 2019-01-01 RX ADMIN — FAMOTIDINE 40 MG: 10 INJECTION, SOLUTION INTRAVENOUS at 11:32

## 2019-01-01 RX ADMIN — ENOXAPARIN SODIUM 70 MG: 80 INJECTION SUBCUTANEOUS at 08:06

## 2019-01-01 RX ADMIN — SODIUM CHLORIDE, POTASSIUM CHLORIDE, SODIUM LACTATE AND CALCIUM CHLORIDE: 600; 310; 30; 20 INJECTION, SOLUTION INTRAVENOUS at 16:48

## 2019-01-01 RX ADMIN — PACLITAXEL 138 MG: 6 INJECTION, SOLUTION INTRAVENOUS at 13:48

## 2019-01-01 RX ADMIN — SODIUM CHLORIDE: 9 INJECTION, SOLUTION INTRAVENOUS at 22:05

## 2019-01-01 RX ADMIN — SODIUM CHLORIDE 1000 ML: 9 INJECTION, SOLUTION INTRAVENOUS at 14:42

## 2019-01-01 RX ADMIN — FENTANYL CITRATE 50 MCG: 50 INJECTION, SOLUTION INTRAMUSCULAR; INTRAVENOUS at 15:20

## 2019-01-01 RX ADMIN — HEPARIN SODIUM (PORCINE) LOCK FLUSH IV SOLN 100 UNIT/ML 5 ML: 100 SOLUTION at 16:25

## 2019-01-01 RX ADMIN — SODIUM CHLORIDE: 9 INJECTION, SOLUTION INTRAVENOUS at 09:23

## 2019-01-01 RX ADMIN — ENOXAPARIN SODIUM 80 MG: 80 INJECTION SUBCUTANEOUS at 00:56

## 2019-01-01 RX ADMIN — HEPARIN, PORCINE (PF) 10 UNIT/ML INTRAVENOUS SYRINGE 5 ML: at 10:27

## 2019-01-01 RX ADMIN — DIPHENHYDRAMINE HYDROCHLORIDE 25 MG: 50 INJECTION INTRAMUSCULAR; INTRAVENOUS at 13:03

## 2019-01-01 RX ADMIN — DEXAMETHASONE SODIUM PHOSPHATE 12 MG: 10 INJECTION, SOLUTION INTRAMUSCULAR; INTRAVENOUS at 13:24

## 2019-01-01 RX ADMIN — ROCURONIUM BROMIDE 50 MG: 10 INJECTION INTRAVENOUS at 16:58

## 2019-01-01 RX ADMIN — HEPARIN SODIUM (PORCINE) LOCK FLUSH IV SOLN 100 UNIT/ML 5 ML: 100 SOLUTION at 14:47

## 2019-01-01 RX ADMIN — SODIUM CHLORIDE: 9 INJECTION, SOLUTION INTRAVENOUS at 12:27

## 2019-01-01 RX ADMIN — CETIRIZINE HYDROCHLORIDE 10 MG: 10 TABLET, FILM COATED ORAL at 08:15

## 2019-01-01 RX ADMIN — SODIUM CHLORIDE, SODIUM LACTATE, POTASSIUM CHLORIDE, CALCIUM CHLORIDE: 600; 310; 30; 20 INJECTION, SOLUTION INTRAVENOUS at 08:57

## 2019-01-01 RX ADMIN — KETOROLAC TROMETHAMINE 30 MG: 30 INJECTION INTRAMUSCULAR; INTRAVENOUS at 18:32

## 2019-01-01 RX ADMIN — PROPOFOL 100 MCG/KG/MIN: 10 INJECTION, EMULSION INTRAVENOUS at 07:52

## 2019-01-01 RX ADMIN — DEXAMETHASONE SODIUM PHOSPHATE 12 MG: 10 INJECTION, SOLUTION INTRAMUSCULAR; INTRAVENOUS at 12:44

## 2019-01-01 RX ADMIN — POTASSIUM CHLORIDE 40 MEQ: 1500 TABLET, EXTENDED RELEASE ORAL at 12:55

## 2019-01-01 RX ADMIN — MIDAZOLAM 2 MG: 1 INJECTION INTRAMUSCULAR; INTRAVENOUS at 16:48

## 2019-01-01 RX ADMIN — POTASSIUM CHLORIDE 20 MEQ: 1500 TABLET, EXTENDED RELEASE ORAL at 21:20

## 2019-01-01 RX ADMIN — ASPIRIN 81 MG CHEWABLE TABLET 81 MG: 81 TABLET CHEWABLE at 11:38

## 2019-01-01 RX ADMIN — PACLITAXEL 138 MG: 6 INJECTION, SOLUTION INTRAVENOUS at 12:18

## 2019-01-01 RX ADMIN — ASPIRIN 81 MG CHEWABLE TABLET 81 MG: 81 TABLET CHEWABLE at 08:49

## 2019-01-01 RX ADMIN — POTASSIUM CHLORIDE 40 MEQ: 750 TABLET, EXTENDED RELEASE ORAL at 09:37

## 2019-01-01 RX ADMIN — FENTANYL CITRATE 25 MCG: 50 INJECTION, SOLUTION INTRAMUSCULAR; INTRAVENOUS at 15:55

## 2019-01-01 RX ADMIN — SODIUM CHLORIDE: 9 INJECTION, SOLUTION INTRAVENOUS at 10:59

## 2019-01-01 RX ADMIN — MULTIPLE VITAMINS W/ MINERALS TAB 1 TABLET: TAB at 08:49

## 2019-01-01 RX ADMIN — GABAPENTIN 300 MG: 300 CAPSULE ORAL at 06:57

## 2019-01-01 RX ADMIN — SODIUM CHLORIDE 250 ML: 9 INJECTION, SOLUTION INTRAVENOUS at 12:20

## 2019-01-01 RX ADMIN — Medication 5 ML: at 11:51

## 2019-01-01 RX ADMIN — ALBUMIN HUMAN 12.5 G: 0.25 SOLUTION INTRAVENOUS at 12:34

## 2019-01-01 RX ADMIN — PRAVASTATIN SODIUM 20 MG: 10 TABLET ORAL at 19:53

## 2019-01-01 RX ADMIN — PROPOFOL 30 MG: 10 INJECTION, EMULSION INTRAVENOUS at 07:54

## 2019-01-01 RX ADMIN — IOPAMIDOL 93 ML: 755 INJECTION, SOLUTION INTRAVASCULAR at 11:34

## 2019-01-01 RX ADMIN — SODIUM CHLORIDE: 9 INJECTION, SOLUTION INTRAVENOUS at 19:50

## 2019-01-01 RX ADMIN — LIDOCAINE HYDROCHLORIDE 10 ML: 10 INJECTION, SOLUTION EPIDURAL; INFILTRATION; INTRACAUDAL; PERINEURAL at 10:26

## 2019-01-01 RX ADMIN — FENTANYL CITRATE 25 MCG: 50 INJECTION, SOLUTION INTRAMUSCULAR; INTRAVENOUS at 15:34

## 2019-01-01 RX ADMIN — MIDAZOLAM 2 MG: 1 INJECTION INTRAMUSCULAR; INTRAVENOUS at 15:48

## 2019-01-01 RX ADMIN — MULTIPLE VITAMINS W/ MINERALS TAB 1 TABLET: TAB at 08:19

## 2019-01-01 RX ADMIN — ENOXAPARIN SODIUM 70 MG: 80 INJECTION SUBCUTANEOUS at 13:51

## 2019-01-01 RX ADMIN — DRONABINOL 2.5 MG: 2.5 CAPSULE ORAL at 12:48

## 2019-01-01 RX ADMIN — ACETAMINOPHEN 1000 MG: 500 TABLET, FILM COATED ORAL at 21:18

## 2019-01-01 RX ADMIN — SODIUM CHLORIDE 1000 ML: 9 INJECTION, SOLUTION INTRAVENOUS at 13:29

## 2019-01-01 RX ADMIN — HEPARIN SODIUM 950 UNITS/HR: 10000 INJECTION, SOLUTION INTRAVENOUS at 00:19

## 2019-01-01 RX ADMIN — LIDOCAINE HYDROCHLORIDE 10 ML: 10 INJECTION, SOLUTION EPIDURAL; INFILTRATION; INTRACAUDAL; PERINEURAL at 14:49

## 2019-01-01 RX ADMIN — HEPARIN SODIUM (PORCINE) LOCK FLUSH IV SOLN 100 UNIT/ML 5 ML: 100 SOLUTION at 15:13

## 2019-01-01 RX ADMIN — MAGNESIUM OXIDE TAB 400 MG (241.3 MG ELEMENTAL MG) 400 MG: 400 (241.3 MG) TAB at 08:50

## 2019-01-01 RX ADMIN — GLUCAGON HYDROCHLORIDE 0.5 MG: 1 INJECTION, POWDER, FOR SOLUTION INTRAMUSCULAR; INTRAVENOUS; SUBCUTANEOUS at 15:46

## 2019-01-01 RX ADMIN — DIPHENHYDRAMINE HYDROCHLORIDE 25 MG: 50 INJECTION INTRAMUSCULAR; INTRAVENOUS at 11:32

## 2019-01-01 RX ADMIN — ALBUMIN HUMAN 12.5 G: 0.25 SOLUTION INTRAVENOUS at 13:21

## 2019-01-01 RX ADMIN — LIDOCAINE HYDROCHLORIDE 10 ML: 10 INJECTION, SOLUTION EPIDURAL; INFILTRATION; INTRACAUDAL; PERINEURAL at 08:08

## 2019-01-01 RX ADMIN — MAGNESIUM OXIDE TAB 400 MG (241.3 MG ELEMENTAL MG) 400 MG: 400 (241.3 MG) TAB at 19:49

## 2019-01-01 RX ADMIN — SODIUM CHLORIDE 1000 ML: 9 INJECTION, SOLUTION INTRAVENOUS at 12:09

## 2019-01-01 RX ADMIN — LORAZEPAM 0.5 MG: 2 INJECTION INTRAMUSCULAR; INTRAVENOUS at 18:12

## 2019-01-01 RX ADMIN — HEPARIN, PORCINE (PF) 10 UNIT/ML INTRAVENOUS SYRINGE 5 ML: at 10:42

## 2019-01-01 RX ADMIN — LIDOCAINE HYDROCHLORIDE 10 ML: 10 INJECTION, SOLUTION EPIDURAL; INFILTRATION; INTRACAUDAL at 09:58

## 2019-01-01 RX ADMIN — FENTANYL CITRATE 100 MCG: 50 INJECTION, SOLUTION INTRAMUSCULAR; INTRAVENOUS at 16:58

## 2019-01-01 RX ADMIN — PROPOFOL 30 MG: 10 INJECTION, EMULSION INTRAVENOUS at 09:41

## 2019-01-01 RX ADMIN — SODIUM CHLORIDE 250 ML: 9 INJECTION, SOLUTION INTRAVENOUS at 11:32

## 2019-01-01 RX ADMIN — ENOXAPARIN SODIUM 60 MG: 60 INJECTION SUBCUTANEOUS at 19:52

## 2019-01-01 RX ADMIN — HEPARIN, PORCINE (PF) 10 UNIT/ML INTRAVENOUS SYRINGE 5 ML: at 07:42

## 2019-01-01 RX ADMIN — SENNOSIDES AND DOCUSATE SODIUM 2 TABLET: 8.6; 5 TABLET ORAL at 20:08

## 2019-01-01 RX ADMIN — PRAVASTATIN SODIUM 20 MG: 20 TABLET ORAL at 19:49

## 2019-01-01 RX ADMIN — HEPARIN 5 ML: 100 SYRINGE at 11:26

## 2019-01-01 RX ADMIN — MIRTAZAPINE 15 MG: 15 TABLET, FILM COATED ORAL at 21:11

## 2019-01-01 RX ADMIN — LIDOCAINE HYDROCHLORIDE 40 MG: 20 INJECTION, SOLUTION INFILTRATION; PERINEURAL at 09:41

## 2019-01-01 RX ADMIN — MIDAZOLAM HYDROCHLORIDE 0.5 MG: 2 INJECTION, SOLUTION INTRAMUSCULAR; INTRAVENOUS at 15:20

## 2019-01-01 RX ADMIN — LIDOCAINE HYDROCHLORIDE 4 ML: 10 INJECTION, SOLUTION EPIDURAL; INFILTRATION; INTRACAUDAL; PERINEURAL at 08:52

## 2019-01-01 RX ADMIN — IOPAMIDOL 75 ML: 755 INJECTION, SOLUTION INTRAVENOUS at 15:11

## 2019-01-01 RX ADMIN — MIRTAZAPINE 15 MG: 15 TABLET, FILM COATED ORAL at 21:19

## 2019-01-01 RX ADMIN — HEPARIN 5 ML: 100 SYRINGE at 16:52

## 2019-01-01 RX ADMIN — SODIUM CHLORIDE 250 ML: 9 INJECTION, SOLUTION INTRAVENOUS at 12:55

## 2019-01-01 RX ADMIN — PROPOFOL 100 MCG/KG/MIN: 10 INJECTION, EMULSION INTRAVENOUS at 09:42

## 2019-01-01 RX ADMIN — LIDOCAINE HYDROCHLORIDE 100 MG: 20 INJECTION, SOLUTION INFILTRATION; PERINEURAL at 16:58

## 2019-01-01 RX ADMIN — LIDOCAINE HYDROCHLORIDE 10 ML: 10 INJECTION, SOLUTION EPIDURAL; INFILTRATION; INTRACAUDAL; PERINEURAL at 12:33

## 2019-01-01 RX ADMIN — POTASSIUM CHLORIDE 20 MEQ: 750 TABLET, EXTENDED RELEASE ORAL at 11:47

## 2019-01-01 RX ADMIN — SENNOSIDES AND DOCUSATE SODIUM 2 TABLET: 8.6; 5 TABLET ORAL at 20:07

## 2019-01-01 RX ADMIN — MULTIPLE VITAMINS W/ MINERALS TAB 1 TABLET: TAB at 08:15

## 2019-01-01 RX ADMIN — SENNOSIDES AND DOCUSATE SODIUM 2 TABLET: 8.6; 5 TABLET ORAL at 09:11

## 2019-01-01 RX ADMIN — SENNOSIDES AND DOCUSATE SODIUM 2 TABLET: 8.6; 5 TABLET ORAL at 08:19

## 2019-01-01 RX ADMIN — LIDOCAINE HYDROCHLORIDE 10 ML: 10 INJECTION, SOLUTION EPIDURAL; INFILTRATION; INTRACAUDAL; PERINEURAL at 10:15

## 2019-01-01 RX ADMIN — POTASSIUM CHLORIDE 40 MEQ: 1500 TABLET, EXTENDED RELEASE ORAL at 11:52

## 2019-01-01 RX ADMIN — SENNOSIDES AND DOCUSATE SODIUM 2 TABLET: 8.6; 5 TABLET ORAL at 19:53

## 2019-01-01 RX ADMIN — ACETAMINOPHEN 1000 MG: 500 TABLET, FILM COATED ORAL at 10:24

## 2019-01-01 RX ADMIN — MIDAZOLAM HYDROCHLORIDE 0.5 MG: 2 INJECTION, SOLUTION INTRAMUSCULAR; INTRAVENOUS at 16:31

## 2019-01-01 RX ADMIN — FENTANYL CITRATE 25 MCG: 50 INJECTION INTRAMUSCULAR; INTRAVENOUS at 08:30

## 2019-01-01 RX ADMIN — DEXAMETHASONE SODIUM PHOSPHATE 12 MG: 10 INJECTION, SOLUTION INTRAMUSCULAR; INTRAVENOUS at 11:54

## 2019-01-01 RX ADMIN — MIDAZOLAM HYDROCHLORIDE 0.5 MG: 2 INJECTION, SOLUTION INTRAMUSCULAR; INTRAVENOUS at 15:34

## 2019-01-01 RX ADMIN — SENNOSIDES AND DOCUSATE SODIUM 2 TABLET: 8.6; 5 TABLET ORAL at 08:49

## 2019-01-01 RX ADMIN — SODIUM CHLORIDE: 9 INJECTION, SOLUTION INTRAVENOUS at 21:22

## 2019-01-01 RX ADMIN — PRAVASTATIN SODIUM 20 MG: 20 TABLET ORAL at 19:37

## 2019-01-01 RX ADMIN — SENNOSIDES AND DOCUSATE SODIUM 2 TABLET: 8.6; 5 TABLET ORAL at 19:49

## 2019-01-01 RX ADMIN — CETIRIZINE HYDROCHLORIDE 10 MG: 10 TABLET, FILM COATED ORAL at 08:18

## 2019-01-01 RX ADMIN — OXYCODONE HYDROCHLORIDE 5 MG: 5 TABLET ORAL at 19:19

## 2019-01-01 RX ADMIN — POTASSIUM CHLORIDE 20 MEQ: 1500 TABLET, EXTENDED RELEASE ORAL at 08:19

## 2019-01-01 RX ADMIN — CETIRIZINE HYDROCHLORIDE 10 MG: 10 TABLET, FILM COATED ORAL at 09:11

## 2019-01-01 RX ADMIN — MIDAZOLAM HYDROCHLORIDE 0.5 MG: 2 INJECTION, SOLUTION INTRAMUSCULAR; INTRAVENOUS at 15:55

## 2019-01-01 RX ADMIN — ALBUMIN HUMAN 12.5 G: 0.25 SOLUTION INTRAVENOUS at 13:30

## 2019-01-01 RX ADMIN — HEPARIN SODIUM 1100 UNITS/HR: 10000 INJECTION, SOLUTION INTRAVENOUS at 15:21

## 2019-01-01 RX ADMIN — SENNOSIDES AND DOCUSATE SODIUM 2 TABLET: 8.6; 5 TABLET ORAL at 08:44

## 2019-01-01 RX ADMIN — IODIXANOL 30 ML: 320 INJECTION, SOLUTION INTRAVASCULAR at 08:54

## 2019-01-01 RX ADMIN — DIPHENHYDRAMINE HYDROCHLORIDE 25 MG: 50 INJECTION INTRAMUSCULAR; INTRAVENOUS at 12:27

## 2019-01-01 RX ADMIN — ENOXAPARIN SODIUM 80 MG: 80 INJECTION SUBCUTANEOUS at 01:06

## 2019-01-01 RX ADMIN — SUGAMMADEX 140 MG: 100 INJECTION, SOLUTION INTRAVENOUS at 18:05

## 2019-01-01 RX ADMIN — PRAVASTATIN SODIUM 20 MG: 20 TABLET ORAL at 20:09

## 2019-01-01 RX ADMIN — HEPARIN 5 ML: 100 SYRINGE at 13:17

## 2019-01-01 RX ADMIN — ENOXAPARIN SODIUM 80 MG: 80 INJECTION SUBCUTANEOUS at 12:57

## 2019-01-01 RX ADMIN — POTASSIUM CHLORIDE 40 MEQ: 750 TABLET, EXTENDED RELEASE ORAL at 08:29

## 2019-01-01 RX ADMIN — ALBUMIN HUMAN 12.5 G: 0.25 SOLUTION INTRAVENOUS at 14:50

## 2019-01-01 RX ADMIN — GADOBUTROL 6 ML: 604.72 INJECTION INTRAVENOUS at 07:26

## 2019-01-01 RX ADMIN — SODIUM CHLORIDE, PRESERVATIVE FREE: 5 INJECTION INTRAVENOUS at 13:56

## 2019-01-01 RX ADMIN — FAMOTIDINE 40 MG: 10 INJECTION, SOLUTION INTRAVENOUS at 13:24

## 2019-01-01 RX ADMIN — ALBUMIN HUMAN 12.5 G: 0.25 SOLUTION INTRAVENOUS at 14:58

## 2019-01-01 RX ADMIN — MAGNESIUM OXIDE TAB 400 MG (241.3 MG ELEMENTAL MG) 400 MG: 400 (241.3 MG) TAB at 09:11

## 2019-01-01 RX ADMIN — SODIUM CHLORIDE 250 ML: 9 INJECTION, SOLUTION INTRAVENOUS at 12:10

## 2019-01-01 RX ADMIN — ONDANSETRON 4 MG: 2 INJECTION INTRAMUSCULAR; INTRAVENOUS at 09:52

## 2019-01-01 RX ADMIN — MIRTAZAPINE 15 MG: 15 TABLET, FILM COATED ORAL at 22:30

## 2019-01-01 RX ADMIN — SENNOSIDES AND DOCUSATE SODIUM 2 TABLET: 8.6; 5 TABLET ORAL at 19:37

## 2019-01-01 RX ADMIN — ALBUMIN HUMAN 12.5 G: 0.25 SOLUTION INTRAVENOUS at 13:12

## 2019-01-01 RX ADMIN — FENTANYL CITRATE 100 MCG: 50 INJECTION, SOLUTION INTRAMUSCULAR; INTRAVENOUS at 17:20

## 2019-01-01 RX ADMIN — IOPAMIDOL 86 ML: 755 INJECTION, SOLUTION INTRAVASCULAR at 12:06

## 2019-01-01 RX ADMIN — ENOXAPARIN SODIUM 60 MG: 60 INJECTION SUBCUTANEOUS at 08:44

## 2019-01-01 RX ADMIN — ALBUMIN HUMAN 12.5 G: 0.25 SOLUTION INTRAVENOUS at 13:32

## 2019-01-01 RX ADMIN — SODIUM CHLORIDE: 9 INJECTION, SOLUTION INTRAVENOUS at 07:41

## 2019-01-01 RX ADMIN — ASPIRIN 81 MG CHEWABLE TABLET 81 MG: 81 TABLET CHEWABLE at 08:18

## 2019-01-01 RX ADMIN — ACETAMINOPHEN 1000 MG: 500 TABLET, FILM COATED ORAL at 08:59

## 2019-01-01 RX ADMIN — LIDOCAINE HYDROCHLORIDE 10 ML: 10 INJECTION, SOLUTION EPIDURAL; INFILTRATION; INTRACAUDAL; PERINEURAL at 10:23

## 2019-01-01 RX ADMIN — LIDOCAINE HYDROCHLORIDE 10 ML: 10 INJECTION, SOLUTION EPIDURAL; INFILTRATION; INTRACAUDAL; PERINEURAL at 10:10

## 2019-01-01 RX ADMIN — SODIUM CHLORIDE 500 ML: 0.9 INJECTION, SOLUTION INTRAVENOUS at 11:42

## 2019-01-01 RX ADMIN — LIDOCAINE HYDROCHLORIDE 15 ML: 10 INJECTION, SOLUTION EPIDURAL; INFILTRATION; INTRACAUDAL; PERINEURAL at 14:45

## 2019-01-01 RX ADMIN — PROPOFOL 70 MG: 10 INJECTION, EMULSION INTRAVENOUS at 16:58

## 2019-01-01 RX ADMIN — IOPAMIDOL 53 ML: 755 INJECTION, SOLUTION INTRAVENOUS at 14:39

## 2019-01-01 RX ADMIN — FENTANYL CITRATE 50 MCG: 50 INJECTION, SOLUTION INTRAMUSCULAR; INTRAVENOUS at 09:39

## 2019-01-01 RX ADMIN — MULTIPLE VITAMINS W/ MINERALS TAB 1 TABLET: TAB at 15:43

## 2019-01-01 RX ADMIN — IOPAMIDOL 92 ML: 755 INJECTION, SOLUTION INTRAVASCULAR at 15:59

## 2019-01-01 RX ADMIN — ACETAMINOPHEN 975 MG: 325 TABLET ORAL at 06:57

## 2019-01-01 RX ADMIN — SENNOSIDES AND DOCUSATE SODIUM 4 TABLET: 8.6; 5 TABLET ORAL at 19:46

## 2019-01-01 RX ADMIN — ACETAMINOPHEN 1000 MG: 500 TABLET, FILM COATED ORAL at 02:17

## 2019-01-01 RX ADMIN — MIRTAZAPINE 15 MG: 15 TABLET, FILM COATED ORAL at 21:18

## 2019-01-01 RX ADMIN — PACLITAXEL 138 MG: 6 INJECTION, SOLUTION INTRAVENOUS at 13:29

## 2019-01-01 RX ADMIN — SODIUM CHLORIDE, PRESERVATIVE FREE: 5 INJECTION INTRAVENOUS at 13:41

## 2019-01-01 RX ADMIN — FAMOTIDINE 40 MG: 10 INJECTION, SOLUTION INTRAVENOUS at 12:59

## 2019-01-01 RX ADMIN — SODIUM CHLORIDE: 9 INJECTION, SOLUTION INTRAVENOUS at 00:37

## 2019-01-01 RX ADMIN — PRAVASTATIN SODIUM 20 MG: 10 TABLET ORAL at 20:08

## 2019-01-01 RX ADMIN — MAGNESIUM OXIDE TAB 400 MG (241.3 MG ELEMENTAL MG) 400 MG: 400 (241.3 MG) TAB at 08:19

## 2019-01-01 RX ADMIN — LIDOCAINE HYDROCHLORIDE AND EPINEPHRINE 4 ML: 10; 10 INJECTION, SOLUTION INFILTRATION; PERINEURAL at 09:48

## 2019-01-01 RX ADMIN — DEXAMETHASONE SODIUM PHOSPHATE 8 MG: 10 INJECTION, SOLUTION INTRAMUSCULAR; INTRAVENOUS at 17:10

## 2019-01-01 RX ADMIN — HEPARIN SODIUM 5000 UNITS: 1000 INJECTION INTRAVENOUS; SUBCUTANEOUS at 08:45

## 2019-01-01 RX ADMIN — SODIUM CHLORIDE 1000 ML: 9 INJECTION, SOLUTION INTRAVENOUS at 11:11

## 2019-01-01 RX ADMIN — SODIUM CHLORIDE, PRESERVATIVE FREE: 5 INJECTION INTRAVENOUS at 05:04

## 2019-01-01 RX ADMIN — FENTANYL CITRATE 25 MCG: 50 INJECTION, SOLUTION INTRAMUSCULAR; INTRAVENOUS at 16:30

## 2019-01-01 RX ADMIN — MAGNESIUM SULFATE HEPTAHYDRATE 2 G: 40 INJECTION, SOLUTION INTRAVENOUS at 14:43

## 2019-01-01 RX ADMIN — MIRTAZAPINE 15 MG: 15 TABLET, FILM COATED ORAL at 22:21

## 2019-01-01 RX ADMIN — SENNOSIDES AND DOCUSATE SODIUM 2 TABLET: 8.6; 5 TABLET ORAL at 08:16

## 2019-01-01 RX ADMIN — PHENAZOPYRIDINE HYDROCHLORIDE 200 MG: 200 TABLET, FILM COATED ORAL at 13:31

## 2019-01-01 RX ADMIN — ALBUMIN HUMAN 12.5 G: 0.25 SOLUTION INTRAVENOUS at 12:46

## 2019-01-01 RX ADMIN — ALBUMIN HUMAN 12.5 G: 0.25 SOLUTION INTRAVENOUS at 13:20

## 2019-01-01 RX ADMIN — IOPAMIDOL 93 ML: 755 INJECTION, SOLUTION INTRAVASCULAR at 08:35

## 2019-01-01 RX ADMIN — HEPARIN SODIUM 950 UNITS/HR: 10000 INJECTION, SOLUTION INTRAVENOUS at 14:29

## 2019-01-01 RX ADMIN — DIPHENHYDRAMINE HYDROCHLORIDE 25 MG: 50 INJECTION INTRAMUSCULAR; INTRAVENOUS at 14:05

## 2019-01-01 RX ADMIN — OXYCODONE HYDROCHLORIDE 5 MG: 5 TABLET ORAL at 10:24

## 2019-01-01 RX ADMIN — FAMOTIDINE 40 MG: 10 INJECTION, SOLUTION INTRAVENOUS at 12:21

## 2019-01-01 RX ADMIN — LIDOCAINE HYDROCHLORIDE 10 ML: 10 INJECTION, SOLUTION EPIDURAL; INFILTRATION; INTRACAUDAL; PERINEURAL at 10:07

## 2019-01-01 RX ADMIN — LIDOCAINE HYDROCHLORIDE 10 ML: 10 INJECTION, SOLUTION EPIDURAL; INFILTRATION; INTRACAUDAL; PERINEURAL at 12:47

## 2019-01-01 ASSESSMENT — PAIN SCALES - GENERAL
PAINLEVEL: NO PAIN (0)
PAINLEVEL: NO PAIN (0)
PAINLEVEL: MILD PAIN (2)
PAINLEVEL: MILD PAIN (3)
PAINLEVEL: MILD PAIN (2)
PAINLEVEL: NO PAIN (0)
PAINLEVEL: MILD PAIN (2)
PAINLEVEL: NO PAIN (0)
PAINLEVEL: MILD PAIN (2)
PAINLEVEL: NO PAIN (0)
PAINLEVEL: MILD PAIN (3)
PAINLEVEL: MILD PAIN (2)

## 2019-01-01 ASSESSMENT — VISUAL ACUITY
OU: NORMAL ACUITY

## 2019-01-01 ASSESSMENT — ACTIVITIES OF DAILY LIVING (ADL)
ADLS_ACUITY_SCORE: 14
ADLS_ACUITY_SCORE: 13
ADLS_ACUITY_SCORE: 11
ADLS_ACUITY_SCORE: 11
TOILETING: 0-->INDEPENDENT
ADLS_ACUITY_SCORE: 11
ADLS_ACUITY_SCORE: 11
ADLS_ACUITY_SCORE: 14
ADLS_ACUITY_SCORE: 13
ADLS_ACUITY_SCORE: 11
ADLS_ACUITY_SCORE: 11
COGNITION: 0 - NO COGNITION ISSUES REPORTED
ADLS_ACUITY_SCORE: 14
ADLS_ACUITY_SCORE: 14
ADLS_ACUITY_SCORE: 11
ADLS_ACUITY_SCORE: 14
ADLS_ACUITY_SCORE: 11
ADLS_ACUITY_SCORE: 14
TRANSFERRING: 0-->INDEPENDENT
ADLS_ACUITY_SCORE: 11
ADLS_ACUITY_SCORE: 11
ADLS_ACUITY_SCORE: 14
ADLS_ACUITY_SCORE: 12
BATHING: 0-->INDEPENDENT
RETIRED_COMMUNICATION: 0-->UNDERSTANDS/COMMUNICATES WITHOUT DIFFICULTY
SWALLOWING: 0-->SWALLOWS FOODS/LIQUIDS WITHOUT DIFFICULTY
ADLS_ACUITY_SCORE: 11
ADLS_ACUITY_SCORE: 11
AMBULATION: 0-->INDEPENDENT
ADLS_ACUITY_SCORE: 15
DRESS: 0-->INDEPENDENT
ADLS_ACUITY_SCORE: 11
ADLS_ACUITY_SCORE: 14
ADLS_ACUITY_SCORE: 11
ADLS_ACUITY_SCORE: 14
ADLS_ACUITY_SCORE: 14
ADLS_ACUITY_SCORE: 11
ADLS_ACUITY_SCORE: 11
RETIRED_EATING: 0-->INDEPENDENT
FALL_HISTORY_WITHIN_LAST_SIX_MONTHS: NO
ADLS_ACUITY_SCORE: 13
ADLS_ACUITY_SCORE: 12
ADLS_ACUITY_SCORE: 11
ADLS_ACUITY_SCORE: 11

## 2019-01-01 ASSESSMENT — ENCOUNTER SYMPTOMS
BRUISES/BLEEDS EASILY: 0
FEVER: 0
COLOR CHANGE: 0
AGITATION: 0
VOMITING: 0
SPEECH DIFFICULTY: 1
POLYDIPSIA: 0
NAUSEA: 0
HEADACHES: 0
LIGHT-HEADEDNESS: 0
NECK STIFFNESS: 0
ABDOMINAL PAIN: 0
NECK PAIN: 0
DIFFICULTY URINATING: 0
FATIGUE: 1
ADENOPATHY: 0
CHILLS: 0

## 2019-01-01 ASSESSMENT — MIFFLIN-ST. JEOR
SCORE: 1270.79
SCORE: 1260.81
SCORE: 1219.99
SCORE: 1271.58
SCORE: 1274.42
SCORE: 1235.41
SCORE: 1258.09
SCORE: 1201.84
SCORE: 1197.31
SCORE: 1266.88
SCORE: 1256.28
SCORE: 1264.44
SCORE: 1265.35
SCORE: 1242.2
SCORE: 1174.16

## 2019-01-01 ASSESSMENT — PAIN DESCRIPTION - DESCRIPTORS
DESCRIPTORS: SORE
DESCRIPTORS: ACHING;CONSTANT
DESCRIPTORS: ACHING

## 2019-01-02 NOTE — LETTER
Cancer Risk Management  Program Locations    [facility]  [facility]  Mailing Address  Cancer Risk Management Program  [address]  [place] [address]    New patient appointments  [phone]  January 2, 2019    Di Evans  [address] [place] [address]      Dear Di,    It was a pleasure meeting with you at the [facility] again on 1/2/2019.  Here is a copy of the progress note from your recent genetic counseling visit to the Cancer Risk Management Program.  If you have any additional questions, please feel free to call.    Referring Provider: Jammie Grier MD    Presenting Information:  Di Evans returned to the Cancer Risk Management Program at the [facility] to discuss her genetic testing results. Her blood was drawn on 11/7/2018.  CustomNext-Cancer testing was ordered from [employer]. This testing was done because of her diagnosis of ovarian cancer and family history of breast cancer. She is here today with her roommate.    Genetic Testing Result: NEGATIVE  Di is negative for mutations in the FADI, BRCA1, BRCA2, BRIP1, CDH1, CHEK2, EPCAM, MLH1, MSH2, MSH6, NBN, NF1, PALB2, PMS2, PTEN, RAD51C, RAD51D, STK11, and TP53 genes.  No mutations were found in any of the 19 genes analyzed.  This test involved sequencing and deletion/duplication analysis of all genes with the exception of EPCAM (deletions/duplications only).    Testing did not detect an identifiable mutation associated with Hereditary Breast and Ovarian Cancer syndrome (BRCA1, BRCA2), Hereditary Diffuse Gastric Cancer (CDH1), Murphy syndrome (EPCAM, MLH1, MSH2, MSH6, PMS2), Li Fraumeni syndrome (TP53), Peutz-Jeghers syndrome (STK11), neurofibromatosis type 1 (NF1), or Cowden  syndrome (PTEN).    Interpretation:  We discussed several different interpretations of this negative test result.    1. One explanation may be that there is a different gene or combination of genes and environment that are associated with the cancers in Di and/or her relatives.    2. It is possible that her maternal aunts did have a mutation in a cancer risk gene and she did not inherit it.  3. There is also a small possibility that there is a mutation in one of these genes, and we could not find it with our current testing methods.       Screening:  Based on this negative test result, it is important for Di and her relatives to refer back to the family history for appropriate cancer screening.      iD should continue to follow with her physicians due to her ovarian cancer diagnosis.     Di has a family history of breast cancer. Based on her personal and family history, Di's risk for breast cancer is less than 20% using the JANESSA v8 and Boy models. Therefore, Di does not meet current National Comprehensive Cancer Network (NCCN) guidelines for high risk breast screening, which is offered to women with a 20% lifetime risk or higher.  However, it is still important for Di to continue with routine breast screening under the care of her physicians.  Breast cancer screening is generally recommended to begin approximately 10 years younger than the earliest age of breast cancer diagnosis in the family, or at age 40, whichever comes first. Di, and her relatives closely related to her aunts who had breast cancer, are encouraged to discuss breast screening with her physicians. Her close female relatives should also report their family history of ovarian cancer to their physicians.     Other population cancer screening, such as recommendations by the American Cancer Society and the National Comprehensive Cancer Network (NCCN), are also appropriate for Di and her family.  These screening  recommendations may change if there are changes to Di's personal and/or family history.  Final screening recommendations should be made by each individual's managing physician.    Summary:  We do not have an explanation for Di's personal or family history of cancer.  Because of that, it is important that she continue with cancer screening based on her personal and family history as discussed above.  Genetic testing is rapidly advancing, and new cancer susceptibility genes will most likely be identified in the future.  Therefore, I encouraged Di to contact me annually or if there are changes in her personal or family history.  This may change how we assess her cancer risk, screening, and the testing we would offer.    Plan:  1.  I provided Di with a copy of her test results today.  2. She plans to follow-up with her physicians as needed.  3. She should contact me annually, or sooner if her family history changes.    If Di has any further questions, I encouraged her to contact me at [phone].    Time spent face to face: 10 minutes.    Jessica Walton MS, [facility]  Licensed Genetic Counselor  P. [phone]  F. [phone]

## 2019-01-02 NOTE — LETTER
"    1/2/2019         RE: Di Evans  [address] [place] [address]        Dear Colleague,    Thank you for referring your patient, Di Evans, to the [facility]. Please see a copy of my visit note below.    1/2/2019    Referring Provider: Jammie Grier MD    Presenting Information:  Di Evans returned to the Cancer Risk Management Program at the [facility] to discuss her genetic testing results. Her blood was drawn on 11/7/2018.  CustomNext-Cancer testing was ordered from [employer]. This testing was done because of her diagnosis of ovarian cancer and family history of breast cancer. She is here today with her roommate.    Genetic Testing Result: NEGATIVE  Di is negative for mutations in the FADI, BRCA1, BRCA2, BRIP1, CDH1, CHEK2, EPCAM, MLH1, MSH2, MSH6, NBN, NF1, PALB2, PMS2, PTEN, RAD51C, RAD51D, STK11, and TP53 genes.  No mutations were found in any of the 19 genes analyzed.  This test involved sequencing and deletion/duplication analysis of all genes with the exception of EPCAM (deletions/duplications only).    Testing did not detect an identifiable mutation associated with Hereditary Breast and Ovarian Cancer syndrome (BRCA1, BRCA2), Hereditary Diffuse Gastric Cancer (CDH1), Murphy syndrome (EPCAM, MLH1, MSH2, MSH6, PMS2), Li Fraumeni syndrome (TP53), Peutz-Jeghers syndrome (STK11), neurofibromatosis type 1 (NF1), or Cowden syndrome (PTEN).    A copy of the test report can be found in the Laboratory tab, dated 11/7/2018, and named \"[facility]\". The report is scanned in as a linked document.    Interpretation:  We discussed several different interpretations of this negative test result.    1. One explanation may be that there is a different gene or combination of genes and environment that are associated with the cancers in Di and/or her relatives.    2. It is possible that her maternal aunts did have a mutation in a cancer " risk gene and she did not inherit it.  3. There is also a small possibility that there is a mutation in one of these genes, and we could not find it with our current testing methods.       Screening:  Based on this negative test result, it is important for Di and her relatives to refer back to the family history for appropriate cancer screening.      Di should continue to follow with her physicians due to her ovarian cancer diagnosis.     Di has a family history of breast cancer. Based on her personal and family history, Di's risk for breast cancer is less than 20% using the JANESSA v8 and Boy models. Therefore, Di does not meet current National Comprehensive Cancer Network (NCCN) guidelines for high risk breast screening, which is offered to women with a 20% lifetime risk or higher.  However, it is still important for Di to continue with routine breast screening under the care of her physicians.  Breast cancer screening is generally recommended to begin approximately 10 years younger than the earliest age of breast cancer diagnosis in the family, or at age 40, whichever comes first. Di, and her relatives closely related to her aunts who had breast cancer, are encouraged to discuss breast screening with her physicians. Her close female relatives should also report their family history of ovarian cancer to their physicians.     Other population cancer screening, such as recommendations by the American Cancer Society and the National Comprehensive Cancer Network (NCCN), are also appropriate for Di and her family.  These screening recommendations may change if there are changes to Di's personal and/or family history.  Final screening recommendations should be made by each individual's managing physician.    Summary:  We do not have an explanation for Di's personal or family history of cancer.  Because of that, it is important that she continue with cancer screening based on her personal and  family history as discussed above.  Genetic testing is rapidly advancing, and new cancer susceptibility genes will most likely be identified in the future.  Therefore, I encouraged Di to contact me annually or if there are changes in her personal or family history.  This may change how we assess her cancer risk, screening, and the testing we would offer.    Plan:  1.  I provided Di with a copy of her test results today.  2. She plans to follow-up with her physicians as needed.  3. She should contact me annually, or sooner if her family history changes.    If Di has any further questions, I encouraged her to contact me at [phone].    Time spent face to face: 10 minutes.    Jessica Walton MS, [facility]  Licensed Genetic Counselor  P. [phone]  F. [phone]        Again, thank you for allowing me to participate in the care of your patient.        Sincerely,        Jessica Walton, GC

## 2019-01-02 NOTE — PROGRESS NOTES
"1/2/2019    Referring Provider: Jammie Grier MD    Presenting Information:  Di Evans returned to the Cancer Risk Management Program at the [facility] to discuss her genetic testing results. Her blood was drawn on 11/7/2018.  CustomNext-Cancer testing was ordered from [email or web address]. This testing was done because of her diagnosis of ovarian cancer and family history of breast cancer. She is here today with her roommate.    Genetic Testing Result: NEGATIVE  Di is negative for mutations in the FADI, BRCA1, BRCA2, BRIP1, CDH1, CHEK2, EPCAM, MLH1, MSH2, MSH6, NBN, NF1, PALB2, PMS2, PTEN, RAD51C, RAD51D, STK11, and TP53 genes.  No mutations were found in any of the 19 genes analyzed.  This test involved sequencing and deletion/duplication analysis of all genes with the exception of EPCAM (deletions/duplications only).    Testing did not detect an identifiable mutation associated with Hereditary Breast and Ovarian Cancer syndrome (BRCA1, BRCA2), Hereditary Diffuse Gastric Cancer (CDH1), Murphy syndrome (EPCAM, MLH1, MSH2, MSH6, PMS2), Li Fraumeni syndrome (TP53), Peutz-Jeghers syndrome (STK11), neurofibromatosis type 1 (NF1), or Cowden syndrome (PTEN).    A copy of the test report can be found in the Laboratory tab, dated 11/7/2018, and named \"SEND OUTS MISC TEST\". The report is scanned in as a linked document.    Interpretation:  We discussed several different interpretations of this negative test result.    1. One explanation may be that there is a different gene or combination of genes and environment that are associated with the cancers in Di and/or her relatives.    2. It is possible that her maternal aunts did have a mutation in a cancer risk gene and she did not inherit it.  3. There is also a small possibility that there is a mutation in one of these genes, and we could not find it with our current testing methods.       Screening:  Based on this negative test result, it is " important for Di and her relatives to refer back to the family history for appropriate cancer screening.      Di should continue to follow with her physicians due to her ovarian cancer diagnosis.     Di has a family history of breast cancer. Based on her personal and family history, Di's risk for breast cancer is less than 20% using the JANESSA v8 and Boy models. Therefore, Di does not meet current National Comprehensive Cancer Network (NCCN) guidelines for high risk breast screening, which is offered to women with a 20% lifetime risk or higher.  However, it is still important for Di to continue with routine breast screening under the care of her physicians.  Breast cancer screening is generally recommended to begin approximately 10 years younger than the earliest age of breast cancer diagnosis in the family, or at age 40, whichever comes first. Di, and her relatives closely related to her aunts who had breast cancer, are encouraged to discuss breast screening with her physicians. Her close female relatives should also report their family history of ovarian cancer to their physicians.     Other population cancer screening, such as recommendations by the American Cancer Society and the National Comprehensive Cancer Network (NCCN), are also appropriate for Di and her family.  These screening recommendations may change if there are changes to Di's personal and/or family history.  Final screening recommendations should be made by each individual's managing physician.    Summary:  We do not have an explanation for Di's personal or family history of cancer.  Because of that, it is important that she continue with cancer screening based on her personal and family history as discussed above.  Genetic testing is rapidly advancing, and new cancer susceptibility genes will most likely be identified in the future.  Therefore, I encouraged Di to contact me annually or if there are changes in her  personal or family history.  This may change how we assess her cancer risk, screening, and the testing we would offer.    Plan:  1.  I provided Di with a copy of her test results today.  2. She plans to follow-up with her physicians as needed.  3. She should contact me annually, or sooner if her family history changes.    If Di has any further questions, I encouraged her to contact me at [phone].    Time spent face to face: 10 minutes.    Jessica Walton MS, [facility]  Licensed Genetic Counselor  P. [phone]  F. [phone]

## 2019-01-04 NOTE — LETTER
1/4/2019         RE: Di Evans  [address] [place] [address]        Dear Colleague,    Thank you for referring your patient, Di Evans, to the [facility]. Please see a copy of my visit note below.    Patient presented today to have her port removed. There was exposed port through the skin. I did not do this procedure in clinic. Will schedule this to be done in the ASC under local/MAC anesthesia, as I will have to excise skin and have to do a rotational flap.     Patient is aware.    This is a no charge visit.    Again, thank you for allowing me to participate in the care of your patient.        Sincerely,        Chanel Rodriguez MD

## 2019-01-04 NOTE — NURSING NOTE
Di Evans's goals for this visit include:   Chief Complaint   Patient presents with     Procedure     Port removal       She requests these members of her care team be copied on today's visit information: yes    PCP: Sonja Davies    Referring Provider:  No referring provider defined for this encounter.    /84   Pulse 79   SpO2 99%     Do you need any medication refills at today's visit? no

## 2019-01-04 NOTE — PROGRESS NOTES
Patient presented today to have her port removed. There was exposed port through the skin. I did not do this procedure in clinic. Will schedule this to be done in the ASC under local/MAC anesthesia, as I will have to excise skin and have to do a rotational flap.     Patient is aware.    This is a no charge visit.

## 2019-01-07 NOTE — ANESTHESIA PREPROCEDURE EVALUATION
Anesthesia Pre-Procedure Evaluation    Patient: Di Evans   MRN:     [id number] Gender:   female   Age:    58 year old :      1960        Preoperative Diagnosis: port exposed through skin   Procedure(s):  REMOVE PORT PERITONEAL     Past Medical History:   Diagnosis Date     Hypertension      Ovarian cancer (H)       Past Surgical History:   Procedure Laterality Date     HYSTERECTOMY TOTAL ABDOMINAL, BILATERAL SALPINGO-OOPHORECTOMY, COMBINED Bilateral 2018    Procedure: COMBINED HYSTERECTOMY TOTAL ABDOMINAL, SALPINGO-OOPHORECTOMY;;  Surgeon: Jammie Dueñas MD;  Location: [facility] OR     LAPAROTOMY, TUMOR DEBULKING, COMBINED Bilateral 2018    Procedure: COMBINED LAPAROTOMY, TUMOR DEBULKING;  Exploratory Laparotomy, Modified Radical Hysterectomy, Removal of Cervix, Bilateral Salpingo - Oophorectomy, Omentectomy, Bilateral Para Aortic and Left Pelvic Lymph Node Dissection, Tumor Debulking, Proctoscopy;  Surgeon: Jammie Dueñas MD;  Location: [facility] OR     SURGICAL HISTORY OF -       left ankle surgery          Anesthesia Evaluation     . Pt has had prior anesthetic. Type: General           ROS/MED HX    ENT/Pulmonary:  - neg pulmonary ROS     Neurologic:  - neg neurologic ROS     Cardiovascular:     (+) hypertension----. : . . . :. .       METS/Exercise Tolerance:     Hematologic:  - neg hematologic  ROS       Musculoskeletal:  - neg musculoskeletal ROS       GI/Hepatic:  - neg GI/hepatic ROS       Renal/Genitourinary:  - ROS Renal section negative       Endo:  - neg endo ROS       Psychiatric:  - neg psychiatric ROS       Infectious Disease:  - neg infectious disease ROS       Malignancy:   (+) Malignancy History of Other  Other CA Ovarian cancer status post Surgery         Other:    (+) No chance of pregnancy   - neg other ROS                     PHYSICAL EXAM:   Mental Status/Neuro: A/A/O   Airway: Facies: Feasible  Mallampati: I  Mouth/Opening: Full  TM distance: > 6 cm  Neck  "ROM: Full   Respiratory: Auscultation: CTAB     Resp. Rate: Normal     Resp. Effort: Normal      CV: Rhythm: Regular  Rate: Age appropriate  Heart: Normal Sounds   Comments:      Dental: Normal                  Lab Results   Component Value Date    WBC 4.9 12/07/2018    HGB 10.2 (L) 12/07/2018    HCT 31.0 (L) 12/07/2018     (L) 12/07/2018     12/07/2018    POTASSIUM 3.0 (L) 12/07/2018    CHLORIDE 100 12/07/2018    CO2 28 12/07/2018    BUN 20 12/07/2018    CR 0.60 12/07/2018     (H) 12/07/2018    TRACEY 9.3 12/07/2018    MAG 1.4 (L) 12/07/2018    ALBUMIN 3.3 (L) 12/07/2018    PROTTOTAL 8.4 12/07/2018    ALT 25 12/07/2018    AST 24 12/07/2018    ALKPHOS 90 12/07/2018    BILITOTAL 0.4 12/07/2018    TSH 4.64 (H) 06/22/2018    T4 1.44 06/22/2018       Preop Vitals  BP Readings from Last 3 Encounters:   01/07/19 151/78   01/04/19 128/84   12/07/18 150/88    Pulse Readings from Last 3 Encounters:   01/07/19 88   01/04/19 79   12/07/18 99      Resp Readings from Last 3 Encounters:   01/07/19 16   12/07/18 18   11/16/18 16    SpO2 Readings from Last 3 Encounters:   01/07/19 99%   01/04/19 99%   12/07/18 97%      Temp Readings from Last 1 Encounters:   01/07/19 36.6  C (97.9  F)    Ht Readings from Last 1 Encounters:   12/07/18 1.651 m (5' 5\")      Wt Readings from Last 1 Encounters:   12/07/18 69.6 kg (153 lb 7 oz)    Estimated body mass index is 25.53 kg/m  as calculated from the following:    Height as of 12/7/18: 1.651 m (5' 5\").    Weight as of 12/7/18: 69.6 kg (153 lb 7 oz).     LDA:  Port A Cath Single 08/20/18 Right Chest wall (Active)   Number of days: 140            Assessment:   ASA SCORE: 3    Will be NPO appropriate at: 1/7/2019 8:58 AM   Documentation: H&P complete; Preop Testing complete; Consents complete   Proceeding: Proceed without further delay  Tobacco Use:  NO Active use of Tobacco/UNKNOWN Tobacco use status     Plan:   Anes. Type:  MAC   Pre-Induction: Midazolam IV   Induction:  IV " (Standard)   Airway: Native Airway   Access/Monitoring: PIV   Maintenance: Propofol; IV   Emergence: Procedure Site   Logistics: Same Day Surgery     Postop Pain/Sedation Strategy:  Standard-Options: Opioids PRN     PONV Management:  Adult Risk Factors: Female, Non-Smoker, Postop Opioids  Prevention: Propofol Infusion; Ondansetron     CONSENT: Direct conversation   Plan and risks discussed with: Patient   Blood Products: Consent Deferred (Minimal Blood Loss)                         Chetan Gillette MD

## 2019-01-07 NOTE — ANESTHESIA POSTPROCEDURE EVALUATION
Anesthesia POST Procedure Evaluation    Patient: Di Evans   MRN:     [id number] Gender:   female   Age:    58 year old :      1960        Preoperative Diagnosis: port exposed through skin   Procedure(s):  REMOVE PORT PERITONEAL   Postop Comments: No value filed.       Anesthesia Type:  MAC    Reportable Event: NO     PAIN: Uncomplicated   Sign Out status: Comfortable, Well controlled pain     PONV: No PONV   Sign Out status:  No Nausea or Vomiting     Neuro/Psych: Uneventful perioperative course   Sign Out Status: Preoperative baseline; Age appropriate mentation     Airway/Resp.: Uneventful perioperative course   Sign Out Status: Non labored breathing, age appropriate RR; Resp. Status within EXPECTED Parameters     CV: Uneventful perioperative course   Sign Out status: Appropriate BP and perfusion indices; Appropriate HR/Rhythm     Disposition:   Sign Out in:  Phase II  Disposition:  Home  Recovery Course: Uneventful  Follow-Up: Not required           Last Anesthesia Record Vitals:  CRNA VITALS  2019 0942 - 2019 1042      2019             Pulse:  86    SpO2:  100 %          Last PACU/Preop Vitals:  Vitals:    19 0840 19 1013 19 1030   BP: 151/78 121/64 132/72   Pulse: 88 84 79   Resp: 16 16 16   Temp: 36.6  C (97.9  F) 36.6  C (97.8  F)    SpO2: 99% 97% 98%         Electronically Signed By: Chetan Gillette MD, 2019, 11:14 AM

## 2019-01-07 NOTE — BRIEF OP NOTE
[facility] Brief Operative Note    Pre-operative diagnosis: port exposed through skin   Post-operative diagnosis Same    Procedure: Port removal with rotational skin flap   Surgeon(s): Surgeon(s) and Role:     * Chanel Rodriguez MD - Primary   Estimated blood loss: Less than 5cc    Specimens: * No specimens in log *   Findings: Non-infected exposed port

## 2019-01-07 NOTE — DISCHARGE INSTRUCTIONS
[facility]  Same-Day Surgery   Adult Discharge Orders & Instructions   For 24 hours after surgery  1. Get plenty of rest.  A responsible adult must stay with you for at least 24 hours after you leave the hospital.   2. Do not drive or use heavy equipment.  If you have weakness or tingling, don't drive or use heavy equipment until this feeling goes away.  3. Do not drink alcohol.  4. Avoid strenuous or risky activities.  Ask for help when climbing stairs.   5. You may feel lightheaded.  IF so, sit for a few minutes before standing.  Have someone help you get up.   6. If you have nausea (feel sick to your stomach): Drink only clear liquids such as apple juice, ginger ale, broth or 7-Up.  Rest may also help.  Be sure to drink enough fluids.  Move to a regular diet as you feel able.  7. You may have a slight fever. Call the doctor if your fever is over 100 F (37.7 C) (taken under the tongue) or lasts longer than 24 hours.  8. You may have a dry mouth, a sore throat, muscle aches or trouble sleeping.  These should go away after 24 hours.  9. Do not make important or legal decisions.   Call your doctor for any of the followin.  Signs of infection (fever, growing tenderness at the surgery site, a large amount of drainage or bleeding, severe pain, foul-smelling drainage, redness, swelling).    2. It has been over 8 to 10 hours since surgery and you are still not able to urinate (pass water).    3.  Headache for over 24 hours.    4.  Numbness, tingling or weakness the day after surgery (if you had spinal anesthesia).  To contact a doctor, call ___________________________

## 2019-01-07 NOTE — ANESTHESIA CARE TRANSFER NOTE
Patient: Di Evans    Procedure(s):  REMOVE PORT PERITONEAL    Diagnosis: port exposed through skin  Diagnosis Additional Information: No value filed.    Anesthesia Type:   No value filed.     Note:  Airway :Room Air  Patient transferred to:Phase II  Comments: Pt stating she is comfortableHandoff Report: Identifed the Patient, Identified the Reponsible Provider, Reviewed the pertinent medical history, Discussed the surgical course, Reviewed Intra-OP anesthesia mangement and issues during anesthesia, Set expectations for post-procedure period and Allowed opportunity for questions and acknowledgement of understanding      Vitals: (Last set prior to Anesthesia Care Transfer)    CRNA VITALS  1/7/2019 0942 - 1/7/2019 1018      1/7/2019             Pulse:  86    SpO2:  100 %                Electronically Signed By: SULMA Yu CRNA  January 7, 2019  10:18 AM

## 2019-01-11 NOTE — OP NOTE
Procedure Date: 01/07/2019      PREOPERATIVE DIAGNOSIS:  Exposed MediPort.      POSTOPERATIVE DIAGNOSIS:  Exposed MediPort.      SURGICAL PROCEDURE PERFORMED:  Resection of MediPort.      SURGEON:  Chanel Rodriguez MD      ASSISTANT:  None.      ANESTHESIA:  Local and MAC.      COMPLICATIONS:  None.      ESTIMATED BLOOD LOSS:  Less than 5 mL.      CLINICAL INDICATIONS FOR THE PROCEDURE:  This is a 58-year-old female with a pertinent past history of ovarian carcinoma.  She presented to me on Friday 01/04 for possible excision of MediPort.  However, the patient clearly had the port exposed through the skin with some skin erosion.  Based on this, it was recommended the port be removed with excising an elliptical excision of the skin around the port.  The procedure was discussed with the patient as were the risks, the consent was obtained and the site was marked.      DETAILS OF PROCEDURE:  The patient was brought to the operating room in stable condition, placed on the operating table in supine position.  After appropriate MAC anesthesia was obtained, the patient was prepped and draped in sterile fashion.  Lidocaine 1% and epinephrine was injected into the skin and subcutaneous tissue of the operative field.  The elliptical lines were marked to excise the port with the skin.  The scalpel was utilized to excise this ellipse of the skin, including the MediPort.  The MediPort and its catheter was removed.  Pressure was held at the insertion of the catheter into the right jugular vein for 3 minutes.  Once that was completed, the area was copiously irrigated.  The subcutaneous tissue was approximated using a 4-0 Vicryl interrupted suture.  The skin incision was approximated using a 5-0 Monocryl running subcuticular suture.  The wound was dressed with Dermabond.  The patient tolerated the procedure well.  She was awoken and taken to the recovery room in stable condition.  I was present for the entire surgical procedure.          CHERRY VENTURA MD             D: 01/10/2019   T: 2019   MT: JOSSELINE      Name:     LINDA HSIEH   MRN:      [id number]        Account:        [id number]   :      1960           Procedure Date: 2019      Document: [id number]

## 2019-01-11 NOTE — NURSING NOTE
"Oncology Rooming Note    January 11, 2019 8:03 AM   Di Evans is a 58 year old female who presents for:    Chief Complaint   Patient presents with     Oncology Clinic Visit     1 month follow up     Initial Vitals: /82 (BP Location: Right arm)   Pulse 94   Temp 98.6  F (37  C) (Oral)   Resp 16   Ht 1.651 m (5' 5\")   Wt 69.6 kg (153 lb 6 oz)   SpO2 100%   BMI 25.52 kg/m   Estimated body mass index is 25.52 kg/m  as calculated from the following:    Height as of this encounter: 1.651 m (5' 5\").    Weight as of this encounter: 69.6 kg (153 lb 6 oz). Body surface area is 1.79 meters squared.  No Pain (0) Comment: Data Unavailable   No LMP recorded. Patient is postmenopausal.  Allergies reviewed: Yes  Medications reviewed: Yes    Medications: Medication refills not needed today.  Pharmacy name entered into [facility]: [facility] [place], [place] - [address], [id number]    5 minutes for nursing intake (face to face time)     Gloria Rivas LPN              "

## 2019-01-11 NOTE — PROGRESS NOTES
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  She is accompanied today by her roommate.  She is feeling overall very well.  She notes all the side effects of chemotherapy seem to have resolved.  She denies any residual neuropathy.  She had her port removed as it was eroding through her skin.  She believes this was due to the fact that it was right where her bra strap was and there was constant rubbing and irritation over her port.  She is willing to have another port placed, however if she can receive all treatments through a peripheral IV she would prefer this.      Cancer Course:    She reports she has been having abdominal symptoms for the past 8 months.  She has been feeling bloated and distended for several months.  She has also had decreased appetite and early satiety.  She does not note any major changes in her bowel or bladder function.  She has not had significant pain.  She finally presented to the ED and demanded a CT which was suspicious for ovarian cancer.    18:  CT A/P:  Large cystic/solid mass within the pelvis highly suspicious for ovarian malignancy. 2.  Omental thickening suspicious for metastatic disease. 3.  Large volume of ascites.  Malignant ascites cannot be excluded. 4.  Small left pleural effusion and left lower lobe atelectasis. 5.  Diverticulosis, small hiatal hernia, and gallbladder sludge.    18:  = 598    18:  CT Chest:  1. No definite metastatic disease in the chest. 2. Small left pleural effusion. 3. Moderate ascites with mesenteric and omental edema/nodularity, better evaluated on CT 2018:  Exploratory laparotomy, modified radical hysterectomy, bilateral salpingo-oophorectomy, omentectomy, right pelvic and  bilateral para-aortic lymph node dissection, CUSA tumor debulking, mobilization of the entire colon, stripping of left pelvic peritoneum, proctoscopy, optimal tumor debulking to no gross residual disease   Pathology:  Stage IIIB mixed clear cell (80%) and endometrioid (20%) adenocarcinoma, + pelvic LN, + PA LN, + omentum    8/24/18:  Cycle #1 carboplatin AUC6 and paclitaxel 175 mg/m2,  = 110    9/14/18:  Cycle #2 carboplatin AUC6 and paclitaxel 175 mg/m2,  = 48    10/5/18:  Cycle #3 carboplatin AUC 6 and paclitaxel 175 mg/m2,  = 59    10/26/18:  Cycle #4 carboplatin AUC 6 and paclitaxel 175 mg/m2,  = 75    11/13/18:  CT C/A/P: In this patient with history of ovarian cancer, there are postoperative changes of total abdominal hysterectomy, bilateral salpingo-oophorectomy and debulking surgery: 1. Small amount of ascites, improved from the prior study. 2. Peritoneal nodularity and thickening, improved from the prior study. 3. Stable bilateral pulmonary nodules measuring up to 4 mm. No new or enlarging pulmonary nodules.    11/16/18:  Cycle #5 carboplatin AUC 6 and paclitaxel 175 mg/m2,  = 71    12/7/18:  Cycle #6 carboplatin AUC 6 and paclitaxel 175 mg/m2,  = 71    1/9/19:  CT C/A/P:  1. Findings suspicious for worsening intra-abdominal involvement by ovarian malignancy with increased peritoneal nodularity and increased retroperitoneal and mesenteric adenopathy. Continued small ascites. 2. Unchanged tiny pulmonary nodules without evidence of malignancy in  the chest    Review of Systems:  Systemic           no weight changes; no fever; no chills; no night sweats; no appetite changes; no fatigue; no weakness  Skin           no rashes, or lesions; no hair loss  Eye           no irritation; no changes in vision  Janice-Laryngeal           no dysphagia; no hoarseness   Pulmonary    no cough; no shortness of breath  Cardiovascular    no chest pain; no palpitations  Gastrointestinal    no diarrhea;  no constipation; no heartburn; no abdominal pain; no changes in bowel  habits; no blood in stool  Genitourinary   no urinary frequency; no urinary urgency; no dysuria; no pain; no abnormal vaginal discharge; no abnormal vaginal bleeding  Breast    no breast discharge; no breast changes; no breast pain  Musculoskeletal    no myalgias; no arthralgias; no back pain  Psychiatric           no depressed mood; no anxiety    Hematologic            no tender lymph nodes; no noticeable swellings or lumps   Endocrine    no hot flashes; no heat/cold intolerance         Neurological   no tremor; no numbness and tingling; no headaches; no difficulty sleeping    Obstetrics and Gynecology History:  G0  Menopause age 44, no HRT      Past Medical History:  Past Medical History:   Diagnosis Date     Hypertension      Ovarian cancer (H)        Past Surgical History:  Past Surgical History:   Procedure Laterality Date     HYSTERECTOMY TOTAL ABDOMINAL, BILATERAL SALPINGO-OOPHORECTOMY, COMBINED Bilateral 7/30/2018    Procedure: COMBINED HYSTERECTOMY TOTAL ABDOMINAL, SALPINGO-OOPHORECTOMY;;  Surgeon: Jammie Dueñas MD;  Location: [facility] OR     LAPAROTOMY, TUMOR DEBULKING, COMBINED Bilateral 7/30/2018    Procedure: COMBINED LAPAROTOMY, TUMOR DEBULKING;  Exploratory Laparotomy, Modified Radical Hysterectomy, Removal of Cervix, Bilateral Salpingo - Oophorectomy, Omentectomy, Bilateral Para Aortic and Left Pelvic Lymph Node Dissection, Tumor Debulking, Proctoscopy;  Surgeon: Jammie Dueñas MD;  Location:  OR     SURGICAL HISTORY OF -   1980    left ankle surgery       Health Maintenance:  Last Pap Smear: No need for further pap smear exams  She has not had a history of abnormal Pap smears.    Last Mammogram: 11/15/16              Result: normal      She has not had a history of abnormal mammograms.    Last Colonoscopy: Never      Current Medications:   has a current medication list which includes the following  prescription(s): hydrochlorothiazide, ibuprofen, pravastatin, and senna-docusate.     Allergies:   Gemfibrozil; Lovastatin; and Penicillins-unknown reaction as a child      Social History:  Social History     Socioeconomic History     Marital status: Single     Spouse name: Not on file     Number of children: 0     Years of education: Not on file     Highest education level: Not on file   Social Needs     Financial resource strain: Not on file     Food insecurity - worry: Not on file     Food insecurity - inability: Not on file     Transportation needs - medical: Not on file     Transportation needs - non-medical: Not on file   Occupational History     Occupation: [employer]     Employer: [employer]   Tobacco Use     Smoking status: Never Smoker     Smokeless tobacco: Never Used   Substance and Sexual Activity     Alcohol use: Yes     Comment: occasional     Drug use: No     Sexual activity: No   Other Topics Concern     Parent/sibling w/ CABG, MI or angioplasty before 65F 55M? No      Service No     Blood Transfusions No     Caffeine Concern Yes     Occupational Exposure No     Hobby Hazards No     Sleep Concern No     Stress Concern No     Weight Concern Yes     Special Diet No     Back Care No     Exercise Yes     Bike Helmet No     Comment: Yes in the future     Seat Belt Yes     Self-Exams Yes   Social History Narrative     Not on file       Lives with a roommate, feels safe at home.  Works as a .  Enjoys gambling.  Does not have an advanced directive on file and would like her roommate, Alivia Hayes to be her POA.  Would like full resuscitation if reversible cause is identified, however would not like to be kept on life sustaining measures long-term.     Family History:   The patient's family history is significant for.  Family History   Problem Relation Age of Onset     Hypertension Mother      Hypertension Father      Cerebrovascular Disease Father          "polycythemia     Breast Cancer Maternal Aunt         older age         Physical Exam:   /82 (BP Location: Right arm)   Pulse 94   Temp 98.6  F (37  C) (Oral)   Resp 16   Ht 1.651 m (5' 5\")   Wt 69.6 kg (153 lb 6 oz)   SpO2 100%   BMI 25.52 kg/m    Body mass index is 25.52 kg/m .    General Appearance: healthy and alert, no distress     HEENT:  no thyromegaly, no palpable nodules or masses        Cardiovascular: regular rate and rhythm, no gallops, rubs or murmurs     Respiratory: lungs clear, no rales, rhonchi or wheezes, normal diaphragmatic excursion    Musculoskeletal: extremities non tender and without edema    Skin: no lesions or rashes     Neurological: normal gait, no gross defects     Psychiatric: appropriate mood and affect                               Hematological: normal cervical, supraclavicular and inguinal lymph nodes     Gastrointestinal:       abdomen soft, non-tender, non-distended, no organomegaly or masses    Genitourinary: Deferred        ECO    Lab:    WBC 2.9 with ANC 1.6.  Hemoglobin 9.4.  Platelets 443.  Creatinine 0.65.  Potassium 3.5.  Magnesium -.  Remainder of electrolytes within normal limits.  AST 20, ALT 21, alkaline phosphatase 84, total bilirubin 0.3.  Albumin 3.2.       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     A total of 30 minutes was spent with the patient, >50% of which were spent in counseling the patient and/or treatment planning.      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 We reviewed her CT showing progression of disease while on primary platinum based chemotherapy.  We discussed that this means she has platinum resistant disease and the ramifications of this.  We then discussed prognosis in the platinum resistant setting.  We reviewed options for treatment " including best supportive care, standard chemotherapy with a non-platinum based agent combined with bevacizumab (most likely liposomal doxorubicin) or a clinical trial.  We discussed the side effects of both standard chemotherapy as well as the TRIO pembrolizumab and CC-486 clinical trial.  She is very interested in pursuing the clinical trial and consents were signed today.  For this she will need a biopsy and this will be obtained next week.  We will plan to give pembro peripherally unless not allowed by the study.  Once all eligibility criteria have been met, she will see an NP at the [facility] to begin treatment, need to begin within 28 days of today when she signed consents.    2.) Chemotherapy side effects    -Neuropathy-resolved   -Port erosion-her port has been removed and there are no signs of infection     3.) Genetic risk factors were assessed and the patient has undergone genetic testing which was negative    4.) Labs and/or tests ordered include:  IR biopsy, CBC, CMP, TSH, CA-125, INR and PTT      5.) Health maintenance issues addressed today include pt is due for a mammogram and colonoscopy which will be deferred given her persistent platinum resistant disease    6.) Elevated BP.  This is improved now on her current anti-hypertensive medications           Thank you for allowing us to participate in the care of your patient.         Sincerely,    Jammie Duque MD  Gynecologic Oncology  [facility]         CENTER, [facility]

## 2019-01-11 NOTE — PROGRESS NOTES
Interventional  Radiology consulted  For Abdominal Retroperitoneal Biopsy for a clinical trial of one of the metastatic lesions   No safe approach  Ir defers bx    Discussed with Matthieu Alarcon IR RPA  [phone] Call pager  [phone] pager

## 2019-01-11 NOTE — LETTER
2019         RE: Di Evans  [address] [place] [place] [address]        Dear Colleague,    Thank you for referring your patient, Di Evans, to the [facility]. Please see a copy of my visit note below.    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  She is accompanied today by her roommate.  She is feeling overall very well.  She notes all the side effects of chemotherapy seem to have resolved.  She denies any residual neuropathy.  She had her port removed as it was eroding through her skin.  She believes this was due to the fact that it was right where her bra strap was and there was constant rubbing and irritation over her port.  She is willing to have another port placed, however if she can receive all treatments through a peripheral IV she would prefer this.      Cancer Course:    She reports she has been having abdominal symptoms for the past 8 months.  She has been feeling bloated and distended for several months.  She has also had decreased appetite and early satiety.  She does not note any major changes in her bowel or bladder function.  She has not had significant pain.  She finally presented to the ED and demanded a CT which was suspicious for ovarian cancer.    18:  CT A/P:  Large cystic/solid mass within the pelvis highly suspicious for ovarian malignancy. 2.  Omental thickening suspicious for metastatic disease. 3.  Large volume of ascites.  Malignant ascites cannot be excluded. 4.  Small left pleural effusion and left lower lobe atelectasis. 5.  Diverticulosis, small hiatal hernia, and gallbladder sludge.    18:  = 598    18:  CT Chest:  1. No definite metastatic disease in the chest. 2. Small  left pleural effusion. 3. Moderate ascites with mesenteric and omental edema/nodularity, better evaluated on CT 7/16/2018 7/30/18:  Exploratory laparotomy, modified radical hysterectomy, bilateral salpingo-oophorectomy, omentectomy, right pelvic and bilateral para-aortic lymph node dissection, CUSA tumor debulking, mobilization of the entire colon, stripping of left pelvic peritoneum, proctoscopy, optimal tumor debulking to no gross residual disease   Pathology:  Stage IIIB mixed clear cell (80%) and endometrioid (20%) adenocarcinoma, + pelvic LN, + PA LN, + omentum    8/24/18:  Cycle #1 carboplatin AUC6 and paclitaxel 175 mg/m2,  = 110    9/14/18:  Cycle #2 carboplatin AUC6 and paclitaxel 175 mg/m2,  = 48    10/5/18:  Cycle #3 carboplatin AUC 6 and paclitaxel 175 mg/m2,  = 59    10/26/18:  Cycle #4 carboplatin AUC 6 and paclitaxel 175 mg/m2,  = 75    11/13/18:  CT C/A/P: In this patient with history of ovarian cancer, there are postoperative changes of total abdominal hysterectomy, bilateral salpingo-oophorectomy and debulking surgery: 1. Small amount of ascites, improved from the prior study. 2. Peritoneal nodularity and thickening, improved from the prior study. 3. Stable bilateral pulmonary nodules measuring up to 4 mm. No new or enlarging pulmonary nodules.    11/16/18:  Cycle #5 carboplatin AUC 6 and paclitaxel 175 mg/m2,  = 71    12/7/18:  Cycle #6 carboplatin AUC 6 and paclitaxel 175 mg/m2,  = 71    1/9/19:  CT C/A/P:  1. Findings suspicious for worsening intra-abdominal involvement by ovarian malignancy with increased peritoneal nodularity and increased retroperitoneal and mesenteric adenopathy. Continued small ascites. 2. Unchanged tiny pulmonary nodules without evidence of malignancy in  the chest    Review of Systems:  Systemic           no weight changes; no fever; no chills; no night sweats; no appetite changes; no fatigue; no weakness  Skin           no rashes, or  lesions; no hair loss  Eye           no irritation; no changes in vision  Janice-Laryngeal           no dysphagia; no hoarseness   Pulmonary    no cough; no shortness of breath  Cardiovascular    no chest pain; no palpitations  Gastrointestinal    no diarrhea; no constipation; no heartburn; no abdominal pain; no changes in bowel  habits; no blood in stool  Genitourinary   no urinary frequency; no urinary urgency; no dysuria; no pain; no abnormal vaginal discharge; no abnormal vaginal bleeding  Breast    no breast discharge; no breast changes; no breast pain  Musculoskeletal    no myalgias; no arthralgias; no back pain  Psychiatric           no depressed mood; no anxiety    Hematologic            no tender lymph nodes; no noticeable swellings or lumps   Endocrine    no hot flashes; no heat/cold intolerance         Neurological   no tremor; no numbness and tingling; no headaches; no difficulty sleeping    Obstetrics and Gynecology History:  G0  Menopause age 44, no HRT      Past Medical History:  Past Medical History:   Diagnosis Date     Hypertension      Ovarian cancer (H)        Past Surgical History:  Past Surgical History:   Procedure Laterality Date     HYSTERECTOMY TOTAL ABDOMINAL, BILATERAL SALPINGO-OOPHORECTOMY, COMBINED Bilateral 7/30/2018    Procedure: COMBINED HYSTERECTOMY TOTAL ABDOMINAL, SALPINGO-OOPHORECTOMY;;  Surgeon: Jammie Dueñsa MD;  Location: [facility]     LAPAROTOMY, TUMOR DEBULKING, COMBINED Bilateral 7/30/2018    Procedure: COMBINED LAPAROTOMY, TUMOR DEBULKING;  Exploratory Laparotomy, Modified Radical Hysterectomy, Removal of Cervix, Bilateral Salpingo - Oophorectomy, Omentectomy, Bilateral Para Aortic and Left Pelvic Lymph Node Dissection, Tumor Debulking, Proctoscopy;  Surgeon: Jammie Dueñas MD;  Location:  OR     SURGICAL HISTORY OF -   1980    left ankle surgery       Health Maintenance:  Last Pap Smear: No need for further pap smear exams  She has not had a history of  abnormal Pap smears.    Last Mammogram: 11/15/16              Result: normal      She has not had a history of abnormal mammograms.    Last Colonoscopy: Never      Current Medications:   has a current medication list which includes the following prescription(s): hydrochlorothiazide, ibuprofen, pravastatin, and senna-docusate.     Allergies:   Gemfibrozil; Lovastatin; and Penicillins-unknown reaction as a child      Social History:  Social History     Socioeconomic History     Marital status: Single     Spouse name: Not on file     Number of children: 0     Years of education: Not on file     Highest education level: Not on file   Social Needs     Financial resource strain: Not on file     Food insecurity - worry: Not on file     Food insecurity - inability: Not on file     Transportation needs - medical: Not on file     Transportation needs - non-medical: Not on file   Occupational History     Occupation: WEALTH at work     Employer: [employer]   Tobacco Use     Smoking status: Never Smoker     Smokeless tobacco: Never Used   Substance and Sexual Activity     Alcohol use: Yes     Comment: occasional     Drug use: No     Sexual activity: No   Other Topics Concern     Parent/sibling w/ CABG, MI or angioplasty before 65F 55M? No      Service No     Blood Transfusions No     Caffeine Concern Yes     Occupational Exposure No     Hobby Hazards No     Sleep Concern No     Stress Concern No     Weight Concern Yes     Special Diet No     Back Care No     Exercise Yes     Bike Helmet No     Comment: Yes in the future     Seat Belt Yes     Self-Exams Yes   Social History Narrative     Not on file       Lives with a roommate, feels safe at home.  Works as a .  Enjoys gambling.  Does not have an advanced directive on file and would like her roommateAlivia to be her POA.  Would like full resuscitation if reversible cause is identified, however would not like to be kept on life  "sustaining measures long-term.     Family History:   The patient's family history is significant for.  Family History   Problem Relation Age of Onset     Hypertension Mother      Hypertension Father      Cerebrovascular Disease Father         polycythemia     Breast Cancer Maternal Aunt         older age         Physical Exam:   /82 (BP Location: Right arm)   Pulse 94   Temp 98.6  F (37  C) (Oral)   Resp 16   Ht 1.651 m (5' 5\")   Wt 69.6 kg (153 lb 6 oz)   SpO2 100%   BMI 25.52 kg/m     Body mass index is 25.52 kg/m .    General Appearance: healthy and alert, no distress     HEENT:  no thyromegaly, no palpable nodules or masses        Cardiovascular: regular rate and rhythm, no gallops, rubs or murmurs     Respiratory: lungs clear, no rales, rhonchi or wheezes, normal diaphragmatic excursion    Musculoskeletal: extremities non tender and without edema    Skin: no lesions or rashes     Neurological: normal gait, no gross defects     Psychiatric: appropriate mood and affect                               Hematological: normal cervical, supraclavicular and inguinal lymph nodes     Gastrointestinal:       abdomen soft, non-tender, non-distended, no organomegaly or masses    Genitourinary: Deferred        ECO    Lab:    WBC 2.9 with ANC 1.6.  Hemoglobin 9.4.  Platelets 443.  Creatinine 0.65.  Potassium 3.5.  Magnesium -.  Remainder of electrolytes within normal limits.  AST 20, ALT 21, alkaline phosphatase 84, total bilirubin 0.3.  Albumin 3.2.       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     A total of 30 minutes was spent with the patient, >50% of which were spent in counseling the patient and/or treatment planning.      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 We reviewed her CT showing progression of disease " while on primary platinum based chemotherapy.  We discussed that this means she has platinum resistant disease and the ramifications of this.  We then discussed prognosis in the platinum resistant setting.  We reviewed options for treatment including best supportive care, standard chemotherapy with a non-platinum based agent combined with bevacizumab (most likely liposomal doxorubicin) or a clinical trial.  We discussed the side effects of both standard chemotherapy as well as the TRIO pembrolizumab and CC-486 clinical trial.  She is very interested in pursuing the clinical trial and consents were signed today.  For this she will need a biopsy and this will be obtained next week.  We will plan to give pembro peripherally unless not allowed by the study.  Once all eligibility criteria have been met, she will see an NP at the [facility] to begin treatment, need to begin within 28 days of today when she signed consents.    2.) Chemotherapy side effects    -Neuropathy-resolved   -Port erosion-her port has been removed and there are no signs of infection     3.) Genetic risk factors were assessed and the patient has undergone genetic testing which was negative    4.) Labs and/or tests ordered include:  IR biopsy, CBC, CMP, TSH, CA-125, INR and PTT      5.) Health maintenance issues addressed today include pt is due for a mammogram and colonoscopy which will be deferred given her persistent platinum resistant disease    6.) Elevated BP.  This is improved now on her current anti-hypertensive medications           Thank you for allowing us to participate in the care of your patient.         Sincerely,    Jammie Duque MD  Gynecologic Oncology  [facility]         CENTER, [facility]

## 2019-01-11 NOTE — PATIENT INSTRUCTIONS
Labs today    IR biopsy at the [facility]    Next visit determined after trial enrollment is determined

## 2019-01-17 NOTE — TELEPHONE ENCOUNTER
We are still waiting for the final word from IR about the biopsy but should hear back later today or early tomorrow.  Dr Molina says it will likely be ok to proceed.

## 2019-01-18 NOTE — PROGRESS NOTES
Patient to be placed on Interventional Radiology schedule for a CT  guided  Biopsy of the Nodule near the stomach .    Discussed with Dr. Pereyra, Dr Martinez Alarcon IR RPA  [phone] Call pager  [phone] pager

## 2019-01-23 NOTE — TELEPHONE ENCOUNTER
RECORDS STATUS - ALL OTHER DIAGNOSIS      RECORDS RECEIVED FROM: [facility]   DATE RECEIVED: 1/23/2019   NOTES STATUS DETAILS   OFFICE NOTE from referring provider  [facility]   OFFICE NOTE from medical oncologist  Epic   DISCHARGE SUMMARY from hospital  [facility]   DISCHARGE REPORT from the ER NA    OPERATIVE REPORT  Epic   MEDICATION LIST  [facility]   CLINICAL TRIAL TREATMENTS TO DATE     LABS     PATHOLOGY REPORTS Scheduled 1/28/19    ANYTHING RELATED TO DIAGNOSIS     GENONOMIC TESTING     TYPE:  Epic   IMAGING (NEED IMAGES & REPORT)     CT SCANS 1/9/19, 11/13/18, 7/26/18, 7/16/18 PACS   MRI NA    MAMMO 11/9/18 PACS   ULTRASOUND NA    PET NA    X Ray - 8/20/18      PACS

## 2019-01-23 NOTE — PROGRESS NOTES
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     HPI: Courtney comes to the clinic accompanied by her roommate Alivia. She is overall feeling well- fatigue continues to improve and she is able to still work doing assembly, very physically active. Notes mild intermittent numbness in her fingers and toes, denies functional impact or pain. Her port removal site has healed and she has no concerns with this. Notes a sore throat daily which has improved with starting omeprazole daily. BP elevated in clinic today, but she reports it typically is elevated at healthcare visits- checks at home and it is normally 120s/80s. Denies fevers, chills, bleeding, or pain. No other concerns, reports an ECOG score of 0.      Oncology History:  2018: Six months of bloating, decreased appetite, early satiety.     18:  CT A/P:  Large cystic/solid mass within the pelvis highly suspicious for ovarian malignancy. 2.  Omental thickening suspicious for metastatic disease. 3.  Large volume of ascites.  Malignant ascites cannot be excluded. 4.  Small left pleural effusion and left lower lobe atelectasis. 5.  Diverticulosis, small hiatal hernia, and gallbladder sludge.     18:  = 598     18:  CT Chest:  1. No definite metastatic disease in the chest. 2. Small left pleural effusion. 3. Moderate ascites with mesenteric and omental edema/nodularity, better evaluated on CT 2018:  Exploratory laparotomy, modified radical hysterectomy, bilateral salpingo-oophorectomy, omentectomy, right pelvic and bilateral para-aortic lymph node dissection, CUSA tumor debulking, mobilization of the  entire colon, stripping of left pelvic peritoneum, proctoscopy, optimal tumor debulking to no gross residual disease                 Pathology:  Stage IIIB mixed clear cell (80%) and endometrioid (20%) adenocarcinoma, + pelvic LN, + PA LN, + omentum     8/24/18:  Cycle #1 carboplatin AUC6 and paclitaxel 175 mg/m2,  = 110     9/14/18:  Cycle #2 carboplatin AUC6 and paclitaxel 175 mg/m2,  = 48     10/5/18:  Cycle #3 carboplatin AUC 6 and paclitaxel 175 mg/m2,  = 59     10/26/18:  Cycle #4 carboplatin AUC 6 and paclitaxel 175 mg/m2,  = 75     11/13/18:  CT C/A/P: In this patient with history of ovarian cancer, there are postoperative changes of total abdominal hysterectomy, bilateral salpingo-oophorectomy and debulking surgery: 1. Small amount of ascites, improved from the prior study. 2. Peritoneal nodularity and thickening, improved from the prior study. 3. Stable bilateral pulmonary nodules measuring up to 4 mm. No new or enlarging pulmonary nodules.     11/16/18:  Cycle #5 carboplatin AUC 6 and paclitaxel 175 mg/m2,  = 71     12/7/18:  Cycle #6 carboplatin AUC 6 and paclitaxel 175 mg/m2,  = 71     1/9/19:  CT C/A/P:  1. Findings suspicious for worsening intra-abdominal involvement by ovarian malignancy with increased peritoneal nodularity and increased retroperitoneal and mesenteric adenopathy. Continued small ascites. 2. Unchanged tiny pulmonary nodules without evidence of malignancy in  the chest        Past Medical History:   Diagnosis Date     Hypertension      Ovarian cancer (H)        Past Surgical History:   Procedure Laterality Date     HYSTERECTOMY TOTAL ABDOMINAL, BILATERAL SALPINGO-OOPHORECTOMY, COMBINED Bilateral 7/30/2018    Procedure: COMBINED HYSTERECTOMY TOTAL ABDOMINAL, SALPINGO-OOPHORECTOMY;;  Surgeon: Jammie Dueñas MD;  Location: [facility] OR     LAPAROTOMY, TUMOR DEBULKING, COMBINED Bilateral 7/30/2018    Procedure: COMBINED LAPAROTOMY, TUMOR DEBULKING;   Exploratory Laparotomy, Modified Radical Hysterectomy, Removal of Cervix, Bilateral Salpingo - Oophorectomy, Omentectomy, Bilateral Para Aortic and Left Pelvic Lymph Node Dissection, Tumor Debulking, Proctoscopy;  Surgeon: Jammie Dueñas MD;  Location[facility] OR     REMOVE PORT PERITONEAL N/A 1/7/2019    Procedure: REMOVE PORT PERITONEAL;  Surgeon: Chanel Rodriguez MD;  Location: [facility] OR     SURGICAL HISTORY OF -   1980    left ankle surgery       Current Outpatient Medications   Medication     hydrochlorothiazide (HYDRODIURIL) 25 MG tablet     ibuprofen (ADVIL/MOTRIN) 600 MG tablet     pravastatin (PRAVACHOL) 20 MG tablet     senna-docusate (SENOKOT-S;PERICOLACE) 8.6-50 MG per tablet     No current facility-administered medications for this visit.        Allergies   Allergen Reactions     Gemfibrozil Muscle Pain (Myalgia)     Lovastatin      headaches     Penicillins        Family History   Problem Relation Age of Onset     Hypertension Mother      Hypertension Father      Cerebrovascular Disease Father         polycythemia     Breast Cancer Maternal Aunt         older age       Social History     Socioeconomic History     Marital status: Single     Spouse name: Not on file     Number of children: 0     Years of education: Not on file     Highest education level: Not on file   Social Needs     Financial resource strain: Not on file     Food insecurity - worry: Not on file     Food insecurity - inability: Not on file     Transportation needs - medical: Not on file     Transportation needs - non-medical: Not on file   Occupational History     Occupation: [employer]     Employer: [employer]   Tobacco Use     Smoking status: Never Smoker     Smokeless tobacco: Never Used   Substance and Sexual Activity     Alcohol use: Yes     Comment: occasional     Drug use: No     Sexual activity: No   Other Topics Concern     Parent/sibling w/ CABG, MI or angioplasty before 65F 55M? No       Service No     Blood Transfusions No     Caffeine Concern Yes     Occupational Exposure No     Hobby Hazards No     Sleep Concern No     Stress Concern No     Weight Concern Yes     Special Diet No     Back Care No     Exercise Yes     Bike Helmet No     Comment: Yes in the future     Seat Belt Yes     Self-Exams Yes   Social History Narrative     Not on file           ROS  General: + fatigue. Denies changes in weight, weakness, appetite changes, night sweats, hot flashes, fever, chills, or difficulty sleeping  HEENT: + sore throat. Denies headaches, hair loss, visual difficulty or disturbances, masses, head injury, tinnitus, hearing loss, epistaxis, congestion, problems with teeth or gums, dysphonia, or dysphagia  Pulmonary: Denies cough, sputum, hemoptysis, shortness of breath, dyspnea on exertion, wheezing, or allergies  Cardiovascular: Denies chest pain, fainting, palpitations, murmurs, activity intolerance, swelling in legs, or high blood pressure  Gastrointestinal: Denies nausea, vomiting, constipation, diarrhea, abdominal pain, bloating, heartburn, melena, hematochezia, or jaundice  Genitourinary: Denies dysuria, urinary urgency or frequency, hematuria, cloudy or malodorous urine, incontinence, repeat urinary tract infections, flank pain, pelvic pain, vaginal bleeding, vaginal discharge, or vaginal dryness  Sexual Function: Denies pain with intercourse, changes in libido, or changes in orgasm  Integumentary: Denies rashes, sores, changing moles, or scarring  Hematologic: Denies swollen lymph nodes, masses, easy bruising, or easy bleeding  Musculoskeletal: Denies falls, back pain, myalgias, arthralgias, stiffness, muscle weakness, or muscle cramps  Neurologic: + numbness/tingling. Denies changes in memory, difficulty with walking, dizziness, seizures, or tremors  Psychiatric: Denies anxiety, depression, mood changes, suicidal thoughts, or difficulty concentrating  Endocrine: Denies polydipsia,  "polyuria, temperature intolerance, or history of thyroid disease        Physical Exam:    /90 (BP Location: Left arm, Patient Position: Sitting)   Pulse 90   Temp 99.1  F (37.3  C) (Oral)   Resp 16   Ht 1.651 m (5' 5\")   Wt 68.9 kg (151 lb 12.8 oz)   SpO2 99%   BMI 25.26 kg/m      CONSTITUTIONAL: Alert non-toxic appearing female in no acute distress  HEAD: Normocephalic, atraumatic  EYES: PERRLA; no scleral icterus  ENT: Oropharynx pink without lesions; tongue with thick white coating  NECK: Neck supple without lymphadenopathy  RESPIRATORY: Lungs clear to auscultation, no increased work of breathing noted  CV: Regular rate and rhythm, S1S2, no clicks, murmurs, rubs, or gallops; bilateral lower extremities without edema, dorsalis pedis pulses 2+ bilaterally  GASTROINTESTINAL: Normoactive bowel sounds x4 quadrants, abdomen soft and non-distended without palpable masses or organomegaly, mild LUQ tenderness to palpation but no guarding, rebound tenderness, or rigidity  GENITOURINARY: Not indicated  LYMPHATIC: Cervical, supraclavicular, and inguinal nodes without lymphadenopathy  MUSCULOSKELETAL: Moves all extremities, no obvious muscle wasting  NEUROLOGIC: No gross deficits, normal gait  SKIN: Appropriate color for race, warm and dry, no rashes or lesions to unclothed skin  PSYCHIATRIC: Pleasant and interactive, affect bright, makes appropriate eye contact, thought process linear    Labs:      1/23/2019   Hemoglobin 11.7 - 15.7 g/dL 10.8 (A)   Hematocrit 35.0 - 47.0 % 34.4 (A)   Platelet Count 150 - 450 10e9/L 415   Absolute Neutrophil 1.6 - 8.3 10e9/L 4.7   Sodium 133 - 144 mmol/L 139   Potassium 3.4 - 5.3 mmol/L 3.2 (A)   Chloride 94 - 109 mmol/L 99   Carbon Dioxide 20 - 32 mmol/L 34 (A)   Urea Nitrogen 7 - 30 mg/dL 10   Creatinine 0.52 - 1.04 mg/dL 0.59   Calcium 8.5 - 10.1 mg/dL 9.6   Bilirubin Total 0.2 - 1.3 mg/dL 0.3   ALT 0 - 50 U/L 16   AST 0 - 45 U/L 18   Alkaline Phosphatase 40 - 150 U/L 88 "   Albumin 3.4 - 5.0 g/dL 3.1 (A)   Protein Total 6.8 - 8.8 g/dL 8.4   INR 0.86 - 1.14 1.02   PTT 22 - 37 sec 30   WBC 4.0 - 11.0 10e9/L 6.3       Assessment/Plan:  1) Progressive platinum resistant ovarian cancer: Overall feeling well with the exception of mild fatigue and mild neuropathy, both grade 1. ECOG 0. Potassium 3.2 today, unclear etiology, asymptomatic- potassium chloride 40mEq PO x1. Tongue with appearance of thrush, could be contributing to her sore throat- nystatin swish and swallow QID x14 days or until her symptoms have resolved x3 days. To monitor BPs at home and notify PCP if these become elevated outside the office. Chanel Purcell,  RN, in for further study education and coordination.   2) Patient verbalized understanding of and agreement with plan    A total of 15 minutes of face to face time were spent with the patient with over 50% of that time spent in counseling, coordination of care, education, and symptom management.    SULMA Coffey, FNP-C  Division of Gynecologic Oncology  [facility]  Pager: [phone]

## 2019-01-23 NOTE — NURSING NOTE
"Oncology Rooming Note    January 23, 2019 1:29 PM   Di Evans is a 58 year old female who presents for:    Chief Complaint   Patient presents with     Blood Draw     Venipuncture labs collected by RN.      Oncology Clinic Visit     Return Ovarian Ca     Initial Vitals: /90 (BP Location: Left arm, Patient Position: Sitting)   Pulse 90   Temp 99.1  F (37.3  C) (Oral)   Resp 16   Ht 1.651 m (5' 5\")   Wt 68.9 kg (151 lb 12.8 oz)   SpO2 99%   BMI 25.26 kg/m   Estimated body mass index is 25.26 kg/m  as calculated from the following:    Height as of this encounter: 1.651 m (5' 5\").    Weight as of this encounter: 68.9 kg (151 lb 12.8 oz). Body surface area is 1.78 meters squared.  No Pain (0) Comment: Data Unavailable   No LMP recorded. Patient has had a hysterectomy.  Allergies reviewed: Yes  Medications reviewed: Yes    Medications: Medication refills not needed today.  Pharmacy name entered into [facility]:    [facility] - [place], [place] - [address], [address]  [facility] [address] [place] [address] [address] AT [facility]  [address]    Clinical concerns: No new concerns.       6 minutes for nursing intake (face to face time)     Judy Montes CMA              "

## 2019-01-23 NOTE — NURSING NOTE
TRIO ([id number]): Screening Process Study Note     Patient here accompanied by her friend. Patient was consented for TRIO Study on 11/JAN/2019 by Dr. Jammie Duque. Study questions were answered regarding consent form, purpose for study, risks and benefits. The patient understands that the study involves an active treatment phase as well as a post-treatment follow up phase. Questions regarding the study logistics were answered today.Labs were reviewed by provider today. For more information regarding the visit, please read Siena Inman's progress note. Instructed patient to come fasting for C1D1 and C2D1. Talked to patient about the importance to use anti-nausea medication prior to the first day of study medication and thereafter while taking CC-486. Instructed patient to call back with any questions or concerns. Patient voiced understanding.     Protocol # [id number]  Protocol version: 14/SEP/2018  IRB Study # [id number]  NCT # [id number]  Main ICF version: 09/OCT/2019  HIPAA version: 14/DEC/2018  PI: Angelo Molina  Pager: [phone]      Chanel Purcell, RN    Pager: [phone]        Form 503.03.01 (Version 2)     Effective date: 01AUG2018     Next Review Date: 01AUG2020

## 2019-01-23 NOTE — NURSING NOTE
Chief Complaint   Patient presents with     Blood Draw     Venipuncture labs collected by RN.

## 2019-01-23 NOTE — LETTER
2019       RE: Di Evans  [address] [place] [address]     Dear Colleague,    Thank you for referring your patient, Di Evans, to the [facility]. Please see a copy of my visit note below.    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     HPI: Courtney comes to the clinic accompanied by her roommate Alivia. She is overall feeling well- fatigue continues to improve and she is able to still work doing assembly, very physically active. Notes mild intermittent numbness in her fingers and toes, denies functional impact or pain. Her port removal site has healed and she has no concerns with this. Notes a sore throat daily which has improved with starting omeprazole daily. BP elevated in clinic today, but she reports it typically is elevated at healthcare visits- checks at home and it is normally 120s/80s. Denies fevers, chills, bleeding, or pain. No other concerns, reports an ECOG score of 0.      Oncology History:  2018: Six months of bloating, decreased appetite, early satiety.     18:  CT A/P:  Large cystic/solid mass within the pelvis highly suspicious for ovarian malignancy. 2.  Omental thickening suspicious for metastatic disease. 3.  Large volume of ascites.  Malignant ascites cannot be excluded. 4.  Small left pleural effusion and left lower lobe atelectasis. 5.  Diverticulosis, small hiatal hernia, and gallbladder sludge.     18:  = 598     18:  CT Chest:  1. No definite metastatic disease in the chest. 2. Small left pleural effusion. 3. Moderate ascites with mesenteric and omental edema/nodularity, better  evaluated on CT 7/16/2018 7/30/18:  Exploratory laparotomy, modified radical hysterectomy, bilateral salpingo-oophorectomy, omentectomy, right pelvic and bilateral para-aortic lymph node dissection, CUSA tumor debulking, mobilization of the entire colon, stripping of left pelvic peritoneum, proctoscopy, optimal tumor debulking to no gross residual disease                 Pathology:  Stage IIIB mixed clear cell (80%) and endometrioid (20%) adenocarcinoma, + pelvic LN, + PA LN, + omentum     8/24/18:  Cycle #1 carboplatin AUC6 and paclitaxel 175 mg/m2,  = 110     9/14/18:  Cycle #2 carboplatin AUC6 and paclitaxel 175 mg/m2,  = 48     10/5/18:  Cycle #3 carboplatin AUC 6 and paclitaxel 175 mg/m2,  = 59     10/26/18:  Cycle #4 carboplatin AUC 6 and paclitaxel 175 mg/m2,  = 75     11/13/18:  CT C/A/P: In this patient with history of ovarian cancer, there are postoperative changes of total abdominal hysterectomy, bilateral salpingo-oophorectomy and debulking surgery: 1. Small amount of ascites, improved from the prior study. 2. Peritoneal nodularity and thickening, improved from the prior study. 3. Stable bilateral pulmonary nodules measuring up to 4 mm. No new or enlarging pulmonary nodules.     11/16/18:  Cycle #5 carboplatin AUC 6 and paclitaxel 175 mg/m2,  = 71     12/7/18:  Cycle #6 carboplatin AUC 6 and paclitaxel 175 mg/m2,  = 71     1/9/19:  CT C/A/P:  1. Findings suspicious for worsening intra-abdominal involvement by ovarian malignancy with increased peritoneal nodularity and increased retroperitoneal and mesenteric adenopathy. Continued small ascites. 2. Unchanged tiny pulmonary nodules without evidence of malignancy in  the chest        Past Medical History:   Diagnosis Date     Hypertension      Ovarian cancer (H)        Past Surgical History:   Procedure Laterality Date     HYSTERECTOMY TOTAL ABDOMINAL, BILATERAL SALPINGO-OOPHORECTOMY, COMBINED Bilateral 7/30/2018     Procedure: COMBINED HYSTERECTOMY TOTAL ABDOMINAL, SALPINGO-OOPHORECTOMY;;  Surgeon: Jammie Dueñas MD;  Location: [facility] OR     LAPAROTOMY, TUMOR DEBULKING, COMBINED Bilateral 7/30/2018    Procedure: COMBINED LAPAROTOMY, TUMOR DEBULKING;  Exploratory Laparotomy, Modified Radical Hysterectomy, Removal of Cervix, Bilateral Salpingo - Oophorectomy, Omentectomy, Bilateral Para Aortic and Left Pelvic Lymph Node Dissection, Tumor Debulking, Proctoscopy;  Surgeon: Jammie Dueñas MD;  Location: [facility] OR     REMOVE PORT PERITONEAL N/A 1/7/2019    Procedure: REMOVE PORT PERITONEAL;  Surgeon: Chanel Rodriguez MD;  Location: [facility] OR     SURGICAL HISTORY OF -   1980    left ankle surgery       Current Outpatient Medications   Medication     hydrochlorothiazide (HYDRODIURIL) 25 MG tablet     ibuprofen (ADVIL/MOTRIN) 600 MG tablet     pravastatin (PRAVACHOL) 20 MG tablet     senna-docusate (SENOKOT-S;PERICOLACE) 8.6-50 MG per tablet     No current facility-administered medications for this visit.        Allergies   Allergen Reactions     Gemfibrozil Muscle Pain (Myalgia)     Lovastatin      headaches     Penicillins        Family History   Problem Relation Age of Onset     Hypertension Mother      Hypertension Father      Cerebrovascular Disease Father         polycythemia     Breast Cancer Maternal Aunt         older age       Social History     Socioeconomic History     Marital status: Single     Spouse name: Not on file     Number of children: 0     Years of education: Not on file     Highest education level: Not on file   Social Needs     Financial resource strain: Not on file     Food insecurity - worry: Not on file     Food insecurity - inability: Not on file     Transportation needs - medical: Not on file     Transportation needs - non-medical: Not on file   Occupational History     Occupation: [employer]     Employer: [employer]   Tobacco Use     Smoking status: Never Smoker      Smokeless tobacco: Never Used   Substance and Sexual Activity     Alcohol use: Yes     Comment: occasional     Drug use: No     Sexual activity: No   Other Topics Concern     Parent/sibling w/ CABG, MI or angioplasty before 65F 55M? No      Service No     Blood Transfusions No     Caffeine Concern Yes     Occupational Exposure No     Hobby Hazards No     Sleep Concern No     Stress Concern No     Weight Concern Yes     Special Diet No     Back Care No     Exercise Yes     Bike Helmet No     Comment: Yes in the future     Seat Belt Yes     Self-Exams Yes   Social History Narrative     Not on file           ROS  General: + fatigue. Denies changes in weight, weakness, appetite changes, night sweats, hot flashes, fever, chills, or difficulty sleeping  HEENT: + sore throat. Denies headaches, hair loss, visual difficulty or disturbances, masses, head injury, tinnitus, hearing loss, epistaxis, congestion, problems with teeth or gums, dysphonia, or dysphagia  Pulmonary: Denies cough, sputum, hemoptysis, shortness of breath, dyspnea on exertion, wheezing, or allergies  Cardiovascular: Denies chest pain, fainting, palpitations, murmurs, activity intolerance, swelling in legs, or high blood pressure  Gastrointestinal: Denies nausea, vomiting, constipation, diarrhea, abdominal pain, bloating, heartburn, melena, hematochezia, or jaundice  Genitourinary: Denies dysuria, urinary urgency or frequency, hematuria, cloudy or malodorous urine, incontinence, repeat urinary tract infections, flank pain, pelvic pain, vaginal bleeding, vaginal discharge, or vaginal dryness  Sexual Function: Denies pain with intercourse, changes in libido, or changes in orgasm  Integumentary: Denies rashes, sores, changing moles, or scarring  Hematologic: Denies swollen lymph nodes, masses, easy bruising, or easy bleeding  Musculoskeletal: Denies falls, back pain, myalgias, arthralgias, stiffness, muscle weakness, or muscle cramps  Neurologic: +  "numbness/tingling. Denies changes in memory, difficulty with walking, dizziness, seizures, or tremors  Psychiatric: Denies anxiety, depression, mood changes, suicidal thoughts, or difficulty concentrating  Endocrine: Denies polydipsia, polyuria, temperature intolerance, or history of thyroid disease        Physical Exam:    /90 (BP Location: Left arm, Patient Position: Sitting)   Pulse 90   Temp 99.1  F (37.3  C) (Oral)   Resp 16   Ht 1.651 m (5' 5\")   Wt 68.9 kg (151 lb 12.8 oz)   SpO2 99%   BMI 25.26 kg/m       CONSTITUTIONAL: Alert non-toxic appearing female in no acute distress  HEAD: Normocephalic, atraumatic  EYES: PERRLA; no scleral icterus  ENT: Oropharynx pink without lesions; tongue with thick white coating  NECK: Neck supple without lymphadenopathy  RESPIRATORY: Lungs clear to auscultation, no increased work of breathing noted  CV: Regular rate and rhythm, S1S2, no clicks, murmurs, rubs, or gallops; bilateral lower extremities without edema, dorsalis pedis pulses 2+ bilaterally  GASTROINTESTINAL: Normoactive bowel sounds x4 quadrants, abdomen soft and non-distended without palpable masses or organomegaly, mild LUQ tenderness to palpation but no guarding, rebound tenderness, or rigidity  GENITOURINARY: Not indicated  LYMPHATIC: Cervical, supraclavicular, and inguinal nodes without lymphadenopathy  MUSCULOSKELETAL: Moves all extremities, no obvious muscle wasting  NEUROLOGIC: No gross deficits, normal gait  SKIN: Appropriate color for race, warm and dry, no rashes or lesions to unclothed skin  PSYCHIATRIC: Pleasant and interactive, affect bright, makes appropriate eye contact, thought process linear    Labs:      1/23/2019   Hemoglobin 11.7 - 15.7 g/dL 10.8 (A)   Hematocrit 35.0 - 47.0 % 34.4 (A)   Platelet Count 150 - 450 10e9/L 415   Absolute Neutrophil 1.6 - 8.3 10e9/L 4.7   Sodium 133 - 144 mmol/L 139   Potassium 3.4 - 5.3 mmol/L 3.2 (A)   Chloride 94 - 109 mmol/L 99   Carbon Dioxide 20 - " 32 mmol/L 34 (A)   Urea Nitrogen 7 - 30 mg/dL 10   Creatinine 0.52 - 1.04 mg/dL 0.59   Calcium 8.5 - 10.1 mg/dL 9.6   Bilirubin Total 0.2 - 1.3 mg/dL 0.3   ALT 0 - 50 U/L 16   AST 0 - 45 U/L 18   Alkaline Phosphatase 40 - 150 U/L 88   Albumin 3.4 - 5.0 g/dL 3.1 (A)   Protein Total 6.8 - 8.8 g/dL 8.4   INR 0.86 - 1.14 1.02   PTT 22 - 37 sec 30   WBC 4.0 - 11.0 10e9/L 6.3       Assessment/Plan:  1) Progressive platinum resistant ovarian cancer: Overall feeling well with the exception of mild fatigue and mild neuropathy, both grade 1. ECOG 0. Potassium 3.2 today, unclear etiology, asymptomatic- potassium chloride 40mEq PO x1. Tongue with appearance of thrush, could be contributing to her sore throat- nystatin swish and swallow QID x14 days or until her symptoms have resolved x3 days. To monitor BPs at home and notify PCP if these become elevated outside the office. Chanel Purcell,  RN, in for further study education and coordination.   2) Patient verbalized understanding of and agreement with plan    A total of 15 minutes of face to face time were spent with the patient with over 50% of that time spent in counseling, coordination of care, education, and symptom management.    SULMA Coffey, FNP-C  Division of Gynecologic Oncology  [facility]  Pager: [phone]

## 2019-01-24 NOTE — TELEPHONE ENCOUNTER
Pharmacy notified via fax of Siena's recommendations. Advised patient contact triage department directly with any questions.     [facility]      ----- Message from SULMA Coffey CNP sent at 1/24/2019  3:11 PM CST -----  Regarding: RE: KCl 20mEq refill request  It was a one time dose thanks  ----- Message -----  From: Ashlee Cardozo RN  Sent: 1/24/2019   2:14 PM  To: SULMA Coffey CNP  Subject: KCl 20mEq refill request                         [facility] 20mEq. I see in your note yesterday you wanted her to take this. Do you want to refill Rx? Or was that a one time dose? Please advise. Thanks!    Ashlee Cardozo RN   [facility] Triage

## 2019-01-27 NOTE — TELEPHONE ENCOUNTER
RECORDS STATUS - ALL OTHER DIAGNOSIS      RECORDS RECEIVED FROM: [facility] - in [facility] (internal referral)   DATE RECEIVED: 1/27/19   NOTES STATUS DETAILS   OFFICE NOTE from referring provider 1/23/19 In [facility]     OFFICE NOTE from medical oncologist In [facility] In [facility]     DISCHARGE SUMMARY from hospital In [facility] In [facility]     DISCHARGE REPORT from the ER In [facility] In [facility]     OPERATIVE REPORT In [facility] In Epic     MEDICATION LIST In Epic In [facility]     CLINICAL TRIAL TREATMENTS TO DATE In [facility] In [facility]     LABS     PATHOLOGY REPORTS In [facility] In Epic     ANYTHING RELATED TO DIAGNOSIS In Epic In Epic     GENONOMIC TESTING     TYPE: In Epic In Epic   IMAGING (NEED IMAGES & REPORT)     CT SCANS In [facility] In [facility]   MRI In [facility] In [facility]   MAMMO In Epic In [facility]   ULTRASOUND In Epic In Epic   PET In Epic In Epic

## 2019-01-28 NOTE — PROGRESS NOTES
Pt here post attempted retroperitoneal biopsy; no procedure, no areas noted to biopsy and no sedation given. Pt awake and alert; family at bedside. Taking PO, food and drink. IV discontinued; pt discharged to home with family.

## 2019-01-28 NOTE — PROGRESS NOTES
Interventional Radiology Pre-Procedure Sedation Assessment   Time of Assessment: 8:54 AM    Expected Level: Moderate Sedation    Indication: Sedation is required for the following type of Procedure: Biopsy    Sedation and procedural consent: Risks, benefits and alternatives were discussed with Patient    PO Intake: Appropriately NPO for procedure    ASA Class: Class 3 - SEVERE SYSTEMIC DISEASE, DEFINITE FUNCTIONAL LIMITATIONS.    Mallampati: Grade 2:  Soft palate, base of uvula, tonsillar pillars, and portion of posterior pharyngeal wall visible    Lungs: Lungs Clear with good breath sounds bilaterally    Heart: Normal heart sounds and rate    History and physical reviewed and no updates needed. I have reviewed the lab findings, diagnostic data, medications, and the plan for sedation. I have determined this patient to be an appropriate candidate for the planned sedation and procedure and have reassessed the patient IMMEDIATELY PRIOR to sedation and procedure.    Oz Carrillo MD

## 2019-01-28 NOTE — PROGRESS NOTES
Prep and teaching complete for retroperitoneal biopsy; Alivia at bedside, phone:  [phone]; confirmed others will drive home. IV in place; H and P and labs are current; awaiting consent and sedation assessment.

## 2019-01-29 NOTE — TELEPHONE ENCOUNTER
Called pt to discuss.  IR has been unsuccessful in obtaining a biopsy and thus we will likely need to proceed to the OR for laparoscopy and biopsy to confirm she has persistent disease prior to any treatment.  Dr Molina is still in discussions with IR about whether or not they will be able to perform a biopsy tomorrow.  If they are unwilling/unable then we will plan to proceed to the OR.

## 2019-01-29 NOTE — PROGRESS NOTES
I discussed with Dr. Molina.  His concern is the lack of available treatment options given the resistance of the tumor to platinum based drugs.  He was hoping and felt that the patients best hopes lied in a clinical trial with immunotherapy.  The patient must have a biopsy in order to be enrolled.  We discussed that the patient would be at high risk for bowel or mesenteric vessel injury, which would be at minimum be very morbid and likely catastrophic for her.  We are asking for clarification as to whether or not a FNA would be suitable.  We could attempt to biopsy the mesenteric implant/node on series 3 image 61 of the biopsy CT from 1/28/2019.

## 2019-01-29 NOTE — TELEPHONE ENCOUNTER
Patient participating in TRIO Study ([id number]). Phone call to patient. Biopsy attempt was done yesterday, unable to do biopsy. Patient is worried that if biopsy is not done, she might not be able to participate in study. Patient is correct. Explained that PI, Dr. Angelo Molina was notified and he will be speaking with Interventional Radiology to figure out what was the reason for not doing biopsy and find a potential safe location to conduct it. Depending on results, patient will be notified. I will notify patient later today or tomorrow of findings. Will keep tomorrow's appointment in case we need it. Will also notify treating MD, Dr. Jammie Duque in order to start formulating a new plan of care in case patient does not participate in study. Instructed patient to call back with any questions or concerns, patient voiced understanding.    Chanel Purcell, RN    Office: [phone]  Pager: [phone]

## 2019-02-05 NOTE — PROGRESS NOTES
Pt here post attempted retroperitoneal biopsy; Dr Jamison to see pt and family. Dr NOWAK gave verbal permission for pt to discharge to home at 1730. Site on left lower abdomen is dry and intact. Pt awake and alert, denies pain. VSS. Family at bedside.

## 2019-02-05 NOTE — PROGRESS NOTES
Pt up walking, steady on her feet; site remains dry and intact. Pt declined any PO intake. VSS. IV discontinued. Voided.  Page back to see pt. Discharge instructions reviewed with pt and family; copy of instructions given to pt. Pt stated understanding. Pt discharged to home per wheelchair with family.

## 2019-02-05 NOTE — PROGRESS NOTES
Interventional Radiology Pre-Procedure Sedation Assessment   Time of Assessment: 2:34 PM    Expected Level: Moderate Sedation    Indication: Sedation is required for the following type of Procedure: Biopsy    Sedation and procedural consent: Risks, benefits and alternatives were discussed with Patient    PO Intake: Appropriately NPO for procedure    ASA Class: Class 2 - MILD SYSTEMIC DISEASE, NO ACUTE PROBLEMS, NO FUNCTIONAL LIMITATIONS.    Mallampati: Grade 1:  Soft palate, uvula, tonsillar pillars, and posterior pharyngeal wall visible    Lungs: Lungs Clear with good breath sounds bilaterally    Heart: Normal heart sounds and rate    History and physical reviewed and no updates needed. I have reviewed the lab findings, diagnostic data, medications, and the plan for sedation. I have determined this patient to be an appropriate candidate for the planned sedation and procedure and have reassessed the patient IMMEDIATELY PRIOR to sedation and procedure.    Gerardo Yin MD

## 2019-02-05 NOTE — IP AVS SNAPSHOT
MRN:[id number]                      After Visit Summary   2/5/2019    Di Evans    MRN: [id number]           Visit Information        Department      2/5/2019 10:55 AM [address] [facility]          Review of your medicines      UNREVIEWED medicines. Ask your doctor about these medicines       Dose / Directions   aspirin 81 MG EC tablet      Dose:  81 mg  Take 81 mg by mouth  Refills:  0     cetirizine 10 MG tablet  Commonly known as:  zyrTEC      Dose:  10 mg  Take 10 mg by mouth  Refills:  0     hydrochlorothiazide 25 MG tablet  Commonly known as:  HYDRODIURIL  Used for:  Essential hypertension with goal blood pressure less than 140/90      Dose:  25 mg  Take 1 tablet (25 mg) by mouth every morning for blood pressure.  Quantity:  90 tablet  Refills:  1     ibuprofen 600 MG tablet  Commonly known as:  ADVIL/MOTRIN  Used for:  Ovarian cancer, unspecified laterality (H)      Dose:  600 mg  Take 1 tablet (600 mg) by mouth every 6 hours as needed for moderate pain  Quantity:  120 tablet  Refills:  3     mometasone 50 MCG/ACT nasal spray  Commonly known as:  NASONEX      Dose:  2 spray  Spray 2 sprays into both nostrils daily  Refills:  0     nystatin 398154 UNIT/ML suspension  Commonly known as:  MYCOSTATIN  Used for:  Thrush      Dose:  239284 Units  Take 5 mLs (500,000 Units) by mouth 4 times daily Swish and swallow until the white coating has resolved x 3 days  Quantity:  473 mL  Refills:  0     pravastatin 20 MG tablet  Commonly known as:  PRAVACHOL  Used for:  Hyperlipidemia LDL goal <130      Dose:  20 mg  Take 1 tablet (20 mg) by mouth every evening for cholesterol.  Quantity:  90 tablet  Refills:  1     senna-docusate 8.6-50 MG tablet  Commonly known as:  SENOKOT-S/PERICOLACE  Used for:  Drug-induced constipation      Dose:  2 tablet  Take 2 tablets by mouth daily  Quantity:  100 tablet  Refills:  3              Protect others around you: Learn how to safely use, store and throw away  your medicines at www.disposemymeds.org.       Follow-ups after your visit       Your next 10 appointments already scheduled    Feb 06, 2019 10:00 AM CST  Masonic Lab Draw with [facility] LAB DRAW  [facility] Masonic Lab Draw ([facility]) [address] [place] [address]  [address]  [facility] [address]  [phone]   Feb 06, 2019 10:30 AM CST  (Arrive by 10:15 AM)  Return Visit with SULMA Coffey CNP  [facility] ([facility]) [address] [place] [address]  [address]  [facility] [address]  [phone]   Feb 07, 2019 12:00 PM CST  (Arrive by 11:45 AM)  Return Visit with SULMA Paul CNP  [facility]) [address] [place] [address]  [address]  [facility] [address]  [phone]   Feb 20, 2019  3:00 PM CST  [facility] Lab Draw with [facility]  [facility]) [address] [place] [address]  [address]  [facility] [address]  [phone]   Feb 27, 2019 10:00 AM CST  Masonic Lab Draw with [facility] LAB DRAW  [facility] Masonic Lab Draw ([facility]) [address] [place] [address]  [address]  [facility] [address]  [phone]   Feb 27, 2019 10:40 AM CST  (Arrive by 10:25 AM)  Return Visit with Angelo Molina MD  [facility] ([facility]) [address] [place] [address]  [address]  [facility] [address]  [phone]   Feb 27, 2019 12:00 PM CST  Infusion 60 with UC ONCOLOGY INFUSION, UC 28 ATC  [facility]) [address] [place] [address]  [address]  [facility] [address]  [phone]   Mar 06, 2019  3:00 PM CST  Masonic Lab Draw with [facility] LAB DRAW  [facility] Masonic Lab Draw ([facility]) [address] [place] [address]  [address]  [facility] [address]  [phone]   Mar 13, 2019  3:00 PM CDT  [facility] Lab Draw  with  MASONIC LAB DRAW  [facility] Masonic Lab Draw ([facility]) [address] [facility]  [address]  [facility] [address] [phone]      Care Instructions       Further instructions from your care team       [facility]    Interventional Radiology  Patient Instructions Following Attempted Retroperitoneal Biopsy    AFTER YOU GO HOME  ? You were given sedation--DO NOT drive or operate machinery at home or at work for at least 24 hours  ? DO relax and take it easy for 48 hours, no strenuous activity for 24 hours  ? DO drink plenty of fluids  ? DO resume your regular diet, unless otherwise instructed by your Primary Physician  ? Keep the dressing dry and in place for 24 hours.  ? DO NOT SMOKE FOR AT LEAST 24 HOURS, if you have been given any medications that were to help you relax or sedate you during your procedure  ? DO NOT drink alcoholic beverages the day of your procedure  ? DO NOT do any strenuous exercise or lifting (> 10 lbs) for at least 7 days following your procedure  ? DO NOT take a bath or shower for at least 12 hours following your procedure  ? Remove dressing after shower the next day. Replace with Band aid for 2 days.  Never leave a wet dressing in place.  ? DO NOT make any important or legal decisions for 24 hours following your procedure  ? There should be minimum drainage from the biopsy site    CALL THE PHYSICIAN IF:  ? You start bleeding from the procedure site.  If you do start to bleed from that site, lie down flat and hold pressure on the site for a minimum of 10 minutes.  Your physician will tell you if you need to return to the hospital  ? You develop nausea or vomiting  ? You have excessive swelling, redness, or tenderness at the site  ? You have drainage that looks like it is infected.  ? You experience severe pain  ? You develop hives or a rash or unexplained itching  ? You develop shortness of breath  ? You develop a temperature of 101  "degrees F or greater    [facility] INTERVENTIONAL RADIOLOGY DEPARTMENT  Procedure Physician:   Joyce Narvaez                                   Date of procedure:   February 5, 2019  Telephone Numbers: [phone] Monday-Friday 8:00 am to 4:30 pm  [phone] After 4:30 pm Monday-Friday, Weekends & Holidays.   Ask for the Interventional Radiologist on call.  Someone is on call 24 hrs/day  [facility] toll free number: [phone] Monday-Friday 8:00 am to 4:30 pm  [facility] Emergency Dept: [phone]          Additional Information About Your Visit       [place] Emergency Registryhart Information    [facility] gives you secure access to your electronic health record. If you see a primary care provider, you can also send messages to your care team and make appointments. If you have questions, please call your primary care clinic.  If you do not have a primary care provider, please call [phone] and they will assist you.       Care EveryWhere ID    This is your Care EveryWhere ID. This could be used by other organizations to access your [facility] medical records  [id number]       Your Vitals Were     Blood Pressure   120/68 (BP Location: Left arm)          Pulse   105          Temperature   98.5  F (36.9  C) (Oral)          Respirations   12          Height   1.651 m (5' 5\")             Weight   68 kg (150 lb)    Pulse Oximetry   97%    BMI (Body Mass Index)   24.96 kg/m           Primary Care Provider Office Phone # Fax #    Sonja Jeni Hernandez [phone]      Equal Access to Services    VIANCA MILLARD AH: Hadii aad ku hadasho Soomaali, waaxda luqadaha, qaybta kaalmada adeegyada, mae schulz. So [facility] [phone].    ATENCIÓN: Si habla español, tiene a perez disposición servicios gratuitos de asistencia lingüística. Llame al [phone].    We comply with applicable federal civil rights laws and [place] laws. We do not discriminate on the basis of race, color, national origin, age, " disability, sex, sexual orientation, or gender identity.           Thank you!    Thank you for choosing [facility] for your care. Our goal is always to provide you with excellent care. Hearing back from our patients is one way we can continue to improve our services. Please take a few minutes to complete the written survey that you may receive in the mail after you visit with us. Thank you!            Medication List      ASK your doctor about these medications          Morning Afternoon Evening Bedtime As Needed    aspirin 81 MG EC tablet  INSTRUCTIONS:  Take 81 mg by mouth                     cetirizine 10 MG tablet  Also known as:  zyrTEC  INSTRUCTIONS:  Take 10 mg by mouth                     hydrochlorothiazide 25 MG tablet  Also known as:  HYDRODIURIL  INSTRUCTIONS:  Take 1 tablet (25 mg) by mouth every morning for blood pressure.                     ibuprofen 600 MG tablet  Also known as:  ADVIL/MOTRIN  INSTRUCTIONS:  Take 1 tablet (600 mg) by mouth every 6 hours as needed for moderate pain                     mometasone 50 MCG/ACT nasal spray  Also known as:  NASONEX  INSTRUCTIONS:  Spray 2 sprays into both nostrils daily                     nystatin 737478 UNIT/ML suspension  Also known as:  MYCOSTATIN  INSTRUCTIONS:  Take 5 mLs (500,000 Units) by mouth 4 times daily Swish and swallow until the white coating has resolved x 3 days                     pravastatin 20 MG tablet  Also known as:  PRAVACHOL  INSTRUCTIONS:  Take 1 tablet (20 mg) by mouth every evening for cholesterol.                     senna-docusate 8.6-50 MG tablet  Also known as:  SENOKOT-S/PERICOLACE  INSTRUCTIONS:  Take 2 tablets by mouth daily

## 2019-02-05 NOTE — PROGRESS NOTES
Patient prepped for retroperitoneal biopsy.  Denies pain.  Family with her.  Labs complete.  K = 3.2, IR notified.  H & P current.  Consent needed.

## 2019-02-05 NOTE — NURSING NOTE
Chief Complaint   Patient presents with     Blood Draw     Labs drawn via VPT by RN in lab.      Labs collected from venipuncture by RN. Vitals taken. Checked in for appointment(s).    Ania JAVED RN PHN BSN  BMT/Oncology Lab

## 2019-02-05 NOTE — DISCHARGE INSTRUCTIONS
[facility]    Interventional Radiology  Patient Instructions Following Attempted Retroperitoneal Biopsy    AFTER YOU GO HOME  ? You were given sedation--DO NOT drive or operate machinery at home or at work for at least 24 hours  ? DO relax and take it easy for 48 hours, no strenuous activity for 24 hours  ? DO drink plenty of fluids  ? DO resume your regular diet, unless otherwise instructed by your Primary Physician  ? Keep the dressing dry and in place for 24 hours.  ? DO NOT SMOKE FOR AT LEAST 24 HOURS, if you have been given any medications that were to help you relax or sedate you during your procedure  ? DO NOT drink alcoholic beverages the day of your procedure  ? DO NOT do any strenuous exercise or lifting (> 10 lbs) for at least 7 days following your procedure  ? DO NOT take a bath or shower for at least 12 hours following your procedure  ? Remove dressing after shower the next day. Replace with Band aid for 2 days.  Never leave a wet dressing in place.  ? DO NOT make any important or legal decisions for 24 hours following your procedure  ? There should be minimum drainage from the biopsy site    CALL THE PHYSICIAN IF:  ? You start bleeding from the procedure site.  If you do start to bleed from that site, lie down flat and hold pressure on the site for a minimum of 10 minutes.  Your physician will tell you if you need to return to the hospital  ? You develop nausea or vomiting  ? You have excessive swelling, redness, or tenderness at the site  ? You have drainage that looks like it is infected.  ? You experience severe pain  ? You develop hives or a rash or unexplained itching  ? You develop shortness of breath  ? You develop a temperature of 101 degrees F or greater    [facility] INTERVENTIONAL RADIOLOGY DEPARTMENT  Procedure Physician:   Joyce Narvaez                                   Date of procedure:   February 5, 2019  Telephone Numbers: [phone] Monday-Friday 8:00 am to 4:30  pm  [phone] After 4:30 pm Monday-Friday, Weekends & Holidays.   Ask for the Interventional Radiologist on call.  Someone is on call 24 hrs/day  [facility] toll free number: [phone] Monday-Friday 8:00 am to 4:30 pm  [facility] Emergency Dept: [phone]

## 2019-02-05 NOTE — IP AVS SNAPSHOT
[address] [address]                                    After Visit Summary   2/5/2019    Di Evans    MRN: [id number]           After Visit Summary Signature Page    I have received my discharge instructions, and my questions have been answered. I have discussed any challenges I see with this plan with the nurse or doctor.    ..........................................................................................................................................  Patient/Patient Representative Signature      ..........................................................................................................................................  Patient Representative Print Name and Relationship to Patient    ..................................................               ................................................  Date                                   Time    ..........................................................................................................................................  Reviewed by Signature/Title    ...................................................              ..............................................  Date                                               Time          22EPIC Rev 08/18

## 2019-02-05 NOTE — PROGRESS NOTES
Patient Name: Di Evans  Medical Record Number: [id number]  Today's Date: 2/5/2019    Procedure: mesenteric nodule biopsy  Proceduralist: Dr. TEODORO Sommers MD, Dr. LENORA Jamison MD, Dr. JOSE ELIAS Dempsey MD    Sedation start time: 1520  Sedation end time: 1645  Sedation medications administered: 2.5 mg versed, 125 mcg fentanyl  Total sedation time: 85 minutes    Other medications: glucagon 0.5mg    Procedure start time: 1528  Puncture time: 1530  Procedure end time: 1645  Report given to: ABBY Garcia       Other Notes: Pt arrived to IR room CT2 from . Consent reviewed, pt confirmed. Pt denies any questions or concerns regarding procedure. Pt positioned supine and monitored per protocol. Pathology present for procedure.Procedure canceled without taking any biopsies, per MD unable to do safely. Site cleansed and dressed per protocol. Pt tolerated procedure without any noted complications. Pt transferred back to .

## 2019-02-06 NOTE — NURSING NOTE
TRIO ([id number]): Study Reconsenting Note     Patient here accompanied by her friend for re-consenting in the TRIO Study. New information has been added to the consent form. This was explained to the patient, and all her questions were answered. The patient verbalized understanding and would like to continue participation in the trial. The patient was provided with a signed copy of the IRB-approved consent form. A copy of the signed ICF was given to patient and a copy was sent to scanning. Patient went to laboratory for screening labs and also to the hospital for second attempt for mandatory biopsy. Instructed patient to call study staff with any questions or concerns, patient voiced understanding.     Protocol # [id number]  Protocol version: 14/SEP/2018  IRB Study # [id number]  NCT # [id number]  Main ICF version: 09/OCT/2019  HIPAA version: 14/DEC/2018  PI: Angelo Molina  Pager: [phone]      Chanel Purcell, RN    Pager: [phone]        Form 503.03.01 (Version 2)     Effective date: 01AUG2018     Next Review Date: 01AUG2020

## 2019-02-06 NOTE — TELEPHONE ENCOUNTER
Contacted patient per Dr Duque request. Dr Duque is able to biopsy in OR tomorrow. Check in at the [facility] at 12 noon. Reviewed no solid foods or milk products 8 hrs prior to surgery. May drink clear liquids until 2 hours prior to surgery. 2 hours prior NPO. Pt states she is not taking blood thinners or NSAIDS. Verified it is ok to take Tylenol. Patient in agreement with plan and denies further questions.  Radha Wyman  RN, BSN, OCN

## 2019-02-07 NOTE — ANESTHESIA PREPROCEDURE EVALUATION
Anesthesia Pre-Procedure Evaluation    Patient: Di Evans   MRN:     [id number] Gender:   female   Age:    58 year old :      1960        Preoperative Diagnosis: Ovarian Cancer   Procedure(s):  Diagnostic Laparoscopy With Biopsy     Past Medical History:   Diagnosis Date     Hypertension      Ovarian cancer (H)       Past Surgical History:   Procedure Laterality Date     HYSTERECTOMY TOTAL ABDOMINAL, BILATERAL SALPINGO-OOPHORECTOMY, COMBINED Bilateral 2018    Procedure: COMBINED HYSTERECTOMY TOTAL ABDOMINAL, SALPINGO-OOPHORECTOMY;;  Surgeon: Jammie Dueñas MD;  Location: [facility] OR     LAPAROTOMY, TUMOR DEBULKING, COMBINED Bilateral 2018    Procedure: COMBINED LAPAROTOMY, TUMOR DEBULKING;  Exploratory Laparotomy, Modified Radical Hysterectomy, Removal of Cervix, Bilateral Salpingo - Oophorectomy, Omentectomy, Bilateral Para Aortic and Left Pelvic Lymph Node Dissection, Tumor Debulking, Proctoscopy;  Surgeon: Jammie Dueñas MD;  Location: [facility] OR     REMOVE PORT PERITONEAL N/A 2019    Procedure: REMOVE PORT PERITONEAL;  Surgeon: Chanel Rodriguez MD;  Location: [facility] OR     SURGICAL HISTORY OF -       left ankle surgery          Anesthesia Evaluation     . Pt has had prior anesthetic.            ROS/MED HX    ENT/Pulmonary:       Neurologic:       Cardiovascular:     (+) hypertension----. : . . . :. .       METS/Exercise Tolerance:     Hematologic:         Musculoskeletal:         GI/Hepatic:         Renal/Genitourinary:         Endo:     (+) Obesity, .      Psychiatric:         Infectious Disease:         Malignancy:   (+) Malignancy History of Other  Other CA Ovarian Cancer status post         Other:                         PHYSICAL EXAM:   Mental Status/Neuro: A/A/O   Airway: Facies: Feasible  Mallampati: I  Mouth/Opening: Full  TM distance: > 6 cm  Neck ROM: Full   Respiratory: Auscultation: CTAB     Resp. Rate: Normal     Resp. Effort: Normal      CV: Rhythm:  "Regular  Rate: Age appropriate  Heart: Normal Sounds   Comments:      Dental: Normal                  Lab Results   Component Value Date    WBC 7.3 02/05/2019    HGB 11.0 (L) 02/05/2019    HCT 34.8 (L) 02/05/2019     (H) 02/05/2019     02/05/2019    POTASSIUM 3.2 (L) 02/05/2019    CHLORIDE 97 02/05/2019    CO2 32 02/05/2019    BUN 19 02/05/2019    CR 0.60 02/05/2019     (H) 02/05/2019    TRACEY 9.4 02/05/2019    MAG 1.4 (L) 12/07/2018    ALBUMIN 2.8 (L) 02/05/2019    PROTTOTAL 8.4 02/05/2019    ALT 19 02/05/2019    AST 18 02/05/2019    ALKPHOS 98 02/05/2019    BILITOTAL 0.3 02/05/2019    PTT 34 02/05/2019    INR 1.15 (H) 02/05/2019    TSH 3.63 02/05/2019    T4 1.44 06/22/2018       Preop Vitals  BP Readings from Last 3 Encounters:   02/07/19 143/85   02/05/19 118/70   01/28/19 (!) 140/92    Pulse Readings from Last 3 Encounters:   02/07/19 97   02/05/19 105   01/23/19 90      Resp Readings from Last 3 Encounters:   02/07/19 14   02/05/19 16   01/28/19 20    SpO2 Readings from Last 3 Encounters:   02/07/19 100%   02/05/19 96%   01/28/19 100%      Temp Readings from Last 1 Encounters:   02/07/19 36.8  C (98.2  F) (Oral)    Ht Readings from Last 1 Encounters:   02/07/19 1.651 m (5' 5\")      Wt Readings from Last 1 Encounters:   02/07/19 69.1 kg (152 lb 5.4 oz)    Estimated body mass index is 25.35 kg/m  as calculated from the following:    Height as of this encounter: 1.651 m (5' 5\").    Weight as of this encounter: 69.1 kg (152 lb 5.4 oz).     LDA:  Peripheral IV 02/07/19 Right Lower forearm (Active)   Site Assessment WDL 2/7/2019 12:26 PM   Line Status Saline locked 2/7/2019 12:26 PM   Phlebitis Scale 0-->no symptoms 2/7/2019 12:26 PM   Infiltration Scale 0 2/7/2019 12:26 PM   Extravasation? No 2/7/2019 12:26 PM   Dressing Intervention New dressing  2/7/2019 12:26 PM   Number of days: 0       ETT (adult) (Active)   Number of days: 0            Assessment:   ASA SCORE: 2       Documentation: H&P " complete; Preop Testing complete; Consents complete   Proceeding: Proceed without further delay  Tobacco Use:  Active user of Tobacco     Plan:   Anes. Type:  General   Pre-Induction: Midazolam IV; Acetaminophen PO   Induction:  IV (Standard)   Airway: Oral ETT   Access/Monitoring: PIV; 2nd PIV   Maintenance: Balanced   Emergence: Procedure Site   Logistics: Same Day Surgery     Postop Pain/Sedation Strategy:  Standard-Options: Opioids PRN     PONV Management:  Adult Risk Factors: Female, Postop Opioids  Prevention: Ondansetron; Dexamethasone     CONSENT: Direct conversation   Plan and risks discussed with: Patient   Blood Products: Consented (ALL Blood Products)                         Guru Nguyen MD

## 2019-02-08 NOTE — ANESTHESIA CARE TRANSFER NOTE
Patient: Di Evans    Procedure(s):  Diagnostic Laparoscopy With Biopsy    Diagnosis: Ovarian Cancer  Diagnosis Additional Information: No value filed.    Anesthesia Type:   No value filed.     Note:  Airway :Face Mask  Patient transferred to:PACU  Comments: To PACU on supplemental O2 with + air exchange, placed on monitor. Report given to RN, questions answered, VSS, patient alert and comfortable.Handoff Report: Identifed the Patient, Identified the Reponsible Provider, Reviewed the pertinent medical history, Discussed the surgical course, Reviewed Intra-OP anesthesia mangement and issues during anesthesia, Set expectations for post-procedure period and Allowed opportunity for questions and acknowledgement of understanding      Vitals: (Last set prior to Anesthesia Care Transfer)    CRNA VITALS  2/7/2019 1741 - 2/7/2019 1815      2/7/2019             Pulse:  90    SpO2:  99 %    Resp Rate (observed):  8            Electronically Signed By: SULMA Page CRNA  February 7, 2019  6:15 PM

## 2019-02-08 NOTE — ANESTHESIA POSTPROCEDURE EVALUATION
Anesthesia POST Procedure Evaluation    Patient: Di Evans   MRN:     [id number] Gender:   female   Age:    58 year old :      1960        Preoperative Diagnosis: Ovarian Cancer   Procedure(s):  Diagnostic Laparoscopy With Biopsy   Postop Comments: No value filed.       Anesthesia Type:  General    Reportable Event: NO     PAIN: Uncomplicated   Sign Out status: Comfortable, Well controlled pain     PONV: No PONV   Sign Out status:  No Nausea or Vomiting     Neuro/Psych: Uneventful perioperative course   Sign Out Status: Preoperative baseline; Age appropriate mentation     Airway/Resp.: Uneventful perioperative course   Sign Out Status: Non labored breathing, age appropriate RR; Resp. Status within EXPECTED Parameters     CV: Uneventful perioperative course   Sign Out status: Appropriate BP and perfusion indices; Appropriate HR/Rhythm     Disposition:   Sign Out in:  PACU  Disposition:  Phase II; Home  Recovery Course: Uneventful  Follow-Up: Not required           Last Anesthesia Record Vitals:  CRNA VITALS  2019 1741 - 2019 1839      2019             NIBP:  152/86    Pulse:  91    NIBP Mean:  107    Ht Rate:  91    SpO2:  100 %          Last PACU/Preop Vitals:  Vitals:    19 1146 19 1820 19 1830   BP: 143/85 (!) 147/93 150/82   Pulse: 97  96   Resp: 14 15 16   Temp: 36.8  C (98.2  F) 37.2  C (99  F)    SpO2: 100%  96%         Electronically Signed By: Guru Nguyen MD, 2019, 6:39 PM

## 2019-02-08 NOTE — DISCHARGE INSTRUCTIONS
[facility]  Same-Day Surgery   Adult Discharge Orders & Instructions     For 24 hours after surgery    1. Get plenty of rest.  A responsible adult must stay with you for at least 24 hours after you leave the hospital.   2. Do not drive or use heavy equipment.  If you have weakness or tingling, don't drive or use heavy equipment until this feeling goes away.  3. Do not drink alcohol.  4. Avoid strenuous or risky activities.  Ask for help when climbing stairs.   5. You may feel lightheaded.  IF so, sit for a few minutes before standing.  Have someone help you get up.   6. If you have nausea (feel sick to your stomach): Drink only clear liquids such as apple juice, ginger ale, broth or 7-Up.  Rest may also help.  Be sure to drink enough fluids.  Move to a regular diet as you feel able.  7. You may have a slight fever. Call the doctor if your fever is over 100 F (37.7 C) (taken under the tongue) or lasts longer than 24 hours.  8. You may have a dry mouth, a sore throat, muscle aches or trouble sleeping.  These should go away after 24 hours.  9. Do not make important or legal decisions.   Call your doctor for any of the followin.  Signs of infection (fever, growing tenderness at the surgery site, a large amount of drainage or bleeding, severe pain, foul-smelling drainage, redness, swelling).    2. It has been over 8 to 10 hours since surgery and you are still not able to urinate (pass water).    3.  Headache for over 24 hours.    To contact Doctor call   Dr. Neto Salgado [facility] #  [phone]    or:        [phone] and ask for the resident on call for gynecology/oncology (answered 24 hours a day)      Emergency Department:    [facility]: [phone]         Tips for taking pain medications  To get the best pain relief possible , remember these points:      Take pain medications as directed, before pain becomes severe      Pain medication can upset your stomach:  taking it with food may help      Constipation is a common side effect of pain medication. Drink plenty of  Fluids      Eat foods high in fiber. Take a stool softener  if recommended by your doctor or  Pharmacist.        Do not drink alcohol, drive or operate machinery while taking pain medications.      Ask about other ways to control pain, such as with heat, ice or relaxation.

## 2019-02-08 NOTE — PROGRESS NOTES
"Post-Op Check      Di Evans is a 58 year old POD#0 s/p dx lsc, peritoneal biopsy.     S: Pt doing well with pain well controlled with oral pain meds. Denies any nausea, shortness of breath and chest pain. She has passed gas. Ambulating, tolerating regular diet.     O:    /79   Pulse 90   Temp 99  F (37.2  C) (Oral)   Resp 16   Ht 1.651 m (5' 5\")   Wt 69.1 kg (152 lb 5.4 oz)   SpO2 97%   BMI 25.35 kg/m      No intake/output data recorded.    General:  A&Ox3, NAD  CV:  RRR, no m/r/g   Pulm:  CTAB, good inspiratory effort  Abd: soft, appropriately tender to palpation, nondistended.  No rebound or guarding  Incisions: c/d/i with overlying dermabond  LE: no edema, no calf tenderness        A/P:  58 year old F, POD#0 s/p dx lsc, peritoneal biopsy with fz c/w metatatic poorly differentiated caricnoma  1. FEN: Regular diet  2. Pain: PO pain meds  3. CV: HTN. Currently stable.   4. Pulm: IS. No issues.  5. Heme: Hgb 11.0>EBL 10  6. GI: Clears, ADAT. Bowel regimen, Zofran PRN nausea.   7. : Voiding  8. ID: Currently afebrile.   9. Endocrine: No issues.  10. Psych/Neuro: NI  11. Prophylaxis: Ambulating  12. Dispo: Discharge home.   Araceli Thornton, PGY3  2/7/2019              "

## 2019-02-15 NOTE — NURSING NOTE
"Oncology Rooming Note    February 15, 2019 1:31 PM   Di Evans is a 58 year old female who presents for:    Chief Complaint   Patient presents with     Blood Draw     Labs drawn via VPT by RN in lab. VS taken. Pt checked in for next appt     Oncology Clinic Visit     Ovarian Ca , labs      Initial Vitals: /85 (BP Location: Left arm, Patient Position: Sitting, Cuff Size: Adult Regular)   Pulse 98   Temp 98  F (36.7  C) (Oral)   Resp 16   Wt 69.6 kg (153 lb 6.4 oz)   SpO2 98%   BMI 25.53 kg/m   Estimated body mass index is 25.53 kg/m  as calculated from the following:    Height as of 2/7/19: 1.651 m (5' 5\").    Weight as of this encounter: 69.6 kg (153 lb 6.4 oz). Body surface area is 1.79 meters squared.  No Pain (0) Comment: Data Unavailable   No LMP recorded. Patient has had a hysterectomy.  Allergies reviewed: Yes  Medications reviewed: Yes    Medications: Medication refills not needed today.  Pharmacy name entered into [facility]:    [facility] - [place], [place] - [address], [address]  [facility] [address] [place] [address] [address] AT [facility] [address]    Clinical concerns: no  [place] was notified.       7  minutes for nursing intake (face to face time)     Laura Lux MA              "

## 2019-02-15 NOTE — PROGRESS NOTES
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     HPI: Courtney comes to the clinic accompanied by her roommate Alivia. She is overall feeling well. She had a diagnostic laparoscopy with peritoneal biopsy on 19, feels she is healing well after this. No longer required pain medication. Energy continues to improve and she is able to still work doing assembly, very physically active. Notes stable mild intermittent numbness in her fingers and toes, denies functional impact or pain. Sore throat resolved with omeprazole, feels nystatin has also helped the white coating to her tongue. BP elevated in clinic today, but she reports it typically is elevated at healthcare visits- checks at home and it is normally 120s/80s. Denies fevers, chills, bleeding, or pain. No other concerns, reports an ECOG score of 0.      Oncology History:  2018: Six months of bloating, decreased appetite, early satiety.     18:  CT A/P:  Large cystic/solid mass within the pelvis highly suspicious for ovarian malignancy. 2.  Omental thickening suspicious for metastatic disease. 3.  Large volume of ascites.  Malignant ascites cannot be excluded. 4.  Small left pleural effusion and left lower lobe atelectasis. 5.  Diverticulosis, small hiatal hernia, and gallbladder sludge.     18:  = 598     18:  CT Chest:  1. No definite metastatic disease in the chest. 2. Small left pleural effusion. 3. Moderate ascites with mesenteric and omental edema/nodularity, better evaluated on CT 2018:  Exploratory laparotomy, modified radical hysterectomy, bilateral salpingo-oophorectomy,  omentectomy, right pelvic and bilateral para-aortic lymph node dissection, CUSA tumor debulking, mobilization of the entire colon, stripping of left pelvic peritoneum, proctoscopy, optimal tumor debulking to no gross residual disease                 Pathology:  Stage IIIB mixed clear cell (80%) and endometrioid (20%) adenocarcinoma, + pelvic LN, + PA LN, + omentum     8/24/18:  Cycle #1 carboplatin AUC6 and paclitaxel 175 mg/m2,  = 110     9/14/18:  Cycle #2 carboplatin AUC6 and paclitaxel 175 mg/m2,  = 48     10/5/18:  Cycle #3 carboplatin AUC 6 and paclitaxel 175 mg/m2,  = 59     10/26/18:  Cycle #4 carboplatin AUC 6 and paclitaxel 175 mg/m2,  = 75     11/13/18:  CT C/A/P: In this patient with history of ovarian cancer, there are postoperative changes of total abdominal hysterectomy, bilateral salpingo-oophorectomy and debulking surgery: 1. Small amount of ascites, improved from the prior study. 2. Peritoneal nodularity and thickening, improved from the prior study. 3. Stable bilateral pulmonary nodules measuring up to 4 mm. No new or enlarging pulmonary nodules.     11/16/18:  Cycle #5 carboplatin AUC 6 and paclitaxel 175 mg/m2,  = 71     12/7/18:  Cycle #6 carboplatin AUC 6 and paclitaxel 175 mg/m2,  = 71     1/9/19:  CT C/A/P:  1. Findings suspicious for worsening intra-abdominal involvement by ovarian malignancy with increased peritoneal nodularity and increased retroperitoneal and mesenteric adenopathy. Continued small ascites. 2. Unchanged tiny pulmonary nodules without evidence of malignancy in  the chest    2/7/19: Diagnostic laparoscopy and peritoneal biopsy- biopsy positive for poorly differentiated adenocarcinoma    Past Medical History:   Diagnosis Date     Hypertension      Ovarian cancer (H)        Past Surgical History:   Procedure Laterality Date     HYSTERECTOMY TOTAL ABDOMINAL, BILATERAL SALPINGO-OOPHORECTOMY, COMBINED Bilateral 7/30/2018    Procedure: COMBINED  HYSTERECTOMY TOTAL ABDOMINAL, SALPINGO-OOPHORECTOMY;;  Surgeon: Jammie Dueñas MD;  Location: [facility] OR     LAPAROSCOPY DIAGNOSTIC (GYN) N/A 2/7/2019    Procedure: Diagnostic Laparoscopy With Biopsy;  Surgeon: Jammie Dueñas MD;  Location: [facility] OR     LAPAROTOMY, TUMOR DEBULKING, COMBINED Bilateral 7/30/2018    Procedure: COMBINED LAPAROTOMY, TUMOR DEBULKING;  Exploratory Laparotomy, Modified Radical Hysterectomy, Removal of Cervix, Bilateral Salpingo - Oophorectomy, Omentectomy, Bilateral Para Aortic and Left Pelvic Lymph Node Dissection, Tumor Debulking, Proctoscopy;  Surgeon: Jammie Dueñas MD;  Location: [facility] OR     REMOVE PORT PERITONEAL N/A 1/7/2019    Procedure: REMOVE PORT PERITONEAL;  Surgeon: Chanel Rodriguez MD;  Location: [facility] OR     SURGICAL HISTORY OF -   1980    left ankle surgery       Current Outpatient Medications   Medication     aspirin 81 MG EC tablet     cetirizine (ZYRTEC) 10 MG tablet     hydrochlorothiazide (HYDRODIURIL) 25 MG tablet     ibuprofen (ADVIL/MOTRIN) 600 MG tablet     mometasone (NASONEX) 50 MCG/ACT nasal spray     nystatin (MYCOSTATIN) 356140 UNIT/ML suspension     pravastatin (PRAVACHOL) 20 MG tablet     senna-docusate (SENOKOT-S;PERICOLACE) 8.6-50 MG per tablet     No current facility-administered medications for this visit.        Allergies   Allergen Reactions     Gemfibrozil Muscle Pain (Myalgia)     Lovastatin      headaches     Penicillins Nausea and Vomiting       Family History   Problem Relation Age of Onset     Hypertension Mother      Hypertension Father      Cerebrovascular Disease Father         polycythemia     Breast Cancer Maternal Aunt         older age       Social History     Socioeconomic History     Marital status: Single     Spouse name: Not on file     Number of children: 0     Years of education: Not on file     Highest education level: Not on file   Social Needs     Financial resource strain: Not on file     Food insecurity -  worry: Not on file     Food insecurity - inability: Not on file     Transportation needs - medical: Not on file     Transportation needs - non-medical: Not on file   Occupational History     Occupation: [employer]     Employer: [employer]   Tobacco Use     Smoking status: Never Smoker     Smokeless tobacco: Never Used   Substance and Sexual Activity     Alcohol use: Yes     Comment: occasional     Drug use: No     Sexual activity: No   Other Topics Concern     Parent/sibling w/ CABG, MI or angioplasty before 65F 55M? No      Service No     Blood Transfusions No     Caffeine Concern Yes     Occupational Exposure No     Hobby Hazards No     Sleep Concern No     Stress Concern No     Weight Concern Yes     Special Diet No     Back Care No     Exercise Yes     Bike Helmet No     Comment: Yes in the future     Seat Belt Yes     Self-Exams Yes   Social History Narrative     Not on file           ROS  General: Denies fatigue, changes in weight, weakness, appetite changes, night sweats, hot flashes, fever, chills, or difficulty sleeping  HEENT: Denies headaches, hair loss, visual difficulty or disturbances, masses, head injury, tinnitus, hearing loss, epistaxis, congestion, problems with teeth or gums, dysphonia, or dysphagia  Pulmonary: + allergies. Denies cough, sputum, hemoptysis, shortness of breath, dyspnea on exertion, wheezing  Cardiovascular: + white coat hypertension. Denies chest pain, fainting, palpitations, murmurs, activity intolerance, swelling in legs  Gastrointestinal: Denies nausea, vomiting, constipation, diarrhea, abdominal pain, bloating, heartburn, melena, hematochezia, or jaundice  Genitourinary: Denies dysuria, urinary urgency or frequency, hematuria, cloudy or malodorous urine, incontinence, repeat urinary tract infections, flank pain, pelvic pain, vaginal bleeding, vaginal discharge, or vaginal dryness  Sexual Function: Denies pain with intercourse, changes in  libido, or changes in orgasm  Integumentary: Denies rashes, sores, changing moles, or scarring  Hematologic: Denies swollen lymph nodes, masses, easy bruising, or easy bleeding  Musculoskeletal: Denies falls, back pain, myalgias, arthralgias, stiffness, muscle weakness, or muscle cramps  Neurologic: + numbness/tingling. Denies changes in memory, difficulty with walking, dizziness, seizures, or tremors  Psychiatric: Denies anxiety, depression, mood changes, suicidal thoughts, or difficulty concentrating  Endocrine: Denies polydipsia, polyuria, temperature intolerance, or history of thyroid disease        Physical Exam:    /85 (BP Location: Left arm, Patient Position: Sitting, Cuff Size: Adult Regular)   Pulse 98   Temp 98  F (36.7  C) (Oral)   Resp 16   Wt 69.6 kg (153 lb 6.4 oz)   SpO2 98%   BMI 25.53 kg/m      CONSTITUTIONAL: Alert non-toxic appearing female in no acute distress  HEAD: Normocephalic, atraumatic  EYES: PERRLA; no scleral icterus  ENT: Oropharynx pink without lesions; tongue appears improved with minimal white coating  NECK: Neck supple without lymphadenopathy  RESPIRATORY: Lungs clear to auscultation, no increased work of breathing noted  CV: Regular rate and rhythm, S1S2, no clicks, murmurs, rubs, or gallops; bilateral lower extremities without edema, dorsalis pedis pulses 2+ bilaterally  GASTROINTESTINAL: Normoactive bowel sounds x4 quadrants, abdomen soft, non-distended, and non-tender to palpation without palpable masses or organomegaly; laparoscopy sites appear well approximated without erythema, edema, warmth, or drainage  GENITOURINARY: Not indicated  LYMPHATIC: Cervical, supraclavicular, and inguinal nodes without lymphadenopathy  MUSCULOSKELETAL: Moves all extremities, no obvious muscle wasting  NEUROLOGIC: No gross deficits, normal gait  SKIN: Appropriate color for race, warm and dry, no rashes or lesions to unclothed skin  PSYCHIATRIC: Pleasant and interactive, affect bright,  makes appropriate eye contact, thought process linear    Labs:      2/15/2019   Hemoglobin 11.7 - 15.7 g/dL 10.2 (A)   Hematocrit 35.0 - 47.0 % 33.4 (A)   Platelet Count 150 - 450 10e9/L 469 (A)   Absolute Neutrophil 1.6 - 8.3 10e9/L 5.4   Sodium 133 - 144 mmol/L 134   Potassium 3.4 - 5.3 mmol/L 3.4   Chloride 94 - 109 mmol/L 96   Carbon Dioxide 20 - 32 mmol/L 33 (A)   Urea Nitrogen 7 - 30 mg/dL 13   Creatinine 0.52 - 1.04 mg/dL 0.55   Calcium 8.5 - 10.1 mg/dL 9.2   Bilirubin Total 0.2 - 1.3 mg/dL 0.3   ALT 0 - 50 U/L 25   AST 0 - 45 U/L 20   Alkaline Phosphatase 40 - 150 U/L 108   Albumin 3.4 - 5.0 g/dL 2.7 (A)   Protein Total 6.8 - 8.8 g/dL 8.0   INR 0.86 - 1.14 1.12   PTT 22 - 37 sec 33   WBC 4.0 - 11.0 10e9/L 7.6       Assessment/Plan:  1) Progressive platinum resistant ovarian cancer: Overall feeling well with the exception of mild neuropathy, grade 1. ECOG 0. Thrush appears to have improved, continue nystatin until this has resolved x3 days. To monitor BPs at home and notify PCP if these become elevated outside the office. Albumin 2.7, reviewed small frequent meals high in protein and protein supplements. Chanel Purcell,  RN, in for further study education and coordination.   2) Patient verbalized understanding of and agreement with plan    A total of 15 minutes of face to face time were spent with the patient with over 50% of that time spent in counseling, coordination of care, education, and symptom management.    SULMA Coffey, FNP-C  Division of Gynecologic Oncology  [facility]  Pager: [phone]

## 2019-02-15 NOTE — NURSING NOTE
Chief Complaint   Patient presents with     Blood Draw     Labs drawn via VPT by RN in lab. VS taken. Pt checked in for next appt     Labs collected from venipuncture by RN. Vitals taken. Checked in for appointment(s).    Ania JAVED RN PHN BSN  BMT/Oncology Lab

## 2019-02-15 NOTE — NURSING NOTE
TRIO Study ([id number]): Study Visit Note   Subject name: Di Evans    Patient here accompanied by her roomate to finalize the screening process in the TRIO Study ([id number]). Since the last study visit, patient has been doing well. Denies any changes to concomitant medications and/or new adverse events. I have personally interviewed this patient and reviewed medical record for adverse events and concomitant medications and these have been recorded on the corresponding logs in patient's research file.     Patient was given the opportunity to ask any trial related questions. Please see Siena Inman's progress note for physical exam and other clinical information. Labs were reviewed - any significant lab values were addressed and reviewed and patient.    Study appointments scheduled per protocol. Reviewed schedule with patient, no need to make any adjustments. Patient will return next week to start treatment if approved for eligibility for study participation, will wait to hear from the sponsor. Instructed patient to call back with any questions or concerns. Patient voiced understanding.       Did the study visit occur within the appropriate window allowed by the protocol? YES    Chanel Purcell RN    Office: [phone]  Pager: [phone]

## 2019-02-15 NOTE — LETTER
2/15/2019     RE: Di Evans  [address] [facility] [address]     Dear Colleague,    Thank you for referring your patient, Di Evans, to the [facility]. Please see a copy of my visit note below.    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     HPI: Courtney comes to the clinic accompanied by her roommate Alivia. She is overall feeling well. She had a diagnostic laparoscopy with peritoneal biopsy on 19, feels she is healing well after this. No longer required pain medication. Energy continues to improve and she is able to still work doing assembly, very physically active. Notes stable mild intermittent numbness in her fingers and toes, denies functional impact or pain. Sore throat resolved with omeprazole, feels nystatin has also helped the white coating to her tongue. BP elevated in clinic today, but she reports it typically is elevated at healthcare visits- checks at home and it is normally 120s/80s. Denies fevers, chills, bleeding, or pain. No other concerns, reports an ECOG score of 0.      Oncology History:  2018: Six months of bloating, decreased appetite, early satiety.     18:  CT A/P:  Large cystic/solid mass within the pelvis highly suspicious for ovarian malignancy. 2.  Omental thickening suspicious for metastatic disease. 3.  Large volume of ascites.  Malignant ascites cannot be excluded. 4.  Small left pleural effusion and left lower lobe atelectasis. 5.  Diverticulosis, small hiatal hernia, and gallbladder sludge.     18:  = 598     18:  CT Chest:  1. No definite metastatic disease in the  chest. 2. Small left pleural effusion. 3. 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 Small amount of ascites, improved from the prior study. 2. Peritoneal nodularity and thickening, improved from the prior study. 3. Stable bilateral pulmonary nodules measuring up to 4 mm. No new or enlarging pulmonary nodules.     11/16/18:  Cycle #5 carboplatin AUC 6 and paclitaxel 175 mg/m2,  = 71     12/7/18:  Cycle #6 carboplatin AUC 6 and paclitaxel 175 mg/m2,  = 71     1/9/19:  CT C/A/P:  1. Findings suspicious for worsening intra-abdominal involvement by ovarian malignancy with increased peritoneal nodularity and increased retroperitoneal and mesenteric adenopathy. Continued small ascites. 2. Unchanged tiny pulmonary nodules without evidence of malignancy in  the chest    2/7/19: Diagnostic laparoscopy and peritoneal biopsy- biopsy positive for poorly differentiated adenocarcinoma    Past Medical History:    Diagnosis Date     Hypertension      Ovarian cancer (H)        Past Surgical History:   Procedure Laterality Date     HYSTERECTOMY TOTAL ABDOMINAL, BILATERAL SALPINGO-OOPHORECTOMY, COMBINED Bilateral 7/30/2018    Procedure: COMBINED HYSTERECTOMY TOTAL ABDOMINAL, SALPINGO-OOPHORECTOMY;;  Surgeon: Jammie Dueñas MD;  Location: [facility] OR     LAPAROSCOPY DIAGNOSTIC (GYN) N/A 2/7/2019    Procedure: Diagnostic Laparoscopy With Biopsy;  Surgeon: Jammie Dueñas MD;  Location: [facility] OR     LAPAROTOMY, TUMOR DEBULKING, COMBINED Bilateral 7/30/2018    Procedure: COMBINED LAPAROTOMY, TUMOR DEBULKING;  Exploratory Laparotomy, Modified Radical Hysterectomy, Removal of Cervix, Bilateral Salpingo - Oophorectomy, Omentectomy, Bilateral Para Aortic and Left Pelvic Lymph Node Dissection, Tumor Debulking, Proctoscopy;  Surgeon: Jammie Dueñas MD;  Location: [facility] OR     REMOVE PORT PERITONEAL N/A 1/7/2019    Procedure: REMOVE PORT PERITONEAL;  Surgeon: Chanel Rodriguez MD;  Location: [facility]     SURGICAL HISTORY OF -   1980    left ankle surgery       Current Outpatient Medications   Medication     aspirin 81 MG EC tablet     cetirizine (ZYRTEC) 10 MG tablet     hydrochlorothiazide (HYDRODIURIL) 25 MG tablet     ibuprofen (ADVIL/MOTRIN) 600 MG tablet     mometasone (NASONEX) 50 MCG/ACT nasal spray     nystatin (MYCOSTATIN) 508442 UNIT/ML suspension     pravastatin (PRAVACHOL) 20 MG tablet     senna-docusate (SENOKOT-S;PERICOLACE) 8.6-50 MG per tablet     No current facility-administered medications for this visit.        Allergies   Allergen Reactions     Gemfibrozil Muscle Pain (Myalgia)     Lovastatin      headaches     Penicillins Nausea and Vomiting       Family History   Problem Relation Age of Onset     Hypertension Mother      Hypertension Father      Cerebrovascular Disease Father         polycythemia     Breast Cancer Maternal Aunt         older age       Social History     Socioeconomic History      Marital status: Single     Spouse name: Not on file     Number of children: 0     Years of education: Not on file     Highest education level: Not on file   Social Needs     Financial resource strain: Not on file     Food insecurity - worry: Not on file     Food insecurity - inability: Not on file     Transportation needs - medical: Not on file     Transportation needs - non-medical: Not on file   Occupational History     Occupation: [employer]     Employer: [employer]   Tobacco Use     Smoking status: Never Smoker     Smokeless tobacco: Never Used   Substance and Sexual Activity     Alcohol use: Yes     Comment: occasional     Drug use: No     Sexual activity: No   Other Topics Concern     Parent/sibling w/ CABG, MI or angioplasty before 65F 55M? No      Service No     Blood Transfusions No     Caffeine Concern Yes     Occupational Exposure No     Hobby Hazards No     Sleep Concern No     Stress Concern No     Weight Concern Yes     Special Diet No     Back Care No     Exercise Yes     Bike Helmet No     Comment: Yes in the future     Seat Belt Yes     Self-Exams Yes   Social History Narrative     Not on file           ROS  General: Denies fatigue, changes in weight, weakness, appetite changes, night sweats, hot flashes, fever, chills, or difficulty sleeping  HEENT: Denies headaches, hair loss, visual difficulty or disturbances, masses, head injury, tinnitus, hearing loss, epistaxis, congestion, problems with teeth or gums, dysphonia, or dysphagia  Pulmonary: + allergies. Denies cough, sputum, hemoptysis, shortness of breath, dyspnea on exertion, wheezing  Cardiovascular: + white coat hypertension. Denies chest pain, fainting, palpitations, murmurs, activity intolerance, swelling in legs  Gastrointestinal: Denies nausea, vomiting, constipation, diarrhea, abdominal pain, bloating, heartburn, melena, hematochezia, or jaundice  Genitourinary: Denies dysuria, urinary urgency or  frequency, hematuria, cloudy or malodorous urine, incontinence, repeat urinary tract infections, flank pain, pelvic pain, vaginal bleeding, vaginal discharge, or vaginal dryness  Sexual Function: Denies pain with intercourse, changes in libido, or changes in orgasm  Integumentary: Denies rashes, sores, changing moles, or scarring  Hematologic: Denies swollen lymph nodes, masses, easy bruising, or easy bleeding  Musculoskeletal: Denies falls, back pain, myalgias, arthralgias, stiffness, muscle weakness, or muscle cramps  Neurologic: + numbness/tingling. Denies changes in memory, difficulty with walking, dizziness, seizures, or tremors  Psychiatric: Denies anxiety, depression, mood changes, suicidal thoughts, or difficulty concentrating  Endocrine: Denies polydipsia, polyuria, temperature intolerance, or history of thyroid disease        Physical Exam:    /85 (BP Location: Left arm, Patient Position: Sitting, Cuff Size: Adult Regular)   Pulse 98   Temp 98  F (36.7  C) (Oral)   Resp 16   Wt 69.6 kg (153 lb 6.4 oz)   SpO2 98%   BMI 25.53 kg/m       CONSTITUTIONAL: Alert non-toxic appearing female in no acute distress  HEAD: Normocephalic, atraumatic  EYES: PERRLA; no scleral icterus  ENT: Oropharynx pink without lesions; tongue appears improved with minimal white coating  NECK: Neck supple without lymphadenopathy  RESPIRATORY: Lungs clear to auscultation, no increased work of breathing noted  CV: Regular rate and rhythm, S1S2, no clicks, murmurs, rubs, or gallops; bilateral lower extremities without edema, dorsalis pedis pulses 2+ bilaterally  GASTROINTESTINAL: Normoactive bowel sounds x4 quadrants, abdomen soft, non-distended, and non-tender to palpation without palpable masses or organomegaly; laparoscopy sites appear well approximated without erythema, edema, warmth, or drainage  GENITOURINARY: Not indicated  LYMPHATIC: Cervical, supraclavicular, and inguinal nodes without  lymphadenopathy  MUSCULOSKELETAL: Moves all extremities, no obvious muscle wasting  NEUROLOGIC: No gross deficits, normal gait  SKIN: Appropriate color for race, warm and dry, no rashes or lesions to unclothed skin  PSYCHIATRIC: Pleasant and interactive, affect bright, makes appropriate eye contact, thought process linear    Labs:      2/15/2019   Hemoglobin 11.7 - 15.7 g/dL 10.2 (A)   Hematocrit 35.0 - 47.0 % 33.4 (A)   Platelet Count 150 - 450 10e9/L 469 (A)   Absolute Neutrophil 1.6 - 8.3 10e9/L 5.4   Sodium 133 - 144 mmol/L 134   Potassium 3.4 - 5.3 mmol/L 3.4   Chloride 94 - 109 mmol/L 96   Carbon Dioxide 20 - 32 mmol/L 33 (A)   Urea Nitrogen 7 - 30 mg/dL 13   Creatinine 0.52 - 1.04 mg/dL 0.55   Calcium 8.5 - 10.1 mg/dL 9.2   Bilirubin Total 0.2 - 1.3 mg/dL 0.3   ALT 0 - 50 U/L 25   AST 0 - 45 U/L 20   Alkaline Phosphatase 40 - 150 U/L 108   Albumin 3.4 - 5.0 g/dL 2.7 (A)   Protein Total 6.8 - 8.8 g/dL 8.0   INR 0.86 - 1.14 1.12   PTT 22 - 37 sec 33   WBC 4.0 - 11.0 10e9/L 7.6       Assessment/Plan:  1) Progressive platinum resistant ovarian cancer: Overall feeling well with the exception of mild neuropathy, grade 1. ECOG 0. Thrush appears to have improved, continue nystatin until this has resolved x3 days. To monitor BPs at home and notify PCP if these become elevated outside the office. Albumin 2.7, reviewed small frequent meals high in protein and protein supplements. Chanel Purcell,  RN, in for further study education and coordination.   2) Patient verbalized understanding of and agreement with plan    A total of 15 minutes of face to face time were spent with the patient with over 50% of that time spent in counseling, coordination of care, education, and symptom management.    SULMA Coffey, FNP-C  Division of Gynecologic Oncology  [facility]  Pager: [phone]

## 2019-02-18 PROBLEM — Z00.6 EXAMINATION OF PARTICIPANT OR CONTROL IN CLINICAL RESEARCH: Status: ACTIVE | Noted: 2019-01-01

## 2019-02-20 NOTE — LETTER
2019       RE: Di Evans  [address] [place] [address]     Dear Colleague,    Thank you for referring your patient, Di Evans, to the [facility]. Please see a copy of my visit note below.    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     HPI: Courtney comes to the clinic accompanied by her roommate Alivia and another friends. She is overall feeling well.  Looking forward to starting trial.  Does have baseline hot flashes and constipation that she uses stool softeners but otherwise.  Doing well.    General: Denies fevers, night sweats or chills. Reports good energy.  HEENT: Denies vision or hearing changes or mouth sores or nose and gum bleeding  Lymph: Denies any new lumps or bumps.  CV: Denies chest pain.  Pulm: Denies shortness of breath orcough.  GI: Denies abdominal pain, nausea, vomiting, diarrhea, or blood in stool, black tarry appearance to stool.  : Denies pain with urination or blood in urine.  Neuro: Denies headaches, numbness, or tingling, light headedness.  Skin: Denies rashes or other skin lesions or changes in skin color.  Musculoskeletal:  Denies any pain.  Heme: Denies bruising.  Psych: Reports sleeping well and good mood.    Oncology History:  2018: Six months of bloating, decreased appetite, early satiety.     18:  CT A/P:  Large cystic/solid mass within the pelvis highly suspicious for ovarian malignancy. 2.  Omental thickening suspicious for metastatic disease. 3.  Large volume of ascites.  Malignant ascites cannot be excluded. 4.  Small left pleural effusion and left lower lobe atelectasis. 5.  Diverticulosis, small hiatal hernia, and gallbladder  sludge.     7/24/18:  = 598     7/25/18:  CT Chest:  1. No definite metastatic disease in the chest. 2. Small left pleural effusion. 3. 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 Small amount of ascites, improved from the prior study. 2. Peritoneal nodularity and thickening, improved from the prior study. 3. Stable bilateral pulmonary nodules measuring up to 4 mm. No new or enlarging pulmonary nodules.     11/16/18:  Cycle #5 carboplatin AUC 6 and paclitaxel 175 mg/m2,  = 71     12/7/18:  Cycle #6 carboplatin AUC 6 and paclitaxel 175 mg/m2,  = 71     1/9/19:  CT C/A/P:  1. Findings suspicious for worsening intra-abdominal involvement by ovarian malignancy with increased peritoneal nodularity and increased retroperitoneal and mesenteric adenopathy. Continued small ascites. 2. Unchanged tiny pulmonary nodules without evidence of malignancy in  the chest    2/7/19: Diagnostic laparoscopy and  peritoneal biopsy- biopsy positive for poorly differentiated adenocarcinoma    2/20/19 Cycle 1 Day 1 TRIO study with CC-486 and pembrolizumab.        Past Medical History:   Diagnosis Date     Hypertension      Ovarian cancer (H)        Past Surgical History:   Procedure Laterality Date     HYSTERECTOMY TOTAL ABDOMINAL, BILATERAL SALPINGO-OOPHORECTOMY, COMBINED Bilateral 7/30/2018    Procedure: COMBINED HYSTERECTOMY TOTAL ABDOMINAL, SALPINGO-OOPHORECTOMY;;  Surgeon: Jammie Dueñas MD;  Location: [facility] OR     LAPAROSCOPY DIAGNOSTIC (GYN) N/A 2/7/2019    Procedure: Diagnostic Laparoscopy With Biopsy;  Surgeon: Jammie Dueñas MD;  Location: [facility] OR     LAPAROTOMY, TUMOR DEBULKING, COMBINED Bilateral 7/30/2018    Procedure: COMBINED LAPAROTOMY, TUMOR DEBULKING;  Exploratory Laparotomy, Modified Radical Hysterectomy, Removal of Cervix, Bilateral Salpingo - Oophorectomy, Omentectomy, Bilateral Para Aortic and Left Pelvic Lymph Node Dissection, Tumor Debulking, Proctoscopy;  Surgeon: Jammie Dueñas MD;  Location: [facility] OR     REMOVE PORT PERITONEAL N/A 1/7/2019    Procedure: REMOVE PORT PERITONEAL;  Surgeon: Chanel Rodriguez MD;  Location: [facility] OR     SURGICAL HISTORY OF -   1980    left ankle surgery       Current Outpatient Medications   Medication     aspirin 81 MG EC tablet     cetirizine (ZYRTEC) 10 MG tablet     hydrochlorothiazide (HYDRODIURIL) 25 MG tablet     ibuprofen (ADVIL/MOTRIN) 600 MG tablet     mometasone (NASONEX) 50 MCG/ACT nasal spray     nystatin (MYCOSTATIN) 057247 UNIT/ML suspension     pravastatin (PRAVACHOL) 20 MG tablet     senna-docusate (SENOKOT-S;PERICOLACE) 8.6-50 MG per tablet     LORazepam (ATIVAN) 1 MG tablet     ondansetron (ZOFRAN) 8 MG tablet     prochlorperazine (COMPAZINE) 10 MG tablet     study CC-486 (IDS #5077) 100 mg TABS CHEMOTHERAPY tablet     No current facility-administered medications for this visit.        Allergies   Allergen Reactions      Gemfibrozil Muscle Pain (Myalgia)     Lovastatin      headaches     Penicillins Nausea and Vomiting       Family History   Problem Relation Age of Onset     Hypertension Mother      Hypertension Father      Cerebrovascular Disease Father         polycythemia     Breast Cancer Maternal Aunt         older age       Social History     Socioeconomic History     Marital status: Single     Spouse name: Not on file     Number of children: 0     Years of education: Not on file     Highest education level: Not on file   Occupational History     Occupation: [employer]     Employer: [employer]   Social Needs     Financial resource strain: Not on file     Food insecurity:     Worry: Not on file     Inability: Not on file     Transportation needs:     Medical: Not on file     Non-medical: Not on file   Tobacco Use     Smoking status: Never Smoker     Smokeless tobacco: Never Used   Substance and Sexual Activity     Alcohol use: Yes     Comment: occasional     Drug use: No     Sexual activity: No   Lifestyle     Physical activity:     Days per week: Not on file     Minutes per session: Not on file     Stress: Not on file   Relationships     Social connections:     Talks on phone: Not on file     Gets together: Not on file     Attends Mandaeism service: Not on file     Active member of club or organization: Not on file     Attends meetings of clubs or organizations: Not on file     Relationship status: Not on file     Intimate partner violence:     Fear of current or ex partner: Not on file     Emotionally abused: Not on file     Physically abused: Not on file     Forced sexual activity: Not on file   Other Topics Concern     Parent/sibling w/ CABG, MI or angioplasty before 65F 55M? No      Service No     Blood Transfusions No     Caffeine Concern Yes     Occupational Exposure No     Hobby Hazards No     Sleep Concern No     Stress Concern No     Weight Concern Yes     Special Diet No     Back Care  No     Exercise Yes     Bike Helmet No     Comment: Yes in the future     Seat Belt Yes     Self-Exams Yes   Social History Narrative     Not on file       Physical Exam:    /87 (BP Location: Left arm, Patient Position: Sitting, Cuff Size: Adult Regular)   Pulse 94   Temp 98.1  F (36.7  C) (Oral)   Resp 16   Wt 70.4 kg (155 lb 4.8 oz)   SpO2 98%   BMI 25.84 kg/m     GEN: comfortable, in no distress  HEENT: atraumatic, sclera anicteric, no oral thursh  CV: RRR, S1/2 present no mrg, no LE peripheral edema  LUNG: comfortable on room air, CTAB, no wheezing rales or rhonchi  GI: no distension, no tenderness to palpation, no hepatomegaly  MSK: no gross deformities, no tenderness to spinous processes  SKIN: warm, dry, no rash  NEURO: no gross abnormalities, alert and oriented x3  PSYCH: appropriate affect and mood.  Lymph: no LAD at neck, axilla and groin    Labs:   174  TSH 7.57 T4 1.32  hgb 10.0 baseline    Assessment/Plan:  1) Progressive platinum resistant ovarian cancer: Overall feeling well.  Ready to start today the TRIO study with CC-486 and pembrolizumab.  She is cohort 1 (CC-486 100 mg daily for 21 days with 1 week off and pembrolizumab q21 days). She discussed the side effects and the trial extensively with our research coordinator Asad.    2) elevated TSH, appears asymptomatic and free T4 wnl will continue to monitor closely.    Angelo Molina MD, MS    Department of Obstetrics and Gynecology   Division of Gynecologic Oncology   [facility]  Phone: [phone]

## 2019-02-20 NOTE — NURSING NOTE
"Oncology Rooming Note    February 20, 2019 11:44 AM   Di Evans is a 58 year old female who presents for:    Chief Complaint   Patient presents with     Blood Draw     labs drawn by venipuncture by rn.  vs taken.     Oncology Clinic Visit     Return; Ovarian CA     Initial Vitals: /87 (BP Location: Left arm, Patient Position: Sitting, Cuff Size: Adult Regular)   Pulse 94   Temp 98.1  F (36.7  C) (Oral)   Resp 16   Wt 70.4 kg (155 lb 4.8 oz)   SpO2 98%   BMI 25.84 kg/m   Estimated body mass index is 25.84 kg/m  as calculated from the following:    Height as of 2/7/19: 1.651 m (5' 5\").    Weight as of this encounter: 70.4 kg (155 lb 4.8 oz). Body surface area is 1.8 meters squared.  No Pain (0) Comment: Data Unavailable   No LMP recorded. Patient has had a hysterectomy.  Allergies reviewed: Yes  Medications reviewed: Yes    Medications: Medication refills not needed today.  Pharmacy name entered into [facility]:    [facility] - [place], [place] - [address], [address]  [facility] [address] [place] [address] [address] [facility] [address]    Clinical concerns: No Concerns Jesse was notified.      Anne Escalona MA              "

## 2019-02-20 NOTE — PROGRESS NOTES
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     HPI: Courtney comes to the clinic accompanied by her roommate Alivia and another friends. She is overall feeling well.  Looking forward to starting trial.  Does have baseline hot flashes and constipation that she uses stool softeners but otherwise.  Doing well.    General: Denies fevers, night sweats or chills. Reports good energy.  HEENT: Denies vision or hearing changes or mouth sores or nose and gum bleeding  Lymph: Denies any new lumps or bumps.  CV: Denies chest pain.  Pulm: Denies shortness of breath orcough.  GI: Denies abdominal pain, nausea, vomiting, diarrhea, or blood in stool, black tarry appearance to stool.  : Denies pain with urination or blood in urine.  Neuro: Denies headaches, numbness, or tingling, light headedness.  Skin: Denies rashes or other skin lesions or changes in skin color.  Musculoskeletal:  Denies any pain.  Heme: Denies bruising.  Psych: Reports sleeping well and good mood.      Oncology History:  2018: Six months of bloating, decreased appetite, early satiety.     18:  CT A/P:  Large cystic/solid mass within the pelvis highly suspicious for ovarian malignancy. 2.  Omental thickening suspicious for metastatic disease. 3.  Large volume of ascites.  Malignant ascites cannot be excluded. 4.  Small left pleural effusion and left lower lobe atelectasis. 5.  Diverticulosis, small hiatal hernia, and gallbladder sludge.     18:  = 598     18:  CT Chest:  1. No definite metastatic disease in the chest. 2. Small left pleural effusion. 3. Moderate ascites with mesenteric and omental edema/nodularity, better evaluated on CT 2018:   Exploratory laparotomy, modified radical hysterectomy, bilateral salpingo-oophorectomy, omentectomy, right pelvic and bilateral para-aortic lymph node dissection, CUSA tumor debulking, mobilization of the entire colon, stripping of left pelvic peritoneum, proctoscopy, optimal tumor debulking to no gross residual disease                 Pathology:  Stage IIIB mixed clear cell (80%) and endometrioid (20%) adenocarcinoma, + pelvic LN, + PA LN, + omentum     8/24/18:  Cycle #1 carboplatin AUC6 and paclitaxel 175 mg/m2,  = 110     9/14/18:  Cycle #2 carboplatin AUC6 and paclitaxel 175 mg/m2,  = 48     10/5/18:  Cycle #3 carboplatin AUC 6 and paclitaxel 175 mg/m2,  = 59     10/26/18:  Cycle #4 carboplatin AUC 6 and paclitaxel 175 mg/m2,  = 75     11/13/18:  CT C/A/P: In this patient with history of ovarian cancer, there are postoperative changes of total abdominal hysterectomy, bilateral salpingo-oophorectomy and debulking surgery: 1. Small amount of ascites, improved from the prior study. 2. Peritoneal nodularity and thickening, improved from the prior study. 3. Stable bilateral pulmonary nodules measuring up to 4 mm. No new or enlarging pulmonary nodules.     11/16/18:  Cycle #5 carboplatin AUC 6 and paclitaxel 175 mg/m2,  = 71     12/7/18:  Cycle #6 carboplatin AUC 6 and paclitaxel 175 mg/m2,  = 71     1/9/19:  CT C/A/P:  1. Findings suspicious for worsening intra-abdominal involvement by ovarian malignancy with increased peritoneal nodularity and increased retroperitoneal and mesenteric adenopathy. Continued small ascites. 2. Unchanged tiny pulmonary nodules without evidence of malignancy in  the chest    2/7/19: Diagnostic laparoscopy and peritoneal biopsy- biopsy positive for poorly differentiated adenocarcinoma    2/20/19 Cycle 1 Day 1 TRIO study with CC-486 and pembrolizumab.        Past Medical History:   Diagnosis Date     Hypertension      Ovarian cancer (H)        Past Surgical  History:   Procedure Laterality Date     HYSTERECTOMY TOTAL ABDOMINAL, BILATERAL SALPINGO-OOPHORECTOMY, COMBINED Bilateral 7/30/2018    Procedure: COMBINED HYSTERECTOMY TOTAL ABDOMINAL, SALPINGO-OOPHORECTOMY;;  Surgeon: Jammie Dueñas MD;  Location: [facility]     LAPAROSCOPY DIAGNOSTIC (GYN) N/A 2/7/2019    Procedure: Diagnostic Laparoscopy With Biopsy;  Surgeon: Jammie Dueñas MD;  Location: [facility] OR     LAPAROTOMY, TUMOR DEBULKING, COMBINED Bilateral 7/30/2018    Procedure: COMBINED LAPAROTOMY, TUMOR DEBULKING;  Exploratory Laparotomy, Modified Radical Hysterectomy, Removal of Cervix, Bilateral Salpingo - Oophorectomy, Omentectomy, Bilateral Para Aortic and Left Pelvic Lymph Node Dissection, Tumor Debulking, Proctoscopy;  Surgeon: Jammie Dueñas MD;  Location: [facility] OR     REMOVE PORT PERITONEAL N/A 1/7/2019    Procedure: REMOVE PORT PERITONEAL;  Surgeon: Chanel Rodriguez MD;  Location: [facility]     SURGICAL HISTORY OF -   1980    left ankle surgery       Current Outpatient Medications   Medication     aspirin 81 MG EC tablet     cetirizine (ZYRTEC) 10 MG tablet     hydrochlorothiazide (HYDRODIURIL) 25 MG tablet     ibuprofen (ADVIL/MOTRIN) 600 MG tablet     mometasone (NASONEX) 50 MCG/ACT nasal spray     nystatin (MYCOSTATIN) 019950 UNIT/ML suspension     pravastatin (PRAVACHOL) 20 MG tablet     senna-docusate (SENOKOT-S;PERICOLACE) 8.6-50 MG per tablet     LORazepam (ATIVAN) 1 MG tablet     ondansetron (ZOFRAN) 8 MG tablet     prochlorperazine (COMPAZINE) 10 MG tablet     study CC-486 (IDS #5077) 100 mg TABS CHEMOTHERAPY tablet     No current facility-administered medications for this visit.        Allergies   Allergen Reactions     Gemfibrozil Muscle Pain (Myalgia)     Lovastatin      headaches     Penicillins Nausea and Vomiting       Family History   Problem Relation Age of Onset     Hypertension Mother      Hypertension Father      Cerebrovascular Disease Father         polycythemia      Breast Cancer Maternal Aunt         older age       Social History     Socioeconomic History     Marital status: Single     Spouse name: Not on file     Number of children: 0     Years of education: Not on file     Highest education level: Not on file   Occupational History     Occupation: [employer]     Employer: [employer]   Social Needs     Financial resource strain: Not on file     Food insecurity:     Worry: Not on file     Inability: Not on file     Transportation needs:     Medical: Not on file     Non-medical: Not on file   Tobacco Use     Smoking status: Never Smoker     Smokeless tobacco: Never Used   Substance and Sexual Activity     Alcohol use: Yes     Comment: occasional     Drug use: No     Sexual activity: No   Lifestyle     Physical activity:     Days per week: Not on file     Minutes per session: Not on file     Stress: Not on file   Relationships     Social connections:     Talks on phone: Not on file     Gets together: Not on file     Attends Hinduism service: Not on file     Active member of club or organization: Not on file     Attends meetings of clubs or organizations: Not on file     Relationship status: Not on file     Intimate partner violence:     Fear of current or ex partner: Not on file     Emotionally abused: Not on file     Physically abused: Not on file     Forced sexual activity: Not on file   Other Topics Concern     Parent/sibling w/ CABG, MI or angioplasty before 65F 55M? No      Service No     Blood Transfusions No     Caffeine Concern Yes     Occupational Exposure No     Hobby Hazards No     Sleep Concern No     Stress Concern No     Weight Concern Yes     Special Diet No     Back Care No     Exercise Yes     Bike Helmet No     Comment: Yes in the future     Seat Belt Yes     Self-Exams Yes   Social History Narrative     Not on file       Physical Exam:    /87 (BP Location: Left arm, Patient Position: Sitting, Cuff Size: Adult  Regular)   Pulse 94   Temp 98.1  F (36.7  C) (Oral)   Resp 16   Wt 70.4 kg (155 lb 4.8 oz)   SpO2 98%   BMI 25.84 kg/m    GEN: comfortable, in no distress  HEENT: atraumatic, sclera anicteric, no oral thursh  CV: RRR, S1/2 present no mrg, no LE peripheral edema  LUNG: comfortable on room air, CTAB, no wheezing rales or rhonchi  GI: no distension, no tenderness to palpation, no hepatomegaly  MSK: no gross deformities, no tenderness to spinous processes  SKIN: warm, dry, no rash  NEURO: no gross abnormalities, alert and oriented x3  PSYCH: appropriate affect and mood.  Lymph: no LAD at neck, axilla and groin      Labs:   174  TSH 7.57 T4 1.32  hgb 10.0 baseline        Assessment/Plan:  1) Progressive platinum resistant ovarian cancer: Overall feeling well.  Ready to start today the TRIO study with CC-486 and pembrolizumab.  She is cohort 1 (CC-486 100 mg daily for 21 days with 1 week off and pembrolizumab q21 days). She discussed the side effects and the trial extensively with our research coordinator Asad.    2) elevated TSH, appears asymptomatic and free T4 wnl will continue to monitor closely.    3. ECOG performance status 0          Angelo Molina MD, MS    Department of Obstetrics and Gynecology   Division of Gynecologic Oncology   [facility]  Phone: [phone]

## 2019-02-20 NOTE — NURSING NOTE
Chief Complaint   Patient presents with     Blood Draw     labs drawn by venipuncture by rn.  vs taken.     Labs drawn by venipuncture by RN in lab.  Vital signs taken.  Hallie Hartman RN

## 2019-02-27 NOTE — NURSING NOTE
Chief Complaint   Patient presents with     Blood Draw     labs drawn with IV start by rn.  vs taken.     Labs drawn with IV start by RN in lab.  Vital signs taken.    Hallie Hartman RN

## 2019-02-27 NOTE — LETTER
2019       RE: Di Evans  [id number]  [facility] [id number]     Dear Colleague,    Thank you for referring your patient, Di Evans, to the [facility]. Please see a copy of my visit note below.                Follow Up Notes on Referred Patient    Date: 3/4/2019       Dr. Chato Hough MD  No address on file       RE: Di Evans  : 1960  ROMAN: 2019    Dear Dr. Chato Hough:    Di Evasn is a 58 year old woman with a diagnosis of platinum resistant ovarian cancer.             The patient presents today for followup.  No symptoms since the last time I have seen her.  She is here today to start treatment with pembro on the TRIO 026 study.  She is otherwise eating and drinking well.  She has had no significant nausea, no vomiting with CC-486 treatment.  Good performance status.     Past Medical History:    Past Medical History:   Diagnosis Date     Hypertension      Ovarian cancer (H)          Past Surgical History:    Past Surgical History:   Procedure Laterality Date     HYSTERECTOMY TOTAL ABDOMINAL, BILATERAL SALPINGO-OOPHORECTOMY, COMBINED Bilateral 2018    Procedure: COMBINED HYSTERECTOMY TOTAL ABDOMINAL, SALPINGO-OOPHORECTOMY;;  Surgeon: Jammie Dueñas MD;  [facility]     LAPAROSCOPY DIAGNOSTIC (GYN) N/A 2019    Procedure: Diagnostic Laparoscopy With Biopsy;  Surgeon: Jammie Dueñas MD;  [facility]     LAPAROTOMY, TUMOR DEBULKING, COMBINED Bilateral 2018    Procedure: COMBINED LAPAROTOMY, TUMOR DEBULKING;  Exploratory Laparotomy, Modified Radical Hysterectomy, Removal of Cervix, Bilateral Salpingo - Oophorectomy, Omentectomy, Bilateral Para Aortic and Left Pelvic Lymph Node Dissection, Tumor Debulking, Proctoscopy;  Surgeon: Jammie Dueñas MD;  Location[facility]     REMOVE PORT PERITONEAL N/A 2019    Procedure: REMOVE PORT PERITONEAL;  Surgeon: Chanel Rodriguez MD;  [facility]     SURGICAL HISTORY OF -   1980    left ankle surgery         Health Maintenance Due   Topic Date Due     ZOSTER IMMUNIZATION (1 of 2) 06/20/2009     FIT Q1 YR  03/16/2015       Current Medications:     Current Outpatient Medications   Medication Sig Dispense Refill     aspirin 81 MG EC tablet Take 81 mg by mouth       cetirizine (ZYRTEC) 10 MG tablet Take 10 mg by mouth       hydrochlorothiazide (HYDRODIURIL) 25 MG tablet Take 1 tablet (25 mg) by mouth every morning for blood pressure. 90 tablet 1     ibuprofen (ADVIL/MOTRIN) 600 MG tablet Take 1 tablet (600 mg) by mouth every 6 hours as needed for moderate pain 120 tablet 3     LORazepam (ATIVAN) 1 MG tablet Take 1 tablet (1 mg) by mouth every 6 hours as needed (Anxiety, Nausea/Vomiting or Sleep) 30 tablet 2     mometasone (NASONEX) 50 MCG/ACT nasal spray Spray 2 sprays into both nostrils daily       pravastatin (PRAVACHOL) 20 MG tablet Take 1 tablet (20 mg) by mouth every evening for cholesterol. 90 tablet 1     senna-docusate (SENOKOT-S;PERICOLACE) 8.6-50 MG per tablet Take 2 tablets by mouth daily 100 tablet 3     study CC-486 (IDS #5077) 100 mg TABS CHEMOTHERAPY tablet Take 1 tablet (100 mg) by mouth daily Schedule 1. Take for 21 days, followed by 7 days off. Take at the same time each day on an empty stomach or with food (a light breakfast or meal of up to approximately 600 calories).  Swallow tablet whole with about 8oz of room temperature water. Do not take broken or cracked tablets. 21 tablet 0     nystatin (MYCOSTATIN) 339993 UNIT/ML suspension Take 5 mLs (500,000 Units) by mouth 4 times daily Swish and swallow until the white coating has resolved x 3 days (Patient not taking: Reported on 2/27/2019) 473 mL 0     ondansetron (ZOFRAN) 8 MG tablet If vomiting occurs with CC-486, take 1 tab (8 mg) 30 minutes prior to each subsequent CC-486 dose. (Patient not taking: Reported on 2/27/2019) 30 tablet 11     prochlorperazine (COMPAZINE) 10 MG tablet Take  1 tablet (10 mg) by mouth every 6 hours as needed (Nausea/Vomiting) (Patient not taking: Reported on 2/27/2019) 30 tablet 2         Allergies:        Allergies   Allergen Reactions     Gemfibrozil Muscle Pain (Myalgia)     Lovastatin      headaches     Penicillins Nausea and Vomiting        Social History:     Social History     Tobacco Use     Smoking status: Never Smoker     Smokeless tobacco: Never Used   Substance Use Topics     Alcohol use: Yes     Comment: occasional       History   Drug Use No         Family History:       Family History   Problem Relation Age of Onset     Hypertension Mother      Hypertension Father      Cerebrovascular Disease Father         polycythemia     Breast Cancer Maternal Aunt         older age       Physical Exam:     /80 (BP Location: Right arm, Patient Position: Sitting, Cuff Size: Adult Regular)   Pulse 98   Temp 98.1  F (36.7  C) (Oral)   Resp 16   Wt 70.8 kg (156 lb)   SpO2 97%   BMI 25.96 kg/m     Body mass index is 25.96 kg/m .    General Appearance: healthy and alert, no distress     HEENT:  no thyromegaly, no palpable nodules or masses        Cardiovascular: regular rate and rhythm, no gallops, rubs or murmurs     Respiratory: lungs clear, no rales, rhonchi or wheezes, normal diaphragmatic excursion    Musculoskeletal: extremities non tender and without edema    Skin: no lesions or rashes     Neurological: normal gait, no gross defects     Psychiatric: appropriate mood and affect                               ABDOMEN:  Soft, nontender, nondistended.  No organomegaly.       Assessment:    Di Evans is a 58 year old woman with a diagnosis of platinum resistant ovarian cancer.     A total of 30 minutes was spent with the patient, 25 minutes of which were spent in counseling the patient and/or treatment planning.    1.  Platinum-resistant ovarian cancer.   2.  On TRIO 0026 study.   3.  ECOG performance status of 0.   4.  Elevated TSH with no T4.       Discussed with the patient, we will proceed with treatment plan with pembrolizumab.  She will continue 0026 on the 100 mg dose as scheduled before.  Patient does agree with the plan, is very appreciative of her care.  All questions were answered.     Angelo Molina MD, MS    Department of Obstetrics and Gynecology   Division of Gynecologic Oncology   [facility]  Phone: [phone]    CC  Patient Care Team:  Sonja Davies MD as PCP - General (Family Practice)  Jocelyn Madden MD as PCP - Assigned PCP  SELF, REFERRED

## 2019-02-27 NOTE — PATIENT INSTRUCTIONS
Contact numbers:  Triage Main/After hours Line: [phone]    Main (scheduling) line: [phone]    Call with chills and/or temperature greater than or equal to 100.5 and questions or concerns.    If after hours, weekends, or holidays, call main hospital  at  [phone] and ask for Oncology doctor on call.         February 2019 Sunday Monday Tuesday Wednesday Thursday Friday Saturday                            1     2       3     4     5    [facility]   9:45 AM   (15 min.)    [facility] DRAW   [facility] Draw    Admission  10:55 AM   Jammie Dueñas MD   Unit [address] [facility]   (Discharge: 2/5/2019)    PROCEDURE - 4.5 HR  11:00 AM   (270 min.)   U2A ROOM 9   Unit 2A [facility]    CT ABD RETROPERITONEAL BIOPSY  12:30 PM   (90 min.)   [facility]CT2   [facility] [place], Interventional Radiology 6     7    Admission  11:29 AM   Jammie Dueñas MD   Same Day Surgery [facility]   (Discharge: 2/7/2019)    LAPAROSCOPY DIAGNOSTIC (GYN)   3:20 PM   Jammie Dueñas MD   [facility] OR 8     9       10     11     12    CT CHEST/ABDOMEN/PELVIS W  11:30 AM   (30 min.)   MGCT1   [facility] 13     14     15    [facility] DRAW   1:30 PM   (15 min.)    [facility] DRAW   [facility] Draw    [facility] RETURN   1:45 PM   (40 min.)   Siena Inman APRN CNP   [facility] 16       17     18     19     20    [facility] DRAW  11:30 AM   (15 min.)   [facility]   [facility] Draw    [facility] RETURN  11:45 AM   (20 min.)   Angelo Molina MD   [facility] 21     22     23       24     25     26     27    [facility] RETURN   9:45 AM   (20 min.)   Angelo Molina MD   [facility]    [facility] DRAW  10:00 AM   (15 min.)   [facility]    [facility] ONC INFUSION 60  12:30 PM   (60 min.)    ONCOLOGY INFUSION   [facility]  28 March 2019 Sunday Monday Tuesday Wednesday Thursday Friday Saturday                            1     2       3     4     5     6    [facility] DRAW   9:15 AM   (15 min.)    [facility] LAB DRAW   [facility] Draw    [facility] RETURN   9:45 AM   (20 min.)   Angelo Molina MD   [facility]    [facility] ONC INFUSION 60   3:00 PM   (60 min.)   [facility] ONCOLOGY INFUSION   [facility] 7     8     9       10     11     12     13     14     15     16       17     18     19     20    [facility] DRAW   1:15 PM   (15 min.)    [facility] 21     22     23       24     25     26     27    [facility]  10:00 AM   (15 min.)    [facility]    [facility] RETURN  10:25 AM   (20 min.)   Angelo Molina MD   [facility]    [facility] ONC INFUSION 60  12:00 PM   (60 min.)   [facility] ONCOLOGY INFUSION   [facility] 28     29     30       31                                                   Lab Results:  Recent Results (from the past 12 hour(s))   TSH    Collection Time: 02/27/19 10:28 AM   Result Value Ref Range    TSH 5.65 (H) 0.40 - 4.00 mU/L   *CBC with platelets differential    Collection Time: 02/27/19 10:28 AM   Result Value Ref Range    WBC 7.6 4.0 - 11.0 10e9/L    RBC Count 3.95 3.8 - 5.2 10e12/L    Hemoglobin 10.1 (L) 11.7 - 15.7 g/dL    Hematocrit 33.3 (L) 35.0 - 47.0 %    MCV 84 78 - 100 fl    MCH 25.6 (L) 26.5 - 33.0 pg    MCHC 30.3 (L) 31.5 - 36.5 g/dL    RDW 16.3 (H) 10.0 - 15.0 %    Platelet Count 493 (H) 150 - 450 10e9/L    Diff Method Automated Method     % Neutrophils 75.0 %    % Lymphocytes 11.9 %    % Monocytes 10.7 %    % Eosinophils 1.7 %    % Basophils 0.4 %    % Immature Granulocytes 0.3 %    Nucleated RBCs 0 0 /100    Absolute Neutrophil 5.7 1.6 - 8.3 10e9/L    Absolute Lymphocytes 0.9 0.8 - 5.3 10e9/L    Absolute Monocytes 0.8 0.0 -  1.3 10e9/L    Absolute Eosinophils 0.1 0.0 - 0.7 10e9/L    Absolute Basophils 0.0 0.0 - 0.2 10e9/L    Abs Immature Granulocytes 0.0 0 - 0.4 10e9/L    Absolute Nucleated RBC 0.0    Comprehensive metabolic panel    Collection Time: 02/27/19 10:28 AM   Result Value Ref Range    Sodium 134 133 - 144 mmol/L    Potassium 3.1 (L) 3.4 - 5.3 mmol/L    Chloride 96 94 - 109 mmol/L    Carbon Dioxide 29 20 - 32 mmol/L    Anion Gap 8 3 - 14 mmol/L    Glucose 103 (H) 70 - 99 mg/dL    Urea Nitrogen 12 7 - 30 mg/dL    Creatinine 0.51 (L) 0.52 - 1.04 mg/dL    GFR Estimate >90 >60 mL/min/[1.73_m2]    GFR Estimate If Black >90 >60 mL/min/[1.73_m2]    Calcium 9.2 8.5 - 10.1 mg/dL    Bilirubin Total 0.3 0.2 - 1.3 mg/dL    Albumin 2.5 (L) 3.4 - 5.0 g/dL    Protein Total 7.8 6.8 - 8.8 g/dL    Alkaline Phosphatase 105 40 - 150 U/L    ALT 22 0 - 50 U/L    AST 25 0 - 45 U/L

## 2019-02-27 NOTE — PROGRESS NOTES
Infusion Nursing Note:  Di Evans presents for C1D8 Keytruda  Met with Dr. Molina before infusion.    Note: N/A.    Treatment Conditions:  Lab Results   Component Value Date    HGB 10.1 02/27/2019     Lab Results   Component Value Date    WBC 7.6 02/27/2019      Lab Results   Component Value Date    ANEU 5.7 02/27/2019     Lab Results   Component Value Date     02/27/2019      Lab Results   Component Value Date     02/27/2019                   Lab Results   Component Value Date    POTASSIUM 3.1 02/27/2019           Lab Results   Component Value Date    MAG 1.4 12/07/2018            Lab Results   Component Value Date    CR 0.51 02/27/2019                   Lab Results   Component Value Date    TRACEY 9.2 02/27/2019                Lab Results   Component Value Date    BILITOTAL 0.3 02/27/2019           Lab Results   Component Value Date    ALBUMIN 2.5 02/27/2019                    Lab Results   Component Value Date    ALT 22 02/27/2019           Lab Results   Component Value Date    AST 25 02/27/2019       Results reviewed, labs MET treatment parameters, ok to proceed with treatment.    Intravenous Access:  Peripheral IV placed in lab.    Post Infusion Assessment:  Patient tolerated infusion without incident.  Blood return noted pre and post infusion.  No evidence of extravasations.  Access discontinued per protocol.    Discharge Plan:   Patient declined prescription refills.  Discharge instructions reviewed with: Patient and Family.  Patient and/or family verbalized understanding of discharge instructions and all questions answered.  AVS to patient via [employer].  Patient will return 3/20, appointments need to be adjusted, Chanel research RN aware and to take care of it and let pt know new appointment dates and times  Patient discharged in stable condition accompanied by: self and friend.    Isa Alston RN

## 2019-02-27 NOTE — NURSING NOTE
"Oncology Rooming Note    February 27, 2019 10:41 AM   Di Evans is a 58 year old female who presents for:    Chief Complaint   Patient presents with     Blood Draw     labs drawn with IV start by rn.  vs taken.     Oncology Clinic Visit     Return; Ovarian CA     Initial Vitals: /80 (BP Location: Right arm, Patient Position: Sitting, Cuff Size: Adult Regular)   Pulse 98   Temp 98.1  F (36.7  C) (Oral)   Resp 16   Wt 70.8 kg (156 lb)   SpO2 97%   BMI 25.96 kg/m   Estimated body mass index is 25.96 kg/m  as calculated from the following:    Height as of 2/7/19: 1.651 m (5' 5\").    Weight as of this encounter: 70.8 kg (156 lb). Body surface area is 1.8 meters squared.  No Pain (0) Comment: Data Unavailable   No LMP recorded. Patient has had a hysterectomy.  Allergies reviewed: Yes  Medications reviewed: Yes    Medications: Medication refills not needed today.  Pharmacy name entered into [facility]:    [facility] - [place], [place] - [address], [address]  [facility] [address] [place], [place] [id number] [address] AT [facility]  [address]    Clinical concerns: No new concerns Jesse was NOT notified.      Anne Escalona CMA              "

## 2019-03-01 NOTE — NURSING NOTE
"TRIO Study ([id number]): Study Visit Note   Subject name: Di Evans     Patient here accompanied by roomate for C1D8 in the TRIO Study ([id number]). Since the last study visit, patient has been doing really well. Patient states that she is ready for next treatment. Reports to have felt \"short of breath\" a few times since starting study medication (grade-1). Reports have started taking zofran 1 hour prior to taking CC-486 to prevent nausea. I have personally interviewed this patient and reviewed medical record for adverse events and concomitant medications and these have been recorded on the corresponding logs in patient's research file.     Patient was given the opportunity to ask any trial related questions. Please see Dr. Angelo Molina's progress note for physical exam and other clinical information. Labs were reviewed - any significant lab values were addressed and reviewed and patient is okay to continue study treatment at current dose. Patient okay to continue at current dose.  Patient here for Keytruda today. Patient escorted to infusion center for study treatment.     Potassium low today (3.1). Dr. Molina wants potassium replace today at infusion center. PO potassium ordered and given. Patient has history of hypokalemia. Will monitor potassium levels and consider placing patient on an oral supplement. Will discuss at next visit.     Study appointments scheduled per protocol. Reviewed schedule with patient, adjustments will be made to schedule. Instructed patient to call back with any questions or concerns. Patient voiced understanding.     Did the study visit occur within the appropriate window allowed by the protocol? YES    Chanel Purcell RN    Office: [phone]  Pager: [phone]                        "

## 2019-03-04 NOTE — PROGRESS NOTES
Follow Up Notes on Referred Patient    Date: 3/4/2019       Dr. Chato Hough MD  No address on file       RE: Di Evans  : 1960  ROMAN: 2019    Dear Dr. Chato Hough:    Di Evans is a 58 year old woman with a diagnosis of platinum resistant ovarian cancer.             The patient presents today for followup.  No symptoms since the last time I have seen her.  She is here today to start treatment with pembro on the TRIO 026 study.  She is otherwise eating and drinking well.  She has had no significant nausea, no vomiting with CC-486 treatment.  Good performance status.             Past Medical History:    Past Medical History:   Diagnosis Date     Hypertension      Ovarian cancer (H)          Past Surgical History:    Past Surgical History:   Procedure Laterality Date     HYSTERECTOMY TOTAL ABDOMINAL, BILATERAL SALPINGO-OOPHORECTOMY, COMBINED Bilateral 2018    Procedure: COMBINED HYSTERECTOMY TOTAL ABDOMINAL, SALPINGO-OOPHORECTOMY;;  Surgeon: Jammie Dueñas MD;  Location: [facility]     LAPAROSCOPY DIAGNOSTIC (GYN) N/A 2019    Procedure: Diagnostic Laparoscopy With Biopsy;  Surgeon: Jammie Dueñas MD;  Location: [facility]     LAPAROTOMY, TUMOR DEBULKING, COMBINED Bilateral 2018    Procedure: COMBINED LAPAROTOMY, TUMOR DEBULKING;  Exploratory Laparotomy, Modified Radical Hysterectomy, Removal of Cervix, Bilateral Salpingo - Oophorectomy, Omentectomy, Bilateral Para Aortic and Left Pelvic Lymph Node Dissection, Tumor Debulking, Proctoscopy;  Surgeon: Jammie Dueñas MD;  Location: [facility] OR     REMOVE PORT PERITONEAL N/A 2019    Procedure: REMOVE PORT PERITONEAL;  Surgeon: Chanel Rodriguez MD;  Location: [facility]     SURGICAL HISTORY OF -       left ankle surgery         Health Maintenance Due   Topic Date Due     ZOSTER IMMUNIZATION (1 of 2) 2009     FIT Q1 YR  2015       Current Medications:     Current Outpatient  Medications   Medication Sig Dispense Refill     aspirin 81 MG EC tablet Take 81 mg by mouth       cetirizine (ZYRTEC) 10 MG tablet Take 10 mg by mouth       hydrochlorothiazide (HYDRODIURIL) 25 MG tablet Take 1 tablet (25 mg) by mouth every morning for blood pressure. 90 tablet 1     ibuprofen (ADVIL/MOTRIN) 600 MG tablet Take 1 tablet (600 mg) by mouth every 6 hours as needed for moderate pain 120 tablet 3     LORazepam (ATIVAN) 1 MG tablet Take 1 tablet (1 mg) by mouth every 6 hours as needed (Anxiety, Nausea/Vomiting or Sleep) 30 tablet 2     mometasone (NASONEX) 50 MCG/ACT nasal spray Spray 2 sprays into both nostrils daily       pravastatin (PRAVACHOL) 20 MG tablet Take 1 tablet (20 mg) by mouth every evening for cholesterol. 90 tablet 1     senna-docusate (SENOKOT-S;PERICOLACE) 8.6-50 MG per tablet Take 2 tablets by mouth daily 100 tablet 3     study CC-486 (IDS #5077) 100 mg TABS CHEMOTHERAPY tablet Take 1 tablet (100 mg) by mouth daily Schedule 1. Take for 21 days, followed by 7 days off. Take at the same time each day on an empty stomach or with food (a light breakfast or meal of up to approximately 600 calories).  Swallow tablet whole with about 8oz of room temperature water. Do not take broken or cracked tablets. 21 tablet 0     nystatin (MYCOSTATIN) 679802 UNIT/ML suspension Take 5 mLs (500,000 Units) by mouth 4 times daily Swish and swallow until the white coating has resolved x 3 days (Patient not taking: Reported on 2/27/2019) 473 mL 0     ondansetron (ZOFRAN) 8 MG tablet If vomiting occurs with CC-486, take 1 tab (8 mg) 30 minutes prior to each subsequent CC-486 dose. (Patient not taking: Reported on 2/27/2019) 30 tablet 11     prochlorperazine (COMPAZINE) 10 MG tablet Take 1 tablet (10 mg) by mouth every 6 hours as needed (Nausea/Vomiting) (Patient not taking: Reported on 2/27/2019) 30 tablet 2         Allergies:        Allergies   Allergen Reactions     Gemfibrozil Muscle Pain (Myalgia)      Lovastatin      headaches     Penicillins Nausea and Vomiting        Social History:     Social History     Tobacco Use     Smoking status: Never Smoker     Smokeless tobacco: Never Used   Substance Use Topics     Alcohol use: Yes     Comment: occasional       History   Drug Use No         Family History:       Family History   Problem Relation Age of Onset     Hypertension Mother      Hypertension Father      Cerebrovascular Disease Father         polycythemia     Breast Cancer Maternal Aunt         older age         Physical Exam:     /80 (BP Location: Right arm, Patient Position: Sitting, Cuff Size: Adult Regular)   Pulse 98   Temp 98.1  F (36.7  C) (Oral)   Resp 16   Wt 70.8 kg (156 lb)   SpO2 97%   BMI 25.96 kg/m    Body mass index is 25.96 kg/m .    General Appearance: healthy and alert, no distress     HEENT:  no thyromegaly, no palpable nodules or masses        Cardiovascular: regular rate and rhythm, no gallops, rubs or murmurs     Respiratory: lungs clear, no rales, rhonchi or wheezes, normal diaphragmatic excursion    Musculoskeletal: extremities non tender and without edema    Skin: no lesions or rashes     Neurological: normal gait, no gross defects     Psychiatric: appropriate mood and affect                               ABDOMEN:  Soft, nontender, nondistended.  No organomegaly.             Assessment:    Di Evans is a 58 year old woman with a diagnosis of platinum resistant ovarian cancer.     A total of 30 minutes was spent with the patient, 25 minutes of which were spent in counseling the patient and/or treatment planning.      1.  Platinum-resistant ovarian cancer.   2.  On TRIO 0026 study.   3.  ECOG performance status of 0.   4.  Elevated TSH with no T4.      Discussed with the patient, we will proceed with treatment plan with pembrolizumab.  She will continue 0026 on the 100 mg dose as scheduled before.  Patient does agree with the plan, is very appreciative of her care.   All questions were answered.       Angelo Molina MD, MS    Department of Obstetrics and Gynecology   Division of Gynecologic Oncology   [facility]  Phone: [phone]        CC  Patient Care Team:  Sonja Davies MD as PCP - General (Family Practice)  Jocelyn Madden MD as PCP - Assigned PCP  SELF, REFERRED

## 2019-03-08 NOTE — TELEPHONE ENCOUNTER
Patient participating in the TRIO Study ([id number]). Patient called to inform study staff that she has been having elevated temperature (highest being 100.5) since 7:00 am this morning. Patient stated that continues to have heartburn. Stated to experience nausea since Monday 4/MAR/2019 and cough for 2 days onset 6/MAR/2019. She reports decreased appetite all wedk long. States that has been drinking fluids okay. Eating a little, but keeping food down. Denies diarrhea and/or vomiting.     Instructed patient to take 650 mg of tylenol for elevated temperature, continue to monitor temperature every hour and use tylenol as needed per dosage instructions. Encouraged patient to continue taking heartburn medication. Instructed patient to call back on Monday to inform study staff of progress. Patient was also informed to contact triage phone number if conditions worsens over the weekend. Patient voiced understanding.     Chanel Purcell RN    Office: [phone]  Pager: [phone]

## 2019-03-11 PROBLEM — R18.8 OTHER ASCITES: Status: ACTIVE | Noted: 2019-01-01

## 2019-03-11 NOTE — TELEPHONE ENCOUNTER
She does not necessarily need to be seen as long as the paracentesis helps her feel better.  If she requires another paracentesis, we could set her up to have them weekly if needed

## 2019-03-11 NOTE — TELEPHONE ENCOUNTER
"Patient participating in TRIO Study ([id number]). Patient contacted clinic with the following message:     \"Hi Dr. Duque, my abdoment is filling up with fluid causing shortness of breath and no appetite. Can it be possible to drain it at least through the trial?\"     Contacted Dr. Duque and she okayed an order for paracentesis. Order placed by Luisa Sheldon and paracentesis scheduled for today 11/MAR/2019 at noon at [facility]. Patient contacted with information of time, date and location for procedure. Left voice mail with Dr. Duque to inform progress. I called clinic and spoke with Siena Inman and notify her of this patient coming today for paracentesis. Asked if it was necessary for patient to see a provider prior to procedure. Siena stated that visit was not necessary.     Next study visit is not until 20/MAR/2019. Contacted Study PI, Dr. Angelo Molina. He would like to have patient come and be assessed after procedure and see if it was effective. Appointment made for this Wednesday with study PI. Patient contacted. Instructed patient in the mean time, to call back if any questions or concerns.     Chanel Purcell, RN    Office: [phone]  Pager: [phone]    "

## 2019-03-11 NOTE — PROGRESS NOTES
Paracentesis Nursing Note  Di Evans presents today to [facility] for a paracentesis.    During today's appointment orders from Dr Neto Salgado were completed.    Progress Note:  Patient identification verified by name and date of birth.  Assessment completed.  Vitals monitored throughout appointment and recorded in Doc Flowsheets.  See proceduralist note in ultrasound.    Vascular Access: peripheral IV placed today.  Labs: were not ordered for this appointment.    Date of consent or authorization: today 3/11/19.  Invasive Procedure Safety Checklist was completed and sent for scanning.     Paracentesis performed by Maikol Light PA-C Radiology.    The following labs were communicated to provider performing paracentesis:  Lab Results   Component Value Date     03/06/2019       Total amount of ascites fluid drained: 4.5 liters.  Color of ascites fluid: sonja.  Total amount of albumin given: 50  grams.    Patient tolerated procedure well.    Post procedure,denies pain or discomfort post paracentesis.      Discharge Plan:  Discharge instructions were reviewed with patient.  Patient/Representative verbalized understanding and all questions were answered.   Discharged from [facility] in stable condition.    Judy Leger RN    Administrations This Visit     albumin human 25 % injection 12.5 g     Admin Date  03/11/2019 Action  New Bag Dose  12.5 g Route  Intravenous Administered By  Judy Leger RN           Admin Date  03/11/2019 Action  New Bag Dose  12.5 g Route  Intravenous Administered By  Judy Leger RN           Admin Date  03/11/2019 Action  New Bag Dose  12.5 g Route  Intravenous Administered By  Judy Leger RN           Admin Date  03/11/2019 Action  New Bag Dose  12.5 g Route  Intravenous Administered By  Judy Leger RN          lidocaine 1 % 20 mL     Admin Date  03/11/2019 Action  Given by Other  Dose  10 mL Route  Other Administered By  Judy Leger RN                /88   Pulse 98

## 2019-03-11 NOTE — PATIENT INSTRUCTIONS
Patient Education     Discharge Instructions for Paracentesis  Paracentesis is a procedure to remove extra fluid from your belly (abdomen). This fluid buildup in the abdomen is called ascites. The procedure may have been done to take a sample of the fluid. Or, it may have been done to drain the extra fluid from your abdomen and help make you more comfortable.     Ascites is buildup of excess fluid in the abdomen.   Home care    If you have pain after the procedure, your healthcare provider can prescribe or recommend pain medicines. Take these exactly as directed. If you stopped taking other medicines before the procedure, ask your provider when you can start them again.    Take it easy for 24 hours after the procedure. Avoid physical activity until your provider says it s OK.    You will have a small bandage over the puncture site. Stitches (sutures), surgical staples, adhesive tapes, adhesive strips, or surgical glue may be used to close the incision. They also help stop bleeding and speed healing. You may take the bandage off in 24 hours.    Check the puncture site for the signs of infection listed below.  Follow-up care  Make a follow-up appointment with your healthcare provider as directed. During your follow-up visit, your provider will check your healing. Let your provider know how you are feeling. You can also discuss the cause of your ascites and if you need any further treatment.  When to seek medical advice  Call your healthcare provider if you have any of the following after the procedure:    A fever of 100.4 F (38.0 C) or higher    Trouble breathing    Pain that doesn't go away even after taking pain medicine    Belly pain not caused by having the skin punctured    Bleeding from the puncture site    More than a small amount of fluid leaking from the puncture site    Swollen belly    Signs of infection at the puncture site. These include increased pain, redness, or swelling, warmth, or bad-smelling  drainage.    Blood in your urine    Feeling dizzy or lightheaded, or fainting   Date Last Reviewed: 7/1/2016 2000-2018 [employer].. [address] [place] [address], [place] [address]. All rights reserved. This information is not intended as a substitute for professional medical care. Always follow your healthcare professional's instructions.

## 2019-03-13 PROBLEM — E87.6 HYPOKALEMIA: Status: ACTIVE | Noted: 2019-01-01

## 2019-03-13 PROBLEM — E87.1 HYPONATREMIA: Status: ACTIVE | Noted: 2019-01-01

## 2019-03-13 NOTE — PROGRESS NOTES
Follow Up Notes on Referred Patient    Date: 3/13/2019       Dr. Chato Hough MD  No address on file       RE: Di Evans  : 1960  ROMAN: 3/13/2019    Dear Dr. hCato Hough:    Di Evans is a 58 year old woman with a diagnosis of platinum-resistant ovarian cancer.     The patient presents today for followup.  She just had a paracentesis.  It feels a little bit better with that.  She does still have occasional nausea and a little bit more fatigue.  She has less of an appetite.  She denies any fevers or chills.  No vaginal bleeding or discharge.             Past Medical History:    Past Medical History:   Diagnosis Date     Hypertension      Ovarian cancer (H)          Past Surgical History:    Past Surgical History:   Procedure Laterality Date     HYSTERECTOMY TOTAL ABDOMINAL, BILATERAL SALPINGO-OOPHORECTOMY, COMBINED Bilateral 2018    Procedure: COMBINED HYSTERECTOMY TOTAL ABDOMINAL, SALPINGO-OOPHORECTOMY;;  Surgeon: Jammie Dueñas MD;  Location: [facility] OR     LAPAROSCOPY DIAGNOSTIC (GYN) N/A 2019    Procedure: Diagnostic Laparoscopy With Biopsy;  Surgeon: Jammie Dueñas MD;  Location: [facility]     LAPAROTOMY, TUMOR DEBULKING, COMBINED Bilateral 2018    Procedure: COMBINED LAPAROTOMY, TUMOR DEBULKING;  Exploratory Laparotomy, Modified Radical Hysterectomy, Removal of Cervix, Bilateral Salpingo - Oophorectomy, Omentectomy, Bilateral Para Aortic and Left Pelvic Lymph Node Dissection, Tumor Debulking, Proctoscopy;  Surgeon: Jammie Dueñas MD;  Location: [facility] OR     REMOVE PORT PERITONEAL N/A 2019    Procedure: REMOVE PORT PERITONEAL;  Surgeon: Chanel Rodriguez MD;  Location: [facility]     SURGICAL HISTORY OF -       left ankle surgery         Health Maintenance Due   Topic Date Due     ZOSTER IMMUNIZATION (1 of 2) 2009     FIT Q1 YR  2015       Current Medications:     Current Outpatient Medications   Medication Sig Dispense  Refill     aspirin 81 MG EC tablet Take 81 mg by mouth       cetirizine (ZYRTEC) 10 MG tablet Take 10 mg by mouth       hydrochlorothiazide (HYDRODIURIL) 25 MG tablet Take 1 tablet (25 mg) by mouth every morning for blood pressure. 90 tablet 1     ibuprofen (ADVIL/MOTRIN) 600 MG tablet Take 1 tablet (600 mg) by mouth every 6 hours as needed for moderate pain 120 tablet 3     LORazepam (ATIVAN) 1 MG tablet Take 1 tablet (1 mg) by mouth every 6 hours as needed (Anxiety, Nausea/Vomiting or Sleep) 30 tablet 2     mometasone (NASONEX) 50 MCG/ACT nasal spray Spray 2 sprays into both nostrils daily       ondansetron (ZOFRAN) 8 MG tablet If vomiting occurs with CC-486, take 1 tab (8 mg) 30 minutes prior to each subsequent CC-486 dose. 30 tablet 11     pravastatin (PRAVACHOL) 20 MG tablet Take 1 tablet (20 mg) by mouth every evening for cholesterol. 90 tablet 1     prochlorperazine (COMPAZINE) 10 MG tablet Take 1 tablet (10 mg) by mouth every 6 hours as needed (Nausea/Vomiting) 30 tablet 2     senna-docusate (SENOKOT-S;PERICOLACE) 8.6-50 MG per tablet Take 2 tablets by mouth daily 100 tablet 3     study CC-486 (IDS #5077) 100 mg TABS CHEMOTHERAPY tablet Take 1 tablet (100 mg) by mouth daily Schedule 1. Take for 21 days, followed by 7 days off. Take at the same time each day on an empty stomach or with food (a light breakfast or meal of up to approximately 600 calories).  Swallow tablet whole with about 8oz of room temperature water. Do not take broken or cracked tablets. 21 tablet 0     potassium chloride ER (K-TAB) 20 MEQ CR tablet Take 1 tablet (20 mEq) by mouth daily 30 tablet 3         Allergies:        Allergies   Allergen Reactions     Gemfibrozil Muscle Pain (Myalgia)     Lovastatin      headaches     Penicillins Nausea and Vomiting        Social History:     Social History     Tobacco Use     Smoking status: Never Smoker     Smokeless tobacco: Never Used   Substance Use Topics     Alcohol use: Yes     Comment:  "occasional       History   Drug Use No         Family History:       Family History   Problem Relation Age of Onset     Hypertension Mother      Hypertension Father      Cerebrovascular Disease Father         polycythemia     Breast Cancer Maternal Aunt         older age         Physical Exam:     /80   Pulse 105   Temp 97.6  F (36.4  C) (Oral)   Resp 18   Ht 1.651 m (5' 5\")   Wt 66 kg (145 lb 6.4 oz)   SpO2 99%   BMI 24.20 kg/m    Body mass index is 24.2 kg/m .    General Appearance: healthy and alert, no distress     Musculoskeletal: extremities non tender and without edema    Neurological: normal gait, no gross defects     Psychiatric: appropriate mood and affect                               ABDOMEN:  Soft, nondistended, nontender.             Assessment:    Di Evans is a 58 year old woman with a diagnosis of platinum-resistant ovarian cancer.     A total of 30 minutes was spent with the patient, 25 minutes of which were spent in counseling the patient and/or treatment planning.      1.  Platinum-resistant ovarian cancer.   2.  On TRIO 0026 study.   3.  ECOG performance status of 0.   4.  Elevated TSH with normal T4.   1.  Hyponatremia   2.  Hypokalemia.   3. Platinum resistant ovarian cancer  4. [id number] study     Discussed with patient we need to replace her potassium, as well as sodium.  We will recheck that tomorrow and replace IV as well as oral.  This might explain some of her fatigue.  If this does not improve or gets worse, I would even consider inpatient replacement of electrolytes, which at this point she does not needed. She will start on the 20th for cycle 2 of YPVZ550 study as well as do a paracentesis as needed.  She is currently scheduled every 2 weeks, but could be increased if needed.  The patient agrees with the plan.  She is very appreciative of her care.  All questions were answered.       Angelo Molina MD, MS    Department of Obstetrics and " Gynecology   Division of Gynecologic Oncology   [facility]  Phone: [phone]          CC  Patient Care Team:  Sonja Davies MD as PCP - General (Family Practice)  Jocelyn Madden MD as Assigned PCP  SELF, REFERRED

## 2019-03-13 NOTE — NURSING NOTE
TRIO Study ([id number]): Study Visit Note   Subject name: Di Evans     Patient here accompanied by her roommate and friend a non-scheduled visit in the TRIO Study ([id number]). Since the last study visit, patient has felt abdominal area full with fluid. Went for paracentesis on Monday 11/MAR/2019. A total of 4 liters of abdominal fluid was retrieved. Paracentesis went well. Patient reports that she has been more tired than before. Reports a cough since 1/MAR/2019, cough inproved since paracentesis was done. Patient also reports backache since 8/MAR/2019. I have personally interviewed this patient and reviewed medical record for adverse events and concomitant medications and these have been recorded on the corresponding logs in patient's research file.     Patient was given the opportunity to ask any trial related questions. Please see Dr. Angelo Molina's progress note for physical exam and other clinical information. Labs were reviewed - any significant lab values were addressed. Patient sent to infusion center to receive replacement potassium and sodium. Patient will receive 20 meq IV potassium and 40 meq oral potassium along with 1,000 mL of NaCl. Patient will go home with a prescription of 20 mg of daily oral potassium.     Study appointments scheduled per protocol. Reviewed schedule with patient, no need to make any adjustments. Instructed patient to call back with any questions or concerns. Patient voiced understanding.     Chanel Purcell RN    Office: [phone]  Pager: [phone]

## 2019-03-13 NOTE — NURSING NOTE
"Oncology Rooming Note    March 13, 2019 11:05 AM   Di Evans is a 58 year old female who presents for:    Chief Complaint   Patient presents with     Blood Draw     VPT blood draw and vitals     Oncology Clinic Visit     Return Ovarian Ca     Initial Vitals: /80   Pulse 105   Temp 97.6  F (36.4  C) (Oral)   Resp 18   Ht 1.651 m (5' 5\")   Wt 66 kg (145 lb 6.4 oz)   SpO2 99%   BMI 24.20 kg/m   Estimated body mass index is 24.2 kg/m  as calculated from the following:    Height as of this encounter: 1.651 m (5' 5\").    Weight as of this encounter: 66 kg (145 lb 6.4 oz). Body surface area is 1.74 meters squared.  Mild Pain (2) Comment: whole back   No LMP recorded. Patient has had a hysterectomy.  Allergies reviewed: Yes  Medications reviewed: Yes    Medications: MEDICATION REFILLS NEEDED TODAY. Provider was notified.  Pharmacy name entered into [facility]:    [facility] - [place], [place] - [address], [address]  [facility] [address] [place] [address] [address] AT [facility] [address]    Clinical concerns: Refill on Ibuprofen, tylenol and compazine; Disability paperwork; fatigue; nausea;       Judy Montes CMA              "

## 2019-03-13 NOTE — Clinical Note
3/13/2019       RE: Di Evans  [address] [place] [address]     Dear Colleague,    Thank you for referring your patient, Di Evans, to the [facility]. Please see a copy of my visit note below.    No notes on file    Again, thank you for allowing me to participate in the care of your patient.      Sincerely,    Angelo Molina MD

## 2019-03-13 NOTE — PROGRESS NOTES
Infusion Nursing Note:  Di Evans presents today for IVF and Potassium.    Patient seen by provider today: Yes: Dr. Molina   present during visit today: Not Applicable.    Note: Pt here for add on 1 liter of normal saline and 20 meq of IV Potassium and 40 meq of oral potassium for a potassium lab of 2.7. She tolerated infusion well without any issues or side affects.     Intravenous Access:  Peripheral IV placed.    Treatment Conditions:  Lab Results   Component Value Date    HGB 10.3 03/13/2019     Lab Results   Component Value Date    WBC 6.0 03/13/2019      Lab Results   Component Value Date    ANEU 4.9 03/13/2019     Lab Results   Component Value Date     03/13/2019      Lab Results   Component Value Date     03/13/2019                   Lab Results   Component Value Date    POTASSIUM 2.7 03/13/2019           Lab Results   Component Value Date    MAG 1.4 12/07/2018            Lab Results   Component Value Date    CR 0.72 03/13/2019                   Lab Results   Component Value Date    TRACEY 8.4 03/13/2019                Lab Results   Component Value Date    BILITOTAL 1.0 03/13/2019           Lab Results   Component Value Date    ALBUMIN 2.5 03/13/2019                    Lab Results   Component Value Date     03/13/2019           Lab Results   Component Value Date     03/13/2019       Results reviewed, labs MET treatment parameters, ok to proceed with treatment.      Post Infusion Assessment:  Patient tolerated infusion without incident.  Blood return noted pre and post infusion.  Site patent and intact, free from redness, edema or discomfort.  No evidence of extravasations.  Access discontinued per protocol.    Discharge Plan:   Patient discharged in stable condition accompanied by: family members.  Departure Mode: Ambulatory.    ANDREW ENGLAND RN

## 2019-03-13 NOTE — LETTER
3/13/2019      RE: Di Evans  [address] [facility] [facility] [address]                   Follow Up Notes on Referred Patient    Date: 3/13/2019       Dr. Chato Hough MD  No address on file       RE: Di Evans  : 1960  ROMAN: 3/13/2019    Dear Dr. Chato Hough:    Di Evans is a 58 year old woman with a diagnosis of platinum-resistant ovarian cancer.     The patient presents today for followup.  She just had a paracentesis.  It feels a little bit better with that.  She does still have occasional nausea and a little bit more fatigue.  She has less of an appetite.  She denies any fevers or chills.  No vaginal bleeding or discharge.             Past Medical History:    Past Medical History:   Diagnosis Date     Hypertension      Ovarian cancer (H)          Past Surgical History:    Past Surgical History:   Procedure Laterality Date     HYSTERECTOMY TOTAL ABDOMINAL, BILATERAL SALPINGO-OOPHORECTOMY, COMBINED Bilateral 2018    Procedure: COMBINED HYSTERECTOMY TOTAL ABDOMINAL, SALPINGO-OOPHORECTOMY;;  Surgeon: Jammie Dueñas MD;  Location: [facility] OR     LAPAROSCOPY DIAGNOSTIC (GYN) N/A 2019    Procedure: Diagnostic Laparoscopy With Biopsy;  Surgeon: Jammie Dueñas MD;  Location: [facility] OR     LAPAROTOMY, TUMOR DEBULKING, COMBINED Bilateral 2018    Procedure: COMBINED LAPAROTOMY, TUMOR DEBULKING;  Exploratory Laparotomy, Modified Radical Hysterectomy, Removal of Cervix, Bilateral Salpingo - Oophorectomy, Omentectomy, Bilateral Para Aortic and Left Pelvic Lymph Node Dissection, Tumor Debulking, Proctoscopy;  Surgeon: Jammie Dueñas MD;  Location: [facility] OR     REMOVE PORT PERITONEAL N/A 2019    Procedure: REMOVE PORT PERITONEAL;  Surgeon: Chanel Rodriguez MD;  Location: [facility] OR     SURGICAL HISTORY OF -       left ankle surgery         Health Maintenance Due   Topic Date Due     ZOSTER IMMUNIZATION (1 of 2) 2009     FIT Q1 YR  2015        Current Medications:     Current Outpatient Medications   Medication Sig Dispense Refill     aspirin 81 MG EC tablet Take 81 mg by mouth       cetirizine (ZYRTEC) 10 MG tablet Take 10 mg by mouth       hydrochlorothiazide (HYDRODIURIL) 25 MG tablet Take 1 tablet (25 mg) by mouth every morning for blood pressure. 90 tablet 1     ibuprofen (ADVIL/MOTRIN) 600 MG tablet Take 1 tablet (600 mg) by mouth every 6 hours as needed for moderate pain 120 tablet 3     LORazepam (ATIVAN) 1 MG tablet Take 1 tablet (1 mg) by mouth every 6 hours as needed (Anxiety, Nausea/Vomiting or Sleep) 30 tablet 2     mometasone (NASONEX) 50 MCG/ACT nasal spray Spray 2 sprays into both nostrils daily       ondansetron (ZOFRAN) 8 MG tablet If vomiting occurs with CC-486, take 1 tab (8 mg) 30 minutes prior to each subsequent CC-486 dose. 30 tablet 11     pravastatin (PRAVACHOL) 20 MG tablet Take 1 tablet (20 mg) by mouth every evening for cholesterol. 90 tablet 1     prochlorperazine (COMPAZINE) 10 MG tablet Take 1 tablet (10 mg) by mouth every 6 hours as needed (Nausea/Vomiting) 30 tablet 2     senna-docusate (SENOKOT-S;PERICOLACE) 8.6-50 MG per tablet Take 2 tablets by mouth daily 100 tablet 3     study CC-486 (IDS #5077) 100 mg TABS CHEMOTHERAPY tablet Take 1 tablet (100 mg) by mouth daily Schedule 1. Take for 21 days, followed by 7 days off. Take at the same time each day on an empty stomach or with food (a light breakfast or meal of up to approximately 600 calories).  Swallow tablet whole with about 8oz of room temperature water. Do not take broken or cracked tablets. 21 tablet 0     potassium chloride ER (K-TAB) 20 MEQ CR tablet Take 1 tablet (20 mEq) by mouth daily 30 tablet 3         Allergies:        Allergies   Allergen Reactions     Gemfibrozil Muscle Pain (Myalgia)     Lovastatin      headaches     Penicillins Nausea and Vomiting        Social History:     Social History     Tobacco Use     Smoking status: Never Smoker      "Smokeless tobacco: Never Used   Substance Use Topics     Alcohol use: Yes     Comment: occasional       History   Drug Use No         Family History:       Family History   Problem Relation Age of Onset     Hypertension Mother      Hypertension Father      Cerebrovascular Disease Father         polycythemia     Breast Cancer Maternal Aunt         older age         Physical Exam:     /80   Pulse 105   Temp 97.6  F (36.4  C) (Oral)   Resp 18   Ht 1.651 m (5' 5\")   Wt 66 kg (145 lb 6.4 oz)   SpO2 99%   BMI 24.20 kg/m     Body mass index is 24.2 kg/m .    General Appearance: healthy and alert, no distress     Musculoskeletal: extremities non tender and without edema    Neurological: normal gait, no gross defects     Psychiatric: appropriate mood and affect                               ABDOMEN:  Soft, nondistended, nontender.             Assessment:    Di Evans is a 58 year old woman with a diagnosis of platinum-resistant ovarian cancer.     A total of 30 minutes was spent with the patient, 25 minutes of which were spent in counseling the patient and/or treatment planning.      1.  Platinum-resistant ovarian cancer.   2.  On TRIO 0026 study.   3.  ECOG performance status of 0.   4.  Elevated TSH with normal T4.   1.  Hyponatremia   2.  Hypokalemia.   3. Platinum resistant ovarian cancer  4. [id number] study     Discussed with patient we need to replace her potassium, as well as sodium.  We will recheck that tomorrow and replace IV as well as oral.  This might explain some of her fatigue.  If this does not improve or gets worse, I would even consider inpatient replacement of electrolytes, which at this point she does not needed. She will start on the 20th for cycle 2 of [id number] study as well as do a paracentesis as needed.  She is currently scheduled every 2 weeks, but could be increased if needed.  The patient agrees with the plan.  She is very appreciative of her care.  All questions were answered. "       Angelo Molina MD, MS    Department of Obstetrics and Gynecology   Division of Gynecologic Oncology   [facility]  Phone: [phone]    CC  Patient Care Team:  Sonja Davies MD as PCP - General (Family Practice)  Jocelyn Madden MD as Assigned PCP

## 2019-03-13 NOTE — NURSING NOTE
Chief Complaint   Patient presents with     Infusion     pt here for add on IVF and potassium for Ovarian Cancer

## 2019-03-18 NOTE — TELEPHONE ENCOUNTER
"TRIO Study ([id number]): Telephone Visit   Subject name: Di Evans     Patient contacted research staff to state that she has been feeling leg cramps from the knee to the buttock. Declines legs being swollen. Upon clarification, patient stated that legs feel \"thight\" only. Reviewed labs with patient from 14/MAR/2019. Patient has low albumin. Will start eating hard boiled eggs to increase albumin. Sodium and potassium remain slightly lower than normal, but have improved since last study visit.     Provider notified of patient's concerns and lab results. Asked provider to contact patient. Patient will return to clinic on Wednesday 20/MAR/2019 for start of next cycle. Concerns and labs will be addressed as well. Instructed patient to call back with any questions or concerns. Patient voiced understanding.     Chanel Purcell RN    Office: [phone]  Pager: [phone]                        "

## 2019-03-18 NOTE — PROGRESS NOTES
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 She is enrolled in the TRIO study.     HPI: Courtney comes to the clinic accompanied by her mother Lilliam. She is overall feeling fair. Tolerated  with minimal nausea- taking Compazine and Zofran as needed with pre-medication before her  dosing as well. Constipated at times, taking senna and Miralax with improvement. 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 Her energy level is lower in the evenings, but she remains physically active and walks in the afternoon, rests afterward. Able to complete her ADLs without assistance and be out of bed >50% of the day. Her biggest concern is proximal thigh/buttocks discomfort which she describes as tightness- denies cramping, radiation, erythema, warmth, tenderness to touch, or swelling. It came on suddenly six days ago without an obvious inciting factor, no trauma. It has been improving since then but still causes her a lot of discomfort at night, legs feel restless at that time as well. Taking oxycodone 10mg at HS and then around 0400 for the leg discomfort so she can sleep. Also taking Tylenol and ibuprofen with some improvement. Had a paracentesis on 3/11 and had four liters removed, had a cough prior to this that resolved with her paracentesis. Next paracentesis scheduled 3/25, though she is not currently having a cough or feeling as full as she had been. No changes in chronic neuropathy. Trying to increase her protein intake- taking in two Boosts, three meals, and two eggs per day. Trying to drink 2L fluids daily, taking in mostly water. No fevers, chills, or bleeding. Reports an ECOG score  of 1.      Oncology History:  7/2018: Six months of bloating, decreased appetite, early satiety.     7/16/18:  CT A/P:  Large cystic/solid mass within the pelvis highly suspicious for ovarian malignancy. 2.  Omental thickening suspicious for metastatic disease. 3.  Large volume of ascites.  Malignant ascites cannot be excluded. 4.  Small left pleural effusion and left lower lobe atelectasis. 5.  Diverticulosis, small hiatal hernia, and gallbladder sludge.     7/24/18:  = 598     7/25/18:  CT Chest:  1. No definite metastatic disease in the chest. 2. Small left pleural effusion. 3. 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 Small amount of ascites, improved from the prior study. 2. Peritoneal nodularity and thickening, improved from the prior study. 3. Stable bilateral pulmonary nodules measuring up to 4 mm. No new or enlarging pulmonary nodules.     11/16/18:  Cycle #5 carboplatin AUC 6 and paclitaxel  175 mg/m2,  = 71     12/7/18:  Cycle #6 carboplatin AUC 6 and paclitaxel 175 mg/m2,  = 71     1/9/19:  CT C/A/P:  1. Findings suspicious for worsening intra-abdominal involvement by ovarian malignancy with increased peritoneal nodularity and increased retroperitoneal and mesenteric adenopathy. Continued small ascites. 2. Unchanged tiny pulmonary nodules without evidence of malignancy in  the chest    2/7/19: Diagnostic laparoscopy and peritoneal biopsy- biopsy positive for poorly differentiated adenocarcinoma    2/20/19: C1D1 TRIO  orally x21 days started  2/27/19:C1D8 TRIO pembrolizumab  3/20/19: C2D1 TRIO  orally x21 days, pembrolizumab    Past Medical History:   Diagnosis Date     Hypertension      Ovarian cancer (H)        Past Surgical History:   Procedure Laterality Date     HYSTERECTOMY TOTAL ABDOMINAL, BILATERAL SALPINGO-OOPHORECTOMY, COMBINED Bilateral 7/30/2018    Procedure: COMBINED HYSTERECTOMY TOTAL ABDOMINAL, SALPINGO-OOPHORECTOMY;;  Surgeon: Jammie Dueñas MD;  Location: [facility] OR     LAPAROSCOPY DIAGNOSTIC (GYN) N/A 2/7/2019    Procedure: Diagnostic Laparoscopy With Biopsy;  Surgeon: Jammie Dueñas MD;  Location: [facility] OR     LAPAROTOMY, TUMOR DEBULKING, COMBINED Bilateral 7/30/2018    Procedure: COMBINED LAPAROTOMY, TUMOR DEBULKING;  Exploratory Laparotomy, Modified Radical Hysterectomy, Removal of Cervix, Bilateral Salpingo - Oophorectomy, Omentectomy, Bilateral Para Aortic and Left Pelvic Lymph Node Dissection, Tumor Debulking, Proctoscopy;  Surgeon: Jammie Dueñas MD;  Location: [facility] OR     REMOVE PORT PERITONEAL N/A 1/7/2019    Procedure: REMOVE PORT PERITONEAL;  Surgeon: Chanel Rodriguez MD;  Location: [facility] OR     SURGICAL HISTORY OF -   1980    left ankle surgery       Current Outpatient Medications   Medication     aspirin 81 MG EC tablet     cetirizine (ZYRTEC) 10 MG tablet     hydrochlorothiazide (HYDRODIURIL) 25 MG tablet     ibuprofen  (ADVIL/MOTRIN) 600 MG tablet     LORazepam (ATIVAN) 1 MG tablet     mometasone (NASONEX) 50 MCG/ACT nasal spray     ondansetron (ZOFRAN) 8 MG tablet     potassium chloride ER (K-TAB) 20 MEQ CR tablet     pravastatin (PRAVACHOL) 20 MG tablet     prochlorperazine (COMPAZINE) 10 MG tablet     senna-docusate (SENOKOT-S;PERICOLACE) 8.6-50 MG per tablet     study CC-486 (IDS #5077) 100 mg TABS CHEMOTHERAPY tablet     No current facility-administered medications for this visit.        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     Employer: [employer]   Social Needs     Financial resource strain: Not on file     Food insecurity:     Worry: Not on file     Inability: Not on file     Transportation needs:     Medical: Not on file     Non-medical: Not on file   Tobacco Use     Smoking status: Never Smoker     Smokeless tobacco: Never Used   Substance and Sexual Activity     Alcohol use: Yes     Comment: occasional     Drug use: No     Sexual activity: No   Lifestyle     Physical activity:     Days per week: Not on file     Minutes per session: Not on file     Stress: Not on file   Relationships     Social connections:     Talks on phone: Not on file     Gets together: Not on file     Attends Sabianist service: Not on file     Active member of club or organization: Not on file     Attends meetings of clubs or organizations: Not on file     Relationship  status: Not on file     Intimate partner violence:     Fear of current or ex partner: Not on file     Emotionally abused: Not on file     Physically abused: Not on file     Forced sexual activity: Not on file   Other Topics Concern     Parent/sibling w/ CABG, MI or angioplasty before 65F 55M? No      Service No     Blood Transfusions No     Caffeine Concern Yes     Occupational Exposure No     Hobby Hazards No     Sleep Concern No     Stress Concern No     Weight Concern Yes     Special Diet No     Back Care No     Exercise Yes     Bike Helmet No     Comment: Yes in the future     Seat Belt Yes     Self-Exams Yes   Social History Narrative     Not on file           ROS  General: + fatigue, weakness. Denies changes in weight,  appetite changes, night sweats, hot flashes, fever, chills, or difficulty sleeping  HEENT: Denies headaches, hair loss, visual difficulty or disturbances, masses, head injury, tinnitus, hearing loss, epistaxis, congestion, problems with teeth or gums, dysphonia, or dysphagia  Pulmonary: + allergies. Denies cough, sputum, hemoptysis, shortness of breath, dyspnea on exertion, wheezing  Cardiovascular: + white coat hypertension. Denies chest pain, fainting, palpitations, murmurs, activity intolerance, swelling in legs  Gastrointestinal: + nausea, constipation. Denies vomiting, diarrhea, abdominal pain, bloating, heartburn, melena, hematochezia, or jaundice  Genitourinary: Denies dysuria, urinary urgency or frequency, hematuria, cloudy or malodorous urine, incontinence, repeat urinary tract infections, flank pain, pelvic pain, vaginal bleeding, vaginal discharge, or vaginal dryness  Sexual Function: Denies pain with intercourse, changes in libido, or changes in orgasm  Integumentary: Denies rashes, sores, changing moles, or scarring  Hematologic: Denies swollen lymph nodes, masses, easy bruising, or easy bleeding  Musculoskeletal: + myalgias, muscle weakness. Denies falls, back pain,  "arthralgias, stiffness, or muscle cramps  Neurologic: + numbness/tingling. Denies changes in memory, difficulty with walking, dizziness, seizures, or tremors  Psychiatric: Denies anxiety, depression, mood changes, suicidal thoughts, or difficulty concentrating  Endocrine: Denies polydipsia, polyuria, temperature intolerance, or history of thyroid disease        Physical Exam:    /77 (BP Location: Right arm, Patient Position: Sitting, Cuff Size: Adult Regular)   Pulse 117   Temp 98.3  F (36.8  C) (Oral)   Resp 16   Ht 1.651 m (5' 5\")   Wt 69.5 kg (153 lb 3.2 oz)   SpO2 97%   BMI 25.49 kg/m      CONSTITUTIONAL: Alert non-toxic appearing female in no acute distress  HEAD: Normocephalic, atraumatic  EYES: PERRLA; no scleral icterus  ENT: Oropharynx pink without lesions  NECK: Neck supple without lymphadenopathy  RESPIRATORY: Lungs clear to auscultation, no increased work of breathing noted  CV: Regular rate and rhythm, S1S2, no clicks, murmurs, rubs, or gallops; bilateral lower extremities without edema, dorsalis pedis pulses 2+ bilaterally  GASTROINTESTINAL: Normoactive bowel sounds x4 quadrants, abdomen soft, non-distended, and non-tender to palpation without palpable masses or organomegaly;   GENITOURINARY: Not indicated  LYMPHATIC: Cervical, supraclavicular, and inguinal nodes without lymphadenopathy  MUSCULOSKELETAL: Moves all extremities, no obvious muscle wasting; no gross abnormalities to proximal thighs/buttocks- no swelling, erythema, edema, warmth, ecchymosis, or tenderness to palpation  NEUROLOGIC: No gross deficits, normal gait  SKIN: Appropriate color for race, warm and dry, no rashes or lesions to unclothed skin  PSYCHIATRIC: Pleasant and interactive, affect euthymic, makes appropriate eye contact, thought process linear    Labs:      3/20/2019  Day 1   Hemoglobin 11.7 - 15.7 g/dL 9.4 (A)   Hematocrit 35.0 - 47.0 % 31.9 (A)   Platelet Count 150 - 450 10e9/L 375   Absolute Neutrophil 1.6 - 8.3 " 10e9/L 4.2   Sodium 133 - 144 mmol/L 129 (A)   Potassium 3.4 - 5.3 mmol/L 3.5   Chloride 94 - 109 mmol/L 92 (A)   Carbon Dioxide 20 - 32 mmol/L 29   Urea Nitrogen 7 - 30 mg/dL 13   Creatinine 0.52 - 1.04 mg/dL 0.46 (A)   Calcium 8.5 - 10.1 mg/dL 9.0   Magnesium 1.6 - 2.3 mg/dL 1.5 (A)   Bilirubin Total 0.2 - 1.3 mg/dL 0.4   ALT 0 - 50 U/L 46   AST 0 - 45 U/L 30   Alkaline Phosphatase 40 - 150 U/L 176 (A)   Albumin 3.4 - 5.0 g/dL 2.5 (A)   Protein Total 6.8 - 8.8 g/dL 7.3   WBC 4.0 - 11.0 10e9/L 5.3   Magnesium 1.5  CK total 21  TSH 2.79   144     Assessment/Plan:  1) Progressive platinum resistant ovarian cancer: Overall feeling fair, ECOG 1. LFTs improving, albumin improving. Proceed with C2D1 TRIO as originally ordered by Dr. Duque. Chanel Purcell, research RN, in for study management.   Grade 1 fatigue. Continue exercise with periods of rest.   Grade 2 muscle pain- unclear etiology, CK obtained which is slightly low making inflammatory myositis from pembrolizumab less likely- potentially related to hypomagnesia, will be replaced in infusion and will start magnesium oxide 400mg PO at HS. Oxycodone refilled for myalgias, may take sparing amounts of Tylenol (LFTs improving), ibuprofen in small amounts only if needed. Warm baths/cold packs may also be helpful. No focal weakness to suggest a spinal cord compression, no s/sxs DVT. Continue to monitor.    Hyponatremia: Largely stable, no neurologic changes. Will add in 1L NS bolus to infusion today. Reviewed taking in fluids containing sodium. Message sent to PCP regarding her hydrochlorothiazide dosing as well.   Nausea: Not impacting ability to eat, controlled with Compazine and Zofran. Continue current management.   Ascites: Not currently bothersome, paracentesis scheduled for 3/25, continue to monitor.   Constipation: Currently managed with senna and Miralax, continue current management.  3) Patient verbalized understanding of and agreement with plan    A  total of 35 minutes of face to face time were spent with the patient with over 50% of that time spent in counseling, coordination of care, education, and symptom management.    SULMA Coffey, FNP-C  Division of Gynecologic Oncology  [facility]  Pager: [phone]

## 2019-03-20 NOTE — PROGRESS NOTES
Infusion Nursing Note:  Di Evans presents today for Cycle 2 Day 1 Study pembrolizumab IDS #[id number], IVF, Mg.    Patient seen by provider today: Yes: Siena Inman NP   present during visit today: Not Applicable.    Note: Magnesium replaced per protocol.    Intravenous Access:  Peripheral IV placed.    Treatment Conditions:  Lab Results   Component Value Date    HGB 9.4 03/20/2019     Lab Results   Component Value Date    WBC 5.3 03/20/2019      Lab Results   Component Value Date    ANEU 4.2 03/20/2019     Lab Results   Component Value Date     03/20/2019      Lab Results   Component Value Date     03/20/2019                   Lab Results   Component Value Date    POTASSIUM 3.5 03/20/2019           Lab Results   Component Value Date    MAG 1.5 03/20/2019            Lab Results   Component Value Date    CR 0.46 03/20/2019                   Lab Results   Component Value Date    TRACEY 9.0 03/20/2019                Lab Results   Component Value Date    BILITOTAL 0.4 03/20/2019           Lab Results   Component Value Date    ALBUMIN 2.5 03/20/2019                    Lab Results   Component Value Date    ALT 46 03/20/2019           Lab Results   Component Value Date    AST 30 03/20/2019     Results reviewed, labs MET treatment parameters, ok to proceed with treatment.    Post Infusion Assessment:  Patient tolerated infusion without incident.  Blood return noted pre and post infusion.  Site patent and intact, free from redness, edema or discomfort.  No evidence of extravasations.  Access discontinued per protocol.     Discharge Plan:   Patient declined prescription refills. Magnesium sent to local pharmacy.  AVS printed. Patient will return 4/10/19 for next appointment. Apts will be better arranged by Study Nurse Chanel.   Patient discharged in stable condition accompanied by: self and friend.  Departure Mode: Ambulatory.    Millie Hung RN

## 2019-03-20 NOTE — NURSING NOTE
"Oncology Rooming Note    March 20, 2019 1:18 PM   Di Evans is a 58 year old female who presents for:    Chief Complaint   Patient presents with     Blood Draw     Labs drawn from PIV placed by RN in lab. Line flushed with saline. Vs taken and pt checked in for appt     Oncology Clinic Visit     Return Ovarain Ca     Initial Vitals: /77 (BP Location: Right arm, Patient Position: Sitting, Cuff Size: Adult Regular)   Pulse 117   Temp 98.3  F (36.8  C) (Oral)   Resp 16   Ht 1.651 m (5' 5\")   Wt 69.5 kg (153 lb 3.2 oz)   SpO2 97%   BMI 25.49 kg/m   Estimated body mass index is 25.49 kg/m  as calculated from the following:    Height as of this encounter: 1.651 m (5' 5\").    Weight as of this encounter: 69.5 kg (153 lb 3.2 oz). Body surface area is 1.79 meters squared.  Mild Pain (2) Comment: Data Unavailable   No LMP recorded. Patient has had a hysterectomy.  Allergies reviewed: Yes  Medications reviewed: Yes    Medications: MEDICATION REFILLS NEEDED TODAY. Provider was notified.  Pharmacy name entered into [facility]:    [facility] - [place], [place] - [address], [id number]  [facility] [address] [place] [address] [address] AT [facility] [address]    Clinical concerns: Refill on tylenol, zofran, ibuprofen, compazine, senna, oxycodone; hamstring and back pain; muscle relaxer's.      Judy Montes CMA              "

## 2019-03-20 NOTE — NURSING NOTE
"TRIO Study ([id number]): Study Visit Note   Subject name: Di Evans     Patient here accompanied by her mother and friend for C2D1 in the TRIO Study ([id number]). Since the last study visit, patient has been doing better. Reports that eating well and tolerating food. Continues to experience \"muscle tightness\" on both legs, but it is improving.  Patient states that she is ready for next treatment. Started taking potassium supplement, potassium levels have improved. Sodium remains low. It will be replaced today prior to Pembrolizumab infusion. Provider decided to test for magnesium levels today due to muscle tightness. Magnesium level is low. Patient will also have magnesium replaced today prior to Pembrolizumab infusion, and will start taking an oral magnesium supplement. Patient denies nausea or vomiting as well as diarrhea. Patient states that she continues to experience constipation, but it is relieved by laxatives and stool softeners. I have personally interviewed this patient and reviewed medical record for adverse events and concomitant medications and these have been recorded on the corresponding logs in patient's research file.     Patient was given the opportunity to ask any trial related questions. Please see Siena Inman's progress note for physical exam and other clinical information. Labs were reviewed - any significant lab values were addressed and reviewed and patient is okay to continue study treatment at current dose. Patient okay to continue at current dose. Patient here for oral CC-486 and Keytruda today. Patient escorted to infusion center for study treatment.     Study appointments scheduled per protocol. Reviewed schedule with patient, no need to make any adjustments. Instructed patient to call back with any questions or concerns. Patient voiced understanding.     IDS number: [id number] Drug name: CC-486    DISPENSE:  Number of blister packs dispensed today: 1  Lot number(s): [id number]  Number " of pills dispensed: 21  RETURN:  Number of blister packs returned:  Lot number(s): [id number]  Number of pills returned: 0  Drug diary returned? YES    Are there any discrepancies between the amount of medication the patient was instructed to take and the amount recorded as taken in the patient s drug diary? NO    Are there any discrepancies between the amount of medication the patient has recorded as taken in the drug diary and the amount that would be expected to be returned based on the amount recorded as taken? NO    Did the study visit occur within the appropriate window allowed by the protocol? YES    Chanel Purcell RN    Office: [phone]  Pager: [phone]

## 2019-03-20 NOTE — NURSING NOTE
Chief Complaint   Patient presents with     Blood Draw     Labs drawn from PIV placed by RN in lab. Line flushed with saline. Vs taken and pt checked in for appt     Denisse Morel RN

## 2019-03-20 NOTE — PATIENT INSTRUCTIONS
Contact Numbers    [facility] [place] [phone]  [facility] [place] and after hours / weekends / holidays:  [phone]      Please call the triage or after hours line if you experience a temperature greater than or equal to 100.5, shaking chills, have uncontrolled nausea, vomiting and/or diarrhea, dizziness, shortness of breath, chest pain, bleeding, unexplained bruising, or if you have any other new/concerning symptoms, questions or concerns.      If you are having any concerning symptoms or wish to speak to a provider before your next infusion visit, please call your care coordinator or triage to notify them so we can adequately serve you.     If you need a refill on a narcotic prescription or other medication, please call before your infusion appointment.                 March 2019 Sunday Monday Tuesday Wednesday Thursday Friday Saturday                            1     2       3     4     5     6    LAB   2:00 PM   (10 min.)   LAB ONC [facility] 7     8     9       10     11    [facility] PARACENTESIS  12:00 PM   (120 min.)   Provider, Dale Spec Inf Para   [facility] Specialty and Procedure    US PARACENTESIS   1:00 PM   (60 min.)   UCUSATC2   [facility] Ultrasound 12     13    [facility] LAB DRAW  10:15 AM   (15 min.)    [facility] LAB DRAW   [facility] Lab Draw    [facility] RETURN  10:45 AM   (20 min.)   [facility] Molina [facility]   1:00 PM   (120 min.)    BMT INFUSION   [facility] Blood and Marrow Transplant 14    LAB   2:00 PM   (10 min.)   LAB FIRST FLOOR [facility] 15     16       17     18     19     20    [facility] LAB DRAW  12:45 PM   (15 min.)    [facility] LAB DRAW   [facility] Lab Draw    [facility] RETURN ACTIVE TREATMENT  12:55 PM   (40 min.)   Siena Inman APRN CNP   [facility] 60   2:30 PM   (60 min.)   DALE  ONCOLOGY INFUSION   [facility] 21     22     23       24     25    [facility] PARACENTESIS   2:00 PM   (120 min.)   Provider, Dale Spec Inf Para   [facility] Specialty and Procedure 26     27    LAB   2:00 PM   (10 min.)   LAB FIRST FLOOR [facility] 28     29     30 31 April 2019 Sunday Monday Tuesday Wednesday Thursday Friday Saturday        1     2     3    CT CHEST/ABDOMEN/PELVIS W  11:30 AM   (30 min.)   MGCT1   [facility]    LAB   2:00 PM   (10 min.)   LAB FIRST FLOOR [facility] 4     5     6       7     8    [facility] PARACENTESIS   2:00 PM   (120 min.)   Provider, Dale Spec Inf Para   [facility] Specialty and Procedure 9     10    [facility] LAB DRAW  10:00 AM   (15 min.)    [facility] LAB DRAW   [facility] Lab Draw    [facility] RETURN ACTIVE TREATMENT  10:15 AM   (40 min.)   Siena Inman APRN CNP   [facility]    PROCEDURE - 4.5 HR  11:00 AM   (270 min.)   U2A ROOM 11   Unit 2A [facility]    [facility] ONC INFUSION 60  12:00 PM   (60 min.)   UC ONCOLOGY INFUSION   [facility]    CT ABD RETROPERITONEAL BIOPSY  12:30 PM   (90 min.)   UUCT2   [facility] [place], Interventional Radiology 11     12     13       14     15     16     17    [facility] LAB DRAW  11:00 AM   (15 min.)    [facility] LAB DRAW   [facility] Lab Draw    [facility] RETURN  11:25 AM   (20 min.)   Angelo Molina MD   [facility] 18     19     20       21     22     23     24    LAB   2:00 PM   (10 min.)   LAB FIRST FLOOR [facility] 25     26     27       28     29     30                                        Recent Results (from the past 24 hour(s))   CBC with platelets differential    Collection Time: 03/20/19  1:00 PM   Result Value Ref Range    WBC 5.3 4.0 - 11.0 10e9/L    RBC Count  3.98 3.8 - 5.2 10e12/L    Hemoglobin 9.4 (L) 11.7 - 15.7 g/dL    Hematocrit 31.9 (L) 35.0 - 47.0 %    MCV 80 78 - 100 fl    MCH 23.6 (L) 26.5 - 33.0 pg    MCHC 29.5 (L) 31.5 - 36.5 g/dL    RDW 18.6 (H) 10.0 - 15.0 %    Platelet Count 375 150 - 450 10e9/L    Diff Method Automated Method     % Neutrophils 78.0 %    % Lymphocytes 13.1 %    % Monocytes 7.5 %    % Eosinophils 0.6 %    % Basophils 0.6 %    % Immature Granulocytes 0.2 %    Nucleated RBCs 0 0 /100    Absolute Neutrophil 4.2 1.6 - 8.3 10e9/L    Absolute Lymphocytes 0.7 (L) 0.8 - 5.3 10e9/L    Absolute Monocytes 0.4 0.0 - 1.3 10e9/L    Absolute Eosinophils 0.0 0.0 - 0.7 10e9/L    Absolute Basophils 0.0 0.0 - 0.2 10e9/L    Abs Immature Granulocytes 0.0 0 - 0.4 10e9/L    Absolute Nucleated RBC 0.0    Comprehensive metabolic panel    Collection Time: 03/20/19  1:00 PM   Result Value Ref Range    Sodium 129 (L) 133 - 144 mmol/L    Potassium 3.5 3.4 - 5.3 mmol/L    Chloride 92 (L) 94 - 109 mmol/L    Carbon Dioxide 29 20 - 32 mmol/L    Anion Gap 8 3 - 14 mmol/L    Glucose 170 (H) 70 - 99 mg/dL    Urea Nitrogen 13 7 - 30 mg/dL    Creatinine 0.46 (L) 0.52 - 1.04 mg/dL    GFR Estimate >90 >60 mL/min/[1.73_m2]    GFR Estimate If Black >90 >60 mL/min/[1.73_m2]    Calcium 9.0 8.5 - 10.1 mg/dL    Bilirubin Total 0.4 0.2 - 1.3 mg/dL    Albumin 2.5 (L) 3.4 - 5.0 g/dL    Protein Total 7.3 6.8 - 8.8 g/dL    Alkaline Phosphatase 176 (H) 40 - 150 U/L    ALT 46 0 - 50 U/L    AST 30 0 - 45 U/L   TSH    Collection Time: 03/20/19  1:00 PM   Result Value Ref Range    TSH 2.79 0.40 - 4.00 mU/L   Magnesium    Collection Time: 03/20/19  1:00 PM   Result Value Ref Range    Magnesium 1.5 (L) 1.6 - 2.3 mg/dL   CK total    Collection Time: 03/20/19  1:00 PM   Result Value Ref Range    CK Total 21 (L) 30 - 225 U/L

## 2019-03-20 NOTE — LETTER
3/20/2019       RE: Di Evans  [address] [place] [address]     Dear Colleague,    Thank you for referring your patient, Di Evans, to the [facility]. Please see a copy of my visit note below.    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 She is enrolled in the TRIO study.     HPI: Courtney comes to the clinic accompanied by her mother Lilliam. She is overall feeling fair. Tolerated  with minimal nausea- taking Compazine and Zofran as needed with pre-medication before her  dosing as well. Constipated at times, taking senna and Miralax with improvement. 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 Her energy level is lower in the evenings, but she remains physically active and walks in the afternoon, rests afterward. Able to complete her ADLs without assistance and be out of bed >50% of the day. Her biggest concern is proximal thigh/buttocks discomfort which she describes as tightness- denies cramping, radiation, erythema, warmth, tenderness to touch, or swelling. It came on suddenly six days ago without an obvious inciting factor, no trauma. It has been improving since then but still causes her a lot of discomfort at night, legs feel restless at that time as well. Taking oxycodone 10mg at HS and then around 0400 for the leg discomfort so she can sleep. Also taking Tylenol and ibuprofen with some improvement. Had a paracentesis on 3/11 and had four liters removed, had a cough prior to this that resolved with her paracentesis. Next paracentesis scheduled 3/25, though she is not currently having a cough or feeling as full as she had  been. No changes in chronic neuropathy. Trying to increase her protein intake- taking in two Boosts, three meals, and two eggs per day. Trying to drink 2L fluids daily, taking in mostly water. No fevers, chills, or bleeding. Reports an ECOG score of 1.      Oncology History:  7/2018: Six months of bloating, decreased appetite, early satiety.     7/16/18:  CT A/P:  Large cystic/solid mass within the pelvis highly suspicious for ovarian malignancy. 2.  Omental thickening suspicious for metastatic disease. 3.  Large volume of ascites.  Malignant ascites cannot be excluded. 4.  Small left pleural effusion and left lower lobe atelectasis. 5.  Diverticulosis, small hiatal hernia, and gallbladder sludge.     7/24/18:  = 598     7/25/18:  CT Chest:  1. No definite metastatic disease in the chest. 2. Small left pleural effusion. 3. 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 Small amount of ascites, improved  from the prior study. 2. Peritoneal nodularity and thickening, improved from the prior study. 3. Stable bilateral pulmonary nodules measuring up to 4 mm. No new or enlarging pulmonary nodules.     11/16/18:  Cycle #5 carboplatin AUC 6 and paclitaxel 175 mg/m2,  = 71     12/7/18:  Cycle #6 carboplatin AUC 6 and paclitaxel 175 mg/m2,  = 71     1/9/19:  CT C/A/P:  1. Findings suspicious for worsening intra-abdominal involvement by ovarian malignancy with increased peritoneal nodularity and increased retroperitoneal and mesenteric adenopathy. Continued small ascites. 2. Unchanged tiny pulmonary nodules without evidence of malignancy in  the chest    2/7/19: Diagnostic laparoscopy and peritoneal biopsy- biopsy positive for poorly differentiated adenocarcinoma    2/20/19: C1D1 TRIO  orally x21 days started  2/27/19:C1D8 TRIO pembrolizumab  3/20/19: C2D1 TRIO  orally x21 days, pembrolizumab    Past Medical History:   Diagnosis Date     Hypertension      Ovarian cancer (H)        Past Surgical History:   Procedure Laterality Date     HYSTERECTOMY TOTAL ABDOMINAL, BILATERAL SALPINGO-OOPHORECTOMY, COMBINED Bilateral 7/30/2018    Procedure: COMBINED HYSTERECTOMY TOTAL ABDOMINAL, SALPINGO-OOPHORECTOMY;;  Surgeon: Jammie Dueñas MD;  Location: [facility] OR     LAPAROSCOPY DIAGNOSTIC (GYN) N/A 2/7/2019    Procedure: Diagnostic Laparoscopy With Biopsy;  Surgeon: Jammie Dueñas MD;  Location: [facility] OR     LAPAROTOMY, TUMOR DEBULKING, COMBINED Bilateral 7/30/2018    Procedure: COMBINED LAPAROTOMY, TUMOR DEBULKING;  Exploratory Laparotomy, Modified Radical Hysterectomy, Removal of Cervix, Bilateral Salpingo - Oophorectomy, Omentectomy, Bilateral Para Aortic and Left Pelvic Lymph Node Dissection, Tumor Debulking, Proctoscopy;  Surgeon: Jammie Dueñas MD;  Location: [facility] OR     REMOVE PORT PERITONEAL N/A 1/7/2019    Procedure: REMOVE PORT PERITONEAL;  Surgeon: Chanel Rodriguez MD;   Location: [facility] OR     SURGICAL HISTORY OF -   1980    left ankle surgery       Current Outpatient Medications   Medication     aspirin 81 MG EC tablet     cetirizine (ZYRTEC) 10 MG tablet     hydrochlorothiazide (HYDRODIURIL) 25 MG tablet     ibuprofen (ADVIL/MOTRIN) 600 MG tablet     LORazepam (ATIVAN) 1 MG tablet     mometasone (NASONEX) 50 MCG/ACT nasal spray     ondansetron (ZOFRAN) 8 MG tablet     potassium chloride ER (K-TAB) 20 MEQ CR tablet     pravastatin (PRAVACHOL) 20 MG tablet     prochlorperazine (COMPAZINE) 10 MG tablet     senna-docusate (SENOKOT-S;PERICOLACE) 8.6-50 MG per tablet     study CC-486 (IDS #5077) 100 mg TABS CHEMOTHERAPY tablet     No current facility-administered medications for this visit.        Allergies   Allergen Reactions     Gemfibrozil Muscle Pain (Myalgia)     Lovastatin      headaches     Penicillins Nausea and Vomiting       Family History   Problem Relation Age of Onset     Hypertension Mother      Hypertension Father      Cerebrovascular Disease Father         polycythemia     Breast Cancer Maternal Aunt         older age       Social History     Socioeconomic History     Marital status: Single     Spouse name: Not on file     Number of children: 0     Years of education: Not on file     Highest education level: Not on file   Occupational History     Occupation: [employer]     Employer: [employer]   Social Needs     Financial resource strain: Not on file     Food insecurity:     Worry: Not on file     Inability: Not on file     Transportation needs:     Medical: Not on file     Non-medical: Not on file   Tobacco Use     Smoking status: Never Smoker     Smokeless tobacco: Never Used   Substance and Sexual Activity     Alcohol use: Yes     Comment: occasional     Drug use: No     Sexual activity: No   Lifestyle     Physical activity:     Days per week: Not on file     Minutes per session: Not on file     Stress: Not on file   Relationships     Social  connections:     Talks on phone: Not on file     Gets together: Not on file     Attends Jainism service: Not on file     Active member of club or organization: Not on file     Attends meetings of clubs or organizations: Not on file     Relationship status: Not on file     Intimate partner violence:     Fear of current or ex partner: Not on file     Emotionally abused: Not on file     Physically abused: Not on file     Forced sexual activity: Not on file   Other Topics Concern     Parent/sibling w/ CABG, MI or angioplasty before 65F 55M? No      Service No     Blood Transfusions No     Caffeine Concern Yes     Occupational Exposure No     Hobby Hazards No     Sleep Concern No     Stress Concern No     Weight Concern Yes     Special Diet No     Back Care No     Exercise Yes     Bike Helmet No     Comment: Yes in the future     Seat Belt Yes     Self-Exams Yes   Social History Narrative     Not on file           ROS  General: + fatigue, weakness. Denies changes in weight,  appetite changes, night sweats, hot flashes, fever, chills, or difficulty sleeping  HEENT: Denies headaches, hair loss, visual difficulty or disturbances, masses, head injury, tinnitus, hearing loss, epistaxis, congestion, problems with teeth or gums, dysphonia, or dysphagia  Pulmonary: + allergies. Denies cough, sputum, hemoptysis, shortness of breath, dyspnea on exertion, wheezing  Cardiovascular: + white coat hypertension. Denies chest pain, fainting, palpitations, murmurs, activity intolerance, swelling in legs  Gastrointestinal: + nausea, constipation. Denies vomiting, diarrhea, abdominal pain, bloating, heartburn, melena, hematochezia, or jaundice  Genitourinary: Denies dysuria, urinary urgency or frequency, hematuria, cloudy or malodorous urine, incontinence, repeat urinary tract infections, flank pain, pelvic pain, vaginal bleeding, vaginal discharge, or vaginal dryness  Sexual Function: Denies pain with intercourse, changes in  "libido, or changes in orgasm  Integumentary: Denies rashes, sores, changing moles, or scarring  Hematologic: Denies swollen lymph nodes, masses, easy bruising, or easy bleeding  Musculoskeletal: + myalgias, muscle weakness. Denies falls, back pain, arthralgias, stiffness, or muscle cramps  Neurologic: + numbness/tingling. Denies changes in memory, difficulty with walking, dizziness, seizures, or tremors  Psychiatric: Denies anxiety, depression, mood changes, suicidal thoughts, or difficulty concentrating  Endocrine: Denies polydipsia, polyuria, temperature intolerance, or history of thyroid disease        Physical Exam:    /77 (BP Location: Right arm, Patient Position: Sitting, Cuff Size: Adult Regular)   Pulse 117   Temp 98.3  F (36.8  C) (Oral)   Resp 16   Ht 1.651 m (5' 5\")   Wt 69.5 kg (153 lb 3.2 oz)   SpO2 97%   BMI 25.49 kg/m       CONSTITUTIONAL: Alert non-toxic appearing female in no acute distress  HEAD: Normocephalic, atraumatic  EYES: PERRLA; no scleral icterus  ENT: Oropharynx pink without lesions  NECK: Neck supple without lymphadenopathy  RESPIRATORY: Lungs clear to auscultation, no increased work of breathing noted  CV: Regular rate and rhythm, S1S2, no clicks, murmurs, rubs, or gallops; bilateral lower extremities without edema, dorsalis pedis pulses 2+ bilaterally  GASTROINTESTINAL: Normoactive bowel sounds x4 quadrants, abdomen soft, non-distended, and non-tender to palpation without palpable masses or organomegaly;   GENITOURINARY: Not indicated  LYMPHATIC: Cervical, supraclavicular, and inguinal nodes without lymphadenopathy  MUSCULOSKELETAL: Moves all extremities, no obvious muscle wasting; no gross abnormalities to proximal thighs/buttocks- no swelling, erythema, edema, warmth, ecchymosis, or tenderness to palpation  NEUROLOGIC: No gross deficits, normal gait  SKIN: Appropriate color for race, warm and dry, no rashes or lesions to unclothed skin  PSYCHIATRIC: Pleasant and " interactive, affect euthymic, makes appropriate eye contact, thought process linear    Labs:      3/20/2019  Day 1   Hemoglobin 11.7 - 15.7 g/dL 9.4 (A)   Hematocrit 35.0 - 47.0 % 31.9 (A)   Platelet Count 150 - 450 10e9/L 375   Absolute Neutrophil 1.6 - 8.3 10e9/L 4.2   Sodium 133 - 144 mmol/L 129 (A)   Potassium 3.4 - 5.3 mmol/L 3.5   Chloride 94 - 109 mmol/L 92 (A)   Carbon Dioxide 20 - 32 mmol/L 29   Urea Nitrogen 7 - 30 mg/dL 13   Creatinine 0.52 - 1.04 mg/dL 0.46 (A)   Calcium 8.5 - 10.1 mg/dL 9.0   Magnesium 1.6 - 2.3 mg/dL 1.5 (A)   Bilirubin Total 0.2 - 1.3 mg/dL 0.4   ALT 0 - 50 U/L 46   AST 0 - 45 U/L 30   Alkaline Phosphatase 40 - 150 U/L 176 (A)   Albumin 3.4 - 5.0 g/dL 2.5 (A)   Protein Total 6.8 - 8.8 g/dL 7.3   WBC 4.0 - 11.0 10e9/L 5.3   Magnesium 1.5  CK total 21  TSH 2.79   144     Assessment/Plan:  1) Progressive platinum resistant ovarian cancer: Overall feeling fair, ECOG 1. LFTs improving, albumin improving. Proceed with C2D1 TRIO as originally ordered by Dr. Duque. Chanel Purcell, research RN, in for study management.   Grade 1 fatigue. Continue exercise with periods of rest.   Grade 2 muscle pain- unclear etiology, CK obtained which is slightly low making inflammatory myositis from pembrolizumab less likely- potentially related to hypomagnesia, will be replaced in infusion and will start magnesium oxide 400mg PO at HS. Oxycodone refilled for myalgias, may take sparing amounts of Tylenol (LFTs improving), ibuprofen in small amounts only if needed. Warm baths/cold packs may also be helpful. No focal weakness to suggest a spinal cord compression, no s/sxs DVT. Continue to monitor.    Hyponatremia: Largely stable, no neurologic changes. Will add in 1L NS bolus to infusion today. Reviewed taking in fluids containing sodium. Message sent to PCP regarding her hydrochlorothiazide dosing as well.   Nausea: Not impacting ability to eat, controlled with Compazine and Zofran. Continue current  management.   Ascites: Not currently bothersome, paracentesis scheduled for 3/25, continue to monitor.   Constipation: Currently managed with senna and Miralax, continue current management.  3) Patient verbalized understanding of and agreement with plan    A total of 35 minutes of face to face time were spent with the patient with over 50% of that time spent in counseling, coordination of care, education, and symptom management.    SULMA Coffey, FNP-C  Division of Gynecologic Oncology  [facility]  Pager: [phone]

## 2019-03-21 NOTE — LETTER
3/21/2019       RE: Di Evans  [address] [facility] [facility] [address]     Dear Colleague,    Thank you for referring your patient, Di Evans, to the [facility]. Please see a copy of my visit note below.    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   She is enrolled in the TRIO study.       HPI: She was last see on 3/20/2020 at which time she reported constipation that was under control with Senna and Mirlax, and she received her treatment.    Over the last 3 days she reports the constipation is significantly worsened and she has significant discomfort. Denies any nausea or vomiting, + flatus.        Oncology History:  2018: Six months of bloating, decreased appetite, early satiety.     18:  CT A/P:  Large cystic/solid mass within the pelvis highly suspicious for ovarian malignancy. 2.  Omental thickening suspicious for metastatic disease. 3.  Large volume of ascites.  Malignant ascites cannot be excluded. 4.  Small left pleural effusion and left lower lobe atelectasis. 5.  Diverticulosis, small hiatal hernia, and gallbladder sludge.     18:  = 598     18:  CT Chest:  1. No definite metastatic disease in the chest. 2. Small left pleural effusion. 3. Moderate ascites with mesenteric and omental edema/nodularity, better evaluated on CT 2018:  Exploratory laparotomy, modified radical hysterectomy, bilateral salpingo-oophorectomy, omentectomy, right pelvic and bilateral para-aortic lymph node dissection, CUSA tumor debulking, mobilization of the entire colon, stripping of left pelvic peritoneum, proctoscopy, optimal tumor debulking to no gross residual  disease                 Pathology:  Stage IIIB mixed clear cell (80%) and endometrioid (20%) adenocarcinoma, + pelvic LN, + PA LN, + omentum     8/24/18:  Cycle #1 carboplatin AUC6 and paclitaxel 175 mg/m2,  = 110     9/14/18:  Cycle #2 carboplatin AUC6 and paclitaxel 175 mg/m2,  = 48     10/5/18:  Cycle #3 carboplatin AUC 6 and paclitaxel 175 mg/m2,  = 59     10/26/18:  Cycle #4 carboplatin AUC 6 and paclitaxel 175 mg/m2,  = 75     11/13/18:  CT C/A/P: In this patient with history of ovarian cancer, there are postoperative changes of total abdominal hysterectomy, bilateral salpingo-oophorectomy and debulking surgery: 1. Small amount of ascites, improved from the prior study. 2. Peritoneal nodularity and thickening, improved from the prior study. 3. Stable bilateral pulmonary nodules measuring up to 4 mm. No new or enlarging pulmonary nodules.     11/16/18:  Cycle #5 carboplatin AUC 6 and paclitaxel 175 mg/m2,  = 71     12/7/18:  Cycle #6 carboplatin AUC 6 and paclitaxel 175 mg/m2,  = 71     1/9/19:  CT C/A/P:  1. Findings suspicious for worsening intra-abdominal involvement by ovarian malignancy with increased peritoneal nodularity and increased retroperitoneal and mesenteric adenopathy. Continued small ascites. 2. Unchanged tiny pulmonary nodules without evidence of malignancy in  the chest    2/7/19: Diagnostic laparoscopy and peritoneal biopsy- biopsy positive for poorly differentiated adenocarcinoma    2/20/19: C1D1 TRIO  orally x21 days started  2/27/19:C1D8 TRIO pembrolizumab  3/20/19: C2D1 TRIO  orally x21 days, pembrolizumab    Past Medical History:   Diagnosis Date     Hypertension      Ovarian cancer (H)        Past Surgical History:   Procedure Laterality Date     HYSTERECTOMY TOTAL ABDOMINAL, BILATERAL SALPINGO-OOPHORECTOMY, COMBINED Bilateral 7/30/2018    Procedure: COMBINED HYSTERECTOMY TOTAL ABDOMINAL, SALPINGO-OOPHORECTOMY;;  Surgeon: Jammie Dueñas,  MD;  Location: [facility] OR     LAPAROSCOPY DIAGNOSTIC (GYN) N/A 2/7/2019    Procedure: Diagnostic Laparoscopy With Biopsy;  Surgeon: Jammie Dueñas MD;  Location: [facility] OR     LAPAROTOMY, TUMOR DEBULKING, COMBINED Bilateral 7/30/2018    Procedure: COMBINED LAPAROTOMY, TUMOR DEBULKING;  Exploratory Laparotomy, Modified Radical Hysterectomy, Removal of Cervix, Bilateral Salpingo - Oophorectomy, Omentectomy, Bilateral Para Aortic and Left Pelvic Lymph Node Dissection, Tumor Debulking, Proctoscopy;  Surgeon: Jammie Dueñas MD;  Location: [facility] OR     REMOVE PORT PERITONEAL N/A 1/7/2019    Procedure: REMOVE PORT PERITONEAL;  Surgeon: Chanel Rodriguez MD;  Location: [facility] OR     SURGICAL HISTORY OF -   1980    left ankle surgery       Current Outpatient Medications   Medication     acetaminophen (TYLENOL) 500 MG tablet     aspirin 81 MG EC tablet     cetirizine (ZYRTEC) 10 MG tablet     hydrochlorothiazide (HYDRODIURIL) 25 MG tablet     ibuprofen (ADVIL/MOTRIN) 600 MG tablet     LORazepam (ATIVAN) 1 MG tablet     magnesium oxide (MAG-OX) 400 MG tablet     mometasone (NASONEX) 50 MCG/ACT nasal spray     ondansetron (ZOFRAN) 8 MG tablet     oxyCODONE (OXY-IR) 5 MG capsule     potassium chloride ER (K-TAB) 20 MEQ CR tablet     pravastatin (PRAVACHOL) 20 MG tablet     prochlorperazine (COMPAZINE) 10 MG tablet     senna-docusate (SENOKOT-S/PERICOLACE) 8.6-50 MG tablet     study CC-486 (IDS #5077) 100 mg TABS CHEMOTHERAPY tablet     study CC-486 (IDS #5077) 100 mg TABS CHEMOTHERAPY tablet     No current facility-administered medications for this visit.        Allergies   Allergen Reactions     Gemfibrozil Muscle Pain (Myalgia)     Lovastatin      headaches     Penicillins Nausea and Vomiting       Family History   Problem Relation Age of Onset     Hypertension Mother      Hypertension Father      Cerebrovascular Disease Father         polycythemia     Breast Cancer Maternal Aunt         older age       Social  History     Socioeconomic History     Marital status: Single     Spouse name: Not on file     Number of children: 0     Years of education: Not on file     Highest education level: Not on file   Occupational History     Occupation: [employer]     Employer: [employer]   Social Needs     Financial resource strain: Not on file     Food insecurity:     Worry: Not on file     Inability: Not on file     Transportation needs:     Medical: Not on file     Non-medical: Not on file   Tobacco Use     Smoking status: Never Smoker     Smokeless tobacco: Never Used   Substance and Sexual Activity     Alcohol use: Yes     Comment: occasional     Drug use: No     Sexual activity: No   Lifestyle     Physical activity:     Days per week: Not on file     Minutes per session: Not on file     Stress: Not on file   Relationships     Social connections:     Talks on phone: Not on file     Gets together: Not on file     Attends Jehovah's witness service: Not on file     Active member of club or organization: Not on file     Attends meetings of clubs or organizations: Not on file     Relationship status: Not on file     Intimate partner violence:     Fear of current or ex partner: Not on file     Emotionally abused: Not on file     Physically abused: Not on file     Forced sexual activity: Not on file   Other Topics Concern     Parent/sibling w/ CABG, MI or angioplasty before 65F 55M? No      Service No     Blood Transfusions No     Caffeine Concern Yes     Occupational Exposure No     Hobby Hazards No     Sleep Concern No     Stress Concern No     Weight Concern Yes     Special Diet No     Back Care No     Exercise Yes     Bike Helmet No     Comment: Yes in the future     Seat Belt Yes     Self-Exams Yes   Social History Narrative     Not on file           ROS  General: + fatigue, weakness. Denies changes in weight,  appetite changes, night sweats, hot flashes, fever, chills, or difficulty sleeping  HEENT: Denies  "headaches, hair loss, visual difficulty or disturbances, masses, head injury, tinnitus, hearing loss, epistaxis, congestion, problems with teeth or gums, dysphonia, or dysphagia  Pulmonary: + allergies. Denies cough, sputum, hemoptysis, shortness of breath, dyspnea on exertion, wheezing  Cardiovascular: + white coat hypertension. Denies chest pain, fainting, palpitations, murmurs, activity intolerance, swelling in legs  Gastrointestinal: + nausea, ++constipation. Denies vomiting, diarrhea, abdominal pain, bloating, heartburn, melena, hematochezia, or jaundice  Genitourinary: Denies dysuria, urinary urgency or frequency, hematuria, cloudy or malodorous urine, incontinence, repeat urinary tract infections, flank pain, pelvic pain, vaginal bleeding, vaginal discharge, or vaginal dryness  Sexual Function: Denies pain with intercourse, changes in libido, or changes in orgasm  Integumentary: Denies rashes, sores, changing moles, or scarring  Hematologic: Denies swollen lymph nodes, masses, easy bruising, or easy bleeding  Musculoskeletal: + myalgias, muscle weakness. Denies falls, back pain, arthralgias, stiffness, or muscle cramps  Neurologic: + numbness/tingling. Denies changes in memory, difficulty with walking, dizziness, seizures, or tremors  Psychiatric: Denies anxiety, depression, mood changes, suicidal thoughts, or difficulty concentrating  Endocrine: Denies polydipsia, polyuria, temperature intolerance, or history of thyroid disease        Physical Exam:    /79   Pulse 115   Temp 98.9  F (37.2  C) (Oral)   Resp 16   Ht 1.651 m (5' 5\")   Wt 68.9 kg (152 lb)   SpO2 99%   BMI 25.29 kg/m       CONSTITUTIONAL: Alert non-toxic appearing female in no acute distress, fatigued  GASTROINTESTINAL: Normoactive bowel sounds x4 quadrants, abdomen soft, mild distension, non-tender to palpation without palpable masses or organomegaly;       Assessment/Plan:  1) Progressive platinum resistant ovarian cancer: on TRIO " clinical trial study.    Constipation attributed to disease status and current oxycodone.    Reviewed aggressive management for constipation    -Increase Senna tablets to to include 4 tables BID. I have informed her to keep on a regular schedule.   - Start Sorbitol 70% solution 15 ml per day    She will contact our clinic if her symptoms do not improve in 48hrs.    Ok to proceed with planned chemotherapy treatment plan    Patient verbalized understanding of and agreement with plan      Maria Antonia Haiel M.D., MPH,  F.A.C.O.G.  Division of Gynecologic Oncology      A total of 20 minutes spent with the patient with over 50% of that time spent in counseling, coordination of care, education, and symptom management.        Again, thank you for allowing me to participate in the care of your patient.      Sincerely,    Maria Antonia Haile MD

## 2019-03-21 NOTE — TELEPHONE ENCOUNTER
TRIO Study ([id number]): Telephone Note   Subject name: Di Evans     Patient participating in the TRIO Study ([id number]). Patient received C2D1 treatment yesterday (oral CC-486 and IV Pembrolizumab). She also received magnesium IV prior to study treatment. During study visit, patient talked about constipation, but she reported that had been taking senna and miralax for relief.     Patient called study staff to report that she is now more concerned about constipation. Has not had a bowel movement for 5 days. Patient has no history of bowel obstruction. Treating MD, Jammie Duque was contacted ans she suggested that patient should come in today and be seen at the clinic by a provider. Patient added to Dr. Maria Antonia Haile's schedule. Will have x-ray done to rule out bowel obstruction. Patient contacted and informed her of time and location of appointment. Instructed patient to call back with any questions or concerns. Patient voiced understanding.     Chanel Purcell RN    Office: [phone]  Pager: [phone]

## 2019-03-21 NOTE — PROGRESS NOTES
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   She is enrolled in the TRIO study.       HPI: She was last see on 3/20/2020 at which time she reported constipation that was under control with Senna and Mirlax, and she received her treatment.    Over the last 3 days she reports the constipation is significantly worsened and she has significant discomfort. Denies any nausea or vomiting, + flatus.        Oncology History:  2018: Six months of bloating, decreased appetite, early satiety.     18:  CT A/P:  Large cystic/solid mass within the pelvis highly suspicious for ovarian malignancy. 2.  Omental thickening suspicious for metastatic disease. 3.  Large volume of ascites.  Malignant ascites cannot be excluded. 4.  Small left pleural effusion and left lower lobe atelectasis. 5.  Diverticulosis, small hiatal hernia, and gallbladder sludge.     18:  = 598     18:  CT Chest:  1. No definite metastatic disease in the chest. 2. Small left pleural effusion. 3. Moderate ascites with mesenteric and omental edema/nodularity, better evaluated on CT 2018:  Exploratory laparotomy, modified radical hysterectomy, bilateral salpingo-oophorectomy, omentectomy, right pelvic and bilateral para-aortic lymph node dissection, CUSA tumor debulking, mobilization of the entire colon, stripping of left pelvic peritoneum, proctoscopy, optimal tumor debulking to no gross residual disease                 Pathology:  Stage IIIB mixed clear cell (80%) and endometrioid (20%) adenocarcinoma, + pelvic LN, + PA LN, + omentum     18:  Cycle #1 carboplatin AUC6 and paclitaxel 175 mg/m2,  = 110     18:  Cycle #2  carboplatin AUC6 and paclitaxel 175 mg/m2,  = 48     10/5/18:  Cycle #3 carboplatin AUC 6 and paclitaxel 175 mg/m2,  = 59     10/26/18:  Cycle #4 carboplatin AUC 6 and paclitaxel 175 mg/m2,  = 75     11/13/18:  CT C/A/P: In this patient with history of ovarian cancer, there are postoperative changes of total abdominal hysterectomy, bilateral salpingo-oophorectomy and debulking surgery: 1. Small amount of ascites, improved from the prior study. 2. Peritoneal nodularity and thickening, improved from the prior study. 3. Stable bilateral pulmonary nodules measuring up to 4 mm. No new or enlarging pulmonary nodules.     11/16/18:  Cycle #5 carboplatin AUC 6 and paclitaxel 175 mg/m2,  = 71     12/7/18:  Cycle #6 carboplatin AUC 6 and paclitaxel 175 mg/m2,  = 71     1/9/19:  CT C/A/P:  1. Findings suspicious for worsening intra-abdominal involvement by ovarian malignancy with increased peritoneal nodularity and increased retroperitoneal and mesenteric adenopathy. Continued small ascites. 2. Unchanged tiny pulmonary nodules without evidence of malignancy in  the chest    2/7/19: Diagnostic laparoscopy and peritoneal biopsy- biopsy positive for poorly differentiated adenocarcinoma    2/20/19: C1D1 TRIO  orally x21 days started  2/27/19:C1D8 TRIO pembrolizumab  3/20/19: C2D1 TRIO  orally x21 days, pembrolizumab    Past Medical History:   Diagnosis Date     Hypertension      Ovarian cancer (H)        Past Surgical History:   Procedure Laterality Date     HYSTERECTOMY TOTAL ABDOMINAL, BILATERAL SALPINGO-OOPHORECTOMY, COMBINED Bilateral 7/30/2018    Procedure: COMBINED HYSTERECTOMY TOTAL ABDOMINAL, SALPINGO-OOPHORECTOMY;;  Surgeon: Jammie Dueñas MD;  Location: [facility] OR     LAPAROSCOPY DIAGNOSTIC (GYN) N/A 2/7/2019    Procedure: Diagnostic Laparoscopy With Biopsy;  Surgeon: Jammie Dueñas MD;  Location: [facility] OR     LAPAROTOMY, TUMOR DEBULKING, COMBINED Bilateral 7/30/2018     Procedure: COMBINED LAPAROTOMY, TUMOR DEBULKING;  Exploratory Laparotomy, Modified Radical Hysterectomy, Removal of Cervix, Bilateral Salpingo - Oophorectomy, Omentectomy, Bilateral Para Aortic and Left Pelvic Lymph Node Dissection, Tumor Debulking, Proctoscopy;  Surgeon: Jammie Dueñas MD;  Location: [facility] OR     REMOVE PORT PERITONEAL N/A 1/7/2019    Procedure: REMOVE PORT PERITONEAL;  Surgeon: Chanel Rodriguez MD;  Location: [facility] OR     SURGICAL HISTORY OF -   1980    left ankle surgery       Current Outpatient Medications   Medication     acetaminophen (TYLENOL) 500 MG tablet     aspirin 81 MG EC tablet     cetirizine (ZYRTEC) 10 MG tablet     hydrochlorothiazide (HYDRODIURIL) 25 MG tablet     ibuprofen (ADVIL/MOTRIN) 600 MG tablet     LORazepam (ATIVAN) 1 MG tablet     magnesium oxide (MAG-OX) 400 MG tablet     mometasone (NASONEX) 50 MCG/ACT nasal spray     ondansetron (ZOFRAN) 8 MG tablet     oxyCODONE (OXY-IR) 5 MG capsule     potassium chloride ER (K-TAB) 20 MEQ CR tablet     pravastatin (PRAVACHOL) 20 MG tablet     prochlorperazine (COMPAZINE) 10 MG tablet     senna-docusate (SENOKOT-S/PERICOLACE) 8.6-50 MG tablet     study CC-486 (IDS #5077) 100 mg TABS CHEMOTHERAPY tablet     study CC-486 (IDS #5077) 100 mg TABS CHEMOTHERAPY tablet     No current facility-administered medications for this visit.        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     Employer: [employer]   Social Needs     Financial resource strain: Not on file     Food insecurity:     Worry: Not on file     Inability: Not on file     Transportation needs:     Medical: Not on file     Non-medical: Not on file   Tobacco Use     Smoking status: Never Smoker     Smokeless tobacco: Never Used   Substance and Sexual Activity     Alcohol use: Yes     Comment: occasional     Drug use: No     Sexual activity: No   Lifestyle     Physical activity:     Days per week: Not on file     Minutes per session: Not on file     Stress: Not on file   Relationships     Social connections:     Talks on phone: Not on file     Gets together: Not on file     Attends Gnosticist service: Not on file     Active member of club or organization: Not on file     Attends meetings of clubs or organizations: Not on file     Relationship status: Not on file     Intimate partner violence:     Fear of current or ex partner: Not on file     Emotionally abused: Not on file     Physically abused: Not on file     Forced sexual activity: Not on file   Other Topics Concern     Parent/sibling w/ CABG, MI or angioplasty before 65F 55M? No      Service No     Blood Transfusions No     Caffeine Concern Yes     Occupational Exposure No     Hobby Hazards No     Sleep Concern No     Stress Concern No     Weight Concern Yes     Special Diet No     Back Care No     Exercise Yes     Bike Helmet No     Comment: Yes in the future     Seat Belt Yes     Self-Exams Yes   Social History Narrative     Not on file           ROS  General: + fatigue, weakness. Denies changes in weight,  appetite changes, night sweats, hot flashes, fever, chills, or difficulty sleeping  HEENT: Denies headaches, hair loss, visual difficulty or disturbances, masses, head injury, tinnitus, hearing loss, epistaxis, congestion, problems with teeth or gums, dysphonia, or dysphagia  Pulmonary: + allergies. Denies cough, sputum, hemoptysis, shortness  "of breath, dyspnea on exertion, wheezing  Cardiovascular: + white coat hypertension. Denies chest pain, fainting, palpitations, murmurs, activity intolerance, swelling in legs  Gastrointestinal: + nausea, ++constipation. Denies vomiting, diarrhea, abdominal pain, bloating, heartburn, melena, hematochezia, or jaundice  Genitourinary: Denies dysuria, urinary urgency or frequency, hematuria, cloudy or malodorous urine, incontinence, repeat urinary tract infections, flank pain, pelvic pain, vaginal bleeding, vaginal discharge, or vaginal dryness  Sexual Function: Denies pain with intercourse, changes in libido, or changes in orgasm  Integumentary: Denies rashes, sores, changing moles, or scarring  Hematologic: Denies swollen lymph nodes, masses, easy bruising, or easy bleeding  Musculoskeletal: + myalgias, muscle weakness. Denies falls, back pain, arthralgias, stiffness, or muscle cramps  Neurologic: + numbness/tingling. Denies changes in memory, difficulty with walking, dizziness, seizures, or tremors  Psychiatric: Denies anxiety, depression, mood changes, suicidal thoughts, or difficulty concentrating  Endocrine: Denies polydipsia, polyuria, temperature intolerance, or history of thyroid disease        Physical Exam:    /79   Pulse 115   Temp 98.9  F (37.2  C) (Oral)   Resp 16   Ht 1.651 m (5' 5\")   Wt 68.9 kg (152 lb)   SpO2 99%   BMI 25.29 kg/m      CONSTITUTIONAL: Alert non-toxic appearing female in no acute distress, fatigued  GASTROINTESTINAL: Normoactive bowel sounds x4 quadrants, abdomen soft, mild distension, non-tender to palpation without palpable masses or organomegaly;       Assessment/Plan:  1) Progressive platinum resistant ovarian cancer: on TRIO clinical trial study.    Constipation attributed to disease status and current oxycodone.    Reviewed aggressive management for constipation    -Increase Senna tablets to to include 4 tables BID. I have informed her to keep on a regular schedule. "   - Start Sorbitol 70% solution 15 ml per day    She will contact our clinic if her symptoms do not improve in 48hrs.    Ok to proceed with planned chemotherapy treatment plan    Patient verbalized understanding of and agreement with plan      Maria Antonia Haile M.D., MPH,  F.A.C.O.G.  Division of Gynecologic Oncology      A total of 20 minutes spent with the patient with over 50% of that time spent in counseling, coordination of care, education, and symptom management.

## 2019-03-21 NOTE — NURSING NOTE
"Oncology Rooming Note    March 21, 2019 1:55 PM   Di Evans is a 58 year old female who presents for:    Chief Complaint   Patient presents with     Oncology Clinic Visit     Return Ovarian Ca     Initial Vitals: /79   Pulse 115   Temp 98.9  F (37.2  C) (Oral)   Resp 16   Ht 1.651 m (5' 5\")   Wt 68.9 kg (152 lb)   SpO2 99%   BMI 25.29 kg/m   Estimated body mass index is 25.29 kg/m  as calculated from the following:    Height as of this encounter: 1.651 m (5' 5\").    Weight as of this encounter: 68.9 kg (152 lb). Body surface area is 1.78 meters squared.  Mild Pain (3) Comment: lowback   No LMP recorded. Patient has had a hysterectomy.  Allergies reviewed: Yes  Medications reviewed: Yes    Medications: Medication refills not needed today.  Pharmacy name entered into [employer]:    [facility] - [place], [place] - [address], [address]  [facility] [address] [place] [address] [address] AT [facility] [address]    Clinical concerns: constipation; looking to see if there is a blockage.       Judy Montes CMA              "

## 2019-03-22 NOTE — TELEPHONE ENCOUNTER
Nutrition Services:     Called Courtney today per her concern from oncology distress screening, concern with her ability to eat (8) and concern with her weight (8).      She reports that her appetite has been low and she has had constipation with Keytruda.  Her weight trends have been stable and she feels she is eating 'okay'.  She is most concerned with her constipation.      Interventions:  Encouraged pt to consume at least 8-10 cups water.   Sent patient edc materials 'Coping with Constipation'.      Advised pt to call RD with further questions.     Cindy Can RDN, N  [facility]  [phone]

## 2019-03-25 PROBLEM — C56.9 OVARIAN CANCER, UNSPECIFIED LATERALITY (H): Status: ACTIVE | Noted: 2018-08-03

## 2019-03-25 NOTE — PATIENT INSTRUCTIONS
Patient Education     Discharge Instructions for Paracentesis  Paracentesis is a procedure to remove extra fluid from your belly (abdomen). This fluid buildup in the abdomen is called ascites. The procedure may have been done to take a sample of the fluid. Or, it may have been done to drain the extra fluid from your abdomen and help make you more comfortable.     Ascites is buildup of excess fluid in the abdomen.   Home care    If you have pain after the procedure, your healthcare provider can prescribe or recommend pain medicines. Take these exactly as directed. If you stopped taking other medicines before the procedure, ask your provider when you can start them again.    Take it easy for 24 hours after the procedure. Avoid physical activity until your provider says it s OK.    You will have a small bandage over the puncture site. Stitches (sutures), surgical staples, adhesive tapes, adhesive strips, or surgical glue may be used to close the incision. They also help stop bleeding and speed healing. You may take the bandage off in 24 hours.    Check the puncture site for the signs of infection listed below.  Follow-up care  Make a follow-up appointment with your healthcare provider as directed. During your follow-up visit, your provider will check your healing. Let your provider know how you are feeling. You can also discuss the cause of your ascites and if you need any further treatment.  When to seek medical advice  Call your healthcare provider if you have any of the following after the procedure:    A fever of 100.4 F (38.0 C) or higher    Trouble breathing    Pain that doesn't go away even after taking pain medicine    Belly pain not caused by having the skin punctured    Bleeding from the puncture site    More than a small amount of fluid leaking from the puncture site    Swollen belly    Signs of infection at the puncture site. These include increased pain, redness, or swelling, warmth, or bad-smelling  drainage.    Blood in your urine    Feeling dizzy or lightheaded, or fainting   Date Last Reviewed: 7/1/2016 2000-2018 [employer]. [address] [place] [address], [place] [address]. All rights reserved. This information is not intended as a substitute for professional medical care. Always follow your healthcare professional's instructions.

## 2019-03-25 NOTE — TELEPHONE ENCOUNTER
"Requested Prescriptions   Pending Prescriptions Disp Refills     polyethylene glycol (MIRALAX/GLYCOLAX) powder [Pharmacy Med Name: POLYETH GLYCOL 3350 NF POWDER 527GM] 527 g 0    Last Written Prescription Date:  12/5/17  Last Fill Quantity: 510,  # refills: 1   Last Office Visit with G, P or [facility] prescribing provider:  11/5/18   Future Office Visit:      Sig: MIX 17 GRAMS IN LIQUID AS DIRECTED AND DRINK ONCE DAILY    Laxatives Protocol Failed - 3/25/2019  4:15 PM       Failed - Medication is active on med list       Passed - Patient is age 6 or older       Passed - Recent (12 mo) or future (30 days) visit within the authorizing provider's specialty    Patient had office visit in the last 12 months or has a visit in the next 30 days with authorizing provider or within the authorizing provider's specialty.  See \"Patient Info\" tab in inbasket, or \"Choose Columns\" in Meds & Orders section of the refill encounter.                "

## 2019-03-26 NOTE — TELEPHONE ENCOUNTER
Per pharmacy the only reason only 23 were dispensed was because that is all the pharmacy had on hand, pt agreed to take this quantity and get a new RX for the balance of the medication

## 2019-03-26 NOTE — TELEPHONE ENCOUNTER
Routing refill request to provider for review/approval because:  Drug not active on patient's medication list        Martha Reddy RN, BSN

## 2019-03-26 NOTE — TELEPHONE ENCOUNTER
"I got an email from Di Evans saying that she had a problem with there oxycodone prescription.     Here is her note:   \"My order for Oxycodone could not be fully filled.  My pharmacy only had 24 capsules since it was ordered that way, which voided the rest of my prescription. Could a new paper of 90 be dropped off there at the pharmacy and my brother in-law pick it up for me?  He works close by,  I am having ride proplems.  Thank you very much for all your help.  I need the Oxycodone to help take the pain away and help me sleep, again thanks for your help.\"       Thank you so much! Please let me know if you have any questions.     Chanel Purcell RN      Office: [phone]   Pager: [phone]   "

## 2019-03-26 NOTE — TELEPHONE ENCOUNTER
Spoke with pt inquired how often she was using oxycodone  Pt got rx filled on 3/20 and got #23  Pt states she had some left of previous RX  Pt currently pt has #17 left  Pt is using 2 tabs about every 6 hours and only at night. Pt states she does not use any during the day and states pain is tolerable and does not feel she needs this during the day  Pt states the pain is in low back, buttocks and ham string area  Pain is really only when she lays down so it is causing issues with sleeping    Inquired if pt could take tylenol/ibuprofen  Pt states she can take tylenol but was told not to take ibuprofen  Suggested to pt she could try taking tylenol and alternate that with the oxycodone.  Suggested also to try taking only 1 tab and see how that works if pain is still severe 1 hour after taking take another tab.      Will check with NP tomorrow on refill of prescription

## 2019-03-26 NOTE — TELEPHONE ENCOUNTER
Lm on vm for pt to call back  To find out how often using RX   What being used for/sx     Per pharmacy unknown why only 24 dispensed   Might be ins plan, might be due to first RX only 7 day supply can be filled, might need PA, might be that pharmacy only had 24 tabs in stock and that was what was dispensed

## 2019-04-05 NOTE — TELEPHONE ENCOUNTER
Spoke with Dr. Duque  She states she called the pt yesterday to discuss recent CT scan results  Told pt she was not sure how this result would affect her being on the study.  Told   Pt she would talk to  and get back to pt    MD states she spoke with  and after reviewing protocols pt can continue on the study  Chanel the  is supposed to call pt to discuss

## 2019-04-05 NOTE — TELEPHONE ENCOUNTER
Spoke with pt inquired if she had heard from Chanel  Pt states she had and does understand that she can stay on the study  Pt just wants to make sure MD is on the same page and not being pushed by someone else's opinion  Pt would really like to touch base with MD just to make sure.  Pt states Chanel was going to ask pt to call her  I told pt I would also send MD a message to call pt    Message sent to Dr. Duque to call pt to confirm plan  MD replied and will call pt

## 2019-04-05 NOTE — TELEPHONE ENCOUNTER
Spoke with chanel   Who states Dr. Duque was supposed to call pt back to discuss   Pt should plan to continue on the study for 4 more weeks, another CT will be done to see where things stand  The changes in CT could be related to pseudo progression which is not uncommon    I advised Chanel to call pt to discuss as she is not clear on everything  Chanel will contact pt and MD if needed

## 2019-04-08 NOTE — PROGRESS NOTES
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 She is enrolled in the TRIO study.     HPI: Courtney comes to the clinic accompanied by her roommate Alivia. She is overall feeling improved since her last visit. Tolerated  with minimal nausea- using Compazine as a pre-medication. Senna is helpful for constipation. Notes that her back feels stiff at times, feels this is due to her lack of physical activity. No longer having pain in her buttocks. Notes worsening fatigue and weakness, not as active as normal but able to complete her ADLs independently, out of bed >50% of the day. Appetite is low but takes in two Boosts per day. Drinking Gatorade and Pedialyte but notes that she does drink a lot of free water. 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 Her energy level is lower in the evenings, but she remains physically active and walks in the afternoon, rests afterward. Able to complete her ADLs without assistance and be out of bed >50% of the day. No longer symptomatic from ascites, last paracentesis was done 3/25/19 which yielded 400mL fluid. Chronic neuropathy is stable. Notes feelings of depression at times, states this is due to her lack of activity. States she is finding gabriele in her life, not having frequent episodes of crying, denies SI/HI. Denies need for intervention. No fevers, chills, or bleeding. Reports an ECOG score of 1.      Oncology History:  2018: Six months of bloating, decreased appetite, early satiety.     18:  CT A/P:  Large cystic/solid mass within the pelvis highly suspicious for ovarian malignancy. 2.  Omental thickening suspicious for metastatic disease.  3.  Large volume of ascites.  Malignant ascites cannot be excluded. 4.  Small left pleural effusion and left lower lobe atelectasis. 5.  Diverticulosis, small hiatal hernia, and gallbladder sludge.     7/24/18:  = 598     7/25/18:  CT Chest:  1. No definite metastatic disease in the chest. 2. Small left pleural effusion. 3. 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 Small amount of ascites, improved from the prior study. 2. Peritoneal nodularity and thickening, improved from the prior study. 3. Stable bilateral pulmonary nodules measuring up to 4 mm. No new or enlarging pulmonary nodules.     11/16/18:  Cycle #5 carboplatin AUC 6 and paclitaxel 175 mg/m2,  = 71     12/7/18:  Cycle #6 carboplatin AUC 6 and paclitaxel 175 mg/m2,  = 71     1/9/19:  CT C/A/P:  1. Findings suspicious for worsening intra-abdominal involvement by ovarian malignancy with increased peritoneal nodularity and increased  retroperitoneal and mesenteric adenopathy. Continued small ascites. 2. Unchanged tiny pulmonary nodules without evidence of malignancy in  the chest    2/7/19: Diagnostic laparoscopy and peritoneal biopsy- biopsy positive for poorly differentiated adenocarcinoma    2/20/19: C1D1 TRIO  orally x21 days started  2/27/19:C1D8 TRIO pembrolizumab  3/20/19: C2D1 TRIO  orally x21 days, pembrolizumab    Past Medical History:   Diagnosis Date     Hypertension      Ovarian cancer (H)        Past Surgical History:   Procedure Laterality Date     HYSTERECTOMY TOTAL ABDOMINAL, BILATERAL SALPINGO-OOPHORECTOMY, COMBINED Bilateral 7/30/2018    Procedure: COMBINED HYSTERECTOMY TOTAL ABDOMINAL, SALPINGO-OOPHORECTOMY;;  Surgeon: Jammie Dueñas MD;  Location: [facility] OR     LAPAROSCOPY DIAGNOSTIC (GYN) N/A 2/7/2019    Procedure: Diagnostic Laparoscopy With Biopsy;  Surgeon: Jammie Dueñas MD;  Location: [facility] OR     LAPAROTOMY, TUMOR DEBULKING, COMBINED Bilateral 7/30/2018    Procedure: COMBINED LAPAROTOMY, TUMOR DEBULKING;  Exploratory Laparotomy, Modified Radical Hysterectomy, Removal of Cervix, Bilateral Salpingo - Oophorectomy, Omentectomy, Bilateral Para Aortic and Left Pelvic Lymph Node Dissection, Tumor Debulking, Proctoscopy;  Surgeon: Jammie Dueñas MD;  Location: [facility] OR     REMOVE PORT PERITONEAL N/A 1/7/2019    Procedure: REMOVE PORT PERITONEAL;  Surgeon: Chanel Rodriguez MD;  Location: [facility] OR     SURGICAL HISTORY OF -   1980    left ankle surgery       Current Outpatient Medications   Medication     acetaminophen (TYLENOL) 500 MG tablet     aspirin 81 MG EC tablet     cetirizine (ZYRTEC) 10 MG tablet     hydrochlorothiazide (HYDRODIURIL) 25 MG tablet     ibuprofen (ADVIL/MOTRIN) 600 MG tablet     LORazepam (ATIVAN) 1 MG tablet     magnesium oxide (MAG-OX) 400 MG tablet     mometasone (NASONEX) 50 MCG/ACT nasal spray     ondansetron (ZOFRAN) 8 MG tablet     oxyCODONE (OXY-IR) 5 MG capsule      polyethylene glycol (MIRALAX/GLYCOLAX) powder     potassium chloride ER (K-TAB) 20 MEQ CR tablet     pravastatin (PRAVACHOL) 20 MG tablet     prochlorperazine (COMPAZINE) 10 MG tablet     senna-docusate (SENNA S) 8.6-50 MG tablet     senna-docusate (SENOKOT-S/PERICOLACE) 8.6-50 MG tablet     sorbitol 70 % solution     study CC-486 (IDS #5077) 100 mg TABS CHEMOTHERAPY tablet     study CC-486 (IDS #5077) 100 mg TABS CHEMOTHERAPY tablet     No current facility-administered medications for this visit.        Allergies   Allergen Reactions     Gemfibrozil Muscle Pain (Myalgia)     Lovastatin      headaches     Penicillins Nausea and Vomiting       Family History   Problem Relation Age of Onset     Hypertension Mother      Hypertension Father      Cerebrovascular Disease Father         polycythemia     Breast Cancer Maternal Aunt         older age       Social History     Socioeconomic History     Marital status: Single     Spouse name: Not on file     Number of children: 0     Years of education: Not on file     Highest education level: Not on file   Occupational History     Occupation: edPULSE     Employer: [employer]   Social Needs     Financial resource strain: Not on file     Food insecurity:     Worry: Not on file     Inability: Not on file     Transportation needs:     Medical: Not on file     Non-medical: Not on file   Tobacco Use     Smoking status: Never Smoker     Smokeless tobacco: Never Used   Substance and Sexual Activity     Alcohol use: Yes     Comment: occasional     Drug use: No     Sexual activity: Never   Lifestyle     Physical activity:     Days per week: Not on file     Minutes per session: Not on file     Stress: Not on file   Relationships     Social connections:     Talks on phone: Not on file     Gets together: Not on file     Attends Baptism service: Not on file     Active member of club or organization: Not on file     Attends meetings of clubs or  organizations: Not on file     Relationship status: Not on file     Intimate partner violence:     Fear of current or ex partner: Not on file     Emotionally abused: Not on file     Physically abused: Not on file     Forced sexual activity: Not on file   Other Topics Concern     Parent/sibling w/ CABG, MI or angioplasty before 65F 55M? No      Service No     Blood Transfusions No     Caffeine Concern Yes     Occupational Exposure No     Hobby Hazards No     Sleep Concern No     Stress Concern No     Weight Concern Yes     Special Diet No     Back Care No     Exercise Yes     Bike Helmet No     Comment: Yes in the future     Seat Belt Yes     Self-Exams Yes   Social History Narrative     Not on file           ROS  General: + fatigue, weakness. Denies changes in weight,  appetite changes, night sweats, hot flashes, fever, chills, or difficulty sleeping  HEENT: Denies headaches, hair loss, visual difficulty or disturbances, masses, head injury, tinnitus, hearing loss, epistaxis, congestion, problems with teeth or gums, dysphonia, or dysphagia  Pulmonary: + allergies. Denies cough, sputum, hemoptysis, shortness of breath, dyspnea on exertion, wheezing  Cardiovascular: Denies chest pain, fainting, palpitations, murmurs, activity intolerance, swelling in legs  Gastrointestinal: + nausea, constipation. Denies vomiting, diarrhea, abdominal pain, bloating, heartburn, melena, hematochezia, or jaundice  Genitourinary: Denies dysuria, urinary urgency or frequency, hematuria, cloudy or malodorous urine, incontinence, repeat urinary tract infections, flank pain, pelvic pain, vaginal bleeding, vaginal discharge, or vaginal dryness  Sexual Function: Denies pain with intercourse, changes in libido, or changes in orgasm  Integumentary: Denies rashes, sores, changing moles, or scarring  Hematologic: Denies swollen lymph nodes, masses, easy bruising, or easy bleeding  Musculoskeletal: + back pain, muscle weakness. Denies  myalgias, falls, back pain, arthralgias, stiffness, or muscle cramps  Neurologic: + numbness/tingling. Denies changes in memory, difficulty with walking, dizziness, seizures, or tremors  Psychiatric: + depression. Denies anxiety, mood changes, suicidal thoughts, or difficulty concentrating  Endocrine: Denies polydipsia, polyuria, temperature intolerance, or history of thyroid disease        Physical Exam:    /67 (BP Location: Right arm, Patient Position: Sitting, Cuff Size: Adult Regular)   Pulse 94   Temp 97.6  F (36.4  C) (Oral)   Resp 16   Wt 65 kg (143 lb 6.4 oz)   SpO2 100%   BMI 23.86 kg/m      CONSTITUTIONAL: Alert non-toxic appearing female in no acute distress  HEAD: Normocephalic, atraumatic  EYES: PERRLA; no scleral icterus  ENT: Oropharynx pink without lesions  NECK: Neck supple without lymphadenopathy  RESPIRATORY: Lungs clear to auscultation, no increased work of breathing noted  CV: Regular rate and rhythm, S1S2, no clicks, murmurs, rubs, or gallops; bilateral lower extremities without edema, dorsalis pedis pulses 2+ bilaterally  GASTROINTESTINAL: Normoactive bowel sounds x4 quadrants, abdomen soft, non-distended, and non-tender to palpation without palpable masses or organomegaly;   GENITOURINARY: Not indicated  LYMPHATIC: Cervical, supraclavicular, and inguinal nodes without lymphadenopathy  MUSCULOSKELETAL: Moves all extremities, no obvious muscle wasting  NEUROLOGIC: No gross deficits, normal gait  SKIN: Appropriate color for race, warm and dry, no rashes or lesions to unclothed skin  PSYCHIATRIC: Pleasant and interactive, affect euthymic, makes appropriate eye contact, thought process linear    Labs:      4/10/2019  Day 22   Hemoglobin 11.7 - 15.7 g/dL 9.7 (A)   Hematocrit 35.0 - 47.0 % 30.9 (A)   Platelet Count 150 - 450 10e9/L 326   Absolute Neutrophil 1.6 - 8.3 10e9/L 5.5   Sodium 133 - 144 mmol/L 126 (A)   Potassium 3.4 - 5.3 mmol/L 3.4   Chloride 94 - 109 mmol/L 90 (A)   Carbon  Dioxide 20 - 32 mmol/L 27   Urea Nitrogen 7 - 30 mg/dL 11   Creatinine 0.52 - 1.04 mg/dL 0.43 (A)   Calcium 8.5 - 10.1 mg/dL 9.2   Bilirubin Total 0.2 - 1.3 mg/dL 0.3   ALT 0 - 50 U/L 27   AST 0 - 45 U/L 21   Alkaline Phosphatase 40 - 150 U/L 90   Albumin 3.4 - 5.0 g/dL 2.5 (A)   Protein Total 6.8 - 8.8 g/dL 7.4   WBC 4.0 - 11.0 10e9/L 6.9   TSH 4.39  Free T4 1.56      Assessment/Plan:  1) Progressive platinum resistant ovarian cancer: Overall feeling fair, ECOG 1. CT reviewed with Courtney- mixed response, but potentially due to pseudoprogression with pembrolizumab. Will have repeat CT in four weeks. Proceed with C2D22 TRIO as originally ordered by Dr. Duque. Chanel Purcell, research RN, in for study management.  2) Treatment/disease effects:   Grade 1-2 fatigue: Bothersome to her, less active than normal. Cancer rehab ordered.   Hyponatremia: Largely stable, no neurologic changes. Will add in 1L NS bolus to infusion today. Reviewed taking in fluids containing sodium, to limit free water intake. To stop hydrochlorothiazide- monitor BP and notify me for readings >140/90.    Constipation: Currently managed with senna, continue current management.   Elevated TSH: Subclinical hypothyroidism likely due to pembrolizumab, continue to monitor. Will add in free T4 with upcoming TSH draws.   Nutrition: Low albumin, poor appetite. Continue oral nutritional supplements, reviewed importance of eating small frequent meals high in protein and calories.   Mental health: Reporting depressive feelings, denies SI. Discussed counseling and pharmacotherapy, declines at this time. Continue to monitor.  3) Patient verbalized understanding of and agreement with plan    A total of 30 minutes of face to face time were spent with the patient with over 50% of that time spent in counseling, coordination of care, education, and symptom management.    SULMA Coffey, FNP-C  Division of Gynecologic Oncology  [facility]  Pager:  [phone]

## 2019-04-09 NOTE — IP AVS SNAPSHOT
[address] [address]                                    After Visit Summary   4/9/2019    Di Evans    MRN: [id number]           After Visit Summary Signature Page    I have received my discharge instructions, and my questions have been answered. I have discussed any challenges I see with this plan with the nurse or doctor.    ..........................................................................................................................................  Patient/Patient Representative Signature      ..........................................................................................................................................  Patient Representative Print Name and Relationship to Patient    ..................................................               ................................................  Date                                   Time    ..........................................................................................................................................  Reviewed by Signature/Title    ...................................................              ..............................................  Date                                               Time          22EPIC Rev 08/18

## 2019-04-09 NOTE — PROGRESS NOTES
1600 Pt arrived on 2a post biopsy. VSS Ra. Dressing c/d/i. No pain. Family at bedside. Pt has food and drink with her. 1650 Pt tolerating oral intake. Discharge instructions reviewed, copy given to pt. Pt tolerated ambulation. PIV dc'd. 1705 Pt discharged home accompanied by family.

## 2019-04-09 NOTE — PROGRESS NOTES
Interventional Radiology Pre-Procedure Sedation Assessment   Time of Assessment: 12:00 PM    Expected Level: Moderate Sedation    Indication: Sedation is required for the following type of Procedure: Biopsy    Sedation and procedural consent: Risks, benefits and alternatives were discussed with Patient    PO Intake: Appropriately NPO for procedure    ASA Class: Class 2 - MILD SYSTEMIC DISEASE, NO ACUTE PROBLEMS, NO FUNCTIONAL LIMITATIONS.    Mallampati: Grade 2:  Soft palate, base of uvula, tonsillar pillars, and portion of posterior pharyngeal wall visible    Lungs: Lungs Clear with good breath sounds bilaterally    Heart: Normal heart sounds and rate    History and physical reviewed and no updates needed. I have reviewed the lab findings, diagnostic data, medications, and the plan for sedation. I have determined this patient to be an appropriate candidate for the planned sedation and procedure and have reassessed the patient IMMEDIATELY PRIOR to sedation and procedure.    Serge Anand MD

## 2019-04-09 NOTE — PROCEDURES
Interventional Radiology Brief Post Procedure Note    Procedure: CT ABDOMEN RETROPERITONEAL BIOPSY    Proceduralist: Clinton Henriquez MD    Assistant: Radiology Resident Physician, Serge Anand MD    Time Out: Prior to the start of the procedure and with procedural staff participation, I verbally confirmed the patient s identity using two indicators, relevant allergies, that the procedure was appropriate and matched the consent or emergent situation, and that the correct equipment/implants were available. Immediately prior to starting the procedure I conducted the Time Out with the procedural staff and re-confirmed the patient s name, procedure, and site/side. (The Joint Commission universal protocol was followed.)  Yes    Medications   Medication Event Details Admin User Admin Time       Sedation: IR Nurse Monitored Care   Post Procedure Summary:  Prior to the start of the procedure and with procedural staff participation, I verbally confirmed the patient s identity using two indicators, relevant allergies, that the procedure was appropriate and matched the consent or emergent situation, and that the correct equipment/implants were available. Immediately prior to starting the procedure I conducted the Time Out with the procedural staff and re-confirmed the patient s name, procedure, and site/side. (The Joint Commission universal protocol was followed.)  Yes       Sedatives: Fentanyl and Midazolam (Versed)    Vital signs, airway and pulse oximetry were monitored and remained stable throughout the procedure and sedation was maintained until the procedure was complete.  The patient was monitored by staff until sedation discharge criteria were met.    Patient tolerance: Patient tolerated the procedure well with no immediate complications.    Time of sedation in minutes: 15 Minutes minutes from beginning to end of physician one to one monitoring.          Findings: Core biopsies taken of a left retroperitoneal nodule.   Pathology was present and noted an adequate specimen.     Estimated Blood Loss: Minimal    Fluoroscopy Time:  minute(s)    SPECIMENS: Core samples sent in formalin to pathology.  Two slides were made by the pathology team.      Complications: 1. None     Condition: Stable    Plan: Post procedure cares on 2A.    Comments: See dictated procedure note for full details.    Serge Anand MD

## 2019-04-09 NOTE — PROGRESS NOTES
Patient Name: Di Evans  Medical Record Number: [id number]  Today's Date: 4/9/2019    Procedure: Ct guided biopsy  Proceduralist: Dr. Henriquez and Dr. Anand    Sedation start time: 1525  Sedation end time: 1540  Sedation medications administered: 100 mcg fentanyl and 2 mg versed   Total sedation time:     Procedure start time: 1525  Puncture time: 1530  Procedure end time: 1540    Report given to: 2A    Other Notes: Pt arrived to IR room CT-2 from . Consent reviewed. Pt denies any questions or concerns regarding procedure. Pt positioned supine and monitored per protocol. Pt tolerated procedure without any noted complications. Samples obtained for [employer] research and sample sent to Surgical Pathology. Pt transferred back to .

## 2019-04-09 NOTE — DISCHARGE INSTRUCTIONS
[facility]    Interventional Radiology  Patient Instructions Following Biopsy    AFTER YOU GO HOME  ? If you were given sedation DO NOT drive or operate machinery at home or at work for at least 24 hours  ? DO relax and take it easy for 48 hours, no strenuous activity for 24 hours  ? DO drink plenty of fluids  ? DO resume your regular diet, unless otherwise instructed by your Primary Physician  ? Keep the dressing dry and in place for 24 hours.  ? DO NOT SMOKE FOR AT LEAST 24 HOURS, if you have been given any medications that were to help you relax or sedate you during your procedure  ? DO NOT drink alcoholic beverages the day of your procedure  ? DO NOT do any strenuous exercise or lifting (> 10 lbs) for at least 3 days following your procedure  ? DO NOT take a bath or shower for at least 12 hours following your procedure  ? Remove dressing after shower the next day. Replace with Band aid for 2 days.  Never leave a wet dressing in place.  ? DO NOT make any important or legal decisions for 24 hours following your procedure  ? There should be minimum drainage from the biopsy site    CALL THE PHYSICIAN IF:  ? You start bleeding from the procedure site.  If you do start to bleed from that site,  hold pressure on the site for a minimum of 10 minutes.  Your physician will tell you if you need to return to the hospital  ? You develop nausea or vomiting  ? You have excessive swelling, redness, or tenderness at the site  ? You have drainage that looks like it is infected.  ? You experience severe pain  ? You develop hives or a rash or unexplained itching  ? You develop shortness of breath  ? You develop a temperature of 101 degrees F or greater    ADDITIONAL INSTRUCTIONS: None    [facility] INTERVENTIONAL RADIOLOGY DEPARTMENT  Procedure Physician:         Dr. Henriquez         Date of procedure: April 9, 2019  Telephone Numbers: [phone] Monday-Friday 8:00 am to 4:30 pm  [phone] After 4:30 pm  Monday-Friday, Weekends & Holidays.   Ask for the Interventional Radiologist on call.  Someone is on call 24 hrs/day  [facility] toll free number: [phone] Monday-Friday 8:00 am to 4:30 pm  [facility] Emergency Dept: [phone]

## 2019-04-09 NOTE — IP AVS SNAPSHOT
MRN:[id number]                      After Visit Summary   4/9/2019    Di Evans    MRN: [id number]           Visit Information        Department      4/9/2019  9:24 AM [address] [facility]          Review of your medicines      UNREVIEWED medicines. Ask your doctor about these medicines       Dose / Directions   acetaminophen 500 MG tablet  Commonly known as:  TYLENOL  Used for:  Muscle pain      Dose:  1000 mg  Take 2 tablets (1,000 mg) by mouth every 8 hours as needed for mild pain  Quantity:  180 tablet  Refills:  3     aspirin 81 MG EC tablet      Dose:  81 mg  Take 81 mg by mouth daily  Refills:  0     cetirizine 10 MG tablet  Commonly known as:  zyrTEC      Dose:  10 mg  Take 10 mg by mouth daily  Refills:  0     hydrochlorothiazide 25 MG tablet  Commonly known as:  HYDRODIURIL  Used for:  Essential hypertension with goal blood pressure less than 140/90      Dose:  25 mg  Take 1 tablet (25 mg) by mouth every morning for blood pressure.  Quantity:  90 tablet  Refills:  1     ibuprofen 600 MG tablet  Commonly known as:  ADVIL/MOTRIN  Used for:  Ovarian cancer, unspecified laterality (H)      Dose:  600 mg  Take 1 tablet (600 mg) by mouth every 6 hours as needed for moderate pain  Quantity:  120 tablet  Refills:  3     LORazepam 1 MG tablet  Commonly known as:  ATIVAN  Used for:  Examination of participant or control in clinical research, Ovarian cancer, unspecified laterality (H)      Dose:  1 mg  Take 1 tablet (1 mg) by mouth every 6 hours as needed (Anxiety, Nausea/Vomiting or Sleep)  Quantity:  30 tablet  Refills:  2     magnesium oxide 400 MG tablet  Commonly known as:  MAG-OX  Used for:  Leg cramps, Hypomagnesemia      Dose:  400 mg  Take 1 tablet (400 mg) by mouth daily  Quantity:  30 tablet  Refills:  3     mometasone 50 MCG/ACT nasal spray  Commonly known as:  NASONEX      Dose:  2 spray  Spray 2 sprays into both nostrils daily  Refills:  0     ondansetron 8 MG tablet  Commonly  known as:  ZOFRAN  Used for:  Examination of participant or control in clinical research, Ovarian cancer, unspecified laterality (H)      If vomiting occurs with CC-486, take 1 tab (8 mg) 30 minutes prior to each subsequent CC-486 dose.  Quantity:  30 tablet  Refills:  11     oxyCODONE 5 MG capsule  Commonly known as:  OXY-IR  Used for:  Muscle pain, Ovarian cancer, unspecified laterality (H)      Dose:  5-10 mg  Take 1-2 capsules (5-10 mg) by mouth every 6 hours as needed for severe pain  Quantity:  90 capsule  Refills:  0     polyethylene glycol powder  Commonly known as:  MIRALAX/GLYCOLAX  Used for:  Slow transit constipation      MIX 17 GRAMS IN LIQUID AS DIRECTED AND DRINK ONCE DAILY  Quantity:  527 g  Refills:  0     potassium chloride ER 20 MEQ CR tablet  Commonly known as:  K-TAB  Used for:  Hypokalemia      Dose:  20 mEq  Take 1 tablet (20 mEq) by mouth daily  Quantity:  30 tablet  Refills:  3     pravastatin 20 MG tablet  Commonly known as:  PRAVACHOL  Used for:  Hyperlipidemia LDL goal <130      Dose:  20 mg  Take 1 tablet (20 mg) by mouth every evening for cholesterol.  Quantity:  90 tablet  Refills:  1     prochlorperazine 10 MG tablet  Commonly known as:  COMPAZINE  Used for:  Examination of participant or control in clinical research, Ovarian cancer, unspecified laterality (H)      Dose:  10 mg  Take 1 tablet (10 mg) by mouth every 6 hours as needed (Nausea/Vomiting)  Quantity:  30 tablet  Refills:  2     senna-docusate 8.6-50 MG tablet  Commonly known as:  SENNA S  Used for:  Other constipation      Dose:  4 tablet  Take 4 tablets by mouth 2 times daily  Quantity:  250 tablet  Refills:  3     study CC-486 100 mg Tabs CHEMOTHERAPY tablet  Commonly known as:  IDS #5077  Used for:  Examination of participant or control in clinical research, Ovarian cancer, unspecified laterality (H)      Dose:  100 mg  Take 1 tablet (100 mg) by mouth daily Schedule 1. Take for 21 days, followed by 7 days off. Take at  the same time each day on an empty stomach or with food (a light breakfast or meal of up to approximately 600 calories).  Swallow tablet whole with about 8oz of room temperature water. Do not take broken or cracked tablets.  Quantity:  21 tablet  Refills:  0     study CC-486 100 mg Tabs CHEMOTHERAPY tablet  Commonly known as:  IDS #5077  Used for:  Examination of participant or control in clinical research, Ovarian cancer, unspecified laterality (H)      Dose:  100 mg  Take 1 tablet (100 mg) by mouth daily Schedule 1. Take for 21 days, followed by 7 days off. Take at the same time each day on an empty stomach or with food (a light breakfast or meal of up to approximately 600 calories).  Swallow tablet whole with about 8oz of room temperature water. Do not take broken or cracked tablets.  Quantity:  21 tablet  Refills:  0              Protect others around you: Learn how to safely use, store and throw away your medicines at www.disposemymeds.org.       Follow-ups after your visit       Your next 10 appointments already scheduled    Apr 10, 2019 10:00 AM CDT  [facility] Lab Draw with  [facility] LAB DRAW  [email or web address] ([facility]) [address] [place] [address]  [address]  [facility] [address]  [phone]      Apr 10, 2019 10:30 AM CDT  (Arrive by 10:15 AM)  Return Active Treatment with SULMA Coffey CNP  [facility] ([facility]) [address] [place] [address]  [address]  [facility] [address]  [phone]      Apr 10, 2019 12:00 PM CDT  Infusion 60 with UC ONCOLOGY INFUSION, UC 28 ATC  [facility] ([facility]) [address] [place] [address]  [address]  [facility] [address]  [phone]      Apr 17, 2019 11:00 AM CDT  [facility] Lab Draw with UC [facility] LAB DRAW  [facility] Lab Draw ([facility]) [address] [place] [address]  [address]  [facility] [address]  [phone]       Apr 17, 2019 11:40 AM CDT  (Arrive by 11:25 AM)  Return Visit with Angelo Molina MD  [facility] ([facility]) [address]  [address]  [facility] [id number] [phone]      Apr 24, 2019  2:00 PM CDT  LAB with LAB FIRST FLOOR [facility] ([facility]) [address]  [facility] [id number] [phone]   Please do not eat 10-12 hours before your appointment if you are coming in fasting for labs on lipids, cholesterol, or glucose (sugar). Does not apply to pregnant women. Water, tea and black coffee (with nothing added) is okay. Do not drink other fluids, diet soda or gum. If you have concerns about taking your medications, please send a message by clicking on Secure Messaging, Message Your Care Team.     Apr 29, 2019 12:00 PM CDT  CT CHEST/ABDOMEN/PELVIS W CONTRAST with UCCT2  [facility] CT ([facility]) [address]  1st Floor  [facility] [id number] [phone]   How do I prepare for my exam? (Food and drink instructions)  To prepare: Do not eat or drink for 2 hours before your exam. If you need to take medicine, you may take it with small sips of water. (We may ask you to take liquid medicine as well.)    How do I prepare for my exam? (Other instructions)  Please arrive 30 minutes early for your CT. Once in the department you might be asked to drink water 15-20 minutes prior to your exam. If indicated you may be asked to drink an oral contrast in advance of your CT. If this is the case, the imaging team will let you know or be in contact with you prior to your appointment    Patients over 70 or patients with diabetes or kidney problems: If you haven t had a blood test (creatinine test) within the last 30 days, the Cardiologist/Radiologist may require you to get this test prior to your exam.    If you have diabetes: Continue to take your metformin  medication on the day of your exam    What should I wear: Please wear loose clothing, such as a sweat suit or jogging clothes. Avoid snaps, zippers and other metal. We may ask you to undress and put on a hospital gown.    How long does the exam take: Most scans take less than 20 minutes.    What should I bring: Please bring any scans or X-rays taken at other hospitals, if similar tests were done. Also bring a list of your medicines, including vitamins, minerals and over-the-counter drugs. It is safest to leave personal items at home.    Do I need a : No  is needed.    What do I need to tell my doctor?  Be sure to tell your doctor:  * If you have any allergies.  * If there s any chance you are pregnant.  * If you are breastfeeding.    What should I do after the exam: No restrictions, You may resume normal activities.    What is this test: A CT (computed tomography) scan is a series of pictures that allows us to look inside your body. The scanner creates images of the body in cross sections, much like slices of bread. This helps us see any problems more clearly. You may receive contrast (X-ray dye) before or during your scan. You will be asked to drink the contrast.    Who should I call with questions: If you have any questions, please call the Imaging Department where you will have your exam. Directions, parking instructions, and other information is available on our website, [email or web address].     May 01, 2019  9:45 AM CDT  [facility] Lab Draw with  MASONIC LAB DRAW  [facility] Lab Draw ([facility]) [address] [place] [address]  [address] [place] [id number] [phone]      May 01, 2019 10:20 AM CDT  (Arrive by 10:05 AM)  Return Visit with Angelo Molina MD  [facility] ([facility]) [address] [place] [address]  [address] [place] [id number] [phone]         Care Instructions       Further instructions from your care  team       [facility]    Interventional Radiology  Patient Instructions Following Biopsy    AFTER YOU GO HOME  ? If you were given sedation DO NOT drive or operate machinery at home or at work for at least 24 hours  ? DO relax and take it easy for 48 hours, no strenuous activity for 24 hours  ? DO drink plenty of fluids  ? DO resume your regular diet, unless otherwise instructed by your Primary Physician  ? Keep the dressing dry and in place for 24 hours.  ? DO NOT SMOKE FOR AT LEAST 24 HOURS, if you have been given any medications that were to help you relax or sedate you during your procedure  ? DO NOT drink alcoholic beverages the day of your procedure  ? DO NOT do any strenuous exercise or lifting (> 10 lbs) for at least 3 days following your procedure  ? DO NOT take a bath or shower for at least 12 hours following your procedure  ? Remove dressing after shower the next day. Replace with Band aid for 2 days.  Never leave a wet dressing in place.  ? DO NOT make any important or legal decisions for 24 hours following your procedure  ? There should be minimum drainage from the biopsy site    CALL THE PHYSICIAN IF:  ? You start bleeding from the procedure site.  If you do start to bleed from that site,  hold pressure on the site for a minimum of 10 minutes.  Your physician will tell you if you need to return to the hospital  ? You develop nausea or vomiting  ? You have excessive swelling, redness, or tenderness at the site  ? You have drainage that looks like it is infected.  ? You experience severe pain  ? You develop hives or a rash or unexplained itching  ? You develop shortness of breath  ? You develop a temperature of 101 degrees F or greater    ADDITIONAL INSTRUCTIONS: None    [facility] INTERVENTIONAL RADIOLOGY DEPARTMENT  Procedure Physician:         Dr. Henriquez         Date of procedure: April 9, 2019  Telephone Numbers: [phone] Monday-Friday 8:00 am to 4:30 pm  [phone] After 4:30 pm  Monday-Friday, Weekends & Holidays.   Ask for the Interventional Radiologist on call.  Someone is on call 24 hrs/day  [facility] toll free number: [phone] Monday-Friday 8:00 am to 4:30 pm  [facility] Emergency Dept: [phone]          Additional Information About Your Visit       MyChart Information    [employer] gives you secure access to your electronic health record. If you see a primary care provider, you can also send messages to your care team and make appointments. If you have questions, please call your primary care clinic.  If you do not have a primary care provider, please call [phone] and they will assist you.       Care EveryWhere ID    This is your Care EveryWhere ID. This could be used by other organizations to access your [facility] medical records  [id number]       Your Vitals Were     Blood Pressure   131/76    Temperature   99.1  F (37.3  C) (Oral)    Respirations   14    Pulse Oximetry   97%           Primary Care Provider Office Phone # Fax #    Sonja Jeni Hernandez [phone]      Equal Access to Services    [facility]: Hadii nakita dubose hadasho Somari, waaxda luqadaha, qaybta kaalmamartin nicole, mae العلي . So [facility] [phone].    ATENCIÓN: Si habla español, tiene a perez disposición servicios gratuitos de asistencia lingüística. Sarbjitame al [phone].    We comply with applicable federal civil rights laws and [place] laws. We do not discriminate on the basis of race, color, national origin, age, disability, sex, sexual orientation, or gender identity.           Thank you!    Thank you for choosing [facility] for your care. Our goal is always to provide you with excellent care. Hearing back from our patients is one way we can continue to improve our services. Please take a few minutes to complete the written survey that you may receive in the mail after you visit with us. Thank you!            Medication List      ASK your doctor about  these medications          Morning Afternoon Evening Bedtime As Needed    acetaminophen 500 MG tablet  Also known as:  TYLENOL  INSTRUCTIONS:  Take 2 tablets (1,000 mg) by mouth every 8 hours as needed for mild pain                     aspirin 81 MG EC tablet  INSTRUCTIONS:  Take 81 mg by mouth daily                     cetirizine 10 MG tablet  Also known as:  zyrTEC  INSTRUCTIONS:  Take 10 mg by mouth daily                     hydrochlorothiazide 25 MG tablet  Also known as:  HYDRODIURIL  INSTRUCTIONS:  Take 1 tablet (25 mg) by mouth every morning for blood pressure.                     ibuprofen 600 MG tablet  Also known as:  ADVIL/MOTRIN  INSTRUCTIONS:  Take 1 tablet (600 mg) by mouth every 6 hours as needed for moderate pain                     LORazepam 1 MG tablet  Also known as:  ATIVAN  INSTRUCTIONS:  Take 1 tablet (1 mg) by mouth every 6 hours as needed (Anxiety, Nausea/Vomiting or Sleep)                     magnesium oxide 400 MG tablet  Also known as:  MAG-OX  INSTRUCTIONS:  Take 1 tablet (400 mg) by mouth daily                     mometasone 50 MCG/ACT nasal spray  Also known as:  NASONEX  INSTRUCTIONS:  Spray 2 sprays into both nostrils daily                     ondansetron 8 MG tablet  Also known as:  ZOFRAN  INSTRUCTIONS:  If vomiting occurs with CC-486, take 1 tab (8 mg) 30 minutes prior to each subsequent CC-486 dose.                     oxyCODONE 5 MG capsule  Also known as:  OXY-IR  INSTRUCTIONS:  Take 1-2 capsules (5-10 mg) by mouth every 6 hours as needed for severe pain                     polyethylene glycol powder  Also known as:  MIRALAX/GLYCOLAX  INSTRUCTIONS:  MIX 17 GRAMS IN LIQUID AS DIRECTED AND DRINK ONCE DAILY                     potassium chloride ER 20 MEQ CR tablet  Also known as:  K-TAB  INSTRUCTIONS:  Take 1 tablet (20 mEq) by mouth daily                     pravastatin 20 MG tablet  Also known as:  PRAVACHOL  INSTRUCTIONS:  Take 1 tablet (20 mg) by mouth every evening for  cholesterol.                     prochlorperazine 10 MG tablet  Also known as:  COMPAZINE  INSTRUCTIONS:  Take 1 tablet (10 mg) by mouth every 6 hours as needed (Nausea/Vomiting)                     senna-docusate 8.6-50 MG tablet  Also known as:  SENNA S  INSTRUCTIONS:  Take 4 tablets by mouth 2 times daily                     study CC-486 100 mg Tabs CHEMOTHERAPY tablet  Also known as:  IDS #5077  INSTRUCTIONS:  Take 1 tablet (100 mg) by mouth daily Schedule 1. Take for 21 days, followed by 7 days off. Take at the same time each day on an empty stomach or with food (a light breakfast or meal of up to approximately 600 calories).  Swallow tablet whole with about 8oz of room temperature water. Do not take broken or cracked tablets.                     study CC-486 100 mg Tabs CHEMOTHERAPY tablet  Also known as:  IDS #5077  INSTRUCTIONS:  Take 1 tablet (100 mg) by mouth daily Schedule 1. Take for 21 days, followed by 7 days off. Take at the same time each day on an empty stomach or with food (a light breakfast or meal of up to approximately 600 calories).  Swallow tablet whole with about 8oz of room temperature water. Do not take broken or cracked tablets.

## 2019-04-09 NOTE — PROGRESS NOTES
1100 Pt on 2a prepped and ready for biopsy. PIV placed and labs sent. H&P current. Family at bedside. Pt ready for consent.

## 2019-04-10 NOTE — NURSING NOTE
"Oncology Rooming Note    April 10, 2019 10:32 AM   Di Evans is a 58 year old female who presents for:    Chief Complaint   Patient presents with     Blood Draw     Labs drawn via PIV placed by RN in lab. VS taken. Patient checked into next appointment.     Oncology Clinic Visit     Return; Ovarian CA, Active Tx     Initial Vitals: /67 (BP Location: Right arm, Patient Position: Sitting, Cuff Size: Adult Regular)   Pulse 94   Temp 97.6  F (36.4  C) (Oral)   Resp 16   Wt 65 kg (143 lb 6.4 oz)   SpO2 100%   BMI 23.86 kg/m   Estimated body mass index is 23.86 kg/m  as calculated from the following:    Height as of 3/21/19: 1.651 m (5' 5\").    Weight as of this encounter: 65 kg (143 lb 6.4 oz). Body surface area is 1.73 meters squared.  No Pain (0) Comment: Data Unavailable   No LMP recorded. Patient has had a hysterectomy.  Allergies reviewed: Yes  Medications reviewed: Yes    Medications: MEDICATION REFILLS NEEDED TODAY. Provider was notified.  Pharmacy name entered into [facility]:    [facility] - [place], [place] - [address], [address]  [facility] [address] [place] [address] [address] AT [facility] [address]    Clinical concerns: Patient requests refill of Senna today. No other concerns. Siena was notified.      Anne Escalona CMA              "

## 2019-04-10 NOTE — NURSING NOTE
Chief Complaint   Patient presents with     Blood Draw     Labs drawn via PIV placed by RN in lab. VS taken. Patient checked into next appointment.     Labs drawn from PIV placed by RN. Line flushed with saline. Vitals taken. Pt checked in for appointment.    Antionette Barron RN

## 2019-04-10 NOTE — PATIENT INSTRUCTIONS
Contact Numbers    [facility] Main Line: [phone]  [facility] Triage and after hours / weekends / holidays:  [phone]      Please call the triage or after hours line if you experience a temperature greater than or equal to 100.5, shaking chills, have uncontrolled nausea, vomiting and/or diarrhea, dizziness, shortness of breath, chest pain, bleeding, unexplained bruising, or if you have any other new/concerning symptoms, questions or concerns.      If you are having any concerning symptoms or wish to speak to a provider before your next infusion visit, please call your care coordinator or triage to notify them so we can adequately serve you.     If you need a refill on a narcotic prescription or other medication, please call before your infusion appointment.

## 2019-04-10 NOTE — LETTER
4/10/2019     RE: Di Evans  [address] [place] [address]     Dear Colleague,    Thank you for referring your patient, Di Evans, to the [facility]. Please see a copy of my visit note below.    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 She is enrolled in the TRIO study.     HPI: Courtney comes to the clinic accompanied by her roommate Alivia. She is overall feeling improved since her last visit. Tolerated  with minimal nausea- using Compazine as a pre-medication. Senna is helpful for constipation. Notes that her back feels stiff at times, feels this is due to her lack of physical activity. No longer having pain in her buttocks. Notes worsening fatigue and weakness, not as active as normal but able to complete her ADLs independently, out of bed >50% of the day. Appetite is low but takes in two Boosts per day. Drinking Gatorade and Pedialyte but notes that she does drink a lot of free water. 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 Her energy level is lower in the evenings, but she remains physically active and walks in the afternoon, rests afterward. Able to complete her ADLs without assistance and be out of bed >50% of the day. No longer symptomatic from ascites, last paracentesis was done 3/25/19 which yielded 400mL fluid. Chronic neuropathy is stable. Notes feelings of depression at times, states this is due to her lack of activity. States she is finding gabriele in her life, not having frequent episodes of crying, denies SI/HI. Denies need for intervention. No fevers, chills, or bleeding. Reports an ECOG score of 1.      Oncology  History:  7/2018: Six months of bloating, decreased appetite, early satiety.     7/16/18:  CT A/P:  Large cystic/solid mass within the pelvis highly suspicious for ovarian malignancy. 2.  Omental thickening suspicious for metastatic disease. 3.  Large volume of ascites.  Malignant ascites cannot be excluded. 4.  Small left pleural effusion and left lower lobe atelectasis. 5.  Diverticulosis, small hiatal hernia, and gallbladder sludge.     7/24/18:  = 598     7/25/18:  CT Chest:  1. No definite metastatic disease in the chest. 2. Small left pleural effusion. 3. 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 Small amount of ascites, improved from the prior study. 2. Peritoneal nodularity and thickening, improved from the prior study. 3. Stable bilateral pulmonary nodules measuring up to 4 mm. No new or enlarging pulmonary nodules.     11/16/18:  Cycle #5 carboplatin AUC 6 and paclitaxel 175 mg/m2,  =  71     12/7/18:  Cycle #6 carboplatin AUC 6 and paclitaxel 175 mg/m2,  = 71     1/9/19:  CT C/A/P:  1. Findings suspicious for worsening intra-abdominal involvement by ovarian malignancy with increased peritoneal nodularity and increased retroperitoneal and mesenteric adenopathy. Continued small ascites. 2. Unchanged tiny pulmonary nodules without evidence of malignancy in  the chest    2/7/19: Diagnostic laparoscopy and peritoneal biopsy- biopsy positive for poorly differentiated adenocarcinoma    2/20/19: C1D1 TRIO  orally x21 days started  2/27/19:C1D8 TRIO pembrolizumab  3/20/19: C2D1 TRIO  orally x21 days, pembrolizumab    Past Medical History:   Diagnosis Date     Hypertension      Ovarian cancer (H)        Past Surgical History:   Procedure Laterality Date     HYSTERECTOMY TOTAL ABDOMINAL, BILATERAL SALPINGO-OOPHORECTOMY, COMBINED Bilateral 7/30/2018    Procedure: COMBINED HYSTERECTOMY TOTAL ABDOMINAL, SALPINGO-OOPHORECTOMY;;  Surgeon: Jammie Dueñas MD;  Location: [facility] OR     LAPAROSCOPY DIAGNOSTIC (GYN) N/A 2/7/2019    Procedure: Diagnostic Laparoscopy With Biopsy;  Surgeon: Jammie Dueñas MD;  Location: [facility] OR     LAPAROTOMY, TUMOR DEBULKING, COMBINED Bilateral 7/30/2018    Procedure: COMBINED LAPAROTOMY, TUMOR DEBULKING;  Exploratory Laparotomy, Modified Radical Hysterectomy, Removal of Cervix, Bilateral Salpingo - Oophorectomy, Omentectomy, Bilateral Para Aortic and Left Pelvic Lymph Node Dissection, Tumor Debulking, Proctoscopy;  Surgeon: Jammie Dueñas MD;  Location: [facility] OR     REMOVE PORT PERITONEAL N/A 1/7/2019    Procedure: REMOVE PORT PERITONEAL;  Surgeon: Chanel Rodriguez MD;  Location: [facility] OR     SURGICAL HISTORY OF -   1980    left ankle surgery       Current Outpatient Medications   Medication     acetaminophen (TYLENOL) 500 MG tablet     aspirin 81 MG EC tablet     cetirizine (ZYRTEC) 10 MG tablet     hydrochlorothiazide (HYDRODIURIL) 25 MG  tablet     ibuprofen (ADVIL/MOTRIN) 600 MG tablet     LORazepam (ATIVAN) 1 MG tablet     magnesium oxide (MAG-OX) 400 MG tablet     mometasone (NASONEX) 50 MCG/ACT nasal spray     ondansetron (ZOFRAN) 8 MG tablet     oxyCODONE (OXY-IR) 5 MG capsule     polyethylene glycol (MIRALAX/GLYCOLAX) powder     potassium chloride ER (K-TAB) 20 MEQ CR tablet     pravastatin (PRAVACHOL) 20 MG tablet     prochlorperazine (COMPAZINE) 10 MG tablet     senna-docusate (SENNA S) 8.6-50 MG tablet     senna-docusate (SENOKOT-S/PERICOLACE) 8.6-50 MG tablet     sorbitol 70 % solution     study CC-486 (IDS #5077) 100 mg TABS CHEMOTHERAPY tablet     study CC-486 (IDS #5077) 100 mg TABS CHEMOTHERAPY tablet     No current facility-administered medications for this visit.        Allergies   Allergen Reactions     Gemfibrozil Muscle Pain (Myalgia)     Lovastatin      headaches     Penicillins Nausea and Vomiting       Family History   Problem Relation Age of Onset     Hypertension Mother      Hypertension Father      Cerebrovascular Disease Father         polycythemia     Breast Cancer Maternal Aunt         older age       Social History     Socioeconomic History     Marital status: Single     Spouse name: Not on file     Number of children: 0     Years of education: Not on file     Highest education level: Not on file   Occupational History     Occupation: [employer]     Employer: [employer]   Social Needs     Financial resource strain: Not on file     Food insecurity:     Worry: Not on file     Inability: Not on file     Transportation needs:     Medical: Not on file     Non-medical: Not on file   Tobacco Use     Smoking status: Never Smoker     Smokeless tobacco: Never Used   Substance and Sexual Activity     Alcohol use: Yes     Comment: occasional     Drug use: No     Sexual activity: Never   Lifestyle     Physical activity:     Days per week: Not on file     Minutes per session: Not on file     Stress: Not on  file   Relationships     Social connections:     Talks on phone: Not on file     Gets together: Not on file     Attends [employer] service: Not on file     Active member of club or organization: Not on file     Attends meetings of clubs or organizations: Not on file     Relationship status: Not on file     Intimate partner violence:     Fear of current or ex partner: Not on file     Emotionally abused: Not on file     Physically abused: Not on file     Forced sexual activity: Not on file   Other Topics Concern     Parent/sibling w/ CABG, MI or angioplasty before 65F 55M? No      Service No     Blood Transfusions No     Caffeine Concern Yes     Occupational Exposure No     Hobby Hazards No     Sleep Concern No     Stress Concern No     Weight Concern Yes     Special Diet No     Back Care No     Exercise Yes     Bike Helmet No     Comment: Yes in the future     Seat Belt Yes     Self-Exams Yes   Social History Narrative     Not on file           ROS  General: + fatigue, weakness. Denies changes in weight,  appetite changes, night sweats, hot flashes, fever, chills, or difficulty sleeping  HEENT: Denies headaches, hair loss, visual difficulty or disturbances, masses, head injury, tinnitus, hearing loss, epistaxis, congestion, problems with teeth or gums, dysphonia, or dysphagia  Pulmonary: + allergies. Denies cough, sputum, hemoptysis, shortness of breath, dyspnea on exertion, wheezing  Cardiovascular: Denies chest pain, fainting, palpitations, murmurs, activity intolerance, swelling in legs  Gastrointestinal: + nausea, constipation. Denies vomiting, diarrhea, abdominal pain, bloating, heartburn, melena, hematochezia, or jaundice  Genitourinary: Denies dysuria, urinary urgency or frequency, hematuria, cloudy or malodorous urine, incontinence, repeat urinary tract infections, flank pain, pelvic pain, vaginal bleeding, vaginal discharge, or vaginal dryness  Sexual Function: Denies pain with intercourse, changes in  libido, or changes in orgasm  Integumentary: Denies rashes, sores, changing moles, or scarring  Hematologic: Denies swollen lymph nodes, masses, easy bruising, or easy bleeding  Musculoskeletal: + back pain, muscle weakness. Denies myalgias, falls, back pain, arthralgias, stiffness, or muscle cramps  Neurologic: + numbness/tingling. Denies changes in memory, difficulty with walking, dizziness, seizures, or tremors  Psychiatric: + depression. Denies anxiety, mood changes, suicidal thoughts, or difficulty concentrating  Endocrine: Denies polydipsia, polyuria, temperature intolerance, or history of thyroid disease        Physical Exam:    /67 (BP Location: Right arm, Patient Position: Sitting, Cuff Size: Adult Regular)   Pulse 94   Temp 97.6  F (36.4  C) (Oral)   Resp 16   Wt 65 kg (143 lb 6.4 oz)   SpO2 100%   BMI 23.86 kg/m       CONSTITUTIONAL: Alert non-toxic appearing female in no acute distress  HEAD: Normocephalic, atraumatic  EYES: PERRLA; no scleral icterus  ENT: Oropharynx pink without lesions  NECK: Neck supple without lymphadenopathy  RESPIRATORY: Lungs clear to auscultation, no increased work of breathing noted  CV: Regular rate and rhythm, S1S2, no clicks, murmurs, rubs, or gallops; bilateral lower extremities without edema, dorsalis pedis pulses 2+ bilaterally  GASTROINTESTINAL: Normoactive bowel sounds x4 quadrants, abdomen soft, non-distended, and non-tender to palpation without palpable masses or organomegaly;   GENITOURINARY: Not indicated  LYMPHATIC: Cervical, supraclavicular, and inguinal nodes without lymphadenopathy  MUSCULOSKELETAL: Moves all extremities, no obvious muscle wasting  NEUROLOGIC: No gross deficits, normal gait  SKIN: Appropriate color for race, warm and dry, no rashes or lesions to unclothed skin  PSYCHIATRIC: Pleasant and interactive, affect euthymic, makes appropriate eye contact, thought process linear    Labs:      4/10/2019  Day 22   Hemoglobin 11.7 - 15.7 g/dL 9.7  (A)   Hematocrit 35.0 - 47.0 % 30.9 (A)   Platelet Count 150 - 450 10e9/L 326   Absolute Neutrophil 1.6 - 8.3 10e9/L 5.5   Sodium 133 - 144 mmol/L 126 (A)   Potassium 3.4 - 5.3 mmol/L 3.4   Chloride 94 - 109 mmol/L 90 (A)   Carbon Dioxide 20 - 32 mmol/L 27   Urea Nitrogen 7 - 30 mg/dL 11   Creatinine 0.52 - 1.04 mg/dL 0.43 (A)   Calcium 8.5 - 10.1 mg/dL 9.2   Bilirubin Total 0.2 - 1.3 mg/dL 0.3   ALT 0 - 50 U/L 27   AST 0 - 45 U/L 21   Alkaline Phosphatase 40 - 150 U/L 90   Albumin 3.4 - 5.0 g/dL 2.5 (A)   Protein Total 6.8 - 8.8 g/dL 7.4   WBC 4.0 - 11.0 10e9/L 6.9   TSH 4.39  Free T4 1.56      Assessment/Plan:  1) Progressive platinum resistant ovarian cancer: Overall feeling fair, ECOG 1. CT reviewed with Courtney- mixed response, but potentially due to pseudoprogression with pembrolizumab. Will have repeat CT in four weeks. Proceed with C2D22 TRIO as originally ordered by Dr. Duque. Chanel Purcell, research RN, in for study management.  2) Treatment/disease effects:   Grade 1-2 fatigue: Bothersome to her, less active than normal. Cancer rehab ordered.   Hyponatremia: Largely stable, no neurologic changes. Will add in 1L NS bolus to infusion today. Reviewed taking in fluids containing sodium, to limit free water intake. To stop hydrochlorothiazide- monitor BP and notify me for readings >140/90.    Constipation: Currently managed with senna, continue current management.   Elevated TSH: Subclinical hypothyroidism likely due to pembrolizumab, continue to monitor. Will add in free T4 with upcoming TSH draws.   Nutrition: Low albumin, poor appetite. Continue oral nutritional supplements, reviewed importance of eating small frequent meals high in protein and calories.   Mental health: Reporting depressive feelings, denies SI. Discussed counseling and pharmacotherapy, declines at this time. Continue to monitor.  3) Patient verbalized understanding of and agreement with plan    A total of 30 minutes of face to face time were  spent with the patient with over 50% of that time spent in counseling, coordination of care, education, and symptom management.    SULMA Coffey, FNP-C  Division of Gynecologic Oncology  [facility]  Pager: [phone]

## 2019-04-10 NOTE — NURSING NOTE
TRIO Study ([id number]): Study Visit Note   Subject name: Di Evans     Patient here accompanied by her roomate for C2D22 in the TRIO Study ([id number]). Since the last study visit, patient has been doing a little bit better. Patient states that she is ready for next treatment. Denies any changes to concomitant medications. Patient stated that appetite is getting better. States that back pain and fatigue/weakness continue (referred to Cancer Rehab by today's provider). Continues to have intermittent nausea (zofran and compazine help). Patient denies fever, blood in stools, and or headaches. Patient states that she no longer feeling discomfort from ascites, canceled her last paracentesis appointment. Patient states that she continues to feel constipation, using senna docusate for relief. Patient reported that she has started to see black spots in left eye starting on 31/MAR/2019 (provider suggested patient to see eye doctor for eye exam), denied eye exam in recent years. I have personally interviewed this patient and reviewed medical record for adverse events and concomitant medications and these have been recorded on the corresponding logs in patient's research file.     Patient was given the opportunity to ask any trial related questions. Please see Siena Inman's progress note for physical exam and other clinical information. Labs were reviewed - any significant lab values were addressed and reviewed and patient is okay to continue study treatment at current dose. Patient will receive Keytruda today. Patient escorted to infusion center for study treatment. Study appointments scheduled per protocol. Reviewed schedule with patient, no need to make any adjustments. Patient will return study dairy and blister packs next week. Instructed patient to call back with any questions or concerns. Patient voiced understanding.     Did the study visit occur within the appropriate window allowed by the protocol?  YES    Chanel Purcell, RN    Office: [phone]  Pager: [phone]

## 2019-04-10 NOTE — PROGRESS NOTES
Infusion Nursing Note:  Di Evans presents today for Cycle 2 Day 22 Keutruda.    Patient seen and examined by Siena Inman NP in clinic prior to infusion.        Intravenous Access:  Peripheral IV placed in lab    Treatment Conditions:  Lab Results   Component Value Date    HGB 9.7 04/10/2019     Lab Results   Component Value Date    WBC 6.9 04/10/2019      Lab Results   Component Value Date    ANEU 5.5 04/10/2019     Lab Results   Component Value Date     04/10/2019      Lab Results   Component Value Date     04/10/2019                   Lab Results   Component Value Date    POTASSIUM 3.4 04/10/2019           Lab Results   Component Value Date    MAG 1.5 03/20/2019            Lab Results   Component Value Date    CR 0.43 04/10/2019                   Lab Results   Component Value Date    TRACEY 9.2 04/10/2019                Lab Results   Component Value Date    BILITOTAL 0.3 04/10/2019           Lab Results   Component Value Date    ALBUMIN 2.5 04/10/2019                    Lab Results   Component Value Date    ALT 27 04/10/2019           Lab Results   Component Value Date    AST 21 04/10/2019     A liter of NS was given per orders due to hyponatruria.   Results reviewed, labs MET treatment parameters, ok to proceed with treatment.      Post Infusion Assessment:  Patient tolerated infusion without incident.       Discharge Plan:   Patient declined prescription refills.  Copy of AVS reviewed with patient and/or family.  Patient will return 5/1/19 for cycle 3 day 15.    Face to Face time: 0.    Eladia Vázquez RN

## 2019-04-12 NOTE — TELEPHONE ENCOUNTER
TRIO Study ([id number]): Study Visit Note   Subject name: Di Evans     Patient contacted study nurse to notify that she was at the ED in [facility]. Patient stated that as having discomfort in right leg and buttock. Patient was assessed and medical staff concluded that patient had a deep vein thrombosis. Patient will stay in hospital for blood clot removal procedure. Patient was informed that she would start anticoagulant therapy at home. Unsure at the moment how long patient will stay at the hospital. Will call next week to follow-up. Study PI, Angelo Molina and treating MD, Jammie Duque were both notified.       Chanel Purcell, RN    Office: [phone]  Pager: [phone]

## 2019-04-17 NOTE — NURSING NOTE
"Oncology Rooming Note    April 17, 2019 11:29 AM   Di Evans is a 58 year old female who presents for:    Chief Complaint   Patient presents with     Blood Draw     labs drawn in lab, vitals taken, pt checked in for appt     Oncology Clinic Visit     Ovarian Ca; Active Tx     Initial Vitals: /69   Pulse 103   Temp 98.6  F (37  C) (Oral)   Resp 16   Wt 64.8 kg (142 lb 12.8 oz)   SpO2 97%   BMI 23.76 kg/m   Estimated body mass index is 23.76 kg/m  as calculated from the following:    Height as of 3/21/19: 1.651 m (5' 5\").    Weight as of this encounter: 64.8 kg (142 lb 12.8 oz). Body surface area is 1.72 meters squared.  Mild Pain (2) Comment: Data Unavailable   No LMP recorded. Patient has had a hysterectomy.  Allergies reviewed: Yes  Medications reviewed: Yes    Medications: Medication refills not needed today.  Pharmacy name entered into [employer]:    [facility] - [place], [place] - [id number] [phone], [address]  [facility] [address] [place], [place] [id number] [address] AT [facility]  [address]    Clinical concerns: Patient denies pelvic and vaginal pain at today's visit.   Dr Molina was notified.      Fransisca Maher CMA              "

## 2019-04-17 NOTE — PROGRESS NOTES
Infusion Nursing Note:  Di Evans presents today for 1L NS.    Patient seen by provider today: Yes: Dr. Molina    Note: Patient added on today due to a low sodium level. Tolerated IVF today without issue.    Intravenous Access:  Peripheral IV placed per Vascular access      Treatment Conditions:  Lab Results   Component Value Date    HGB 8.4 04/17/2019     Lab Results   Component Value Date    WBC 4.7 04/17/2019      Lab Results   Component Value Date    ANEU 3.5 04/17/2019     Lab Results   Component Value Date     04/17/2019      Lab Results   Component Value Date     04/17/2019                   Lab Results   Component Value Date    POTASSIUM 3.6 04/17/2019           Lab Results   Component Value Date    MAG 1.5 03/20/2019            Lab Results   Component Value Date    CR 0.45 04/17/2019                   Lab Results   Component Value Date    TRACEY 9.2 04/17/2019                Lab Results   Component Value Date    BILITOTAL 0.3 04/17/2019           Lab Results   Component Value Date    ALBUMIN 2.3 04/17/2019                    Lab Results   Component Value Date    ALT 20 04/17/2019           Lab Results   Component Value Date    AST 20 04/17/2019           Post Infusion Assessment:  Patient tolerated infusion without incident.  Blood return noted pre and post infusion.  Site patent and intact, free from redness, edema or discomfort.  No evidence of extravasations.  Access discontinued per protocol.    Discharge Plan:   Patient declined prescription refills.  Discharge instructions reviewed with: Patient and Family.  Patient and/or family verbalized understanding of discharge instructions and all questions answered.  AVS to patient via [employer].  Patient will return 5/1/19 for next appointment.   Patient discharged in stable condition accompanied by: family.  Departure Mode: Ambulatory.    Melina Meléndez RN

## 2019-04-17 NOTE — NURSING NOTE
Chief Complaint   Patient presents with     Blood Draw     labs drawn in lab, vitals taken, pt checked in for appt     Celia Dubois, CMA

## 2019-04-17 NOTE — LETTER
2019       RE: Di Evans  [address]  [facility] [id number]     Dear Colleague,    Thank you for referring your patient, Di Evans, to the [facility]. Please see a copy of my visit note below.                Follow Up Notes on Referred Patient    Date: 2019       Dr. Chato Hough MD  No address on file       RE: Di Evans  : 1960  ROMAN: 2019    Dear Dr. Chato Hough:    Di Evans is a 58 year old woman with a diagnosis of recurrent platinum-resistant ovarian cancer.             Patient presents today for followup.  She feels she is slightly improved symptomatically.  Less distention, less nausea, no vomiting and denies any fever or chills.  No vaginal bleeding or discharge.             Past Medical History:    Past Medical History:   Diagnosis Date     Hypertension      Ovarian cancer (H)          Past Surgical History:    Past Surgical History:   Procedure Laterality Date     HYSTERECTOMY TOTAL ABDOMINAL, BILATERAL SALPINGO-OOPHORECTOMY, COMBINED Bilateral 2018    Procedure: COMBINED HYSTERECTOMY TOTAL ABDOMINAL, SALPINGO-OOPHORECTOMY;;  Surgeon: Jammie Dueñas MD;  Location: [facility] OR     LAPAROSCOPY DIAGNOSTIC (GYN) N/A 2019    Procedure: Diagnostic Laparoscopy With Biopsy;  Surgeon: Jammie Dueñas MD;  Location: [facility] OR     LAPAROTOMY, TUMOR DEBULKING, COMBINED Bilateral 2018    Procedure: COMBINED LAPAROTOMY, TUMOR DEBULKING;  Exploratory Laparotomy, Modified Radical Hysterectomy, Removal of Cervix, Bilateral Salpingo - Oophorectomy, Omentectomy, Bilateral Para Aortic and Left Pelvic Lymph Node Dissection, Tumor Debulking, Proctoscopy;  Surgeon: Jammie Dueñas MD;  Location: [facility] OR     REMOVE PORT PERITONEAL N/A 2019    Procedure: REMOVE PORT PERITONEAL;  Surgeon: Chanel Rodriguez MD;  Location:  OR     SURGICAL HISTORY OF -       left ankle surgery     THROMBECTOMY LOWER  EXTREMITY Right 04/2019         Health Maintenance Due   Topic Date Due     ZOSTER IMMUNIZATION (1 of 2) 06/20/2009     FIT Q1 YR  03/16/2015     PHQ-2  01/01/2019       Current Medications:     Current Outpatient Medications   Medication Sig Dispense Refill     cetirizine (ZYRTEC) 10 MG tablet Take 10 mg by mouth daily        LORazepam (ATIVAN) 1 MG tablet Take 1 tablet (1 mg) by mouth every 6 hours as needed (Anxiety, Nausea/Vomiting or Sleep) 30 tablet 2     magnesium oxide (MAG-OX) 400 MG tablet Take 1 tablet (400 mg) by mouth daily 30 tablet 3     mometasone (NASONEX) 50 MCG/ACT nasal spray Spray 2 sprays into both nostrils daily       ondansetron (ZOFRAN) 8 MG tablet If vomiting occurs with CC-486, take 1 tab (8 mg) 30 minutes prior to each subsequent CC-486 dose. 30 tablet 11     oxyCODONE (OXY-IR) 5 MG capsule Take 1-2 capsules (5-10 mg) by mouth every 6 hours as needed for severe pain 90 capsule 0     potassium chloride ER (K-TAB) 20 MEQ CR tablet Take 1 tablet (20 mEq) by mouth daily 30 tablet 3     pravastatin (PRAVACHOL) 20 MG tablet Take 1 tablet (20 mg) by mouth every evening for cholesterol. 90 tablet 1     prochlorperazine (COMPAZINE) 10 MG tablet Take 1 tablet (10 mg) by mouth every 6 hours as needed (Nausea/Vomiting) 30 tablet 2     study CC-486 (IDS #5077) 100 mg TABS CHEMOTHERAPY tablet Take 1 tablet (100 mg) by mouth daily Schedule 1. Take for 21 days, followed by 7 days off. Take at the same time each day on an empty stomach or with food (a light breakfast or meal of up to approximately 600 calories).  Swallow tablet whole with about 8oz of room temperature water. Do not take broken or cracked tablets. 21 tablet 0     acetaminophen (TYLENOL) 500 MG tablet Take 2 tablets (1,000 mg) by mouth every 8 hours as needed for mild pain 180 tablet 3     aspirin 81 MG EC tablet Take 81 mg by mouth daily        enoxaparin (LOVENOX) 60 MG/0.6ML syringe Inject 0.6 mLs (60 mg) Subcutaneous every 12 hours 60  Syringe 0     senna-docusate (SENNA S) 8.6-50 MG tablet Take 4 tablets by mouth 2 times daily 250 tablet 3     study CC-486 (IDS #5077) 100 mg TABS CHEMOTHERAPY tablet Take 1 tablet (100 mg) by mouth daily Schedule 1. Take for 21 days, followed by 7 days off. Take at the same time each day on an empty stomach or with food (a light breakfast or meal of up to approximately 600 calories).  Swallow tablet whole with about 8oz of room temperature water. Do not take broken or cracked tablets. 21 tablet 0     study CC-486 (IDS #5077) 100 mg TABS CHEMOTHERAPY tablet Take 1 tablet (100 mg) by mouth daily Schedule 1. Take for 21 days, followed by 7 days off. Take at the same time each day on an empty stomach or with food (a light breakfast or meal of up to approximately 600 calories).  Swallow tablet whole with about 8oz of room temperature water. Do not take broken or cracked tablets. 21 tablet 0     traZODone (DESYREL) 50 MG tablet Take 0.5-1 tablets (25-50 mg) by mouth At Bedtime Take about 1 hours before bedtime 30 tablet 1         Allergies:        Allergies   Allergen Reactions     Gemfibrozil Muscle Pain (Myalgia)     Lovastatin      headaches     Penicillins Nausea and Vomiting        Social History:     Social History     Tobacco Use     Smoking status: Never Smoker     Smokeless tobacco: Never Used   Substance Use Topics     Alcohol use: Yes     Comment: occasional       History   Drug Use No         Family History:       Family History   Problem Relation Age of Onset     Hypertension Mother      Hypertension Father      Cerebrovascular Disease Father         polycythemia     Breast Cancer Maternal Aunt         older age         Physical Exam:     /69   Pulse 103   Temp 98.6  F (37  C) (Oral)   Resp 16   Wt 64.8 kg (142 lb 12.8 oz)   SpO2 97%   BMI 23.76 kg/m     Body mass index is 23.76 kg/m .    General Appearance: healthy and alert, no distress    Cardiovascular: regular rate and rhythm, no gallops,  rubs or murmurs     Respiratory: lungs clear, no rales, rhonchi or wheezes, normal diaphragmatic excursion    Musculoskeletal: extremities non tender and without edema    Skin: no lesions or rashes     Neurological: normal gait, no gross defects     Psychiatric: appropriate mood and affect                               Hematological: normal cervical, supraclavicular and inguinal lymph nodes     ABDOMEN:  Soft, nontender, nondistended.  Well-healed midline incision.           Assessment:    Di Evans is a 58 year old woman with a diagnosis of recurrent platinum-resistant ovarian cancer.     A total of 40 minutes was spent with the patient, 30 minutes of which were spent in counseling the patient and/or treatment planning.      1.  Platinum-resistant ovarian cancer.   2.  On TRIO 0026 study.   3.  ECOG performance status of 0.   4.  Elevated TSH with normal T4.   1.  Hyponatremia   2.  Hypokalemia.   3. Platinum resistant ovarian cancer  4. FKWG221 study      Discussed with patient we will start today cycle 3, day 1 of ULGS169 study.  She is on the lowest dose of .  We will have that replaced and rechecked.  She did have a mixed response on the 6-week scan.  Biopsy was obtained.  We will repeat the CT scan in 4 weeks to assess whether this is true progression based on iRESIST criteria.  Patient as well as family agrees with the plan.  She is very appreciative of her care.  All questions were answered.       Angelo Molina MD, MS    Department of Obstetrics and Gynecology   Division of Gynecologic Oncology   [facility]  Phone: [phone]      CC  Patient Care Team:  Sonja Davies MD as PCP - General (Family Practice)  Jocelyn Madden MD as Assigned PCP

## 2019-04-18 NOTE — NURSING NOTE
TRIO Study ([id number]): Study Visit Note   Subject name: Di Evans     Patient here accompanied by 2 friends and an aunt for C3D1 in the TRIO Study ([id number]). Since the last study visit, patient has been doing okay. Patient states that she is feeling better since the thrombectomy. No longer experiencing right lower pain in leg. Patient reports that continues to feel tired, was a little constipated in the last week, but is trying learn to adjust stool softeners. Although patient is tired, she states that she is ready for next treatment. Patient started Lovenox injections after being discharged from the hospital. I have personally interviewed this patient and reviewed medical record for adverse events and concomitant medications and these have been recorded on the corresponding logs in patient's research file. Study diary returned today. Diary states that patient skipped one pill. The reason was because patient thought that she was going to be taken out of the study after speaking with Dr. Jammie Duque and reviewing results of 6 week CT scan done on 3/APR/2019. Dr. Duque thought that TRIO study patients needed to come off it CT scan showed progression. It was communicated to Dr. Duque that a pseudo progression is expected at 6 week CT scan and that patient will be brought back for a follow-up CT scan in 4 weeks to assess for true progression. Treating MD called patient back and instructed her to continue study medication and study visits.     Patient was given the opportunity to ask any trial related questions. Please see Dr. Angelo Molina's progress note for physical exam and other clinical information. Labs were reviewed - any significant lab values were addressed and reviewed and patient is okay to continue study treatment at current dose. Hematology lab showed grade-2 anemia. Provider would like patient to start OTC iron tablets at home. He recommended Vitron C. Patient purchased tablets at the  in-house pharmacy. Will start taking tablets today. Patient's sodium is low today. Dr. Molina recommended for patient to have sodium replaced today at infusion center. An appointment was scheduled right after provider's visit and an order was placed for 1 litter of Normal Saline. Patient here for oral CC-486 study drug only, not due to receive Keytruda today. Patient escorted to infusion center for sodium replacement. Study appointments scheduled per protocol. Reviewed schedule with patient, no need to make any adjustments. Instructed patient to call back with any questions or concerns. Patient voiced understanding.     IDS number: [id number] Drug name: CC-486    DISPENSE:   Number of blister packs dispensed: 1  Lot number(s): [id number]  Number of pills dispensed: 21  RETURN:  Number of blister packs returned: 1  Lot number(s): [id number]  Number of pills returned: 1  Drug diary returned? YES    Are there any discrepancies between the amount of medication the patient was instructed to take and the amount recorded as taken in the patient s drug diary? NO   Are there any discrepancies between the amount of medication the patient has recorded as taken in the drug diary and the amount that would be expected to be returned based on the amount recorded as taken? NO    Did the study visit occur within the appropriate window allowed by the protocol? YES    Chanel Purcell RN    Office: [phone]  Pager: [phone]

## 2019-04-22 NOTE — PROGRESS NOTES
SUBJECTIVE:   Di Evans is a 58 year old female who presents to clinic today for the following   health issues:        ED/UC Followup:    Facility:  [facility]  Date of visit: 4/11/2019  Reason for visit: leg pain/DVT  Current Status: Weak/fatigue, loss of appetite     Had thrombectomy and placed on lovenox for three months.  Due to see oncology Friday.    Insomnia - trouble sleeping through the night for months. Feels tired in the morning sometimes.      Additional history: as documented    Reviewed  and updated as needed this visit by clinical staff  Tobacco  Allergies  Meds  Problems  Med Hx  Surg Hx  Fam Hx  Soc Hx          Reviewed and updated as needed this visit by Provider  Tobacco  Allergies  Meds  Problems  Med Hx  Surg Hx  Fam Hx         Patient Active Problem List   Diagnosis     CARDIOVASCULAR SCREENING; LDL GOAL LESS THAN 130     Essential hypertension with goal blood pressure less than 140/90     Hyperlipidemia LDL goal <130     Overweight     Changing skin lesion     Elevated TSH     Pelvic mass     S/P hysterectomy     Ovarian cancer, unspecified laterality (H)     Examination of participant or control in clinical research     Other ascites     Hyponatremia     Hypokalemia     Past Surgical History:   Procedure Laterality Date     HYSTERECTOMY TOTAL ABDOMINAL, BILATERAL SALPINGO-OOPHORECTOMY, COMBINED Bilateral 7/30/2018    Procedure: COMBINED HYSTERECTOMY TOTAL ABDOMINAL, SALPINGO-OOPHORECTOMY;;  Surgeon: Jammie Dueñas MD;  Location: [facility] OR     LAPAROSCOPY DIAGNOSTIC (GYN) N/A 2/7/2019    Procedure: Diagnostic Laparoscopy With Biopsy;  Surgeon: Jammie Dueñas MD;  Location: [facility] OR     LAPAROTOMY, TUMOR DEBULKING, COMBINED Bilateral 7/30/2018    Procedure: COMBINED LAPAROTOMY, TUMOR DEBULKING;  Exploratory Laparotomy, Modified Radical Hysterectomy, Removal of Cervix, Bilateral Salpingo - Oophorectomy, Omentectomy, Bilateral Para Aortic and  "Left Pelvic Lymph Node Dissection, Tumor Debulking, Proctoscopy;  Surgeon: Jammie Dueñas MD;  Location: [facility] OR     REMOVE PORT PERITONEAL N/A 1/7/2019    Procedure: REMOVE PORT PERITONEAL;  Surgeon: Chanel Rodriguez MD;  Location: [facility] OR     SURGICAL HISTORY OF -   1980    left ankle surgery       Social History     Tobacco Use     Smoking status: Never Smoker     Smokeless tobacco: Never Used   Substance Use Topics     Alcohol use: Yes     Comment: occasional     Family History   Problem Relation Age of Onset     Hypertension Mother      Hypertension Father      Cerebrovascular Disease Father         polycythemia     Breast Cancer Maternal Aunt         older age           ROS:  Constitutional, HEENT, cardiovascular, pulmonary, gi and gu systems are negative, except as otherwise noted.    OBJECTIVE:     /78 (BP Location: Left arm, Patient Position: Chair, Cuff Size: Adult Regular)   Pulse 132   Temp 98.7  F (37.1  C) (Tympanic)   Ht 1.651 m (5' 5\")   Wt 64.4 kg (142 lb)   SpO2 99%   Breastfeeding? No   BMI 23.63 kg/m    Body mass index is 23.63 kg/m .  GENERAL: healthy, alert and no distress  NECK: no adenopathy, no asymmetry, masses, or scars and thyroid normal to palpation  RESP: lungs clear to auscultation - no rales, rhonchi or wheezes  CV: regular rate and rhythm, normal S1 S2, no S3 or S4, no murmur, click or rub, no peripheral edema and peripheral pulses strong  ABDOMEN: soft, nontender, no hepatosplenomegaly, no masses and bowel sounds normal  MS: no gross musculoskeletal defects noted, no edema  PSYCH: mentation appears normal, affect normal/bright    Diagnostic Test Results:  Care everywhere reviewed    ASSESSMENT/PLAN:     1. Acute deep vein thrombosis (DVT) of distal vein of right lower extremity (H)  Refilled and check labs.  Discussed treatment length with oncology  - enoxaparin (LOVENOX) 60 MG/0.6ML syringe; Inject 0.6 mLs (60 mg) Subcutaneous every 12 hours  Dispense: 60 " Syringe; Refill: 0  - Basic metabolic panel  - CBC with platelets differential    2. Ovarian cancer, unspecified laterality (H)  As above  - enoxaparin (LOVENOX) 60 MG/0.6ML syringe; Inject 0.6 mLs (60 mg) Subcutaneous every 12 hours  Dispense: 60 Syringe; Refill: 0  - Basic metabolic panel  - CBC with platelets differential    3. Psychophysiological insomnia  Trial of trazodone but discussed that patient may be getting sleep that she needs or should keep her brain more active during the day..  Discussed SSRI mediated blood thinning and patient will monitor.  - traZODone (DESYREL) 50 MG tablet; Take 0.5-1 tablets (25-50 mg) by mouth At Bedtime Take about 1 hours before bedtime  Dispense: 30 tablet; Refill: 1    The uses and side effects, including black box warnings as appropriate, were discussed in detail.  All patient questions were answered.  The patient was instructed to call immediately if any side effects developed.     FUTURE APPOINTMENTS:       - Follow-up visit in 2 months or sooner if needed.    Sonja Hernandez MD  [facility]

## 2019-04-22 NOTE — PATIENT INSTRUCTIONS
Discuss long term treatment with oncology  Patient Education     Treating Insomnia     Learning to relax before bedtime can improve your sleep.   Good sleeping habits are a key part of treatment. If needed, some medicines may help you sleep better at first. Making healthy lifestyle changes and learning to relax can improve your sleep. Treating insomnia takes commitment. But trust that your efforts will pay off. Be sure to talk with your healthcare provider before taking any medicine.  Healthy lifestyle  Your lifestyle affects your health and your sleep. Here are some healthy habits:    Keep a regular sleep schedule. Go to bed and get up at the same time each day.    Exercise regularly. It may help you reduce stress. Avoid strenuous exercise for 2 to 4 hours before bedtime.    Avoid or limit naps, especially in the late afternoon.    Use your bed only for sleep and sex.    Don t spend too much time in bed trying to fall asleep. If you can t fall asleep, get up and do something (no electronics) until you become tired and drowsy.    Avoid or limit caffeine and nicotine for up to 6 hours before bedtime. They can keep you awake at night.     Also avoid alcohol for at least 4 to 6 hours before bedtime. It may help you fall asleep at first. But you will have more awakenings during the night. And your sleep will not be restful.  Before bedtime  To sleep better every night, try these tips:    Have a bedtime routine to let your body and mind know when it s time to sleep.    Think of going to bed as relaxing and enjoyable. Sleep will come sooner.    If your worries don t let you sleep, write them down in a diary. Then close it, and go to bed.    Make sure the room is not too hot or too cold. If it s not dark enough, an eye mask can help. If it s noisy, try using earplugs.    Don't eat a large meal just before bedtime.    Remove noises, bright lights, TVs, cell phones, and computers from your sleeping environment.    Use a  comfortable mattress and pillow.  Learn to relax  Stress, anxiety, and body tension may keep you awake at night. To unwind before bedtime, try a warm bath, meditation, or yoga. Also try the following:    Deep breathing. Sit or lie back in a chair. Take a slow, deep breath. Hold it for 5 counts. Then breathe out slowly through your mouth. Keep doing this until you feel relaxed.    Progressive muscle relaxation. Tense and then relax the muscles in your body as you breathe deeply. Start with your feet and work up your body to your neck and face.  Cognitive Behavioral Therapy (CBT)  CBT is the most effective treatment for long-term insomnia. It tries to address the underlying causes of your sleep problems, including your habits and how you think about sleep.  Individual Therapy  Obinna Rondon PsyD,   Insomnia   [facility]    [facility]: [phone]    [facility]: [phone]  [facility]  Visit www.[place].org or call [phone] to find a clinic close to you.  Suggested resources  The resources below may help you relax. This list is for information only. [facility] is not responsible for the quality of services or the actions of any person or organization. There may be a fee to use some of these resources.  Insomnia treatment books  Faheem Mind by Jammie Richmond and Melvina Cruz (2013)  Overcoming Insomnia by Buster Christopher and Jammie Richmond (2008)  Quiet Your Mind and Get to Sleep: Solutions to Insomnia for Those with Depression, Anxiety or Chronic Pain by Jammie Richmond and Melvina Cruz (2009)  No More Sleepless Nights by Ugo Mcpherson and Katie Pal (1996)  Say Faheem to Insomnia by Benedicto Baez (2009)  The Insomnia Workbook by Maru Taylor and Luigi Funez (2009)  The Insomnia Answer by Vladimir Hooks and Stan Schmitt (2006)  Stress management and relaxation books  The Relaxation and  Stress Reduction Workbook by Yamilka Whaley, Ruma Baker and Moo Mitchell (2008)  Stress Management Workbook: Techniques and Self-Assessment Procedures by Brandie Salazar and Eyal Harp (1997)  A Mindfulness-Based Stress Reduction Workbook by Abran Moon and Chely Jensen (2010)  The Complete Stress Management Workbook by Jose Miramontes, Giovanny Zamudio and Tino Coleman (1996)  Online programs  www.SHUTi.me (pronounced shut eye). There is a fee for this program.   www.sleepIO.com (pronounced sleep ee oh). There is a fee for this program.  Other online resources  Deep breathing and progressive muscle relaxation (PMR):    http://www.Navionics  Meditation:    www.fragrantheartShasta Crystals    www.Fashion ProjectguidedCore Essence OrthopaedicsmeditationCore Essence Orthopaedicssite.BIOSAFE  Guided imagery:    http://www.Navionics    http://Body & Soul.BIOSAFE  (click on  Resource Library,  then choose  Meditation, Relaxation, Guided Imagery )  Apps for your mobile device:    Autogenic Training Progressive Muscle Relaxation by Thismoment    Calm: Meditation and Sleep Stories by Calm.com, Inc.    International Sportsbook Timer - Meditation Krissy by [employer]  This is not a prescription and these resources are optional. You must pay for any costs when using these resources. Please ask your insurance carrier if you can be reimbursed for these resources. If so, you are responsible for sending the needed details to your insurance carrier. These resources may also be tax deductible as medical expenses. Check with your .  Date Last Reviewed: 5/18/2018  Clinically reviewed by Obinna Rondon PsyD, JEREMIAS, [facility], Director of the Insomnia and Behavioral Sleep Health Program, [facility].    5007-9774 The Interactive [employer]. [address] [place] [address], [place], [place] [address]. All rights reserved. This information is not intended as a substitute for professional medical care. Always follow your healthcare professional's instructions.

## 2019-04-23 NOTE — PROGRESS NOTES
Follow Up Notes on Referred Patient    Date: 2019       Dr. Chato Hough MD  No address on file       RE: Di Evans  : 1960  ROMAN: 2019    Dear Dr. Chato Hough:    Di Evans is a 58 year old woman with a diagnosis of recurrent platinum-resistant ovarian cancer.             Patient presents today for followup.  She feels she is slightly improved symptomatically.  Less distention, less nausea, no vomiting and denies any fever or chills.  No vaginal bleeding or discharge.             Past Medical History:    Past Medical History:   Diagnosis Date     Hypertension      Ovarian cancer (H)          Past Surgical History:    Past Surgical History:   Procedure Laterality Date     HYSTERECTOMY TOTAL ABDOMINAL, BILATERAL SALPINGO-OOPHORECTOMY, COMBINED Bilateral 2018    Procedure: COMBINED HYSTERECTOMY TOTAL ABDOMINAL, SALPINGO-OOPHORECTOMY;;  Surgeon: Jammie Dueñas MD;  Location: [facility] OR     LAPAROSCOPY DIAGNOSTIC (GYN) N/A 2019    Procedure: Diagnostic Laparoscopy With Biopsy;  Surgeon: Jammie Dueñas MD;  Location: [facility] OR     LAPAROTOMY, TUMOR DEBULKING, COMBINED Bilateral 2018    Procedure: COMBINED LAPAROTOMY, TUMOR DEBULKING;  Exploratory Laparotomy, Modified Radical Hysterectomy, Removal of Cervix, Bilateral Salpingo - Oophorectomy, Omentectomy, Bilateral Para Aortic and Left Pelvic Lymph Node Dissection, Tumor Debulking, Proctoscopy;  Surgeon: Jammie Dueñas MD;  Location: [facility] OR     REMOVE PORT PERITONEAL N/A 2019    Procedure: REMOVE PORT PERITONEAL;  Surgeon: Chanel Rodriguez MD;  Location:  OR     SURGICAL HISTORY OF -       left ankle surgery     THROMBECTOMY LOWER EXTREMITY Right 2019         Health Maintenance Due   Topic Date Due     ZOSTER IMMUNIZATION (1 of 2) 2009     FIT Q1 YR  2015     PHQ-2  2019       Current Medications:     Current Outpatient Medications   Medication Sig  Dispense Refill     cetirizine (ZYRTEC) 10 MG tablet Take 10 mg by mouth daily        LORazepam (ATIVAN) 1 MG tablet Take 1 tablet (1 mg) by mouth every 6 hours as needed (Anxiety, Nausea/Vomiting or Sleep) 30 tablet 2     magnesium oxide (MAG-OX) 400 MG tablet Take 1 tablet (400 mg) by mouth daily 30 tablet 3     mometasone (NASONEX) 50 MCG/ACT nasal spray Spray 2 sprays into both nostrils daily       ondansetron (ZOFRAN) 8 MG tablet If vomiting occurs with CC-486, take 1 tab (8 mg) 30 minutes prior to each subsequent CC-486 dose. 30 tablet 11     oxyCODONE (OXY-IR) 5 MG capsule Take 1-2 capsules (5-10 mg) by mouth every 6 hours as needed for severe pain 90 capsule 0     potassium chloride ER (K-TAB) 20 MEQ CR tablet Take 1 tablet (20 mEq) by mouth daily 30 tablet 3     pravastatin (PRAVACHOL) 20 MG tablet Take 1 tablet (20 mg) by mouth every evening for cholesterol. 90 tablet 1     prochlorperazine (COMPAZINE) 10 MG tablet Take 1 tablet (10 mg) by mouth every 6 hours as needed (Nausea/Vomiting) 30 tablet 2     study CC-486 (IDS #5077) 100 mg TABS CHEMOTHERAPY tablet Take 1 tablet (100 mg) by mouth daily Schedule 1. Take for 21 days, followed by 7 days off. Take at the same time each day on an empty stomach or with food (a light breakfast or meal of up to approximately 600 calories).  Swallow tablet whole with about 8oz of room temperature water. Do not take broken or cracked tablets. 21 tablet 0     acetaminophen (TYLENOL) 500 MG tablet Take 2 tablets (1,000 mg) by mouth every 8 hours as needed for mild pain 180 tablet 3     aspirin 81 MG EC tablet Take 81 mg by mouth daily        enoxaparin (LOVENOX) 60 MG/0.6ML syringe Inject 0.6 mLs (60 mg) Subcutaneous every 12 hours 60 Syringe 0     senna-docusate (SENNA S) 8.6-50 MG tablet Take 4 tablets by mouth 2 times daily 250 tablet 3     study CC-486 (IDS #5077) 100 mg TABS CHEMOTHERAPY tablet Take 1 tablet (100 mg) by mouth daily Schedule 1. Take for 21 days, followed  by 7 days off. Take at the same time each day on an empty stomach or with food (a light breakfast or meal of up to approximately 600 calories).  Swallow tablet whole with about 8oz of room temperature water. Do not take broken or cracked tablets. 21 tablet 0     study CC-486 (IDS #5077) 100 mg TABS CHEMOTHERAPY tablet Take 1 tablet (100 mg) by mouth daily Schedule 1. Take for 21 days, followed by 7 days off. Take at the same time each day on an empty stomach or with food (a light breakfast or meal of up to approximately 600 calories).  Swallow tablet whole with about 8oz of room temperature water. Do not take broken or cracked tablets. 21 tablet 0     traZODone (DESYREL) 50 MG tablet Take 0.5-1 tablets (25-50 mg) by mouth At Bedtime Take about 1 hours before bedtime 30 tablet 1         Allergies:        Allergies   Allergen Reactions     Gemfibrozil Muscle Pain (Myalgia)     Lovastatin      headaches     Penicillins Nausea and Vomiting        Social History:     Social History     Tobacco Use     Smoking status: Never Smoker     Smokeless tobacco: Never Used   Substance Use Topics     Alcohol use: Yes     Comment: occasional       History   Drug Use No         Family History:       Family History   Problem Relation Age of Onset     Hypertension Mother      Hypertension Father      Cerebrovascular Disease Father         polycythemia     Breast Cancer Maternal Aunt         older age         Physical Exam:     /69   Pulse 103   Temp 98.6  F (37  C) (Oral)   Resp 16   Wt 64.8 kg (142 lb 12.8 oz)   SpO2 97%   BMI 23.76 kg/m    Body mass index is 23.76 kg/m .    General Appearance: healthy and alert, no distress    Cardiovascular: regular rate and rhythm, no gallops, rubs or murmurs     Respiratory: lungs clear, no rales, rhonchi or wheezes, normal diaphragmatic excursion    Musculoskeletal: extremities non tender and without edema    Skin: no lesions or rashes     Neurological: normal gait, no gross  defects     Psychiatric: appropriate mood and affect                               Hematological: normal cervical, supraclavicular and inguinal lymph nodes     ABDOMEN:  Soft, nontender, nondistended.  Well-healed midline incision.           Assessment:    Di Evans is a 58 year old woman with a diagnosis of recurrent platinum-resistant ovarian cancer.     A total of 40 minutes was spent with the patient, 30 minutes of which were spent in counseling the patient and/or treatment planning.      1.  Platinum-resistant ovarian cancer.   2.  On TRIO 0026 study.   3.  ECOG performance status of 0.   4.  Elevated TSH with normal T4.   5.  Hyponatremia   6.  Hypokalemia.   7. Platinum resistant ovarian cancer  8. FSLJ508 study      Discussed with patient we will start today cycle 3, day 1 of ZRPE218 study.  She is on the lowest dose of .  We will have that replaced and rechecked.  She did have a mixed response on the 6-week scan.  Biopsy was obtained.  We will repeat the CT scan in 4 weeks to assess whether this is true progression based on iRESIST criteria.  Patient as well as family agrees with the plan.  She is very appreciative of her care.  All questions were answered.       Angelo Molina MD, MS    Department of Obstetrics and Gynecology   Division of Gynecologic Oncology   [facility]  Phone: [phone]      CC  Patient Care Team:  Sonja Davies MD as PCP - General (Family Practice)  Jocelyn Madden MD as Assigned PCP  SELF, REFERRED

## 2019-04-24 NOTE — RESULT ENCOUNTER NOTE
Ms. Evans,    Your labs are stable for you and have even improved slightly.    Please contact the clinic if you have additional questions.  Thank you.    Sincerely,    Sonja Hernandez

## 2019-04-29 NOTE — PROGRESS NOTES
"[facility]:  Care Coordination Note    Received the following update from Dr. Duque regarding patient's CT scan from today:    \"I just got a call from Radiology about this patient.  Her CT showed PE's, so I have prescribed Lovenox for her and sent it to her pharmacy.  Could you please call her and tell her, she should start this and take it twice a day.  I do not have the full read on how her disease is, but as soon as I see this I will give her a call to discuss.\"    Writer placed telephone call to patient with above results and recommendations.  Patient shares that she is already taking Lovenox, at a dose of 60 mg BID, which she has been taking for the past 3 weeks.  Patient denies having any breathing changes, shortness of breath, or chest pain currently.  Reviewed with patient that she should go to her nearest ER if she were to develop sudden shortness of breath or chest pain, and she verbalizes understanding.  Message was sent to Dr. Duque with this update.    Patient will await a telephone call from Dr. Duque with any further recommendations, and further updates regarding her final CT results.     Mihir King, RN, BSN, OCN  Oncology Care Coordinator  [facility]      "

## 2019-04-29 NOTE — DISCHARGE INSTRUCTIONS

## 2019-05-01 NOTE — PATIENT INSTRUCTIONS
[facility] Main Line: [phone]    Call triage nurse with chills and/or temperature greater than or equal to 100.4, uncontrolled nausea/vomiting, diarrhea, constipation, dizziness, shortness of breath, chest pain, bleeding, unexplained bruising, or any new/concerning symptoms, questions/concerns.   If you are having any concerning symptoms or wish to speak to a provider before your next infusion visit, please call your care coordinator or triage to notify them so we can adequately serve you.   Triage Nurse Line: [phone]    If after hours, weekends, or holidays [phone]             May 2019      Lazarus Monday Tuesday Wednesday Thursday Friday Saturday                  1    [facility] DRAW   9:45 AM   (15 min.)    [facility] DRAW   [facility] Lab Draw    [facility] RETURN  10:05 AM   (20 min.)   Angelo Molina MD   [facility] ONC INFUSION 60  11:00 AM   (60 min.)    ONCOLOGY INFUSION   [facility] 2     3     4       5     6     7     8     9     10     11       12     13     14     15     16     17     18       19     20     21     22     23     24     25       26     27     28     29     30     31 June 2019 Sunday Monday Tuesday Wednesday Thursday Friday Saturday                                 1       2     3     4     5     6     7     8       9     10     11     12     13     14     15       16     17     18     19     20  Happy Birthday!     21     22       23     24     25     26     27     28     29       30                                                   Lab Results:  Recent Results (from the past 12 hour(s))   *CBC with platelets differential    Collection Time: 05/01/19 10:15 AM   Result Value Ref Range    WBC 5.9 4.0 - 11.0 10e9/L    RBC Count 4.08 3.8 - 5.2 10e12/L    Hemoglobin 9.6 (L) 11.7 - 15.7 g/dL    Hematocrit 31.5 (L) 35.0 - 47.0 %    MCV 77 (L) 78 - 100 fl    MCH 23.5 (L) 26.5 - 33.0  pg    MCHC 30.5 (L) 31.5 - 36.5 g/dL    RDW 19.4 (H) 10.0 - 15.0 %    Platelet Count 422 150 - 450 10e9/L    Diff Method Automated Method     % Neutrophils 77.9 %    % Lymphocytes 9.0 %    % Monocytes 11.6 %    % Eosinophils 0.5 %    % Basophils 0.5 %    % Immature Granulocytes 0.5 %    Nucleated RBCs 0 0 /100    Absolute Neutrophil 4.6 1.6 - 8.3 10e9/L    Absolute Lymphocytes 0.5 (L) 0.8 - 5.3 10e9/L    Absolute Monocytes 0.7 0.0 - 1.3 10e9/L    Absolute Eosinophils 0.0 0.0 - 0.7 10e9/L    Absolute Basophils 0.0 0.0 - 0.2 10e9/L    Abs Immature Granulocytes 0.0 0 - 0.4 10e9/L    Absolute Nucleated RBC 0.0    Comprehensive metabolic panel    Collection Time: 05/01/19 10:15 AM   Result Value Ref Range    Sodium 128 (L) 133 - 144 mmol/L    Potassium 3.8 3.4 - 5.3 mmol/L    Chloride 96 94 - 109 mmol/L    Carbon Dioxide 22 20 - 32 mmol/L    Anion Gap 9 3 - 14 mmol/L    Glucose 126 (H) 70 - 99 mg/dL    Urea Nitrogen 12 7 - 30 mg/dL    Creatinine 0.46 (L) 0.52 - 1.04 mg/dL    GFR Estimate >90 >60 mL/min/[1.73_m2]    GFR Estimate If Black >90 >60 mL/min/[1.73_m2]    Calcium 9.6 8.5 - 10.1 mg/dL    Bilirubin Total 0.3 0.2 - 1.3 mg/dL    Albumin 2.3 (L) 3.4 - 5.0 g/dL    Protein Total 7.7 6.8 - 8.8 g/dL    Alkaline Phosphatase 99 40 - 150 U/L    ALT 27 0 - 50 U/L    AST 22 0 - 45 U/L   TSH with free T4 reflex    Collection Time: 05/01/19 10:15 AM   Result Value Ref Range    TSH 3.71 0.40 - 4.00 mU/L

## 2019-05-01 NOTE — TELEPHONE ENCOUNTER
TRIO Study ([id number]): Study Visit Note   Subject name: Di Evans     Patient had CT scan on 29/APR/2019. Treating MD, Jammie Duque and Study PI, Jose Molina were contacted regarding CT scan results. Both PI and treating MD are aware of pulmonary embolism, and agreed that current Lovenox dosage is appropriate for treatment of PE. Both PI and treating MD want patient to continue on study treatment.     Patient was contacted regarding CT scan results and answered her questions. Patient has an appointment for study visit tomorrow with Dr. Molina. CT scan results will be reviewed with more detail. Informed patient that Dr. Duque will be giving her a call as well prior to the study visit to answer any questions she might have. Reminded patient to go immediately to the ED if she experiences any chest pain or shortness of breath, patient voiced understanding. Instructed patient to call back with any questions or concerns.      Chanel Purcell RN    Office: [phone]  Pager: [phone]

## 2019-05-01 NOTE — LETTER
2019       RE: Di Evnas  [address]  [facility] [place] [facility] [address]     Dear Colleague,    Thank you for referring your patient, Di Evans, to the [facility]. Please see a copy of my visit note below.                Follow Up Notes on Referred Patient    Date: 2019       Dr. Chato Hough MD  No address on file       RE: Di Evans  : 1960  ROMAN: 2019    Dear Dr. Chato Hough:    Di Evans is a 58 year old woman with a diagnosis of recurrent platinum resistant ovarian cancer.           The patient presents today for followup and discussion of CT scan results.  She is doing reasonably well overall, still has some fatigue which has maybe slightly improved, still has some nausea.  Denies any fever or chills.  Appetite has been about the same, does have some decreased appetite but then also she is having regular meals.  She is otherwise able to take care of herself.  Denies any vaginal bleeding or discharge.  No shortness of breath or chest pain.  Of note, she has recently been diagnosed with a lower extremity DVT which was treated with full anticoagulation with Lovenox as well as a thrombectomy.  The CT scan of her chest yesterday showed the pulmonary embolus, which the patient was asymptomatic from.  Again, she is fully anticoagulated at this point.             Past Medical History:    Past Medical History:   Diagnosis Date     DVT (deep venous thrombosis) (H)      Hypertension      Ovarian cancer (H)      Pulmonary embolism (H)          Past Surgical History:    Past Surgical History:   Procedure Laterality Date     HYSTERECTOMY TOTAL ABDOMINAL, BILATERAL SALPINGO-OOPHORECTOMY, COMBINED Bilateral 2018    Procedure: COMBINED HYSTERECTOMY TOTAL ABDOMINAL, SALPINGO-OOPHORECTOMY;;  Surgeon: Jammie Dueñas MD;  Location: [facility] OR     LAPAROSCOPY DIAGNOSTIC (GYN) N/A 2019    Procedure: Diagnostic Laparoscopy With Biopsy;  Surgeon: Neto  Jammie Salgado MD;  Location: [facility] OR     LAPAROTOMY, TUMOR DEBULKING, COMBINED Bilateral 7/30/2018    Procedure: COMBINED LAPAROTOMY, TUMOR DEBULKING;  Exploratory Laparotomy, Modified Radical Hysterectomy, Removal of Cervix, Bilateral Salpingo - Oophorectomy, Omentectomy, Bilateral Para Aortic and Left Pelvic Lymph Node Dissection, Tumor Debulking, Proctoscopy;  Surgeon: Jammie Dueñas MD;  Location: [facility] OR     REMOVE PORT PERITONEAL N/A 1/7/2019    Procedure: REMOVE PORT PERITONEAL;  Surgeon: Chanel Rodriguez MD;  Location: [facility] OR     SURGICAL HISTORY OF -   1980    left ankle surgery     THROMBECTOMY LOWER EXTREMITY Right 04/2019         Health Maintenance Due   Topic Date Due     ZOSTER IMMUNIZATION (1 of 2) 06/20/2009     FIT Q1 YR  03/16/2015     PHQ-2  01/01/2019       Current Medications:     Current Outpatient Medications   Medication Sig Dispense Refill     acetaminophen (TYLENOL) 500 MG tablet Take 2 tablets (1,000 mg) by mouth every 8 hours as needed for mild pain 180 tablet 3     cetirizine (ZYRTEC) 10 MG tablet Take 10 mg by mouth daily        enoxaparin (LOVENOX) 60 MG/0.6ML syringe Inject 0.6 mLs (60 mg) Subcutaneous every 12 hours 60 Syringe 0     LORazepam (ATIVAN) 1 MG tablet Take 1 tablet (1 mg) by mouth every 6 hours as needed (Anxiety, Nausea/Vomiting or Sleep) 30 tablet 2     magnesium oxide (MAG-OX) 400 MG tablet Take 1 tablet (400 mg) by mouth daily 30 tablet 3     mometasone (NASONEX) 50 MCG/ACT nasal spray Spray 2 sprays into both nostrils daily       potassium chloride ER (K-TAB) 20 MEQ CR tablet Take 1 tablet (20 mEq) by mouth daily 30 tablet 3     pravastatin (PRAVACHOL) 20 MG tablet Take 1 tablet (20 mg) by mouth every evening for cholesterol. 90 tablet 1     prochlorperazine (COMPAZINE) 10 MG tablet Take 1 tablet (10 mg) by mouth every 6 hours as needed (Nausea/Vomiting) 30 tablet 2     senna-docusate (SENNA S) 8.6-50 MG tablet Take 4 tablets by mouth 2 times daily  250 tablet 3     study CC-486 (IDS #5077) 100 mg TABS CHEMOTHERAPY tablet Take 1 tablet (100 mg) by mouth daily Schedule 1. Take for 21 days, followed by 7 days off. Take at the same time each day on an empty stomach or with food (a light breakfast or meal of up to approximately 600 calories).  Swallow tablet whole with about 8oz of room temperature water. Do not take broken or cracked tablets. 21 tablet 0     aspirin 81 MG EC tablet Take 81 mg by mouth daily        enoxaparin (LOVENOX) 60 MG/0.6ML syringe Inject 0.6 mLs (60 mg) Subcutaneous 2 times daily (Patient not taking: Reported on 5/1/2019) 60 Syringe 3     ondansetron (ZOFRAN) 8 MG tablet If vomiting occurs with CC-486, take 1 tab (8 mg) 30 minutes prior to each subsequent CC-486 dose. (Patient not taking: Reported on 5/1/2019) 30 tablet 11     oxyCODONE (OXY-IR) 5 MG capsule Take 1-2 capsules (5-10 mg) by mouth every 6 hours as needed for severe pain (Patient not taking: Reported on 5/1/2019) 90 capsule 0     study CC-486 (IDS #5077) 100 mg TABS CHEMOTHERAPY tablet Take 1 tablet (100 mg) by mouth daily Schedule 1. Take for 21 days, followed by 7 days off. Take at the same time each day on an empty stomach or with food (a light breakfast or meal of up to approximately 600 calories).  Swallow tablet whole with about 8oz of room temperature water. Do not take broken or cracked tablets. (Patient not taking: Reported on 5/1/2019) 21 tablet 0     study CC-486 (IDS #5077) 100 mg TABS CHEMOTHERAPY tablet Take 1 tablet (100 mg) by mouth daily Schedule 1. Take for 21 days, followed by 7 days off. Take at the same time each day on an empty stomach or with food (a light breakfast or meal of up to approximately 600 calories).  Swallow tablet whole with about 8oz of room temperature water. Do not take broken or cracked tablets. (Patient not taking: Reported on 5/1/2019) 21 tablet 0     traZODone (DESYREL) 50 MG tablet Take 0.5-1 tablets (25-50 mg) by mouth At Bedtime  Take about 1 hours before bedtime (Patient not taking: Reported on 5/1/2019) 30 tablet 1         Allergies:        Allergies   Allergen Reactions     Gemfibrozil Muscle Pain (Myalgia)     Lovastatin      headaches     Penicillins Nausea and Vomiting        Social History:     Social History     Tobacco Use     Smoking status: Never Smoker     Smokeless tobacco: Never Used   Substance Use Topics     Alcohol use: Yes     Comment: occasional       History   Drug Use No         Family History:       Family History   Problem Relation Age of Onset     Hypertension Mother      Hypertension Father      Cerebrovascular Disease Father         polycythemia     Breast Cancer Maternal Aunt         older age         Physical Exam:     /81 (BP Location: Right arm, Patient Position: Sitting, Cuff Size: Adult Regular)   Pulse 105   Temp 98.5  F (36.9  C) (Oral)   Resp 16   Wt 64.3 kg (141 lb 12.8 oz)   SpO2 99%   BMI 23.60 kg/m     Body mass index is 23.6 kg/m .    General Appearance: healthy and alert, no distress     Cardiovascular: regular rate and rhythm, no gallops, rubs or murmurs     Respiratory: lungs clear, no rales, rhonchi or wheezes, normal diaphragmatic excursion    Musculoskeletal: extremities non tender and without edema    Skin: no lesions or rashes     Neurological: normal gait, no gross defects     Psychiatric: appropriate mood and affect                               ABDOMEN:  Soft, mildly distended, nontender, no organomegaly.  Well-healed midline incision.           Assessment:    Di Evans is a 58 year old woman with a diagnosis of recurrent platinum resistant ovarian cancer.     A total of 40 minutes was spent with the patient, 30 minutes of which were spent in counseling the patient and/or treatment planning.      1.  Platinum-resistant ovarian cancer.   2.  On TRIO 0026 study.   3.  ECOG performance status of 0.   4.  Elevated TSH with normal T4.   5.  Hyponatremia   6.  Anemia HgB 9.6    7. Platinum resistant ovarian cancer  8. HMHS124 study    I reviewed with the patient as well as friends and family that had accompanied her the day of the visit the results of the recent CT scan.  Based on that, she has no disease progression, but stable disease.  We did discuss to continue with the study TRIO 26.  She is due for pempro today for cycle 3.  This will include cycle 3, and then she will start cycle 4 after that.  We will add the sodium today, as it is still a little low at 128 with a liter of normal saline today.  The potassium is within normal range today.  She is still having some mild anemia with a hemoglobin of 9.6; this is slightly improved from before.  Other labs are within normal limits.  The patient agrees with this plan.  They are very appreciative of their care.  We discussed to also now to try to go more outside to increase activity.  She agrees with this.  Again, all questions were answered.  She is very appreciative of her care.       Angelo Molina MD, MS    Department of Obstetrics and Gynecology   Division of Gynecologic Oncology   [facility]  Phone: [phone]          CC  Patient Care Team:  Sonja Davies MD as PCP - General (Family Practice)

## 2019-05-01 NOTE — NURSING NOTE
TRIO Study ([id number]): Study Visit Note   Subject name: Di Evans     Patient here accompanied by 2 friends and her aunt for C3D15 in the TRIO Study ([id number]). Since the last study visit, patient has been doing okay. Patient had a pulmonary embolism seen on CT scan done one 29/APR/2019, for which patient continues Lovenox treatment. Patient denies shortness of breath and/or chest pain. Patient complains of dyspepsia x 2 weeks. Patient states that anorexia and fatigue continue, but remain the same. Denies vomiting, nausea, and constipation. Reports soft stools, but denies diarrhea. Denies any changes to concomitant medications. Patient states that she is ready for next treatment. I have personally interviewed this patient and reviewed medical record for adverse events and concomitant medications and these have been recorded on the corresponding logs in patient's research file.     Patient was given the opportunity to ask any trial related questions. Please see Dr. Angelo Molina's progress note for physical exam and other clinical information. Labs were reviewed - any significant lab values were addressed and reviewed. Provider reviewed CT scan results with patient and answered all her questions regarding results, pulmonary embolism and appropriate therapy. Sodium remains low, will be replaced today prior to Pembrolizumab infusion. Patient is okay to continue study treatment at current dose. Patient here for Keytruda only. Patient escorted to infusion center for study treatment. Study appointments scheduled per protocol. Instructed patient to call back with any questions or concerns. Patient voiced understanding.     Did the study visit occur within the appropriate window allowed by the protocol? YES    Chanel Purcell RN    Office: [phone]  Pager: [phone]

## 2019-05-01 NOTE — PROGRESS NOTES
Infusion Nursing Note:  Di Evnas presents today for C3D15 Keytruda.    Patient seen by provider today: Yes: Dr Molina   present during visit today: Not Applicable.    Note: Patient reported to clinic today with no new complaints after seeing provider.  Patient declines any interventions for pain during this infusion visit.    TORB: 5/1/19 1109 Dr Jesse Mancini Masters/Sidney Milton RN  -Give 1 liter NS today along with Keytruda infusion    Intravenous Access:  Peripheral IV placed.    Treatment Conditions:  Lab Results   Component Value Date    HGB 9.6 05/01/2019     Lab Results   Component Value Date    WBC 5.9 05/01/2019      Lab Results   Component Value Date    ANEU 4.6 05/01/2019     Lab Results   Component Value Date     05/01/2019      Lab Results   Component Value Date     05/01/2019                   Lab Results   Component Value Date    POTASSIUM 3.8 05/01/2019           Lab Results   Component Value Date    MAG 1.5 03/20/2019            Lab Results   Component Value Date    CR 0.46 05/01/2019                   Lab Results   Component Value Date    TRACEY 9.6 05/01/2019                Lab Results   Component Value Date    BILITOTAL 0.3 05/01/2019           Lab Results   Component Value Date    ALBUMIN 2.3 05/01/2019                    Lab Results   Component Value Date    ALT 27 05/01/2019           Lab Results   Component Value Date    AST 22 05/01/2019       Results reviewed, labs MET treatment parameters, ok to proceed with treatment.      Post Infusion Assessment:  Patient tolerated infusion without incident.  Blood return noted pre and post infusion.  Site patent and intact, free from redness, edema or discomfort.  No evidence of extravasations.  Access discontinued per protocol.       Discharge Plan:   Patient declined prescription refills.  Discharge instructions reviewed with: Patient and Family.  Patient and/or family verbalized understanding of  discharge instructions and all questions answered.  AVS to patient via [employer].  Patient aware to watch MyChart for next appointment and to call in a few days if not made.   Patient discharged in stable condition accompanied by: self and Family.  Departure Mode: Ambulatory.  Face to Face time: 0 minutes.    Sidney Milton RN

## 2019-05-01 NOTE — NURSING NOTE
"Oncology Rooming Note    May 1, 2019 10:27 AM   Di Evans is a 58 year old female who presents for:    Chief Complaint   Patient presents with     Blood Draw     labs drawn with piv start by rn.  vs taken     Oncology Clinic Visit     Return; Ovarian CA     Initial Vitals: /81 (BP Location: Right arm, Patient Position: Sitting, Cuff Size: Adult Regular)   Pulse 105   Temp 98.5  F (36.9  C) (Oral)   Resp 16   Wt 64.3 kg (141 lb 12.8 oz)   SpO2 99%   BMI 23.60 kg/m   Estimated body mass index is 23.6 kg/m  as calculated from the following:    Height as of 4/22/19: 1.651 m (5' 5\").    Weight as of this encounter: 64.3 kg (141 lb 12.8 oz). Body surface area is 1.72 meters squared.  Mild Pain (3) Comment: Data Unavailable   No LMP recorded. Patient has had a hysterectomy.  Allergies reviewed: Yes  Medications reviewed: Yes    Medications: MEDICATION REFILLS NEEDED TODAY. Provider was notified.  Pharmacy name entered into [facility]:    [facility] [place], [place] - [address], [address]  [facility] [address] [place] [address] [address] [facility] [address]    Clinical concerns: Patient requests a refill of Lorazepam today. Patient has upset stomach today.  Jesse was notified.      Anne Escalona CMA              "

## 2019-05-01 NOTE — NURSING NOTE
Chief Complaint   Patient presents with     Blood Draw     labs drawn with piv start by rn.  vs taken     Labs drawn with PIV start by rn.  Pt tolerated well.  VS taken and pt checked in for next appt.  Jodie Gardner RN

## 2019-05-02 NOTE — PROGRESS NOTES
Follow Up Notes on Referred Patient    Date: 2019       Dr. Chato Hough MD  No address on file       RE: Di Evans  : 1960  ROMAN: 2019    Dear Dr. Chato Hough:    Di Evans is a 58 year old woman with a diagnosis of recurrent platinum resistant ovarian cancer.           The patient presents today for followup and discussion of CT scan results.  She is doing reasonably well overall, still has some fatigue which has maybe slightly improved, still has some nausea.  Denies any fever or chills.  Appetite has been about the same, does have some decreased appetite but then also she is having regular meals.  She is otherwise able to take care of herself.  Denies any vaginal bleeding or discharge.  No shortness of breath or chest pain.  Of note, she has recently been diagnosed with a lower extremity DVT which was treated with full anticoagulation with Lovenox as well as a thrombectomy.  The CT scan of her chest yesterday showed the pulmonary embolus, which the patient was asymptomatic from.  Again, she is fully anticoagulated at this point.             Past Medical History:    Past Medical History:   Diagnosis Date     DVT (deep venous thrombosis) (H)      Hypertension      Ovarian cancer (H)      Pulmonary embolism (H)          Past Surgical History:    Past Surgical History:   Procedure Laterality Date     HYSTERECTOMY TOTAL ABDOMINAL, BILATERAL SALPINGO-OOPHORECTOMY, COMBINED Bilateral 2018    Procedure: COMBINED HYSTERECTOMY TOTAL ABDOMINAL, SALPINGO-OOPHORECTOMY;;  Surgeon: Jammie Dueñas MD;  Location: [facility] OR     LAPAROSCOPY DIAGNOSTIC (GYN) N/A 2019    Procedure: Diagnostic Laparoscopy With Biopsy;  Surgeon: Jammie Dueñas MD;  Location: [facility] OR     LAPAROTOMY, TUMOR DEBULKING, COMBINED Bilateral 2018    Procedure: COMBINED LAPAROTOMY, TUMOR DEBULKING;  Exploratory Laparotomy, Modified Radical Hysterectomy, Removal of Cervix, Bilateral  Salpingo - Oophorectomy, Omentectomy, Bilateral Para Aortic and Left Pelvic Lymph Node Dissection, Tumor Debulking, Proctoscopy;  Surgeon: Jammie Dueñas MD;  Location: [facility] OR     REMOVE PORT PERITONEAL N/A 1/7/2019    Procedure: REMOVE PORT PERITONEAL;  Surgeon: Chanel Rodriguez MD;  Location: [facility] OR     SURGICAL HISTORY OF -   1980    left ankle surgery     THROMBECTOMY LOWER EXTREMITY Right 04/2019         Health Maintenance Due   Topic Date Due     ZOSTER IMMUNIZATION (1 of 2) 06/20/2009     FIT Q1 YR  03/16/2015     PHQ-2  01/01/2019       Current Medications:     Current Outpatient Medications   Medication Sig Dispense Refill     acetaminophen (TYLENOL) 500 MG tablet Take 2 tablets (1,000 mg) by mouth every 8 hours as needed for mild pain 180 tablet 3     cetirizine (ZYRTEC) 10 MG tablet Take 10 mg by mouth daily        enoxaparin (LOVENOX) 60 MG/0.6ML syringe Inject 0.6 mLs (60 mg) Subcutaneous every 12 hours 60 Syringe 0     LORazepam (ATIVAN) 1 MG tablet Take 1 tablet (1 mg) by mouth every 6 hours as needed (Anxiety, Nausea/Vomiting or Sleep) 30 tablet 2     magnesium oxide (MAG-OX) 400 MG tablet Take 1 tablet (400 mg) by mouth daily 30 tablet 3     mometasone (NASONEX) 50 MCG/ACT nasal spray Spray 2 sprays into both nostrils daily       potassium chloride ER (K-TAB) 20 MEQ CR tablet Take 1 tablet (20 mEq) by mouth daily 30 tablet 3     pravastatin (PRAVACHOL) 20 MG tablet Take 1 tablet (20 mg) by mouth every evening for cholesterol. 90 tablet 1     prochlorperazine (COMPAZINE) 10 MG tablet Take 1 tablet (10 mg) by mouth every 6 hours as needed (Nausea/Vomiting) 30 tablet 2     senna-docusate (SENNA S) 8.6-50 MG tablet Take 4 tablets by mouth 2 times daily 250 tablet 3     study CC-486 (IDS #[id number]) 100 mg TABS CHEMOTHERAPY tablet Take 1 tablet (100 mg) by mouth daily Schedule 1. Take for 21 days, followed by 7 days off. Take at the same time each day on an empty stomach or with food (a light  breakfast or meal of up to approximately 600 calories).  Swallow tablet whole with about 8oz of room temperature water. Do not take broken or cracked tablets. 21 tablet 0     aspirin 81 MG EC tablet Take 81 mg by mouth daily        enoxaparin (LOVENOX) 60 MG/0.6ML syringe Inject 0.6 mLs (60 mg) Subcutaneous 2 times daily (Patient not taking: Reported on 5/1/2019) 60 Syringe 3     ondansetron (ZOFRAN) 8 MG tablet If vomiting occurs with CC-486, take 1 tab (8 mg) 30 minutes prior to each subsequent CC-486 dose. (Patient not taking: Reported on 5/1/2019) 30 tablet 11     oxyCODONE (OXY-IR) 5 MG capsule Take 1-2 capsules (5-10 mg) by mouth every 6 hours as needed for severe pain (Patient not taking: Reported on 5/1/2019) 90 capsule 0     study CC-486 (IDS #[id number]) 100 mg TABS CHEMOTHERAPY tablet Take 1 tablet (100 mg) by mouth daily Schedule 1. Take for 21 days, followed by 7 days off. Take at the same time each day on an empty stomach or with food (a light breakfast or meal of up to approximately 600 calories).  Swallow tablet whole with about 8oz of room temperature water. Do not take broken or cracked tablets. (Patient not taking: Reported on 5/1/2019) 21 tablet 0     study CC-486 (IDS #[id number]) 100 mg TABS CHEMOTHERAPY tablet Take 1 tablet (100 mg) by mouth daily Schedule 1. Take for 21 days, followed by 7 days off. Take at the same time each day on an empty stomach or with food (a light breakfast or meal of up to approximately 600 calories).  Swallow tablet whole with about 8oz of room temperature water. Do not take broken or cracked tablets. (Patient not taking: Reported on 5/1/2019) 21 tablet 0     traZODone (DESYREL) 50 MG tablet Take 0.5-1 tablets (25-50 mg) by mouth At Bedtime Take about 1 hours before bedtime (Patient not taking: Reported on 5/1/2019) 30 tablet 1         Allergies:        Allergies   Allergen Reactions     Gemfibrozil Muscle Pain (Myalgia)     Lovastatin      headaches     Penicillins Nausea and  Vomiting        Social History:     Social History     Tobacco Use     Smoking status: Never Smoker     Smokeless tobacco: Never Used   Substance Use Topics     Alcohol use: Yes     Comment: occasional       History   Drug Use No         Family History:       Family History   Problem Relation Age of Onset     Hypertension Mother      Hypertension Father      Cerebrovascular Disease Father         polycythemia     Breast Cancer Maternal Aunt         older age         Physical Exam:     /81 (BP Location: Right arm, Patient Position: Sitting, Cuff Size: Adult Regular)   Pulse 105   Temp 98.5  F (36.9  C) (Oral)   Resp 16   Wt 64.3 kg (141 lb 12.8 oz)   SpO2 99%   BMI 23.60 kg/m    Body mass index is 23.6 kg/m .    General Appearance: healthy and alert, no distress     Cardiovascular: regular rate and rhythm, no gallops, rubs or murmurs     Respiratory: lungs clear, no rales, rhonchi or wheezes, normal diaphragmatic excursion    Musculoskeletal: extremities non tender and without edema    Skin: no lesions or rashes     Neurological: normal gait, no gross defects     Psychiatric: appropriate mood and affect                               ABDOMEN:  Soft, mildly distended, nontender, no organomegaly.  Well-healed midline incision.           Assessment:    Di Evans is a 58 year old woman with a diagnosis of recurrent platinum resistant ovarian cancer.     A total of 40 minutes was spent with the patient, 30 minutes of which were spent in counseling the patient and/or treatment planning.      1.  Platinum-resistant ovarian cancer.   2.  On TRIO 0026 study.   3.  ECOG performance status of 0.   4.  Elevated TSH with normal T4.   5.  Hyponatremia   6.  Anemia HgB 9.6   7. Platinum resistant ovarian cancer  8. [id number] study    I reviewed with the patient as well as friends and family that had accompanied her the day of the visit the results of the recent CT scan.  Based on that, she has no disease  progression, but stable disease.  We did discuss to continue with the study TRIO 26.  She is due for pempro today for cycle 3.  This will include cycle 3, and then she will start cycle 4 after that.  We will add the sodium today, as it is still a little low at 128 with a liter of normal saline today.  The potassium is within normal range today.  She is still having some mild anemia with a hemoglobin of 9.6; this is slightly improved from before.  Other labs are within normal limits.  The patient agrees with this plan.  They are very appreciative of their care.  We discussed to also now to try to go more outside to increase activity.  She agrees with this.  Again, all questions were answered.  She is very appreciative of her care.       Angelo Molina MD, MS    Department of Obstetrics and Gynecology   Division of Gynecologic Oncology   [facility]  Phone: [phone]          CC  Patient Care Team:  Sonja Davies MD as PCP - General (Family Practice)  Sonja Davies MD as Assigned PCP  SELF, REFERRED

## 2019-05-08 NOTE — PROGRESS NOTES
Paracentesis Nursing Note  Di Evans presents today to [facility] for a paracentesis.    During today's appointment orders from Dr Neto Salgado were completed.    Progress Note:  Patient identification verified by name and date of birth.  Assessment completed.  Vitals monitored throughout appointment and recorded in Doc Flowsheets.    Pt very uncomfortable with amount of abd distension today; pt  Encouraged to make an appointment for her next para in 3 weeks instead of 4 so that she will not have today's level of bad discomfort.   See proceduralist note in ultrasound.    Vascular Access: peripheral IV placed today.  Labs: were drawn per orders.     Date of consent or authorization: 3/11/19.  Invasive Procedure Safety Checklist was completed and sent for scanning.     Paracentesis performed by Moo Richey PA-C Radiology.    The following labs were communicated to provider performing paracentesis:  Lab Results   Component Value Date     05/08/2019       Total amount of ascites fluid drained: 4.3 liters.  Color of ascites fluid: yellow.  Total amount of albumin given: 50  grams.    Patient tolerated procedure well.    Post procedure,denies pain or discomfort post paracentesis. Denies dizziness      Discharge Plan:  Discharge instructions were reviewed with patient.  Patient/Representative verbalized understanding and all questions were answered.   Discharged from [facility] in stable condition.    Judy Leger RN        /84   Pulse 105

## 2019-05-10 NOTE — TELEPHONE ENCOUNTER
"Requested Prescriptions   Pending Prescriptions Disp Refills     hydrochlorothiazide (HYDRODIURIL) 25 MG tablet [Pharmacy Med Name: HYDROCHLOROTHIAZIDE 25MG TABLETS]      Last Written Prescription Date:  11/5/18  Last Fill Quantity: 90,  # refills: 1   Last Office Visit with [facility], P or [facility] prescribing provider:  4/22/19   Future Office Visit:      90 tablet 0     Sig: TAKE 1 TABLET(25 MG) BY MOUTH EVERY MORNING FOR BLOOD PRESSURE       Diuretics (Including Combos) Protocol Failed - 5/10/2019  3:32 AM        Failed - Medication is active on med list        Failed - Normal serum creatinine on file in past 12 months     Recent Labs   Lab Test 05/01/19  1015   CR 0.46*              Failed - Normal serum sodium on file in past 12 months     Recent Labs   Lab Test 05/01/19  1015   *              Passed - Blood pressure under 140/90 in past 12 months     BP Readings from Last 3 Encounters:   05/08/19 118/69   05/01/19 130/81   04/22/19 121/78                 Passed - Recent (12 mo) or future (30 days) visit within the authorizing provider's specialty     Patient had office visit in the last 12 months or has a visit in the next 30 days with authorizing provider or within the authorizing provider's specialty.  See \"Patient Info\" tab in inbasket, or \"Choose Columns\" in Meds & Orders section of the refill encounter.              Passed - Patient is age 18 or older        Passed - No active pregancy on record        Passed - Normal serum potassium on file in past 12 months     Recent Labs   Lab Test 05/01/19  1015   POTASSIUM 3.8                    Passed - No positive pregnancy test in past 12 months              Rob Faarax  [facility] Rad[facility]  " Dressing (No Sutures): dry sterile dressing

## 2019-05-11 NOTE — PROGRESS NOTES
SUBJECTIVE:   Chief Complaint   Patient presents with     Dizziness     and light headaches, been going on for 2 days      Throat Pain     sore throat off and on for about 2days      Ear Problem     ears hurt      Di Evans is a 58 year old female complains of dizziness for 2 days.   Timing: gradual onset, still present and constant onset during, light activity and movement of head.  Quality: room spinning/falling/movement, off balance.    She does have generalized weakness- she is receiving chemotherapy for ovarian cancer  She had 4500 ml of fluid drained from her abdomen 4 days ago, and labs showed hyponatremia, so she was given a liter of normal saline IV    Had past DVT- she is on daily lovenox  The symptoms become more intense with :movement of the head, especially side to side.  Associated symptoms:sore throat; runny nose , left ear pain  does not interfere with activities of daily living;  denies any previous problems with similar symptoms.      Past Medical History:   Diagnosis Date     DVT (deep venous thrombosis) (H)      Hypertension      Ovarian cancer (H)      Pulmonary embolism (H)      Patient Active Problem List   Diagnosis     CARDIOVASCULAR SCREENING; LDL GOAL LESS THAN 130     Essential hypertension with goal blood pressure less than 140/90     Hyperlipidemia LDL goal <130     Overweight     Changing skin lesion     Elevated TSH     Pelvic mass     S/P hysterectomy     Ovarian cancer, unspecified laterality (H)     Examination of participant or control in clinical research     Other ascites     Hyponatremia     Hypokalemia       ALLERGIES:  Gemfibrozil; Lovastatin; and Penicillins      Current Outpatient Medications on File Prior to Visit:  acetaminophen (TYLENOL) 500 MG tablet Take 2 tablets (1,000 mg) by mouth every 8 hours as needed for mild pain   aspirin 81 MG EC tablet Take 81 mg by mouth daily    cetirizine (ZYRTEC) 10 MG tablet Take 10 mg by mouth daily    enoxaparin (LOVENOX) 60  MG/0.6ML syringe Inject 0.6 mLs (60 mg) Subcutaneous 2 times daily (Patient not taking: Reported on 5/1/2019)   enoxaparin (LOVENOX) 60 MG/0.6ML syringe Inject 0.6 mLs (60 mg) Subcutaneous every 12 hours   LORazepam (ATIVAN) 1 MG tablet Take 1 tablet (1 mg) by mouth every 6 hours as needed (Anxiety, Nausea/Vomiting or Sleep)   magnesium oxide (MAG-OX) 400 MG tablet Take 1 tablet (400 mg) by mouth daily   mometasone (NASONEX) 50 MCG/ACT nasal spray Spray 2 sprays into both nostrils daily   ondansetron (ZOFRAN) 8 MG tablet If vomiting occurs with CC-486, take 1 tab (8 mg) 30 minutes prior to each subsequent CC-486 dose. (Patient not taking: Reported on 5/1/2019)   oxyCODONE (OXY-IR) 5 MG capsule Take 1-2 capsules (5-10 mg) by mouth every 6 hours as needed for severe pain (Patient not taking: Reported on 5/1/2019)   potassium chloride ER (K-TAB) 20 MEQ CR tablet Take 1 tablet (20 mEq) by mouth daily   pravastatin (PRAVACHOL) 20 MG tablet Take 1 tablet (20 mg) by mouth every evening for cholesterol.   prochlorperazine (COMPAZINE) 10 MG tablet Take 1 tablet (10 mg) by mouth every 6 hours as needed (Nausea/Vomiting)   senna-docusate (SENNA S) 8.6-50 MG tablet Take 4 tablets by mouth 2 times daily   study CC-486 (IDS #5077) 100 mg TABS CHEMOTHERAPY tablet Take 1 tablet (100 mg) by mouth daily Schedule 1. Take for 21 days, followed by 7 days off. Take at the same time each day on an empty stomach or with food (a light breakfast or meal of up to approximately 600 calories).  Swallow tablet whole with about 8oz of room temperature water. Do not take broken or cracked tablets. (Patient not taking: Reported on 5/1/2019)   study CC-486 (IDS #5077) 100 mg TABS CHEMOTHERAPY tablet Take 1 tablet (100 mg) by mouth daily Schedule 1. Take for 21 days, followed by 7 days off. Take at the same time each day on an empty stomach or with food (a light breakfast or meal of up to approximately 600 calories).  Swallow tablet whole with about  8oz of room temperature water. Do not take broken or cracked tablets. (Patient not taking: Reported on 5/1/2019)   study CC-486 (IDS #5077) 100 mg TABS CHEMOTHERAPY tablet Take 1 tablet (100 mg) by mouth daily Schedule 1. Take for 21 days, followed by 7 days off. Take at the same time each day on an empty stomach or with food (a light breakfast or meal of up to approximately 600 calories).  Swallow tablet whole with about 8oz of room temperature water. Do not take broken or cracked tablets.   traZODone (DESYREL) 50 MG tablet Take 0.5-1 tablets (25-50 mg) by mouth At Bedtime Take about 1 hours before bedtime (Patient not taking: Reported on 5/1/2019)     No current facility-administered medications on file prior to visit.     Social History     Tobacco Use     Smoking status: Never Smoker     Smokeless tobacco: Never Used   Substance Use Topics     Alcohol use: Yes     Comment: occasional       Family History   Problem Relation Age of Onset     Hypertension Mother      Hypertension Father      Cerebrovascular Disease Father         polycythemia     Breast Cancer Maternal Aunt         older age       ROS:  CONSTITUTIONAL:NEGATIVE for fever, chills, change in weight  INTEGUMENTARY/SKIN: NEGATIVE for worrisome rashes, moles or lesions  EYES: NEGATIVE for vision changes or irritation  GI: NEGATIVE for nausea, abdominal pain, heartburn, or change in bowel habits    OBJECTIVE:  /84 (BP Location: Left arm, Patient Position: Sitting, Cuff Size: Adult Regular)   Pulse 142   Temp 98.2  F (36.8  C) (Oral)   Resp 18   Wt 62.8 kg (138 lb 6.4 oz)   SpO2 99%   BMI 23.03 kg/m       GENERAL APPEARANCE: alert, mild distress, cooperative,  fatigued  EYES: EOMI,  PERRL, conjunctiva clear,  Fundi normal  HENT: ear canals and TM's normal.  Nose and mouth without ulcers, erythema or lesions.  No sinus tenderness  NECK: supple, nontender, no lymphadenopathy  RESP: lungs clear to auscultation - no rales, rhonchi or wheezes  CV:  regular rates and rhythm, normal S1 S2, no murmur noted  SKIN: no suspicious lesions or rashes     NEURO:  Mental status normal. Gait able to walk without difficulty-  Has  Balance difficulty with toe/ heel walk  Romberg negative.    Motor, sensory function normal in all extremities.       Finger- nose accurate  .   Normal strength and tone is generally decreased ,  Normal speech and mentation, cranial nerves normal  Pulse regular. Rapid changes in head position during the exam do precipitate brief dizziness  . Moving head side to side, not forward/ back       ASSESSMENT:  Labyrinthitis of left ear     - meclizine (ANTIVERT) 25 MG tablet; Take 1 tablet (25 mg) by mouth 3 times daily as needed for dizziness    :  The patient is reassured that these symptoms do not appear to represent a serious or threatening condition. This is generally a self-limited temporary but uncomfortable situation.  We discussed the role of the semi-circular canals in detecting and maintaining balance and that a viral illness in the ear/ sinus can cause swelling in the semi-circular canals and promote vertigo symptoms      Rest, avoid potentially dangerous activities (such as driving or working with machinery or at heights), use OTC Meclizine prn.      Should go to the ED if patient develops other symptoms, such as alterations of speech, swallowing, vision, motor or sensory systems.      Follow-up with primary care or ENT if dizziness persists or worsens.    Patient education materials were provided about diagnosis and treatment         Viral URI  Symptomatic treatment with acetaminophen or Ibuprofen as needed for pain and for fever.    OTC cough/ cold medication may be taken to help control cough.  Discuss that viral illness can cause vertigo symptoms due to swelling in semi-circular canals    At high risk for infection due to chemotherapy   Ovarian cancer, unspecified laterality (H)     Discussed she is at increased risk of bacterial  infection due to her weakened immunity/ chemotherapy, but with 2 days of cold symptoms, antibiotic treatment is not necessary  Have re-assessment at oncology appt. In 4 days or return if worsening    Hyponatremia     Will contribute to general weakness, but not vertigo  Re-check lytes today- after her NS infusion    - Comprehensive metabolic panel

## 2019-05-13 NOTE — TELEPHONE ENCOUNTER
Check with patient if she is taking this.  I believe it was stopped due to low blood pressures.    Sonja Dotson M.D.

## 2019-05-13 NOTE — TELEPHONE ENCOUNTER
Routing refill request to provider for review/approval because:  Drug not active on patient's medication list  Janessa Rodrigues RN

## 2019-05-15 NOTE — LETTER
5/15/2019       RE: Di Evans  [address]  [facility] [id number]     Dear Colleague,    Thank you for referring your patient, Di Evans, to the [facility]. Please see a copy of my visit note below.                Follow Up Notes on Referred Patient    Date: 2019       Dr. Chato Hough MD  No address on file       RE: Di Evans  : 1960  ROMAN: 5/15/2019    Dear Dr. Chato Hough:    Di Evans is a 58 year old woman with a diagnosis of recurrent platinum resistant ovarian cancer.    The patient presents today for followup.  She does have some abdominal distention again after paracentesis she had prior.  She is otherwise eating and drinking fairly well.  Has minimal nausea, no vomiting.  No change in urinary or bowel habits.  No vaginal bleeding or discharge.             Past Medical History:    Past Medical History:   Diagnosis Date     DVT (deep venous thrombosis) (H)      Hypertension      Ovarian cancer (H)      Pulmonary embolism (H)          Past Surgical History:    Past Surgical History:   Procedure Laterality Date     HYSTERECTOMY TOTAL ABDOMINAL, BILATERAL SALPINGO-OOPHORECTOMY, COMBINED Bilateral 2018    Procedure: COMBINED HYSTERECTOMY TOTAL ABDOMINAL, SALPINGO-OOPHORECTOMY;;  Surgeon: Jammie Dueñas MD;  Location: [facility] OR     LAPAROSCOPY DIAGNOSTIC (GYN) N/A 2019    Procedure: Diagnostic Laparoscopy With Biopsy;  Surgeon: Jammie Dueñas MD;  Location: [facility] OR     LAPAROTOMY, TUMOR DEBULKING, COMBINED Bilateral 2018    Procedure: COMBINED LAPAROTOMY, TUMOR DEBULKING;  Exploratory Laparotomy, Modified Radical Hysterectomy, Removal of Cervix, Bilateral Salpingo - Oophorectomy, Omentectomy, Bilateral Para Aortic and Left Pelvic Lymph Node Dissection, Tumor Debulking, Proctoscopy;  Surgeon: Jammie Dueñas MD;  Location: [facility] OR     REMOVE PORT PERITONEAL N/A 2019    Procedure: REMOVE PORT  PERITONEAL;  Surgeon: Chanel Rodriguez MD;  Location: [facility] OR     SURGICAL HISTORY OF -   1980    left ankle surgery     THROMBECTOMY LOWER EXTREMITY Right 04/2019         Health Maintenance Due   Topic Date Due     ZOSTER IMMUNIZATION (1 of 2) 06/20/2009     FIT Q1 YR  03/16/2015     PHQ-2  01/01/2019     PREVENTIVE CARE VISIT  06/06/2019       Current Medications:     Current Outpatient Medications   Medication Sig Dispense Refill     acetaminophen (TYLENOL) 500 MG tablet Take 2 tablets (1,000 mg) by mouth every 8 hours as needed for mild pain 180 tablet 3     cetirizine (ZYRTEC) 10 MG tablet Take 10 mg by mouth daily        enoxaparin (LOVENOX) 60 MG/0.6ML syringe Inject 0.6 mLs (60 mg) Subcutaneous every 12 hours 60 Syringe 0     LORazepam (ATIVAN) 1 MG tablet Take 1 tablet (1 mg) by mouth every 6 hours as needed (Anxiety, Nausea/Vomiting or Sleep) 30 tablet 2     magnesium oxide (MAG-OX) 400 MG tablet Take 1 tablet (400 mg) by mouth daily 30 tablet 3     meclizine (ANTIVERT) 25 MG tablet Take 1 tablet (25 mg) by mouth 3 times daily as needed for dizziness 30 tablet 0     mometasone (NASONEX) 50 MCG/ACT nasal spray Spray 2 sprays into both nostrils daily       potassium chloride ER (K-TAB) 20 MEQ CR tablet Take 1 tablet (20 mEq) by mouth daily 30 tablet 3     pravastatin (PRAVACHOL) 20 MG tablet Take 1 tablet (20 mg) by mouth every evening for cholesterol. 90 tablet 1     senna-docusate (SENNA S) 8.6-50 MG tablet Take 4 tablets by mouth 2 times daily 250 tablet 3     aspirin 81 MG EC tablet Take 81 mg by mouth daily        enoxaparin (LOVENOX) 60 MG/0.6ML syringe Inject 0.6 mLs (60 mg) Subcutaneous 2 times daily 60 Syringe 3     ondansetron (ZOFRAN) 8 MG tablet If vomiting occurs with CC-486, take 1 tab (8 mg) 30 minutes prior to each subsequent CC-486 dose. 30 tablet 11     oxyCODONE (OXY-IR) 5 MG capsule Take 1-2 capsules (5-10 mg) by mouth every 6 hours as needed for severe pain 90 capsule 0      prochlorperazine (COMPAZINE) 10 MG tablet Take 1 tablet (10 mg) by mouth every 6 hours as needed (Nausea/Vomiting) 30 tablet 2     study CC-486 (IDS #5077) 100 mg TABS CHEMOTHERAPY tablet Take 1 tablet (100 mg) by mouth daily Schedule 1. Take for 21 days, followed by 7 days off. Take at the same time each day on an empty stomach or with food (a light breakfast or meal of up to approximately 600 calories).  Swallow tablet whole with about 8oz of room temperature water. Do not take broken or cracked tablets. 21 tablet 0     study CC-486 (IDS #5077) 100 mg TABS CHEMOTHERAPY tablet Take 1 tablet (100 mg) by mouth daily Schedule 1. Take for 21 days, followed by 7 days off. Take at the same time each day on an empty stomach or with food (a light breakfast or meal of up to approximately 600 calories).  Swallow tablet whole with about 8oz of room temperature water. Do not take broken or cracked tablets. 21 tablet 0     study CC-486 (IDS #5077) 100 mg TABS CHEMOTHERAPY tablet Take 1 tablet (100 mg) by mouth daily Schedule 1. Take for 21 days, followed by 7 days off. Take at the same time each day on an empty stomach or with food (a light breakfast or meal of up to approximately 600 calories).  Swallow tablet whole with about 8oz of room temperature water. Do not take broken or cracked tablets. 21 tablet 0     study CC-486 (IDS #5077) 100 mg TABS CHEMOTHERAPY tablet Take 1 tablet (100 mg) by mouth daily Schedule 1. Take for 21 days, followed by 7 days off. Take at the same time each day on an empty stomach or with food (a light breakfast or meal of up to approximately 600 calories).  Swallow tablet whole with about 8oz of room temperature water. Do not take broken or cracked tablets. 21 tablet 0     traZODone (DESYREL) 50 MG tablet Take 0.5-1 tablets (25-50 mg) by mouth At Bedtime Take about 1 hours before bedtime 30 tablet 1         Allergies:        Allergies   Allergen Reactions     Gemfibrozil Muscle Pain (Myalgia)      Lovastatin      headaches     Penicillins Nausea and Vomiting        Social History:     Social History     Tobacco Use     Smoking status: Never Smoker     Smokeless tobacco: Never Used   Substance Use Topics     Alcohol use: Yes     Comment: occasional       History   Drug Use No         Family History:       Family History   Problem Relation Age of Onset     Hypertension Mother      Hypertension Father      Cerebrovascular Disease Father         polycythemia     Breast Cancer Maternal Aunt         older age         Physical Exam:     /83 (BP Location: Right arm, Patient Position: Sitting, Cuff Size: Adult Regular)   Pulse 122   Temp 98.2  F (36.8  C) (Oral)   Resp 16   Wt 64.5 kg (142 lb 4.8 oz)   SpO2 97%   BMI 23.68 kg/m     Body mass index is 23.68 kg/m .    General Appearance:  alert, no distress     Cardiovascular:           regular rate and rhythm, no gallops, rubs or murmurs                Respiratory:     lungs clear, no rales, rhonchi or wheezes, normal diaphragmatic excursion     Musculoskeletal:         extremities non tender and without edema     Skin:    no lesions or rashes      Neurological:   normal gait, no gross defects                Psychiatric:      appropriate mood and affect                               ABDOMEN:  Soft, mildly distended, nontender, no organomegaly.  Well-healed midline incision.               Assessment:     Di Evans is a 58 year old woman with a diagnosis of recurrent platinum resistant ovarian cancer.      A total of 40 minutes was spent with the patient, 30 minutes of which were spent in counseling the patient and/or treatment planning.        1.  Platinum-resistant ovarian cancer.   2.  On TRIO 0026 study.   3.  ECOG performance status of 0.   4.  Elevated TSH with normal T4.   5.  Hyponatremia   6.  Anemia HgB 9.0   7. Platinum resistant ovarian cancer  8. CWHC335 study     We did discuss to continue with the study TRIO 26.  She is here today to  start cycle 4 with first  alone.   Her sodium is still a little low at 128 encouraged to increase oral slat intake. We did discuss to repeat paracentesis for ascites today and then as needed. The potassium is within normal range today.  She is still having some mild anemia with a hemoglobin of 9.0.  Other labs are within normal limits.  The patient agrees with this plan.  They are very appreciative of their care.  We discussed to also now to try to go more outside to increase activity.  She agrees with this.  Again, all questions were answered.  She is very appreciative of her care.         Angelo Molina MD, MS    Department of Obstetrics and Gynecology   Division of Gynecologic Oncology   [facility]  Phone: [phone]      CC  Patient Care Team:  Sonja Davies MD as PCP - General (Family Practice)  Sonja Davies MD as Assigned PCP

## 2019-05-15 NOTE — PATIENT INSTRUCTIONS
Dear Di Evans    Thank you for choosing [facility] ([facility]) for your procedure.  The following information is a summary of our appointment as well as important reminders.      Your paracentesis procedure today, beginning weight 143.4 lb (65 kg), removed 3.1 liters ascites fluid, gave 37.5 grams albumin, ending weight approximately 136.6 lb    We look forward in seeing you on your next appointment here at [facility] ([facility]).  Please don t hesitate to call us at [phone] to reschedule any of your appointments or to speak with one of the [facility] registered nurses.  It was a pleasure taking care of you today.    Sincerely,    [facility]  [facility]  [address] [place]  [address]  Phone:  [phone]  DISCHARGE INSTRUCTIONS FOLLOWING ABDOMINAL PARACENTESIS    After you go home:    No strenuous activity for 24 hours    Resume your regular diet    Limit fluid intake for the first 48 hours to no more than 2 quarts per day.  There should be minimal drainage from the needle site.  If drainage does occur and soaks through the bandage, apply gentle pressure with your hand for 5 minutes.    Notify MD for the following:    Excessive drainage    Excessive swelling, redness or tenderness at the needle site    Fever greater than 101 degrees F    Dizziness or light-headedness when getting up or walking      IF THIS IS A MEDICAL EMERGENCY, CALL [phone]    If you have any questions or concerns    Contact the Hepatology Clinic:   [phone]    If you are a post-transplant patient, contact the Transplant Office:  [phone]    If this is after hours, contact the hospital :  [phone] and as to have the GI resident on call paged                   I have received and understand my discharge instructions and I have all of my personal  belongings.          ------------------------------------------------------        ---------------------------------------------------    Patient / Significant Other's Signature     Relationship

## 2019-05-15 NOTE — NURSING NOTE
Chief Complaint   Patient presents with     Blood Draw     labs drawn via vpt by rn. vs taken      Blood drawn via vpt by RN in lab. VS taken. Pt checked into next appointment.   Elaine Kirkpatrick RN

## 2019-05-15 NOTE — NURSING NOTE
"Oncology Rooming Note    May 15, 2019 11:50 AM   Di Evans is a 58 year old female who presents for:    Chief Complaint   Patient presents with     Blood Draw     labs drawn via vpt by rn. vs taken      Oncology Clinic Visit     Return; Ovarian CA     Initial Vitals: /83 (BP Location: Right arm, Patient Position: Sitting, Cuff Size: Adult Regular)   Pulse 122   Temp 98.2  F (36.8  C) (Oral)   Resp 16   Wt 64.5 kg (142 lb 4.8 oz)   SpO2 97%   BMI 23.68 kg/m   Estimated body mass index is 23.68 kg/m  as calculated from the following:    Height as of 4/22/19: 1.651 m (5' 5\").    Weight as of this encounter: 64.5 kg (142 lb 4.8 oz). Body surface area is 1.72 meters squared.  No Pain (0) Comment: Data Unavailable   No LMP recorded. Patient has had a hysterectomy.  Allergies reviewed: Yes  Medications reviewed: Yes    Medications: Medication refills not needed today.  Pharmacy name entered into [facility]:    [facility] - [place], [place] - [address], [address]  [facility] [address] [place] [address] [address] AT [facility]  [address]    Clinical concerns: Patient has had dizziness and has felt \"off balance since she had paracentesis last Wednesday. Per patient it had improved slightly. Patient is also expressing a lot of phlegm with coughing, coughing so hard, almost throwing up. More coughing after eating. Jesse was notified.      Anne Escalona CMA              "

## 2019-05-15 NOTE — PROGRESS NOTES
Paracentesis Nursing Note  Di Evans [facility].    During today's appointment orders from Dr. Neto Salgado were completed.    Progress Note:  Patient identification verified by name and date of birth.  Assessment completed.  Vitals monitored throughout appointment and recorded in Doc Flowsheets.  See proceduralist note in ultrasound.    Vascular Access: peripheral IV placed today.  Labs: were not ordered for this appointment.    Date of consent or authorization: 3/11/19 - 6/9/19  (90 days).  Invasive Procedure Safety Checklist was completed and sent for scanning.     Paracentesis performed by Inga Aaron PA-C Radiology.    The following labs were communicated to provider performing paracentesis:  Lab Results   Component Value Date     05/15/2019     Total amount of ascites fluid drained: 3.1 liters.  Color of ascites fluid: yellow.  Total amount of albumin given: 37.5  grams.  Patient tolerated procedure well.  Post procedure,denies pain or discomfort post paracentesis.    Discharge Plan:  Discharge instructions were reviewed with patient.  Patient/Representative verbalized understanding and all questions were answered.   Discharged from [facility] in stable condition.    Judy Jasso RN    Administrations This Visit     albumin human 25 % injection 12.5 g     Admin Date  05/15/2019 Action  New Bag Dose  12.5 g Route  Intravenous Administered By  Judy Jasso RN           Admin Date  05/15/2019 Action  New Bag Dose  12.5 g Route  Intravenous Administered By  Judy Jasso RN           Admin Date  05/15/2019 Action  New Bag Dose  12.5 g Route  Intravenous Administered By  Judy Jasso RN          lidocaine 1 % 20 mL     Admin Date  05/15/2019 Action  Given by Other Dose  10 mL Route  Other Administered By  Judy Jasso RN                /85   Pulse 114   Temp 97.8  F  (36.6  C) (Oral)   Resp 16   Wt 65 kg (143 lb 6.4 oz)   SpO2 97%   BMI 23.86 kg/m

## 2019-05-15 NOTE — NURSING NOTE
TRIO Study ([id number]): Study Visit Note IDS number: [id number] Drug name: CC-486   Subject name: Di Evans     Patient here accompanied by roommate, and 2 other family members for C4D1 in the TRIO Study ([id number]). Since the last study visit, patient has been doing okay. Patient reports that she has been having productive couth x 2 weeks. Patient reports that she felt concerned about ascites collection in the abdomen and went to clinic for paracentesis on 8/MAY/2019. On the same day of paracentesis she started to feel dizzy went to urgent-care the next day (9/MAY/2019). Patient also reports that she also started having ear pain/discomfort on 9/MAY/2019. Patient was prescribed Meclizine 25 mg PRN. Patient states that ear pain/discomfor went away after taking the medication, and it has resolved completely. Dizziness continues, but has improved greatly. Patient states that she is ready for next treatment. I have personally interviewed this patient and reviewed medical record for adverse events and concomitant medications and these have been recorded on the corresponding logs in patient's research file. Patient was given the opportunity to ask any trial related questions. Please see Dr. Angelo Molina's progress note for physical exam and other clinical information. Labs were reviewed - any significant lab values were addressed and reviewed and patient is okay to continue study treatment at current dose. Low sodium lab value today. Doctor Molina doesn't recommend for sodium to be replaced today, but has encouraged patient to increase sodium through food intake. Patient okay to continue at current dose. Patient complains of ascites accumulation today again. Doctor Molina explained to patient that productive cough due to ascites. He recommended for patient to have paracentesis soon. Patient scheduled to have paracentesis done today.     Patient here for oral CC-486 study drug only. Patient not due to receive  Keytruda today. Study appointments scheduled per protocol. Reviewed schedule with patient, no need to make any adjustments. Instructed patient to call back with any questions or concerns. Patient voiced understanding.     DISPENSE:  Number of blister packs dispensed: 1  Lot number: [id number]  Number of pills dispensed: 21  RETURN:  Number of blister packs returned: 1  Lot number: [id number]  Number of pills returned: 0  Drug diary returned? YES    Are there any discrepancies between the amount of medication the patient was instructed to take and the amount recorded as taken in the patient s drug diary? NO Are there any discrepancies between the amount of medication the patient has recorded as taken in the drug diary and the amount that would be expected to be returned based on the amount recorded as taken? NO Did the study visit occur within the appropriate window allowed by the protocol? YES    Chanel Purcell RN    Office: [phone]  Pager: [phone]

## 2019-05-16 NOTE — TELEPHONE ENCOUNTER
TRIO Study ([id number]): Telephone Note   Subject name: Di Evans     Patient's roommate, Alivia called and left a voice mail to ask for CA-125 lab result and to inform research staff that patient was vomiting yesterday on the way home.     Patient was called back this morning and was informed of CA-125 result and ask more questions about vomiting. Patient took anti-nausea/emetic medication prior to taking CC-486 while in the clinic yesterday, but she did not take the usual medication she takes prior to CC-486 dose because she had left it at home. Patient stated that 2 hours after study medication was taken, she started vomiting x 5 times. Patient stated that she did not experienced any nausea, but vomiting alone. Vomiting subsided after that episode, and patient feels better today. Encouraged patient to keep up with anti-emetic medication.     Treating MD, Doctor Jammie Duque was called this morning and informed of patient's CA-125 lab result, overall patient's condition and other relevant lab results. Doctor Duque wishes for patient to continue in study treatment despite CA-125 result. Doctor Duque also suggested for patient to have a paracentesis weekly appointments scheduled and use them as needed due to recurrent ascites collection lately. Doctor Duque was informed of the date of next CT scan and study visits and treatments. Next study visit scheduled next week for Keytruda infusion. Patient scheduled to  Have next CT scan on 6/26/2019. Dr. Duque stated that will consider a CT scan sooner than the one stated per protocol, but only depending on patient's condition. As of now, we will wait for next CT scan as scheduled.     Patient was called back and informed that I had spoken to Doctor Duque and explained the things we talked about, patient voiced understanding. Instructed patient to call back with any questions or concerns.     Chanel Purcell, RN    Office: [phone]  Pager: [phone]

## 2019-05-17 NOTE — PROGRESS NOTES
Follow Up Notes on Referred Patient    Date: 2019       Dr. Chato Hough MD  No address on file       RE: Di Evans  : 1960  ROMAN: 5/15/2019    Dear Dr. Chato Hough:    Di Evans is a 58 year old woman with a diagnosis of recurrent platinum resistant ovarian cancer.    The patient presents today for followup.  She does have some abdominal distention again after paracentesis she had prior.  She is otherwise eating and drinking fairly well.  Has minimal nausea, no vomiting.  No change in urinary or bowel habits.  No vaginal bleeding or discharge.             Past Medical History:    Past Medical History:   Diagnosis Date     DVT (deep venous thrombosis) (H)      Hypertension      Ovarian cancer (H)      Pulmonary embolism (H)          Past Surgical History:    Past Surgical History:   Procedure Laterality Date     HYSTERECTOMY TOTAL ABDOMINAL, BILATERAL SALPINGO-OOPHORECTOMY, COMBINED Bilateral 2018    Procedure: COMBINED HYSTERECTOMY TOTAL ABDOMINAL, SALPINGO-OOPHORECTOMY;;  Surgeon: Jammie Dueñas MD;  Location: [facility] OR     LAPAROSCOPY DIAGNOSTIC (GYN) N/A 2019    Procedure: Diagnostic Laparoscopy With Biopsy;  Surgeon: Jammie Dueñas MD;  Location: [facility] OR     LAPAROTOMY, TUMOR DEBULKING, COMBINED Bilateral 2018    Procedure: COMBINED LAPAROTOMY, TUMOR DEBULKING;  Exploratory Laparotomy, Modified Radical Hysterectomy, Removal of Cervix, Bilateral Salpingo - Oophorectomy, Omentectomy, Bilateral Para Aortic and Left Pelvic Lymph Node Dissection, Tumor Debulking, Proctoscopy;  Surgeon: Jammie Dueñas MD;  Location: [facility] OR     REMOVE PORT PERITONEAL N/A 2019    Procedure: REMOVE PORT PERITONEAL;  Surgeon: Chanel Rodriguez MD;  Location: [facility] OR     SURGICAL HISTORY OF -       left ankle surgery     THROMBECTOMY LOWER EXTREMITY Right 2019         Health Maintenance Due   Topic Date Due     ZOSTER IMMUNIZATION (1 of 2)  06/20/2009     FIT Q1 YR  03/16/2015     PHQ-2  01/01/2019     PREVENTIVE CARE VISIT  06/06/2019       Current Medications:     Current Outpatient Medications   Medication Sig Dispense Refill     acetaminophen (TYLENOL) 500 MG tablet Take 2 tablets (1,000 mg) by mouth every 8 hours as needed for mild pain 180 tablet 3     cetirizine (ZYRTEC) 10 MG tablet Take 10 mg by mouth daily        enoxaparin (LOVENOX) 60 MG/0.6ML syringe Inject 0.6 mLs (60 mg) Subcutaneous every 12 hours 60 Syringe 0     LORazepam (ATIVAN) 1 MG tablet Take 1 tablet (1 mg) by mouth every 6 hours as needed (Anxiety, Nausea/Vomiting or Sleep) 30 tablet 2     magnesium oxide (MAG-OX) 400 MG tablet Take 1 tablet (400 mg) by mouth daily 30 tablet 3     meclizine (ANTIVERT) 25 MG tablet Take 1 tablet (25 mg) by mouth 3 times daily as needed for dizziness 30 tablet 0     mometasone (NASONEX) 50 MCG/ACT nasal spray Spray 2 sprays into both nostrils daily       potassium chloride ER (K-TAB) 20 MEQ CR tablet Take 1 tablet (20 mEq) by mouth daily 30 tablet 3     pravastatin (PRAVACHOL) 20 MG tablet Take 1 tablet (20 mg) by mouth every evening for cholesterol. 90 tablet 1     senna-docusate (SENNA S) 8.6-50 MG tablet Take 4 tablets by mouth 2 times daily 250 tablet 3     aspirin 81 MG EC tablet Take 81 mg by mouth daily        enoxaparin (LOVENOX) 60 MG/0.6ML syringe Inject 0.6 mLs (60 mg) Subcutaneous 2 times daily 60 Syringe 3     ondansetron (ZOFRAN) 8 MG tablet If vomiting occurs with CC-486, take 1 tab (8 mg) 30 minutes prior to each subsequent CC-486 dose. 30 tablet 11     oxyCODONE (OXY-IR) 5 MG capsule Take 1-2 capsules (5-10 mg) by mouth every 6 hours as needed for severe pain 90 capsule 0     prochlorperazine (COMPAZINE) 10 MG tablet Take 1 tablet (10 mg) by mouth every 6 hours as needed (Nausea/Vomiting) 30 tablet 2     study CC-486 (IDS #5077) 100 mg TABS CHEMOTHERAPY tablet Take 1 tablet (100 mg) by mouth daily Schedule 1. Take for 21 days,  followed by 7 days off. Take at the same time each day on an empty stomach or with food (a light breakfast or meal of up to approximately 600 calories).  Swallow tablet whole with about 8oz of room temperature water. Do not take broken or cracked tablets. 21 tablet 0     study CC-486 (IDS #[id number]) 100 mg TABS CHEMOTHERAPY tablet Take 1 tablet (100 mg) by mouth daily Schedule 1. Take for 21 days, followed by 7 days off. Take at the same time each day on an empty stomach or with food (a light breakfast or meal of up to approximately 600 calories).  Swallow tablet whole with about 8oz of room temperature water. Do not take broken or cracked tablets. 21 tablet 0     study CC-486 (IDS #[id number]) 100 mg TABS CHEMOTHERAPY tablet Take 1 tablet (100 mg) by mouth daily Schedule 1. Take for 21 days, followed by 7 days off. Take at the same time each day on an empty stomach or with food (a light breakfast or meal of up to approximately 600 calories).  Swallow tablet whole with about 8oz of room temperature water. Do not take broken or cracked tablets. 21 tablet 0     study CC-486 (IDS #5077) 100 mg TABS CHEMOTHERAPY tablet Take 1 tablet (100 mg) by mouth daily Schedule 1. Take for 21 days, followed by 7 days off. Take at the same time each day on an empty stomach or with food (a light breakfast or meal of up to approximately 600 calories).  Swallow tablet whole with about 8oz of room temperature water. Do not take broken or cracked tablets. 21 tablet 0     traZODone (DESYREL) 50 MG tablet Take 0.5-1 tablets (25-50 mg) by mouth At Bedtime Take about 1 hours before bedtime 30 tablet 1         Allergies:        Allergies   Allergen Reactions     Gemfibrozil Muscle Pain (Myalgia)     Lovastatin      headaches     Penicillins Nausea and Vomiting        Social History:     Social History     Tobacco Use     Smoking status: Never Smoker     Smokeless tobacco: Never Used   Substance Use Topics     Alcohol use: Yes     Comment: occasional        History   Drug Use No         Family History:       Family History   Problem Relation Age of Onset     Hypertension Mother      Hypertension Father      Cerebrovascular Disease Father         polycythemia     Breast Cancer Maternal Aunt         older age         Physical Exam:     /83 (BP Location: Right arm, Patient Position: Sitting, Cuff Size: Adult Regular)   Pulse 122   Temp 98.2  F (36.8  C) (Oral)   Resp 16   Wt 64.5 kg (142 lb 4.8 oz)   SpO2 97%   BMI 23.68 kg/m    Body mass index is 23.68 kg/m .    General Appearance:  alert, no distress     Cardiovascular:           regular rate and rhythm, no gallops, rubs or murmurs                Respiratory:     lungs clear, no rales, rhonchi or wheezes, normal diaphragmatic excursion     Musculoskeletal:         extremities non tender and without edema     Skin:    no lesions or rashes      Neurological:   normal gait, no gross defects                Psychiatric:      appropriate mood and affect                               ABDOMEN:  Soft, mildly distended, nontender, no organomegaly.  Well-healed midline incision.               Assessment:     Di Evans is a 58 year old woman with a diagnosis of recurrent platinum resistant ovarian cancer.      A total of 40 minutes was spent with the patient, 30 minutes of which were spent in counseling the patient and/or treatment planning.        1.  Platinum-resistant ovarian cancer.   2.  On TRIO 0026 study.   3.  ECOG performance status of 0.   4.  Elevated TSH with normal T4.   5.  Hyponatremia   6.  Anemia HgB 9.0   7. Platinum resistant ovarian cancer  8. [id number] study     We did discuss to continue with the study TRIO 26.  She is here today to start cycle 4 with first  alone.  Her sodium is still a little low at 128 encouraged to increase oral slat intake. We did discuss to repeat paracentesis for ascites today and then as needed. The potassium is within normal range today.  She is still  having some mild anemia with a hemoglobin of 9.0.  Other labs are within normal limits.  The patient agrees with this plan.  They are very appreciative of their care.  We discussed to also now to try to go more outside to increase activity.  She agrees with this.  Again, all questions were answered.  She is very appreciative of her care.         Angelo Molina MD, MS    Department of Obstetrics and Gynecology   Division of Gynecologic Oncology   [facility]  Phone: [phone]      CC  Patient Care Team:  Sonja Davies MD as PCP - General (Family Practice)  Sonja Davies MD as Assigned PCP  SELF, REFERRED

## 2019-05-22 PROBLEM — I82.4Z1 ACUTE DEEP VEIN THROMBOSIS (DVT) OF DISTAL VEIN OF RIGHT LOWER EXTREMITY (H): Status: ACTIVE | Noted: 2019-01-01

## 2019-05-22 NOTE — LETTER
2019     RE: Di Evans  [address]  [place] [address]     Dear Colleague,    Thank you for referring your patient, Di Evans, to the [facility]. Please see a copy of my visit note below.                Follow Up Notes on Referred Patient    Date: 2019       Dr. Referred Self, MD  No address on file       RE: Di Evans  : 1960  ROMAN: 2019    Dear Dr. Referred Self:    Di Evans is a 58 year old woman with a diagnosis of recurrent platinum resistant ovarian cancer.    The patient presents today for followup.  She does have some abdominal distention again after paracentesis she had prior.  She is otherwise eating and drinking fairly well.  Has minimal nausea, no vomiting.  No change in urinary or bowel habits.  No vaginal bleeding or discharge.              Past Medical History:    Past Medical History:   Diagnosis Date     DVT (deep venous thrombosis) (H)      Hypertension      Ovarian cancer (H)      Pulmonary embolism (H)          Past Surgical History:    Past Surgical History:   Procedure Laterality Date     HYSTERECTOMY TOTAL ABDOMINAL, BILATERAL SALPINGO-OOPHORECTOMY, COMBINED Bilateral 2018    Procedure: COMBINED HYSTERECTOMY TOTAL ABDOMINAL, SALPINGO-OOPHORECTOMY;;  Surgeon: Jammie Dueñas MD;  Location: [facility] OR     LAPAROSCOPY DIAGNOSTIC (GYN) N/A 2019    Procedure: Diagnostic Laparoscopy With Biopsy;  Surgeon: Jammie Dueñas MD;  Location: [facility] OR     LAPAROTOMY, TUMOR DEBULKING, COMBINED Bilateral 2018    Procedure: COMBINED LAPAROTOMY, TUMOR DEBULKING;  Exploratory Laparotomy, Modified Radical Hysterectomy, Removal of Cervix, Bilateral Salpingo - Oophorectomy, Omentectomy, Bilateral Para Aortic and Left Pelvic Lymph Node Dissection, Tumor Debulking, Proctoscopy;  Surgeon: Jammie Dueñas MD;  Location: [facility] OR     REMOVE PORT PERITONEAL N/A 2019    Procedure: REMOVE PORT  PERITONEAL;  Surgeon: Chanel Rodriguez MD;  Location: [facility] OR     SURGICAL HISTORY OF -   1980    left ankle surgery     THROMBECTOMY LOWER EXTREMITY Right 04/2019         Health Maintenance Due   Topic Date Due     ZOSTER IMMUNIZATION (1 of 2) 06/20/2009     FIT  03/16/2015     PHQ-2  01/01/2019     PREVENTIVE CARE VISIT  06/06/2019       Current Medications:     Current Outpatient Medications   Medication Sig Dispense Refill     cetirizine (ZYRTEC) 10 MG tablet Take 10 mg by mouth daily        enoxaparin (LOVENOX) 60 MG/0.6ML syringe Inject 0.6 mLs (60 mg) Subcutaneous 2 times daily 60 Syringe 3     LORazepam (ATIVAN) 1 MG tablet Take 1 tablet (1 mg) by mouth every 6 hours as needed (Anxiety, Nausea/Vomiting or Sleep) 30 tablet 2     magnesium oxide (MAG-OX) 400 MG tablet Take 1 tablet (400 mg) by mouth daily 30 tablet 3     mometasone (NASONEX) 50 MCG/ACT nasal spray Spray 2 sprays into both nostrils daily       ondansetron (ZOFRAN) 8 MG tablet If vomiting occurs with CC-486, take 1 tab (8 mg) 30 minutes prior to each subsequent CC-486 dose. 30 tablet 11     potassium chloride ER (K-TAB) 20 MEQ CR tablet Take 1 tablet (20 mEq) by mouth daily 30 tablet 3     pravastatin (PRAVACHOL) 20 MG tablet Take 1 tablet (20 mg) by mouth every evening for cholesterol. 90 tablet 1     prochlorperazine (COMPAZINE) 10 MG tablet Take 1 tablet (10 mg) by mouth every 6 hours as needed (Nausea/Vomiting) 30 tablet 2     senna-docusate (SENNA S) 8.6-50 MG tablet Take 4 tablets by mouth 2 times daily 250 tablet 3     study CC-486 (IDS #5077) 100 mg TABS CHEMOTHERAPY tablet Take 1 tablet (100 mg) by mouth daily Schedule 1. Take for 21 days, followed by 7 days off. Take at the same time each day on an empty stomach or with food (a light breakfast or meal of up to approximately 600 calories).  Swallow tablet whole with about 8oz of room temperature water. Do not take broken or cracked tablets. 21 tablet 0     acetaminophen (TYLENOL) 500  MG tablet Take 2 tablets (1,000 mg) by mouth every 8 hours as needed for mild pain (Patient not taking: Reported on 5/22/2019) 180 tablet 3     aspirin 81 MG EC tablet Take 81 mg by mouth daily        meclizine (ANTIVERT) 25 MG tablet Take 1 tablet (25 mg) by mouth 3 times daily as needed for dizziness (Patient not taking: Reported on 5/22/2019) 30 tablet 0     oxyCODONE (OXY-IR) 5 MG capsule Take 1-2 capsules (5-10 mg) by mouth every 6 hours as needed for severe pain (Patient not taking: Reported on 5/22/2019) 90 capsule 0     traZODone (DESYREL) 50 MG tablet Take 0.5-1 tablets (25-50 mg) by mouth At Bedtime Take about 1 hours before bedtime (Patient not taking: Reported on 5/22/2019) 30 tablet 1         Allergies:    Allergies   Allergen Reactions     Gemfibrozil Muscle Pain (Myalgia)     Lovastatin      headaches     Penicillins Nausea and Vomiting        Social History:   Social History     Tobacco Use     Smoking status: Never Smoker     Smokeless tobacco: Never Used   Substance Use Topics     Alcohol use: Yes     Comment: occasional       History   Drug Use No       Family History:   Family History   Problem Relation Age of Onset     Hypertension Mother      Hypertension Father      Cerebrovascular Disease Father         polycythemia     Breast Cancer Maternal Aunt         older age       Physical Exam:   /72 (BP Location: Left arm, Patient Position: Sitting, Cuff Size: Adult Regular)   Pulse 116   Temp 98.4  F (36.9  C) (Oral)   Resp 16   Wt 64.4 kg (141 lb 14.4 oz)   SpO2 100%   BMI 23.61 kg/m     Body mass index is 23.61 kg/m .    General Appearance:  alert, no distress     Cardiovascular:           regular rate and rhythm, no gallops, rubs or murmurs                Respiratory:     lungs clear, no rales, rhonchi or wheezes, normal diaphragmatic excursion     Musculoskeletal:         extremities non tender and without edema     Skin:    no lesions or rashes      Neurological:   normal gait, no  gross defects     Psychiatric:      appropriate mood and affect              ABDOMEN:  Soft, mildly distended, nontender, no organomegaly.  Well-healed midline incision.          Assessment:   Di Evans is a 58 year old woman with a diagnosis of recurrent platinum resistant ovarian cancer.      A total of 40 minutes was spent with the patient, 30 minutes of which were spent in counseling the patient and/or treatment planning.        1.  Platinum-resistant ovarian cancer.   2.  On TRIO 0026 study, cycle 4.   3.  ECOG performance status of 0.   4.  Elevated TSH with normal T4.   5.  Hyponatremia   6.  Anemia HgB  8.7  7. Platinum resistant ovarian cancer  8. [id number] study     We did discuss to continue with the study TRIO 26.  She is here today for cycle 4 and pembrolizumab.  Her sodium is still a little low at 127 encouraged to increase oral salt intake, we will also add 1l of NS today, [place] today at 93. We did discuss to repeat paracentesis for ascites today and then as needed. The potassium is within normal range today.  She is still having some mild anemia with a hemoglobin of 8.7, asymptomatic, garde 2 toxicity will transfuse if symptomatic or <7.  The patient agrees with this plan.  They are very appreciative of their care.  We discussed to also now to try to go more outside to increase activity.  She agrees with this.  Again, all questions were answered.  She is very appreciative of her care.         Angelo Molina MD, MS    Department of Obstetrics and Gynecology   Division of Gynecologic Oncology   [facility]  Phone: [phone]    CC  Patient Care Team:  Sonja Davies MD as PCP - General (Family Practice)  Sonja Davies MD as Assigned PCP  SELF, REFERRED

## 2019-05-22 NOTE — NURSING NOTE
Chief Complaint   Patient presents with     Blood Draw     labs drawn via PIV start by rn. vs taken      Labs drawn via PIV start by rn in lab. Flushed with saline. Vitals taken. Pt checked in for next appointment.   Elaine Kirkpatrick RN

## 2019-05-22 NOTE — NURSING NOTE
TRIO Study ([id number]): Study Visit Note     Patient here accompanied by mother for C4D8 in the TRIO Study ([id number]). Since the last study visit, patient has been doing about the same. Patient states that she is ready for next treatment. Denies any changes to concomitant medications and/or new adverse events. I have personally interviewed this patient and reviewed medical record for adverse events and concomitant medications and these have been recorded on the corresponding logs in patient's research file.     Patient was given the opportunity to ask any trial related questions. Please see Dr. Molina's progress note for physical exam and other clinical information. Labs were reviewed - any significant lab values were addressed and reviewed and patient is okay to continue study treatment at current dose. Patient okay to continue at current dose     Patient here for pembrolizumab infusion only. Patient escorted to infusion center for study treatment.     Asad Welsh RN     Pager: [phone]

## 2019-05-22 NOTE — PROGRESS NOTES
Infusion Nursing Note:  Di Evans presents today for cycle 4, day 8 pembrolizumab (IDS #5077), IVF  Patient seen by provider today: Yes: Dr Molina   present during visit today: Not Applicable.    Note: N/A.    Intravenous Access:  Peripheral IV placed in lab    Treatment Conditions:  Lab Results   Component Value Date    HGB 8.7 05/22/2019     Lab Results   Component Value Date    WBC 5.9 05/22/2019      Lab Results   Component Value Date    ANEU 4.8 05/22/2019     Lab Results   Component Value Date     05/22/2019      Lab Results   Component Value Date     05/22/2019                   Lab Results   Component Value Date    POTASSIUM 4.3 05/22/2019           Lab Results   Component Value Date    MAG 1.7 05/15/2019            Lab Results   Component Value Date    CR 0.46 05/22/2019                   Lab Results   Component Value Date    TRACEY 8.7 05/22/2019                Lab Results   Component Value Date    BILITOTAL 0.3 05/22/2019           Lab Results   Component Value Date    ALBUMIN 2.0 05/22/2019                    Lab Results   Component Value Date    ALT 17 05/22/2019           Lab Results   Component Value Date    AST 18 05/22/2019       Results reviewed, labs MET treatment parameters, ok to proceed with treatment.    5/22/2019 1230 ABDULAZIZ Johansen, RN Research Coordinator for Angelo Molina MD/Johnna Acuña RN  -give patient 1 liter NS with infusion today for sodium 127      Post Infusion Assessment:  Patient tolerated infusion without incident.  Blood return noted pre and post infusion.  Site patent and intact, free from redness, edema or discomfort.  No evidence of extravasations.  PIV flushed and left intact at the end of infusion for patient to return for paracentesis tomorrow.       Discharge Plan:   Prescription refills given for ativan.  Discharge instructions reviewed with: Patient.  Patient and/or family verbalized understanding of discharge instructions and  all questions answered.  AVS to patient via [employer].  Patient will return 6/12 for next infusion appointment.   Patient discharged in stable condition accompanied by: self.  Departure Mode: Ambulatory.  Face to Face time: 0.    Johnna Acuña RN

## 2019-05-22 NOTE — PATIENT INSTRUCTIONS
Contact Numbers    [facility]: [phone]  [facility] [place] and after hours / weekends / holidays:  [phone]      Please call the triage or after hours line if you experience a temperature greater than or equal to 100.5, shaking chills, have uncontrolled nausea, vomiting and/or diarrhea, dizziness, shortness of breath, chest pain, bleeding, unexplained bruising, or if you have any other new/concerning symptoms, questions or concerns.      If you are having any concerning symptoms or wish to speak to a provider before your next infusion visit, please call your care coordinator or triage to notify them so we can adequately serve you.     If you need a refill on a narcotic prescription or other medication, please call before your infusion appointment.             May 2019      Lazarus Monday Tuesday Wednesday Thursday Friday Saturday                  1    [facility]   9:45 AM   (15 min.)    [facility] DRAW   [facility] Lab Draw    [facility] RETURN  10:05 AM   (20 min.)   [facility]  11:00 AM   (60 min.)    ONCOLOGY INFUSION   [facility] 2     3     4       5     6     7     8    [facility] D[facility]  11:30 AM   (15 min.)    [facility] DRAW   [facility] Lab Draw    P PARACENTESIS  12:00 PM   (120 min.)   Provider, [facility] Specialty [facility] Procedure    US PARACENTESIS   1:35 PM   (60 min.)   USATC2   [facility] Ultrasound 9     10     11    TEAM SHORT   9:20 AM   (5 min.)   Destini Nieves MD   [facility]   12     13     14     15    [facility] DRAW  11:30 AM   (15 min.)    [facility] DRAW   [facility] Lab Draw    [facility] RETURN  11:45 AM   (20 min.)   [facility]ina [facility]   [facility]   2:00 PM   (120 min.)   Provider,  [facility] Specialty and Procedure    US PARACENTESIS   2:05 PM   (60 min.)   UCUSATC1   [facility] Ultrasound 16     17     18       19     20     21     22    [facility] DRAW  11:30 AM   (15 min.)   [facility] D[facility]   [facility] Draw    [facility] RETURN  11:45 AM   (20 min.)   Angelo Molina MD   [facility]    [facility] ONC INFUSION 60  12:30 PM   (60 min.)   [facility] ONCOLOGY INFUSION   [facility] 23    [facility] PARACENTESIS   9:30 AM   (120 min.)   Provider, Uc Spec Inf Para   [facility] Specialty and Procedure 24     25       26     27     28     29    LAB   2:00 PM   (10 min.)   LAB FIRST FLOOR [facility] 30 31 June 2019 Sunday Monday Tuesday Wednesday Thursday Friday Saturday                                 1       2     3    [facility] PARACENTESIS  12:00 PM   (120 min.)   Provider, Uc Spec Inf Para   [facility] Specialty and Procedure 4     5    LAB   2:00 PM   (10 min.)   LAB FIRST FLOOR [facility] 6     7     8       9     10     11     12    [facility] DRAW  10:45 AM   (15 min.)   [facility] DRAW   [facility] Draw    [facility] RETURN  11:05 AM   (20 min.)   [facility]   [facility]  12:30 PM   (60 min.)   [facility] ONCOLOGY INFUSION   [facility] 13     14     15       16     17     18     19    LAB   2:00 PM   (10 min.)   LAB FIRST FLOOR [facility] 20  Happy Birthday!     21     22       23     24     25     26    CT CHEST/ABDOMEN/PELVIS W  11:30 AM   (30 min.)   MGCT1   [facility]    LAB   2:00 PM   (10 min.)   LAB FIRST FLOOR [facility] 27     28     29       30                                                  Recent Results (from the past 24 hour(s))   TSH     Collection Time: 05/22/19 12:01 PM   Result Value Ref Range    TSH 2.60 0.40 - 4.00 mU/L   TSH with free T4 reflex    Collection Time: 05/22/19 12:01 PM   Result Value Ref Range    TSH 2.60 0.40 - 4.00 mU/L   CBC with platelets differential    Collection Time: 05/22/19 12:01 PM   Result Value Ref Range    WBC 5.9 4.0 - 11.0 10e9/L    RBC Count 3.75 (L) 3.8 - 5.2 10e12/L    Hemoglobin 8.7 (L) 11.7 - 15.7 g/dL    Hematocrit 28.9 (L) 35.0 - 47.0 %    MCV 77 (L) 78 - 100 fl    MCH 23.2 (L) 26.5 - 33.0 pg    MCHC 30.1 (L) 31.5 - 36.5 g/dL    RDW 17.8 (H) 10.0 - 15.0 %    Platelet Count 538 (H) 150 - 450 10e9/L    Diff Method Automated Method     % Neutrophils 82.5 %    % Lymphocytes 8.0 %    % Monocytes 8.2 %    % Eosinophils 0.3 %    % Basophils 0.5 %    % Immature Granulocytes 0.5 %    Nucleated RBCs 0 0 /100    Absolute Neutrophil 4.8 1.6 - 8.3 10e9/L    Absolute Lymphocytes 0.5 (L) 0.8 - 5.3 10e9/L    Absolute Monocytes 0.5 0.0 - 1.3 10e9/L    Absolute Eosinophils 0.0 0.0 - 0.7 10e9/L    Absolute Basophils 0.0 0.0 - 0.2 10e9/L    Abs Immature Granulocytes 0.0 0 - 0.4 10e9/L    Absolute Nucleated RBC 0.0    Comprehensive metabolic panel    Collection Time: 05/22/19 12:01 PM   Result Value Ref Range    Sodium 127 (L) 133 - 144 mmol/L    Potassium 4.3 3.4 - 5.3 mmol/L    Chloride 93 (L) 94 - 109 mmol/L    Carbon Dioxide 26 20 - 32 mmol/L    Anion Gap 9 3 - 14 mmol/L    Glucose 111 (H) 70 - 99 mg/dL    Urea Nitrogen 9 7 - 30 mg/dL    Creatinine 0.46 (L) 0.52 - 1.04 mg/dL    GFR Estimate >90 >60 mL/min/[1.73_m2]    GFR Estimate If Black >90 >60 mL/min/[1.73_m2]    Calcium 8.7 8.5 - 10.1 mg/dL    Bilirubin Total 0.3 0.2 - 1.3 mg/dL    Albumin 2.0 (L) 3.4 - 5.0 g/dL    Protein Total 6.4 (L) 6.8 - 8.8 g/dL    Alkaline Phosphatase 107 40 - 150 U/L    ALT 17 0 - 50 U/L    AST 18 0 - 45 U/L

## 2019-05-22 NOTE — PATIENT INSTRUCTIONS
Breast Cancer Screening: During our visit today, we discussed that it is recommended you receive breast cancer screening. Please call or make an appointment with your primary care provider to discuss this with them. You may also call the [facility] scheduling line ([phone]) to set up a mammography appointment at the Breast Center within the [facility].

## 2019-05-22 NOTE — NURSING NOTE
"Oncology Rooming Note    May 22, 2019 12:09 PM   Di Evans is a 58 year old female who presents for:    Chief Complaint   Patient presents with     Oncology Clinic Visit     Return Ovarian Ca     Initial Vitals: /72 (BP Location: Left arm, Patient Position: Sitting, Cuff Size: Adult Regular)   Pulse 116   Temp 98.4  F (36.9  C) (Oral)   Resp 16   Wt 64.4 kg (141 lb 14.4 oz)   SpO2 100%   BMI 23.61 kg/m   Estimated body mass index is 23.61 kg/m  as calculated from the following:    Height as of 4/22/19: 1.651 m (5' 5\").    Weight as of this encounter: 64.4 kg (141 lb 14.4 oz). Body surface area is 1.72 meters squared.  No Pain (0) Comment: Data Unavailable   No LMP recorded. Patient has had a hysterectomy.  Allergies reviewed: Yes  Medications reviewed: Yes    Medications: MEDICATION REFILLS NEEDED TODAY. Provider was notified.  Pharmacy name entered into [facility]:    [facility] - [place], [place] - [address]  [facility] [address] [place] [address] [id number] [facility]  [address]    Clinical concerns: Refill on Ativan;        Judy Montes CMA              "

## 2019-05-23 NOTE — PROGRESS NOTES
"Paracentesis Nursing Note  Di Evans [facility].    During today's appointment orders from Dr. Molina were completed.    Progress Note:  Patient identification verified by name and date of birth.  Assessment completed.  Vitals monitored throughout appointment and recorded in Doc Flowsheets.  See proceduralist note in ultrasound.    Vascular Access: patient arrived with PIV placed yesterday. Not needed for procedure. IV discontinued.  Labs: were not ordered for this appointment.    Date of consent or authorization: 3/11/2019.  Invasive Procedure Safety Checklist was completed and sent for scanning.     Paracentesis performed by Maikol Light PA-C Radiology.    The following labs were communicated to provider performing paracentesis:  Lab Results   Component Value Date     05/22/2019       Total amount of ascites fluid drained: 2.6 liters.  Color of ascites fluid: light yellow, clear.  Total amount of albumin given: 0 grams per therapy plan.  Confirmed orders are correct with Dr. Molina.     Patient tolerated procedure well.    Post procedure,denies pain or discomfort post paracentesis.      Discharge Plan:  Discharge instructions were reviewed with patient.  Patient/Representative verbalized understanding and all questions were answered.   [facility] in stable condition.    Kanu Amaro RN     Administrations This Visit     lidocaine (PF) (XYLOCAINE) 1 % injection 20 mL     Admin Date  05/23/2019 Action  Given by Other Dose  10 mL Route  Subcutaneous Administered By  Kanu Amaro RN              Vital signs:  Temp: 98.2  F (36.8  C) Temp src: Oral BP: 120/85 Pulse: 138   Resp: 16 SpO2: 100 %       Weight: 65.2 kg (143 lb 12.8 oz)  Estimated body mass index is 23.93 kg/m  as calculated from the following:    Height as of 4/22/19: 1.651 m (5' 5\").    Weight as of this encounter: " 65.2 kg (143 lb 12.8 oz).

## 2019-05-23 NOTE — PROGRESS NOTES
Follow Up Notes on Referred Patient    Date: 2019       Dr. Referred Self, MD  No address on file       RE: Di Evans  : 1960  ROMAN: 2019    Dear Dr. Referred Self:    Di Evans is a 58 year old woman with a diagnosis of recurrent platinum resistant ovarian cancer.    The patient presents today for followup.  She does have some abdominal distention again after paracentesis she had prior.  She is otherwise eating and drinking fairly well.  Has minimal nausea, no vomiting.  No change in urinary or bowel habits.  No vaginal bleeding or discharge.              Past Medical History:    Past Medical History:   Diagnosis Date     DVT (deep venous thrombosis) (H)      Hypertension      Ovarian cancer (H)      Pulmonary embolism (H)          Past Surgical History:    Past Surgical History:   Procedure Laterality Date     HYSTERECTOMY TOTAL ABDOMINAL, BILATERAL SALPINGO-OOPHORECTOMY, COMBINED Bilateral 2018    Procedure: COMBINED HYSTERECTOMY TOTAL ABDOMINAL, SALPINGO-OOPHORECTOMY;;  Surgeon: Jammie Dueñas MD;  Location: [facility] OR     LAPAROSCOPY DIAGNOSTIC (GYN) N/A 2019    Procedure: Diagnostic Laparoscopy With Biopsy;  Surgeon: Jammie Dueñas MD;  Location: [facility] OR     LAPAROTOMY, TUMOR DEBULKING, COMBINED Bilateral 2018    Procedure: COMBINED LAPAROTOMY, TUMOR DEBULKING;  Exploratory Laparotomy, Modified Radical Hysterectomy, Removal of Cervix, Bilateral Salpingo - Oophorectomy, Omentectomy, Bilateral Para Aortic and Left Pelvic Lymph Node Dissection, Tumor Debulking, Proctoscopy;  Surgeon: Jammie Dueñas MD;  Location: [facility] OR     REMOVE PORT PERITONEAL N/A 2019    Procedure: REMOVE PORT PERITONEAL;  Surgeon: Chanel Rodriguez MD;  Location: [facility] OR     SURGICAL HISTORY OF -       left ankle surgery     THROMBECTOMY LOWER EXTREMITY Right 2019         Health Maintenance Due   Topic Date Due     ZOSTER IMMUNIZATION (1 of 2)  06/20/2009     FIT  03/16/2015     PHQ-2  01/01/2019     PREVENTIVE CARE VISIT  06/06/2019       Current Medications:     Current Outpatient Medications   Medication Sig Dispense Refill     cetirizine (ZYRTEC) 10 MG tablet Take 10 mg by mouth daily        enoxaparin (LOVENOX) 60 MG/0.6ML syringe Inject 0.6 mLs (60 mg) Subcutaneous 2 times daily 60 Syringe 3     LORazepam (ATIVAN) 1 MG tablet Take 1 tablet (1 mg) by mouth every 6 hours as needed (Anxiety, Nausea/Vomiting or Sleep) 30 tablet 2     magnesium oxide (MAG-OX) 400 MG tablet Take 1 tablet (400 mg) by mouth daily 30 tablet 3     mometasone (NASONEX) 50 MCG/ACT nasal spray Spray 2 sprays into both nostrils daily       ondansetron (ZOFRAN) 8 MG tablet If vomiting occurs with CC-486, take 1 tab (8 mg) 30 minutes prior to each subsequent CC-486 dose. 30 tablet 11     potassium chloride ER (K-TAB) 20 MEQ CR tablet Take 1 tablet (20 mEq) by mouth daily 30 tablet 3     pravastatin (PRAVACHOL) 20 MG tablet Take 1 tablet (20 mg) by mouth every evening for cholesterol. 90 tablet 1     prochlorperazine (COMPAZINE) 10 MG tablet Take 1 tablet (10 mg) by mouth every 6 hours as needed (Nausea/Vomiting) 30 tablet 2     senna-docusate (SENNA S) 8.6-50 MG tablet Take 4 tablets by mouth 2 times daily 250 tablet 3     study CC-486 (IDS #5077) 100 mg TABS CHEMOTHERAPY tablet Take 1 tablet (100 mg) by mouth daily Schedule 1. Take for 21 days, followed by 7 days off. Take at the same time each day on an empty stomach or with food (a light breakfast or meal of up to approximately 600 calories).  Swallow tablet whole with about 8oz of room temperature water. Do not take broken or cracked tablets. 21 tablet 0     acetaminophen (TYLENOL) 500 MG tablet Take 2 tablets (1,000 mg) by mouth every 8 hours as needed for mild pain (Patient not taking: Reported on 5/22/2019) 180 tablet 3     aspirin 81 MG EC tablet Take 81 mg by mouth daily        meclizine (ANTIVERT) 25 MG tablet Take 1  tablet (25 mg) by mouth 3 times daily as needed for dizziness (Patient not taking: Reported on 5/22/2019) 30 tablet 0     oxyCODONE (OXY-IR) 5 MG capsule Take 1-2 capsules (5-10 mg) by mouth every 6 hours as needed for severe pain (Patient not taking: Reported on 5/22/2019) 90 capsule 0     traZODone (DESYREL) 50 MG tablet Take 0.5-1 tablets (25-50 mg) by mouth At Bedtime Take about 1 hours before bedtime (Patient not taking: Reported on 5/22/2019) 30 tablet 1         Allergies:        Allergies   Allergen Reactions     Gemfibrozil Muscle Pain (Myalgia)     Lovastatin      headaches     Penicillins Nausea and Vomiting        Social History:     Social History     Tobacco Use     Smoking status: Never Smoker     Smokeless tobacco: Never Used   Substance Use Topics     Alcohol use: Yes     Comment: occasional       History   Drug Use No         Family History:       Family History   Problem Relation Age of Onset     Hypertension Mother      Hypertension Father      Cerebrovascular Disease Father         polycythemia     Breast Cancer Maternal Aunt         older age         Physical Exam:     /72 (BP Location: Left arm, Patient Position: Sitting, Cuff Size: Adult Regular)   Pulse 116   Temp 98.4  F (36.9  C) (Oral)   Resp 16   Wt 64.4 kg (141 lb 14.4 oz)   SpO2 100%   BMI 23.61 kg/m    Body mass index is 23.61 kg/m .    General Appearance:  alert, no distress     Cardiovascular:           regular rate and rhythm, no gallops, rubs or murmurs                Respiratory:     lungs clear, no rales, rhonchi or wheezes, normal diaphragmatic excursion     Musculoskeletal:         extremities non tender and without edema     Skin:    no lesions or rashes      Neurological:   normal gait, no gross defects     Psychiatric:      appropriate mood and affect              ABDOMEN:  Soft, mildly distended, nontender, no organomegaly.  Well-healed midline incision.               Assessment:     Di Evans is a 58  year old woman with a diagnosis of recurrent platinum resistant ovarian cancer.      A total of 40 minutes was spent with the patient, 30 minutes of which were spent in counseling the patient and/or treatment planning.        1.  Platinum-resistant ovarian cancer.   2.  On TRIO 0026 study, cycle 4.   3.  ECOG performance status of 0.   4.  Elevated TSH with normal T4.   5.  Hyponatremia   6.  Anemia HgB  8.7  7. Platinum resistant ovarian cancer  8. BOPT590 study     We did discuss to continue with the study TRIO 26.  She is here today for cycle 4 and pembrolizumab.  Her sodium is still a little low at 127 encouraged to increase oral salt intake, we will also add 1l of NS today, [place] today at 93. We did discuss to repeat paracentesis for ascites today and then as needed. The potassium is within normal range today.  She is still having some mild anemia with a hemoglobin of 8.7, asymptomatic, garde 2 toxicity will transfuse if symptomatic or <7.  The patient agrees with this plan.  They are very appreciative of their care.  We discussed to also now to try to go more outside to increase activity.  She agrees with this.  Again, all questions were answered.  She is very appreciative of her care.         Angelo Molina MD, MS    Department of Obstetrics and Gynecology   Division of Gynecologic Oncology   [facility]  Phone: [phone]          CC  Patient Care Team:  Sonja Davies MD as PCP - General (Family Practice)  Sonja Davies MD as Assigned PCP  SELF, REFERRED

## 2019-05-30 NOTE — PROGRESS NOTES
"Paracentesis Nursing Note  Di Evans [facility].    During today's appointment orders from Dr. Molina were completed.    Progress Note:  Patient identification verified by name and date of birth.  Assessment completed.  Vitals monitored throughout appointment and recorded in Doc Flowsheets.  See proceduralist note in ultrasound.    Labs: were not ordered for this appointment.    Date of consent or authorization: 3/11/2019.  Invasive Procedure Safety Checklist was completed and sent for scanning.     Paracentesis performed by Maikol Light PA-C Radiology.    The following labs were communicated to provider performing paracentesis:  Lab Results   Component Value Date     05/22/2019       Total amount of ascites fluid drained: 2.5 liters.  Color of ascites fluid: light yellow, clear.  Total amount of albumin given: 0 grams per therapy plan.    Patient tolerated procedure well.    Post procedure,denies pain or discomfort post paracentesis.      Discharge Plan:  Discharge instructions were reviewed with patient.  Patient/Representative verbalized understanding and all questions were answered.   [facility] in stable condition.    Danii Diana RN  Administrations This Visit     lidocaine (PF) (XYLOCAINE) 1 % injection 20 mL     Admin Date  05/30/2019 Action  Given by Other Clinician Dose  10 mL Route  Subcutaneous Administered By  Danii Diana RN                 Vital signs:  Temp: 98.1  F (36.7  C) Temp src: Oral BP: 129/87 Pulse: 121   Resp: 16 SpO2: 100 %       Weight: 63.6 kg (140 lb 4.8 oz)  Estimated body mass index is 23.35 kg/m  as calculated from the following:    Height as of 4/22/19: 1.651 m (5' 5\").    Weight as of this encounter: 63.6 kg (140 lb 4.8 oz).        "

## 2019-06-03 NOTE — ANESTHESIA PREPROCEDURE EVALUATION
Anesthesia Pre-Procedure Evaluation    Patient: Di Evans   MRN:     [id number] Gender:   female   Age:    58 year old :      1960        Preoperative Diagnosis: Ovarian Cancer   Procedure(s):  Chest Port Placement, Single Lumen     Past Medical History:   Diagnosis Date     DVT (deep venous thrombosis) (H)      Hypertension      Ovarian cancer (H)      Pulmonary embolism (H)       Past Surgical History:   Procedure Laterality Date     HYSTERECTOMY TOTAL ABDOMINAL, BILATERAL SALPINGO-OOPHORECTOMY, COMBINED Bilateral 2018    Procedure: COMBINED HYSTERECTOMY TOTAL ABDOMINAL, SALPINGO-OOPHORECTOMY;;  Surgeon: Jammie Dueñas MD;  Location: [facility] OR     LAPAROSCOPY DIAGNOSTIC (GYN) N/A 2019    Procedure: Diagnostic Laparoscopy With Biopsy;  Surgeon: Jammie Dueñas MD;  Location: [facility] OR     LAPAROTOMY, TUMOR DEBULKING, COMBINED Bilateral 2018    Procedure: COMBINED LAPAROTOMY, TUMOR DEBULKING;  Exploratory Laparotomy, Modified Radical Hysterectomy, Removal of Cervix, Bilateral Salpingo - Oophorectomy, Omentectomy, Bilateral Para Aortic and Left Pelvic Lymph Node Dissection, Tumor Debulking, Proctoscopy;  Surgeon: Jammie Dueñas MD;  Location: [facility] OR     REMOVE PORT PERITONEAL N/A 2019    Procedure: REMOVE PORT PERITONEAL;  Surgeon: Chanel Rodriguez MD;  Location:  OR     SURGICAL HISTORY OF -   1980    left ankle surgery     THROMBECTOMY LOWER EXTREMITY Right 2019          Anesthesia Evaluation     .             ROS/MED HX    ENT/Pulmonary:  - neg pulmonary ROS     Neurologic:  - neg neurologic ROS     Cardiovascular:  - neg cardiovascular ROS   (+) Dyslipidemia, hypertension----. : . . . :. .       METS/Exercise Tolerance:     Hematologic:  - neg hematologic  ROS   (+) History of blood clots -      Musculoskeletal:  - neg musculoskeletal ROS       GI/Hepatic:  - neg GI/hepatic ROS       Renal/Genitourinary:  - ROS Renal section negative       Endo:  -  "neg endo ROS       Psychiatric:  - neg psychiatric ROS       Infectious Disease:  - neg infectious disease ROS       Malignancy:   (+) Malignancy History of Other  Other CA ovarian Active status post      - no malignancy   Other:    - neg other ROS                     PHYSICAL EXAM:   Mental Status/Neuro: A/A/O   Airway: Facies: Feasible  Mallampati: I  Mouth/Opening: Full  TM distance: > 6 cm  Neck ROM: Full   Respiratory: Auscultation: CTAB     Resp. Rate: Normal     Resp. Effort: Normal      CV: Rhythm: Regular  Rate: Age appropriate  Heart: Normal Sounds   Comments:      Dental: Normal                  Lab Results   Component Value Date    WBC 6.0 05/29/2019    HGB 9.2 (L) 05/29/2019    HCT 30.6 (L) 05/29/2019     (H) 05/29/2019     (L) 05/22/2019    POTASSIUM 4.3 05/22/2019    CHLORIDE 93 (L) 05/22/2019    CO2 26 05/22/2019    BUN 9 05/22/2019    CR 0.46 (L) 05/22/2019     (H) 05/22/2019    TRACEY 8.7 05/22/2019    MAG 1.7 05/15/2019    ALBUMIN 2.0 (L) 05/22/2019    PROTTOTAL 6.4 (L) 05/22/2019    ALT 17 05/22/2019    AST 18 05/22/2019    ALKPHOS 107 05/22/2019    BILITOTAL 0.3 05/22/2019    PTT 33 02/15/2019    INR 1.32 (H) 06/03/2019    TSH 2.80 05/29/2019    T4 1.56 (H) 04/10/2019       Preop Vitals  BP Readings from Last 3 Encounters:   06/03/19 118/90   05/30/19 116/82   05/23/19 116/80    Pulse Readings from Last 3 Encounters:   06/03/19 137   05/30/19 110   05/23/19 123      Resp Readings from Last 3 Encounters:   06/03/19 16   05/30/19 16   05/23/19 16    SpO2 Readings from Last 3 Encounters:   06/03/19 99%   05/30/19 100%   05/23/19 100%      Temp Readings from Last 1 Encounters:   06/03/19 36.5  C (97.7  F)    Ht Readings from Last 1 Encounters:   06/03/19 1.651 m (5' 5\")      Wt Readings from Last 1 Encounters:   06/03/19 62.6 kg (138 lb)    Estimated body mass index is 22.96 kg/m  as calculated from the following:    Height as of this encounter: 1.651 m (5' 5\").    Weight as of this " encounter: 62.6 kg (138 lb).     LDA:  Peripheral IV 06/03/19 Left Hand (Active)   Site Assessment WDL 6/3/2019  6:53 AM   Line Status Infusing 6/3/2019  6:53 AM   Phlebitis Scale 0-->no symptoms 6/3/2019  6:53 AM   Infiltration Scale 0 6/3/2019  6:53 AM   Number of days: 0            Assessment:   ASA SCORE: 3    NPO Status: > 6 hours since completed Solid Foods   Documentation: H&P complete; Preop Testing complete; Consents complete   Proceeding: Proceed without further delay  Tobacco Use:  NO Active use of Tobacco/UNKNOWN Tobacco use status     Plan:   Anes. Type:  MAC   Pre-Induction: Midazolam IV   Induction:  IV (Standard)   Airway: Native Airway   Access/Monitoring: PIV   Maintenance: Propofol; IV   Emergence: Procedure Site   Logistics: Same Day Surgery     Postop Pain/Sedation Strategy:  Standard-Options: Opioids PRN     PONV Management:  Adult Risk Factors: Female, Non-Smoker, Postop Opioids  Prevention: Propofol Infusion; Ondansetron     CONSENT: Direct conversation   Plan and risks discussed with: Patient   Blood Products: Consent Deferred (Minimal Blood Loss)                         Clinton Elmore MD

## 2019-06-03 NOTE — ANESTHESIA POSTPROCEDURE EVALUATION
Anesthesia POST Procedure Evaluation    Patient: Di Evans   MRN:     [id number] Gender:   female   Age:    58 year old :      1960        Preoperative Diagnosis: Ovarian Cancer   Procedure(s):  Chest Port Placement, Single Lumen   Postop Comments: No value filed.       Anesthesia Type:  MAC  No value filed.    Reportable Event: NO     PAIN: Uncomplicated   Sign Out status: Comfortable, Well controlled pain     PONV: No PONV   Sign Out status:  No Nausea or Vomiting     Neuro/Psych: Uneventful perioperative course   Sign Out Status: Preoperative baseline; Age appropriate mentation     Airway/Resp.: Uneventful perioperative course   Sign Out Status: Non labored breathing, age appropriate RR; Resp. Status within EXPECTED Parameters     CV: Uneventful perioperative course   Sign Out status: Appropriate BP and perfusion indices; Appropriate HR/Rhythm     Disposition:   Sign Out in:  PACU  Disposition:  Phase II; Home  Recovery Course: Uneventful  Follow-Up: Not required           Last Anesthesia Record Vitals:  CRNA VITALS  6/3/2019 0804 - 6/3/2019 0904      6/3/2019             Pulse:  93    SpO2:  100 %          Last PACU Vitals:  Vitals Value Taken Time   BP     Temp     Pulse     Resp     SpO2     Temp src     NIBP 99/70 6/3/2019  8:30 AM   Pulse 93 6/3/2019  8:33 AM   SpO2 100 % 6/3/2019  8:33 AM   Resp     Temp     Ht Rate 94 6/3/2019  8:32 AM   Temp 2           Electronically Signed By: Clinton Elmore MD, Karina 3, 2019, 2:35 PM

## 2019-06-03 NOTE — DISCHARGE INSTRUCTIONS
A collaboration between [facility] and [facility]  Experts in minimally invasive, targeted treatments performed using imaging guidance    Venous Access Device,  Port Catheter or Tunneled or Non-Tunneled Central Line Placement    Today you had a procedure today to install a venous access device; either a tunneled central vein catheter or a subcutaneous port catheter.    One of our Radiology PAs performed this procedure for you today:  ? Moo Richey PA-C  ? FRANSISCA Contreras PA-C  ? Talat Thayer PA-C  ? Inga Werner PA-C   ? Maikol Light PA-C    After you go home:  - Drink plenty of fluids.  Generally 6-8 (8 ounce) glasses a day is recommended.  - Resume your regular diet unless otherwise ordered by a medical provider.  - Keep any applied tape/gauze dressings clean and dry.  Change tape/gauze dressings if they get wet or soiled.  - You may shower the following day after procedure, however cover and protect from moisture any tape/gauze dressings.  You may let water hit and run over dried skin glue, but do not scrub.  Pat the area dry after showering.  - Port placement incisions are closed with absorbable suture, meaning they do not need to be removed at a later date, and a topical skin adhesive (skin glue).  This glue will wear off in 7-14 days.  Do not remove before this time.  If 14 days have passed and residual glue is present, you may gently remove it.  - Do not apply gels, lotions, or ointments to the glue site for the first 10 days as this may cause the glue to prematurely soften and fail.  - Do not perform strenuous activities or lift greater than 10 pounds for the next three days.  - If there is bleeding or oozing from the procedure site, apply firm pressure to the area for 5-10 minutes.  If the bleeding continues seek medical advice at the numbers below.  - Mild procedure site discomfort can be treated with an ice pack and over-the-counter pain  relievers.        For 24 hours after any sedation used:  - Relax and take it easy.  No strenuous activities.  - Do not drive or operate machines at home or at work.  - No alcohol consumption.  - Do not make any important or legal decisions.    Call our Interventional Radiology (IR) service if:  - If you start bleeding from the procedure site.  If you do start to bleed from the site, lie down and hold some pressure on the site.  Our radiology provider can help you decide if you need to return to the hospital.  - If you have new or worsening pain related to the procedure.  - If you have concerning swelling at the procedure site.  - If you develop persistent nausea or vomiting.  - If you develop hives or a rash or any unexplained itching.  - If you have a fever of greater than 100.5  F and chills in the first 5 days after procedure.  - Any other concerns related to your procedure.      [facility]  Interventional Radiology (IR)  [address] [place] [address]    Contact Number:  [phone]  (IR control desk)  - Monday - Friday 8:00 am - 4:30 pm    After hours for urgent concerns:  [phone]  - After 4:30 pm Monday - Friday, Weekends and Holidays.   - Ask for Interventional Radiology on-call.  Someone is available 24 hours a day.  - [facility] toll free number:  [phone]        [facility]  Home Care Following Anesthesia  For 24 hours after surgery:  1. Get plenty of rest.  A responsible adult must stay with you for at least 24 hours after you leave the surgery center.  2. Do not drive or use heavy equipment.  If you have weakness or tingling, don't drive or use heavy equipment until this feeling goes away.   3. Do not drink alcohol.   4. Avoid strenuous or risky activities.  Ask for help when climbing stairs.  5. You may feel lightheaded.  IF so, sit for a few minutes before standing.  Have someone help you get  up.   6. If you have nausea (feel sick to your stomach): Drink only clear liquids such as apple juice, ginger ale, broth or 7-Up.  Rest may also help.  Be sure to drink enough fluids.  Move to a regular diet as you feel able.   7. You may have a slight fever.  Call the doctor if your fever is over 100 F (37.7 C) (taken under the tongue) or lasts longer than 24 hours.  8. You may have a dry mouth, a sore throat, muscle aches or trouble sleeping. These should go away after 24 hours.  9. Do not make important or legal decisions.               Tips for taking pain medications  To get the best pain relief possible, remember these points:    Take pain medications as directed, before pain becomes severe.    Pain medication can upset your stomach: taking it with food may help.    Constipation is a common side effect of pain medication. Drink plenty of  fluids.    Eat foods high in fiber. Take a stool softener if recommended by your doctor or pharmacist.    Do not drink alcohol, drive or operate machinery while taking pain medications.    Ask about other ways to control pain, such as with heat, ice or relaxation.    Tylenol/Acetaminophen Consumption  To help encourage the safe use of acetaminophen, the makers of TYLENOL  have lowered the maximum daily dose for single-ingredient Extra Strength TYLENOL  (acetaminophen) products sold in the [place] from 8 pills per day (4,000 mg) to 6 pills per day (3,000 mg). The dosing interval has also changed from 2 pills every 4-6 hours to 2 pills every 6 hours.    If you feel your pain relief is insufficient, you may take Tylenol/Acetaminophen in addition to your narcotic pain medication.     Be careful not to exceed 3,000 mg of Tylenol/Acetaminophen in a 24 hour period from all sources.    If you are taking extra strength Tylenol/acetaminophen (500 mg), the maximum dose is 6 tablets in 24 hours.    If you are taking regular strength acetaminophen (325 mg), the maximum dose is 9 tablets in  24 hours.    Call a doctor for any of the followin. Signs of infection (fever, growing tenderness at the surgery site, a large amount of drainage or bleeding, severe pain, foul-smelling drainage, redness, swelling).  2. It has been over 8 to 10 hours since surgery and you are still not able to urinate (pass water).  3. Headache for over 24 hours.  4. Numbness, tingling or weakness the day after surgery (if you had spinal anesthesia).  Your doctor is:  Talat Thayer PA-C                    Or dial [phone] and ask for the resident on call for:  Interventional Radiology  For emergency care, call the:  [facility]:  [phone] (TTY for hearing impaired: [phone])

## 2019-06-03 NOTE — PROCEDURES
Interventional Radiology Brief Post Procedure Note    Procedure:  IR CHEST PORT PLACEMENT > 5 YRS OF AGE [[id number]]    Proceduralist: Talat Thayer PA-C    Assistant: None    Time Out: Prior to the start of the procedure and with procedural staff participation, I verbally confirmed the patient s identity using two indicators, relevant allergies, that the procedure was appropriate and matched the consent or emergent situation, and that the correct equipment/implants were available. Immediately prior to starting the procedure I conducted the Time Out with the procedural staff and re-confirmed the patient s name, procedure, and site/side. (The Joint Commission universal protocol was followed.)  Yes    Sedation: Monitored Anesthesia Care (MAC) administered and documented by Anesthesia Care Provider    Findings: Completed image-guided placement of 6 [place]oth[place] 18.5 cm single lumen power-injectable central venous port via right IJ. Aspirates and flushes freely, heparin locked and is ready for immediate use.    Estimated Blood Loss: Less than 10 ml    Fluoroscopy Time: 0.2 minute(s)    Specimens: None    Complications: 1. None     Condition: Stable    Plan: Ready for immediate use. Follow-up per primary team. Return for removal as indicated.    Comments: See dictated procedure note for full details.    Talat Thayer PA-C  Interventional Radiology  [phone]

## 2019-06-03 NOTE — ANESTHESIA CARE TRANSFER NOTE
Patient: Di Evans    Procedure(s):  Chest Port Placement, Single Lumen    Diagnosis: Ovarian Cancer  Diagnosis Additional Information: No value filed.    Anesthesia Type:   No value filed.     Note:  Airway :Room Air  Patient transferred to:Phase II  Handoff Report: Identifed the Patient, Identified the Reponsible Provider, Reviewed the pertinent medical history, Discussed the surgical course, Reviewed Intra-OP anesthesia mangement and issues during anesthesia, Set expectations for post-procedure period and Allowed opportunity for questions and acknowledgement of understanding      Vitals: (Last set prior to Anesthesia Care Transfer)    CRNA VITALS  6/3/2019 0804 - 6/3/2019 0838      6/3/2019             Pulse:  93    SpO2:  100 %                Electronically Signed By: SULMA Marcus CRNA  Karina 3, 2019  8:38 AM

## 2019-06-06 PROBLEM — R53.83 FATIGUE: Status: ACTIVE | Noted: 2019-01-01

## 2019-06-06 NOTE — PLAN OF CARE
A: A&Ox3-4, disoriented to place intermittently but with reorientation improves. VSS, added 2L NC while sleeping for sating into 80's. Sinus Tach . Afebrile. Abdominal pain, pt declining pain medication, repositioning as needed. Abdomen remains distended. Denies nausea. NPO, possible para in AM. Pt aware and agrees. MIVF NS @ 100 mL/h via port. No new skin deficits noted. Up with assist x1 to commode. No BM overnight. Friends joined patient at bedside, supportive. Pt able to make needs known via call light.     I/O this shift:  In: 338.33 [I.V.:338.33]  Out: 125 [Urine:125]    Temp:  [98.9  F (37.2  C)] 98.9  F (37.2  C)  Pulse:  [113] 113  Heart Rate:  [113] 113  Resp:  [18] 18  BP: (124)/(71) 124/71  SpO2:  [88 %-99 %] 99 %     R: Continue with POC. Notify primary team with changes.

## 2019-06-06 NOTE — PLAN OF CARE
Admission          6/5/2019 11:58 PM  -----------------------------------------------------------  Reason for admission: Telemetry monitoring, AMS, closer monitoring  Primary team notified of pt arrival.  Admitted from: [facility]  Via: stretcher  Accompanied by: Solo  Belongings: No belongings brought with.  Admission Profile: complete  Teaching: orientation to unit and call light- call light within reach, call don't fall, use of console, meal times, when to call for the RN, and enforced importance of safety   Access:L PIV s/l'd and R port a cath accessed from previous hospital  Telemetry:Placed on pt  Ht./Wt.: weight to be completed still  Two RN skin assessment completed by: Mihaela RN & Bob RN, no skin deficits noted, old scar with pale complexion, slight edema in RLE.     Temp:  [98.9  F (37.2  C)] 98.9  F (37.2  C)  Pulse:  [113] 113  Heart Rate:  [113] 113  Resp:  [18] 18  BP: (124)/(71) 124/71  SpO2:  [94 %] 94 %

## 2019-06-06 NOTE — PROGRESS NOTES
Transfer  Transferred to: 7C  Via:bed  Reason for transfer:Pt no longer appropriate for 6B- improved patient condition  Family: Aware of transfer  Belongings: Packed and sent with pt  Chart: Delivered with pt to next unit  Medications: Meds sent to new unit with pt  Report given to: Amadou Mendoza RN   Pt status: Stable

## 2019-06-06 NOTE — PROGRESS NOTES
06/06/19 1320   Quick Adds   Type of Visit Initial PT Evaluation   Living Environment   Lives With friend(s)   Living Arrangements house   Home Accessibility stairs to enter home;stairs within home   Number of Stairs, Main Entrance 1   Stair Railings, Main Entrance none   Number of Stairs, Within Home, Primary 8   Stair Railings, Within Home, Primary railing on right side (ascending)   Transportation Anticipated family or friend will provide   Living Environment Comment pt lives with a family friend who works second shift, generally around during the day to assist as neeeded, also reports having another friend that can also assist intermittently    Self-Care   Usual Activity Tolerance moderate   Current Activity Tolerance moderate   Regular Exercise No   Equipment Currently Used at Home none   Activity/Exercise/Self-Care Comment pt IND at baseline but with progressive deconditioning over past year, currently able to walk to mailbox but unable to complete other activity    Functional Level Prior   Ambulation 0-->independent   Transferring 0-->independent   Toileting 0-->independent   Bathing 0-->independent   Fall history within last six months no   Which of the above functional risks had a recent onset or change? ambulation;cognition   Prior Functional Level Comment pt reports being IND at baseline although quite deconditioning    General Information   Onset of Illness/Injury or Date of Surgery - Date 06/05/19   Referring Physician Ruma Acevedo MD   Patient/Family Goals Statement to get better   Pertinent History of Current Problem (include personal factors and/or comorbidities that impact the POC) 58 year old with recurrent platinum resistant ovarian cancer who presented to the [facility] emergency department with increased confusion for the past day and weakness.    Precautions/Limitations fall precautions   Heart Disease Risk Factors Lack of physical activity   General Observations pt supine, flat  affect   General Info Comments activity orders: up ad estevan   Cognitive Status Examination   Orientation person;place   Level of Consciousness alert   Follows Commands and Answers Questions 50% of the time   Personal Safety and Judgment impaired   Memory impaired   Cognitive Comment pt with difficulty following multistep commands    Pain Assessment   Patient Currently in Pain No   Integumentary/Edema   Integumentary/Edema no deficits were identifed   Posture    Posture Forward head position;Protracted shoulders   Range of Motion (ROM)   ROM Quick Adds No deficits were identified   Strength   Strength Comments >3/5 per mobility    Bed Mobility   Bed Mobility Comments SBA For supine to sit    Transfer Skills   Transfer Comments SBA For STS    Gait   Gait Comments CGA for gait x 125ft x  2   Balance   Balance Comments reaching for IV Pole, external support with mobility    Sensory Examination   Sensory Perception Comments reports numbness on the bottom of B feet s/p chemo    General Therapy Interventions   Planned Therapy Interventions progressive activity/exercise;home program guidelines;risk factor education;transfer training;strengthening;bed mobility training;balance training;gait training   Clinical Impression   Criteria for Skilled Therapeutic Intervention yes, treatment indicated   PT Diagnosis impaired mobility    Influenced by the following impairments impaired cognition, balance    Functional limitations due to impairments impaired gait, transfers   Clinical Presentation Evolving/Changing   Clinical Presentation Rationale pt with new AMS, new decline in mobility    Clinical Decision Making (Complexity) Moderate complexity   Therapy Frequency`   (6x per week )   Predicted Duration of Therapy Intervention (days/wks) 1 week    Anticipated Discharge Disposition Home with Assist;Transitional Care Facility   Risk & Benefits of therapy have been explained Yes   Patient, Family & other staff in agreement with plan of  "care Yes   Clinical Impression Comments Pt currently with below baseline mobilty, may require TCU stay pending cog screen   [facility] AM-PAC TM \"6 Clicks\"   2016, Trustees of [facility], under license to [employer].  All rights reserved.   6 Clicks Short Forms Basic Mobility Inpatient Short Form   [facility] AM-PAC  \"6 Clicks\" V.2 Basic Mobility Inpatient Short Form   1. Turning from your back to your side while in a flat bed without using bedrails? 4 - None   2. Moving from lying on your back to sitting on the side of a flat bed without using bedrails? 4 - None   3. Moving to and from a bed to a chair (including a wheelchair)? 4 - None   4. Standing up from a chair using your arms (e.g., wheelchair, or bedside chair)? 4 - None   5. To walk in hospital room? 3 - A Little   6. Climbing 3-5 steps with a railing? 3 - A Little   Basic Mobility Raw Score (Score out of 24.Lower scores equate to lower levels of function) 22   Total Evaluation Time   Total Evaluation Time (Minutes) 6     "

## 2019-06-06 NOTE — DISCHARGE SUMMARY
Gynecologic Oncology Discharge Summary    Di Evans  [id number]    Admit Date: 6/5/2019  Discharge Date: 06/08/19  Admitting Provider: Nika Mayorga MD  Discharge Provider: Nika Mayorga MD    Admission Dx:   - Recurrent platinum resistant ovarian cancer  - Confusion  - Fatigue  - Tachycardia  - Malignant ascites  - Hypertension  - Hyperlipidemia  - History of DVT, PE    Discharge Dx:  - Worsening right lower extremity DVT  - Dural venous sinus thrombosis  - Tachycardia  - Otherwise, same as above    Patient Active Problem List   Diagnosis     CARDIOVASCULAR SCREENING; LDL GOAL LESS THAN 130     Essential hypertension with goal blood pressure less than 140/90     Hyperlipidemia LDL goal <130     Overweight     Changing skin lesion     Elevated TSH     Pelvic mass     S/P hysterectomy     Ovarian cancer, unspecified laterality (H)     Examination of participant or control in clinical research     Other ascites     Hyponatremia     Hypokalemia     Acute deep vein thrombosis (DVT) of distal vein of right lower extremity (H)     Fatigue     Thrombosis     Procedures:   Therapeutic paracentesis  MRI brain  CT venogram  Lower extremity dopplers    Prior to Admission Medications:  Medications Prior to Admission   Medication Sig Dispense Refill Last Dose     acetaminophen (TYLENOL) 500 MG tablet Take 2 tablets (1,000 mg) by mouth every 8 hours as needed for mild pain 180 tablet 3 Past Week at Unknown time     aspirin 81 MG EC tablet Take 81 mg by mouth daily    Past Month at Unknown time     cetirizine (ZYRTEC) 10 MG tablet Take 10 mg by mouth daily    Past Week at Unknown time     LORazepam (ATIVAN) 1 MG tablet Take 1 tablet (1 mg) by mouth every 6 hours as needed (Anxiety, Nausea/Vomiting or Sleep) 30 tablet 2 Past Month at Unknown time     magnesium oxide (MAG-OX) 400 MG tablet Take 1 tablet (400 mg) by mouth daily 30 tablet 3 Past Month at Unknown time     mometasone (NASONEX) 50 MCG/ACT nasal spray Spray 2  sprays into both nostrils daily   Past Month at Unknown time     ondansetron (ZOFRAN) 8 MG tablet If vomiting occurs with CC-486, take 1 tab (8 mg) 30 minutes prior to each subsequent CC-486 dose. 30 tablet 11 Past Month at Unknown time     potassium chloride ER (K-TAB) 20 MEQ CR tablet Take 1 tablet (20 mEq) by mouth daily 30 tablet 3 Past Week at Unknown time     pravastatin (PRAVACHOL) 20 MG tablet Take 1 tablet (20 mg) by mouth every evening for cholesterol. 90 tablet 1 Past Week at Unknown time     prochlorperazine (COMPAZINE) 10 MG tablet Take 1 tablet (10 mg) by mouth every 6 hours as needed (Nausea/Vomiting) 30 tablet 2 Past Month at Unknown time     senna-docusate (SENNA S) 8.6-50 MG tablet Take 4 tablets by mouth 2 times daily 250 tablet 3 Past Week at Unknown time     study CC-486 (IDS #5077) 100 mg TABS CHEMOTHERAPY tablet Take 1 tablet (100 mg) by mouth daily Schedule 1. Take for 21 days, followed by 7 days off. Take at the same time each day on an empty stomach or with food (a light breakfast or meal of up to approximately 600 calories).  Swallow tablet whole with about 8oz of room temperature water. Do not take broken or cracked tablets. 21 tablet 0 6/5/2019 at Unknown time     hydrochlorothiazide (MICROZIDE) 12.5 MG capsule Take 25 mg by mouth daily        Discharge Medications:  Current Discharge Medication List      START taking these medications    Details   mirtazapine (REMERON) 15 MG tablet Take 1 tablet (15 mg) by mouth At Bedtime  Qty: 60 tablet, Refills: 1    Associated Diagnoses: Ovarian cancer, unspecified laterality (H)         CONTINUE these medications which have CHANGED    Details   !! enoxaparin (LOVENOX) 60 MG/0.6ML syringe Inject 0.6 mLs (60 mg) Subcutaneous 2 times daily  Qty: 60 Syringe, Refills: 3    Associated Diagnoses: Other acute pulmonary embolism without acute cor pulmonale (H)      !! enoxaparin (LOVENOX) 80 MG/0.8ML syringe Inject 0.7 mLs (70 mg) Subcutaneous every 12  hours  Qty: 60 Syringe, Refills: 1    Associated Diagnoses: Acute deep vein thrombosis (DVT) of distal vein of right lower extremity (H)       !! - Potential duplicate medications found. Please discuss with provider.      CONTINUE these medications which have NOT CHANGED    Details   acetaminophen (TYLENOL) 500 MG tablet Take 2 tablets (1,000 mg) by mouth every 8 hours as needed for mild pain  Qty: 180 tablet, Refills: 3    Associated Diagnoses: Muscle pain      aspirin 81 MG EC tablet Take 81 mg by mouth daily       cetirizine (ZYRTEC) 10 MG tablet Take 10 mg by mouth daily       LORazepam (ATIVAN) 1 MG tablet Take 1 tablet (1 mg) by mouth every 6 hours as needed (Anxiety, Nausea/Vomiting or Sleep)  Qty: 30 tablet, Refills: 2    Associated Diagnoses: Examination of participant or control in clinical research; Ovarian cancer, unspecified laterality (H)      magnesium oxide (MAG-OX) 400 MG tablet Take 1 tablet (400 mg) by mouth daily  Qty: 30 tablet, Refills: 3    Associated Diagnoses: Leg cramps; Hypomagnesemia      mometasone (NASONEX) 50 MCG/ACT nasal spray Spray 2 sprays into both nostrils daily      ondansetron (ZOFRAN) 8 MG tablet If vomiting occurs with CC-486, take 1 tab (8 mg) 30 minutes prior to each subsequent CC-486 dose.  Qty: 30 tablet, Refills: 11    Associated Diagnoses: Examination of participant or control in clinical research; Ovarian cancer, unspecified laterality (H)      potassium chloride ER (K-TAB) 20 MEQ CR tablet Take 1 tablet (20 mEq) by mouth daily  Qty: 30 tablet, Refills: 3    Associated Diagnoses: Hypokalemia      pravastatin (PRAVACHOL) 20 MG tablet Take 1 tablet (20 mg) by mouth every evening for cholesterol.  Qty: 90 tablet, Refills: 1    Associated Diagnoses: Hyperlipidemia LDL goal <130      prochlorperazine (COMPAZINE) 10 MG tablet Take 1 tablet (10 mg) by mouth every 6 hours as needed (Nausea/Vomiting)  Qty: 30 tablet, Refills: 2    Associated Diagnoses: Examination of  participant or control in clinical research; Ovarian cancer, unspecified laterality (H)      senna-docusate (SENNA S) 8.6-50 MG tablet Take 4 tablets by mouth 2 times daily  Qty: 250 tablet, Refills: 3    Associated Diagnoses: Ovarian cancer, unspecified laterality (H)      study CC-486 (IDS #5077) 100 mg TABS CHEMOTHERAPY tablet Take 1 tablet (100 mg) by mouth daily Schedule 1. Take for 21 days, followed by 7 days off. Take at the same time each day on an empty stomach or with food (a light breakfast or meal of up to approximately 600 calories).  Swallow tablet whole with about 8oz of room temperature water. Do not take broken or cracked tablets.  Qty: 21 tablet, Refills: 0    Associated Diagnoses: Examination of participant or control in clinical research; Ovarian cancer, unspecified laterality (H)      hydrochlorothiazide (MICROZIDE) 12.5 MG capsule Take 25 mg by mouth daily           Consultations:  Internal medicine   Palliative care  Stroke neurology  Neuro interventional radiology  Hematology    Brief History of Illness:  Di Evans is a 58 year old with recurrent platinum resistant ovarian cancer who presented to the [place] emergency department with increased confusion for the past day and weakness. She has felt progressively more weak over the last few days and especially in the last day. She denies headache and changes in vision, fever, chills, chest pain, shortness of breath, and difficulty with urination. Her abdomen feels bloated and she has abdominal pain similar to what she experiences just before she undergoes therapeutic paracentesis, which she has weekly and was scheduled to undergo on day of presentation. She felt slightly better after receiving IV fluids in the emergency department.    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 7/30/18: XL, ISAIAS, BSO, omentectomy, R pelvic LND, bilat PALND, CUSA debulking, colon  mobilization, L pelvic peritoneum stripping, optimal debulking. 8/24-12/17/18: 6C carbo/taxol. 1/9/19: CT incr peritoneal nodularity, incr retroperitoneal/mesenteric adenopathy 2/17/19: dx lsc, peritoneal bx w/ poorly diff adenocarcinoma. 2/20/19: started TRIO study. 4/3/19: CT w/ mixed response, decr ascites and R paracolic gutter implants but incr L retroperitoneal implant and mesenteric LN. 4/9/19 bx retroperitoneal LN + for recurrence. 4/12/19: R LE DVT s/p thrombectomy. 5/22/19: C4D8 TRIO (CC-486, pembrolizumab). She will follow up as an outpatient for continued plan of care.   FEN:  - She was maintained on a regular diet was well as maintenance IVF due to hyponatremia. She received potassium replacement for hypokalemia.  By discharge, she was tolerating a regular diet without nausea and vomiting, her electrolytes were wnl and able to maintain her hydration without IVF supplementation.  Pain:   - She was given tylenol and oxycodone for pain.  Her pain was well controlled on this and she was discharged home with these medications.  CV:   - She had 8 beats of SVT in ED but was asymptomatic, monitored on telemetry with no further runs of SVT. She was initially tachycardic to the 130's on admission, however this improved to the low 100's with IVF bolus x2 in the ED. She was continued on her home hyperlipidemia. She has hypertension, but is on no medications for this. She had no acute CV issues while in house.     PULM:  - She had no acute pulmonary issues while in house and was able to maintain her O2 sats >94% on room air.   HEME:   - Hgb 9.1 on admission, dropped to 7.3 so received 1u pRBC. Hemoglobin was stable at 9.6 at the time of discharge.  - Patient has a history of PE and RLE DVT (s/p thrombectomy at 4/19) on therapeutic Lovenox. Lovenox was held x2 doses prior to paracentesis. RN noted worsening RLE swelling on HD#2 therefore bilateral LE dopplers were obtained which revealed worsening RLE DVT. No DVT  noted in LLE. Heparin XA level was obtained and noted to be subtherapeutic at 0.4. Hematology was consulted who recommended increasing lovenox to 70mg BID. They did not recommend repeat imaging or Xa level in the future unless the patient had symptoms. It was suspected that these clots may have occurred due to holding doses of lovenox for procedures, so it is recommended that she does not hold lovenox in the future for paracentesis procedures.   GI:   - She has baseline nausea and very limited oral intake. She was maintained on antiemetics prn. Constipation was treated with senna, miralax, dulcolax suppository PRN. CT showed no evidence of obstruction on admission. At the time of discharge, she was tolerating a regular diet without nausea and vomiting.  She will be discharged with a bowel regimen to prevent constipation. She had no acute GI issues while in house.  :    - Prior to discharge, the patient was voiding spontaneously without difficulty.  She had no acute  issues while in house.  ID:   - The patient presented with concern for possible sepsis given tachycardia with heart rate in the 130s. She was AF during her hospitalization. Tachycardia resolved with fluid resuscitation. Workup include normal WBC count and lactic acid. Urinanalysis and CXR negative. CT significant for ascites and peritoneal carcinomatosis. Received 1 dose vancomycin and cefepime in the ED, however this was discontinued due to no evidence of infection.  ENDO:   - No issues  PSYCH/NEURO:   -  Confusion per patient and family members. Head CT on admission was negative. Due to continued confusion, brain MRI was obtained which was notable for dural venous sinus thrombosis. Stroke neurology and Neuro IR were consulted who recommended CT venogram for further evaluation. Following imaging, the patient's symptoms resolved, so they did not recommend any further intervention.  PPX:    - SCD on left leg only due to recent DVT in right leg. She  was continued on therapeutic lovenox which was increased to 70mg BID. She tolerated these prophylactic interventions without incident.  They were discontinued at the time of her discharge.      Discharge Instructions and Follow up:  Ms. Di Evans was discharged from the hospital with follow up with Dr. Molina on 6/12/19.  A referral to Heme/Onc was also placed.    Discharge Diet: Regular  Discharge Activity: Activity as tolerated  Discharge Follow up: as above    Discharge Disposition:  Discharged to home    Discharge Staff: Nika Acevedo MD  OBGYN Resident PGY2  06/08/2019 11:22 AM

## 2019-06-06 NOTE — PLAN OF CARE
PT 6B: PT evaluation completed, treatment initiated.   Discharge Planner PT   Patient plan for discharge: home with assist   Current status: pt slightly confused, difficulty following multistep commands. SBA-CGA for transfers, CGA for gait x 150ft x2 with intermittent UE support on IV pole. Pt completes 8 stairs with B rails and CGA for safety.   Barriers to return to prior living situation: medical status, cognition, safety awareness   Recommendations for discharge: anticipate home with assist pending progress/ LOS  Rationale for recommendations: Pt reports having assist at home from friend during most of the day as roommate works second shift, pending progress and cognition clearance/screen, anticipate return home with assist and HHPT. If pt continues to demonstrate impaired safety/balance pt may benefit from TCU to progress IND and safety with mobility.       Entered by: Cindy Whalen 06/06/2019 4:21 PM

## 2019-06-06 NOTE — H&P
"Gynecology/Oncology H&P    CC: Fatigue, Confusion    HPI: Di Evans is a 58 year old with recurrent platinum resistant ovarian cancer who presented to the [facility] emergency department with increased confusion for the past day and weakness.     The emergency department provider at [facility] was concerned for possible sepsis given tachycardia with heart rate in the 130s. She was afebrile with a Tmax of 100.1. Workup included normal WBC count and lactic acid. Urinalysis and CXR negative. CT scan significant for ascites and peritoneal carcinomatosis. After receiving 2L of normal saline, patient reported she was starting to feel better, and her heart rate came down to the 120s, which is more similar to her baseline heat rate in the 110s. She was started on broad spectrum antibiotics with vancomycin and cefepime in the emergency department. Her care was discussed with Dr. Jacobsen who accepted transfer to [facility].    On arrival, Ms. Evans states she is feeling \"crummy.\" She does feel slightly better after receiving IV fluids in the emergency department. She has felt progressively more weak over the last few days and especially in the last day. She denies headache and changes in vision, fever, chills, chest pain, shortness of breath, and difficulty with urination. Her abdomen feels bloated and she has abdominal pain similar to what she experiences just before she undergoes therapeutic paracentesis, which she has weekly and was scheduled to undergo today. She has baseline nausea and very limited oral intake. Denies emesis. She has been constipated despite taking senna and miralax, and her last bowel movement was three days ago. Passed flatus most recently this afternoon. She has been ambulating but becomes fatigued quickly.    Treatment History:  7/2018: Six months of bloating, decreased appetite, early satiety.     7/16/18:  CT A/P:  Large cystic/solid mass within the pelvis highly suspicious for ovarian " malignancy. 2.  Omental thickening suspicious for metastatic disease. 3.  Large volume of ascites.  Malignant ascites cannot be excluded. 4.  Small left pleural effusion and left lower lobe atelectasis. 5.  Diverticulosis, small hiatal hernia, and gallbladder sludge.     7/24/18:  = 598     7/25/18:  CT Chest:  1. No definite metastatic disease in the chest. 2. Small left pleural effusion. 3. 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 Small amount of ascites, improved from the prior study. 2. Peritoneal nodularity and thickening, improved from the prior study. 3. Stable bilateral pulmonary nodules measuring up to 4 mm. No new or enlarging pulmonary nodules.     11/16/18:  Cycle #5 carboplatin AUC 6 and paclitaxel 175 mg/m2,  = 71     12/7/18:  Cycle #6 carboplatin AUC 6 and paclitaxel 175 mg/m2,  = 71     1/9/19:  CT C/A/P:  1. Findings suspicious for worsening intra-abdominal involvement by  ovarian malignancy with increased peritoneal nodularity and increased retroperitoneal and mesenteric adenopathy. Continued small ascites. 2. Unchanged tiny pulmonary nodules without evidence of malignancy in  the chest     2/7/19: Diagnostic laparoscopy and peritoneal biopsy- biopsy positive for poorly differentiated adenocarcinoma     2/20/19: C1D1 TRIO  orally x21 days started  2/27/19:C1D8 TRIO pembrolizumab  3/20/19: C2D1 TRIO  orally x21 days, pembrolizumab    3/25/19: Therapeutic paracentesis     4/3/19: CT C/A/P: Mixed response to treatment with decreased ascites/decreased right paracolic gutter implants but with increased left retroperitoneal implant and increased mesenteric lymph node.     4/9/19: Retroperitoneal biopsy - high grade carcinoma, consistent w/ recurrence of previously diagnosed ovarian cancer    4/10/19: C2D22 TRIO    4/12/19: Right lower extremity DVT s/p thrombectomy. Started therapeutic lovenox with plan for 3 months of therapy.    4/17/19: C3D1 TRIO    4/29/19: CT C/A/P 1. R-sided segmental pulmonary emboli without evidence of right heart strain. 2. Increased malignant ascites with stable associated peritoneal carcinomatosis. 3. Slightly increased size of upper abdominal, central mesenteric, and retroperitoneal lymph nodes and prominent prevascular and upper mediastinal lymph nodes. 4. Unchanged small pulmonary nodules.     5/8/19: Therapeutic paracentesis    5/11/19: C3D15 TRIO  5/15/19: C4D1 TRIO. Therapeutic paracentesis.  5/22/19: C4D8 TRIO    5/23/19: Therapeutic paracentesis.  5/30/19: Therapeutic paracentesis.  6/3/19: Port placed    ROS:  Gen: No fevers/chills  HEENT: no changes in vision  Neuro: No dizziness.  CV: No CP  Pulm: No SOB or cough  GI: No N/V.  No constipation/diarrhea  : No dysuria, frequency, urgency.  No bothersome vaginal discharge  Skin: No rashes or itching  MSK: No joint problems.  Endocrine: No DM  Allergy/Immunology: No autoimmune diseases  Psych:  No anxiety/depression    PMH:  Past Medical History:   Diagnosis Date     DVT (deep venous thrombosis) (H)      Hypertension      Ovarian cancer (H)      Pulmonary embolism (H)        PSHx:  Past Surgical History:   Procedure Laterality Date     HYSTERECTOMY TOTAL ABDOMINAL, BILATERAL SALPINGO-OOPHORECTOMY, COMBINED Bilateral 7/30/2018    Procedure: COMBINED HYSTERECTOMY TOTAL ABDOMINAL, SALPINGO-OOPHORECTOMY;;  Surgeon: Jammie Dueñas MD;  Location: [facility] OR     INSERT PORT VASCULAR ACCESS N/A 6/3/2019    Procedure: Chest Port Placement, Single Lumen;  Surgeon: Luigi Thayer PA-C;  Location: [facility] OR     IR CHEST PORT PLACEMENT > 5 YRS OF AGE  6/3/2019     LAPAROSCOPY DIAGNOSTIC (GYN) N/A 2/7/2019    Procedure: Diagnostic Laparoscopy With Biopsy;  Surgeon: Jammie Dueñas MD;  Location: [facility] OR     LAPAROTOMY, TUMOR DEBULKING, COMBINED Bilateral 7/30/2018    Procedure: COMBINED LAPAROTOMY, TUMOR DEBULKING;  Exploratory Laparotomy, Modified Radical Hysterectomy, Removal of Cervix, Bilateral Salpingo - Oophorectomy, Omentectomy, Bilateral Para Aortic and Left Pelvic Lymph Node Dissection, Tumor Debulking, Proctoscopy;  Surgeon: Jammie Dueñas MD;  Location: [facility] OR     REMOVE PORT PERITONEAL N/A 1/7/2019    Procedure: REMOVE PORT PERITONEAL;  Surgeon: Chanel Rodriguez MD;  Location: [facility] OR     SURGICAL HISTORY OF -   1980    left ankle surgery     THROMBECTOMY LOWER EXTREMITY Right 04/2019       Meds:    No current facility-administered medications on file prior to encounter.   Current Outpatient Medications on File Prior to Encounter:  acetaminophen (TYLENOL) 500 MG tablet Take 2 tablets (1,000 mg) by mouth every 8 hours as needed for mild pain   aspirin 81 MG EC tablet Take 81 mg by mouth daily    cetirizine (ZYRTEC) 10 MG tablet Take 10 mg by mouth daily    enoxaparin (LOVENOX) 60 MG/0.6ML syringe Inject 0.6 mLs (60 mg) Subcutaneous 2 times daily   LORazepam (ATIVAN) 1 MG tablet  Take 1 tablet (1 mg) by mouth every 6 hours as needed (Anxiety, Nausea/Vomiting or Sleep)   magnesium oxide (MAG-OX) 400 MG tablet Take 1 tablet (400 mg) by mouth daily   mometasone (NASONEX) 50 MCG/ACT nasal spray Spray 2 sprays into both nostrils daily   ondansetron (ZOFRAN) 8 MG tablet If vomiting occurs with CC-486, take 1 tab (8 mg) 30 minutes prior to each subsequent CC-486 dose.   potassium chloride ER (K-TAB) 20 MEQ CR tablet Take 1 tablet (20 mEq) by mouth daily   pravastatin (PRAVACHOL) 20 MG tablet Take 1 tablet (20 mg) by mouth every evening for cholesterol.   prochlorperazine (COMPAZINE) 10 MG tablet Take 1 tablet (10 mg) by mouth every 6 hours as needed (Nausea/Vomiting)   senna-docusate (SENNA S) 8.6-50 MG tablet Take 4 tablets by mouth 2 times daily   study CC-486 (IDS #5077) 100 mg TABS CHEMOTHERAPY tablet Take 1 tablet (100 mg) by mouth daily Schedule 1. Take for 21 days, followed by 7 days off. Take at the same time each day on an empty stomach or with food (a light breakfast or meal of up to approximately 600 calories).  Swallow tablet whole with about 8oz of room temperature water. Do not take broken or cracked tablets.        Allergies:     Allergies   Allergen Reactions     Gemfibrozil Muscle Pain (Myalgia)     Lovastatin      headaches     Penicillins Nausea and Vomiting        FamHx:  Family History   Problem Relation Age of Onset     Hypertension Mother      Hypertension Father      Cerebrovascular Disease Father         polycythemia     Breast Cancer Maternal Aunt         older age       SocialHx:  Social History     Socioeconomic History     Marital status: Single     Spouse name: Not on file     Number of children: 0     Years of education: Not on file     Highest education level: Not on file   Occupational History     Occupation: [employer]     Employer: [employer]   Social Needs     Financial resource strain: Not on file     Food insecurity:     Worry: Not on  "file     Inability: Not on file     Transportation needs:     Medical: Not on file     Non-medical: Not on file   Tobacco Use     Smoking status: Never Smoker     Smokeless tobacco: Never Used   Substance and Sexual Activity     Alcohol use: Yes     Comment: occasional     Drug use: No     Sexual activity: Never   Lifestyle     Physical activity:     Days per week: Not on file     Minutes per session: Not on file     Stress: Not on file   Relationships     Social connections:     Talks on phone: Not on file     Gets together: Not on file     Attends [employer] service: Not on file     Active member of club or organization: Not on file     Attends meetings of clubs or organizations: Not on file     Relationship status: Not on file     Intimate partner violence:     Fear of current or ex partner: Not on file     Emotionally abused: Not on file     Physically abused: Not on file     Forced sexual activity: Not on file   Other Topics Concern     Parent/sibling w/ CABG, MI or angioplasty before 65F 55M? No      Service No     Blood Transfusions No     Caffeine Concern Yes     Occupational Exposure No     Hobby Hazards No     Sleep Concern No     Stress Concern No     Weight Concern Yes     Special Diet No     Back Care No     Exercise Yes     Bike Helmet No     Comment: Yes in the future     Seat Belt Yes     Self-Exams Yes   Social History Narrative     Not on file       Vitals:  Vitals:    06/06/19 0003   BP: 124/71   BP Location: Right arm   Pulse: 113   Resp: 18   Temp: 98.9  F (37.2  C)   TempSrc: Oral   SpO2: 94%       PEx:  Gen: Appears fatigued but otherwise alert, no acute distress. Oriented to person, time (initially stated year was 2013 but then said, \"No not 2013. It's 2019), but not to place (thinks she is still at [facility]). Able to answer month and president.  HEENT: Normocephalic  Neuro: Grossly normal facial movements and movements of upper extremities.  CV: Tachycardic, no " murmurs  Chest: Port in right chest nontender, no surrounding erythema  Pulm: CTAB, non-labored breathing.  Abd: Normoactive bowel sounds. Upper quadrantsoderately distended, lower quadrants softer and less distended. Minimally tender to palpation of entire abdomen.  Pelvic: Deferred  Ext: Non-tender, no erythema  Skin: No rashes appreciated  Psych: Appropriate insight/judgment    Labs:  Labs reviewed from encounter at [facility] 6/5/9:   Hgb 9.1 (previously 8.5 on 4/13)  WBC 7.5  Plts 255    Lactic acid 1.0    PT 17.4  INR 1.6    Na 126  K 3.7  Albumin 1.5    Troponin-I < 0.056    Urinalysis unremarkable    Imaging:  CT Head (6/5) [facility]: Normal brain and skull. No finding to account for altered mental status/confusion.    CT C/A/P (6/5) [facility]:   1.  The large pelvic mass shown on prior CT scan of 7/16/2018 has been resected. Large volume of ascites in the greater and lesser peritoneal sacs with extensive peritoneal enhancement, suspect related to peritoneal carcinomatosis. Loculated fluid in the lesser sac has mass effect upon the adjacent stomach.    2.  At least one, possibly 2 hypodense liver masses which were not seen on 7/16/2018, concerning for hepatic metastasis.    3.  Aside from liver, no significant solid abdominal organ abnormality.    4.  Focal adenomyomatosis at the fundus of the gallbladder. No acute biliary tract finding.    5.  No acute finding involving bowel. No evidence of mechanical bowel obstruction.    CXR (6/5) [facility]: Clear lungs. No pleural abnormality. Cardiomediastinal silhouette normal. Port-A-Cath tip in the SVC.    Assessment: Ms. Evans is a 58-year-old with recurrent platinum resistant ovarian cancer admitted as a transfer of care from [facility] for evaluation of confusion and worsening fatigue. Differential diagnosis includes dehydration given the tachycardia, weakness, and hyponatremia, deconditioning related to chronic disease, effects of chemotherapy, and  infection. She is afebrile and workup has not identified a source of infection so far, making dehydration and deconditioning from chronic disease and poor nutrition most likely.    Active Problem list:  Recurrent platinum resistant ovarian cancer  Confusion  Fatigue  Tachycardia  Malignant ascites  Hypertension  Hyperlipidemia  History of DVT, PE    Plan:    Disease: Recurrent platinum resistant Stage IIIB mixed clear cell and endometrioid cancer. Currently enrolled in TRIO study, most recently C4D8 on 5/22/19 with pembrolizumab. CC-486 held.   FEN: NPO in preparation for possible IR paracentesis tomorrow with plan to advance diet after procedure. Normal saline @ 100 mL/h. Hyponatremia, plan to recheck in AM, giving fluids. Albumin 1.5.   Pain: Tylenol, oxycodone PRN.  Heme: History of pulmonary embolism and DVT (s/p thrombectomy 4/12/19), on therapeutic lovenox. 0100 dose held in preparation for possible IR procedure. Will discuss afternoon anticoagulation dose with IR in the AM. Chronic anemia, stable.  CV: Tachycardic on presentation to the ED in the 130s with response to IV fluids to 110s, which is patient's current baseline. On telemetry given 8 beat run of SVT in ED, asymptomatic. HTN (no meds). HLD, continue home statin.  Resp: No acute issues.  GI: Nausea, zofran and compazine PRN. Constipation, senna, miralax, dulcolax suppository PRN.  : Voiding spontaneously, no issues.  MSK: Subjective weakness, PT/OT consult.  Neuro/Psych: Confusion per patient and ED provider report. Oriented, but not to place. Head CT negative. Continue to monitor.   Endocrine: no issues  ID: Started on empiric treatment with Vanocmycin and Cefepime in the ED. Antibiotics discontinued as patient is afebrile with negative infectious workup (WBC 7.5, lactic acid 1.0, negative UA, negative CXR). Continue to monitor.  PPx: SCD on left leg. Resume therapeutic lovenox when appropriate.  Disposition: Anticipate discharge to home pending  improvement in symptoms.     Staffed with Dr. Rachel Acevedo MD   OB/GYN PGY3  6/6/2019      I saw and evaluated the patient. I agree with the findings and the plan of care as documented in Dr. Acevedo 's note.     58 year old with progressive ovarian cancer with confusion, fatigue.  On my exam she is AAOx3 but is having some word finding troubles. Remainder of neuro exam is normal (gross motor and strength)   Labs notable for  Hg 7.3 and Na low at 127 (now 131)     Unclear etiology of her confusion and fatigue. May be multifocal due to cancer therapy, hyponatremia, anemia, hypovolemia. No obvious MI, CVA, infection.     -Plan brain MRI to rule out metastatic disease  -1 U PRBC today  -IVF hydration to correct hyponatremia  -cnt to re-orient patient. REquesting bed by window  -Para tomorrow    Nika Mayorga MD  Gynecologic Oncology  Pager [phone]

## 2019-06-06 NOTE — CONSULTS
[facility]  Consult Note - Hospitalist Service, [address] procedure team     Date of Admission:  6/5/2019  Consult Requested by: Lynda Howell  Reason for Consult: Therapeutic paracentesis    Paracentesis procedure note    CLINICAL HISTORY:  Ascites; ultrasound-guided paracentesis requested.     PERFORMED BY:  Elias Ervin MD     CONSENT:  The patient understood the limitations, alternatives, and  risks of the procedure and agreed to the procedure.  Written informed  consent was obtained and is documented in the patient record.   Safety check list performed at bedside     MEDICATIONS:  No intravenous sedation was administered.  A 10:1 volume  mixture of 1% lidocaine was used for local anesthesia.       DESCRIPTION:  Ascites fluid was identified on limited abdominal  ultrasound exam in [facility] and picture is documented in the patient's record.   The abdomen was prepped and draped in the usual sterile fashion.   Color ultrasound was used to assess the subcutaneous tissues. No  vessels were identified in the region of planned puncture. Local  anesthesia was achieved.  Under ultrasound guidance, a 5-Romansh  pigtail centesis needle/catheter was advanced into the peritoneal  space with ascites fluid return.  Needle was removed.  The catheter  was connected to drainage container.  Ascites was aspirated and 2300 ml of yellow brownish ascites was removed. Catheter was removed on completion of drainage and sterile dressing was  applied.   Blood loss: none     COMPLICATIONS:  No immediate concerns; the patient remained stable  throughout the procedure and tolerated it well.     Please ok to resume Lovenox at 10 pm.     Elias Ervin MD  [facility] [place]  Pager: [phone]

## 2019-06-07 PROBLEM — I82.90 THROMBOSIS: Status: ACTIVE | Noted: 2019-01-01

## 2019-06-07 NOTE — PROGRESS NOTES
"Gynecologic Oncology   Progress Note  6/7/19      HD#2: fatigue, weakness, confusion  Disease: Recurrent platinum resistant ovarian cancer    24 hour events:   - Hyponatremia resolved with IV fluids  - Paracentesis with removal of 2.3 L  - Some word finding difficulty, confusion per roommate, brain MRI ordered but not yet performed  - 1 unit pack RBCs with hemoglobin of 7.3    Subjective: Ms. Evans is feeling \"good' this morning. She reports feeling less weak and fatigued. She has tolerated fluids and a small amount of other PO intake. She is voiding spontaneously and passing flatus. Hoping to go home soon.     Objective:   Vitals:    06/06/19 1930 06/06/19 1955 06/06/19 2049 06/06/19 2157   BP: 148/83 155/81 143/82 147/84   BP Location:   Right arm    Pulse:       Resp: 16 16  16   Temp: 98.7  F (37.1  C) 98.4  F (36.9  C) 98.9  F (37.2  C) 99.6  F (37.6  C)   TempSrc: Oral Oral Oral Oral   SpO2: 98% 99% 100% 100%   Height:           GEN - alert, oriented x3, no acute distress  CV - tachycardic  PULM - breathing comfortably on room air  ABD - soft, minimally distended, nontender  Ext - nontender, noedema    Lines/drains:   Port, right chest  PIV right and left arms    I/Os  (Yesterday // Since Midnight)  PO: not recorded // not recorded   IVF: 2011 cc // not recorded  Urine:300cc // not recorded      New Labs/Imaging-   Results for orders placed or performed during the hospital encounter of 06/05/19 (from the past 24 hour(s))   CBC with platelets differential   Result Value Ref Range    WBC 6.3 4.0 - 11.0 10e9/L    RBC Count 3.32 (L) 3.8 - 5.2 10e12/L    Hemoglobin 7.3 (L) 11.7 - 15.7 g/dL    Hematocrit 25.6 (L) 35.0 - 47.0 %    MCV 77 (L) 78 - 100 fl    MCH 22.0 (L) 26.5 - 33.0 pg    MCHC 28.5 (L) 31.5 - 36.5 g/dL    RDW 18.1 (H) 10.0 - 15.0 %    Platelet Count 212 150 - 450 10e9/L    Diff Method Automated Method     % Neutrophils 87.4 %    % Lymphocytes 6.4 %    % Monocytes 5.4 %    % Eosinophils 0.0 %    % " Basophils 0.2 %    % Immature Granulocytes 0.6 %    Nucleated RBCs 0 0 /100    Absolute Neutrophil 5.5 1.6 - 8.3 10e9/L    Absolute Lymphocytes 0.4 (L) 0.8 - 5.3 10e9/L    Absolute Monocytes 0.3 0.0 - 1.3 10e9/L    Absolute Eosinophils 0.0 0.0 - 0.7 10e9/L    Absolute Basophils 0.0 0.0 - 0.2 10e9/L    Abs Immature Granulocytes 0.0 0 - 0.4 10e9/L    Absolute Nucleated RBC 0.0    Basic metabolic panel   Result Value Ref Range    Sodium 131 (L) 133 - 144 mmol/L    Potassium 3.2 (L) 3.4 - 5.3 mmol/L    Chloride 100 94 - 109 mmol/L    Carbon Dioxide 22 20 - 32 mmol/L    Anion Gap 8 3 - 14 mmol/L    Glucose 89 70 - 99 mg/dL    Urea Nitrogen 9 7 - 30 mg/dL    Creatinine 0.41 (L) 0.52 - 1.04 mg/dL    GFR Estimate >90 >60 mL/min/[1.73_m2]    GFR Estimate If Black >90 >60 mL/min/[1.73_m2]    Calcium 8.1 (L) 8.5 - 10.1 mg/dL   INR   Result Value Ref Range    INR 1.41 (H) 0.86 - 1.14   Magnesium   Result Value Ref Range    Magnesium 1.6 1.6 - 2.3 mg/dL   ABO/Rh type and screen   Result Value Ref Range    Units Ordered 1     ABO O     RH(D) Pos     Antibody Screen Neg     Test Valid Only At          [facility]    Specimen Expires 06/09/2019     Crossmatch Red Blood Cells    Blood component   Result Value Ref Range    Unit Number [id number]     Blood Component Type Red Blood Cells Leukocyte Reduced     Division Number 00     Status of Unit Released to care unit     Blood Product Code [id number]     Unit Status ISS    Internal Medicine Procedure Team ADULT IP Consult [facility] - Paracentesis    Narrative    Elias Ervin MD     6/6/2019  4:54 PM  [facility]  Consult Note - Hospitalist Service, Gold  IM procedure team     Date of Admission:  6/5/2019  Consult Requested by: Lynda Howell  Reason for Consult: Therapeutic paracentesis    Paracentesis procedure note    CLINICAL HISTORY:  Ascites; ultrasound-guided paracentesis    requested.     PERFORMED BY:  Elias Ervin MD     CONSENT:  The patient understood the limitations, alternatives,   and  risks of the procedure and agreed to the procedure.  Written   informed  consent was obtained and is documented in the patient record.   Safety check list performed at bedside     MEDICATIONS:  No intravenous sedation was administered.  A 10:1   volume  mixture of 1% lidocaine was used for local anesthesia.       DESCRIPTION:  Ascites fluid was identified on limited abdominal  ultrasound exam in RLQ and picture is documented in the patient's   record.   The abdomen was prepped and draped in the usual sterile fashion.   Color ultrasound was used to assess the subcutaneous tissues. No  vessels were identified in the region of planned puncture. Local  anesthesia was achieved.  Under ultrasound guidance, a 5-Chinese  pigtail centesis needle/catheter was advanced into the peritoneal  space with ascites fluid return.  Needle was removed.  The   catheter  was connected to drainage container.  Ascites was aspirated and   2300 ml of yellow brownish ascites was removed. Catheter was   removed on completion of drainage and sterile dressing was  applied.   Blood loss: none     COMPLICATIONS:  No immediate concerns; the patient remained   stable  throughout the procedure and tolerated it well.     Please ok to resume Lovenox at 10 pm.     Elias Ervin MD  [facility], [place]  Pager: [phone]     POC US Guide for Paracentesis    Impression    Paracentesis procedure note    CLINICAL HISTORY:  Ascites; ultrasound-guided paracentesis requested.     PERFORMED BY:  Elias Ervin MD     CONSENT:  The patient understood the limitations, alternatives, and  risks of the procedure and agreed to the procedure.  Written informed  consent was obtained and is documented in the patient record.   Safety check list performed at bedside     MEDICATIONS:  No intravenous sedation was  administered.  A 10:1 volume  mixture of 1% lidocaine was used for local anesthesia.       DESCRIPTION:  Ascites fluid was identified on limited abdominal  ultrasound exam in RLQ and picture is documented in the patient's record.   The abdomen was prepped and draped in the usual sterile fashion.   Color ultrasound was used to assess the subcutaneous tissues. No  vessels were identified in the region of planned puncture. Local  anesthesia was achieved.  Under ultrasound guidance, a 5-Albanian  pigtail centesis needle/catheter was advanced into the peritoneal  space with ascites fluid return.  Needle was removed.  The catheter  was connected to drainage container.  Ascites was aspirated and 2300 ml of yellow brownish ascites was removed. Catheter was removed on completion of drainage and sterile dressing was  applied.   Blood loss: none     COMPLICATIONS:  No immediate concerns; the patient remained stable  throughout the procedure and tolerated it well.     Elias Ervin MD   Potassium   Result Value Ref Range    Potassium 3.6 3.4 - 5.3 mmol/L       Assessment:  Ms. Evans is a 58-year-old with progressive ovarian cancer admitted with fatigue and confusion, likely multifocal in nature resulting from cancer therapy, hyponatremia, hypovolemia, and anemia.      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 Currently enrolled in TRIO study, most recently C4D8 on 5/22/19 with pembrolizumab. [id number] held.   FEN: Reg diet.Hyponatremia, resolved. Albumin 1.5.  Pain: Tylenol, oxycodone PRN.  Heme: History of pulmonary embolism and DVT (s/p thrombectomy 4/12/19), on therapeutic lovenox. Chronic anemia, Hgb 7.3 > 1U RBC > AM pending.  CV:  Tachycardic on presentation to the ED in the 130s with response to IV fluids to 110s, which is patient's current baseline. On telemetry given 8 beat run of SVT in ED, asymptomatic with no further episodes. HLD, continue home statin.  Resp: No acute issues.  GI: Nausea, zofran and compazine PRN. Constipation, senna, miralax, dulcolax suppository PRN.  : Voiding spontaneously, no issues.  MSK: Subjective weakness, PT/OT consult.  Neuro/Psych: Confusion, difficulty with word finding and following multistep commands with PT. Head CT negative, brain MRI ordered. Continue to monitor.   Endocrine: no issues  ID: Started on empiric treatment with Vanocmycin and Cefepime in the ED. Antibiotics discontinued as patient is afebrile with negative infectious workup (WBC 7.5, lactic acid 1.0, negative UA, negative CXR). Continue to monitor.  PPx: SCD on left leg. Therapeutic lovenox.  Disposition: Anticipate discharge to home pending improvement in symptoms. PT recommends home with assist.     Ruma Acevedo MD  OB/GYN, PGY3  06/07/19

## 2019-06-07 NOTE — CONSULTS
Stroke Consult Note    2019    Di Evans is a 58 year old year old female admitted on 2019 with hx metastatic ovarian cancer, for concern of increased fatigue and word-finding difficulty which has since improved. Imaging demonstrates large sagittal sinus thromboses and left transverse sinus to the jugular vein.     Problem List  1. Metastatic ovarian cancer  2. Hx LE DVT  3. 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    24 Hour Vital Signs Summary:  Temperatures:  Current - Temp: 95.3  F (35.2  C); Max - Temp  Av.7  F (35.9  C)  Min: 95.3  F (35.2  C)  Max: 97.4  F (36.3  C)  Respiration range: Resp  Av.3  Min: 16  Max: 18  Pulse range: Pulse  Av  Min: 116  Max: 116  Blood pressure range: Systolic (24hrs), Av , Min:136 , Max:155   ; Diastolic (24hrs), Av, Min:78, Max:93    Pulse oximetry range: SpO2  Av.7 %  Min: 98 %  Max: 99 %    Ventilator Settings  Resp: 18    Intake/Output Summary (Last 24 hours) at 2019 0714  Last data filed at 2019 0659  Gross per 24 hour   Intake 1557.5 ml   Output 1625 ml   Net -67.5 ml          Current Medications:    enoxaparin  70 mg Subcutaneous Q12H     mirtazapine  15 mg Oral At Bedtime     pravastatin  20 mg Oral QPM     senna-docusate  1 tablet Oral BID    Or     senna-docusate  2 tablet Oral BID     sodium chloride (PF)  3 mL Intracatheter Q8H       PRN Medications:  acetaminophen, bisacodyl, lidocaine 4%, lidocaine (buffered or not buffered), LORazepam, melatonin, naloxone, ondansetron **OR** ondansetron, oxyCODONE, polyethylene glycol, potassium chloride, potassium chloride with lidocaine, potassium chloride, potassium chloride, potassium chloride, prochlorperazine **OR** prochlorperazine **OR** prochlorperazine, sodium chloride (PF)    Infusions:    sodium chloride 75 mL/hr at 19 0504       Allergies   Allergen Reactions     Gemfibrozil Muscle Pain (Myalgia)     Lovastatin      headaches      "Penicillins Nausea and Vomiting       Physical Examination:  BP (!) 136/93 (BP Location: Right arm)   Pulse 116   Temp 95.3  F (35.2  C) (Oral)   Resp 18   Ht 1.651 m (5' 5\")   SpO2 99%   BMI 22.96 kg/m    Alert, awake, oriented x4.  PERRL, EOMI. Face symmetric, tongue midline. Uvula midline, palate elevated symmetrically.   Motor 5/5 BUE and BLE. No pronator drift.   Sensation intact throughout.       Labs/Studies:  Recent Labs   Lab Test 06/07/19  0842 06/06/19  1730 06/06/19  0619 06/05/19  1505  05/22/19  1201   *  --  131*  --   --  127*   POTASSIUM 3.2* 3.6 3.2*  --   --  4.3   CHLORIDE 98  --  100  --   --  93*   CO2 23  --  22  --   --  26   ANIONGAP 9  --  8  --   --  9   *  --  89  --   --  111*   BUN 7  --  9  --   --  9   CR 0.38*  --  0.41*  --   --  0.46*   TRACEY 8.3*  --  8.1*  --   --  8.7   WBC 6.6  --  6.3 7.1   < > 5.9   RBC 4.09  --  3.32* 3.72*   < > 3.75*   HGB 9.6*  --  7.3* 8.6*   < > 8.7*   *  --  212 244   < > 538*    < > = values in this interval not displayed.       Recent Labs   Lab Test 06/07/19  0842 06/06/19  0619 06/03/19  0651  02/15/19  1314 02/05/19  1023 01/23/19  1249   INR 1.39* 1.41* 1.32*   < > 1.12 1.15* 1.02   PTT  --   --   --   --  33 34 30    < > = values in this interval not displayed.         No lab results found in last 7 days.    Imaging:    Us Lower Extremity Venous Duplex Bilateral    Result Date: 6/7/2019  EXAMINATION: US LOWER EXTREMITY VENOUS DUPLEX BILATERAL  6/7/2019 12:06 PM  HISTORY: r/o DVT, h/o LE DVT with now worsening edema COMPARISON: None    PROCEDURE COMMENTS: Ultrasound was performed of the deep venous system of the right and left lower extremity using grayscale, color, and spectral Doppler. FINDINGS: Right: Right common femoral vein is partially compressible at the location of the insertion of the greater saphenous vein. Demonstrates hypoechoic intraluminal filling defect, consistent with thrombus. Right femoral vein in the " mid thigh is noncompressible, demonstrates hypoechoic intraluminal filling defect, consistent with thrombus. Right popliteal vein is partially compressible, demonstrates hypoechoic intraluminal filling defect, consistent with thrombus. Right posterior tibial vein at the level of the ankle is fully compressible. Mild dilatation of the common femoral vein, femoral vein, femoral vein. Mild right lower extremity subcutaneous edema. Left: The common femoral, greater saphenous origin, femoral, popliteal, and deep calf veins are visualized and are patent. Venous waveforms are normal. There is normal response to compression.     IMPRESSION:. 1.  Right lower extremity partially occlusive to occlusive thrombus in the right common femoral vein, femoral vein, popliteal vein. Dilatation of the vein with regions of prominent hypoechogenicity suggest superimposed acute/subacute on chronic thrombus. 2.  No left lower extremity DVT. [Urgent Result: DVT] Finding was identified on 6/7/2019 1:31 PM. Dr. Howell was contacted by Dr. Hugo Villegas at 6/7/2019 1:44 PM and verbalized understanding of the urgent finding. I have personally reviewed the examination and initial interpretation and I agree with the findings. MATTHEW RUIZ MD    Mr Brain W/o & W Contrast    Result Date: 6/7/2019   MR BRAIN W/O & W CONTRAST 6/7/2019 7:55 AM Provided History: Altered level of consciousness (LOC), unexplained; stage IV ovarian cancer, suspicion for metastasis. Comparison: Head CT 6/5/2019. Technique: Multiplanar T1-weighted, axial FLAIR, and susceptibility images were obtained without intravenous contrast. Following intravenous gadolinium-based contrast administration, axial T2-weighted, diffusion, and T1-weighted images (in multiple planes) were obtained. Contrast: 6ML OF GADAVIST Findings: There is no mass effect, midline shift, or evidence of intracranial hemorrhage. The ventricles are proportionate to the cerebral sulci. Normal major vascular  intracranial flow-voids. No intraparenchymal restricted diffusion. There are minimal scattered periventricular subcortical white matter FLAIR hyperintensities, nonspecific, and likely sequela of chronic small vessel ischemic disease. Postcontrast images demonstrate no abnormal intracranial enhancement. There is long segment filling defect within the superior sagittal sinus extending to the torcula. Additional filling defects at the junction of the left transverse sinus and sigmoid sinus extending to the proximal internal jugular vein. Small oval filling defects in the right transverse sinus (series 100 image 51), most likely represents arachnoid granulation. No abnormality of the skull marrow signal. The visualized portions of paranasal sinuses, and mastoid air cells are relatively clear. The orbits are grossly unremarkable.     Impression: 1. Long segment filling defect within the superior sagittal sinus. Additional filling defect extending from the distal left transverse sinus to the proximal internal jugular vein. Findings concerning for dural venous sinus thrombosis. Consider further evaluation with brain MRV. 2. No evidence of intracranial metastatic disease. 3. Minimal scattered periventricular and subcortical white matter hyperintensities, likely sequelae across all consistencies. [Urgent Result: Dural venous sinus thrombosis] Finding was identified on 6/7/2019 8:51 AM. Dr. Mayorga was contacted by Dr. Mijares at 6/7/2019 9:24 AM and verbalized understanding of the urgent finding.  I have personally reviewed the examination and initial interpretation and I agree with the findings. KIRA CABRERA MD    Poc Us Guide For Paracentesis    Paracentesis procedure note CLINICAL HISTORY:  Ascites; ultrasound-guided paracentesis requested.   PERFORMED BY:  Elias Ervin MD   CONSENT:  The patient understood the limitations, alternatives, and risks of the procedure and agreed to the procedure.  Written informed  consent was obtained and is documented in the patient record. Safety check list performed at bedside   MEDICATIONS:  No intravenous sedation was administered.  A 10:1 volume mixture of 1% lidocaine was used for local anesthesia.    DESCRIPTION:  Ascites fluid was identified on limited abdominal ultrasound exam in RLQ and picture is documented in the patient's record. The abdomen was prepped and draped in the usual sterile fashion. Color ultrasound was used to assess the subcutaneous tissues. No vessels were identified in the region of planned puncture. Local anesthesia was achieved.  Under ultrasound guidance, a 5-Cymraes pigtail centesis needle/catheter was advanced into the peritoneal space with ascites fluid return.  Needle was removed.  The catheter was connected to drainage container.  Ascites was aspirated and 2300 ml of yellow brownish ascites was removed. Catheter was removed on completion of drainage and sterile dressing was applied. Blood loss: none   COMPLICATIONS:  No immediate concerns; the patient remained stable throughout the procedure and tolerated it well. Elias Ervin MD    Ctv Head Neck W Contrast    Result Date: 6/7/2019  CTV HEAD NECK WITH CONTRAST 6/7/2019 3:18 PM History:  Dural venous sinus thrombosis suspected ICD-10:  Comparison: 6/7/2019 dated MRI Technique: Helically acquired thin section axial CT images were obtained with 1 mm collimation through the brain after intravenous bolus injection of iodinated contrast medium with an approximately 20-25 second delay between administration of contrast and scanning. Image data were sent to the [employer] 3D workstation and postprocessed by the radiologist using maximum intensity pixel (MIP), multiplanar and volume rendered 3D reconstruction programs. Contrast: 75ml isovue 370 Findings: There is filling defect along the superior sagittal sinus except the anterior and mid parts. In addition there is filling defect along the lateral aspect of  the transverse sinus, the sigmoid sinus and upper jugular vein. These are compatible with sinus thrombosis. The rest of the deep venous sinuses are patent. There is no evidence of intracranial hematoma. No mass effect or shift. There is no hydrocephalus.     IMPRESSION: Deep sinus thrombosis involving the left lateral transverse sinus, sigmoid sinus, upper jugular vein and majority of the superior sagittal sinus except the anterior portion. FREDRICK ELLSWORTH MD    Assessment/Plan  Di Evans is a 58 year old h/o metastatic ovarian cancer now presenting with sagittal sinus thromboses and left transverse sinus thromboses extending to jugular vein.   - In setting of stable neurological exam and no stroke or bleed present, would hold on any surgical or procedural intervention.   - Recommend hematology evaluation of best anticoagulation for patient. Does need continued anticoagulation as treatment for these thromboses, would favor DOAC over lovenox if it is deemed this is a lovenox failure, but will defer to hematology colleagues.   - Recommend neuro checks q4. Would not discharge at this time as patient could quickly decompensate. Will continue to follow along.       Denisse Willson MD  Neurocritical care fellow

## 2019-06-07 NOTE — PLAN OF CARE
Discharge Planner OT   Patient plan for discharge: home with assist  Current status: Eval completed, treatment initiated. Pt was supine upon arrival and agreed to participate in therapy. Pt completed MoCA form 8.2. Pt scored 21/30 (26/30 is considered normal) and present to  with mild cognitive impairments in areas in visuospatial, delayed memory recall, and attention. Pt demos some insight to cognitive deficits but will require further re-enforcement. Pt was oriented to person, place, and time. Pt was left with all needs met and friends present.   Barriers to return to prior living situation: deconditioning, cognition changes  Recommendations for discharge: anticipate home with assist pending progress,  nursing for med management and possible  PT/OT for safety eval.   Rationale for recommendations: Pt would benefit from further IP therapy to increase safety awareness with ADLs and IADLs to ensure safe return home. Pt has assist from her roommate if needed.        Entered by: Siena Vivar 06/07/2019 2:52 PM

## 2019-06-07 NOTE — PLAN OF CARE
Patient was received from unit 6B this evening,alert and oriented x4,accompanied by family members.Temp max 99.6,heart rate 110-120's baseline for patient,blood pressure 140's/80's,denied chest pain or discomfort,denied palpitation.Abdominal paracentesis site covered with band aid,dressing dry and intact.Received 1 unit red  blood cell transfusion without reaction noted.Serum potassium  level was recheck ed,level back at 3.6.Up with standby assist.Started on Lovenox injection for right leg DVT.Awaiting Brain MRI test..Continue per plan of care.

## 2019-06-07 NOTE — PLAN OF CARE
HD#2: fatigue, weakness, confusion. Has h/o recurrent platinum resistant ovarian cancer(per MD note).  VS: VSS except tachy in the 100s  Neuro: Alert and oriented x4, no change since this AM. Flat affect, calls appropriately. No s/s confusion.  Cardiac: tachy                 Respiratory: wnl  GI/: distended, +BS, passing flatus, had 2 small formed tan colored stools, Voiding spontaneously(not saved)  Diet/appetite: tolerated regular diet at breakfast, now NPO  Activity: SBA to bathroom, steady gait.  Pain: Denies pain  Skin/drains:  rt abd paracentesis site with intact dressing. Rt LE swollen, more than left(both ankles and feet are swollen)  Lines: Rt chest port infusing, left ac PIV, sl'd.  P: continue to monitor neuros, VS, and gen status and continue with POC.

## 2019-06-07 NOTE — PROGRESS NOTES
06/07/19 1300   Quick Adds   Type of Visit Initial Occupational Therapy Evaluation   Living Environment   Lives With friend(s)   Living Arrangements house   Home Accessibility stairs to enter home;stairs within home   Number of Stairs, Main Entrance 1   Stair Railings, Main Entrance none   Number of Stairs, Within Home, Primary 8   Transportation Anticipated family or friend will provide   Living Environment Comment Pt reports that she lives with a friend who is able to assist her as needed.    Self-Care   Usual Activity Tolerance moderate   Current Activity Tolerance moderate   Regular Exercise No   Equipment Currently Used at Home none   Activity/Exercise/Self-Care Comment Pt reports that she was IND at baseline, but has been deconditioning over the past year.    Functional Level   Ambulation 0-->independent   Transferring 0-->independent   Toileting 0-->independent   Bathing 0-->independent   Dressing 0-->independent   Eating 0-->independent   Communication 0-->understands/communicates without difficulty   Swallowing 0-->swallows foods/liquids without difficulty   Cognition 1 - attention or memory deficits   Fall history within last six months no   Which of the above functional risks had a recent onset or change? ambulation;transferring;toileting;bathing;cognition;dressing   Prior Functional Level Comment Pt reports being IND at baseline.    General Information   Onset of Illness/Injury or Date of Surgery - Date 06/05/19   Referring Physician Lynda Howell MD   Patient/Family Goals Statement Pt would like to return home   Additional Occupational Profile Info/Pertinent History of Current Problem  Di Evans is a 58 year old with recurrent platinum resistant ovarian cancer who presented to the emergency department with increased confusion for the past day and weakness.    Weight-Bearing Status - LUE full weight-bearing   Weight-Bearing Status - RUE full weight-bearing   Weight-Bearing Status - LLE full  "weight-bearing   Weight-Bearing Status - RLE full weight-bearing   General Observations Activity: Up ad estevan   Cognitive Status Examination   Orientation orientation to person, place and time   Level of Consciousness alert   Follows Commands (Cognition) WFL   Memory impaired   Attention No deficits were identified   Cognitive Comment Pt reports that she had not had cognitive changes since being admitted to hospital, but would benefit from further cognitive testing.    Visual Perception   Visual Perception Wears glasses   Sensory Examination   Sensory Comments Pt reports some tingling in BLEs.    Pain Assessment   Patient Currently in Pain No   Posture   Posture not impaired   Instrumental Activities of Daily Living (IADL)   IADL Comments Pt reports that she recieves assistance for IADLs.    Activities of Daily Living Analysis   Impairments Contributing to Impaired Activities of Daily Living cognition impaired;strength decreased;sensation decreased   Clinical Impression   Criteria for Skilled Therapeutic Interventions Met yes, treatment indicated   OT Diagnosis decreased ADLs-I   Influenced by the following impairments general deconditioning, impaired cognition   Assessment of Occupational Performance 3-5 Performance Deficits   Identified Performance Deficits bathing, dressing, toileting, ambulation, transfering   Clinical Decision Making (Complexity) Low complexity   Therapy Frequency 5 times/wk   Predicted Duration of Therapy Intervention (days/wks) 06/14/19   Anticipated Discharge Disposition Home with Assist   Risks and Benefits of Treatment have been explained. Yes   Patient, Family & other staff in agreement with plan of care Yes   Clinical Impression Comments Pt presents to OT today with general deconditioning and impaired cognition both leading to decreased ADL-I. Pt to benefit from skilled OT services to address the following problem list.    [facility] AM-PAC  \"6 Clicks\" Daily Activity Inpatient Short " Form   1. Putting on and taking off regular lower body clothing? 3 - A Little   2. Bathing (including washing, rinsing, drying)? 3 - A Little   3. Toileting, which includes using toilet, bedpan or urinal? 3 - A Little   4. Putting on and taking off regular upper body clothing? 3 - A Little   5. Taking care of personal grooming such as brushing teeth? 4 - None   6. Eating meals? 4 - None   Daily Activity Raw Score (Score out of 24.Lower scores equate to lower levels of function) 20   Total Evaluation Time   Total Evaluation Time (Minutes) 5

## 2019-06-07 NOTE — CONSULTS
[facility]  Palliative Care Consultation Note    Patient: Di Evans  Date of Admission:  6/5/2019    Requesting Clinician / Team: GYN/ONC  Reason for consult: anorexia, anxiety    Recommendations:    Discontinue Marinol.    Start mirtazapine, an atypical antidepressant, 15 mg every day may be a good choice as it can help both for anxiety with insomnia and increase appetite    Long term, if some other medication is needed, consider, excitalopram 10 mg every day although serotonin specific reuptake inhibitors often take 2-4 weeks to see an effect.    Recommend requesting referral for out-patient palliative care follow-up in [place].    Did not address goals of care on this first visit -anticipate further discussions over time.       These recommendations have been discussed with       Thank you for the opportunity to participate in the care of this patient and family. Our team: will continue to follow.     During regular M-F work hours--if you are not sure who specifically to contact--please contact us by sending a text page to our team consult pager at [phone].    After regular work hours and on weekends/holidays, you can call our answering service at [phone]. Also, who's on call for us is available in [facility] Smart Web.       Qing Berry MD  Palliative care fellow    Patient seen and evaluated with Dr. Berry.    Agree with assessment and recommendations.    Total time spent was 60 minutes,  >50% of time was spent counseling and/or coordination of care regarding symptoms and support.    Kelsi Renee  Palliative Care   Pager [phone]        Assessments:  Di Evans is a 58 year old female with platinum resistant ovarian cancer who was admitted for increased confusion, weakness/fatigue, anorexia and anxiety.    Today, the patient was seen for symptom management    Prognosis, Goals, & Planning:      Functional Status just prior to hospitalization: 2  "(Ambulatory and capable of all selfcare but unable to carry out any work activities; may need help with IADLs up and about > 50% of waking hours)      Prognosis, Goals, and/or Advance Care Planning were addressed today: No.  Waiting anxiously to hear from team what the next steps are after receiving results about dural venous sinus thrombosis. Did not talk about cancer prognosis /treatment goals today.       Patient's decision making preferences: with input from medical clinicians and loved ones      Patient has decision-making capacity today for complex decisions: Partial (needs assistance with complex decisions)      I have concerns about the patient/family's health literacy today: No      Patient has a completed Health Care Directive: No.       Code status: full code    Coping, Meaning, & Spirituality:   Mood, coping, and/or meaning in the context of serious illness were addressed today: Yes   Briefly, spoke of Courtney's anxiety as I met her for the first time minutes after she received the news of \"a blood clot in my brain\".  She reported that she was very anxious about \"what happens next\".  Alivia stated that Courtney's anxiety had been growing over the past 6 months.  Courtney said that she thought she was coping \"OK\" with her illness.  Her primary coping strategy is talking with Alivia but admits she \"usually keeps it all in\".  [employer]ian jhony but not regular meetings/Voodoo attendance.  Her spirituality is important to her.    Social:     Living situation: Lives with roommate, Alivia.  No children.  No pets.      Key family / caregivers: Alivia, other friends, her mother    Occupational history: Worked with circuit board assembly    Substance use history: No.  Occasional alcohol use.    Financial concerns: None      History of Present Illness:  History gathered today from: patient, family/loved ones, medical chart    Di Evans is a 58 year old with recurrent platinum resistant ovarian cancer who presented to the [facility] emergency department with a one day history of increased confusion and worsening weakness/fatigue over 3 days. According to her roommate, Alivia, she is less attentive and unable to concentrate, even for a short period of time.  She increasingly looks to Alivia for prompts.  Courtney's ability to stay on her feet has diminished significantly, needing to sit down and rest.  \"It takes a lot of effort to do anything\".  In the past week, she needs more help with her ADLs. Her insomnia is worse.  She has trouble falling asleep and staying asleep.  Courtney believes that the lorazepam helps (home dose:  1 mg Q6H PRN) for sleep only as it is too sedating for daytime but feels she could benefit from a higher dose.  She was also experiencing abdominal fullness as she often does before she undergoes her weekly therapeutic paracentesis.  She had no dysuria, urgency or frequency.  She has history of constipation and had not had a normal bowel movement for 3 days.  She takes senna daily. She denies vaginal discharge or abnormal vaginal bleeding. She has had a gradual loss of appetite with early satiety.  No dysgeusia. She has lost 30 pounds over the past 6 months (15# with effort, initially).  She denies fever or chills.  She has not had  headaches, changes in vision or dizziness.  No sore throat, cough, trouble breathing, chest pain or shortness of breath.  She has no new rashes, joint pain or swelling.  No recent travel, exposure to sick contacts.      Initially Courtney was evaluated in [facility] ED before being transferred to [facility] for possible sepsis (tachycardia, Tmax 100.1).    Workup included normal WBC count, normal lactic acid, negative urinalysis and unremarkable CXR. CT scan showed significant for ascites and peritoneal carcinomatosis.  She did feel better after receiving 2L of normal saline.  She also received one dose of vancomycin and cefepime.   Her continued  disoriention to place prompted admission with brain MRI " scheduled this AM (see interpretation below).  S/P abdominal paracentesis with removal of 2300 ml of fluid.    Pain: denies.  Rarely takes any medication at home.  Acetaminophen and oxycodone available.   Dyspnea:  None  Nausea:  None  Vomiting:  None  Constipation: On-going challenge.  Daily senna.  Diarrhea:  None  Anorexia:  Worsening.  Agitation:  None  Confusion:  Increased  Fatigue: Worsening  Depression:  None  Anxiety:  Worsening  Insomnia:  Worsening      Key Palliative Symptom Data:  # Pain severity the last 12 hours: none  # Dyspnea severity the last 12 hours: none  # Nausea severity the last 12 hours: none  # Anxiety severity the last 12 hours: moderate    Patient is on opioids: assessed and I made recommendations about bowel care as above.    ROS:  Comprehensive ROS is reviewed and is negative except as here & per HPI:      Past Medical History:  Past Medical History:   Diagnosis Date     DVT (deep venous thrombosis) (H)      Hypertension      Ovarian cancer (H)      Pulmonary embolism (H)         Past Surgical History:  Past Surgical History:   Procedure Laterality Date     HYSTERECTOMY TOTAL ABDOMINAL, BILATERAL SALPINGO-OOPHORECTOMY, COMBINED Bilateral 7/30/2018    Procedure: COMBINED HYSTERECTOMY TOTAL ABDOMINAL, SALPINGO-OOPHORECTOMY;;  Surgeon: Jammie Dueñas MD;  Location: [facility] OR     INSERT PORT VASCULAR ACCESS N/A 6/3/2019    Procedure: Chest Port Placement, Single Lumen;  Surgeon: Luigi Thayer PA-C;  Location: [facility] OR     IR CHEST PORT PLACEMENT > 5 YRS OF AGE  6/3/2019     LAPAROSCOPY DIAGNOSTIC (GYN) N/A 2/7/2019    Procedure: Diagnostic Laparoscopy With Biopsy;  Surgeon: Jammie Dueñas MD;  Location: [facility] OR     LAPAROTOMY, TUMOR DEBULKING, COMBINED Bilateral 7/30/2018    Procedure: COMBINED LAPAROTOMY, TUMOR DEBULKING;  Exploratory Laparotomy, Modified Radical Hysterectomy, Removal of Cervix, Bilateral Salpingo - Oophorectomy, Omentectomy, Bilateral Para Aortic and  Left Pelvic Lymph Node Dissection, Tumor Debulking, Proctoscopy;  Surgeon: Jammie Dueñas MD;  Location: [facility] OR     REMOVE PORT PERITONEAL N/A 1/7/2019    Procedure: REMOVE PORT PERITONEAL;  Surgeon: Chanel Rodriguez MD;  Location: [facility] OR     SURGICAL HISTORY OF -   1980    left ankle surgery     THROMBECTOMY LOWER EXTREMITY Right 04/2019         Family History:  Family History   Problem Relation Age of Onset     Hypertension Mother      Hypertension Father      Cerebrovascular Disease Father         polycythemia     Breast Cancer Maternal Aunt         older age        Allergies:  Allergies   Allergen Reactions     Gemfibrozil Muscle Pain (Myalgia)     Lovastatin      headaches     Penicillins Nausea and Vomiting        Medications:  I have reviewed this patient's medication profile and medications from this hospitalization.   Noted scheduled meds are:  Dronabinol 2.5 mg BID  Senna-docusate 1-2 tablets BID  Noted PRN meds are:  Acetaminophen  Lorazepam  Oxycodone  [id number]    Physical Exam:  Vital Signs: Temp: 99.6  F (37.6  C) Temp src: Oral BP: 147/84   Heart Rate: 116 Resp: 16 SpO2: 100 % O2 Device: None (Room air)    Weight: 0 lbs 0 oz  General: alert, oriented to person, place, time, situation  Head: NCAT  Short hair  Eyes:  PERRL EOMI, No scleral icterus  Mouth/Throat: No oral lesions.  MMM.  Good dentition.  Tongue midline.  Neck: Supple.  No masses.  Chest/Pulmonary: Symmetric chest wall excursion.  Easy, unlabored breathing.  Clear, well-aerated lungs.  No adventitious breath sounds.  No accessory muscle use.    Cardiovascular: Normal heart sounds.  No murmurs.  Symmetric peripheral pulses.   Abdomen: firm, NT/ND.  +bowel sounds.  No organomegaly, masses.  : deferred  Musculoskel/Extremities: No edema  Skin:  Mild pallor.  No rashes.   Neuro:  No gross deficits. Strength 5/5 x 4 extremities. Sensation intact.  Cooperative.  Flat affect.  Concentrated effort to articulate at times. Short term  memory intact    Data reviewed:  Recent imaging reviewed, my comments on pertinents:   TODAY    Impression:  1.  Right lower extremity partially occlusive to occlusive thrombus in  the right common femoral vein, femoral vein, popliteal vein.  Dilatation of the vein with regions of prominent hypoechogenicity  suggest superimposed acute/subacute on chronic thrombus.  2.  No left lower extremity DVT.    Impression:  1. Long segment filling defect within the superior sagittal sinus.  Additional filling defect extending from the distal left transverse  sinus to the proximal internal jugular vein. Findings concerning for  dural venous sinus thrombosis. Consider further evaluation with brain  MRV.  2. No evidence of intracranial metastatic disease.  3. Minimal scattered periventricular and subcortical white matter  hyperintensities, likely sequelae across all consistencies.    Recent lab data reviewed, my comments on pertinents:   Na   131  K     3.2  BUN  7  Cr    0.38  GFR >90  Calc  8.3  Gluc  103  WBC  6.6  Hgb    9.6  Plts    147  INR  1.39

## 2019-06-07 NOTE — CONSULTS
"  Hematology  Consult Note   Date of Service: 06/07/2019    Patient: Di Evans  MRN: [id number]  Admission Date: 6/5/2019  Hospital Day # 1    Reason for Consult: \"Refractory thrombosis - LE DVT and cerebral venous thrombosis while on lovenox in clear cell carcinoma\"      History of Present Illness:    Di Evans is a 58 year old woman with history notable for recurrent metastatic clear cell ovarian carcinoma, currently on the TRIO Study (with pembrolizumab), recently diagnosed RLE DVT on 4/12/19 (s/p thrombectomy and started on enoxaparin ~1 mg/kg BID), and asymptomatic PE on CT scan from 4/29/19. Her disease is complicated by peritoneal carcinomatosis requiring almost weekly therapeutic paracentesis. She presented to [place] ED on 6/5/19 for evaluation of increased confusion and weakness over the preceding couple of days. She had a port placed on 6/3/19. Work-up in the ED was concerning for a possible infection, as she was tachycardic and had a low-grade fever (100.1F). Labs included a CBC (essentially WNL) and a normal lactic acid. UA and CXR were unremarkable. CT scan showed significant ascites and the known peritoneal carcinomatosis. She was given IV fluids with improvement in her HR, and started on cefepime and vancomycin. She was transferred to [facility] for further cares. She had a therapeutic paracentesis on 6/5/19, for removal of 2.3 L of yellow/brown ascitic fluid.    Over the following 24 hours, she developed word-finding difficulty and confusion (per her roommate). Brain MRI demonstrated a sagittal sinus thrombosis (per preliminary read), and MRV was ordered for follow-up. US of the BLE revealed a partially occlusive clot in the R common femoral vein, femoral vein, and popliteal vein (super-imposed acute/subacute on chronic thrombus), and no DVT in the LLE. The hematology service was consulted for recommendations regarding anticoagulation, as the patient has been on enoxaparin for the RLE " DVT. In reviewing the medical record and history with the patient, she has had to hold doses of enoxaparin recently due to procedures - port placement on 6/3/19, and paracentesis on 6/5/19.    Past Medical History:  Past Medical History:   Diagnosis Date     DVT (deep venous thrombosis) (H)      Hypertension      Ovarian cancer (H)      Pulmonary embolism (H)        Past Surgical History:  Past Surgical History:   Procedure Laterality Date     HYSTERECTOMY TOTAL ABDOMINAL, BILATERAL SALPINGO-OOPHORECTOMY, COMBINED Bilateral 7/30/2018    Procedure: COMBINED HYSTERECTOMY TOTAL ABDOMINAL, SALPINGO-OOPHORECTOMY;;  Surgeon: Jammie Dueñas MD;  Location: [facility] OR     INSERT PORT VASCULAR ACCESS N/A 6/3/2019    Procedure: Chest Port Placement, Single Lumen;  Surgeon: Luigi Thayer PA-C;  Location: [facility] OR     IR CHEST PORT PLACEMENT > 5 YRS OF AGE  6/3/2019     LAPAROSCOPY DIAGNOSTIC (GYN) N/A 2/7/2019    Procedure: Diagnostic Laparoscopy With Biopsy;  Surgeon: Jammie Dueñas MD;  Location: [facility] OR     LAPAROTOMY, TUMOR DEBULKING, COMBINED Bilateral 7/30/2018    Procedure: COMBINED LAPAROTOMY, TUMOR DEBULKING;  Exploratory Laparotomy, Modified Radical Hysterectomy, Removal of Cervix, Bilateral Salpingo - Oophorectomy, Omentectomy, Bilateral Para Aortic and Left Pelvic Lymph Node Dissection, Tumor Debulking, Proctoscopy;  Surgeon: Jammie Dueñas MD;  Location: [facility] OR     REMOVE PORT PERITONEAL N/A 1/7/2019    Procedure: REMOVE PORT PERITONEAL;  Surgeon: Chanel Rodriguez MD;  Location: [facility] OR     SURGICAL HISTORY OF -   1980    left ankle surgery     THROMBECTOMY LOWER EXTREMITY Right 04/2019       Social History:  Social History     Socioeconomic History     Marital status: Single     Spouse name: Not on file     Number of children: 0     Years of education: Not on file     Highest education level: Not on file   Occupational History     Occupation: [employer]      Employer: [employer]   Social Needs     Financial resource strain: Not on file     Food insecurity:     Worry: Not on file     Inability: Not on file     Transportation needs:     Medical: Not on file     Non-medical: Not on file   Tobacco Use     Smoking status: Never Smoker     Smokeless tobacco: Never Used   Substance and Sexual Activity     Alcohol use: Yes     Comment: occasional     Drug use: No     Sexual activity: Never   Lifestyle     Physical activity:     Days per week: Not on file     Minutes per session: Not on file     Stress: Not on file   Relationships     Social connections:     Talks on phone: Not on file     Gets together: Not on file     Attends Sabianist service: Not on file     Active member of club or organization: Not on file     Attends meetings of clubs or organizations: Not on file     Relationship status: Not on file     Intimate partner violence:     Fear of current or ex partner: Not on file     Emotionally abused: Not on file     Physically abused: Not on file     Forced sexual activity: Not on file   Other Topics Concern     Parent/sibling w/ CABG, MI or angioplasty before 65F 55M? No      Service No     Blood Transfusions No     Caffeine Concern Yes     Occupational Exposure No     Hobby Hazards No     Sleep Concern No     Stress Concern No     Weight Concern Yes     Special Diet No     Back Care No     Exercise Yes     Bike Helmet No     Comment: Yes in the future     Seat Belt Yes     Self-Exams Yes   Social History Narrative     Not on file        Family History  Family History   Problem Relation Age of Onset     Hypertension Mother      Hypertension Father      Cerebrovascular Disease Father         polycythemia     Breast Cancer Maternal Aunt         older age       Outpatient Medications:    No current facility-administered medications on file prior to encounter.   Current Outpatient Medications on File Prior to Encounter:  acetaminophen (TYLENOL) 500 MG tablet  "Take 2 tablets (1,000 mg) by mouth every 8 hours as needed for mild pain   aspirin 81 MG EC tablet Take 81 mg by mouth daily    cetirizine (ZYRTEC) 10 MG tablet Take 10 mg by mouth daily    LORazepam (ATIVAN) 1 MG tablet Take 1 tablet (1 mg) by mouth every 6 hours as needed (Anxiety, Nausea/Vomiting or Sleep)   magnesium oxide (MAG-OX) 400 MG tablet Take 1 tablet (400 mg) by mouth daily   mometasone (NASONEX) 50 MCG/ACT nasal spray Spray 2 sprays into both nostrils daily   ondansetron (ZOFRAN) 8 MG tablet If vomiting occurs with CC-486, take 1 tab (8 mg) 30 minutes prior to each subsequent CC-486 dose.   potassium chloride ER (K-TAB) 20 MEQ CR tablet Take 1 tablet (20 mEq) by mouth daily   pravastatin (PRAVACHOL) 20 MG tablet Take 1 tablet (20 mg) by mouth every evening for cholesterol.   prochlorperazine (COMPAZINE) 10 MG tablet Take 1 tablet (10 mg) by mouth every 6 hours as needed (Nausea/Vomiting)   senna-docusate (SENNA S) 8.6-50 MG tablet Take 4 tablets by mouth 2 times daily   study CC-486 (IDS #5077) 100 mg TABS CHEMOTHERAPY tablet Take 1 tablet (100 mg) by mouth daily Schedule 1. Take for 21 days, followed by 7 days off. Take at the same time each day on an empty stomach or with food (a light breakfast or meal of up to approximately 600 calories).  Swallow tablet whole with about 8oz of room temperature water. Do not take broken or cracked tablets.   hydrochlorothiazide (MICROZIDE) 12.5 MG capsule Take 25 mg by mouth daily        Physical Exam:    Blood pressure 147/84, pulse 113, temperature 99.6  F (37.6  C), temperature source Oral, resp. rate 16, height 1.651 m (5' 5\"), SpO2 100 %, not currently breastfeeding.  General: alert and cooperative, lying in bed, no acute distress  HEENT: sclera anicteric, EOMI, MMM  Neck: supple, normal ROM  CV: tachycardic with a regular rhythm, normal S1/S2, no m/r/g  Resp: CTAB, no wheezing/crackles, normal respiratory effort on ambient air  GI: soft, non-tender, " non-distended, bowel sounds present and normoactive  Extremities: warm and well-perfused, right leg more edematous than left  Skin: no rashes on limited exam, no jaundice  Neuro: AOx3, moves all extremities equally, no focal deficits  Access: Port in right upper chest (c/d/i)    Labs & Studies: I personally reviewed the following studies:  ROUTINE LABS (Last four results):  CMP  Recent Labs   Lab 06/07/19  0842 06/06/19  1730 06/06/19 0619   *  --  131*   POTASSIUM 3.2* 3.6 3.2*   CHLORIDE 98  --  100   CO2 23  --  22   ANIONGAP 9  --  8   *  --  89   BUN 7  --  9   CR 0.38*  --  0.41*   GFRESTIMATED >90  --  >90   GFRESTBLACK >90  --  >90   TRACEY 8.3*  --  8.1*   MAG  --   --  1.6     CBC  Recent Labs   Lab 06/07/19  0842 06/06/19  0619 06/05/19  1505   WBC 6.6 6.3 7.1   RBC 4.09 3.32* 3.72*   HGB 9.6* 7.3* 8.6*   HCT 32.2* 25.6* 27.7*   MCV 79 77* 75*   MCH 23.5* 22.0* 23.1*   MCHC 29.8* 28.5* 31.0*   RDW 18.7* 18.1* 17.9*   * 212 244     INR  Recent Labs   Lab 06/07/19  0842 06/06/19  0619 06/03/19  0651   INR 1.39* 1.41* 1.32*     MR BRAIN W/O & W CONTRAST 6/7/2019 7:55 AM  HISTORY: Altered level of consciousness (LOC), unexplained;  stage IV ovarian cancer, suspicion for metastasis.  COMPARISON: Head CT 6/5/2019.  IMPRESSION:  1. Long segment filling defect within the superior sagittal sinus.  Additional filling defect extending from the distal left transverse  sinus to the proximal internal jugular vein. Findings concerning for  dural venous sinus thrombosis. Consider further evaluation with brain  MRV.  2. No evidence of intracranial metastatic disease.  3. Minimal scattered periventricular and subcortical white matter  hyperintensities, likely sequelae across all consistencies.    US LOWER EXTREMITY VENOUS DUPLEX BILATERAL, 6/7/2019 12:06 PM  HISTORY: r/o DVT, h/o LE DVT with now worsening edema   COMPARISON: None      IMPRESSION:  1.  Right lower extremity partially occlusive to occlusive  thrombus in  the right common femoral vein, femoral vein, popliteal vein.  Dilatation of the vein with regions of prominent hypoechogenicity  suggest superimposed acute/subacute on chronic thrombus.  2.  No left lower extremity DVT.    Assessment & Plan:   Di Evans is a 58 year old female with history notable for recurrent metastatic clear cell ovarian carcinoma (w/peritoneal carcinomatosis requiring weekly paracentesis), currently on the TRIO Study (with pembrolizumab), recently diagnosed RLE DVT on 4/12/19 (s/p thrombectomy and started on enoxaparin), and asymptomatic PE on CT scan from 4/29/19. She is admitted to the hospital for evaluation of weakness and confusion. MRI on 6/7/19 revealed a superior sagittal sinus thrombosis and the hematology team has been consulted for recommendations regarding anticoagulation.    It is difficult to determine if this is a Lovenox failure. Two or three doses were held earlier this week in preparation for the port placement on 6/3/19. The patient noted symptoms started about 2 days later. Two doses of Lovenox were again held at the time of admission, and the morning following, in preparation for a therapeutic paracentesis on 6/6/19. The MRI was obtained on 6/7/19, about 2 days after the patient noted symptoms, notable for the sagittal sinus thrombosis. She was restarted on Lovenox in the evening on 6/6/19, and Xa level after two doses was 0.4 (technically sub-therapeutic). It is possible she was sub-therapeutic on Lovenox, the clot formed while she was off Lovenox, or the clot was present before detected on MRI today. As such, we recommend increasing the dose of Lovenox.    Summary of Recommendations:    Continue anticoagulation with Lovenox, at increased dose of 70 mg BID.    Do not need to check another Xa level.      Patient was seen and plan of care was discussed with attending physician Dr. Bella.    Nellie Castañeda MD/PhD  Heme/Onc Fellow    Attending Note:   I have reviewed the patient chart, and interviewed and examined the patient.  I agree with the assessment and plan. Patient has superior sagittal sinus thrombosis noted on MRI, was not clearly having symptoms attributable to this thrombus, and may have been there since the time of the leg DVT diagnosed in April 2019. She did have some interruption of anticoagulation over the past week for procedures. She is clearly hypercoagulable, and the anti-Xa is a bit below therapeutic. Therefore recommend increased the dose of enoxaparin slightly to 70 mg q12h. Also avoid holding for paracentesis, since has not had bleeding in past when she has had the procedure on enoxparin. No need to switch to a different anticoagulant at this time.  Aleyda Bella MD  Hematology

## 2019-06-07 NOTE — PROGRESS NOTES
Brief progress note    Appreciate RN's page about patient's R>L LE edema.    S: Endorsed bilateral foot numbness and tingling. Denied calf pain, SOB, or chest pain.    O:   Oxygen saturation at 98% in the room.  Vitals:    06/06/19 1930 06/06/19 1955 06/06/19 2049 06/06/19 2157   BP: 148/83 155/81 143/82 147/84   BP Location:   Right arm    Pulse:       Resp: 16 16  16   Temp: 98.7  F (37.1  C) 98.4  F (36.9  C) 98.9  F (37.2  C) 99.6  F (37.6  C)   TempSrc: Oral Oral Oral Oral   SpO2: 98% 99% 100% 100%   Height:         G: sitting in bed, NAD    A/P:  Patient has a h/o DVTs in her right leg. She is currently on therapeutic lovenox, which is the treatment for old or new DVTs in the right leg. Considering LE doppler US and will discuss with staff.    MD [place] SaucedoN OBGYN PGY-1  6/7/2019

## 2019-06-07 NOTE — PLAN OF CARE
Pt alert and orientated. Can follow commands and make needs known. On room air. Tachycardic in the 110s-120s while at rest. Afebrile. SBP in the 140s. No acute events noted overnight. NS infusing at prescribed order of 75mL/hr via central port catheter. Family at bedside and supportive with cares. Will continue to monitor as noted and follow POC.     Kiet Agustin RN [phone]

## 2019-06-07 NOTE — UTILIZATION REVIEW
"[facility]  Admission Status; Secondary Review Determination     Admission Date: 6/5/2019 11:58 PM       Under the authority of the Utilization Management Committee, the utilization review process indicated a secondary review on the above patient.  The review outcome is based on review of the medical records, discussions with staff, and applying clinical experience noted on the date of the review.        ()      Inpatient Status Appropriate - This patient's medical care is consistent with medical management for inpatient care and reasonable inpatient medical practice.      (X) Observation Status Appropriate - This patient does not meet hospital inpatient criteria and is placed in observation status. If this patient's primary payer is Medicare and was admitted as an inpatient, Condition Code 44 should be used and patient status changed to \"observation\".   () Admission Status NOT Appropriate - This patient's medical care is not consistent with medical management for Inpatient or Observation Status.        () Outpatient Procedure Status Appropriate - Procedure not on Medicare Inpatient list and no complications at the time of this review       RATIONALE FOR DETERMINATION      Brief clinical presentation, information copied from the chart, abbreviated and edited for relevant content:     6/7/2019 9:05 AM (CT) Josefina Ontiveros: Paged Dr. Mayorga to change to OBS. Patient is a 58 year old with recurrent ovarian cancer admitted inpatient for confusion and fatigue, found to be anemic (7.3), slightly hyponatremic (127) and with ascites. After admission yesterday had ultrasound-guided paracentesis with removal of 2300 of yellow brownish fluid removed with improvement in her ascites. She also received 1 unit of PRBCs with improvement and today after IVF her NA was 131. She reported feeling well to the MD this morning. CT of her head yesterday didn't show anything concerning, they repeated and MRI today. No IP criteria noted. " Procedures could have been completed as outpatient. Likely to be discharged today. I advised the team to change to OBS based on the low intensity of cares, improvement after one night of treatment, mild severity of symptoms that were ameliorated quickly with interventions.            The information on this document is developed by the utilization review team in order for the business office to ensure compliance.  This only denotes the appropriateness of proper admission status and does not reflect the quality of care rendered.         The definitions of Inpatient Status and Observation Status used in making the determination above are those provided in the CMS Coverage Manual, Chapter 1 and Chapter 6, section 70.4.      Sincerely,      Josefina Ontiveros MD   Utilization Review/ Case Management  [facility].

## 2019-06-08 NOTE — PLAN OF CARE
Shift: [id number]  HD#3: fatigue, weakness, confusion. Has h/o recurrent platinum resistant ovarian cancer(per MD note).  VS: AVSS except tachy in the 110-120's  Neuro: Alert and oriented x4, no change overnight. Flat affect, calls appropriately. No s/s confusion.  Cardiac: Tachycardic.             Respiratory: WNL  GI/: Distended, +BS, passing flatus, no BM overnight. Voiding spontaneously.   Diet/appetite: NPO  Activity: SBA to bathroom, steady gait.  Pain: Denies pain  Skin/drains: Paracentesis right abdominal dressing site intact. RLE swollen, greater then left. PCD's only to left.   Lines: Right chest port infusing IVMF, Left PIV saline locked.   Plan: Monitor petros/vitals and continue with POC

## 2019-06-08 NOTE — PROGRESS NOTES
"Gynecologic Oncology   Progress Note      HD#3: fatigue, weakness, confusion  Disease: Recurrent platinum resistant ovarian cancer    24 hour events:   - CT venogram \"Deep sinus thrombosis involving the left lateral transverse sinus, sigmoid sinus, upper jugular vein and majority of the superior sagittal sinus except the anterior portion.\"  - LE US - RLE DVT   - tachycardia to 100s  - heparin 10A level subtherapeutic at 0.40  - potassium repeletion  - hematology consultation, increase lovenox to 70 mg BID, do not need to recheck Xa level, do not hold if having routine paracentesis    Subjective: Ms. Evans is feeling well this morning. She denies any weakness.  She was able to ambulate to the bathroom three times overnight.  She denies any headaches, vision changes or chest pain. Reports shortness of breathing with talking or ambulating.  Reports swelling in legs has improved overnight.    Objective:   Patient Vitals for the past 24 hrs:   BP Temp Temp src Pulse Heart Rate Resp SpO2   06/08/19 0814 145/89 96.1  F (35.6  C) Oral -- 116 16 98 %   06/08/19 0450 (!) 136/93 95.3  F (35.2  C) Oral 116 -- 18 99 %   06/07/19 2358 155/88 97.3  F (36.3  C) Oral -- 125 18 99 %   06/07/19 1420 148/78 97.4  F (36.3  C) Oral -- 109 16 98 %     GEN - alert & oriented x3, no acute distress  CV - tachycardic, regular rhythm, no murmurs  PULM - breathing comfortably on room air, CTAB in anterior lung fields  ABD - soft, nondistended, nontender  Ext - nontender, R leg > L leg, trace edema on right  NEURO - CN II-VII in tact, strength and sensation normal and equal bilaterally in UE and LE    Lines/drains:   Port, right chest  PIV right and left arms    I/Os  (Yesterday // Since Midnight)  PO: 240 // NPO  IVF: 118 ml // 0  Urine: 625 ml // 1000      New Labs/Imaging-   US LE (6/7)  1.  Right lower extremity partially occlusive to occlusive thrombus in  the right common femoral vein, femoral vein, popliteal vein.  Dilatation of the " vein with regions of prominent hypoechogenicity  suggest superimposed acute/subacute on chronic thrombus.  2.  No left lower extremity DVT.    CTV Head and Neck (6/7)  Findings: There is filling defect along the superior sagittal sinus  except the anterior and mid parts. In addition there is filling defect  along the lateral aspect of the transverse sinus, the sigmoid sinus  and upper jugular vein. These are compatible with sinus thrombosis.  The rest of the deep venous sinuses are patent. There is no evidence  of intracranial hematoma. No mass effect or shift. There is no  hydrocephalus.                                                                      IMPRESSION: Deep sinus thrombosis involving the left lateral  transverse sinus, sigmoid sinus, upper jugular vein and majority of  the superior sagittal sinus except the anterior portion.    Assessment:  Ms. Evans is a 58-year-old with progressive ovarian cancer admitted with fatigue and confusion, and new diagnosis dural venous sinus thrombosis and right lower extremity DVT.    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 Currently enrolled in TRIO study, most recently C4D8 on 5/22/19 with pembrolizumab. CC-486 held.   FEN: NPO for procedure. Hyponatremia, resolved. Albumin 1.5. NS 75 ml/hr  Pain: Tylenol, oxycodone PRN.  Heme: History of pulmonary embolism and DVT (s/p thrombectomy 4/12/19), on therapeutic lovenox, hep 10a subtherapeutic, lovenox increased to 70 mg BID. Chronic anemia, Hgb 7.3 > 1U RBC > 9.6  CV: Tachycardic on presentation to the ED in the 130s with response to IV fluids to 110s, which is patient's  current baseline. No respiratory symptoms and satting 99% on RA, so will not obtain CT PE, particularly since she is already on therapeutic lovenox. On telemetry given 8 beat run of SVT in ED, asymptomatic with no further episodes. HLD, continue home statin.  Resp: No acute issues.  GI: Nausea, zofran and compazine PRN. Constipation, senna, miralax, dulcolax suppository PRN.  : Voiding spontaneously, no issues.  MSK: Subjective weakness, PT/OT consult.  Neuro/Psych: Brain MRI (6/7): dural venous sinus thrombosis, CT venogram (6/7): sinus thrombosis. Neuro IR and stroke team consulted. Continue to monitor.  MRV today.  Endocrine: no issues  ID: Started on empiric treatment with Vanocmycin and Cefepime in the ED. Antibiotics discontinued as patient is afebrile with negative infectious workup (WBC 7.5, lactic acid 1.0, negative UA, negative CXR). Continue to monitor.  PPx: SCD on left leg. Therapeutic lovenox.  Disposition: Anticipate discharge to home pending improvement in symptoms. PT and OT recommend home with assist.      Amy Schumer, MD  OB/GYN, PGY3  6/8/19

## 2019-06-08 NOTE — PLAN OF CARE
HD#3: fatigue, weakness, confusion. Has h/o recurrent platinum resistant ovarian cancer(per MD note).  VS: VSS except tachy in the 100s  Neuro: Alert and oriented x4, calls appropriately. No s/s confusion.  Cardiac: tachy                 Respiratory: wnl  GI/: distended, +BS, passing flatus. Voiding spontaneously(not saved)  Diet/appetite: tolerated regular diet   Activity: SBA to bathroom, steady gait.  Pain: Denies pain  Skin/drains:  rt abd paracentesis site with intact dressing. Rt LE swollen, more than left(both ankles and feet are swollen)  Lines: Rt chest port needle and  PIV removed.  Pt discharged to home. Discharge instructions and follow up appointments reviewed with pt and family. No c/o's at time of discharge.

## 2019-06-08 NOTE — PROGRESS NOTES
Late Entry:    Patient had presented this admission with altered mental status that ultimately led to a MRI this AM:    Impression:  1. Long segment filling defect within the superior sagittal sinus. Additional filling defect extending from the distal left transverse sinus to the proximal internal jugular vein. Findings concerning for dural venous sinus thrombosis. Consider further evaluation with brain MRV.  2. No evidence of intracranial metastatic disease.  3. Minimal scattered periventricular and subcortical white matter hyperintensities, likely sequelae across all consistencies.    Pt mental status has improved during her stay without any worsening confusion.    Case initially discussed with neurosurgery for possible embolectomy who recommended discussion with neuro-IR.  Neuro-IR recommended case be reviewed with Neurology Stroke team, who recommended MRV if able, however, patient had just gotten a contrast load this AM.  Therefore, pt was to undergo CT venogram.    IMPRESSION: Deep sinus thrombosis involving the left lateral transverse sinus, sigmoid sinus, upper jugular vein and majority of the superior sagittal sinus except the anterior portion.    Heme/Onc consult requested in meantime.  Final recommendations - increase lovenox to 70 mg BID given sub therapeutic Xa level today.    Discussed MRV results and Heme/Onc recommendations with Neuro Stroke team - final recommendations from their team in AM but no acute interventions at this time.    Julio Leroy MD

## 2019-06-08 NOTE — PROGRESS NOTES
"NEUROLOGY STROKE PROGRESS NOTE   2019  Di Evans  : 1960 MRN# [id number]    Di Evans is a 58 year old year old female admitted on 2019 with hx metastatic ovarian cancer, for concern of increased fatigue and word-finding difficulty which has since improved. Imaging demonstrates venous thrombosis in sagittal and left transverse sinus with extension to jugular vein.     Interval:   No acute events overnight. She is resting comfortably in bed with loved ones at bedside. She as found to have a subtherapeutic Xa level today and her lovenox dose was increased to 70 mg BID by hem/onc. There have been no symptoms suggestive of stroke.            Physical Exam:   /89 (BP Location: Right arm)   Pulse 116   Temp 96.1  F (35.6  C) (Oral)   Resp 16   Ht 1.651 m (5' 5\")   SpO2 98%   BMI 22.96 kg/m     Physical Exam:   General - appears comfortable.     Mental status: Awake, alert, attentive, oriented to p/p/t. Speech is fluent, comprehension and repetition intact. No dysarthria.  Cranial nerves: Eyes conjugate, PERRLA, EOMI, visual fields full, face symmetric, facial sensation intact, palate rise symmetric, tongue/uvula midline, hearing intact to conversation.  Motor:/5 strength in all 4 extremities. Pronator drift negative.   Reflexes: Not assessed  Sensory: Intact to light touch in arms and legs.   Coordination: No FNF dysmetria, but slight proprioceptive impairment with eyes closed.   Gait: Deferred          Data:   Labs/Studies:  Recent Labs   Lab Test 19  1157 19  0842 19  1730 19  0619 19  1505  19  1201   NA  --  131*  --  131*  --   --  127*   POTASSIUM 3.0* 3.2* 3.6 3.2*  --   --  4.3   CHLORIDE  --  98  --  100  --   --  93*   CO2  --  23  --  22  --   --  26   ANIONGAP  --  9  --  8  --   --  9   GLC  --  103*  --  89  --   --  111*   BUN  --  7  --  9  --   --  9   CR  --  0.38*  --  0.41*  --   --  0.46*   TRACEY  --  8.3*  --  8.1*  --   --  " 8.7   WBC  --  6.6  --  6.3 7.1   < > 5.9   RBC  --  4.09  --  3.32* 3.72*   < > 3.75*   HGB  --  9.6*  --  7.3* 8.6*   < > 8.7*   PLT  --  147*  --  212 244   < > 538*    < > = values in this interval not displayed.       Recent Labs   Lab Test 06/07/19  0842 06/06/19  0619 06/03/19  0651  02/15/19  1314 02/05/19  1023 01/23/19  1249   INR 1.39* 1.41* 1.32*   < > 1.12 1.15* 1.02   PTT  --   --   --   --  33 34 30    < > = values in this interval not displayed.     Impression/recommendation:  Di Evans is a 58 year old h/o metastatic ovarian cancer now presenting with sagittal sinus thromboses and left transverse sinus thromboses extending to jugular vein. She was found to have subtherapeutic Xa and her lovenox was increased.   - Agree with increased anticoagulation   - We would have recommended further monitoring in the hospital for clinical stability, but she was well appearing this morning and discharged.   - Follow-up with neurology outpatient     Patient was discussed with Dr. Mac. She had left the hospital prior to us seeing her this morning.     Maru Haddad  PGY-2  P: [phone]

## 2019-06-09 NOTE — PLAN OF CARE
Physical Therapy Discharge Summary    Reason for therapy discharge:    Discharged to home.    Progress towards therapy goal(s). See goals on Care Plan in [facility] electronic health record for goal details.  Goals partially met.  Barriers to achieving goals:   discharge from facility.    Therapy recommendation(s):    Continue home exercise program.

## 2019-06-10 NOTE — TELEPHONE ENCOUNTER
Patient discharged from [facility] IP  ( Inpatient or ER).    Discharge location: [facility]  Discharge date: 6/8/19  Diagnosis: Other Acute Pulmonary Embolism Without Acute Cor Pulmale (H), Acute Deep Vein Thrombosis (Dvt) Of Distal Vein O  Patient has been in the ER/IP 0/1 times.  Care Coord:  NA  Please follow up as appropriate. If no follow up required, please close encounter.

## 2019-06-10 NOTE — PLAN OF CARE
Occupational Therapy Discharge Summary    Reason for therapy discharge:    Discharged to home with home therapy.    Progress towards therapy goal(s). See goals on Care Plan in [facility] electronic health record for goal details.  Goals partially met.  Barriers to achieving goals:   discharge from facility.    Therapy recommendation(s):    Continued therapy is recommended.  Rationale/Recommendations:  Pt would benefit from further IP therapy to increase safety awareness with ADLs and IADLs to ensure safe return home. Pt has assist from her roommate if needed. .

## 2019-06-11 PROBLEM — R41.0 CONFUSION: Status: ACTIVE | Noted: 2019-01-01

## 2019-06-11 NOTE — ED NOTES
[facility], [place]   ED Nurse to Floor Handoff     Di Evans is a 58 year old female who speaks English and lives with family members,  in a home  They arrived in the ED by car from home    ED Chief Complaint: Slurred Speech    ED Dx;   Final diagnoses:   TIA (transient ischemic attack)   Other acute pulmonary embolism without acute cor pulmonale (H)         Needed?: No    Allergies:   Allergies   Allergen Reactions     Gemfibrozil Muscle Pain (Myalgia)     Lovastatin      headaches     Penicillins Nausea and Vomiting   .  Past Medical Hx:   Past Medical History:   Diagnosis Date     DVT (deep venous thrombosis) (H)      Hypertension      Ovarian cancer (H)      Pulmonary embolism (H)       Baseline Mental status: WDL  Current Mental Status changes: at basesline    Infection present or suspected this encounter: no  Sepsis suspected: No  Isolation type: No active isolations     Activity level - Baseline/Home:  Stand with Assist  Activity Level - Current:   Stand with Assist    Bariatric equipment needed?: No    In the ED these meds were given:   Medications   heparin 100 UNIT/ML injection 5 mL (has no administration in time range)   heparin  drip 25,000 units in 0.45% NaCl 250 mL (see additional administration details for dose) (1,100 Units/hr Intravenous New Bag 6/11/19 1521)   heparin bolus from infusion pump (has no administration in time range)   0.9% sodium chloride BOLUS (0 mLs Intravenous Stopped 6/11/19 1347)   sodium chloride (PF) 0.9% PF flush 90 mL (90 mLs Intravenous Given 6/11/19 1152)   iopamidol (ISOVUE-370) solution 75 mL (75 mLs Intravenous Given 6/11/19 1151)   enoxaparin (LOVENOX) injection 70 mg (70 mg Subcutaneous Given 6/11/19 1351)   sodium chloride (PF) 0.9% PF flush 84 mL (84 mLs Intravenous Given 6/11/19 1439)   iopamidol (ISOVUE-370) solution 53 mL (53 mLs Intravenous Given 6/11/19 1439)       Drips running?  Yes, heparin 1100 units/hr  (11ml/hr)    Home pump  No    Current LDAs  Incision/Surgical Site 02/07/19 Bilateral Abdomen (Active)   Number of days: 124       Incision/Surgical Site 06/03/19 Right Chest (Active)   Number of days: 8       Labs results:   Labs Ordered and Resulted from Time of ED Arrival Up to the Time of Departure from the ED   CBC WITH PLATELETS DIFFERENTIAL - Abnormal; Notable for the following components:       Result Value    Hemoglobin 9.2 (*)     Hematocrit 31.1 (*)     MCH 23.4 (*)     MCHC 29.6 (*)     RDW 19.4 (*)     Absolute Lymphocytes 0.4 (*)     All other components within normal limits   BASIC METABOLIC PANEL - Abnormal; Notable for the following components:    Sodium 131 (*)     Creatinine 0.42 (*)     Calcium 8.3 (*)     All other components within normal limits   PARTIAL THROMBOPLASTIN TIME - Abnormal; Notable for the following components:    PTT 45 (*)     All other components within normal limits   CREATININE POCT - Abnormal; Notable for the following components:    Creatinine 0.2 (*)     All other components within normal limits   ROUTINE UA WITH MICROSCOPIC - Abnormal; Notable for the following components:    Ketones Urine 5 (*)     Bacteria Urine Few (*)     All other components within normal limits   ISTAT INR POCT - Abnormal; Notable for the following components:    ISTAT INR 1.4 (*)     All other components within normal limits   GLUCOSE MONITOR NURSING POCT   GLUCOSE BY METER   TROPONIN I   VITAL SIGNS AND NEURO CHECKS   ACTIVITY   PULSE OXIMETRY NURSING   NOTIFY   ISTAT INR NURSING POCT   ISTAT TROPONIN NURSING POCT   PLATELETS MONITORED PER HEPARIN TREATMENT PROTOCOL (FOR MEANINGFUL USE   ASSESSMENT   NOTIFY PHYSICIAN   NOTIFY PHYSICIAN   IP ASSIGN PROVIDER TEAM TO TREATMENT TEAM       Imaging Studies:   Recent Results (from the past 24 hour(s))   CT Head Neck Angio w/o & w Contrast   Result Value    Radiologist flags New pulmonary emboli (Urgent)    Narrative    CTA  HEAD NECK WITH CONTRAST  6/11/2019 12:08 PM    Head CT without contrast  CT angiogram of the neck   CT angiogram of the base of the brain with contrast  Reconstruction by the Radiologist on the 3D workstation    Provided History:  slurred speech    Comparison:  MR head dated 6/7/2019.      Technique:  HEAD CT:  Using multidetector thin collimation helical acquisition  technique, axial, coronal and sagittal CT images from the skull base  to the vertex were obtained without intravenous contrast.   HEAD and NECK CTA: During rapid bolus intravenous injection of  nonionic contrast material, axial images were obtained using thin  collimation multidetector helical technique from the base of the skull  through the [place]. This CT angiogram data was reconstructed  at thin intervals with mild overlap. Images were sent to the My [employer] App  workstation, and 3D reconstructions were obtained. The axial source  images, multiplanar reformations, 3D reconstructions in both maximum  intensity projection display and volume rendered models were reviewed,  with reconstructions performed by the technologist and the  radiologist.    Contrast: 75ml isovue 370    Findings:  Head CT: There is no intracranial hemorrhage, mass effect, or midline  shift. Gray/white matter differentiation in both cerebral hemispheres  is preserved. Ventricles are proportionate to the cerebral sulci.  Visualized portions of the paranasal sinuses and mastoid air cells are  clear. Mild generalized cerebral volume loss and leukoaraiosis.    Head CTA demonstrates no aneurysm or stenosis of the major  intracranial arteries. The anterior communicating artery is patent.  The posterior communicating arteries are not visualized. Filling  defects again noted in the left transverse sinus, sigmoid sinus,  superior sagittal sinus and the left internal jugular vein. Findings  are better demonstrated on prior venous phase imaging.    Neck CTA demonstrates no stenosis of the major cervical arteries.  The  origins of the great vessels from the aortic arch are patent. Moderate  grade calcification of the right carotid bifurcation with  approximately 25% stenosis. Maximum diameter at the bulb measuring 3  mm. The normal distal right internal carotid artery measures 4 mm. The  normal distal left internal carotid artery measures 4 mm. No  significant calcification on the left.     Redemonstration of multiple pulmonary emboli involving the left upper  and left lower lobes. Pulmonary emboli involving the anterior and  posterior segments of the left upper lobe appear new as compared to CT  dated 4/29/2019. Mediastinal and supraclavicular lymphadenopathy.  Largest supraclavicular node measuring approximately 1.2 cm, see  series 11 image 508.      Impression    Impression:    1. Head CTA demonstrates no aneurysm or stenosis of the major  intracranial arteries.   2. Neck CTA demonstrates approximately 25% stenosis of the right  internal carotid artery at the level of the bifurcation. No  significant stenosis in the left internal carotid or the vertebral  arteries.  3. No intracranial hemorrhage on the noncontrast head CT.    4. Redemonstration of filling defects in the left transverse sinus,  left sigmoid sinus, superior sagittal sinus and left internal jugular  vein consistent with known sinus thrombosis. Findings are better  demonstrated on prior venous phase imaging.  5. Increased pulmonary emboli burden with new segmental pulmonary  emboli involving the anterior and posterior segments of the left upper  lobe.  6 Small pleural effusions and adjacent compressive atelectasis visible  in the upper lungs.   7. Mediastinal and supraclavicular lymphadenopathy.    [Urgent Result: New pulmonary emboli]    Finding was identified on 6/11/2019 12:20 PM.     Rachael Olivas MD was contacted by Dr. Lino Dempsey DO (Radiology  R2) at 6/11/2019 1:49 PM and verbalized understanding of the urgent  finding.     I have personally  reviewed the examination and initial interpretation  and I agree with the findings.    KIRA CABRERA MD   CT Chest Pulmonary Embolism w Contrast   Result Value    Radiologist flags Pulmonary embolism (AA)    Narrative    EXAM: CT CHEST PULMONARY EMBOLISM W CONTRAST  6/11/2019 2:48 PM     HISTORY:  Possible new pulmonary emboli noted on CT angiogram of the  head and neck.  Evaluate for new emboli formation.       Comparison: CT cap 4/29/2019    TECHNIQUE: Volumetric helical acquisition of CT images of the chest  from the lung apices to the kidneys were acquired after the  administration of 80 mL of Isovue-370 IV contrast. .   Three-dimensional (3D) post-processed angiographic images were  reconstructed, archived to PACS and used in interpretation of this  study.     FINDINGS:     Contrast bolus is: adequate.  Exam is positive for acute pulmonary  embolism.  There is multiple filling defects in the right upper and  lower lobes segmental branches which are worsened since CT cap dated  4/29/2019. Left lower lobe subsegmental filling defect (series 6,  image 173).    New bilateral left greater than right pleural effusion. Right lower  lobe which solid and groundglass opacity. No pneumothorax. Heart is  normal. No evidence of right heart strain. Thoracic aorta is intact.  Perivascular prominent mediastinal lymph nodes.    Upper abdomen: Partially visualized upper abdomen loculated ascites  centered in the lesser sac and perigastric region with mass effect to  the stomach.      Impression    IMPRESSION:   1. Exam is positive for acute pulmonary embolism. Right upper and  lower lobe segmental pulmonary embolism and subsegmental left lower  lobe pulmonary embolism, worsening since 4/29/2019.  2. No evidence of right heart strain.  3. Right basilar peripheral wedge-shaped groundglass and solid opacity  is differential diagnosis includes hemorrhage versus infarction.  4. Bilateral pleural effusions.    [Critical Result:  "Pulmonary embolism]    Finding was identified on 6/11/2019 2:51 PM.     Dr Olivas was contacted by Dr. Liz Trujillo M.D.  at 6/11/2019  3:03 PM and verbalized understanding of the critical finding.  Call    I have personally reviewed the examination and initial interpretation  and I agree with the findings.    AIDE HARDING MD       Recent vital signs:   /83   Pulse 102   Temp 97  F (36.1  C) (Oral)   Resp 25   Ht 1.651 m (5' 5\")   Wt 62.1 kg (137 lb)   SpO2 97%   BMI 22.80 kg/m      [place] Coma Scale Score: 15 (06/11/19 1535)       Cardiac Rhythm: Normal Sinus  Pt needs tele? No  Skin/wound Issues: None    Code Status: Full Code    Pain control: pt had none    Nausea control: pt had none    Abnormal labs/tests/findings requiring intervention: Lvat Peyton    Family present during ED course? Yes   Family Comments/Social Situation comments: None at this time    Tasks needing completion: Hep 10a 8643 and q6 there after    Mihaela Damon RN    [phone] [facility]      "

## 2019-06-11 NOTE — ED AVS SNAPSHOT
[facility], [place], Emergency Department  [address] [place] [id number]  Phone:  [phone]                                    Di Evans   MRN: [id number]    Department:  [facility], Emergency Department   Date of Visit:  6/11/2019           After Visit Summary Signature Page    I have received my discharge instructions, and my questions have been answered. I have discussed any challenges I see with this plan with the nurse or doctor.    ..........................................................................................................................................  Patient/Patient Representative Signature      ..........................................................................................................................................  Patient Representative Print Name and Relationship to Patient    ..................................................               ................................................  Date                                   Time    ..........................................................................................................................................  Reviewed by Signature/Title    ...................................................              ..............................................  Date                                               Time          22EPIC Rev 08/18

## 2019-06-11 NOTE — PHARMACY
Pharmacy Stroke Code Response  Pharmacist responded as part of the Stroke Code Team activation to patient care area ED.  The Stroke Team determined that the patient was not a candidate for IV alteplase therapy and the pharmacy team was dismissed at 1120.    Latricia Sanabria, JaswantD

## 2019-06-11 NOTE — ED TRIAGE NOTES
Patient presents via hospital wheelchair from home with complaints of slurred speech and right hand numbness. Patient reports this began approximately 1.5 hours ago. Patient and family reports speech is better now and patient reports the numbness in her hand is now gone.

## 2019-06-11 NOTE — H&P
[facility] History and Physical    Di Evans MRN# [id number]   Age: 58 year old YOB: 1960     Date of Admission:  6/11/2019    Primary care provider: Sonja Davies             Chief Complaint:   Confusion x12min         History of Present Illness:   Di Evans, 57yo female with progressive resistant ovarian cancer, presented to ED by ambulance for confusion. Patient was on the phone at ~0900 this morning when she noted word finding difficulties. She noted that her thoughts are clear, but just could not get the words out. Simultaneously, she also have right thumb and index finger numbness, which resolved with the speech difficulties.  Yesterday ~1430, she noted some right sided sharp chest pain with deep breaths, which has resolved by today. Denied fever, chills, night sweats, URI-like symptoms (rhinorrhea, sore throat, coughing), chest pain, SOB, abdominal pain, diarrhea, or UTI-like symptoms (dysuria, frequency).     ED course: CT/CTA imaging of the head and neck showed no evidence of intracranial hemorrhage or acute ischemic stroke, but showed possible new pulmonary emboli on patient's left upper lung. CTA of the chest showed multiple new pulmonary emboli, bilaterally, with concern for pulmonary infarct in the right lung. Patient's laboratory studies returned with normal glucose of 89; no leukocytosis (WBC 6800), chronic anemia with stable hemoglobin of 9.2; hyponatremia at her baseline 131.         Cancer Treatment History:   7/2018: Six months of bloating, decreased appetite, early satiety.     7/16/18:  CT A/P:  Large cystic/solid mass within the pelvis highly suspicious for ovarian malignancy. 2.  Omental thickening suspicious for metastatic disease. 3.  Large volume of ascites.  Malignant ascites cannot be excluded. 4.  Small left pleural effusion and left lower lobe atelectasis. 5.  Diverticulosis, small hiatal hernia, and gallbladder sludge.     7/24/18:  =  598     7/25/18:  CT Chest:  1. No definite metastatic disease in the chest. 2. Small left pleural effusion. 3. 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 Small amount of ascites, improved from the prior study. 2. Peritoneal nodularity and thickening, improved from the prior study. 3. Stable bilateral pulmonary nodules measuring up to 4 mm. No new or enlarging pulmonary nodules.     11/16/18:  Cycle #5 carboplatin AUC 6 and paclitaxel 175 mg/m2,  = 71     12/7/18:  Cycle #6 carboplatin AUC 6 and paclitaxel 175 mg/m2,  = 71     1/9/19:  CT C/A/P:  1. Findings suspicious for worsening intra-abdominal involvement by ovarian malignancy with increased peritoneal nodularity and increased retroperitoneal and mesenteric adenopathy. Continued small ascites. 2. Unchanged tiny pulmonary nodules without evidence of malignancy in  the chest     2/7/19: Diagnostic laparoscopy and peritoneal biopsy- biopsy  positive for poorly differentiated adenocarcinoma     2/20/19: C1D1 TRIO  orally x21 days started  2/27/19:C1D8 TRIO pembrolizumab  3/20/19: C2D1 TRIO  orally x21 days, pembrolizumab     3/25/19: Therapeutic paracentesis      4/3/19: CT C/A/P: Mixed response to treatment with decreased ascites/decreased right paracolic gutter implants but with increased left retroperitoneal implant and increased mesenteric lymph node.      4/9/19: Retroperitoneal biopsy - high grade carcinoma, consistent w/ recurrence of previously diagnosed ovarian cancer     4/10/19: C2D22 TRIO     4/12/19: Right lower extremity DVT s/p thrombectomy. Started therapeutic lovenox with plan for 3 months of therapy.     4/17/19: C3D1 TRIO     4/29/19: CT C/A/P 1. R-sided segmental pulmonary emboli without evidence of right heart strain. 2. Increased malignant ascites with stable associated peritoneal carcinomatosis. 3. Slightly increased size of upper abdominal, central mesenteric, and retroperitoneal lymph nodes and prominent prevascular and upper mediastinal lymph nodes. 4. Unchanged small pulmonary nodules.      5/8/19: Therapeutic paracentesis     5/11/19: C3D15 TRIO  5/15/19: C4D1 TRIO. Therapeutic paracentesis.  5/22/19: C4D8 TRIO     5/23/19: Therapeutic paracentesis.  5/30/19: Therapeutic paracentesis.  6/3/19: Port placed  6/5-6/8/19: hospitalization for increase confusion, found to have dural venous sinus thrombosis (sagittal sinus thromboses and left transverse sinus thromboses extending to jugular vein). Therapeutic paracentesis on 6/6/19. S/p Hematology consult: increased lovenox dose of 70 mg BID. S/p Stroke consult: hold off any surgical or procedural intervention.         Past Medical History:     Past Medical History:   Diagnosis Date     DVT (deep venous thrombosis) (H)      Hypertension      Ovarian cancer (H)      Pulmonary embolism (H)           Past Surgical History:      Past Surgical History:   Procedure Laterality  Date     HYSTERECTOMY TOTAL ABDOMINAL, BILATERAL SALPINGO-OOPHORECTOMY, COMBINED Bilateral 7/30/2018    Procedure: COMBINED HYSTERECTOMY TOTAL ABDOMINAL, SALPINGO-OOPHORECTOMY;;  Surgeon: Jammie Dueñas MD;  Location: [facility] OR     INSERT PORT VASCULAR ACCESS N/A 6/3/2019    Procedure: Chest Port Placement, Single Lumen;  Surgeon: Luigi Thayer PA-C;  Location: [facility] OR     IR CHEST PORT PLACEMENT > 5 YRS OF AGE  6/3/2019     LAPAROSCOPY DIAGNOSTIC (GYN) N/A 2/7/2019    Procedure: Diagnostic Laparoscopy With Biopsy;  Surgeon: Jammie Dueñas MD;  Location: [facility] OR     LAPAROTOMY, TUMOR DEBULKING, COMBINED Bilateral 7/30/2018    Procedure: COMBINED LAPAROTOMY, TUMOR DEBULKING;  Exploratory Laparotomy, Modified Radical Hysterectomy, Removal of Cervix, Bilateral Salpingo - Oophorectomy, Omentectomy, Bilateral Para Aortic and Left Pelvic Lymph Node Dissection, Tumor Debulking, Proctoscopy;  Surgeon: Jammie Dueñas MD;  Location: [facility] OR     REMOVE PORT PERITONEAL N/A 1/7/2019    Procedure: REMOVE PORT PERITONEAL;  Surgeon: Chanel Rodriguez MD;  Location: [facility] OR     SURGICAL HISTORY OF -   1980    left ankle surgery     THROMBECTOMY LOWER EXTREMITY Right 04/2019          Social History:     Social History     Tobacco Use     Smoking status: Never Smoker     Smokeless tobacco: Never Used   Substance Use Topics     Alcohol use: Yes     Comment: occasional          Family History:     Family History   Problem Relation Age of Onset     Hypertension Mother      Hypertension Father      Cerebrovascular Disease Father         polycythemia     Breast Cancer Maternal Aunt         older age          Allergies:     Allergies   Allergen Reactions     Gemfibrozil Muscle Pain (Myalgia)     Lovastatin      headaches     Penicillins Nausea and Vomiting          Medications:     No current facility-administered medications on file prior to encounter.   Current Outpatient Medications on File Prior to  Encounter:  acetaminophen (TYLENOL) 500 MG tablet Take 2 tablets (1,000 mg) by mouth every 8 hours as needed for mild pain   aspirin 81 MG EC tablet Take 81 mg by mouth daily    cetirizine (ZYRTEC) 10 MG tablet Take 10 mg by mouth daily    enoxaparin (LOVENOX) 60 MG/0.6ML syringe Inject 0.6 mLs (60 mg) Subcutaneous 2 times daily   enoxaparin (LOVENOX) 80 MG/0.8ML syringe Inject 0.7 mLs (70 mg) Subcutaneous every 12 hours   hydrochlorothiazide (MICROZIDE) 12.5 MG capsule Take 25 mg by mouth daily   LORazepam (ATIVAN) 1 MG tablet Take 1 tablet (1 mg) by mouth every 6 hours as needed (Anxiety, Nausea/Vomiting or Sleep)   magnesium oxide (MAG-OX) 400 MG tablet Take 1 tablet (400 mg) by mouth daily   meclizine (ANTIVERT) 25 MG tablet TK 1 T PO  TID AS NEEDED  FOR DIZZINESS   mirtazapine (REMERON) 15 MG tablet Take 1 tablet (15 mg) by mouth At Bedtime   mometasone (NASONEX) 50 MCG/ACT nasal spray Spray 2 sprays into both nostrils daily   ondansetron (ZOFRAN) 8 MG tablet If vomiting occurs with CC-486, take 1 tab (8 mg) 30 minutes prior to each subsequent CC-486 dose.   oxyCODONE (OXY-IR) 5 MG capsule TK 1 TO 2 CS PO Q 6 H PRN FOR SEVERE PAIN   potassium chloride ER (K-TAB) 20 MEQ CR tablet Take 1 tablet (20 mEq) by mouth daily   pravastatin (PRAVACHOL) 20 MG tablet Take 1 tablet (20 mg) by mouth every evening for cholesterol.   prochlorperazine (COMPAZINE) 10 MG tablet Take 1 tablet (10 mg) by mouth every 6 hours as needed (Nausea/Vomiting)   senna-docusate (SENNA S) 8.6-50 MG tablet Take 4 tablets by mouth 2 times daily   study CC-486 (IDS #5077) 100 mg TABS CHEMOTHERAPY tablet Take 1 tablet (100 mg) by mouth daily Schedule 1. Take for 21 days, followed by 7 days off. Take at the same time each day on an empty stomach or with food (a light breakfast or meal of up to approximately 600 calories).  Swallow tablet whole with about 8oz of room temperature water. Do not take broken or cracked tablets.   traZODone (DESYREL) 50  MG tablet             Review of Systems:     CONSTITUTIONAL: Negative for  fevers, chills, fatigue, anorexia and weight loss  SKIN: Negative for rashes, lumps, or bumps  HEENT: Negative for vision changes, changes in hearing, sinus drainage, sore throat  RESPIRATORY: Negative for  cough, shortness of breath, dyspnea and wheezing, hemoptysis  CARDIOVASCULAR: Negative for  chest pain, dyspnea, palpitations, edema  GASTROINTESTINAL: Positive for nausea and vomiting x1 yesterday. Negative change in bowel habits, diarrhea, abdominal pain, abdominal distention, reflux, hematemesis and hematochezia. Positive for constipation.  GENITOURINARY: Negative for frequency, dysuria, nocturia, urinary incontinence and hematuria  MUSCULOSKELETAL: Negative for muscle weakness, joint stiffness, joint swelling, back pain  NEUROLOGIC: Negative for headaches, syncope, weakness, numbness, tingling, memory problems, or incoordination  PSYCHIATRIC: Negative for anxiety and depression  HEMATOLOGIC/LYMPHATIC:  Negative for easy bruising, bleeding and lymphadenopathy  ENDOCRINE:  Negative for heat intolerance and cold intolerance         Physical Exam:     Vitals:    06/11/19 1345 06/11/19 1430 06/11/19 1500 06/11/19 1530   BP: 142/87 131/84 144/87 139/83   Pulse: 96 105 98 102   Resp: 25      Temp:       TempSrc:       SpO2: 99% 98% 97% 97%   Weight:       Height:         Constitutional: Healthy appearing female, no acute distress  HEENT: Normal appearance.  Cardiovascular: Regular rate and rhythm without murmurs, clicks, gallops or rub  Respiratory: Clear to auscultation bilaterally without crackles or wheeze  Gastrointestinal: Abdomen soft, non-tender. BS normal. No masses, organomegaly  Neuro:  Mental status:  The patient is alert, attentive, and oriented x3 (place, time, person). Speech is clear and fluent with good repetition and comprehension. Naming was not tested. Clock drawing was precise and accurate.    Cranial nerves:  CN II:  Visual fields are grossly intact. Pupils are equal in size.  CN III, IV, VI: extraocular eye movements are all intact.  CN V: Facial sensation is grossly intact.  CN VII: Face is symmetric with normal eye closure and smile.  CN VII: Hearing is grossly normal.  CN XI: Head turning and shoulder shrug are intact  CN XII: Tongue is midline with normal movements and no atrophy.    Motor:  Muscle bulk and tone are normal. Strength is full bilaterally.      Biceps Triceps Hip flexion  Ankle flexion   Ankle extension  L 5 5 5  5  5   R 5 5 5  5  5     Sensory:  Light touch, pinprick, position sense, and vibration sense are intact in fingers and toes.    Extremities: right dorsalis pedis pulse intact, left dorsalis pedis pulse has always been hard to palpate by the same examiner. RLE 3+ edema. LLE 2+ edema  Skin: No suspicious lesions or rashes  Psychiatric: mentation appears normal and affect normal/bright  Lines: Port         Data:     Results for orders placed or performed during the hospital encounter of 06/11/19 (from the past 24 hour(s))   CBC with platelets differential   Result Value Ref Range    WBC 6.8 4.0 - 11.0 10e9/L    RBC Count 3.93 3.8 - 5.2 10e12/L    Hemoglobin 9.2 (L) 11.7 - 15.7 g/dL    Hematocrit 31.1 (L) 35.0 - 47.0 %    MCV 79 78 - 100 fl    MCH 23.4 (L) 26.5 - 33.0 pg    MCHC 29.6 (L) 31.5 - 36.5 g/dL    RDW 19.4 (H) 10.0 - 15.0 %    Platelet Count 293 150 - 450 10e9/L    Diff Method Automated Method     % Neutrophils 90.5 %    % Lymphocytes 5.5 %    % Monocytes 3.3 %    % Eosinophils 0.0 %    % Basophils 0.1 %    % Immature Granulocytes 0.6 %    Nucleated RBCs 0 0 /100    Absolute Neutrophil 6.1 1.6 - 8.3 10e9/L    Absolute Lymphocytes 0.4 (L) 0.8 - 5.3 10e9/L    Absolute Monocytes 0.2 0.0 - 1.3 10e9/L    Absolute Eosinophils 0.0 0.0 - 0.7 10e9/L    Absolute Basophils 0.0 0.0 - 0.2 10e9/L    Abs Immature Granulocytes 0.0 0 - 0.4 10e9/L    Absolute Nucleated RBC 0.0    Basic metabolic panel   Result  Value Ref Range    Sodium 131 (L) 133 - 144 mmol/L    Potassium 3.5 3.4 - 5.3 mmol/L    Chloride 96 94 - 109 mmol/L    Carbon Dioxide 25 20 - 32 mmol/L    Anion Gap 10 3 - 14 mmol/L    Glucose 86 70 - 99 mg/dL    Urea Nitrogen 8 7 - 30 mg/dL    Creatinine 0.42 (L) 0.52 - 1.04 mg/dL    GFR Estimate >90 >60 mL/min/[1.73_m2]    GFR Estimate If Black >90 >60 mL/min/[1.73_m2]    Calcium 8.3 (L) 8.5 - 10.1 mg/dL   Partial thromboplastin time   Result Value Ref Range    PTT 45 (H) 22 - 37 sec   ISTAT INR POCT   Result Value Ref Range    ISTAT INR 1.4 (H) 0.86 - 1.14   Troponin I   Result Value Ref Range    Troponin I ES <0.015 0.000 - 0.045 ug/L   Creatinine POCT   Result Value Ref Range    Creatinine 0.2 (L) 0.52 - 1.04 mg/dL    GFR Estimate >90 >60 mL/min/[1.73_m2]    GFR Estimate If Black >90 >60 mL/min/[1.73_m2]   Glucose by meter   Result Value Ref Range    Glucose 89 70 - 99 mg/dL   EKG 12-lead, tracing only   Result Value Ref Range    Interpretation ECG Click View Image link to view waveform and result    CT Head Neck Angio w/o & w Contrast   Result Value Ref Range    Radiologist flags New pulmonary emboli (Urgent)     Narrative    CTA  HEAD NECK WITH CONTRAST 6/11/2019 12:08 PM    Head CT without contrast  CT angiogram of the neck   CT angiogram of the base of the brain with contrast  Reconstruction by the Radiologist on the 3D workstation    Provided History:  slurred speech    Comparison:  MR head dated 6/7/2019.      Technique:  HEAD CT:  Using multidetector thin collimation helical acquisition  technique, axial, coronal and sagittal CT images from the skull base  to the vertex were obtained without intravenous contrast.   HEAD and NECK CTA: During rapid bolus intravenous injection of  nonionic contrast material, axial images were obtained using thin  collimation multidetector helical technique from the base of the skull  through the [place]. This CT angiogram data was reconstructed  at thin intervals  with mild overlap. Images were sent to the [employer]  workstation, and 3D reconstructions were obtained. The axial source  images, multiplanar reformations, 3D reconstructions in both maximum  intensity projection display and volume rendered models were reviewed,  with reconstructions performed by the technologist and the  radiologist.    Contrast: 75ml isovue 370    Findings:  Head CT: There is no intracranial hemorrhage, mass effect, or midline  shift. Gray/white matter differentiation in both cerebral hemispheres  is preserved. Ventricles are proportionate to the cerebral sulci.  Visualized portions of the paranasal sinuses and mastoid air cells are  clear. Mild generalized cerebral volume loss and leukoaraiosis.    Head CTA demonstrates no aneurysm or stenosis of the major  intracranial arteries. The anterior communicating artery is patent.  The posterior communicating arteries are not visualized. Filling  defects again noted in the left transverse sinus, sigmoid sinus,  superior sagittal sinus and the left internal jugular vein. Findings  are better demonstrated on prior venous phase imaging.    Neck CTA demonstrates no stenosis of the major cervical arteries. The  origins of the great vessels from the aortic arch are patent. Moderate  grade calcification of the right carotid bifurcation with  approximately 25% stenosis. Maximum diameter at the bulb measuring 3  mm. The normal distal right internal carotid artery measures 4 mm. The  normal distal left internal carotid artery measures 4 mm. No  significant calcification on the left.     Redemonstration of multiple pulmonary emboli involving the left upper  and left lower lobes. Pulmonary emboli involving the anterior and  posterior segments of the left upper lobe appear new as compared to CT  dated 4/29/2019. Mediastinal and supraclavicular lymphadenopathy.  Largest supraclavicular node measuring approximately 1.2 cm, see  series 11 image 508.      Impression     Impression:    1. Head CTA demonstrates no aneurysm or stenosis of the major  intracranial arteries.   2. Neck CTA demonstrates approximately 25% stenosis of the right  internal carotid artery at the level of the bifurcation. No  significant stenosis in the left internal carotid or the vertebral  arteries.  3. No intracranial hemorrhage on the noncontrast head CT.    4. Redemonstration of filling defects in the left transverse sinus,  left sigmoid sinus, superior sagittal sinus and left internal jugular  vein consistent with known sinus thrombosis. Findings are better  demonstrated on prior venous phase imaging.  5. Increased pulmonary emboli burden with new segmental pulmonary  emboli involving the anterior and posterior segments of the left upper  lobe.  6 Small pleural effusions and adjacent compressive atelectasis visible  in the upper lungs.   7. Mediastinal and supraclavicular lymphadenopathy.    [Urgent Result: New pulmonary emboli]    Finding was identified on 6/11/2019 12:20 PM.     Rachael Olivas MD was contacted by Dr. Lino Dempsey DO (Radiology  R2) at 6/11/2019 1:49 PM and verbalized understanding of the urgent  finding.     I have personally reviewed the examination and initial interpretation  and I agree with the findings.    KIRA CABRERA MD   UA with Microscopic   Result Value Ref Range    Color Urine Light Yellow     Appearance Urine Clear     Glucose Urine Negative NEG^Negative mg/dL    Bilirubin Urine Negative NEG^Negative    Ketones Urine 5 (A) NEG^Negative mg/dL    Specific Gravity Urine 1.005 1.003 - 1.035    Blood Urine Negative NEG^Negative    pH Urine 7.0 5.0 - 7.0 pH    Protein Albumin Urine Negative NEG^Negative mg/dL    Urobilinogen mg/dL Normal 0.0 - 2.0 mg/dL    Nitrite Urine Negative NEG^Negative    Leukocyte Esterase Urine Negative NEG^Negative    Source Midstream Urine     WBC Urine <1 0 - 5 /HPF    RBC Urine 1 0 - 2 /HPF    Bacteria Urine Few (A) NEG^Negative /HPF     Squamous Epithelial /HPF Urine <1 0 - 1 /HPF   CT Chest Pulmonary Embolism w Contrast   Result Value Ref Range    Radiologist flags Pulmonary embolism (AA)     Narrative    EXAM: CT CHEST PULMONARY EMBOLISM W CONTRAST  6/11/2019 2:48 PM     HISTORY:  Possible new pulmonary emboli noted on CT angiogram of the  head and neck.  Evaluate for new emboli formation.       Comparison: CT cap 4/29/2019    TECHNIQUE: Volumetric helical acquisition of CT images of the chest  from the lung apices to the kidneys were acquired after the  administration of 80 mL of Isovue-370 IV contrast. .   Three-dimensional (3D) post-processed angiographic images were  reconstructed, archived to PACS and used in interpretation of this  study.     FINDINGS:     Contrast bolus is: adequate.  Exam is positive for acute pulmonary  embolism.  There is multiple filling defects in the right upper and  lower lobes segmental branches which are worsened since CT cap dated  4/29/2019. Left lower lobe subsegmental filling defect (series 6,  image 173).    New bilateral left greater than right pleural effusion. Right lower  lobe which solid and groundglass opacity. No pneumothorax. Heart is  normal. No evidence of right heart strain. Thoracic aorta is intact.  Perivascular prominent mediastinal lymph nodes.    Upper abdomen: Partially visualized upper abdomen loculated ascites  centered in the lesser sac and perigastric region with mass effect to  the stomach.      Impression    IMPRESSION:   1. Exam is positive for acute pulmonary embolism. Right upper and  lower lobe segmental pulmonary embolism and subsegmental left lower  lobe pulmonary embolism, worsening since 4/29/2019.  2. No evidence of right heart strain.  3. Right basilar peripheral wedge-shaped groundglass and solid opacity  is differential diagnosis includes hemorrhage versus infarction.  4. Bilateral pleural effusions.    [Critical Result: Pulmonary embolism]    Finding was identified on  6/11/2019 2:51 PM.     Dr Olivas was contacted by Dr. Liz Trujillo M.D.  at 6/11/2019  3:03 PM and verbalized understanding of the critical finding.  Call    I have personally reviewed the examination and initial interpretation  and I agree with the findings.    AIDE HARDING MD          Assessment and Plan:   Assessment: 58 year old female with progressive ovarian cancer admitted with recurrent but different kind od confusion in the setting of recently diagnosed dural venous sinus thrombosis and h/o RLE DVT and PE.     Active Problem list:  Progressive platinum resistant ovarian cancer  Confusion  Hyponatremia  Tachycardia  Malignant ascites  Hypertension  Hyperlipidemia  Anemia of chronic disease  H/o dual venos sagittal sinus thrombosis  History of DVT, PE     Plan:   Dz: progressive platinum resistant Stage IIIB mixed clear cell and endometrioid cancer. Currently enrolled in TRIO study, most recently C4D8 on 5/22/19 with pembrolizumab. CC-486 held.   FEN: regular diet. Hyponatremia 131. Will hold off on IV fluid for now and recheck BMP tomorrow am.  Pain: prn tylenol, oxycodone   Heme: History of pulmonary embolism and DVT (s/p thrombectomy 4/12/19), recent dual venous sinus thrombosis, on therapeutic lovenox increased 70 mg BID. Chronic anemia, Hgb 9.2  CV: HLD, hold PTA pravastatin. Tachycardic in 100s at baseline since cancer diagnosis.  Pulm: No acute issues.  GI: Nausea, PTA Zofran, Compazine. Constipation, JENNIFER senna-docusate  : Voiding spontaneously, no issues.  ID: no concerns. WBC 6.8  Endocrine: no issues  Psych/Neuro: PTA Mirtazapine  PPX:  SCD on left leg. Started heparin drip in the ED  Dispo:  Home pending on clinical improvement       Lynda Howell MD  [facility] PGY-1  6/11/2019       Patient seen and evaluated with the resident. Laboratory and recent imaging reviewed. Admit for confusion in the setting of recent dural venous sinus thrombosis. Plan for anticoagulation and further  workup.    Maria Antonia Haile MD  Gynecologic Oncology

## 2019-06-11 NOTE — CONSULTS
[facility] [place]      Neurology Stroke Consult    Patient Name: Di Evans  : 1960 MRN#: [id number]    STROKE DATA    Stroke Code:  Stroke code not activated.  Time patient seen:  2019 1800  Last known normal (pt's baseline):  6/10/19 night    TPA treatment:  Not given due to outside the time window, minor / isolated / quickly resolving stroke symptoms.     National Institutes of Health Stroke Scale (at presentation)  NIHSS done at:  time patient seen      Score    Level of consciousness:  (0)   Alert, keenly responsive     LOC questions:  (0)   Answers both questions correctly    LOC commands:  (0)   Performs both tasks correctly    Best gaze:  (0)   Normal    Visual:  (0)   No visual loss    Facial palsy:  (0)   Normal symmetrical movements    Motor arm (left):  (0)   No drift    Motor arm (right):  (0)   No drift    Motor leg (left):  (0)   No drift    Motor leg (right):  (0)   No drift    Limb ataxia:  (0)   Absent    Sensory:  (0)   Normal- no sensory loss    Best language:  (0)   Normal- no aphasia    Dysarthria:  (0)   Normal    Extinction and inattention:  (0)   No abnormality        NIHSS Total Score:  0        Dysphagia Screen  Time of screenin/10/19   Screening results: Passed screening, no dysarthria - Regular Diet with thin liquids     ASSESSMENT & RECOMMENDATIONS   The patient was seen for word finding difficulty today. She is known of recent L transverse sinus thrombosis and she is on Lovenox. There was a question of subtherapeutic Lovenox on  as she was taking it for different purpose. Now she is presenting with similar milder pattern of symptoms than she had on , in addition to that she was found to have acute PE. I think the patient hypercoagulable state is not controlled on current anticoagulation regimen, thus different anticoagulation plan should be addressed and to be worked side by side with hematology.      Impression: uncontrolled  sinus vein thrombosis    Recommendations:  1. Agree to do MRI stroke limited to rule out stroke- showed asymptomatic small strokes in different vascular territory that is consistent with her hypercoagulable state secondary to ovarian cancer  2. Hematology to consider different anticoagulation agent upon discharge, will leave it up to hematology to decide  3. Neuro checks q 2 hours, if there is any concern of increased ICP (like change in mental status, abnormal eye mobility, progressive headache, nausea, vomiting etc) please contact Neurology immediately.   4. Neurology will sign off, please let us know if you have any question     HPI  Di Evans is a 58 year old female with past medical history of ongoing ovarian cancer she is on chemotherapy and she is going to receive 5th cycle tomorrow, she had DVT in 4/2019 and she was on Lovenox 60 mg BID and found to have left transverse sinus thrombosis on 6/7 and at that time hematology thought she was subtherapeutic on her medication because she was off it for couple of days for purpose of doing port cath, so they went up on Lovenox from 60 to 70 mg BID, she was admitted from Tuesday till Saturday and was discharged (against neurological recommendation), noting that initially when she presented with word finding difficulty and confusion. However, her symptoms improved throughout admission and she has been doing well since Saturday until yesterday she started to have dull aching mild to moderate headache associated with nausea and one time vomiting and this morning she woke up with word finding difficulty lasted for 15 minutes and 5 minutes R hand numbness, so she came for the ER for further eval. CT/CTA unremarkable and Neurology became involved. Exam unremarkable.     Pertinent Past Medical/Surgical History  Past Medical History:   Diagnosis Date     DVT (deep venous thrombosis) (H)      Hypertension      Ovarian cancer (H)      Pulmonary embolism (H)        Past  Surgical History:   Procedure Laterality Date     HYSTERECTOMY TOTAL ABDOMINAL, BILATERAL SALPINGO-OOPHORECTOMY, COMBINED Bilateral 7/30/2018    Procedure: COMBINED HYSTERECTOMY TOTAL ABDOMINAL, SALPINGO-OOPHORECTOMY;;  Surgeon: Jammie Dueñas MD;  Location: [facility] OR     INSERT PORT VASCULAR ACCESS N/A 6/3/2019    Procedure: Chest Port Placement, Single Lumen;  Surgeon: Luigi Thayer PA-C;  Location: [facility] OR     IR CHEST PORT PLACEMENT > 5 YRS OF AGE  6/3/2019     LAPAROSCOPY DIAGNOSTIC (GYN) N/A 2/7/2019    Procedure: Diagnostic Laparoscopy With Biopsy;  Surgeon: Jammie Dueñas MD;  Location: [facility] OR     LAPAROTOMY, TUMOR DEBULKING, COMBINED Bilateral 7/30/2018    Procedure: COMBINED LAPAROTOMY, TUMOR DEBULKING;  Exploratory Laparotomy, Modified Radical Hysterectomy, Removal of Cervix, Bilateral Salpingo - Oophorectomy, Omentectomy, Bilateral Para Aortic and Left Pelvic Lymph Node Dissection, Tumor Debulking, Proctoscopy;  Surgeon: Jammie Dueñas MD;  Location: [facility] OR     REMOVE PORT PERITONEAL N/A 1/7/2019    Procedure: REMOVE PORT PERITONEAL;  Surgeon: Chanel Rodriguez MD;  Location: [facility] OR     SURGICAL HISTORY OF -   1980    left ankle surgery     THROMBECTOMY LOWER EXTREMITY Right 04/2019       Medications: I have reviewed this patient's current medications.    Allergies: All allergies reviewed and addressed.    Family History: This patient has no significant family history.    Social History: I have reviewed this patient's social history.      ROS:  The 10 point Review of Systems is negative other than noted in the HPI.     PHYSICAL EXAMINATION  Vital Signs:  B/P: 143/78,  T: 97,  P: 107,  R: 18    General:  patient lying in bed without any acute distress    HEENT:  normocephalic/atraumatic  Cardio:  RRR  Pulmonary:  no respiratory distress  Abdomen:  soft  Extremities:  no edema  Skin:  intact     Neurologic  Mental Status:  fully alert, attentive and oriented, follows  commands, speech clear and fluent  Cranial Nerves:  visual fields intact, PERRL, EOMI with normal smooth pursuit, facial sensation intact and symmetric, facial movements symmetric, hearing not formally tested but intact to conversation, palate elevation symmetric and uvula midline, no dysarthria, shoulder shrug strong bilaterally, tongue protrusion midline  Motor:  no abnormal movements, normal tone throughout, normal muscle bulk, no pronator drift, normal and symmetric rapid finger tapping, able to move all limbs spontaneously, strength 5/5 throughout upper and lower extremities  Reflexes:  2+ and symmetric throughout, no clonus, toes downgoing  Sensory:  intact/symmetric to light touch and pin prick throughout upper and lower extremities  Coordination:  FNF and HS intact without dysmetria  Station/Gait:  unable to test    Labs  Labs and Imaging reviewed and used in developing the plan; pertinent results included.     Lab Results   Component Value Date    GLC 86 06/11/2019       The patient was discussed with the Fellow, Dr. Morgan.  The staff is Dr. Ann.    Chano Cruz  Neurology resident   Pager: [phone]

## 2019-06-11 NOTE — DISCHARGE SUMMARY
Gynecologic Oncology Discharge Summary    Di Evans  [id number]    Admit Date: 6/11/2019  Discharge Date: 6/15/2019  Admitting Provider: Maria Antonia Haile MD  Discharge Provider: Maria Antonia Haile MD    Admission Dx:   - Progressive resistant ovarian cancer  - Confusion  - Dural venous sinus thrombosis  - Malignant ascites  - Hypertension  - Hyperlipidemia  - History of DVT, PE    Discharge Dx:  - Two new punctate foci of acute infarct in the bilateral parietal-occipital white matter.  - Acute pulmonary embolism: RUL, RLL, LLL  - Bilateral pleural effusions  - Otherwise, same as above    Patient Active Problem List   Diagnosis     CARDIOVASCULAR SCREENING; LDL GOAL LESS THAN 130     Essential hypertension with goal blood pressure less than 140/90     Hyperlipidemia LDL goal <130     Overweight     Changing skin lesion     Elevated TSH     Pelvic mass     S/P hysterectomy     Ovarian cancer, unspecified laterality (H)     Examination of participant or control in clinical research     Other ascites     Hyponatremia     Hypokalemia     Acute deep vein thrombosis (DVT) of distal vein of right lower extremity (H)     Fatigue     Thrombosis     Confusion     Procedures:   - IVF  - Heparin drip  - Lovenox injection  - CT/CTA head, neck and chest  - MR brain for stroke limited  - US guided paracentesis     Prior to Admission Medications:  Medications Prior to Admission   Medication Sig Dispense Refill Last Dose     acetaminophen (TYLENOL) 500 MG tablet Take 2 tablets (1,000 mg) by mouth every 8 hours as needed for mild pain 180 tablet 3 6/11/2019 at AM     enoxaparin (LOVENOX) 80 MG/0.8ML syringe Inject 0.7 mLs (70 mg) Subcutaneous every 12 hours 60 Syringe 1 6/11/2019 at 0100 - dose 1 of 2     LORazepam (ATIVAN) 1 MG tablet Take 1 tablet (1 mg) by mouth every 6 hours as needed (Anxiety, Nausea/Vomiting or Sleep) 30 tablet 2 Past Month at PRN     mometasone (NASONEX) 50 MCG/ACT nasal spray Spray 2 sprays into both nostrils  daily as needed    Past Month at Unknown time     ondansetron (ZOFRAN) 8 MG tablet If vomiting occurs with CC-486, take 1 tab (8 mg) 30 minutes prior to each subsequent CC-486 dose. 30 tablet 11 Past Week at PRN     oxyCODONE (ROXICODONE) 5 MG tablet Take 5-10 mg by mouth every 6 hours as needed for severe pain   Past Month at PRN     prochlorperazine (COMPAZINE) 10 MG tablet Take 1 tablet (10 mg) by mouth every 6 hours as needed (Nausea/Vomiting) 30 tablet 2 6/11/2019 at AM     senna-docusate (SENNA S) 8.6-50 MG tablet Take 4 tablets by mouth 2 times daily 250 tablet 3 Past Week at Unknown time     cetirizine (ZYRTEC) 10 MG tablet Take 10 mg by mouth daily    Unknown at Unknown time     magnesium oxide (MAG-OX) 400 MG tablet Take 1 tablet (400 mg) by mouth daily 30 tablet 3 Unknown at Unknown time     mirtazapine (REMERON) 15 MG tablet Take 1 tablet (15 mg) by mouth At Bedtime 60 tablet 1 Unknown at PM     potassium chloride ER (K-TAB) 20 MEQ CR tablet Take 1 tablet (20 mEq) by mouth daily 30 tablet 3 Unknown at Unknown time     pravastatin (PRAVACHOL) 20 MG tablet Take 1 tablet (20 mg) by mouth every evening for cholesterol. 90 tablet 1 Unknown at PM     study CC-486 (IDS #5077) 100 mg TABS CHEMOTHERAPY tablet Take 1 tablet (100 mg) by mouth daily Schedule 1. Take for 21 days, followed by 7 days off. Take at the same time each day on an empty stomach or with food (a light breakfast or meal of up to approximately 600 calories).  Swallow tablet whole with about 8oz of room temperature water. Do not take broken or cracked tablets. 21 tablet 0 Unknown at Unknown time     Discharge Medications:     Review of your medicines      UNREVIEWED medicines. Ask your doctor about these medicines      Dose / Directions   enoxaparin 80 MG/0.8ML syringe  Commonly known as:  LOVENOX  Used for:  Acute deep vein thrombosis (DVT) of distal vein of right lower extremity (H)      Dose:  70 mg  Inject 0.7 mLs (70 mg) Subcutaneous every  12 hours  Quantity:  60 Syringe  Refills:  1        START taking      Dose / Directions   aspirin 81 MG chewable tablet  Commonly known as:  ASA      Dose:  81 mg  Start taking on:  6/15/2019  Take 1 tablet (81 mg) by mouth daily  Quantity:  30 tablet  Refills:  3        CONTINUE these medicines which have NOT CHANGED      Dose / Directions   acetaminophen 500 MG tablet  Commonly known as:  TYLENOL  Used for:  Muscle pain      Dose:  1000 mg  Take 2 tablets (1,000 mg) by mouth every 8 hours as needed for mild pain  Quantity:  180 tablet  Refills:  3     cetirizine 10 MG tablet  Commonly known as:  zyrTEC      Dose:  10 mg  Take 10 mg by mouth daily  Refills:  0     LORazepam 1 MG tablet  Commonly known as:  ATIVAN  Used for:  Examination of participant or control in clinical research, Ovarian cancer, unspecified laterality (H)      Dose:  1 mg  Take 1 tablet (1 mg) by mouth every 6 hours as needed (Anxiety, Nausea/Vomiting or Sleep)  Quantity:  30 tablet  Refills:  2     magnesium oxide 400 MG tablet  Commonly known as:  MAG-OX  Used for:  Leg cramps, Hypomagnesemia      Dose:  400 mg  Take 1 tablet (400 mg) by mouth daily  Quantity:  30 tablet  Refills:  3     mirtazapine 15 MG tablet  Commonly known as:  REMERON  Used for:  Ovarian cancer, unspecified laterality (H)      Dose:  15 mg  Take 1 tablet (15 mg) by mouth At Bedtime  Quantity:  60 tablet  Refills:  1     mometasone 50 MCG/ACT nasal spray  Commonly known as:  NASONEX      Dose:  2 spray  Spray 2 sprays into both nostrils daily as needed  Refills:  0     ondansetron 8 MG tablet  Commonly known as:  ZOFRAN  Used for:  Examination of participant or control in clinical research, Ovarian cancer, unspecified laterality (H)      If vomiting occurs with CC-486, take 1 tab (8 mg) 30 minutes prior to each subsequent CC-486 dose.  Quantity:  30 tablet  Refills:  11     oxyCODONE 5 MG tablet  Commonly known as:  ROXICODONE      Dose:  5-10 mg  Take 5-10 mg by mouth  every 6 hours as needed for severe pain  Refills:  0     potassium chloride ER 20 MEQ CR tablet  Commonly known as:  K-TAB  Used for:  Hypokalemia      Dose:  20 mEq  Take 1 tablet (20 mEq) by mouth daily  Quantity:  30 tablet  Refills:  3     pravastatin 20 MG tablet  Commonly known as:  PRAVACHOL  Used for:  Hyperlipidemia LDL goal <130      Dose:  20 mg  Take 1 tablet (20 mg) by mouth every evening for cholesterol.  Quantity:  90 tablet  Refills:  1     prochlorperazine 10 MG tablet  Commonly known as:  COMPAZINE  Used for:  Examination of participant or control in clinical research, Ovarian cancer, unspecified laterality (H)      Dose:  10 mg  Take 1 tablet (10 mg) by mouth every 6 hours as needed (Nausea/Vomiting)  Quantity:  30 tablet  Refills:  2     senna-docusate 8.6-50 MG tablet  Commonly known as:  SENNA S  Used for:  Ovarian cancer, unspecified laterality (H)      Dose:  4 tablet  Take 4 tablets by mouth 2 times daily  Quantity:  250 tablet  Refills:  3     study CC-486 100 mg Tabs CHEMOTHERAPY tablet  Commonly known as:  IDS #5077  Used for:  Examination of participant or control in clinical research, Ovarian cancer, unspecified laterality (H)      Dose:  100 mg  Take 1 tablet (100 mg) by mouth daily Schedule 1. Take for 21 days, followed by 7 days off. Take at the same time each day on an empty stomach or with food (a light breakfast or meal of up to approximately 600 calories).  Swallow tablet whole with about 8oz of room temperature water. Do not take broken or cracked tablets.  Quantity:  21 tablet  Refills:  0           Where to get your medicines      These medications were sent to [facility] [place], [place] - [address]  [address] [address]    Phone:  [phone]     aspirin 81 MG chewable tablet         Consultations:  - Hematology  - Neurology Stroke  - Neurology Interventional Radiology  - Internal Medicine    Brief History of Illness:  Di  ISH Evans, 57yo female with progressive resistant ovarian cancer, presented to ED by ambulance for confusion. Patient was on the phone at ~0900 this morning when she noted word finding difficulties. She noted that her thoughts are clear, but just could not get the words out. Simultaneously, she also have right thumb and index finger numbness, which resolved with the speech difficulties.  Yesterday ~1430, she noted some right sided sharp chest pain with deep breaths, which has resolved by today. Denied fever, chills, night sweats, URI-like symptoms (rhinorrhea, sore throat, coughing), chest pain, SOB, abdominal pain, diarrhea, or UTI-like symptoms (dysuria, frequency).      ED course: CT/CTA imaging of the head and neck showed no evidence of intracranial hemorrhage or acute ischemic stroke, but showed possible new pulmonary emboli on patient's left upper lung. CTA of the chest showed multiple new pulmonary emboli, bilaterally, with concern for pulmonary infarct in the right lung. Patient's laboratory studies returned with normal glucose of 89; no leukocytosis (WBC 6800), chronic anemia with stable hemoglobin of 9.2; hyponatremia at her baseline 131.     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 7/30/18: XL, ISAIAS, BSO, omentectomy, R pelvic LND, bilat PALND, CUSA debulking, colon mobilization, L pelvic peritoneum stripping, optimal debulking. 8/24-12/17/18: 6C carbo/taxol. 1/9/19: CT incr peritoneal nodularity, incr retroperitoneal/mesenteric adenopathy 2/17/19: dx lsc, peritoneal bx w/ poorly diff adenocarcinoma. 2/20/19: started TRIO study. 4/3/19: CT w/ mixed response, decr ascites and R paracolic gutter implants but incr L retroperitoneal implant and mesenteric LN. 4/9/19 bx retroperitoneal LN + for recurrence. 4/12/19: R LE DVT s/p thrombectomy. 5/22/19: C4D8 TRIO (CC-486, pembrolizumab). 6/3 Port placed. 6/5-6/8 hospitalization for  increased confusion, found to have dural venous sinus thrombosis. Per heme recs: increased lovenox dose of 70 mg BID, stroke held of on any intervention. She will follow-up postoperatively for a care plan.  FEN:   - She was maintained on a regular diet without IV fluid. No acute changes. Hyponatremia at her baseline 131 and unchanged.   Pain:   - Her pain was controlled with her PTA tylenol and oxycodone  CV:   - She has hypertension, but is on no medications for this. Runs of SVT/Vtach was noted on HD#2, but EKG NSR and Trop wnl. Her PTA pravastatin was continued for her hyperlipidemia. She has been tachycardic in 100s at baseline since cancer diagnosis.   PULM:   - CTA chest showed multiple new pulmonary embolic bilaterally, with concern for pulmonary infarct in the right lung. See HEME below for details.  HEME:   - Chronic anemia with stable Hgb 9.2 on admission. Hemoglobin was stable at 8.7 at the time of discharge.  - Patient has a history of PE and RLE DVT (s/p thrombectomy at 4/19) on therapeutic Lovenox. On previous admission, Lovenox was held x2 doses prior to paracentesis and bilateral LE dopplers were obtained which revealed worsening RLE DVT. No DVT noted in LLE. Heparin XA level was subtherapeutic at 0.4. Hematology was consulted who recommended increasing lovenox to 70mg BID. They did not recommend repeat imaging or Xa level in the future unless the patient had symptoms. It was suspected that these clots may have occurred due to holding doses of lovenox for procedures, so it is recommended that she does not hold lovenox in the future for paracentesis procedures. Hematology consulted for current admission. Initially, patient was put on heparin drip. She was then switched to 80mg BID lovenox. After 3 doses of lovenox, her heparin Xa level was in therapeutic range and patient was discharged. Hematology did not need follow up from her.   GI:   - She has baseline nausea and very limited oral intake. She was  "maintained on antiemetics prn. Constipation was treated with senna, miralax, dulcolax suppository PRN. CT showed no evidence of obstruction on admission. She received US guided paracenthesis 2050cc on HD#3 for symptomatic relief. Her symptoms of nausea were improved on discharge.    :    - Voiding spontaneously. She had no acute  issues while in house.  ID:   - The patient was AF during her hospitalization.    ENDO:   - no issues.   PSYCH/NEURO:   - Dysarthria for 12 minutes per patient and family members. Brain MRI 6/5 was notable for dural venous sinus thrombosis. CT/CTA imaging of head and neck showed no evidence of intracranial hemorrhage or acute ischemia stroke. MR brain showed \"two punctate foci of acute infarct in the bilateral parietal-occipital white matter\". Stroke neurology and Neuro IR were consulted who recommended CT venogram for further evaluation. Following imaging, the patient's symptoms resolved, so they did not recommend any further intervention.    PPX:    - She was given left SCD during her hospital course.   They were discontinued at the time of her discharge.      Discharge Instructions and Follow up:  Ms. Di Evans was discharged from the hospital with follow up for cancer treatment plan.    Discharge Diet: Regular  Discharge Activity: Activity as tolerated  Discharge Follow up:   7/3/2019 8:00 AM Angelo Molina MD     Discharge Disposition:  Discharged to home    Discharge Staff: MD Ruma Senior MD  PGY-2  Ob/Gyn    The patient was seen and examined with the resident, the labs and vital signs were reviewed.The discharge care plan outlined above was provided under my directives and direct supervision. Patient ready for discharge    Maria Antonia Haile MD, MPH  Gynecologic Oncology        "

## 2019-06-11 NOTE — ED PROVIDER NOTES
[place] EMERGENCY DEPARTMENT ([facility])  June 11, 2019    History     Chief Complaint   Patient presents with     Slurred Speech     HPI  Di Evans is a 58 year old female with a history of recurrent platinum resistant ovarian cancer currently undergoing experimental chemotherapy (enrolled in TRIO study), complicated by ascites (weekly paracentesis), status post total abdominal hysterectomy with bilateral sphingo-oophorectomy, and omentectomy (7/30/2018) with right pelvic and bilateral para-aortic lymph node dissections; DVT/PE (on Lovenox); and hypertension; who presents to the Emergency Department for evaluation of slurred speech that began at 9:00 AM. Patient states she was on the phone approximately tow hours prior to her arrival when she noticed her slurring of her speech, and was unable to form full sentences. According to her friend who is currently accompanying the patient, this episode lasted for approximately 10 minutes and afterwards patient seemed to be her normal self, at baseline.  Patient denies any pain or fevers.  No chest pain or shortness of breath.    Here, patient reports that her speech appears to be back to her baseline and she denies any other neurologic deficits. However, patient does currently endorse some fatigue. She also notes feeling somewhat ill last night and reports she had an episode of emesis. She denies a fever or pain while breathing. Patient also states she was in the hospital on Saturday (6/8/19) for slurred speech. She reports she has blood clots in her leg and is continues her chemotherapy for ovarian cancer.     Per chart review, patient presented to the [place] ED on 6/5/19 with a one day history of confusion and abdominal pain. Her work-up at that time included CT imaging of the head, chest xray and laboratory studies, all of which were essentially unremarkable. Her encephalopathy did improve following IV fluids and imaging did not reveal any  acute intracranial pathology. She was started on broad-spectrum antibiotics given that she underwent a port placement two days prior to her visit. Regarding her abdominal pain, patient had a CT of the abdomen/pelvis that was remarkable for ascites, peritoneal carcinomatosis as well as two new liver masses not seen on her previous imaging. Given this, patient was transferred to the [facility] Oncology service for admission, during which her broad spectrum antibiotics were discontinued after one dose as she had no evidence of infection. She did receive a therapeutic paracentesis for her ascites.      Furthermore, a brain MRI was obtained for further evaluation of her confusion that was notable for dural venous sinus thrombosis that was confirmed with CT venous of the head and neck (please see impression below). However, Stroke Neurology and Interventional Neurology did not recommend any further intervention as her symptoms resolved and she was discharged on 6/8/2019.       CTV HEAD NECK WITH CONTRAST (6/7/2019) IMPRESSION:   Deep sinus thrombosis involving the left lateral  transverse sinus, sigmoid sinus, upper jugular vein and majority of  the superior sagittal sinus except the anterior portion.      PAST MEDICAL HISTORY  Past Medical History:   Diagnosis Date     DVT (deep venous thrombosis) (H)      Hypertension      Ovarian cancer (H)      Pulmonary embolism (H)      PAST SURGICAL HISTORY  Past Surgical History:   Procedure Laterality Date     HYSTERECTOMY TOTAL ABDOMINAL, BILATERAL SALPINGO-OOPHORECTOMY, COMBINED Bilateral 7/30/2018    Procedure: COMBINED HYSTERECTOMY TOTAL ABDOMINAL, SALPINGO-OOPHORECTOMY;;  Surgeon: Jammie Dueñas MD;  Location: [facility] OR     INSERT PORT VASCULAR ACCESS N/A 6/3/2019    Procedure: Chest Port Placement, Single Lumen;  Surgeon: Luigi Thayer PA-C;  Location: [facility] OR     IR CHEST PORT PLACEMENT > 5 YRS OF AGE  6/3/2019     LAPAROSCOPY DIAGNOSTIC (GYN) N/A 2/7/2019     Procedure: Diagnostic Laparoscopy With Biopsy;  Surgeon: Jammie Dueñas MD;  Location: [facility] OR     LAPAROTOMY, TUMOR DEBULKING, COMBINED Bilateral 7/30/2018    Procedure: COMBINED LAPAROTOMY, TUMOR DEBULKING;  Exploratory Laparotomy, Modified Radical Hysterectomy, Removal of Cervix, Bilateral Salpingo - Oophorectomy, Omentectomy, Bilateral Para Aortic and Left Pelvic Lymph Node Dissection, Tumor Debulking, Proctoscopy;  Surgeon: Jammie Dueñas MD;  Location: [facility] OR     REMOVE PORT PERITONEAL N/A 1/7/2019    Procedure: REMOVE PORT PERITONEAL;  Surgeon: Chanel Rodriguez MD;  Location: [facility] OR     SURGICAL HISTORY OF -   1980    left ankle surgery     THROMBECTOMY LOWER EXTREMITY Right 04/2019     FAMILY HISTORY  Family History   Problem Relation Age of Onset     Hypertension Mother      Hypertension Father      Cerebrovascular Disease Father         polycythemia     Breast Cancer Maternal Aunt         older age     SOCIAL HISTORY  Social History     Tobacco Use     Smoking status: Never Smoker     Smokeless tobacco: Never Used   Substance Use Topics     Alcohol use: Yes     Comment: occasional     MEDICATIONS  Current Facility-Administered Medications   Medication     heparin  drip 25,000 units in 0.45% NaCl 250 mL (see additional administration details for dose)     heparin 100 UNIT/ML injection 5 mL     heparin bolus from infusion pump     Current Outpatient Medications   Medication     acetaminophen (TYLENOL) 500 MG tablet     cetirizine (ZYRTEC) 10 MG tablet     enoxaparin (LOVENOX) 80 MG/0.8ML syringe     LORazepam (ATIVAN) 1 MG tablet     magnesium oxide (MAG-OX) 400 MG tablet     mirtazapine (REMERON) 15 MG tablet     mometasone (NASONEX) 50 MCG/ACT nasal spray     ondansetron (ZOFRAN) 8 MG tablet     oxyCODONE (ROXICODONE) 5 MG tablet     potassium chloride ER (K-TAB) 20 MEQ CR tablet     pravastatin (PRAVACHOL) 20 MG tablet     prochlorperazine (COMPAZINE) 10 MG tablet     senna-docusate  "(SENNA S) 8.6-50 MG tablet     study CC-486 (IDS #[id number]) 100 mg TABS CHEMOTHERAPY tablet     ALLERGIES  Allergies   Allergen Reactions     Gemfibrozil Muscle Pain (Myalgia)     Lovastatin      headaches     Penicillins Nausea and Vomiting       I have reviewed the Medications, Allergies, Past Medical and Surgical History, and Social History in the Epic system.      Review of Systems   Constitutional: Positive for fatigue. Negative for chills and fever.   HENT: Negative for congestion.    Eyes: Negative for visual disturbance.   Gastrointestinal: Negative for abdominal pain, nausea and vomiting.   Endocrine: Negative for polydipsia and polyuria.   Genitourinary: Negative for difficulty urinating.   Musculoskeletal: Negative for neck pain and neck stiffness.   Skin: Negative for color change.   Allergic/Immunologic: Positive for immunocompromised state.   Neurological: Positive for speech difficulty ( resolved). Negative for light-headedness and headaches.   Hematological: Negative for adenopathy. Does not bruise/bleed easily.   Psychiatric/Behavioral: Negative for agitation and behavioral problems.   All other systems reviewed and are negative.      Physical Exam   BP: 139/88  Pulse: 121  Heart Rate: 104  Temp: 97  F (36.1  C)  Resp: 18  Height: 165.1 cm (5' 5\")  Weight: 62.1 kg (137 lb)  SpO2: 98 %      Physical Exam   Constitutional: She is oriented to person, place, and time. She appears well-developed and well-nourished. No distress.   HENT:   Head: Normocephalic and atraumatic.   Mouth/Throat: Oropharynx is clear and moist. No oropharyngeal exudate.   Eyes: Pupils are equal, round, and reactive to light. Conjunctivae and EOM are normal. No scleral icterus.   Neck: Normal range of motion.   Cardiovascular: Normal rate, normal heart sounds and intact distal pulses.   Pulmonary/Chest: Effort normal and breath sounds normal. No respiratory distress.   Port in right upper chest wall without erythema, induration, " fluctuance, or drainage.   Abdominal: Soft. Bowel sounds are normal. There is no tenderness.   Musculoskeletal: She exhibits no edema or tenderness.   Neurological: She is alert and oriented to person, place, and time. She has normal strength. She displays normal reflexes. No cranial nerve deficit or sensory deficit. She exhibits normal muscle tone. Coordination and gait normal. GCS eye subscore is 4. GCS verbal subscore is 5. GCS motor subscore is 6.   Reflex Scores:       Patellar reflexes are 2+ on the right side and 2+ on the left side.       Achilles reflexes are 2+ on the right side and 2+ on the left side.  No dysarthria, dysmetria, diplopia, disdiadochokinesia. Strength 5/5 in b/l UEs with  and b/l LEs with dorsi- and plantar-flexion against resistance. Sensation to light touch and 2-point discrimination intact in b/l distal UEs and LEs. CNs II-XII intact. Negative Romberg. Normal gait.     Skin: Skin is warm. No rash noted. She is not diaphoretic.   Psychiatric: She has a normal mood and affect. Her behavior is normal. Judgment and thought content normal.   Nursing note and vitals reviewed.      ED Course        Procedures             EKG Interpretation:      Interpreted by Rachael Olivas  Time reviewed: 11:44 AM  Symptoms at time of EKG: Possible stroke  Rhythm: Sinus tachycardia  Rate: 104  Axis: normal  Ectopy: none  Conduction: normal  ST Segments/ T Waves: No ST-T wave changes  Q Waves: none  Comparison to prior: Unchanged from old EKG done on July 30, 2018    Clinical Impression: Sinus tachycardia          Critical Care time:  was 45 minutes for this patient excluding procedures.             Labs Ordered and Resulted from Time of ED Arrival Up to the Time of Departure from the ED   CBC WITH PLATELETS DIFFERENTIAL - Abnormal; Notable for the following components:       Result Value    Hemoglobin 9.2 (*)     Hematocrit 31.1 (*)     MCH 23.4 (*)     MCHC 29.6 (*)     RDW 19.4 (*)     Absolute  Lymphocytes 0.4 (*)     All other components within normal limits   BASIC METABOLIC PANEL - Abnormal; Notable for the following components:    Sodium 131 (*)     Creatinine 0.42 (*)     Calcium 8.3 (*)     All other components within normal limits   PARTIAL THROMBOPLASTIN TIME - Abnormal; Notable for the following components:    PTT 45 (*)     All other components within normal limits   CREATININE POCT - Abnormal; Notable for the following components:    Creatinine 0.2 (*)     All other components within normal limits   ROUTINE UA WITH MICROSCOPIC - Abnormal; Notable for the following components:    Ketones Urine 5 (*)     Bacteria Urine Few (*)     All other components within normal limits   ISTAT INR POCT - Abnormal; Notable for the following components:    ISTAT INR 1.4 (*)     All other components within normal limits   GLUCOSE MONITOR NURSING POCT   GLUCOSE BY METER   TROPONIN I   VITAL SIGNS AND NEURO CHECKS   ACTIVITY   PULSE OXIMETRY NURSING   NOTIFY   ISTAT INR NURSING POCT   ISTAT TROPONIN NURSING POCT   PLATELETS MONITORED PER HEPARIN TREATMENT PROTOCOL (FOR MEANINGFUL USE   ASSESSMENT   NOTIFY PHYSICIAN   NOTIFY PHYSICIAN   IP ASSIGN PROVIDER TEAM TO TREATMENT TEAM            Assessments & Plan (with Medical Decision Making)       This is a 58 year old female presenting with episode of slurred speech that resolved by now. Differential diagnosis: TIA, electrolyte abnormalities, dehydration, ICH.    After thorough history and physical examination, patient appears to be in no acute distress. I will obtain CT/CTA imaging of the head and neck along with laboratory studies for further diagnostic evaluation. Patient and her companions agree with the plan.    Patient's laboratory studies returned with normal glucose of 89. No leukocytosis, WBC is normal at 6800. There is chronic anemia with stable hemoglobin of 9.2. Electrolytes show no evidence of dehydration, creatinine is normal 0.42. Sodium is 131, however,  this is at patient's baseline and I do not believe that this is causing her intermittent slurred speech. INR is somewhat elevated at 1.4. I reviewed patient's CT/CTA of the head and neck and I read the radiology report; no evidence of intracranial hemorrhage or acute ischemic stroke. She does have venous sinus thrombosis in her brain which was diagnosed during the last hospital stay. She is currently on Lovenox, twice daily; she requested her home dose to be administered in the Emergency Department since she was due for it at 1:00 PM. I will order a dose of subcutaneous Lovenox and subsequently patient will be discharged with recommendations to follow-up with her primary care physician and her specialists. She and her companions agree with the plan. She also agrees to return to the Emergency Department if her symptoms return and persist.    2:05 PM I received a phone call from a staff radiologist stating that he was able to notice possible new pulmonary emboli on patient's left upper lung while he was reviewing her CT/CTA of the head and neck. Patient did have pulmonary emboli diagnosed in April 2019, however, not in this location. Given that this is a new finding, I believe the patient likely needs a dedicated CTA of the chest for further evaluation. This is a concerning finding given that she is already anticoagulated on Lovenox, twice daily. She could be failing Lovenox therapy and she might need to be transitioned to a different anticoagulant. Patient and the companions were informed about this and they agree with the plan to proceed with further evaluation.     I reviewed patient's CT angiogram of the chest and I discussed the results with the radiologist; there are multiple new pulmonary emboli, bilaterally, with concern for pulmonary infarct in the right lung. No evidence of central emboli or heart strain. Case was discussed with Gyn/Oncology team and they will come evaluate the patient for admission. I will  initiate intravenous heparin in the Emergency Department given the fact that she is failing outpatient therapeutic Lovenox treatment. Patient and companions were informed of this and they agree with the plan.     This part of the medical record was transcribed by Nargis Koehler, Medical Scribe, from a dictation done by Rachael Olivas MD.     I have reviewed the nursing notes.    I have reviewed the findings, diagnosis, plan and need for follow up with the patient.       Medication List      There are no discharge medications for this visit.         Final diagnoses:   TIA (transient ischemic attack)   Other acute pulmonary embolism without acute cor pulmonale (H)     ILatricia, am serving as a trained medical scribe to document services personally performed by Rachael Olivas MD, based on the provider's statements to me.      IRachael MD, was physically present and have reviewed and verified the accuracy of this note documented by Latricia Hobbs.     6/11/2019   [facility], [place], EMERGENCY DEPARTMENT     Rachael Olivas MD  06/11/19 [id number]

## 2019-06-11 NOTE — TELEPHONE ENCOUNTER
Called the patient as she is being followed by oncology. She is set up for tomorrow. She states there is nothing needed by her PCP at this time. She was given the phone number to call us if anything is needed. Also reviewed her appointment times for tomorrow.    Millie Oswald RN, [facility] Triage

## 2019-06-11 NOTE — CONSULTS
Hematology  Consult Note   Date of Service: 06/11/2019    Patient: Di Evans  MRN: [id number]  Admission Date: 6/11/2019  Hospital Day # 0    Reason for Consult: New PE while on therapeutic Lovenox.      History of Present Illness:    Di Evans is a 58 year old woman with history notable for recurrent metastatic clear cell ovarian carcinoma (complicated by peritoneal carcinomatosis requiring almost weekly therapeutic paracentesis), currently on the TRIO Study (with pembrolizumab), recently diagnosed RLE DVT on 4/12/19 (s/p thrombectomy and started on enoxaparin 60 mg BID), and asymptomatic PE on CT scan from 4/29/19. She was recently hospitalized 6/5/19 through 6/8/19 for evaluation of confusion. 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 US of the BLE during that admission revealed a partially occlusive clot in the R common femoral vein, femoral vein, and popliteal vein (super-imposed acute/subacute on chronic thrombus), and no DVT in the LLE. The hematology service was consulted for recommendations regarding anticoagulation, as the patient has been on enoxaparin for the RLE DVT. Heparin Xa level was found to be 0.4 (technically sub-therapeutic). We recommended increasing the enoxaparin dose to 70 mg BID, with which the patient was compliant and no missed doses after hospital discharge.    This morning, a few hours prior to admission, she was on the phone with a friend and had abrupt onset of dysarthria. She also noted right thumb and index finger numbness. EMS was called and she was brought to the ED for further evaluation. By the time of her arrival, her symptoms had largely resolved. Work-up in the ED included CT of the head and neck which showed no evidence of intracranial hemorrhage or acute ischemic stroke, but showed a possible new pulmonary emboli in the left lung. CTA of the chest showed multiple new pulmonary emboli bilaterally with concern for pulmonary infarct in the right lung.  Of note, the patient also noted sharp right-sided chest wall pain, particularly with deep breathing the day prior to admission. She denies significant shortness of breath. No cough. Both legs still felt very swollen, right leg much worse. No bleeding or bruising problems. She denies any other focal neurological symptoms. Back to baseline in terms of speech.    Past Medical History:  Past Medical History:   Diagnosis Date     DVT (deep venous thrombosis) (H)      Hypertension      Ovarian cancer (H)      Pulmonary embolism (H)        Past Surgical History:  Past Surgical History:   Procedure Laterality Date     HYSTERECTOMY TOTAL ABDOMINAL, BILATERAL SALPINGO-OOPHORECTOMY, COMBINED Bilateral 7/30/2018    Procedure: COMBINED HYSTERECTOMY TOTAL ABDOMINAL, SALPINGO-OOPHORECTOMY;;  Surgeon: Jammie Dueñas MD;  Location: [facility] OR     INSERT PORT VASCULAR ACCESS N/A 6/3/2019    Procedure: Chest Port Placement, Single Lumen;  Surgeon: Luigi Thayer PA-C;  Location: [facility] OR     IR CHEST PORT PLACEMENT > 5 YRS OF AGE  6/3/2019     LAPAROSCOPY DIAGNOSTIC (GYN) N/A 2/7/2019    Procedure: Diagnostic Laparoscopy With Biopsy;  Surgeon: Jammie Dueñas MD;  Location: [facility] OR     LAPAROTOMY, TUMOR DEBULKING, COMBINED Bilateral 7/30/2018    Procedure: COMBINED LAPAROTOMY, TUMOR DEBULKING;  Exploratory Laparotomy, Modified Radical Hysterectomy, Removal of Cervix, Bilateral Salpingo - Oophorectomy, Omentectomy, Bilateral Para Aortic and Left Pelvic Lymph Node Dissection, Tumor Debulking, Proctoscopy;  Surgeon: Jammie Dueñas MD;  Location: [facility] OR     REMOVE PORT PERITONEAL N/A 1/7/2019    Procedure: REMOVE PORT PERITONEAL;  Surgeon: Chanel Rodriguez MD;  Location: [facility] OR     SURGICAL HISTORY OF -   1980    left ankle surgery     THROMBECTOMY LOWER EXTREMITY Right 04/2019       Social History:  Social History     Socioeconomic History     Marital status: Single     Spouse name: None     Number of  children: 0     Years of education: None     Highest education level: None   Occupational History     Occupation: [employer]     Employer: [employer]   Social Needs     Financial resource strain: None     Food insecurity:     Worry: None     Inability: None     Transportation needs:     Medical: None     Non-medical: None   Tobacco Use     Smoking status: Never Smoker     Smokeless tobacco: Never Used   Substance and Sexual Activity     Alcohol use: Yes     Comment: occasional     Drug use: No     Sexual activity: Never   Lifestyle     Physical activity:     Days per week: None     Minutes per session: None     Stress: None   Relationships     Social connections:     Talks on phone: None     Gets together: None     Attends Faith service: None     Active member of club or organization: None     Attends meetings of clubs or organizations: None     Relationship status: None     Intimate partner violence:     Fear of current or ex partner: None     Emotionally abused: None     Physically abused: None     Forced sexual activity: None   Other Topics Concern     Parent/sibling w/ CABG, MI or angioplasty before 65F 55M? No      Service No     Blood Transfusions No     Caffeine Concern Yes     Occupational Exposure No     Hobby Hazards No     Sleep Concern No     Stress Concern No     Weight Concern Yes     Special Diet No     Back Care No     Exercise Yes     Bike Helmet No     Comment: Yes in the future     Seat Belt Yes     Self-Exams Yes   Social History Narrative     None        Family History  Family History   Problem Relation Age of Onset     Hypertension Mother      Hypertension Father      Cerebrovascular Disease Father         polycythemia     Breast Cancer Maternal Aunt         older age       Outpatient Medications:    No current facility-administered medications on file prior to encounter.   Current Outpatient Medications on File Prior to Encounter:  acetaminophen (TYLENOL)  "500 MG tablet Take 2 tablets (1,000 mg) by mouth every 8 hours as needed for mild pain   cetirizine (ZYRTEC) 10 MG tablet Take 10 mg by mouth daily    enoxaparin (LOVENOX) 80 MG/0.8ML syringe Inject 0.7 mLs (70 mg) Subcutaneous every 12 hours   LORazepam (ATIVAN) 1 MG tablet Take 1 tablet (1 mg) by mouth every 6 hours as needed (Anxiety, Nausea/Vomiting or Sleep)   magnesium oxide (MAG-OX) 400 MG tablet Take 1 tablet (400 mg) by mouth daily   mirtazapine (REMERON) 15 MG tablet Take 1 tablet (15 mg) by mouth At Bedtime   mometasone (NASONEX) 50 MCG/ACT nasal spray Spray 2 sprays into both nostrils daily as needed    ondansetron (ZOFRAN) 8 MG tablet If vomiting occurs with CC-486, take 1 tab (8 mg) 30 minutes prior to each subsequent CC-486 dose.   oxyCODONE (ROXICODONE) 5 MG tablet Take 5-10 mg by mouth every 6 hours as needed for severe pain   potassium chloride ER (K-TAB) 20 MEQ CR tablet Take 1 tablet (20 mEq) by mouth daily   pravastatin (PRAVACHOL) 20 MG tablet Take 1 tablet (20 mg) by mouth every evening for cholesterol.   prochlorperazine (COMPAZINE) 10 MG tablet Take 1 tablet (10 mg) by mouth every 6 hours as needed (Nausea/Vomiting)   senna-docusate (SENNA S) 8.6-50 MG tablet Take 4 tablets by mouth 2 times daily   study CC-486 (IDS #5077) 100 mg TABS CHEMOTHERAPY tablet Take 1 tablet (100 mg) by mouth daily Schedule 1. Take for 21 days, followed by 7 days off. Take at the same time each day on an empty stomach or with food (a light breakfast or meal of up to approximately 600 calories).  Swallow tablet whole with about 8oz of room temperature water. Do not take broken or cracked tablets.        Physical Exam:    Blood pressure 135/85, pulse 113, temperature 97  F (36.1  C), temperature source Oral, resp. rate 25, height 1.651 m (5' 5\"), weight 62.1 kg (137 lb), SpO2 100 %, not currently breastfeeding.  General: alert and cooperative, lying in bed, no acute distress  HEENT: sclera anicteric, EOMI, MMM  Neck: " supple, normal ROM  CV: tachycardic with a regular rhythm, normal S1/S2, no m/r/g  Resp: CTAB, no wheezing/crackles, normal respiratory effort on ambient air  GI: soft, non-tender, non-distended  Extremities: warm and well-perfused, right leg more edematous than left  Skin: no rashes on limited exam, no jaundice  Neuro: AOx3, moves all extremities equally, no focal deficits  Access: Port in right upper chest (c/d/i)    Labs & Studies: I personally reviewed the following studies:  ROUTINE LABS (Last four results):  CMP  Recent Labs   Lab 06/11/19  1130 06/11/19  1129 06/08/19  1157 06/07/19  0842 06/06/19  1730 06/06/19 0619   NA  --  131*  --  131*  --  131*   POTASSIUM  --  3.5 3.0* 3.2* 3.6 3.2*   CHLORIDE  --  96  --  98  --  100   CO2  --  25  --  23  --  22   ANIONGAP  --  10  --  9  --  8   GLC  --  86  --  103*  --  89   BUN  --  8  --  7  --  9   CR  --  0.42*  --  0.38*  --  0.41*   GFRESTIMATED >90 >90  --  >90  --  >90   GFRESTBLACK >90 >90  --  >90  --  >90   TRACEY  --  8.3*  --  8.3*  --  8.1*   MAG  --   --   --   --   --  1.6     CBC  Recent Labs   Lab 06/11/19  1129 06/07/19  0842 06/06/19  0619 06/05/19  1505   WBC 6.8 6.6 6.3 7.1   RBC 3.93 4.09 3.32* 3.72*   HGB 9.2* 9.6* 7.3* 8.6*   HCT 31.1* 32.2* 25.6* 27.7*   MCV 79 79 77* 75*   MCH 23.4* 23.5* 22.0* 23.1*   MCHC 29.6* 29.8* 28.5* 31.0*   RDW 19.4* 18.7* 18.1* 17.9*    147* 212 244     INR  Recent Labs   Lab 06/07/19  0842 06/06/19  0619   INR 1.39* 1.41*     CTA  HEAD NECK WITH CONTRAST 6/11/2019 12:08 PM  HISTORY:  slurred speech  COMPARISON:  MR head dated 6/7/2019.    IMPRESSION:    1. Head CTA demonstrates no aneurysm or stenosis of the major  intracranial arteries.   2. Neck CTA demonstrates approximately 25% stenosis of the right  internal carotid artery at the level of the bifurcation. No  significant stenosis in the left internal carotid or the vertebral  arteries.  3. No intracranial hemorrhage on the noncontrast head CT.    4.  Redemonstration of filling defects in the left transverse sinus,  left sigmoid sinus, superior sagittal sinus and left internal jugular  vein consistent with known sinus thrombosis. Findings are better  demonstrated on prior venous phase imaging.  5. Increased pulmonary emboli burden with new segmental pulmonary  emboli involving the anterior and posterior segments of the left upper  lobe.  6 Small pleural effusions and adjacent compressive atelectasis visible  in the upper lungs.   7. Mediastinal and supraclavicular lymphadenopathy.     CT CHEST PULMONARY EMBOLISM W CONTRAST  6/11/2019 2:48 PM   HISTORY:  Possible new pulmonary emboli noted on CT angiogram of the  head and neck.  Evaluate for new emboli formation.  COMPARISON: CT CAP 4/29/2019  IMPRESSION:   1. Exam is positive for acute pulmonary embolism. Right upper and  lower lobe segmental pulmonary embolism and subsegmental left lower  lobe pulmonary embolism, worsening since 4/29/2019.  2. No evidence of right heart strain.  3. Right basilar peripheral wedge-shaped groundglass and solid opacity  is differential diagnosis includes hemorrhage versus infarction.  4. Bilateral pleural effusions.     Assessment & Plan:   Di Evans is a 58 year old female with history notable for recurrent metastatic clear cell ovarian carcinoma (w/peritoneal carcinomatosis requiring weekly paracentesis), currently on the TRIO Study (with pembrolizumab), recently diagnosed RLE DVT on 4/12/19 (s/p thrombectomy and started on enoxaparin), and asymptomatic PE on CT scan from 4/29/19.       She was recently hospitalized 6/5 - 6/8/19 for evaluation of confusion and found to have a sagittal sinus thrombosis. The hematology team was consulted for recommendations regarding anticoagulation (as this occurred on enoxaparin), and in the setting of a slightly sub-therapeutic Hep Xa level we had opted to increase the enoxaparin dose from 60 mg BID to 70 mg BID. She reports 100%  compliance with this regimen, but unfortunately presented to the ED again today for evaluation of sudden onset dysarthria with pleuritic chest pain the day prior. She was found to have worsening bilateral PE and the hematology team was again consulted for recommendations regarding anticoagulation.    This patient has clearly demonstrated a hypercoagulable state with a high propensity to clot despite appropriate anticoagulation. The heparin drip was started prior to checking a Xa level, so we cannot be certain her dose was therapeutic, but this is her second admission for worsening clots while on enoxaparin and as such we favor switching strategies.    Summary of Recommendations:    Agree with heparin drip for now (high intensity).    We will discuss alternatives with the patient tomorrow. We briefly discussed switching to a DOAC (or warfarin). Will need to make sure the chosen agent is compatible with her study drug.    Would also recommend discussion about these clots with the , as they should be reported as adverse events.    No indication for IVC filter at this time.      We will continue to follow this patient while hospitalized. Please do not hesitate to page with any questions or concerns.    Patient was seen and plan of care was discussed with attending physician Dr. Bella.    Nellie Castañeda MD/PhD  Heme/Onc Fellow  Pager [phone]    Attending Note:  I have reviewed the patient chart, and interviewed and examined the patient.  I agree with the assessment and plan. Continue heparin for now. Will decide about changing agents +/- adding aspirin 81 mg daily. Unfortunately, in this setting there may not be any good options to prevent progression of clot. Since she is on a clinical trial, her PI will need to find out if OK to be on rivaroxaban or apixaban while on the experimental drug. Will follow.  Aleyda Bella MD  Hematology

## 2019-06-11 NOTE — ED NOTES
BEFAST exam negative. No neuro deficits noted during triage. Speech seemed WDL. ED MD advised upon rooming of patient.

## 2019-06-11 NOTE — PHARMACY-ADMISSION MEDICATION HISTORY
"Admission medication history interview status for the 6/11/2019 admission is complete. See Epic admission navigator for allergy information, pharmacy, prior to admission medications and immunization status.     Medication history interview sources:  Patient, family, [facility]    Changes made to PTA medication list (reason)  Added: None  Deleted:   -Enoxaparin 60 mg (per patient she now takes 0.7 mL every 12 hours. Per progress note on 6/8, patient's enoxaparin dose was increased from 60 mg to 70 mg due to a subtherapeutic Xa level)  -Aspirin (no longer taking per patient)  -Hydrochlorothiazide 25 mg (no longer taking per patient. Last fill history 4/10/19 for 30 day supply)   -Meclizine (no longer taking per patient and family)  -Trazodone 50 mg (no longer taking per patient and family. Last fill history 4/22 for 30 day supply)  Changed:   -Oxycodone 5 mg: changed from capsule --> tablet (based upon most recent fill history via [facility])   -Mometasone: 2 sprays daily --> 2 sprays daily PRN (per patient)     Additional medication history information (including reliability of information, actions taken by pharmacist):  -Patient was able to answer questions regarding her medication list with good reliability. Her family also contributed to discussion.   -Patient takes 70 mg of enoxaparin every 12 hours due to a history of DVT. Her last dose at home dose was 0100 his morning. She did receive a dose of 70 mg at 1330 in the hospital.   -Patient in on an investigational chemotherapy drug. She takes the drug for 21 days followed by 7 days off of the drug. She reports she didn't take the drug today as it was an \"off day.\" She is scheduled to start taking the drug again tomorrow.       Prior to Admission medications    Medication Sig Last Dose Taking? Auth Provider   acetaminophen (TYLENOL) 500 MG tablet Take 2 tablets (1,000 mg) by mouth every 8 hours as needed for mild pain 6/11/2019 at AM Yes Siena Inman, APRN " CNP   cetirizine (ZYRTEC) 10 MG tablet Take 10 mg by mouth daily  6/10/2019 at Unknown time Yes Reported, Patient   enoxaparin (LOVENOX) 80 MG/0.8ML syringe Inject 0.7 mLs (70 mg) Subcutaneous every 12 hours 6/11/2019 at 0100 - dose 1 of 2 Yes Miya Acevedo MD   LORazepam (ATIVAN) 1 MG tablet Take 1 tablet (1 mg) by mouth every 6 hours as needed (Anxiety, Nausea/Vomiting or Sleep) Past Month at PRN Yes Angelo Molina MD   magnesium oxide (MAG-OX) 400 MG tablet Take 1 tablet (400 mg) by mouth daily 6/10/2019 at Unknown time Yes Siena Inman APRN CNP   mirtazapine (REMERON) 15 MG tablet Take 1 tablet (15 mg) by mouth At Bedtime 6/10/2019 at PM Yes Miya Acevedo MD   mometasone (NASONEX) 50 MCG/ACT nasal spray Spray 2 sprays into both nostrils daily as needed  Past Month at Unknown time Yes Reported, Patient   ondansetron (ZOFRAN) 8 MG tablet If vomiting occurs with CC-486, take 1 tab (8 mg) 30 minutes prior to each subsequent CC-486 dose. Past Week at PRN Yes Siena Inman APRN CNP   oxyCODONE (ROXICODONE) 5 MG tablet Take 5-10 mg by mouth every 6 hours as needed for severe pain Past Month at PRN Yes Unknown, Entered By History   potassium chloride ER (K-TAB) 20 MEQ CR tablet Take 1 tablet (20 mEq) by mouth daily 6/10/2019 at Unknown time Yes Angelo Molina MD   pravastatin (PRAVACHOL) 20 MG tablet Take 1 tablet (20 mg) by mouth every evening for cholesterol. 6/10/2019 at PM Yes Rayshawn Reaves MD   prochlorperazine (COMPAZINE) 10 MG tablet Take 1 tablet (10 mg) by mouth every 6 hours as needed (Nausea/Vomiting) 6/11/2019 at AM Yes Siena Inman APRN CNP   senna-docusate (SENNA S) 8.6-50 MG tablet Take 4 tablets by mouth 2 times daily Past Week at Unknown time Yes Siena Inman APRN CNP   study CC-486 (IDS #5077) 100 mg TABS CHEMOTHERAPY tablet Take 1 tablet (100 mg) by mouth daily Schedule 1. Take for 21 days, followed by 7 days off. Take at the same time each day on an empty  stomach or with food (a light breakfast or meal of up to approximately 600 calories).  Swallow tablet whole with about 8oz of room temperature water. Do not take broken or cracked tablets. 6/4/2019 Yes Angelo Molina MD       Medication history completed by: Luigi Schwartz, Pharm.D. IV Student

## 2019-06-12 NOTE — PLAN OF CARE
Neuro: A&Ox4. Neuro checks q2h, as ordered. WNL for patient, no neurological concerns at this time. Neuro team consulting as well and by today.   Cardiac: SR/Stach, HR , BP's stable. 2 short episodes of SVT and 6 beats of v-tach this shift, asymptomatic, team notified, EKG, troponin labs done. Team discussing with cardiology team, as well.   Respiratory: Sating > 95% on RA.  GI/: Adequate urine output. BM X1 this shift.  Diet/appetite: Tolerating regular diet. Eating about 50% of her meals at this time. Dietician consulted today.   Activity:  stand by assistance, up ambulating to bathroom. Activity as tolerated.   Pain: At acceptable level on current regimen. X1 dose of oxycodone and x1 dose of tylenol given today.  Skin: No new deficits noted.  LDA's: Heparin gtt and MIVF infusing via port in right chest. Heparin gtt therapeutic this AM so remains at 950 units/hr.     Plan: See results of troponin levels. MD faulkner review 12 lead EKG. Continue with telemetry monitoring. Continue with heparin gtt. NPO at midnight for paracentesis tomorrow.

## 2019-06-12 NOTE — TELEPHONE ENCOUNTER
"TRIO Study ([id number]): Telephone Note   Subject name: Di Evans     Patient in the TRIO Study ([id number]). Patient's roommate called to say that Di was admitted tot he hospital and that she had had 2 minor strokes.     Chart review showed that [patient was admitted to the hospital yesterday 11/JUN/2019 after ED visit. Patient presented to ED with confusion. MR of brain for stroke limited was conducted and showed \"2 punctate foci of acute infarct in the bilateral parietal-occipital white matter\".     Patient was called back to notify that study appointment was canceled today and will request new appointment for next week. Study protocol allows 21 day hold on study treatment for non-study treatment related AEs. Informed patient that we will set up study visit next week, but will review if more time is needed to allow recovery. Patient unsure how long she will be in-patient. Instructed patient to call back with any questions or concerns. Patient and roommate voiced understanding.     Chanel Purcell RN    Office: [phone]  Pager: [phone]                        "

## 2019-06-12 NOTE — PLAN OF CARE
"/87   Pulse 114   Temp 97.7  F (36.5  C) (Oral)   Resp 16   Ht 1.651 m (5' 5\")   Wt 62.1 kg (137 lb)   SpO2 98%   BMI 22.80 kg/m      Neuro: A&Ox4. Neuro assessments q2h. Intact  Cardiac: STach. VSS.   Respiratory: Sating 98% on RA.  GI/: Adequate urine output. BM x1  Diet/appetite: Tolerating regular diet. Minimal appetite   Activity:  Up with SBA to bathroom   Pain: Denies   Skin: No new deficits noted.  LDA's: Right chest port infusing NS 75mL/hour. Heparin Gtt held for a 10A of 1.43 until 0000- will restart and reduce rate by 150units/hour      Plan: Continue with POC. Notify primary team with changes.    "

## 2019-06-12 NOTE — PROGRESS NOTES
Gynecologic Oncology Daily Progress Note  2019    Di Evans  : 1960  MRN: [id number]    HD#2 admitted for confusion  Disease: Recurrent platinum resistant ovarian cancer    24 hour events:   - Admitted for confusion/change in mental status  - MR Brain showing two small strokes, CTA redemonstration of dural venous thombosis, CT chest with new pulmonary emboli  - Neurology consulted recommended q2h Neuro checks, consider MRV tomorrow. Nothing to do about small acute strokes. Will continue to follow.  - Hematology consulted, recommend discontinuing lovenox, starting heparin gtt. Will discuss alternatives to lovenox with patient.    Subjective: Doing well this morning. Denies any ongoing confusion. Had a self resolved episodes of SOB overnight.  Denies CP, nausea/vomiting, fevers/chills. Denies any weakness/deficits. Ambulating without difficulty. Voiding spontaneously, + flatus.     Objective:  Patient Vitals for the past 24 hrs:   BP Temp Temp src Pulse Heart Rate Resp SpO2 Height Weight   19 0416 126/78 98.3  F (36.8  C) Oral -- 101 18 -- -- 68.2 kg (150 lb 6.4 oz)   19 0000 140/86 98.4  F (36.9  C) Oral 102 -- 18 93 % -- --   19 1900 134/87 97.7  F (36.5  C) Oral 114 -- 16 98 % -- --   19 1800 143/78 -- -- 107 -- -- 97 % -- --   19 1730 129/82 97  F (36.1  C) Oral -- 103 18 98 % -- --   19 1700 129/82 -- -- 103 -- -- 98 % -- --   19 1630 135/85 -- -- 113 -- -- 100 % -- --   19 1530 139/83 -- -- 102 -- -- 97 % -- --   19 1500 144/87 -- -- 98 -- -- 97 % -- --   19 1430 131/84 -- -- 105 -- -- 98 % -- --   19 1345 142/87 -- -- 96 96 25 99 % -- --   19 1330 140/86 -- -- 97 97 19 99 % -- --   19 1315 143/86 -- -- 99 100 19 99 % -- --   19 1300 131/77 -- -- 98 97 19 96 % -- --   19 1130 132/89 -- -- 106 104 20 98 % -- --   19 1115 (!) 135/98 -- -- -- -- -- -- -- --   19 1107 139/88 97  F (36.1  C) Oral  "121 -- 18 98 % 1.651 m (5' 5\") 62.1 kg (137 lb)     GEN - NAD, sitting comfortably in bed  CV - RRR  PULM - CTAB, no wheezing  ABD - soft, non-distended, non-tender  EXT - 2+ edema on RLE, 1+ in LLE, non-tender, SCDs on R  L/D - Port    I/O (last 24h // since midnight)  I: PO: NR//240mL; IV: NR//735mL  O: UOP: 2x//100mL +1x; Stool: 1x//1x    ROUTINE IP LABS (Last four results)  BMP  Recent Labs   Lab 19  1129 19  1157 19  0842  19   * 131*  --  131*  --  131*   POTASSIUM 3.9 3.5 3.0* 3.2*   < > 3.2*   CHLORIDE 99 96  --  98  --  100   TRACEY 8.2* 8.3*  --  8.3*  --  8.1*   CO2 24 25  --  23  --  22   BUN 7 8  --  7  --  9   CR 0.43* 0.42*  --  0.38*  --  0.41*   GLC 92 86  --  103*  --  89    < > = values in this interval not displayed.     CBC  Recent Labs   Lab 19  1129 19  0842 19   WBC 6.6 6.8 6.6 6.3   RBC 4.03 3.93 4.09 3.32*   HGB 9.5* 9.2* 9.6* 7.3*   HCT 31.5* 31.1* 32.2* 25.6*   MCV 78 79 79 77*   MCH 23.6* 23.4* 23.5* 22.0*   MCHC 30.2* 29.6* 29.8* 28.5*   RDW 19.7* 19.4* 18.7* 18.1*    293 147* 212     Imagin2019  CT Head/Neck Angiogram:  Impression:    1. Head CTA demonstrates no aneurysm or stenosis of the major intracranial arteries.   2. Neck CTA demonstrates approximately 25% stenosis of the right internal carotid artery at the level of the bifurcation. No significant stenosis in the left internal carotid or the vertebral arteries.  3. No intracranial hemorrhage on the noncontrast head CT.    4. Redemonstration of filling defects in the left transverse sinus, left sigmoid sinus, superior sagittal sinus and left internal jugular vein consistent with known sinus thrombosis. Findings are better demonstrated on prior venous phase imaging.  5. Increased pulmonary emboli burden with new segmental pulmonary emboli involving the anterior and posterior segments of the left upper lobe.  6 Small pleural " effusions and adjacent compressive atelectasis visible in the upper lungs.   7. Mediastinal and supraclavicular lymphadenopathy.    CT Chest:  Impression:   1. Exam is positive for acute pulmonary embolism. Right upper and lower lobe segmental pulmonary embolism and subsegmental left lower lobe pulmonary embolism, worsening since 4/29/2019.  2. No evidence of right heart strain.  3. Right basilar peripheral wedge-shaped groundglass and solid opacity is differential diagnosis includes hemorrhage versus infarction.  4. Bilateral pleural effusions.    MR Brain:  Impression:  Two punctate foci of acute infarct in the bilateral parietal-occipital white matter.    Assessment: Di Evans is a 58 year old with history of platinum resistant ovarian cacner, now HD#2 confusion in the setting of known dural venous thrombosis and new embolic strokes.     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 Currently enrolled in TRIO study, most recently C4D8 on 5/22/19 with pembrolizumab. CC-486 held.   FEN: Regular diet. Hyponatremia 131. NS @75ml/h.  Pain: PRN tylenol, oxycodone.  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 PTA on lovenox 70 mg BID [HELD], currently on heparin gtt. Anemia of chronic disease, Hgb 9.2. CT Chest 6/11 multiple new PEs  CV: HLD: on PTA pravastatin. Tachycardic in 100s at baseline since cancer diagnosis, possibly related to PEs.  Pulm: Multiple pulmonary emboli. Sats >90%.  GI: Nausea: PTA Zofran, Compazine. Constipation: JENNIFER senna-docusate  : Voiding spontaneously, no issues.  ID: No concerns. WBC 6.8  Endocrine: No issues  Psych/Neuro: PTA Mirtazapine. MR Brain with two punctate embolic strokes. No intervention, Q2H Neuro checks per Neurology.   PPX:  SCD on left leg. Heparin gtt.  Dispo: Home pending on clinical improvement     Miya Acevedo MD  Gynecology Oncology  PGY3  Pager: [phone]  06/12/2019 5:43 AM     I have examined this patient personally with the team and agree with the above findings and plan.  Ms. Evans has made clear to me today that she does not wish for intubation or resuscitation in the event of a cardiac or pulmonary arrest or should a stroke incapacitate her.     Ugo Flor

## 2019-06-12 NOTE — PROVIDER NOTIFICATION
Time of notification: 1419  Provider notified: Dr. JEAN CARLOS dominguez, Gyn/Onc MD called me back. Unsure if it was Dr. Leroy.  Reason for notification:   At 1338 had 8 beats of SVT, at 1346 had 6 beats of vtach. I was in the room during both and was asymptomatic during both. BP stable at 130/78. This writer called tele tech and asked them to review tele trends to assess for other episodes of this. At this time, they mentioned she also had 7 beats of SVT at 0642 this morning.      Temp:  [97  F (36.1  C)-98.4  F (36.9  C)] 98.2  F (36.8  C)  Pulse:  [] 102  Heart Rate:  [] 99  Resp:  [16-18] 18  BP: (110-147)/(78-98) 130/78  SpO2:  [93 %-100 %] 96 %  Orders received: MD placed orders for troponin and EKG, nursing to continue with telemetry monitoring, MD will discuss with Cardiology team. This writer verbalized understanding. Nursing will continue to monitor.

## 2019-06-12 NOTE — PLAN OF CARE
Admission          6/11/2019 11:09 AM  -----------------------------------------------------------  Reason for admission: Pulmonary emoboli  Primary team notified of pt arrival.  Admitted from: ED   Via: stretcher  Accompanied by: Friends   Belongings: Placed at bedside, clothing in closet- Cellphone at bedside   Admission Profile: complete  Teaching: orientation to unit and call light- call light within reach, call don't fall, use of console, meal times, when to call for the RN, and enforced importance of safety   Access: Chest port   Telemetry:Placed on pt  Ht./Wt.: complete  2 RN Skin Assessment Completed By: LENORA Gruber, RN and JOSE ELIAS Pike RN   Pt status:    Temp:  [97  F (36.1  C)-97.7  F (36.5  C)] 97.7  F (36.5  C)  Pulse:  [] 114  Heart Rate:  [] 103  Resp:  [16-25] 16  BP: (129-144)/(77-98) 134/87  SpO2:  [96 %-100 %] 98 %

## 2019-06-12 NOTE — PROGRESS NOTES
"  Hematology - Inpatient Consult Service  Progress Note   Date of Service: 06/12/2019    Patient: Di Evans  MRN: [id number]  Admission Date: 6/11/2019  Hospital Day # 1    Reason for Consult: New PE while on therapeutic Lovenox.      Interval History:  Doing OK today. MRI yesterday revealed small strokes. Neurological symptoms resolved. She had a brief episode of SOB yesterday but that has resolved. No chest pain. No bleeding problems. Abdomen a bit more distended today. She is eager to get home soon.    Physical Exam:    Blood pressure (!) 147/91, pulse 102, temperature 98.3  F (36.8  C), temperature source Oral, resp. rate 18, height 1.651 m (5' 5\"), weight 68.2 kg (150 lb 6.4 oz), SpO2 96 %, not currently breastfeeding.  General: alert and cooperative, lying in bed, no acute distress  HEENT: sclera anicteric, EOMI, MMM  CV: tachycardic  Resp: normal respiratory effort on ambient air  GI: soft, non-tender, mildly distended  Extremities: warm and well-perfused, right leg more edematous than left  Skin: no rashes on limited exam, no jaundice  Access: Port in right upper chest (c/d/i)    Current Inpatient Medications:  Current Facility-Administered Medications   Medication     acetaminophen (TYLENOL) tablet 1,000 mg     cetirizine (zyrTEC) tablet 10 mg     fluticasone (FLONASE) 50 MCG/ACT spray 2 spray     heparin  drip 25,000 units in 0.45% NaCl 250 mL (see additional administration details for dose)     heparin 100 UNIT/ML injection 5 mL     heparin bolus from infusion pump     LORazepam (ATIVAN) tablet 1 mg     magnesium oxide (MAG-OX) tablet 400 mg     melatonin tablet 1 mg     mirtazapine (REMERON) tablet 15 mg     naloxone (NARCAN) injection 0.1-0.4 mg     ondansetron (ZOFRAN) tablet 4 mg     oxyCODONE (ROXICODONE) tablet 5-10 mg     Patient is already receiving anticoagulation with heparin, enoxaparin (LOVENOX), warfarin (COUMADIN)  or other anticoagulant medication     potassium chloride ER " (K-DUR/KLOR-CON M) CR tablet 20 mEq     pravastatin (PRAVACHOL) tablet 20 mg     prochlorperazine (COMPAZINE) injection 10 mg    Or     prochlorperazine (COMPAZINE) tablet 10 mg    Or     prochlorperazine (COMPAZINE) Suppository 25 mg     prochlorperazine (COMPAZINE) tablet 10 mg     senna-docusate (SENOKOT-S/PERICOLACE) 8.6-50 MG per tablet 4 tablet     sodium chloride 0.9% infusion       Labs & Studies: I personally reviewed the following studies:  ROUTINE LABS (Last four results):  CMP  Recent Labs   Lab 06/12/19 0437 06/11/19  1130 06/11/19  1129 06/08/19  1157 06/07/19  0842  06/06/19 0619   *  --  131*  --  131*  --  131*   POTASSIUM 3.9  --  3.5 3.0* 3.2*   < > 3.2*   CHLORIDE 99  --  96  --  98  --  100   CO2 24  --  25  --  23  --  22   ANIONGAP 7  --  10  --  9  --  8   GLC 92  --  86  --  103*  --  89   BUN 7  --  8  --  7  --  9   CR 0.43*  --  0.42*  --  0.38*  --  0.41*   GFRESTIMATED >90 >90 >90  --  >90  --  >90   GFRESTBLACK >90 >90 >90  --  >90  --  >90   TRACEY 8.2*  --  8.3*  --  8.3*  --  8.1*   MAG  --   --   --   --   --   --  1.6   PROTTOTAL 5.8*  --   --   --   --   --   --    ALBUMIN 1.3*  --   --   --   --   --   --    BILITOTAL 0.2  --   --   --   --   --   --    ALKPHOS 118  --   --   --   --   --   --    AST 12  --   --   --   --   --   --    ALT 13  --   --   --   --   --   --     < > = values in this interval not displayed.     CBC  Recent Labs   Lab 06/12/19 0437 06/11/19  1129 06/07/19  0842 06/06/19  0619   WBC 6.6 6.8 6.6 6.3   RBC 4.03 3.93 4.09 3.32*   HGB 9.5* 9.2* 9.6* 7.3*   HCT 31.5* 31.1* 32.2* 25.6*   MCV 78 79 79 77*   MCH 23.6* 23.4* 23.5* 22.0*   MCHC 30.2* 29.6* 29.8* 28.5*   RDW 19.7* 19.4* 18.7* 18.1*    293 147* 212     CTA  HEAD NECK WITH CONTRAST 6/11/2019 12:08 PM  HISTORY:  slurred speech  COMPARISON:  MR head dated 6/7/2019.    IMPRESSION:    1. Head CTA demonstrates no aneurysm or stenosis of the major  intracranial arteries.   2. Neck CTA  demonstrates approximately 25% stenosis of the right  internal carotid artery at the level of the bifurcation. No  significant stenosis in the left internal carotid or the vertebral  arteries.  3. No intracranial hemorrhage on the noncontrast head CT.    4. Redemonstration of filling defects in the left transverse sinus,  left sigmoid sinus, superior sagittal sinus and left internal jugular  vein consistent with known sinus thrombosis. Findings are better  demonstrated on prior venous phase imaging.  5. Increased pulmonary emboli burden with new segmental pulmonary  emboli involving the anterior and posterior segments of the left upper  lobe.  6 Small pleural effusions and adjacent compressive atelectasis visible  in the upper lungs.   7. Mediastinal and supraclavicular lymphadenopathy.     CT CHEST PULMONARY EMBOLISM W CONTRAST  6/11/2019 2:48 PM   HISTORY:  Possible new pulmonary emboli noted on CT angiogram of the  head and neck.  Evaluate for new emboli formation.  COMPARISON: CT CAP 4/29/2019  IMPRESSION:   1. Exam is positive for acute pulmonary embolism. Right upper and  lower lobe segmental pulmonary embolism and subsegmental left lower  lobe pulmonary embolism, worsening since 4/29/2019.  2. No evidence of right heart strain.  3. Right basilar peripheral wedge-shaped groundglass and solid opacity  is differential diagnosis includes hemorrhage versus infarction.  4. Bilateral pleural effusions.    MRI BRAIN W/OUT CONTRAST, 6/11/2019  HISTORY:  Stroke, follow up.  COMPARISON:  CT head/neck 6/11/2019. Brain MRI 6/7/2019.   IMPRESSION:  Two punctate foci of acute infarct in the bilateral parietal-occipital  white matter.       Assessment & Plan:   Di Evans is a 58 year old female with history notable for recurrent metastatic clear cell ovarian carcinoma (w/peritoneal carcinomatosis requiring weekly paracentesis), currently on the TRIO Study (with CC-486 and pembrolizumab), recently diagnosed RLE DVT on  4/12/19 (s/p thrombectomy and started on enoxaparin), and asymptomatic PE on CT scan from 4/29/19. 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 The hematology team was consulted for recommendations regarding anticoagulation (as this occurred on enoxaparin), and in the setting of a slightly sub-therapeutic Hep Xa level we opted to increase the enoxaparin dose from 60 mg BID to 70 mg BID. She reported 100% compliance with this regimen, but unfortunately presented to the ED again on 6/11/19 with dysarthria and pleuritic chest pain the day prior. She was found to have worsening bilateral PE and the hematology team was again consulted for recommendations regarding anticoagulation.     This patient has clearly demonstrated a hypercoagulable state with a high propensity to clot despite appropriate anticoagulation. There are no clear guidelines for recurrent VTEs on enoxaparin, though consensus is generally to increase the dose by 20-25%. Given the evidence of strokes on MRI, we also favor adding an aspirin.     Summary of Recommendations:    Transition heparin drip to enoxaparin 80 mg BID.    After 3 doses, check an anti-Xa level (4-6 hours after the 3rd dose is given).    Start aspirin 81 mg daily.    Ideal timing of the therapeutic paracentesis would be 1-2 hours after stopping the heparin drip and just prior to starting enoxaparin.     We will continue to follow this patient while hospitalized. Please do not hesitate to page with any questions or concerns.    Patient was seen and plan of care was discussed with attending physician Dr. Bella.    Nellie Castañeda MD/PhD  Heme/Onc Fellow  Pager [phone]    Attending Note:  I have reviewed the patient chart, and interviewed and examined the patient.  I agree with the assessment and plan.  Aleyda Bella MD  Hematology

## 2019-06-12 NOTE — PLAN OF CARE
"/78 (BP Location: Left arm)   Pulse 102   Temp 98.3  F (36.8  C) (Oral)   Resp 18   Ht 1.651 m (5' 5\")   Wt 68.2 kg (150 lb 6.4 oz)   SpO2 93%   BMI 25.03 kg/m     Neuro: A&Ox4. Clear, logical speech.  Follows commands. PERRLA. Baseline numbness in bilateral feet.  Cardiac: ST. HR 80s to low 100s. SBP 120s-140s. Afebrile.   Respiratory: Sating > 95% on RA.   GI/: Adequate urine output.  Small, soft BM X1.  Diet/appetite: Tolerating regular diet.   Activity: SBA up to bathroom. Steady on feet.  Pain: At acceptable level on current regimen. Managed with prn tylenol.  Skin: No new deficits noted.  LDA's: Rt chest port infusing heparin gtt at 950 units/hr. NS MIV at 75mL/hr.    Plan:  Possible follow-up MRV today and 5th round of chemo. Continue with POC. Notify primary team with changes.  "

## 2019-06-12 NOTE — PROGRESS NOTES
"CLINICAL NUTRITION SERVICES - ASSESSMENT NOTE     Nutrition Prescription    RECOMMENDATIONS FOR MDs/PROVIDERS TO ORDER:  Bowel regimen per team  Fluids per team  Encourage PO intakes   If pts PO intakes do not improve, may need to consider nutrition support.     Malnutrition Status:    Severe malnutrition in the context of chronic illness    Recommendations already ordered by Registered Dietitian (RD):  Multivitamin   Supplements PRN    Future/Additional Recommendations:  Continue to monitor PO intakes   Monitor for need to begin calorie counts     REASON FOR ASSESSMENT  Di Evans is a/an 58 year old female assessed by the dietitian for Admission Nutrition Risk Screen for reduced oral intake over the last month    NUTRITION HISTORY  Per chart: history of recurrent platinum resistant ovarian cancer currently undergoing experimental chemotherapy (enrolled in TRIO study), complicated by ascites (weekly paracentesis). 7/16/18, pt had 6 months of bloating, decreased appetite and early satiety,     -- MR Brain showing two small strokes, CTA redemonstration of dural venous thombosis, CT chest with new pulmonary emboli    Per pt, reports that her appetite and PO intakes over the past few months have been poor. She states that in th beginning, she was losing weight intentional ( this was about 1 year ago) and then she began to lose weight unintentionally d/t lack of PO intakes and her disease state. She reported that she was would only a few bites of her meals/day. She was trying to drink Boost, however recently is started to cause increased abdominal pain and therefore she stopped drinking them. Since admit, she reports her appetite has greatly increased from PTA. She reports eating 50% of her breakfast for the first time in a \"very long time\" and that her friends get her lunch from the cafeteria today.     CURRENT NUTRITION ORDERS  Diet: Regular  Intake/Tolerance: Improving PO intakes compared to PTA.     LABS  Na: " "131 (L), Cr: .43 (L), Labs reviewed    MEDICATIONS  Remeron just initiated, Medications reviewed    ANTHROPOMETRICS  Height: 165.1 cm (5' 5\")  Most Recent Weight: 68.2 kg (150 lb 6.4 oz)    IBW: 56.8 kg  BMI: Overweight BMI 25-29.9  Weight History: Pt is fluid up 2/2 ascites and other noted edema.Pt does report about 15-20# wt loss in the past few months.  Per chart review, noted ~ 10% wt loss inthe past 6 months.   Wt Readings from Last 10 Encounters:   06/12/19 68.2 kg (150 lb 6.4 oz)   06/03/19 62.6 kg (138 lb)   05/30/19 63.6 kg (140 lb 4.8 oz)   05/23/19 62.5 kg (137 lb 11.2 oz)   05/22/19 64.4 kg (141 lb 14.4 oz)   05/15/19 62.1 kg (136 lb 14.4 oz)   05/15/19 64.5 kg (142 lb 4.8 oz)   05/11/19 62.8 kg (138 lb 6.4 oz)   05/08/19 62.4 kg (137 lb 9.6 oz)   05/01/19 64.3 kg (141 lb 12.8 oz)     Dosing Weight: 62 kg ( lowest wt this admit 6/11)     ASSESSED NUTRITION NEEDS  Estimated Energy Needs: 8143-5954 kcals/day (25 - 30 kcals/kg)  Justification: Increased needs and Repletion  Estimated Protein Needs: 74-93 grams protein/day (1.2 - 1.5 grams of pro/kg)  Justification: Hypercatabolism with acute illness and Increased needs  Estimated Fluid Needs: 1 mL/kcal/day   Justification: Maintenance and On a fluid restriction    PHYSICAL FINDINGS  See malnutrition section below.    MALNUTRITION  % Intake: </=50% for >/= 1 month (severe)  % Weight Loss: > 10% in 6 months (severe)  Subcutaneous Fat Loss: Facial region, Upper arm, Lower arm and Thoracic/intercostal: moderate  Muscle Loss: Thoracic region (clavicle, acromium bone, deltoid, trapezius, pectoral), Upper arm (bicep, tricep), Lower arm  (forearm), Dorsal hand and Upper leg (quadricep, hamstring): moderate-severe   Fluid Accumulation/Edema: Moderate  Malnutrition Diagnosis: Severe malnutrition in the context of chronic illness    NUTRITION DIAGNOSIS  Inadequate protein-energy intake related to decreased appetite, poor PO intakes and hypercatabolism r/t disease as " evidenced by moderate-severe muscle wasting, Low Cr, and pt pt report.       INTERVENTIONS  Implementation  Nutrition education for nutrition relationship to health/disease and Nutrition education for recommended modifications   Medical food supplement therapy- provided [facility] supplement list- pt able to order PRN  Modify composition of meals/snacks- 5-6 small meals per day and focus on protein   Multivitamin/mineral supplement therapy     Goals  Patient to consume % of nutritionally adequate meal trays TID, or the equivalent with supplements/snacks.     Monitoring/Evaluation  Progress toward goals will be monitored and evaluated per protocol.      Christine Thorpe, RD, MS, LD  6B- Pager: [phone]

## 2019-06-12 NOTE — PROGRESS NOTES
GYN ONC Brief Progress Note:    Reviewed code status with patient on rounds this AM given new strokes while on anticoagulation.  Patient states she would not want to be intubated or undergo chest compressions given her current disease status and recent strokes.  Code status updated in Epic, POLST form completed with patient, SW to complete heathcare directive with patient.    Julio Leroy MD

## 2019-06-13 NOTE — PLAN OF CARE
Neuro: A&Ox4, forgetful. Neuro check q 2h, intact. CMS q 4, intact.   Cardiac: SR-ST, HR 90-low 100s. BP mildly elevated 130-140/70-80.   Respiratory: Sating 94% on RA. Reports intermittent shortness of breath that does not last long. One episode where O2 sats dropped to 79% but quickly recovered <1 min.   GI/: Adequate urine output. No bm this shift.   Diet/appetite: Tolerating Regular diet. Poor appetite.   Activity:  Standby assist.   Pain: Complaining of mild headache. Prn tylenol given with relief.   Skin: No new deficits noted. Bilateral heels blanchable, elevated with pillow.   LDA's: R port running NS at 75ml/hr and heparin gtt at 950 unit(s)/hr.     Plan: Continue with POC. Notify primary team with changes.

## 2019-06-13 NOTE — PROGRESS NOTES
Brief Progress Note    6/12: Although not officially consulted, discussed on the phone the possibility of intervention with neurology interventional radiology as they had been formally consulted on her admission (6/5-6/8) for dural venous sinus thrombosis. Neuro IR agreed that no intervention is indicated at this time.    Faithful Ilan Mac, MS4

## 2019-06-13 NOTE — CONSULTS
Social Work Services Progress Note    Hospital Day: 3  Date of Initial Social Work Evaluation:  Not completed.   Collaborated with:  Floor SWer, pt and pt family/friends at bedside.     Data:  Di Evans is a 58 year old with history of platinum resistant ovarian cancer, now HD#3 confusion in the setting of known dural venous thrombosis and new embolic strokes. She is currently DNR/DNI.    Intervention:  SWer consulted for HCD.     Assessment:  No HCD on file in pt chart. SWer met with pt and two visitors at bedside. Pt appeared A&Ox4 and pleasant. SWer introduced role and educated pt on HCD. Pt stated that she completed one yesterday. Pt visitor showed SWer the POLST form. SWer educated pt on the difference between POLST and HCD. Pt declined interest in completing at this time. Pt visitors encouraged pt to complete. Pt agreeable to reviewing the form and considering completing the form overnight. Pt agreeable if SWer follow up tomorrow and to arrange notary (if form is completed). Pt and pt visitors denied any additional SW needs or concerns.     Plan:    Anticipated Disposition:  Home, additional SW and D/C needs unknown at this time     Barriers to d/c plan:  None identified at this time.     Follow Up:  SWer will follow up with tomorrow 6/14 and to check-in to see if pt has completed the form.     JOSE Dacosta, [facility]  Acute Care Inpatient Clinical   6D-Observation Unit  Phone: [phone]  I   Pager: [phone]

## 2019-06-13 NOTE — PROGRESS NOTES
Gynecologic Oncology Daily Progress Note  2019    Di Evans  : 1960  MRN: [id number]    HD#3 admitted for confusion  Disease: Recurrent platinum resistant ovarian cancer    24 hour events:   - Code status changed to DNR/DNI  - Hematology consulted: recommend discontinuing heparin gtt and transition to 80 mg lovenox BID  - Neurology Candelaria and IR Neurology evaluated patient, no intervention planned or recommended at this time  - SVT and V tach run noted on telemetry, troponin negative, Cardiology recommended obtaining troponins if symptomatic    Subjective: Doing well this morning. No complaints. She denies any ongoing confusion, speech is normal. She denies headaches, changes in vision. She is eating/drinking normally. Having bloating, but not terribly symptomatic, ok with deferring paracentesis for now. Otherwise denies CP, SOB, fevers/chills, nausea/vomiting. Passing gas, voiding spontaneously.     Objective:  Patient Vitals for the past 24 hrs:   BP Temp Temp src Heart Rate Resp SpO2 Weight   19 0331 (!) 137/92 98  F (36.7  C) Oral 96 18 97 % 69.9 kg (154 lb)   19 2330 139/84 98.6  F (37  C) Oral -- 18 -- --   19 1935 146/85 98.7  F (37.1  C) Oral 101 18 99 % --   19 1516 128/78 98.2  F (36.8  C) Oral 96 18 97 % --   19 1358 130/78 -- -- 99 -- -- --   19 1355 (!) 110/98 -- -- 100 -- -- --   19 1325 -- -- -- 104 -- 96 % --   19 1132 130/87 98.2  F (36.8  C) Oral 92 18 98 % --   19 1024 128/83 -- -- 97 -- -- --   19 0754 (!) 147/91 98.3  F (36.8  C) Oral 93 18 96 % --     GEN - NAD, sitting comfortably in bed  CV - RRR  PULM - CTAB, no wheezing  ABD - soft, moderately distended, non-tender  EXT - 2+ edema on RLE, 1+ in LLE, non-tender, SCDs on R  L/D - Port    I/O (last 24h // since midnight)  I: PO: 480mL//0mL; IV: 2100mL//200mL  O: UOP: 1200mL +1x//200mL; Stool: 3x//0    ROUTINE IP LABS (Last four results)  BMP  Recent Labs   Lab  06/13/19  0528 06/12/19  0437 06/11/19  1129 06/08/19  1157 06/07/19  0842    131* 131*  --  131*   POTASSIUM 4.6 3.9 3.5 3.0* 3.2*   CHLORIDE 102 99 96  --  98   TRACEY 8.3* 8.2* 8.3*  --  8.3*   CO2 26 24 25  --  23   BUN 7 7 8  --  7   CR 0.42* 0.43* 0.42*  --  0.38*   GLC 92 92 86  --  103*     CBC  Recent Labs   Lab 06/13/19  0528 06/12/19  0437 06/11/19  1129 06/07/19  0842   WBC 6.0 6.6 6.8 6.6   RBC 3.89 4.03 3.93 4.09   HGB 9.1* 9.5* 9.2* 9.6*   HCT 31.0* 31.5* 31.1* 32.2*   MCV 80 78 79 79   MCH 23.4* 23.6* 23.4* 23.5*   MCHC 29.4* 30.2* 29.6* 29.8*   RDW 19.8* 19.7* 19.4* 18.7*    330 293 147*     Assessment: Di Evans is a 58 year old with history of platinum resistant ovarian cancer, now HD#3 confusion in the setting of known dural venous thrombosis and new embolic strokes. She is currently DNR/DNI.    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 Currently enrolled in TRIO study, most recently C4D8 on 5/22/19 with pembrolizumab. CC-486 held.   FEN: Regular diet. Hyponatremia resolved. NS @75ml/h.  Pain: PRN tylenol, oxycodone.  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 PTA on lovenox 70 mg BID [HELD], currently on heparin gtt. Plan to transition back to lovenox today at 80mg BID. Anemia of chronic disease, Hgb 9.5. CT Chest 6/11 multiple new PEs  CV: HLD: on PTA pravastatin. Tachycardic in 100s at baseline since cancer diagnosis, possibly related to PEs. SVT/V tach on telemetry 6/12. Troponin neg. Repeat troponins if symptomatic per Cards.  Pulm: Multiple pulmonary emboli. Sats >90%.  GI: Nausea: PTA Zofran, Compazine. Constipation: JENNIFER senna-docusate. Plan for therapeutic paracentesis when symptomatic.   : Voiding spontaneously, no issues.  ID: No concerns. WBC 8.0.  Endocrine: No issues  Psych/Neuro: PTA Mirtazapine. MR Brain with two punctate embolic  strokes. No intervention, Q2H Neuro checks per Neurology.   PPX:  SCD on left leg. Heparin gtt.  Dispo: Home pending on clinical improvement     Miya Acevedo MD  Gynecology Oncology PGY3  Pager: [phone]  06/13/2019 6:24 AM     Provider Disclosure:   I agree with above History, Review of Systems, Physical exam and Plan. I have reviewed the content of the documentation and have edited it as needed. I have seen and personally performed the services documented here and the documentation accurately represents those services and the decisions I have made.     Electronically signed by:   Angelo Molina MD   Gynecologic Oncology   [facility]

## 2019-06-13 NOTE — PLAN OF CARE
"BP (!) 137/92 (BP Location: Right arm)   Pulse 102   Temp 98  F (36.7  C) (Oral)   Resp 18   Ht 1.651 m (5' 5\")   Wt 69.9 kg (154 lb)   SpO2 97%   BMI 25.63 kg/m     Neuro: A&Ox4. Clear, logical speech.  Follows commands. PERRLA. Baseline numbness in bilateral feet.  Cardiac: SR. HR 80s to 90s. SBP 130s. Afebrile.             Respiratory: Sating > 95% on RA.   GI/: Adequate urine output.  BM X0.  Diet/appetite: Tolerating regular diet.   Activity: SBA up to bathroom. Steady on feet.  Pain: At acceptable level on current regimen.  Skin: No new deficits noted.  LDA's: Rt chest port infusing heparin gtt at 950 units/hr. NS MIV at 75mL/hr.     Plan:  Possible paracentesis and 5th round of chemo today. Continue with POC. Notify primary team with changes.    "

## 2019-06-13 NOTE — PROCEDURES
Consult and Procedure Service - Procedure Note    Attending: Luigi Cesar MD  Resident: none  Procedure: therapeutic paracentesis  Indication: Distension  Risk Assessment: elevated due to anticoagulation after procedure      The risks and benefits of the procedure were explained to the patient  who expressed understanding and opted to proceed.  Consent was obtained and placed in the chart.  A time out was performed.  An area of ascites was located and marked using ultrasound guidance in the right lower quadrant; the area was prepped and draped in the usual sterile fashion.  8 ml of 1% lidocaine was instilled and ascites located.  A small incision was made with an 11 blade.  The Stockleap paracentesis catheter and needle were inserted under real-time guidance until ascites obtained then the needle removed.  The apparatus was connected to vacuum bottles and a total of 2050 ml of dark yellow colored fluid removed.   A specimen was not sent for analysis. The catheter was withdrawn and the area dressed.    Patient tolerated the procedure well with no immediate complications.  Please contact the Consult and Procedure Service if any complications or concerns arise.     Luigi Cesar MD  DOS:  6/13/19

## 2019-06-14 NOTE — PLAN OF CARE
2746-1421:    Neuro: A&Ox4, neuros intact. Follows commands. PERRLA. Baseline numbness in bilateral feet.   Cardiac: SR/ST HR 90s-110s. BPs 120s-130s/70s-90s. Afebrile.          Respiratory: Sating > 95% on RA.   GI/: Adequate urine output, no BM today.  Diet/appetite: Tolerating regular diet, appetite fair/good.   Activity: SBA up to bathroom.  Pain: Denies pain today.  Skin: No new deficits noted.  LDA's: Rt chest port saline-locked.  Plan:  Paracentesis performed today, 2050mL removed. Monitory R abdomen para site. Telemetry monitoring and IVF discontinued. 5th round of chemo on hold. Continue with POC. Notify primary team with changes

## 2019-06-14 NOTE — PROGRESS NOTES
"  Hematology - Inpatient Consult Service  Progress Note   Date of Service: 06/14/2019    Patient: Di Evans  MRN: [id number]  Admission Date: 6/11/2019  Hospital Day # 3    Reason for Consult: New PE while on therapeutic Lovenox.      Interval History:  Doing OK today. Legs less swollen today. Abdomen feeling better after the paracentesis. No bleeding or significant bruising. Breathing comfortably. Does not want to go home until we have a clear plan for the Lovenox dosing.    Physical Exam:    Blood pressure 119/87, pulse 115, temperature 98.7  F (37.1  C), temperature source Oral, resp. rate 16, height 1.651 m (5' 5\"), weight 69.9 kg (154 lb), SpO2 96 %, not currently breastfeeding.  General: alert and cooperative, lying in bed, no acute distress  HEENT: sclera anicteric, EOMI  Resp: normal respiratory effort on ambient air  Extremities: warm and well-perfused, right leg more edematous than left  Skin: no rashes on limited exam, no jaundice    Current Inpatient Medications:  Current Facility-Administered Medications   Medication     acetaminophen (TYLENOL) tablet 1,000 mg     aspirin (ASA) chewable tablet 81 mg     cetirizine (zyrTEC) tablet 10 mg     fluticasone (FLONASE) 50 MCG/ACT spray 2 spray     heparin 100 UNIT/ML injection 5 mL     LORazepam (ATIVAN) tablet 1 mg     magnesium oxide (MAG-OX) tablet 400 mg     melatonin tablet 1 mg     mirtazapine (REMERON) tablet 15 mg     multivitamin w/minerals (THERA-VIT-M) tablet 1 tablet     naloxone (NARCAN) injection 0.1-0.4 mg     ondansetron (ZOFRAN) tablet 4 mg     oxyCODONE (ROXICODONE) tablet 5-10 mg     Patient is already receiving anticoagulation with heparin, enoxaparin (LOVENOX), warfarin (COUMADIN)  or other anticoagulant medication     potassium chloride ER (K-DUR/KLOR-CON M) CR tablet 20 mEq     pravastatin (PRAVACHOL) tablet 20 mg     prochlorperazine (COMPAZINE) injection 10 mg    Or     prochlorperazine (COMPAZINE) tablet 10 mg    Or     " prochlorperazine (COMPAZINE) Suppository 25 mg     senna-docusate (SENOKOT-S/PERICOLACE) 8.6-50 MG per tablet 4 tablet       Labs & Studies: I personally reviewed the following studies:  ROUTINE LABS (Last four results):  CMP  Recent Labs   Lab 06/14/19  0521 06/13/19  0528 06/12/19  0437 06/11/19  1130 06/11/19  1129    134 131*  --  131*   POTASSIUM 4.3 4.6 3.9  --  3.5   CHLORIDE 101 102 99  --  96   CO2 28 26 24  --  25   ANIONGAP 5 7 7  --  10   * 92 92  --  86   BUN 6* 7 7  --  8   CR 0.47* 0.42* 0.43*  --  0.42*   GFRESTIMATED >90 >90 >90 >90 >90   GFRESTBLACK >90 >90 >90 >90 >90   TRACEY 8.2* 8.3* 8.2*  --  8.3*   PROTTOTAL  --   --  5.8*  --   --    ALBUMIN  --   --  1.3*  --   --    BILITOTAL  --   --  0.2  --   --    ALKPHOS  --   --  118  --   --    AST  --   --  12  --   --    ALT  --   --  13  --   --      CBC  Recent Labs   Lab 06/14/19  0521 06/13/19  0528 06/12/19  0437 06/11/19  1129   WBC 5.2 6.0 6.6 6.8   RBC 3.73* 3.89 4.03 3.93   HGB 8.6* 9.1* 9.5* 9.2*   HCT 29.6* 31.0* 31.5* 31.1*   MCV 79 80 78 79   MCH 23.1* 23.4* 23.6* 23.4*   MCHC 29.1* 29.4* 30.2* 29.6*   RDW 19.9* 19.8* 19.7* 19.4*    349 330 293     CTA  HEAD NECK WITH CONTRAST 6/11/2019 12:08 PM  HISTORY:  slurred speech  COMPARISON:  MR head dated 6/7/2019.    IMPRESSION:    1. Head CTA demonstrates no aneurysm or stenosis of the major  intracranial arteries.   2. Neck CTA demonstrates approximately 25% stenosis of the right  internal carotid artery at the level of the bifurcation. No  significant stenosis in the left internal carotid or the vertebral  arteries.  3. No intracranial hemorrhage on the noncontrast head CT.    4. Redemonstration of filling defects in the left transverse sinus,  left sigmoid sinus, superior sagittal sinus and left internal jugular  vein consistent with known sinus thrombosis. Findings are better  demonstrated on prior venous phase imaging.  5. Increased pulmonary emboli burden with new  segmental pulmonary  emboli involving the anterior and posterior segments of the left upper  lobe.  6 Small pleural effusions and adjacent compressive atelectasis visible  in the upper lungs.   7. Mediastinal and supraclavicular lymphadenopathy.     CT CHEST PULMONARY EMBOLISM W CONTRAST  6/11/2019 2:48 PM   HISTORY:  Possible new pulmonary emboli noted on CT angiogram of the  head and neck.  Evaluate for new emboli formation.  COMPARISON: CT CAP 4/29/2019  IMPRESSION:   1. Exam is positive for acute pulmonary embolism. Right upper and  lower lobe segmental pulmonary embolism and subsegmental left lower  lobe pulmonary embolism, worsening since 4/29/2019.  2. No evidence of right heart strain.  3. Right basilar peripheral wedge-shaped groundglass and solid opacity  is differential diagnosis includes hemorrhage versus infarction.  4. Bilateral pleural effusions.    MRI BRAIN W/OUT CONTRAST, 6/11/2019  HISTORY:  Stroke, follow up.  COMPARISON:  CT head/neck 6/11/2019. Brain MRI 6/7/2019.   IMPRESSION:  Two punctate foci of acute infarct in the bilateral parietal-occipital  white matter.       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 The hematology team was consulted for recommendations regarding anticoagulation (as this occurred on enoxaparin), and in the setting of a slightly sub-therapeutic Hep Xa level we opted to increase the enoxaparin dose from 60 mg BID to 70 mg BID. She reported 100% compliance with this regimen, but unfortunately presented to the ED again on 6/11/19  with dysarthria and pleuritic chest pain the day prior. She was found to have worsening bilateral PE and the hematology team was again consulted for recommendations regarding anticoagulation.     This patient has clearly demonstrated a hypercoagulable state with a high propensity to clot despite appropriate anticoagulation. There are no clear guidelines for recurrent VTEs on enoxaparin, though consensus is generally to increase the dose by 20-25%. Given the evidence of strokes on MRI, we also favor adding an aspirin.     Summary of Recommendations:    Continue increased dose of enoxaparin 80 mg BID.    Check an anti-Xa level (4-6 hours after the 3rd dose is given) ---> 5PM tonight.    Continue aspirin 81 mg daily.    We will follow-up the Xa level tomorrow and make recommendations about any dose adjustments and follow-up before the patient discharges in the AM.    We will continue to follow this patient while hospitalized. Please do not hesitate to page with any questions or concerns.    Patient was seen and plan of care was discussed with attending physician Dr. Bella.    Nellie Castañeda MD/PhD  Heme/Onc Fellow    Attending Note:  I have reviewed the patient chart, and interviewed and examined the patient.  I agree with the assessment and plan.  Aleyda Bella MD  Hematology  Pager [phone]

## 2019-06-14 NOTE — PROGRESS NOTES
BRIEF SW NOTE:     Per chart review, anticipate discharge home today vs tomorrow morning. SWer spoke with bedside RN who confirmed with pt that HCD was completed. Notary notified and plans to come to complete the document, put copy in chart and provide pt with copy at approx 3:00pm this afternoon. No further SW or discharge needs identified at this time.     Yuni Mckinney, JOSE, [facility] [place]  Acute Care Inpatient Clinical   6D-Observation Unit  Phone: [phone]  I   Pager: [phone]

## 2019-06-14 NOTE — PROGRESS NOTES
Gynecologic Oncology Daily Progress Note  2019    Di Evans  : 1960  MRN: [id number]    HD#4 admitted for confusion  Disease: Recurrent platinum resistant ovarian cancer    24 hour events:   - Telemetry discontinued  - Heparin gtt stopped, lovenox resumed  - Therapeutic paracentesis (2050mL)    Subjective: Doing well this morning. No complaints. Denies CP, SOB, nausea/vomiting. Denies headaches, changes in vision. Occasional WRIGHT, but this is stable. Voiding spontaneously, +flatus. Normal appetite.     Objective:  Patient Vitals for the past 24 hrs:   BP Temp Temp src Heart Rate Resp SpO2   19 0324 131/81 98.7  F (37.1  C) Oral -- 20 98 %   19 0025 133/74 98.8  F (37.1  C) Oral -- 20 99 %   19 1954 144/81 99  F (37.2  C) Oral 104 24 99 %   19 1808 133/89 98.5  F (36.9  C) Oral 109 16 99 %   19 1143 (!) 137/94 98.7  F (37.1  C) Axillary 113 16 --   19 0841 124/77 97.8  F (36.6  C) Oral 110 16 --     GEN - NAD, sitting comfortably in bed  CV - RRR  PULM - CTAB, no wheezing  ABD - soft, moderately distended, non-tender  EXT - 2+ edema on RLE, 1+ in LLE, non-tender, SCDs on R  L/D - Port    I/O (last 24h // since midnight)  I: PO: 550mL//NR; IV: 675mL//NR  O: UOP: 800mL//200mL    ROUTINE IP LABS (Last four results)  BMP  Recent Labs   Lab 19  0521 19  0528 19  0437 19  1129    134 131* 131*   POTASSIUM 4.3 4.6 3.9 3.5   CHLORIDE 101 102 99 96   TRACEY 8.2* 8.3* 8.2* 8.3*   CO2 28 26 24 25   BUN 6* 7 7 8   CR 0.47* 0.42* 0.43* 0.42*   * 92 92 86     CBC  Recent Labs   Lab 19  0521 19  0528 19  0437 19  1129   WBC 5.2 6.0 6.6 6.8   RBC 3.73* 3.89 4.03 3.93   HGB 8.6* 9.1* 9.5* 9.2*   HCT 29.6* 31.0* 31.5* 31.1*   MCV 79 80 78 79   MCH 23.1* 23.4* 23.6* 23.4*   MCHC 29.1* 29.4* 30.2* 29.6*   RDW 19.9* 19.8* 19.7* 19.4*    349 330 293     Assessment: Di Evans is a 58 year old with history of  platinum resistant ovarian cancer, now HD#4 confusion in the setting of known dural venous thrombosis and new embolic strokes. She is currently DNR/DNI.    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 Currently enrolled in TRIO study, most recently C4D8 on 5/22/19 with pembrolizumab. CC-486 held.   FEN: Regular diet. Hyponatremia resolved.   Pain: PRN tylenol, oxycodone.  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 Previously on lovenox 70 mg BID, now s/p heparin gtt. Resumed on lovenox now 80mg BID. Following Xa levels per Heme. Anemia of chronic disease, Hgb 9.5. CT Chest 6/11 multiple new PEs  CV: HLD: on PTA pravastatin. Tachycardic in 100s at baseline since cancer diagnosis, possibly related to PEs. SVT/V tach on telemetry 6/12. Troponin neg. Off telemetry. Repeat troponins if symptomatic per Cards.  Pulm: Multiple pulmonary emboli. Sats >90%.  GI: Nausea: PTA Zofran, Compazine. Constipation: JENNIFER senna-docusate. POD#1 s/p therapeutic paracentesis with Medicine.   : Voiding spontaneously, no issues.  ID: No concerns. WBC 8.0.  Endocrine: No issues  Psych/Neuro: PTA Mirtazapine. MR Brain with two punctate embolic strokes. No intervention, Q2H Neuro checks per Neurology.   PPX:  SCD on left leg. Lovenox.  Dispo: Home later today vs tomorrow morning    Miya Acevedo MD  Gynecology Oncology PGY3  Pager: [phone]  06/14/2019 6:42 AM     The patient was seen and examined with the resident, the labs and vital signs were reviewed. The medical care plan outlined above was provided under my directives and direct supervision.     Maria Antonia Haile MD, MPH  Gynecologic Oncology

## 2019-06-14 NOTE — PLAN OF CARE
"/81 (BP Location: Left arm)   Pulse 102   Temp 98.7  F (37.1  C) (Oral)   Resp 20   Ht 1.651 m (5' 5\")   Wt 69.9 kg (154 lb)   SpO2 98%   BMI 25.63 kg/m     Neuro: A&Ox4, neuros intact. Follows commands. PERRLA. Baseline numbness in bilateral feet.   Cardiac: Tele dc'd.          Respiratory: Sating > 95% on RA.   GI/: Adequate urine output. BM x0.   Diet/appetite: Tolerating regular diet, appetite fair/good.   Activity: SBA up to bathroom.  Pain: Denies pain today.  Skin: No new deficits noted.   LDA's: Rt chest port saline-locked. Blood return noted.  Plan: Stroke education today. 5th round of chemo on hold. Continue with POC. Notify primary team with changes       "

## 2019-06-14 NOTE — PLAN OF CARE
5845-0831:     Neuro: A&Ox4, neuros intact. Follows commands. PERRLA. Baseline numbness in bilateral feet.   Cardiac: SR/ST HR 110s. Other VSS. Afebrile.          Respiratory: Sating > 95% on RA.   GI/: Adequate urine output, no BM today.  Diet/appetite: Tolerating regular diet, appetite fair/good.   Activity: SBA up to bathroom, walking in halls.  Pain: Denies pain.  Skin: No new deficits noted. R abdomen paracentesis site (performed yesterday) with small drainage, dressing changed x1.  LDA's: Rt chest port saline-locked.  Plan:  Lovenox administered at 13:00. Plan for heparin Xa lab draw at 17:00 to assess Lovenox dose effectiveness.  Depending on Xa result, patient may discharge tonight or tomorrow.    Stroke teaching performed at beside by [facility]. Monitory R abdomen para site. 5th round of chemo on hold. Continue with POC. Notify primary team with changes       Pt calling to RS 5/28/2019 EGD. PLs call. Thank you.

## 2019-06-15 NOTE — PLAN OF CARE
"/81 (BP Location: Right arm)   Pulse 115   Temp 99.7  F (37.6  C) (Oral)   Resp 20   Ht 1.651 m (5' 5\")   Wt 69.9 kg (154 lb)   SpO2 96%   BMI 25.63 kg/m      Shift: [id number]  Reason for Admission: Confusion in the setting of known dural venous thrombosis and new embolic strokes   VS: VSS on RA- low grade temp, gave Tylenol. Tachycardic at baseline  Neuros: A/Ox4, CMS q4h- intact, neuros q2h- intact, able to make needs known  GI/: No BMs this shift. Voiding adequately  Diet: Reg diet  Drains/Lines: R. Port SL?  Activity: SBA  Pain/Nausea: Denies both?   Respiratory/Trach: SOB on exertion   Skin: R. LE +3 edema, L. LE +2 edema. Measured circumference of legs q4h.  Labs: Hep 10a- 0.64, pending Hep 10a AM lab recheck   Social: Family/friends at bedside   Plan of care: Possible discharge tomorrow AM after Hep 10a levels are drawn. Will continue to monitor and follow POC.   "

## 2019-06-15 NOTE — PLAN OF CARE
"/77 (BP Location: Right arm)   Pulse 115   Temp 98.6  F (37  C) (Oral)   Resp 20   Ht 1.651 m (5' 5\")   Wt 69.9 kg (154 lb)   SpO2 94%   BMI 25.63 kg/m      Reason for admission: Confusion and word finding difficulties d/t multiple new PE and sagittal sinus thrombosis.  Vitals: VSS.  CMS q4h, Neuros q2h  Activity: Up SBA/ independent  Pain: Denies  Neuro: AO x 4   Cardiac: WDL  Respiratory: Dim LS in bases.  Dypnea on exertion.  GI/: Voiding spontaneously.    Diet: Regular   Lines: R chest port SL.  Skin/Wounds: R abdomen paracentesis site dressing CDI  Plan: Possible discharge today depending upon Hep10a results.      Continue to monitor and follow POC    Dmitri Molina RN on 6/15/2019 at 7:24 AM         "

## 2019-06-15 NOTE — PROGRESS NOTES
"  Hematology - Inpatient Consult Service  Progress Note   Date of Service: 06/15/2019    Patient: Di Evans  MRN: [id number]  Admission Date: 6/11/2019  Hospital Day # 4    Reason for Consult: New PE while on therapeutic Lovenox.      Interval History:  No acute events noted overnight. Feeling well today and eager to get home. Legs are much better. Still having intermittent episodes of SOB, but transient and resolve without intervention. No bleeding or bruising. Discussed plan for Lovenox and aspirin with the patient and her caregivers today at bedside.    Physical Exam:    Blood pressure 120/70, pulse 115, temperature 98.7  F (37.1  C), temperature source Oral, resp. rate 18, height 1.651 m (5' 5\"), weight 69.9 kg (154 lb), SpO2 98 %, not currently breastfeeding.  General: alert and cooperative, lying in bed, no acute distress  HEENT: sclera anicteric, EOMI  Resp: normal respiratory effort on ambient air  Extremities: warm and well-perfused, right leg slightly more edematous than left  Skin: no rashes on limited exam, no jaundice    Current Inpatient Medications:  Current Facility-Administered Medications   Medication     0.9% sodium chloride BOLUS     0.9% sodium chloride BOLUS     acetaminophen (TYLENOL) tablet 1,000 mg     aspirin (ASA) chewable tablet 81 mg     cetirizine (zyrTEC) tablet 10 mg     enoxaparin (LOVENOX) injection 80 mg     fluticasone (FLONASE) 50 MCG/ACT spray 2 spray     heparin 100 UNIT/ML injection 5 mL     LORazepam (ATIVAN) tablet 1 mg     magnesium oxide (MAG-OX) tablet 400 mg     melatonin tablet 1 mg     mirtazapine (REMERON) tablet 15 mg     multivitamin w/minerals (THERA-VIT-M) tablet 1 tablet     naloxone (NARCAN) injection 0.1-0.4 mg     ondansetron (ZOFRAN) tablet 4 mg     oxyCODONE (ROXICODONE) tablet 5-10 mg     Patient is already receiving anticoagulation with heparin, enoxaparin (LOVENOX), warfarin (COUMADIN)  or other anticoagulant medication     potassium chloride ER " (K-DUR/KLOR-CON M) CR tablet 20 mEq     pravastatin (PRAVACHOL) tablet 20 mg     prochlorperazine (COMPAZINE) injection 10 mg    Or     prochlorperazine (COMPAZINE) tablet 10 mg    Or     prochlorperazine (COMPAZINE) Suppository 25 mg     senna-docusate (SENOKOT-S/PERICOLACE) 8.6-50 MG per tablet 4 tablet       Labs & Studies: I personally reviewed the following studies:  ROUTINE LABS (Last four results):  CMP  Recent Labs   Lab 06/15/19  0458 06/14/19  0521 06/13/19  0528 06/12/19  0437    133 134 131*   POTASSIUM 3.5 4.3 4.6 3.9   CHLORIDE 100 101 102 99   CO2 26 28 26 24   ANIONGAP 8 5 7 7   GLC 97 110* 92 92   BUN 6* 6* 7 7   CR 0.40* 0.47* 0.42* 0.43*   GFRESTIMATED >90 >90 >90 >90   GFRESTBLACK >90 >90 >90 >90   TRACEY 8.4* 8.2* 8.3* 8.2*   PROTTOTAL  --   --   --  5.8*   ALBUMIN  --   --   --  1.3*   BILITOTAL  --   --   --  0.2   ALKPHOS  --   --   --  118   AST  --   --   --  12   ALT  --   --   --  13     CBC  Recent Labs   Lab 06/15/19  0458 06/14/19  0521 06/13/19  0528 06/12/19  0437   WBC 5.6 5.2 6.0 6.6   RBC 3.80 3.73* 3.89 4.03   HGB 8.7* 8.6* 9.1* 9.5*   HCT 30.6* 29.6* 31.0* 31.5*   MCV 81 79 80 78   MCH 22.9* 23.1* 23.4* 23.6*   MCHC 28.4* 29.1* 29.4* 30.2*   RDW 19.9* 19.9* 19.8* 19.7*   * 398 349 330     CTA  HEAD NECK WITH CONTRAST 6/11/2019 12:08 PM  HISTORY:  slurred speech  COMPARISON:  MR head dated 6/7/2019.    IMPRESSION:    1. Head CTA demonstrates no aneurysm or stenosis of the major  intracranial arteries.   2. Neck CTA demonstrates approximately 25% stenosis of the right  internal carotid artery at the level of the bifurcation. No  significant stenosis in the left internal carotid or the vertebral  arteries.  3. No intracranial hemorrhage on the noncontrast head CT.    4. Redemonstration of filling defects in the left transverse sinus,  left sigmoid sinus, superior sagittal sinus and left internal jugular  vein consistent with known sinus thrombosis. Findings are  better  demonstrated on prior venous phase imaging.  5. Increased pulmonary emboli burden with new segmental pulmonary  emboli involving the anterior and posterior segments of the left upper  lobe.  6 Small pleural effusions and adjacent compressive atelectasis visible  in the upper lungs.   7. Mediastinal and supraclavicular lymphadenopathy.     CT CHEST PULMONARY EMBOLISM W CONTRAST  6/11/2019 2:48 PM   HISTORY:  Possible new pulmonary emboli noted on CT angiogram of the  head and neck.  Evaluate for new emboli formation.  COMPARISON: CT CAP 4/29/2019  IMPRESSION:   1. Exam is positive for acute pulmonary embolism. Right upper and  lower lobe segmental pulmonary embolism and subsegmental left lower  lobe pulmonary embolism, worsening since 4/29/2019.  2. No evidence of right heart strain.  3. Right basilar peripheral wedge-shaped groundglass and solid opacity  is differential diagnosis includes hemorrhage versus infarction.  4. Bilateral pleural effusions.    MRI BRAIN W/OUT CONTRAST, 6/11/2019  HISTORY:  Stroke, follow up.  COMPARISON:  CT head/neck 6/11/2019. Brain MRI 6/7/2019.   IMPRESSION:  Two punctate foci of acute infarct in the bilateral parietal-occipital  white matter.       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 The hematology team was consulted for recommendations regarding anticoagulation (as this occurred on enoxaparin), and in the setting of a slightly sub-therapeutic Hep Xa level we opted to increase the enoxaparin dose from 60 mg BID to 70 mg BID. She  reported 100% compliance with this regimen, but unfortunately presented to the ED again on 6/11/19 with dysarthria and pleuritic chest pain the day prior. She was found to have worsening bilateral PE and the hematology team was again consulted for recommendations regarding anticoagulation.     This patient has clearly demonstrated a hypercoagulable state with a high propensity to clot despite appropriate anticoagulation. There are no clear guidelines for recurrent VTEs on enoxaparin, though consensus is generally to increase the dose by 20-25%. Given the evidence of strokes on MRI, we also favor adding an aspirin. We increased the dose of enoxaparin to 80 mg BID and subsequent Xa level after the third dose was 0.64.     Summary of Recommendations:    Continue increased dose of enoxaparin 80 mg BID.    Continue aspirin 81 mg daily.    No need to recheck Xa level, and no need for outpatient hematology follow-up.    Patient was seen and plan of care was discussed with attending physician Dr. Bella.    Nellie Castañeda MD/PhD  Heme/Onc Fellow  [phone]    Attending Note:  I have reviewed the patient chart, and interviewed and examined the patient.  I agree with the assessment and plan. I can be contacted if there are further questions when she is outpatient- trying to minimize number of clinic visits for her.   Aleyda Bella MD  Hematology

## 2019-06-15 NOTE — PROGRESS NOTES
Gynecologic Oncology Daily Progress Note  06/15/2019    Di Evans  : 1960  MRN: [id number]    HD#5 admitted for confusion  Disease: Recurrent platinum resistant ovarian cancer    24 hour events:   - Most recent Xa level therapeutic range    Subjective: Doing well this morning. No complaints. Denies CP, SOB, nausea/vomiting. Denies headaches, changes in vision. Occasional WRIGHT, but this is stable. Voiding spontaneously, +flatus. Normal appetite.     Objective:  Patient Vitals for the past 24 hrs:   BP Temp Temp src Pulse Heart Rate Resp SpO2   06/15/19 0353 130/77 97.6  F (36.4  C) Oral -- 94 20 97 %   19 2354 124/77 98.6  F (37  C) Oral -- 95 20 94 %   19 -- 99.7  F (37.6  C) Oral -- -- -- --   19 1926 130/81 99.5  F (37.5  C) Oral -- 110 20 96 %   19 1515 119/87 98.7  F (37.1  C) Oral 115 -- 16 96 %   19 1200 -- 97.5  F (36.4  C) Oral 114 -- -- 97 %   19 0840 122/76 97.5  F (36.4  C) Oral -- 112 18 99 %     GEN - NAD, sitting comfortably in bed  CV - RRR  PULM - CTAB, no wheezing  ABD - soft, moderately distended, non-tender  EXT - 2+ edema on RLE, 1+ in LLE, non-tender, SCDs on R  L/D - Port    I/O (last 24h // since midnight)  I: PO: 120mL//NR; IV: 0mL//0  O: UOP: 1210mL//600mL    ROUTINE IP LABS (Last four results)  BMP  Recent Labs   Lab 06/15/19  0458 19  0521 19  0528 19  0437    133 134 131*   POTASSIUM 3.5 4.3 4.6 3.9   CHLORIDE 100 101 102 99   TRACEY 8.4* 8.2* 8.3* 8.2*   CO2 26 28 26 24   BUN 6* 6* 7 7   CR 0.40* 0.47* 0.42* 0.43*   GLC 97 110* 92 92     CBC  Recent Labs   Lab 06/15/19  0458 19  0521 19  0528 19  0437   WBC 5.6 5.2 6.0 6.6   RBC 3.80 3.73* 3.89 4.03   HGB 8.7* 8.6* 9.1* 9.5*   HCT 30.6* 29.6* 31.0* 31.5*   MCV 81 79 80 78   MCH 22.9* 23.1* 23.4* 23.6*   MCHC 28.4* 29.1* 29.4* 30.2*   RDW 19.9* 19.9* 19.8* 19.7*   * 398 349 330     Assessment: Di Evans is a 58 year old with history  of platinum resistant ovarian cancer, now HD#5 confusion in the setting of known dural venous thrombosis and new embolic strokes. She is currently DNR/DNI.    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 Currently enrolled in TRIO study, most recently C4D8 on 5/22/19 with pembrolizumab. CC-486 held.   FEN: Regular diet. Hyponatremia resolved.   Pain: PRN tylenol, oxycodone.  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 Previously on lovenox 70 mg BID, now s/p heparin gtt. Resumed on lovenox now 80mg BID. Following Xa levels per Heme, will give recs this AM before pt's discharge. Anemia of chronic disease, Hgb 9.5. CT Chest 6/11 multiple new PEs  CV: HLD: on PTA pravastatin. Tachycardic in 100s at baseline since cancer diagnosis, possibly related to PEs. SVT/V tach on telemetry 6/12. Troponin neg. Off telemetry. Repeat troponins if symptomatic per Cards.  Pulm: Multiple pulmonary emboli. Sats >90%.  GI: Nausea: PTA Zofran, Compazine. Constipation: JENNIFER senna-docusate. POD#2 s/p therapeutic paracentesis with Medicine.   : Voiding spontaneously, no issues.  ID: No concerns. WBC 8.0.  Endocrine: No issues  Psych/Neuro: PTA Mirtazapine. MR Brain with two punctate embolic strokes. No intervention, Q2H Neuro checks per Neurology.   PPX:  SCD on left leg. Lovenox.  Dispo: Home later today after any recs by heme about anticoagulation    Sue Eli MD  OB/GYN PGY-2  06/15/19 6:46 AM     The patient was seen and examined with the resident, the labs and vital signs were reviewed. The medical care plan outlined above was provided under my directives and direct supervision.     Maria Antonia Haile MD, MPH  Gynecologic Oncology

## 2019-06-15 NOTE — PROGRESS NOTES
DISCHARGE                         No discharge date for patient encounter.  ----------------------------------------------------------------------------  Discharged to: Home  Via: private transportation  Accompanied by: Family  Discharge Instructions: REGULAR diet, NORMAL activity, medications, follow up appointments, when to call the MD, aftercare instructions.  Prescriptions: To be filled by [facility]; medication list reviewed & sent with pt  Follow Up Appointments: arranged; information given  Belongings: All sent with pt  IV: d/c'd  Telemetry: d/c'd  Pt exhibits understanding of above discharge instructions; all questions answered.    Discharge Paperwork: Signed, copied, and sent home with patient.

## 2019-06-17 NOTE — TELEPHONE ENCOUNTER
Patient discharged from [facility] IP  ( Inpatient or ER).    Discharge location: [facility]  Discharge date: 6/15/19  Diagnosis: OCCIPITAL CEREBRAL INFARCTION (H), TIA (TRANSIENT ISCHEMIC ATTACK)  Patient has been in the ER/IP 0/2 times.  Care Coord:  NA  Please follow up as appropriate. If no follow up required, please close encounter.

## 2019-06-18 NOTE — TELEPHONE ENCOUNTER
Patient is followed by oncology. See last hospital follow up outreach. Patient has already been seen by oncology since ED visit and has future apts already scheduled.  No further outreach by PCP office, at this time.    Natalee Ruiz RN

## 2019-06-20 NOTE — PATIENT INSTRUCTIONS
Patient Education     Discharge Instructions for Paracentesis  Paracentesis is a procedure to remove extra fluid from your belly (abdomen). This fluid buildup in the abdomen is called ascites. The procedure may have been done to take a sample of the fluid. Or, it may have been done to drain the extra fluid from your abdomen and help make you more comfortable.     Ascites is buildup of excess fluid in the abdomen.   Home care    If you have pain after the procedure, your healthcare provider can prescribe or recommend pain medicines. Take these exactly as directed. If you stopped taking other medicines before the procedure, ask your provider when you can start them again.    Take it easy for 24 hours after the procedure. Avoid physical activity until your provider says it s OK.    You will have a small bandage over the puncture site. Stitches (sutures), surgical staples, adhesive tapes, adhesive strips, or surgical glue may be used to close the incision. They also help stop bleeding and speed healing. You may take the bandage off in 24 hours.    Check the puncture site for the signs of infection listed below.  Follow-up care  Make a follow-up appointment with your healthcare provider as directed. During your follow-up visit, your provider will check your healing. Let your provider know how you are feeling. You can also discuss the cause of your ascites and if you need any further treatment.  When to seek medical advice  Call your healthcare provider if you have any of the following after the procedure:    A fever of 100.4 F (38.0 C) or higher    Trouble breathing    Pain that doesn't go away even after taking pain medicine    Belly pain not caused by having the skin punctured    Bleeding from the puncture site    More than a small amount of fluid leaking from the puncture site    Swollen belly    Signs of infection at the puncture site. These include increased pain, redness, or swelling, warmth, or bad-smelling  drainage.    Blood in your urine    Feeling dizzy or lightheaded, or fainting   Date Last Reviewed: 7/1/2016 2000-2018 [employer]. [address] [place] [address], [place] [address]. All rights reserved. This information is not intended as a substitute for professional medical care. Always follow your healthcare professional's instructions.

## 2019-06-20 NOTE — PROGRESS NOTES
Paracentesis Nursing Note  Di Evans presents today to [facility] for a paracentesis.    During today's appointment orders from Dr. Molina were completed.    Progress Note:  Patient identification verified by name and date of birth.  Assessment completed.  Vitals monitored throughout appointment and recorded in Doc Flowsheets.  See proceduralist note in ultrasound.    Vascular Access: pt declined port access as she does not have albumin orders and does not have labs due  Labs: were not ordered for this appointment.    Date of consent or authorization: 06/20/19  .  Invasive Procedure Safety Checklist was completed and sent for scanning.     Paracentesis performed by Chase Stoddard PA-C Radiology.    The following labs were communicated to provider performing paracentesis:  Lab Results   Component Value Date     06/15/2019       Total amount of ascites fluid drained: 3.2 liters.  Color of ascites fluid: clear/yellow.  Total amount of albumin given: not ordered.    Patient tolerated procedure well.    Post procedure,denies pain or discomfort post paracentesis.      Discharge Plan:  Discharge instructions were reviewed with patient.  Patient/Representative verbalized understanding and all questions were answered.   Discharged from [facility] in stable condition.    Patricia Caceres RN  Administrations This Visit     lidocaine (PF) (XYLOCAINE) 1 % injection 20 mL     Admin Date  06/20/2019 Action  Given by Other Clinician Dose  10 mL Route  Subcutaneous Administered By  Patricia Caceres, ABBY                    Temp 98.1  F (36.7  C) (Oral)   Wt 71 kg (156 lb 8 oz)   SpO2 97%   BMI 26.04 kg/m

## 2019-06-24 NOTE — PROGRESS NOTES
Form returned for correction.  Corrections made and form re-faxed to [employer] @ [phone].    Corrected for sent for scanning into chart.    Fransisca Maher CMA ([facility])

## 2019-06-26 NOTE — LETTER
2019       RE: Di Evans  [address]  [place] [facility] [place] [place] [address]     Dear Colleague,    Thank you for referring your patient, Di Evans, to the [facility]. Please see a copy of my visit note below.                Follow Up Notes on Referred Patient    Date: 2019       Dr. Chato Hough MD  No address on file       RE: Di Evans  : 1960  ROMAN: 2019    Dear Dr. Chato Hough:    Di Evans is a 58 year old woman with a diagnosis of recurrent platinum resistant ovarian cancer.    The patient presents today for follow up, she had two recent hospital admission for sinus thrombosis in the brain as well as strokes.  She does have some more abdominal distention again after paracentesis she had prior.  She has decreased appetite, eating and drinking small portions.  Has minimal nausea, no vomiting.  No change in urinary or bowel habits.  No vaginal bleeding or discharge.       Past Medical History:    Past Medical History:   Diagnosis Date     DVT (deep venous thrombosis) (H)      Hypertension      Ovarian cancer (H)      Pulmonary embolism (H)          Past Surgical History:    Past Surgical History:   Procedure Laterality Date     HYSTERECTOMY TOTAL ABDOMINAL, BILATERAL SALPINGO-OOPHORECTOMY, COMBINED Bilateral 2018    Procedure: COMBINED HYSTERECTOMY TOTAL ABDOMINAL, SALPINGO-OOPHORECTOMY;;  Surgeon: Jammie Dueñas MD;  Location: [facility] OR     INSERT PORT VASCULAR ACCESS N/A 6/3/2019    Procedure: Chest Port Placement, Single Lumen;  Surgeon: Luigi Thayer PA-C;  Location: [facility] OR     IR CHEST PORT PLACEMENT > 5 YRS OF AGE  6/3/2019     LAPAROSCOPY DIAGNOSTIC (GYN) N/A 2019    Procedure: Diagnostic Laparoscopy With Biopsy;  Surgeon: Jammie Dueñas MD;  Location: [facility] OR     LAPAROTOMY, TUMOR DEBULKING, COMBINED Bilateral 2018    Procedure: COMBINED LAPAROTOMY, TUMOR DEBULKING;  Exploratory Laparotomy, Modified  Radical Hysterectomy, Removal of Cervix, Bilateral Salpingo - Oophorectomy, Omentectomy, Bilateral Para Aortic and Left Pelvic Lymph Node Dissection, Tumor Debulking, Proctoscopy;  Surgeon: Jammie Dueñas MD;  Location: [facility] OR     REMOVE PORT PERITONEAL N/A 1/7/2019    Procedure: REMOVE PORT PERITONEAL;  Surgeon: Chanel Rodriguez MD;  Location: [facility] OR     SURGICAL HISTORY OF -   1980    left ankle surgery     THROMBECTOMY LOWER EXTREMITY Right 04/2019         Health Maintenance Due   Topic Date Due     ZOSTER IMMUNIZATION (1 of 2) 06/20/2009     FIT  03/16/2015     PHQ-2  01/01/2019     PREVENTIVE CARE VISIT  06/06/2019       Current Medications:     No current outpatient medications on file.         Allergies:        Allergies   Allergen Reactions     Gemfibrozil Muscle Pain (Myalgia)     Lovastatin      headaches     Penicillins Nausea and Vomiting        Social History:     Social History     Tobacco Use     Smoking status: Never Smoker     Smokeless tobacco: Never Used   Substance Use Topics     Alcohol use: Yes     Comment: occasional       History   Drug Use No         Family History:         Family History   Problem Relation Age of Onset     Hypertension Mother      Hypertension Father      Cerebrovascular Disease Father         polycythemia     Breast Cancer Maternal Aunt         older age         Physical Exam:     /88 (BP Location: Right arm, Patient Position: Sitting, Cuff Size: Adult Regular)   Pulse 122   Temp 97.3  F (36.3  C) (Oral)   Resp 16   Wt 70.5 kg (155 lb 8 oz)   SpO2 96%   BMI 25.88 kg/m     Body mass index is 25.88 kg/m .    General Appearance: alert, no distress     Cardiovascular: Mild tachycardia regular rhythm, no gallops, rubs or murmurs     Respiratory: Decreased breath sounds lower lungs.    Musculoskeletal: extremities non tender, +2 edema lower extremities    Neurological: normal gait, no gross defects     Psychiatric: appropriate mood and affect                                ABDOMEN:  Soft, mildly distended.  No organomegaly.  Well-healed midline incision.           Assessment:    Di Evans is a 59 year old woman with a diagnosis of recurrent platinum resistant ovarian cancer.     A total of 40 minutes was spent with the patient, 30 minutes of which were spent in counseling the patient and/or treatment planning.      1.  Recurrent platinum-resistant ovarian cancer.   2.  TRIO 026 study.     3.  Currently off treatment.   4.  Deep venous thrombosis.   5.  PE.   6.  Sinus troubles.   7.  Strokes.     8.  ECOG performance status of 1.      Reviewed with the patient the CT scan indicating worsening disease.  We will await the  today.  If that is also increased, would discontinue the study then due to disease progression.  She does have worsening bilateral lower extremity DVTs.  We will arrange for her to obtain an IVC filter to prevent additional pulmonary emboli.  Also discussed this case with her hematologist.  We will increase her Lovenox that she is currently on from 80 b.i.d. to 100 b.i.d.  We will check a factor Xa level.  Plan to see her back next week for end-of-study visit as well as then plan to start her on weekly Taxol at that point.  The patient as well as her family and friends agree with the plans.  They are very appreciative of her care.  All questions were answered.       Angelo Molina MD, MS    Department of Obstetrics and Gynecology   Division of Gynecologic Oncology   [facility]  Phone: [phone]        CC  Patient Care Team:  Sonja Davies MD as PCP - General (Family Practice)

## 2019-06-26 NOTE — NURSING NOTE
TRIO Study ([id number]): Study Visit Note   Subject name: Di Evans     Patient here accompanied by her family and roomates for unscheduled visit in the TRIO Study ([id number]). I have personally interviewed this patient and reviewed medical record for adverse events and concomitant medications and these have been recorded on the corresponding logs in patient's research file. Patient was given the opportunity to ask any trial related questions. Please see Dr. Angelo Molina  progress note for physical exam and other clinical information. CT scan reviewed by Dr. Molina with patient and her questions were answered. Patient has shown progression on CT scan plus worsening of DVT. Patient will be taken off study and will return next week for end of treatment visit. Last Pembrolizumab infusion was conducted on 22/MAY/2019 and last CC-486 taken was 4/JUN/2019.      Due to worsening thrombosis, Dr. Molina has requested patient to have a IVC Filter placed. Order placed and appointment scheduled for Friday 6/28/2019. Patient to hold Lovenox all day Thursday in preparation for IVC Filter placement. Patient will increase Lovenox injections to 100mg/mL BID on Sunday. Patient scheduled to have Heparin 10-A testing done on Wednesday. Patient will do Lovenox injection on Wednesday morning at 6:15 am with intention to do test around 10:15 am. Called patient to discuss plan and sent her an email with times and dates of procedures including instructions, questions answered. Instructed patient to call back with any questions or concerns. Patient voiced understanding.     IDS number: [id number] Drug name: [id number]    RETURN:  Number of blister packs returned: 1  Lot number: [id number]  Number of pills returned: 0  Drug diary returned? YES    Are there any discrepancies between the amount of medication the patient was instructed to take and the amount recorded as taken in the patient s drug diary? NO Are there any discrepancies  between the amount of medication the patient has recorded as taken in the drug diary and the amount that would be expected to be returned based on the amount recorded as taken? NO    Did the study visit occur within the appropriate window allowed by the protocol? YES    Chanel Purcell RN    Office: [phone]  Pager: [phone]

## 2019-06-26 NOTE — NURSING NOTE
Patient's port de-accessed without complications. Patient tolerated well.    Judy Montes CMA ([facility])

## 2019-06-26 NOTE — NURSING NOTE
"Oncology Rooming Note    June 26, 2019 9:30 AM   Di Evans is a 59 year old female who presents for:    Chief Complaint   Patient presents with     Port Draw     labs drawn via port by rn. vs taken      Oncology Clinic Visit     OVARIAN CA     Initial Vitals: /88 (BP Location: Right arm, Patient Position: Sitting, Cuff Size: Adult Regular)   Pulse 122   Temp 97.3  F (36.3  C) (Oral)   Resp 16   Wt 70.5 kg (155 lb 8 oz)   SpO2 96%   BMI 25.88 kg/m   Estimated body mass index is 25.88 kg/m  as calculated from the following:    Height as of 6/11/19: 1.651 m (5' 5\").    Weight as of this encounter: 70.5 kg (155 lb 8 oz). Body surface area is 1.8 meters squared.  Mild Pain (2) Comment: Data Unavailable   No LMP recorded. Patient has had a hysterectomy.  Allergies reviewed: Yes  Medications reviewed: Yes    Medications: MEDICATION REFILLS NEEDED TODAY. Provider was notified.  Pharmacy name entered into [facility]:    [facility] - [place], [place] - [id number]  [facility] [address] [place] [address] [address] AT [facility]  [address]    Clinical concerns: Compazine and Zofran refills requested.       TRAVON BANERJEE CMA              "

## 2019-06-26 NOTE — NURSING NOTE
"Chief Complaint   Patient presents with     Port Draw     labs drawn via port by rn. vs taken      Port accessed by RN with 20 G 3/4\" gripper needle, labs drawn from port in lab. Flushed with saline and heparin. VS taken. Pt checked into next appointment.   Elaine Kirkpatrick, ABBY     "

## 2019-06-27 NOTE — PATIENT INSTRUCTIONS
Patient Education     Discharge Instructions for Paracentesis  Paracentesis is a procedure to remove extra fluid from your belly (abdomen). This fluid buildup in the abdomen is called ascites. The procedure may have been done to take a sample of the fluid. Or, it may have been done to drain the extra fluid from your abdomen and help make you more comfortable.     Ascites is buildup of excess fluid in the abdomen.   Home care    If you have pain after the procedure, your healthcare provider can prescribe or recommend pain medicines. Take these exactly as directed. If you stopped taking other medicines before the procedure, ask your provider when you can start them again.    Take it easy for 24 hours after the procedure. Avoid physical activity until your provider says it s OK.    You will have a small bandage over the puncture site. Stitches (sutures), surgical staples, adhesive tapes, adhesive strips, or surgical glue may be used to close the incision. They also help stop bleeding and speed healing. You may take the bandage off in 24 hours.    Check the puncture site for the signs of infection listed below.  Follow-up care  Make a follow-up appointment with your healthcare provider as directed. During your follow-up visit, your provider will check your healing. Let your provider know how you are feeling. You can also discuss the cause of your ascites and if you need any further treatment.  When to seek medical advice  Call your healthcare provider if you have any of the following after the procedure:    A fever of 100.4 F (38.0 C) or higher    Trouble breathing    Pain that doesn't go away even after taking pain medicine    Belly pain not caused by having the skin punctured    Bleeding from the puncture site    More than a small amount of fluid leaking from the puncture site    Swollen belly    Signs of infection at the puncture site. These include increased pain, redness, or swelling, warmth, or bad-smelling  drainage.    Blood in your urine    Feeling dizzy or lightheaded, or fainting   Date Last Reviewed: 7/1/2016 2000-2018 [employer]. [address] [place] [address], [place] [address]. All rights reserved. This information is not intended as a substitute for professional medical care. Always follow your healthcare professional's instructions.

## 2019-06-27 NOTE — PROGRESS NOTES
Paracentesis Nursing Note  Di Evans presents today to [facility] for a paracentesis.    During today's appointment orders from Dr. Molina were completed.    Progress Note:  Patient identification verified by name and date of birth.  Assessment completed.  Vitals monitored throughout appointment and recorded in Doc Flowsheets.  See proceduralist note in ultrasound.    Vascular Access:port accessed, + blood return noted. Heparin locked and de-accessed at the end of procedure.     Labs: were not ordered for this appointment.    Date of consent or authorization: 06/20/19  .  Invasive Procedure Safety Checklist was completed and sent for scanning.     Paracentesis performed by Inga Aaron PA-C Radiology.    The following labs were communicated to provider performing paracentesis:  Lab Results   Component Value Date    PLT 1013 06/26/2019       Total amount of ascites fluid drained: 3.4  liters.  Color of ascites fluid: clear/yellow.  Total amount of albumin given: not ordered.    Patient tolerated procedure well.    Post procedure,denies pain or discomfort post paracentesis.      Discharge Plan:  Discharge instructions were reviewed with patient.  Patient/Representative verbalized understanding and all questions were answered.   Discharged from [facility] in stable condition.    Destini Peña RN          /74   Pulse 120   Temp 97.3  F (36.3  C) (Oral)   Resp 18   Wt 70.9 kg (156 lb 6.4 oz)   SpO2 98%   BMI 26.03 kg/m

## 2019-06-28 NOTE — PROCEDURES
Interventional Radiology Brief Post Procedure Note    Procedure: IR IVC FILTER PLACEMENT    Proceduralist: Hao Duque MD and Oz Carrillo MD    Assistant: Radiology Resident Physician, HORACE Hough MD and None    Time Out: Prior to the start of the procedure and with procedural staff participation, I verbally confirmed the patient s identity using two indicators, relevant allergies, that the procedure was appropriate and matched the consent or emergent situation, and that the correct equipment/implants were available. Immediately prior to starting the procedure I conducted the Time Out with the procedural staff and re-confirmed the patient s name, procedure, and site/side. (The Joint Commission universal protocol was followed.)  Yes    Medications   Medication Event Details Admin User Admin Time   midazolam (VERSED) injection 0.5-2 mg Medication Given Dose: 1 mg; Route: Intravenous; Comment: Time given Anna Marie Haro RN 6/28/2019  8:30 AM   fentaNYL (PF) (SUBLIMAZE) injection 25-50 mcg Medication Given Dose: 25 mcg; Route: Intravenous; Comment: Time given Anna Marie Haro RN 6/28/2019  8:30 AM   heparin 5,000 units in 0.9% sodium chloride 1000 mL Medication Given Dose: 5,000 Units; Route: TABLE SOLN; Comment: On the table Anna Marie Haro RN 6/28/2019  8:45 AM   lidocaine 1 % 1-30 mL Medication Given by Other Dose: 4 mL; Route: Intradermal; Comment: by Anna Marie Zendejas RN 6/28/2019  8:52 AM   iodixanol (VISIPAQUE 320) injection 50 mL Medication Given Dose: 30 mL; Route: Intravenous; Scheduled Time:  8:15 AM Nadira Mcgee 6/28/2019  8:54 AM       Sedation: IR Nurse Monitored Care   Post Procedure Summary:  Prior to the start of the procedure and with procedural staff participation, I verbally confirmed the patient s identity using two indicators, relevant allergies, that the procedure was appropriate and matched the consent or emergent situation, and that the correct equipment/implants  were available. Immediately prior to starting the procedure I conducted the Time Out with the procedural staff and re-confirmed the patient s name, procedure, and site/side. (The Joint Commission universal protocol was followed.)  Yes       Sedatives: Fentanyl and Midazolam (Versed)    Vital signs, airway and pulse oximetry were monitored and remained stable throughout the procedure and sedation was maintained until the procedure was complete.  The patient was monitored by staff until sedation discharge criteria were met.    Patient tolerance: Patient tolerated the procedure well with no immediate complications.    Time of sedation in minutes: 30 Minutes minutes from beginning to end of physician one to one monitoring.          Findings: Routine permanent IVC filter placement.     Estimated Blood Loss: Minimal    Fluoroscopy Time: 2.7 minute(s)    SPECIMENS: None    Complications: 1. None     Condition: Stable    Plan:   IVC filter in place.  BR with HOB elevated x1 hour    Comments: See dictated procedure note for full details.    Hao Duque MD

## 2019-06-28 NOTE — DISCHARGE INSTRUCTIONS
[facility]  Going Home after IVC Filter Placement                                                     After you go home:    Drink plenty of fluids.    Resume your normal diet    Do not scub the procedure site vigorously for 3 days    No lotion or powder to the puncture site for 3 days    DO NOT do any strenuous exercise or lifting greater than 10 pounds for at least 2 days following your procedure    HOB elevated to at least 30 degrees. Do not lay flat for at least 2 hours after you go home.    IF YOU WERE GIVEN SEDATION    No driving or alcoholic beverages today    Do not make any important or legal decisions today    We recommend an adult stay with you for the first 24 hours    CALL THE PHYSICIAN IF:    Monitor neck site for bleeding, swelling. If any bleeding or swelling immediately apply pressure and call the doctor.    If you notice any changes in your voice or breathing you should call your doctor immediately & come to the closest Emergency Department.  Do Not Drive Yourself.    You develop nausea or vomiting    You develop hives or a rash or any unexplained itching        [facility] INTERVENTIONAL RADIOLOGY DEPARTMENT        Procedure Physician:   Dr. Carrillo, Dr. Gianluca Gonzalez         Date of procedure:June 28, 2019        Telephone numbers:     [phone]  Monday-Friday 8:00 am to 4:30 pm                                              [phone]  After 4:30 pm Monday-Friday, Weekends & Holidays. Ask for the Interventional Radiologist on call.Someone is available  24 hours/day        [facility] toll free number: [phone]  Monday-Friday 8:00 am to 4:30 pm

## 2019-06-28 NOTE — PROGRESS NOTES
Patient tolerated recovery stage well. VSS, right neck site clean/dry/intact, no hematoma, and denies pain. Patient tolerated PO food and fluids. Teaching was done and discharge instructions were given. Patient ambulated, voided, and port was de-accessed.  Patient discharged from the hospital via wheel chair to home with her family.

## 2019-06-28 NOTE — PROGRESS NOTES
Pt arrived back to unit 2A at 915.  VSS.  Denies pain.  Right neck dressing dry and intact.  Pt resting.

## 2019-06-28 NOTE — PROCEDURES
Interventional Radiology Pre-Procedure Sedation Assessment   Time of Assessment: 7:37 AM    Expected Level: Moderate Sedation    Indication: Sedation is required for the following type of Procedure: IVC filter placement    Sedation and procedural consent: Risks, benefits and alternatives were discussed with Patient    PO Intake: Appropriately NPO for procedure    ASA Class: Class 2 - MILD SYSTEMIC DISEASE, NO ACUTE PROBLEMS, NO FUNCTIONAL LIMITATIONS.    Mallampati: Grade 2:  Soft palate, base of uvula, tonsillar pillars, and portion of posterior pharyngeal wall visible    Lungs: Lungs Clear with good breath sounds bilaterally    Heart: Normal heart sounds and rate    History and physical reviewed and no updates needed. I have reviewed the lab findings, diagnostic data, medications, and the plan for sedation. I have determined this patient to be an appropriate candidate for the planned sedation and procedure and have reassessed the patient IMMEDIATELY PRIOR to sedation and procedure.    Bakari Hough MD

## 2019-06-28 NOTE — PROGRESS NOTES
Patient Name: Di Evans  Medical Record Number: [id number]  Today's Date: 6/28/2019    Procedure: Placement of a permanent [employer] filter  Proceduralist: Dr Carrillo, Dr Duque, Dr Hough    Sedation start time: 0830  Sedation end time: 0855  Sedation medications administered: Versed 1 mg  Fentanyl  25 mcg  Total sedation time: 20 min  Sedation Notes: Tolerated sedation well     Procedure start time: 0830  Puncture time: 0834  Procedure end time: 0855    Report given to: ABBY Garcia   : No    Other Notes: Pt arrived to IR room 4 from . Consent reviewed. Pt denies any questions or concerns regarding procedure. Pt positioned supine, prepped and monitored per protocol. IVC filter placed via RIJ.  Pt tolerated procedure without any noted complications. Pt transferred back to .

## 2019-06-28 NOTE — PROGRESS NOTES
Follow Up Notes on Referred Patient    Date: 2019       Dr. Chato Hough MD  No address on file       RE: Di Evans  : 1960  ROMAN: 2019    Dear Dr. Chato Hough:    Di Evans is a 58 year old woman with a diagnosis of recurrent platinum resistant ovarian cancer.    The patient presents today for follow up, she had two recent hospital admission for sinus thrombosis in the brain as well as strokes.  She does have some more abdominal distention again after paracentesis she had prior.  She has decreased appetite, eating and drinking small portions.  Has minimal nausea, no vomiting.  No change in urinary or bowel habits.  No vaginal bleeding or discharge.       Past Medical History:    Past Medical History:   Diagnosis Date     DVT (deep venous thrombosis) (H)      Hypertension      Ovarian cancer (H)      Pulmonary embolism (H)          Past Surgical History:    Past Surgical History:   Procedure Laterality Date     HYSTERECTOMY TOTAL ABDOMINAL, BILATERAL SALPINGO-OOPHORECTOMY, COMBINED Bilateral 2018    Procedure: COMBINED HYSTERECTOMY TOTAL ABDOMINAL, SALPINGO-OOPHORECTOMY;;  Surgeon: Jammie Dueñas MD;  Location: [facility] OR     INSERT PORT VASCULAR ACCESS N/A 6/3/2019    Procedure: Chest Port Placement, Single Lumen;  Surgeon: Luigi Thayer PA-C;  Location: [facility] OR     IR CHEST PORT PLACEMENT > 5 YRS OF AGE  6/3/2019     LAPAROSCOPY DIAGNOSTIC (GYN) N/A 2019    Procedure: Diagnostic Laparoscopy With Biopsy;  Surgeon: Jammie Dueñas MD;  Location: [facility] OR     LAPAROTOMY, TUMOR DEBULKING, COMBINED Bilateral 2018    Procedure: COMBINED LAPAROTOMY, TUMOR DEBULKING;  Exploratory Laparotomy, Modified Radical Hysterectomy, Removal of Cervix, Bilateral Salpingo - Oophorectomy, Omentectomy, Bilateral Para Aortic and Left Pelvic Lymph Node Dissection, Tumor Debulking, Proctoscopy;  Surgeon: Jammie Dueñas MD;  Location: [facility] OR     REMOVE  PORT PERITONEAL N/A 1/7/2019    Procedure: REMOVE PORT PERITONEAL;  Surgeon: Chanel Rodriguez MD;  Location: [facility] OR     SURGICAL HISTORY OF -   1980    left ankle surgery     THROMBECTOMY LOWER EXTREMITY Right 04/2019         Health Maintenance Due   Topic Date Due     ZOSTER IMMUNIZATION (1 of 2) 06/20/2009     FIT  03/16/2015     PHQ-2  01/01/2019     PREVENTIVE CARE VISIT  06/06/2019       Current Medications:     No current outpatient medications on file.         Allergies:        Allergies   Allergen Reactions     Gemfibrozil Muscle Pain (Myalgia)     Lovastatin      headaches     Penicillins Nausea and Vomiting        Social History:     Social History     Tobacco Use     Smoking status: Never Smoker     Smokeless tobacco: Never Used   Substance Use Topics     Alcohol use: Yes     Comment: occasional       History   Drug Use No         Family History:         Family History   Problem Relation Age of Onset     Hypertension Mother      Hypertension Father      Cerebrovascular Disease Father         polycythemia     Breast Cancer Maternal Aunt         older age         Physical Exam:     /88 (BP Location: Right arm, Patient Position: Sitting, Cuff Size: Adult Regular)   Pulse 122   Temp 97.3  F (36.3  C) (Oral)   Resp 16   Wt 70.5 kg (155 lb 8 oz)   SpO2 96%   BMI 25.88 kg/m    Body mass index is 25.88 kg/m .    General Appearance: alert, no distress     Cardiovascular: Mild tachycardia regular rhythm, no gallops, rubs or murmurs     Respiratory: Decreased breath sounds lower lungs.    Musculoskeletal: extremities non tender, +2 edema lower extremities    Neurological: normal gait, no gross defects     Psychiatric: appropriate mood and affect                               ABDOMEN:  Soft, mildly distended.  No organomegaly.  Well-healed midline incision.           Assessment:    Di Evans is a 59 year old woman with a diagnosis of recurrent platinum resistant ovarian cancer.     A total of  40 minutes was spent with the patient, 30 minutes of which were spent in counseling the patient and/or treatment planning.      1.  Recurrent platinum-resistant ovarian cancer.   2.  TRIO 026 study.     3.  Currently off treatment.   4.  Deep venous thrombosis.   5.  PE.   6.  Sinus troubles.   7.  Strokes.     8.  ECOG performance status of 1.      Reviewed with the patient the CT scan indicating worsening disease.  We will await the  today.  If that is also increased, would discontinue the study then due to disease progression.  She does have worsening bilateral lower extremity DVTs.  We will arrange for her to obtain an IVC filter to prevent additional pulmonary emboli.  Also discussed this case with her hematologist.  We will increase her Lovenox that she is currently on from 80 b.i.d. to 100 b.i.d.  We will check a factor Xa level.  Plan to see her back next week for end-of-study visit as well as then plan to start her on weekly Taxol at that point.  The patient as well as her family and friends agree with the plans.  They are very appreciative of her care.  All questions were answered.       Angelo Molina MD, MS    Department of Obstetrics and Gynecology   Division of Gynecologic Oncology   [facility]  Phone: [phone]        CC  Patient Care Team:  Sonja Davies MD as PCP - General (Family Practice)  Sonja Davies MD as Assigned PCP  SELF, REFERRED

## 2019-06-28 NOTE — IP AVS SNAPSHOT
[facility], [place], Interventional Radiology  [address] [facility] [address]  Phone:  [phone]                                    After Visit Summary   6/28/2019    Di Evans    MRN: [id number]           After Visit Summary Signature Page    I have received my discharge instructions, and my questions have been answered. I have discussed any challenges I see with this plan with the nurse or doctor.    ..........................................................................................................................................  Patient/Patient Representative Signature      ..........................................................................................................................................  Patient Representative Print Name and Relationship to Patient    ..................................................               ................................................  Date                                   Time    ..........................................................................................................................................  Reviewed by Signature/Title    ...................................................              ..............................................  Date                                               Time          22EPIC Rev 08/18

## 2019-06-28 NOTE — OR NURSING
Patient prepped for IVC filter placement.  Denies pain.   Family with her.  H & P current.  Consent signed.  Labs pending.  Had paracentesis yesterday & has small ooze of clear slightly yellow fluid from site on right mid abd.

## 2019-07-03 NOTE — PROGRESS NOTES
Infusion Nursing Note:  Di Evans presents today for Cycle 1 Day 1 of Taxol (has received in the past).    Patient seen by provider today: Yes: Dr. Molina   present during visit today: Not Applicable.    Note: Patient restarting Taxol today. She last had it in December of 2018. Writer reinforced chemotherapy teaching/side effects and schedule. Patient instructed to call triage with questions/concerns or if he/she has chills and/or temperature greater than or equal to 100.5. Triage number: [phone]; After hours, weekends, or holidays, call [phone] and ask for oncology doctor on call.    TORB: Za Tracey RN/ Joie Jean-Baptiste RNCC/ Dr. Molina @ 11:52AM on 7/3/19. Verified that MD only wants 25mg of Benadryl and 12 mg of Decadron prior to Taxol today. MD verified this is correct and would like it decreased due to it being weekly Taxol.     Intravenous Access:  Implanted Port.    Treatment Conditions:  Lab Results   Component Value Date    HGB 8.7 07/03/2019     Lab Results   Component Value Date    WBC 8.0 07/03/2019      Lab Results   Component Value Date    ANEU 6.2 07/03/2019     Lab Results   Component Value Date     07/03/2019      Lab Results   Component Value Date     07/03/2019                   Lab Results   Component Value Date    POTASSIUM 4.0 07/03/2019           Lab Results   Component Value Date    MAG 1.6 06/06/2019            Lab Results   Component Value Date    CR 0.41 07/03/2019                   Lab Results   Component Value Date    TRACEY 8.0 07/03/2019                Lab Results   Component Value Date    BILITOTAL 0.3 07/03/2019           Lab Results   Component Value Date    ALBUMIN 1.0 07/03/2019                    Lab Results   Component Value Date    ALT 12 07/03/2019           Lab Results   Component Value Date    AST 12 07/03/2019       Results reviewed, labs MET treatment parameters, ok to proceed with treatment.      Post Infusion Assessment:  Patient  tolerated infusion without incident.  Blood return noted pre and post infusion.  Site patent and intact, free from redness, edema or discomfort.  No evidence of extravasations.  Access discontinued per protocol.       Discharge Plan:   Prescription refills given for Compazine and Ativan.  Discharge instructions reviewed with: Patient and Family.  Patient and/or family verbalized understanding of discharge instructions and all questions answered.  AVS to patient via [employer].  Patient will return 7/11/19 for next appointment.   Patient discharged in stable condition accompanied by: family.  Departure Mode: Wheelchair.    Za Tracey RN

## 2019-07-03 NOTE — NURSING NOTE
"Oncology Rooming Note    July 3, 2019 9:18 AM   Di Evans is a 59 year old female who presents for:    Chief Complaint   Patient presents with     Oncology Clinic Visit     Ovarian Cancer- Research visit     Initial Vitals: /78   Pulse 107   Temp 98.2  F (36.8  C) (Oral)   Wt 65.6 kg (144 lb 10 oz)   SpO2 99%   BMI 24.07 kg/m   Estimated body mass index is 24.07 kg/m  as calculated from the following:    Height as of 6/28/19: 1.651 m (5' 5\").    Weight as of this encounter: 65.6 kg (144 lb 10 oz). Body surface area is 1.73 meters squared.  Data Unavailable Comment: Data Unavailable   No LMP recorded. Patient has had a hysterectomy.  Allergies reviewed: Yes  Medications reviewed: Yes    Medications: Medication refills not needed today.  Pharmacy name entered into [employer]:    [facility] - [place], [place] - [address], [address]  [facility] [address] [place] [address] [address] AT [facility] [address]    Clinical concerns: n/a       TRAVON BANERJEE CMA              "

## 2019-07-03 NOTE — PROGRESS NOTES
[facility] GYN-Oncology Teaching Note    Relevant Diagnosis:  Progressinve ovarian cancer Disease has advanced and is off study.   Plan weekly Taxol and  Weekly paracentesis    Teaching Topic:  Chemotherapy weekly Taxol  Pt is DNR/DNI  And a POLOST form has been signed.     Person(s) involved in teaching:  Mother and 2 good friends   She always has someone with her to assist and never left alone.     Motivation Level:   Asks Questions:   Yes  Eager to Learn:  Yes  Cooperative:  Yes  Receptive (willing/able to accept information):  Yes  Comments:   Spends over 50% of the time in the recliner or in the bed.  Has seen Palliative care  and would like to see them again at  . There first appt at  is October 8   So a request was placed for her to be seen at the  on a day she already has appts for chemo and paracentesis.      Patient and Family demonstrates understanding of the following:  Reason for the appointment, diagnosis and treatment plan:  Yes  Knowledge of proper use of medications and conditions for which they are ordered (with special attention to potential side effects or drug interactions):  Yes  Which situations necessitate calling provider and whom to contact:  Yes    Teaching Concerns:  Yes. Reinforce information  handicap sticker will be mailed to her.     Instructional Materials Used/Given:  Taxol using [facility] oncology and eating hints she already had a Cancer information handbook.     Time spent teaching with patient:  35 minutes  Joie NAJERA RN, OCN  Care Coordinator   Gynecologic Cancer   Office [phone]  Pager [phone]

## 2019-07-03 NOTE — LETTER
7/3/2019       RE: Di Evans  [address]  [facility] [address]     Dear Colleague,    Thank you for referring your patient, Di Evans, to the [facility]. Please see a copy of my visit note below.                Follow Up Notes on Referred Patient    Date: 2019       Dr. Chato Hough MD  No address on file       RE: Di Evans  : 1960  ROMAN: 7/3/2019    Dear Dr. Chato Hough:    Di Evans is a 58 year old woman with a diagnosis of recurrent platinum resistant ovarian cancer.    The patient presents today for follow up, she had two recent hospital admission for sinus thrombosis in the brain as well as strokes.  She does have some more abdominal distention again after paracentesis she had prior.  She has decreased appetite, eating and drinking small portions.  Has minimal nausea, no vomiting.  No change in urinary or bowel habits.  No vaginal bleeding or discharge.     Past Medical History:    Past Medical History:   Diagnosis Date     DVT (deep venous thrombosis) (H)      Hypertension      Ovarian cancer (H)      Pulmonary embolism (H)          Past Surgical History:    Past Surgical History:   Procedure Laterality Date     HYSTERECTOMY TOTAL ABDOMINAL, BILATERAL SALPINGO-OOPHORECTOMY, COMBINED Bilateral 2018    Procedure: COMBINED HYSTERECTOMY TOTAL ABDOMINAL, SALPINGO-OOPHORECTOMY;;  Surgeon: Jammie Dueñas MD;  Location: [facility] OR     INSERT PORT VASCULAR ACCESS N/A 6/3/2019    Procedure: Chest Port Placement, Single Lumen;  Surgeon: Luigi Thayer PA-C;  Location: [facility] OR     IR CHEST PORT PLACEMENT > 5 YRS OF AGE  6/3/2019     IR IVC FILTER PLACEMENT  2019     LAPAROSCOPY DIAGNOSTIC (GYN) N/A 2019    Procedure: Diagnostic Laparoscopy With Biopsy;  Surgeon: Jammie Dueñas MD;  Location: [facility] OR     LAPAROTOMY, TUMOR DEBULKING, COMBINED Bilateral 2018    Procedure: COMBINED LAPAROTOMY, TUMOR DEBULKING;   Exploratory Laparotomy, Modified Radical Hysterectomy, Removal of Cervix, Bilateral Salpingo - Oophorectomy, Omentectomy, Bilateral Para Aortic and Left Pelvic Lymph Node Dissection, Tumor Debulking, Proctoscopy;  Surgeon: Jammie Dueñas MD;  Location: [facility] OR     REMOVE PORT PERITONEAL N/A 1/7/2019    Procedure: REMOVE PORT PERITONEAL;  Surgeon: Chanel Rodriguez MD;  Location: [facility] OR     SURGICAL HISTORY OF -   1980    left ankle surgery     THROMBECTOMY LOWER EXTREMITY Right 04/2019         Health Maintenance Due   Topic Date Due     ZOSTER IMMUNIZATION (1 of 2) 06/20/2009     FIT  03/16/2015     PHQ-2  01/01/2019     PREVENTIVE CARE VISIT  06/06/2019       Current Medications:     Current Outpatient Medications   Medication Sig Dispense Refill     acetaminophen (TYLENOL) 500 MG tablet Take 2 tablets (1,000 mg) by mouth every 8 hours as needed for mild pain 180 tablet 3     aspirin (ASA) 81 MG chewable tablet Take 1 tablet (81 mg) by mouth daily 30 tablet 3     cetirizine (ZYRTEC) 10 MG tablet Take 10 mg by mouth daily        enoxaparin (LOVENOX) 100 MG/ML syringe Inject 1 mL (100 mg) Subcutaneous 2 times daily 28 Syringe 0     enoxaparin (LOVENOX) 80 MG/0.8ML syringe Inject 0.8 mLs (80 mg) Subcutaneous 2 times daily (Patient taking differently: Inject 100 mg Subcutaneous 2 times daily ) 60 Syringe 3     LORazepam (ATIVAN) 1 MG tablet Take 1 tablet (1 mg) by mouth every 6 hours as needed (Anxiety, Nausea/Vomiting or Sleep) (Patient not taking: Reported on 7/11/2019) 30 tablet 2     magic mouthwash (FIRST-MOUTHWASH BLM) compounding kit Swish and spit 5-10 mLs in mouth every 6 hours as needed for mouth sores 237 mL 0     magnesium oxide (MAG-OX) 400 MG tablet Take 1 tablet (400 mg) by mouth daily 30 tablet 3     mirtazapine (REMERON) 15 MG tablet Take 15 mg by mouth At Bedtime       mometasone (NASONEX) 50 MCG/ACT nasal spray Spray 2 sprays into both nostrils daily as needed        oxyCODONE  (ROXICODONE) 5 MG tablet Take 5-10 mg by mouth every 6 hours as needed for severe pain       potassium chloride ER (K-TAB) 20 MEQ CR tablet Take 1 tablet (20 mEq) by mouth daily 30 tablet 3     pravastatin (PRAVACHOL) 20 MG tablet Take 1 tablet (20 mg) by mouth every evening for cholesterol. 90 tablet 1     prochlorperazine (COMPAZINE) 10 MG tablet Take 1 tablet (10 mg) by mouth every 6 hours as needed (Nausea/Vomiting) 30 tablet 2     senna-docusate (SENNA S) 8.6-50 MG tablet Take 4 tablets by mouth 2 times daily 250 tablet 3         Allergies:        Allergies   Allergen Reactions     Gemfibrozil Muscle Pain (Myalgia)     Lovastatin      headaches     Penicillins Nausea and Vomiting        Social History:     Social History     Tobacco Use     Smoking status: Never Smoker     Smokeless tobacco: Never Used   Substance Use Topics     Alcohol use: Yes     Comment: occasional       History   Drug Use No         Family History:       Family History   Problem Relation Age of Onset     Hypertension Mother      Hypertension Father      Cerebrovascular Disease Father         polycythemia     Breast Cancer Maternal Aunt         older age         Physical Exam:     /78   Pulse 107   Temp 98.2  F (36.8  C) (Oral)   Wt 65.6 kg (144 lb 10 oz)   SpO2 99%   BMI 24.07 kg/m     Body mass index is 24.07 kg/m .    General Appearance:  alert, no distress     Cardiovascular:           Mild tachycardia regular rhythm, no gallops, rubs or murmurs              Respiratory:     Decreased breath sounds lower lungs.     Musculoskeletal:         extremities non tender, +2 edema lower extremities     Neurological:   normal gait, no gross defects                Psychiatric:      appropriate mood and affect                               ABDOMEN:  Soft, mildly distended.  No organomegaly.  Well-healed midline incision.               Assessment:     Di Evans is a 59 year old woman with a diagnosis of recurrent platinum resistant  ovarian cancer.      A total of 40 minutes was spent with the patient, 30 minutes of which were spent in counseling the patient and/or treatment planning.        1.  Recurrent platinum-resistant ovarian cancer.   2.  TRIO 026 study.     3.  Currently off treatment.   4.  Deep venous thrombosis.   5.  PE.   6.  Sinus troubles.   7.  Strokes.     8.  ECOG performance status of 1.      Given disease progression we will discontinue the patient on the TRIO 026 study. We will start her on weekly taxol. She does have worsening bilateral lower extremity DVTs. She has received an IVC filter. We  have increased her Lovenox to 100 b.i.d. She will follow up with the hematologist. The patient as well as her family and friends agree with the plans.  They are very appreciative of her care.  All questions were answered.         Angelo Molina MD, MS    Department of Obstetrics and Gynecology   Division of Gynecologic Oncology   [facility]  Phone: [phone]      CC  Patient Care Team:  Sonja Davies MD as PCP - General (Family Practice)  Sonja Davies MD as Assigned PCP  Ching Sheldon, RN as Specialty Care Coordinator (Gyn-Onc)  SELF, REFERRED    Again, thank you for allowing me to participate in the care of your patient.      Sincerely,    Angelo Molina MD

## 2019-07-03 NOTE — PATIENT INSTRUCTIONS
Contact Numbers  [facility]: [phone]    After Hours:  [phone]  Triage: [phone]    Please call the [facility] Triage line if you experience a temperature greater than or equal to 100.5, shaking chills, have uncontrolled nausea, vomiting and/or diarrhea, dizziness, shortness of breath, chest pain, bleeding, unexplained bruising, or if you have any other new/concerning symptoms, questions or concerns.     If it is after hours, weekends, or holidays, please call the main hospital  at  [phone] and ask to speak to the Oncology doctor on call.     If you are having any concerning symptoms or wish to speak to a provider before your next infusion visit, please call your care coordinator or triage to notify them so we can adequately serve you.     If you need a refill on a narcotic prescription or other medication, please call triage before your infusion appointment.       July 2019 Sunday Monday Tuesday Wednesday Thursday Friday Saturday        1     2     3    [facility] PARACENTESIS   7:30 AM   (120 min.)   Provider, [facility] Spec Inf Para   [facility] Specialty and Procedure    US PARACENTESIS   7:40 AM   (60 min.)   [id number]   [facility] Ultrasound    UMP RETURN   9:45 AM   (20 min.)   Angelo Molina MD   [facility]    [facility] ONC INFUSION 120  10:30 AM   (120 min.)    ONCOLOGY INFUSION   [facility] 4     5     6       7     8     9     10     11    UMP PARACENTESIS   9:30 AM   (120 min.)   Provider, [place] Spec Inf Para   [facility] Specialty and Procedure    [facility] LAB DRAW  12:15 PM   (15 min.)   [facility] DRAW   [facility] Lab Draw    UMP ONC INFUSION 120   1:00 PM   (120 min.)    ONCOLOGY INFUSION   [facility] 12     13       14     15     16     17     18    UMP PARACENTESIS   9:30 AM   (120 min.)   Provider, Dale Spec Inf Para   [facility] Specialty and Procedure    [facility] DRAW  12:45 PM   (15 min.)   [facility] DRAW   [facility] Lab Draw    RETURN   1:00 PM   (30 min.)   NURSE ONLY CANCER CENTER   [facility]    LEVEL 2   1:30 PM   (120 min.)   [place] INFUSION   [facility]    UMP ONC INFUSION 120   1:30 PM   (120 min.)    ONCOLOGY INFUSION   [facility] 19     20       21     22     23     24     25    UMP PARACENTESIS   9:30 AM   (120 min.)   Provider, Dale Spec Inf Para   [facility] Specialty and Procedure    [facility] DRAW  12:15 PM   (15 min.)   [facility] DRAW   [facility] Lab Draw    UMP ONC INFUSION 120   1:00 PM   (120 min.)    ONCOLOGY INFUSION   [facility]    RETURN   1:30 PM   (30 min.)   NURSE ONLY CANCER CENTER   [facility]    LEVEL 2   2:00 PM   (120 min.)   [place] INFUSION   [facility] 26     27       28     29    NEW CLOTTING DISORDER   1:00 PM   (60 min.)   Aleyda Bella MD   [facility] 30 31 August 2019 Sunday Monday Tuesday Wednesday Thursday Friday Saturday                       1    RETURN  11:30 AM   (30 min.)   NURSE ONLY CANCER CENTER   [facility]    LEVEL 2  12:00 PM   (120 min.)   [place] INFUSION   [facility]    UMP PARACENTESIS  12:00 PM   (120 min.)   Provider, Dale Spec Inf Para   [facility] Specialty and Procedure    [facility] DRAW   2:00 PM   (15 min.)   [facility] DRAW   [facility] Lab Draw    [facility] ONC INFUSION 120   2:30 PM   (120 min.)   [facility] ONCOLOGY INFUSION   [facility] 2     3       4     5     6     7     8    UMP PARACENTESIS   9:30 AM   (120 min.)   Provider, Dale Spec Inf Para   [facility] Specialty and Procedure    [facility]  LAB DRAW  12:30 PM   (15 min.)   [facility] LAB DRAW   [facility] Lab Draw    UMP ONC INFUSION 120   1:00 PM   (120 min.)   [facility] ONCOLOGY INFUSION   [facility] 9     10       11     12     13     14     15    UMP PARACENTESIS   9:30 AM   (120 min.)   Provider,  Spec Inf Para   [facility] Specialty and Procedure 16     17       18     19     20     21     22    UMP PARACENTESIS   9:30 AM   (120 min.)   Provider,  Spec Inf Para   [facility] Specialty and Procedure 23     24       25     26     27     28     29    UMP PARACENTESIS   9:30 AM   (120 min.)   Provider,  Spec Inf [facility] Specialty and Procedure 30     31                     Lab Results:  Recent Results (from the past 12 hour(s))    tumor marker    Collection Time: 07/03/19  7:45 AM   Result Value Ref Range     570 (H) 0 - 30 U/mL   TSH    Collection Time: 07/03/19  7:45 AM   Result Value Ref Range    TSH 8.56 (H) 0.40 - 4.00 mU/L   *CBC with platelets differential    Collection Time: 07/03/19  7:45 AM   Result Value Ref Range    WBC 8.0 4.0 - 11.0 10e9/L    RBC Count 3.87 3.8 - 5.2 10e12/L    Hemoglobin 8.7 (L) 11.7 - 15.7 g/dL    Hematocrit 28.9 (L) 35.0 - 47.0 %    MCV 75 (L) 78 - 100 fl    MCH 22.5 (L) 26.5 - 33.0 pg    MCHC 30.1 (L) 31.5 - 36.5 g/dL    RDW 20.7 (H) 10.0 - 15.0 %    Platelet Count 671 (H) 150 - 450 10e9/L    Diff Method Automated Method     % Neutrophils 78.0 %    % Lymphocytes 9.3 %    % Monocytes 11.9 %    % Eosinophils 0.1 %    % Basophils 0.1 %    % Immature Granulocytes 0.6 %    Nucleated RBCs 0 0 /100    Absolute Neutrophil 6.2 1.6 - 8.3 10e9/L    Absolute Lymphocytes 0.7 (L) 0.8 - 5.3 10e9/L    Absolute Monocytes 1.0 0.0 - 1.3 10e9/L    Absolute Eosinophils 0.0 0.0 - 0.7 10e9/L    Absolute Basophils 0.0 0.0 - 0.2 10e9/L    Abs Immature Granulocytes 0.1 0 - 0.4 10e9/L    Absolute Nucleated RBC 0.0     Comprehensive metabolic panel    Collection Time: 07/03/19  7:45 AM   Result Value Ref Range    Sodium 131 (L) 133 - 144 mmol/L    Potassium 4.0 3.4 - 5.3 mmol/L    Chloride 100 94 - 109 mmol/L    Carbon Dioxide 24 20 - 32 mmol/L    Anion Gap 6 3 - 14 mmol/L    Glucose 100 (H) 70 - 99 mg/dL    Urea Nitrogen 10 7 - 30 mg/dL    Creatinine 0.41 (L) 0.52 - 1.04 mg/dL    GFR Estimate >90 >60 mL/min/[1.73_m2]    GFR Estimate If Black >90 >60 mL/min/[1.73_m2]    Calcium 8.0 (L) 8.5 - 10.1 mg/dL    Bilirubin Total 0.3 0.2 - 1.3 mg/dL    Albumin 1.0 (L) 3.4 - 5.0 g/dL    Protein Total 5.8 (L) 6.8 - 8.8 g/dL    Alkaline Phosphatase 202 (H) 40 - 150 U/L    ALT 12 0 - 50 U/L    AST 12 0 - 45 U/L   Heparin 10a Level    Collection Time: 07/03/19  7:45 AM   Result Value Ref Range    Heparin 10A Level 1.33 (HH) IU/mL

## 2019-07-03 NOTE — NURSING NOTE
TRIO Study ([id number]): Study Visit Note   Subject name: Di Evans     Patient here accompanied by her mother and 2 friends for End of Treatment Visit in the TRIO Study ([id number]). All EOT procedures completed. I have personally interviewed this patient and reviewed medical record for adverse events and concomitant medications and these have been recorded on the corresponding logs in patient's research file. Patient was given the opportunity to ask any trial related questions. Patient presented with the option to continue on study protocol AND with the opportunity to sign the withdrawal ICF. Patient agreeable to continue with study follow-up per protocol and to be contacted as needed for study data resolution.     Patient met with Dr. Angelo Molina to discuss the future plan of care. All questions answered. Please see Dr. Molina's progress note for physical exam and other clinical information. Patient returned study medication and study diary at a previous visit.     Patient instructed to call with questions or concerns. Did the study visit occur within the appropriate window allowed by the protocol? YES    Chanel Purcell RN    Office: [phone]  Pager: [phone]

## 2019-07-03 NOTE — PROGRESS NOTES
Paracentesis Nursing Note  Di Evans [facility].    During today's appointment orders from Dr. Molina were completed.    Progress Note:  Patient identification verified by name and date of birth.  Assessment completed.  Vitals monitored throughout appointment and recorded in Doc Flowsheets.  See proceduralist note in ultrasound.    Vascular Access: port accessed today; locked with low dose heparin and left accessed per pt request; pt reports she has infusion later today.  Labs: were drawn per orders.     Date of consent or authorization: 6/20.  Invasive Procedure Safety Checklist was completed and sent for scanning.     Paracentesis performed by Chase Stoddard PA-C Radiology.    The following labs were communicated to provider performing paracentesis:  Lab Results   Component Value Date    PLT 1,013 06/26/2019       Total amount of ascites fluid drained: 3.2 liters.  Color of ascites fluid: yellow and clear.  Total amount of albumin given: not ordered  Patient tolerated procedure well.    Post procedure,denies pain or discomfort post paracentesis.      Discharge Plan:  Discharge instructions were reviewed with patient.  Patient/Representative verbalized understanding and all questions were answered.   Discharged from [facility] in stable condition.    Patricia Caceres RN       Administrations This Visit     heparin lock flush 10 UNIT/ML injection 5 mL     Admin Date  07/03/2019 Action  Given Dose  5 mL Route  Intracatheter Administered By  Patricia Caceres RN          lidocaine (PF) (XYLOCAINE) 1 % injection 20 mL     Admin Date  07/03/2019 Action  Given by Other Clinician Dose  10 mL Route  Subcutaneous Administered By  Patricia Caceres RN                  /73   Temp 98.2  F (36.8  C) (Oral)   Wt 69.4 kg (153 lb)   SpO2 99%   BMI 25.46 kg/m

## 2019-07-03 NOTE — PATIENT INSTRUCTIONS
Plan weekly taxol for 6 weeks then off 1 week then Restart for cycle 2.      Call your Care Coordinator with chills and/or temperature greater then or equal to 100.4, uncontrolled nausea and vomiting, diarrhea, constipation, dizziness, light headedness, shortness of breath, chest pain. unexplained bruising, bleeding that does not stop with 10 minutes of pressure or any new/concerning symptoms and questions or concerns.  Monday- Friday    If after hours, weekends or holidays call [phone] or  the [facility] at [phone] and ask for (GYN/ONC) resident on call.    Your  Care Coordinator is  Luisa Sheldon RN, MA   Gynecologic Cancer   Office [phone]

## 2019-07-07 NOTE — PROGRESS NOTES
Subjective     Di Evans is a 59 year old female who presents to clinic today for the following health issues:    HPI       Acute Illness   Acute illness concerns: sore throat, ? strep  Onset: x 2 days    Fever: no    Chills/Sweats: no    Headache (location?): no    Sinus Pressure:YES- sinus drainage- from allergies    Conjunctivitis:  no    Ear Pain: no    Rhinorrhea: no    Congestion: no    Sore Throat: YES     Cough: no    Wheeze: no    Decreased Appetite: no    Nausea: no    Vomiting: no    Diarrhea:  no    Dysuria/Freq.: no    Fatigue/Achiness: YES- has Ovarian cancer- diagnosed a year on 3rd round of chemo started about 1 week ago.     Sick/Strep Exposure: no     Therapies Tried and outcome: nothing          Patient Active Problem List   Diagnosis     CARDIOVASCULAR SCREENING; LDL GOAL LESS THAN 130     Essential hypertension with goal blood pressure less than 140/90     Hyperlipidemia LDL goal <130     Overweight     Changing skin lesion     Elevated TSH     Pelvic mass     S/P hysterectomy     Ovarian cancer, unspecified laterality (H)     Examination of participant or control in clinical research     Other ascites     Hyponatremia     Hypokalemia     Acute deep vein thrombosis (DVT) of distal vein of right lower extremity (H)     Fatigue     Thrombosis     Confusion     Past Surgical History:   Procedure Laterality Date     HYSTERECTOMY TOTAL ABDOMINAL, BILATERAL SALPINGO-OOPHORECTOMY, COMBINED Bilateral 7/30/2018    Procedure: COMBINED HYSTERECTOMY TOTAL ABDOMINAL, SALPINGO-OOPHORECTOMY;;  Surgeon: Jammie Dueñas MD;  Location: [facility]     INSERT PORT VASCULAR ACCESS N/A 6/3/2019    Procedure: Chest Port Placement, Single Lumen;  Surgeon: Luigi Thayer PA-C;  [facility]     IR CHEST PORT PLACEMENT > 5 YRS OF AGE  6/3/2019     IR IVC FILTER PLACEMENT  6/28/2019     LAPAROSCOPY DIAGNOSTIC (GYN) N/A 2/7/2019    Procedure: Diagnostic Laparoscopy With Biopsy;  Surgeon: Neto Salgado  Jammie Mijares MD;  Location: [facility] OR     LAPAROTOMY, TUMOR DEBULKING, COMBINED Bilateral 7/30/2018    Procedure: COMBINED LAPAROTOMY, TUMOR DEBULKING;  Exploratory Laparotomy, Modified Radical Hysterectomy, Removal of Cervix, Bilateral Salpingo - Oophorectomy, Omentectomy, Bilateral Para Aortic and Left Pelvic Lymph Node Dissection, Tumor Debulking, Proctoscopy;  Surgeon: Jammie Dueñas MD;  Location: [facility] OR     REMOVE PORT PERITONEAL N/A 1/7/2019    Procedure: REMOVE PORT PERITONEAL;  Surgeon: Chanel Rodriguez MD;  Location: [facility]     SURGICAL HISTORY OF -   1980    left ankle surgery     THROMBECTOMY LOWER EXTREMITY Right 04/2019       Social History     Tobacco Use     Smoking status: Never Smoker     Smokeless tobacco: Never Used   Substance Use Topics     Alcohol use: Yes     Comment: occasional     Family History   Problem Relation Age of Onset     Hypertension Mother      Hypertension Father      Cerebrovascular Disease Father         polycythemia     Breast Cancer Maternal Aunt         older age         Current Outpatient Medications   Medication Sig Dispense Refill     acetaminophen (TYLENOL) 500 MG tablet Take 2 tablets (1,000 mg) by mouth every 8 hours as needed for mild pain 180 tablet 3     aspirin (ASA) 81 MG chewable tablet Take 1 tablet (81 mg) by mouth daily 30 tablet 3     cetirizine (ZYRTEC) 10 MG tablet Take 10 mg by mouth daily        enoxaparin (LOVENOX) 100 MG/ML syringe Inject 1 mL (100 mg) Subcutaneous 2 times daily for 14 days 28 Syringe 0     enoxaparin (LOVENOX) 80 MG/0.8ML syringe Inject 0.8 mLs (80 mg) Subcutaneous 2 times daily 60 Syringe 3     enoxaparin (LOVENOX) 80 MG/0.8ML syringe Inject 0.7 mLs (70 mg) Subcutaneous every 12 hours 60 Syringe 1     LORazepam (ATIVAN) 1 MG tablet Take 1 tablet (1 mg) by mouth every 6 hours as needed (Anxiety, Nausea/Vomiting or Sleep) 30 tablet 2     magnesium oxide (MAG-OX) 400 MG tablet Take 1 tablet (400 mg) by mouth daily 30 tablet 3      mometasone (NASONEX) 50 MCG/ACT nasal spray Spray 2 sprays into both nostrils daily as needed        oxyCODONE (ROXICODONE) 5 MG tablet Take 5-10 mg by mouth every 6 hours as needed for severe pain       potassium chloride ER (K-TAB) 20 MEQ CR tablet Take 1 tablet (20 mEq) by mouth daily 30 tablet 3     pravastatin (PRAVACHOL) 20 MG tablet Take 1 tablet (20 mg) by mouth every evening for cholesterol. 90 tablet 1     prochlorperazine (COMPAZINE) 10 MG tablet Take 1 tablet (10 mg) by mouth every 6 hours as needed (Nausea/Vomiting) 30 tablet 2     senna-docusate (SENNA S) 8.6-50 MG tablet Take 4 tablets by mouth 2 times daily 250 tablet 3     Allergies   Allergen Reactions     Gemfibrozil Muscle Pain (Myalgia)     Lovastatin      headaches     Penicillins Nausea and Vomiting         Reviewed and updated as needed this visit by Provider         Review of Systems   All others are negative except as above.        Objective    /75   Pulse 135   Temp 97.9  F (36.6  C) (Oral)   Resp 18   SpO2 98%   There is no height or weight on file to calculate BMI.     Physical Exam   GENERAL: healthy, alert and no distress  EYES: Eyes grossly normal to inspection, PERRL and conjunctivae and sclerae normal  HENT: ear canals and TM's normal, nose normal. Canker sores noted on the oral pharynx.   NECK: no adenopathy, no asymmetry, masses, or scars and thyroid normal to palpation  RESP: lungs clear to auscultation - no rales, rhonchi or wheezes  CV: regular rate and rhythm, normal S1 S2, no S3 or S4, no murmur, click or rub, no peripheral edema and peripheral pulses strong    Diagnostic Test Results:  Reviewed and discussed with patient prior to discharge.  Results for orders placed or performed in visit on 07/07/19   Strep, Rapid Screen   Result Value Ref Range    Specimen Description Throat     Rapid Strep A Screen       NEGATIVE: No Group A streptococcal antigen detected by immunoassay, await culture report.              Assessment & Plan     Di was seen today for pharyngitis.    Diagnoses and all orders for this visit:    Canker sores oral  -     magic mouthwash (FIRST-MOUTHWASH BLM) compounding kit; Swish and spit 5-10 mLs in mouth every 6 hours as needed for mouth sores  Reassured that these are self limiting. Encouraged well balanced diet and taking multivitamin to bridge any nutritional gaps.      Throat pain  -     Strep, Rapid Screen    Ovarian cancer, unspecified laterality (H) on Chemotherapy  Following with Oncology       Patient education and Handout with home care instructions given. See AVS for details.      Return in about 1 week (around 7/14/2019), or if symptoms worsen or fail to improve.    Antionette Solano MD  [facility]

## 2019-07-11 PROBLEM — D63.0 ANEMIA IN NEOPLASTIC DISEASE: Status: ACTIVE | Noted: 2019-01-01

## 2019-07-11 NOTE — PATIENT INSTRUCTIONS
Contact Numbers    [facility] [place]: [phone]  [facility] [place] and after hours / weekends / holidays:  [phone]      Please call the triage or after hours line if you experience a temperature greater than or equal to 100.5, shaking chills, have uncontrolled nausea, vomiting and/or diarrhea, dizziness, shortness of breath, chest pain, bleeding, unexplained bruising, or if you have any other new/concerning symptoms, questions or concerns.      If you are having any concerning symptoms or wish to speak to a provider before your next infusion visit, please call your care coordinator or triage to notify them so we can adequately serve you.     If you need a refill on a narcotic prescription or other medication, please call before your infusion appointment.

## 2019-07-11 NOTE — PROGRESS NOTES
Infusion Nursing Note:  Di Evans presents today for Cycle 2 Day 8 Taxol and 1 unit of PRBCs.        Note: Pt has no concerns today, but does report increased fatigue and WRIGHT.  Hgb 7.1 today.  Martha Reed aware and ordered 1 unit of PRBCs to be given today.  Martha came to infusion to consent pt. Martha aware that hgb has dropped form 8.7 to 7.1. Pt reports no blood in stool or urine.  She did note some blood in tubing during her paracentesis today.  Martha aware.     Intravenous Access:  Blood return noted pre and post infusion.   Implanted Port.    Treatment Conditions:  Lab Results   Component Value Date    HGB 7.1 07/11/2019     Lab Results   Component Value Date    WBC 7.4 07/11/2019      Lab Results   Component Value Date    ANEU 5.8 07/11/2019     Lab Results   Component Value Date     07/11/2019      Lab Results   Component Value Date     07/03/2019                   Lab Results   Component Value Date    POTASSIUM 4.3 07/11/2019           Lab Results   Component Value Date    MAG 1.7 07/11/2019            Lab Results   Component Value Date    CR 0.41 07/03/2019                   Lab Results   Component Value Date    TRACEY 8.0 07/03/2019                Lab Results   Component Value Date    BILITOTAL 0.3 07/03/2019           Lab Results   Component Value Date    ALBUMIN 1.0 07/03/2019                    Lab Results   Component Value Date    ALT 12 07/03/2019           Lab Results   Component Value Date    AST 12 07/03/2019       Results reviewed, labs MET treatment parameters, ok to proceed with treatment.      Post Infusion Assessment:  Patient tolerated infusion without incident.        Discharge Plan:   Prescription refills given for remeron.  Pt states she needs 100mg refills on her lovenox however, the prescription is for 80mcg.  Dmitri Waldrop in [facility] contacting care team to get the correct prescription orders.  This script will be filled at [facility] reviewed with  patient and/or family.  Patient will return 7/18/19 for cycle 1 day 15.    Face to Face time: 0.    Eladia Vázquez RN

## 2019-07-11 NOTE — PROGRESS NOTES
Paracentesis Nursing Note  Di Evans [facility].    During today's appointment orders from Dr. Molina were completed.    Progress Note:  Patient identification verified by name and date of birth.  Assessment completed.  Vitals monitored throughout appointment and recorded in Doc Flowsheets.  See proceduralist note in ultrasound.    Date of consent or authorization: 6/20.  Invasive Procedure Safety Checklist was completed and sent for scanning.     Paracentesis performed by Rossi Cortés PA-C Radiology.    The following labs were communicated to provider performing paracentesis:  Lab Results   Component Value Date     07/03/2019       Total amount of ascites fluid drained: 3.8 liters.  Color of ascites fluid: yellow and clear.  Total amount of albumin given: not ordered  Patient tolerated procedure well.    Post procedure,denies pain or discomfort post paracentesis.      Discharge Plan:  Discharge instructions were reviewed with patient.  Patient/Representative verbalized understanding and all questions were answered.   Discharged [facility] in stable condition.    Danii Diana RN    Administrations This Visit     lidocaine (PF) (XYLOCAINE) 1 % injection 20 mL     Admin Date  07/11/2019 Action  Given Dose  10 mL Route  Subcutaneous Administered By  Danii Diana RN                     /85   Pulse 105   Temp 98.3  F (36.8  C) (Oral)   Resp 16   Wt 63.6 kg (140 lb 4.8 oz)   SpO2 100%   BMI 23.35 kg/m

## 2019-07-16 NOTE — PROGRESS NOTES
Follow Up Notes on Referred Patient    Date: 2019       Dr. Chato Hough MD  No address on file       RE: Di Evans  : 1960  ROMAN: 7/3/2019    Dear Dr. Chato Hough:    Di Evans is a 58 year old woman with a diagnosis of recurrent platinum resistant ovarian cancer.    The patient presents today for follow up, she had two recent hospital admission for sinus thrombosis in the brain as well as strokes.  She does have some more abdominal distention again after paracentesis she had prior.  She has decreased appetite, eating and drinking small portions.  Has minimal nausea, no vomiting.  No change in urinary or bowel habits.  No vaginal bleeding or discharge.     Past Medical History:    Past Medical History:   Diagnosis Date     DVT (deep venous thrombosis) (H)      Hypertension      Ovarian cancer (H)      Pulmonary embolism (H)          Past Surgical History:    Past Surgical History:   Procedure Laterality Date     HYSTERECTOMY TOTAL ABDOMINAL, BILATERAL SALPINGO-OOPHORECTOMY, COMBINED Bilateral 2018    Procedure: COMBINED HYSTERECTOMY TOTAL ABDOMINAL, SALPINGO-OOPHORECTOMY;;  Surgeon: Jammie Dueñas MD;  Location: [facility] OR     INSERT PORT VASCULAR ACCESS N/A 6/3/2019    Procedure: Chest Port Placement, Single Lumen;  Surgeon: Luigi Thayer PA-C;  [facility]     IR CHEST PORT PLACEMENT > 5 YRS OF AGE  6/3/2019     IR IVC FILTER PLACEMENT  2019     LAPAROSCOPY DIAGNOSTIC (GYN) N/A 2019    Procedure: Diagnostic Laparoscopy With Biopsy;  Surgeon: Jammie Dueñas MD;  Location: [facility] OR     LAPAROTOMY, TUMOR DEBULKING, COMBINED Bilateral 2018    Procedure: COMBINED LAPAROTOMY, TUMOR DEBULKING;  Exploratory Laparotomy, Modified Radical Hysterectomy, Removal of Cervix, Bilateral Salpingo - Oophorectomy, Omentectomy, Bilateral Para Aortic and Left Pelvic Lymph Node Dissection, Tumor Debulking, Proctoscopy;  Surgeon: Jammie Dueñas  MD Dyllan;  Location: [facility] OR     REMOVE PORT PERITONEAL N/A 1/7/2019    Procedure: REMOVE PORT PERITONEAL;  Surgeon: Chanel Rodriguez MD;  Location: [facility]     SURGICAL HISTORY OF -   1980    left ankle surgery     THROMBECTOMY LOWER EXTREMITY Right 04/2019         Health Maintenance Due   Topic Date Due     ZOSTER IMMUNIZATION (1 of 2) 06/20/2009     FIT  03/16/2015     PHQ-2  01/01/2019     PREVENTIVE CARE VISIT  06/06/2019       Current Medications:     Current Outpatient Medications   Medication Sig Dispense Refill     acetaminophen (TYLENOL) 500 MG tablet Take 2 tablets (1,000 mg) by mouth every 8 hours as needed for mild pain 180 tablet 3     aspirin (ASA) 81 MG chewable tablet Take 1 tablet (81 mg) by mouth daily 30 tablet 3     cetirizine (ZYRTEC) 10 MG tablet Take 10 mg by mouth daily        enoxaparin (LOVENOX) 100 MG/ML syringe Inject 1 mL (100 mg) Subcutaneous 2 times daily 28 Syringe 0     enoxaparin (LOVENOX) 80 MG/0.8ML syringe Inject 0.8 mLs (80 mg) Subcutaneous 2 times daily (Patient taking differently: Inject 100 mg Subcutaneous 2 times daily ) 60 Syringe 3     LORazepam (ATIVAN) 1 MG tablet Take 1 tablet (1 mg) by mouth every 6 hours as needed (Anxiety, Nausea/Vomiting or Sleep) (Patient not taking: Reported on 7/11/2019) 30 tablet 2     magic mouthwash (FIRST-MOUTHWASH BLM) compounding kit Swish and spit 5-10 mLs in mouth every 6 hours as needed for mouth sores 237 mL 0     magnesium oxide (MAG-OX) 400 MG tablet Take 1 tablet (400 mg) by mouth daily 30 tablet 3     mirtazapine (REMERON) 15 MG tablet Take 15 mg by mouth At Bedtime       mometasone (NASONEX) 50 MCG/ACT nasal spray Spray 2 sprays into both nostrils daily as needed        oxyCODONE (ROXICODONE) 5 MG tablet Take 5-10 mg by mouth every 6 hours as needed for severe pain       potassium chloride ER (K-TAB) 20 MEQ CR tablet Take 1 tablet (20 mEq) by mouth daily 30 tablet 3     pravastatin (PRAVACHOL) 20 MG tablet Take 1 tablet (20 mg) by  mouth every evening for cholesterol. 90 tablet 1     prochlorperazine (COMPAZINE) 10 MG tablet Take 1 tablet (10 mg) by mouth every 6 hours as needed (Nausea/Vomiting) 30 tablet 2     senna-docusate (SENNA S) 8.6-50 MG tablet Take 4 tablets by mouth 2 times daily 250 tablet 3         Allergies:        Allergies   Allergen Reactions     Gemfibrozil Muscle Pain (Myalgia)     Lovastatin      headaches     Penicillins Nausea and Vomiting        Social History:     Social History     Tobacco Use     Smoking status: Never Smoker     Smokeless tobacco: Never Used   Substance Use Topics     Alcohol use: Yes     Comment: occasional       History   Drug Use No         Family History:       Family History   Problem Relation Age of Onset     Hypertension Mother      Hypertension Father      Cerebrovascular Disease Father         polycythemia     Breast Cancer Maternal Aunt         older age         Physical Exam:     /78   Pulse 107   Temp 98.2  F (36.8  C) (Oral)   Wt 65.6 kg (144 lb 10 oz)   SpO2 99%   BMI 24.07 kg/m    Body mass index is 24.07 kg/m .    General Appearance:  alert, no distress     Cardiovascular:           Mild tachycardia regular rhythm, no gallops, rubs or murmurs              Respiratory:     Decreased breath sounds lower lungs.     Musculoskeletal:         extremities non tender, +2 edema lower extremities     Neurological:   normal gait, no gross defects                Psychiatric:      appropriate mood and affect                               ABDOMEN:  Soft, mildly distended.  No organomegaly.  Well-healed midline incision.               Assessment:     Di Evans is a 59 year old woman with a diagnosis of recurrent platinum resistant ovarian cancer.      A total of 40 minutes was spent with the patient, 30 minutes of which were spent in counseling the patient and/or treatment planning.        1.  Recurrent platinum-resistant ovarian cancer.   2.  TRIO [facility] study.     3.  Currently off  treatment.   4.  Deep venous thrombosis.   5.  PE.   6.  Sinus troubles.   7.  Strokes.     8.  ECOG performance status of 1.      Given disease progression we will discontinue the patient on the TRIO 026 study. We will start her on weekly taxol. She does have worsening bilateral lower extremity DVTs. She has received an IVC filter. We have increased her Lovenox to 100 b.i.d. She will follow up with the hematologist. The patient as well as her family and friends agree with the plans.  They are very appreciative of her care.  All questions were answered.         Angelo Molina MD, MS    Department of Obstetrics and Gynecology   Division of Gynecologic Oncology   [facility]  Phone: [phone]      CC  Patient Care Team:  Sonja Davies MD as PCP - General (Family Practice)  Sonja Davies MD as Assigned PCP  Ching Sheldon, RN as Specialty Care Coordinator (Gyn-Onc)  SELF, REFERRED

## 2019-07-18 NOTE — PROGRESS NOTES
Infusion Nursing Note:  Di Evans presents today for Cycle 1 Day 15 of Taxol and 1 unit of PRBCs.    Patient seen by provider today: No   present during visit today: Not Applicable.    Note: Patient presents to infusion post paracentesis. Reports feeling better after procedure. Has fatigue on and off, but feeling okay today. Denies any shortness of breath, dizziness, or chest pain. She has non-pitting edema in her bilateral lower extremities, which she reports is baseline. Reports that the edema comes and goes, but tries to keep her feet elevated. She occasionally has some diarrhea, but none in the last week. No other issues or concerns.    TORB: Za Tracey RN/Martha Reed NP @ 12:30PM on 7/18/19. Patient not scheduled for blood transfusion today, but her hgb is 7.9; Per NP, please give 1 unit of PRBCs today.     Intravenous Access:  Implanted Port.    Treatment Conditions:  Lab Results   Component Value Date    HGB 7.9 07/18/2019     Lab Results   Component Value Date    WBC 4.9 07/18/2019      Lab Results   Component Value Date    ANEU 3.4 07/18/2019     Lab Results   Component Value Date     07/18/2019      Lab Results   Component Value Date     07/03/2019                   Lab Results   Component Value Date    POTASSIUM 4.0 07/18/2019           Lab Results   Component Value Date    MAG 1.6 07/18/2019            Lab Results   Component Value Date    CR 0.41 07/03/2019                   Lab Results   Component Value Date    TRACEY 8.0 07/03/2019                Lab Results   Component Value Date    BILITOTAL 0.3 07/03/2019           Lab Results   Component Value Date    ALBUMIN 1.0 07/03/2019                    Lab Results   Component Value Date    ALT 12 07/03/2019           Lab Results   Component Value Date    AST 12 07/03/2019       Results reviewed, labs MET treatment parameters, ok to proceed with treatment.  Blood transfusion consent signed 7/11/19.      Post Infusion  Assessment:  Patient tolerated infusion without incident.  Blood return noted pre and post infusion.  Site patent and intact, free from redness, edema or discomfort.  No evidence of extravasations.  Access discontinued per protocol.       Discharge Plan:   Patient declined prescription refills.  Discharge instructions reviewed with: Patient.  Patient and/or family verbalized understanding of discharge instructions and all questions answered.  Copy of AVS reviewed with patient and/or family.  Patient will return 7/25/19 for next appointment.  Patient discharged in stable condition accompanied by: sister and mother.  Departure Mode: Wheelchair.    Za Tracey RN

## 2019-07-18 NOTE — PATIENT INSTRUCTIONS
Dear Di Evans    Thank you for choosing [facility] ([facility]) for your procedure.  The following information is a summary of our appointment as well as important reminders.      Your paracentesis procedure today, beginning weight 144.7 lb (65.6 kg), removed 3.5 liters ascites fluid, gave no albumin per orders, ending weight 135 lb (61.6 kg).    We look forward in seeing you on your next appointment here at [facility] ([facility]).  Please don t hesitate to call us at [phone] to reschedule any of your appointments or to speak with one of the [facility] registered nurses.  It was a pleasure taking care of you today.    Sincerely,    [facility]  [address] [place] [address]  [id number]  Phone:  [phone]  DISCHARGE INSTRUCTIONS FOLLOWING ABDOMINAL PARACENTESIS    After you go home:    No strenuous activity for 24 hours    Resume your regular diet    Limit fluid intake for the first 48 hours to no more than 2 quarts per day.  There should be minimal drainage from the needle site.  If drainage does occur and soaks through the bandage, apply gentle pressure with your hand for 5 minutes.    Notify MD for the following:    Excessive drainage    Excessive swelling, redness or tenderness at the needle site    Fever greater than 101 degrees F    Dizziness or light-headedness when getting up or walking      IF THIS IS A MEDICAL EMERGENCY, CALL 911    If you have any questions or concerns    Contact the Hepatology Clinic:   [phone]    If you are a post-transplant patient, contact the Transplant Office:  [phone]    If this is after hours, contact the hospital :  [phone] and as to have the GI resident on call paged                   I have received and understand my discharge instructions and I have all of my personal  belongings.          ------------------------------------------------------        ---------------------------------------------------    Patient / Significant Other's Signature     Relationship

## 2019-07-18 NOTE — PATIENT INSTRUCTIONS
Contact Numbers  [facility]: [phone]        Please call the [facility] Triage line if you experience a temperature greater than or equal to 100.5, shaking chills, have uncontrolled nausea, vomiting and/or diarrhea, dizziness, shortness of breath, chest pain, bleeding, unexplained bruising, or if you have any other new/concerning symptoms, questions or concerns.     If it is after hours, weekends, or holidays, please call the main hospital  at  [phone] and ask to speak to the Oncology doctor on call.     If you are having any concerning symptoms or wish to speak to a provider before your next infusion visit, please call your care coordinator or triage to notify them so we can adequately serve you.     If you need a refill on a narcotic prescription or other medication, please call triage before your infusion appointment.           July 2019 Sunday Monday Tuesday Wednesday Thursday Friday Saturday        1     2     3    UMP PARACENTESIS   7:30 AM   (120 min.)   Provider,  Spec Inf Para   [facility]    US PARACENTESIS   7:40 AM   (60 min.)   [id number] [place] [address] Ultrasound    UMP RETURN   9:45 AM   (20 min.)   Angelo Molina MD   [facility]    UMP ONC INFUSION 120  10:30 AM   (120 min.)    ONCOLOGY INFUSION   [facility] 4     5     6       7    TEAM SHORT   9:10 AM   (5 min.)   Antionette Solano MD   [facility] 8     9     10     11    UMP PARACENTESIS   9:30 AM   (120 min.)   Provider,  Spec Inf Para   [facility] Procedure    US PARACENTESIS   9:40 AM   (60 min.)   [email or web address]   [facility] Ultrasound    [facility] LAB DRAW  12:15 PM   (15 min.)   [facility] LAB DRAW   [facility] Lab Draw    UMP ONC INFUSION 120   1:00 PM   (120 min.)    ONCOLOGY INFUSION   [facility]  Cancer [facility] 12     13       14     15     16     17     18    UMP PARACENTESIS   9:30 AM   (120 min.)   Provider,  Spec Inf Para   [facility] Procedure    US PARACENTESIS   9:35 AM   (60 min.)   [email or web address]   [facility] Ultrasound    UMP ONC INFUSION 120   1:30 PM   (120 min.)    ONCOLOGY INFUSION   [facility] 19     20       21     22     23     24     25    UMP PARACENTESIS   9:30 AM   (120 min.)   Provider,  Spec Inf Para   [facility]    [facility] DRAW  12:15 PM   (15 min.)    [facility] LAB DRAW   [facility] Draw    UMP ONC INFUSION 120   1:00 PM   (120 min.)    ONCOLOGY INFUSION   [facility] 26     27       28     29    NEW CLOTTING DISORDER   1:00 PM   (60 min.)   Aleyda Bella MD   [facility] 30 31 August 2019 Sunday Monday Tuesday Wednesday Thursday Friday Saturday                       1    UMP PARACENTESIS  12:00 PM   (120 min.)   Provider,  Spec Inf Para   [facility]    [facility] DRAW   2:00 PM   (15 min.)    [facility] LAB DRAW   [facility] Draw    UMP ONC INFUSION 120   2:30 PM   (120 min.)    ONCOLOGY INFUSION   [facility] 2     3       4     5     6     7     8    UMP PARACENTESIS   9:30 AM   (120 min.)   Provider,  Spec Inf Para   [facility]    [facility] DRAW  12:30 PM   (15 min.)   [facility] DRAW   [facility] Draw    UMP ONC INFUSION 120   1:00 PM   (120 min.)    ONCOLOGY INFUSION   [facility] 9     10       11     12     13     14     15    UMP PARACENTESIS   9:30 AM   (120 min.)   Provider,  Spec Inf Para   [facility] 16      17       18     19     20     21     22    [facility] DRAW   8:00 AM   (15 min.)    [facility] LAB DRAW   [facility] Lab Draw    UMP RETURN   8:05 AM   (40 min.)   Martha Reed APRN CNP   [facility]    UMP PARACENTESIS   9:30 AM   (120 min.)   Provider, Dale Spec Inf Para   [facility] Specialty [facility] Procedure    UMP ONC INFUSION 120  12:00 PM   (120 min.)    ONCOLOGY INFUSION   [facility] 23     24       25     26     27     28     29    [facility] DRAW   9:15 AM   (15 min.)    [facility] LAB DRAW   [facility] Lab Draw    UMP PARACENTESIS   9:30 AM   (120 min.)   Provider,  Spec Inf Para   [facility] Procedure    UMP ONC INFUSION 120  12:00 PM   (120 min.)    ONCOLOGY INFUSION   [facility] 30     31                     Lab Results:  Recent Results (from the past 12 hour(s))   TSH    Collection Time: 07/18/19  9:40 AM   Result Value Ref Range    TSH 4.04 (H) 0.40 - 4.00 mU/L   *CBC with platelets differential    Collection Time: 07/18/19  9:40 AM   Result Value Ref Range    WBC 4.9 4.0 - 11.0 10e9/L    RBC Count 3.51 (L) 3.8 - 5.2 10e12/L    Hemoglobin 7.9 (L) 11.7 - 15.7 g/dL    Hematocrit 26.9 (L) 35.0 - 47.0 %    MCV 77 (L) 78 - 100 fl    MCH 22.5 (L) 26.5 - 33.0 pg    MCHC 29.4 (L) 31.5 - 36.5 g/dL    RDW 22.0 (H) 10.0 - 15.0 %    Platelet Count 393 150 - 450 10e9/L    Diff Method Automated Method     % Neutrophils 69.1 %    % Lymphocytes 12.4 %    % Monocytes 17.1 %    % Eosinophils 0.4 %    % Basophils 0.2 %    % Immature Granulocytes 0.8 %    Nucleated RBCs 0 0 /100    Absolute Neutrophil 3.4 1.6 - 8.3 10e9/L    Absolute Lymphocytes 0.6 (L) 0.8 - 5.3 10e9/L    Absolute Monocytes 0.8 0.0 - 1.3 10e9/L    Absolute Eosinophils 0.0 0.0 - 0.7 10e9/L    Absolute Basophils 0.0 0.0 - 0.2 10e9/L    Abs Immature Granulocytes 0.0 0 - 0.4 10e9/L    Absolute Nucleated RBC  0.0    ABO/Rh type and screen    Collection Time: 07/18/19  9:40 AM   Result Value Ref Range    Units Ordered 1     ABO O     RH(D) Pos     Antibody Screen Neg     Test Valid Only At          [facility]    Specimen Expires 07/21/2019     Crossmatch Red Blood Cells    Blood component    Collection Time: 07/18/19  9:40 AM   Result Value Ref Range    Unit Number [id number]     Blood Component Type Red Blood Cells LeukoReduced (Part 2)     Division Number 00     Status of Unit Released to care unit 07/18/2019 1453     Blood Product Code [id number]     Unit Status ISS    Magnesium    Collection Time: 07/18/19  9:43 AM   Result Value Ref Range    Magnesium 1.6 1.6 - 2.3 mg/dL   Potassium    Collection Time: 07/18/19  9:43 AM   Result Value Ref Range    Potassium 4.0 3.4 - 5.3 mmol/L

## 2019-07-18 NOTE — PROGRESS NOTES
Paracentesis Nursing Note  Di Evans [facility].    During today's appointment orders from Dr. Molina were completed.    Progress Note:  Patient identification verified by name and date of birth.  Assessment completed.  Vitals monitored throughout appointment and recorded in Doc Flowsheets.  See proceduralist note in ultrasound.    Vascular Access: port accessed today.  Labs: were drawn per orders.   Date of consent or authorization: 6/20/19 - 9/18/19 (90 days).  Invasive Procedure Safety Checklist was completed and sent for scanning.   Paracentesis performed by Inga Werner PA-C Radiology.    The following labs were communicated to provider performing paracentesis:  Lab Results   Component Value Date     07/18/2019     Total amount of ascites fluid drained: 3.5 liters.  Color of ascites fluid: sonja.  Patient tolerated procedure well.  Post procedure,denies pain or discomfort post paracentesis.    Discharge Plan:  Discharge instructions were reviewed with patient.  Patient/Representative verbalized understanding and all questions were answered.   [facility] in stable condition.    Judy Jasso RN    Administrations This Visit     heparin lock flush 10 UNIT/ML injection 5 mL     Admin Date  07/18/2019 Action  Given Dose  5 mL Route  Intracatheter Administered By  Judy Jasso RN          lidocaine (PF) (XYLOCAINE) 1 % injection 20 mL     Admin Date  07/18/2019 Action  Given by Other Dose  10 mL Route  Subcutaneous Administered By  Judy Jasso, RN          sodium chloride (PF) 0.9% PF flush 3-20 mL     Admin Date  07/18/2019 Action  Given Dose  30 mL Route  Intracatheter Administered By  Judy Jasso RN                /87   Pulse 128   Temp 97.5  F (36.4  C) (Oral)   Resp 15   Wt 65.6 kg (144 lb 11.2 oz)   BMI 24.08 kg/m

## 2019-07-25 NOTE — PATIENT INSTRUCTIONS
Dear Di Evans    Thank you for choosing [facility] ([facility]) for your procedure.  The following information is a summary of our appointment as well as important reminders.      Your paracentesis procedure today, beginning weight 142.1 lb (64.5 kg), removed 3.8 liters ascites fluid, ending weight 132 lb (60 kg).    We look forward in seeing you on your next appointment here at [facility] ([facility]).  Please don t hesitate to call us at [phone] to reschedule any of your appointments or to speak with one of the [facility] registered nurses.  It was a pleasure taking care of you today.    Sincerely,    [facility]  [facility]  [address] [place] [address]  [address]  Phone:  [phone]  DISCHARGE INSTRUCTIONS FOLLOWING ABDOMINAL PARACENTESIS    After you go home:    No strenuous activity for 24 hours    Resume your regular diet    Limit fluid intake for the first 48 hours to no more than 2 quarts per day.  There should be minimal drainage from the needle site.  If drainage does occur and soaks through the bandage, apply gentle pressure with your hand for 5 minutes.    Notify MD for the following:    Excessive drainage    Excessive swelling, redness or tenderness at the needle site    Fever greater than 101 degrees F    Dizziness or light-headedness when getting up or walking      IF THIS IS A MEDICAL EMERGENCY, CALL 611    If you have any questions or concerns    Contact the Hepatology Clinic:   [phone]    If you are a post-transplant patient, contact the Transplant Office:  [phone]    If this is after hours, contact the hospital :  [phone] and as to have the GI resident on call paged                   I have received and understand my discharge instructions and I have all of my personal  belongings.          ------------------------------------------------------        ---------------------------------------------------    Patient / Significant Other's Signature     Relationship

## 2019-07-25 NOTE — PATIENT INSTRUCTIONS
Contact Numbers  [facility]: [phone]    After Hours:  [phone]  Triage: [phone]    Please call the [facility] Triage line if you experience a temperature greater than or equal to 100.5, shaking chills, have uncontrolled nausea, vomiting and/or diarrhea, dizziness, shortness of breath, chest pain, bleeding, unexplained bruising, or if you have any other new/concerning symptoms, questions or concerns.     If it is after hours, weekends, or holidays, please call the main hospital  at  [phone] and ask to speak to the Oncology doctor on call.     If you are having any concerning symptoms or wish to speak to a provider before your next infusion visit, please call your care coordinator or triage to notify them so we can adequately serve you.     If you need a refill on a narcotic prescription or other medication, please call triage before your infusion appointment.

## 2019-07-25 NOTE — PROGRESS NOTES
Care Coordinator Note  Patient states she needs three additional syringes of Lovenox 100 mg/syringe, currently taking BID, to get her to the 7/29/19  1 pm clinic appointment with Dr. Pizarro.  Pharmacy notified and will fill today while patient here for weekly Taxol infusion.  Patient states she would like a palliative care appointment for symptom management.  Referral request sent to scheduling.          Patient verbalized back understanding of the above information discussed.     Luisa SAHA, RN  Care Coordinator  Gynecologic Cancer   Office:  [phone]  Pager: [phone] #[phone]

## 2019-07-25 NOTE — PROGRESS NOTES
Paracentesis Nursing Note  Di Evans presents [facility] for [facility].    During today's appointment orders from Dr. Molina were completed.    Progress Note:  Patient identification verified by name and date of birth.  Assessment completed.  Vitals monitored throughout appointment and recorded in Doc Flowsheets.  See proceduralist note in ultrasound.    Vascular Access: port accessed today.  Labs: were drawn per orders.   Date of consent or authorization: 6/20/19 - 9/18/19 (90 days).  Invasive Procedure Safety Checklist was completed and sent for scanning.   Paracentesis performed by Chase Stoddard PA-C Radiology.    The following labs were communicated to provider performing paracentesis:  Lab Results   Component Value Date     07/25/2019     Total amount of ascites fluid drained: 3.8 liters.  Color of ascites fluid: yellow.  Total amount of albumin given: 0 grams. No albumin given per therapy plan  Patient tolerated procedure fairly well.  Post procedure,denies pain or discomfort post paracentesis.    Discharge Plan:  Discharge instructions were reviewed with patient.  Patient/Representative verbalized understanding and all questions were answered.   Discharged from [facility] in stable condition.    Judy Jasso RN    Administrations This Visit     heparin lock flush 10 UNIT/ML injection 5 mL     Admin Date  07/25/2019 Action  Given Dose  5 mL Route  Intracatheter Administered By  Judy Jasso RN          lidocaine (PF) (XYLOCAINE) 1 % injection 20 mL     Admin Date  07/25/2019 Action  Given by Other Dose  10 mL Route  Subcutaneous Administered By  Judy Jasso RN                /84   Pulse 128   Temp 97.8  F (36.6  C) (Oral)   Wt 60.1 kg (132 lb 8 oz)   SpO2 98%   BMI 22.05 kg/m

## 2019-07-29 NOTE — PROGRESS NOTES
"PROBLEM LIST:  1.  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  Recurrent metastatic clear cellular ovarian carcinoma.  3.  Stroke while on therapeutic enoxaparin    Interim history: Ms. Evans is here for follow-up regarding recurrent venous and arterial thrombosis.  She has been on enoxaparin there was increased to 100 mg twice daily along with aspirin.  An IVC filter was placed 6/28/19.  Her lower extremity DVTs worsened in spite of being on Lovenox 80 mg twice daily which is why she was increased to 100.  She was discontinued from the TRIO study (, pembfolizumab) and is now on weekly Taxol.  She says she is thinking about quitting the Taxol.    She reports that she has had an episode of some bright red blood in her stool when she had the push with the bowel movement.  But then is subsequent bowel movement had no blood.  She is having paracenteses almost weekly without holding her enoxaparin and is not having any bleeding or bruising.  She says that there is a little bit of blood in the fluid that is removed with a paracentesis.  She denies any epistaxis, gum bleeding, hematuria, large bruises.    She reports that she has been very fatigued.  Because she is somewhat unsteady on her feet she uses a walker in the house, but today is in a wheelchair.  She says her heart rate has been fast when she comes to visit, but she does not feel lightheaded.  She did receive a couple of units of blood last week and said she felt better after receiving the blood.    PHYSICAL EXAMINATION:  /76 (BP Location: Right arm, Patient Position: Sitting, Cuff Size: Adult Small)   Pulse 149   Resp 12   Ht 1.651 m (5' 5\")   Wt 60.3 kg (133 lb)   SpO2 99%   BMI 22.13 kg/m      General appearance:  Patient is 60 yo woman in no acute distress, siting in wheelchair-  pale.     HEENT:  No pallor, icterus, or mucositis.    Lungs:  Clear to auscultation bilaterally.   Heart:  Regular rate and rhythm; no " S3 S4 or murmer.     Abdomen:  Well-healed surgical scar.Positive bowel sounds, mildly distended.    Extremities:  1+ bilateral ankle edema.     Skin:  Eczematous rash on top of R foot and at L ankle. No petechiae or ecchymoses.    Labs:  Results for MARYJANE HSIEH (MRN [id number]) as of 7/29/2019 16:06   Ref. Range 7/29/2019 14:10   Sodium Latest Ref Range: 133 - 144 mmol/L 130 (L)   Potassium Latest Ref Range: 3.4 - 5.3 mmol/L 4.2   Chloride Latest Ref Range: 94 - 109 mmol/L 98   Carbon Dioxide Latest Ref Range: 20 - 32 mmol/L 24   Urea Nitrogen Latest Ref Range: 7 - 30 mg/dL 11   Creatinine Latest Ref Range: 0.52 - 1.04 mg/dL 0.41 (L)   GFR Estimate Latest Ref Range: >60 mL/min/1.73_m2 >90   GFR Estimate If Black Latest Ref Range: >60 mL/min/1.73_m2 >90   Calcium Latest Ref Range: 8.5 - 10.1 mg/dL 7.6 (L)   Anion Gap Latest Ref Range: 3 - 14 mmol/L 8   Glucose Latest Ref Range: 70 - 99 mg/dL 113 (H)   WBC Latest Ref Range: 4.0 - 11.0 10e9/L 5.7   Hemoglobin Latest Ref Range: 11.7 - 15.7 g/dL 8.3 (L)   Hematocrit Latest Ref Range: 35.0 - 47.0 % 27.1 (L)   Platelet Count Latest Ref Range: 150 - 450 10e9/L 340   RBC Count Latest Ref Range: 3.8 - 5.2 10e12/L 3.52 (L)   MCV Latest Ref Range: 78 - 100 fl 77 (L)   MCH Latest Ref Range: 26.5 - 33.0 pg 23.6 (L)   MCHC Latest Ref Range: 31.5 - 36.5 g/dL 30.6 (L)   RDW Latest Ref Range: 10.0 - 15.0 % 22.6 (H)   Diff Method Unknown Automated Method   % Neutrophils Latest Units: % 85.6   % Lymphocytes Latest Units: % 10.3   % Monocytes Latest Units: % 3.5   % Eosinophils Latest Units: % 0.2   % Basophils Latest Units: % 0.0   % Immature Granulocytes Latest Units: % 0.4   Nucleated RBCs Latest Ref Range: 0 /100 0   Absolute Neutrophil Latest Ref Range: 1.6 - 8.3 10e9/L 4.8   Absolute Lymphocytes Latest Ref Range: 0.8 - 5.3 10e9/L 0.6 (L)   Absolute Monocytes Latest Ref Range: 0.0 - 1.3 10e9/L 0.2   Absolute Eosinophils Latest Ref Range: 0.0 - 0.7 10e9/L 0.0   Absolute  Basophils Latest Ref Range: 0.0 - 0.2 10e9/L 0.0   Abs Immature Granulocytes Latest Ref Range: 0 - 0.4 10e9/L 0.0   Absolute Nucleated RBC Unknown 0.0   % Retic Latest Ref Range: 0.5 - 2.0 % 1.5   Absolute Retic Latest Ref Range: 25 - 95 10e9/L 52.8       Assessment and recommendation:  1.  Recurrent DVT, PE, stroke in the setting of metastatic ovarian cancer- she is on quite a high dose of enoxaparin for her weight.  However she is not having any bleeding symptoms, and is tolerating the injections without any problem..Her anti-Xa level was last checked about 5 hours after the dose, which was appropriate timing, and was higher than the desired therapeutic range.  However, I do not want to lower her dose based on this result.   Her lower extremity thrombus increased in size when she was on a lower dose of enoxaparin.  Therefore I think it is reasonable to keep her at the same dose of 100 mg every 12 hours and do not bother checking levels .   She should also continue with the aspirin.  She is having some rectal bleeding.  If that becomes significantly worse or if she develops other significant bleeding, then lowering or stopping the enoxaparin would need to be considered.    2.  Anemia- I do not think this is due to significant GI bleeding.  Her absolute reticulocyte count is not elevated, which would be the normal response to bleeding.  Therefore I think that the anemia is more related to her chronic illness with some suppression by chemotherapy.  She may have an element of iron deficiency with the low MCV.  However it would probably be easier for her to have intermittent transfusions then being placed on oral iron, which can bother the stomach.    I will not arrange for routine follow-up with me, but I can be contacted if there are any questions regarding her anticoagulation.    Aleyda Bella MD  Hematology

## 2019-08-01 NOTE — PROGRESS NOTES
Care Coordinator Note  Returned call from Alivia regarding question about how to get hospice for Lucina.  Informed I will contact [facility] and the hospice program nurse will call her to set up a home appointment.  ABBY Rodríguez at [facility] contacted and will call Alivia today to set up home visit.      Luisa Sheldon MSN, RN  Care Coordinator  Gynecologic Cancer   Office:  [phone]  Pager: [phone] #[phone]

## 2019-08-27 ENCOUNTER — CARE COORDINATION (OUTPATIENT)
Dept: ONCOLOGY | Facility: CLINIC | Age: 59
End: 2019-08-27

## 2019-08-27 DIAGNOSIS — R25.2 LEG CRAMPS: ICD-10-CM

## 2019-08-27 DIAGNOSIS — E83.42 HYPOMAGNESEMIA: ICD-10-CM

## 2019-08-27 NOTE — PROGRESS NOTES
Care Coordinator Note  Notified by [facility] patient  19.  Luias SAHA, RN  Care Coordinator  Gynecologic Cancer   Office:  [phone]  Pager: [phone] #[phone]

## 2019-08-28 RX ORDER — MAGNESIUM OXIDE 400 MG/1
400 TABLET ORAL DAILY
Qty: 30 TABLET | Refills: 3 | OUTPATIENT
Start: 2019-08-28

## 2019-09-26 NOTE — TELEPHONE ENCOUNTER
TRIO Study ([id number]): Study Visit Note   Subject name: Di Evans     Patient declined rapidly due to disease progression. Saturday 27/JUL/2019, patient decided to stop weekly taxol chemotherapy treatments. Patient requested to be placed in hospice. A hospice order was placed and the initial hospice visit occurred on 1/AUG/2019.     Research staff was contacted today 8/6/2019 by patient's family to inform us that Di Evans passed away at home at 3:40 pm.     Chanel Purcell, RN    Office: [phone]  Pager: [phone]

## 2020-02-14 NOTE — PROGRESS NOTES
SUBJECTIVE:   Di Evans is a 57 year old female who presents to clinic today for the following health issues:    Concern - Sinus Problem  Onset: x 1 week    Description:   Pressure and drainage-drainage is clear, denies sinus pain or headache    Intensity: mild    Progression of Symptoms:  worsening    Accompanying Signs & Symptoms:  coughing    Previous history of similar problem:   Yes, sinus infection    Precipitating factors:   Worsened by: none    Alleviating factors:  Improved by: none    Therapies Tried and outcome: no medication was taken  Cough- clear phelgm, no shortness of breath, no chest pain.  Denies fever or chills.  Doesn't think she has allergies.    Problem list and histories reviewed & adjusted, as indicated.  Additional history: as documented    Patient Active Problem List   Diagnosis     CARDIOVASCULAR SCREENING; LDL GOAL LESS THAN 130     Hypertension goal BP (blood pressure) < 140/90     Hyperlipidemia LDL goal <130     Overweight     Changing skin lesion     Past Surgical History:   Procedure Laterality Date     SURGICAL HISTORY OF -   1980    left ankle surgery       Social History   Substance Use Topics     Smoking status: Never Smoker     Smokeless tobacco: Never Used     Alcohol use Yes      Comment: occasional     Family History   Problem Relation Age of Onset     Hypertension Mother      Hypertension Father      CEREBROVASCULAR DISEASE Father      polycythemia         Current Outpatient Prescriptions   Medication Sig Dispense Refill     aspirin 81 MG tablet Take 1 tablet by mouth daily. *       fluticasone (FLONASE) 50 MCG/ACT spray Spray 2 sprays into both nostrils daily 1 Bottle 11     guaiFENesin (MUCINEX) 600 MG 12 hr tablet Take 1 tablet (600 mg) by mouth 2 times daily 30 tablet 0     hydrochlorothiazide (HYDRODIURIL) 25 MG tablet Take 1 tablet (25 mg) by mouth every morning 90 tablet 1     loratadine (CLARITIN) 10 MG tablet Take 1 tablet (10 mg) by mouth daily 30 tablet 3      pravastatin (PRAVACHOL) 20 MG tablet Take 1 tablet (20 mg) by mouth every evening 90 tablet 3     Allergies   Allergen Reactions     Gemfibrozil Muscle Pain (Myalgia)     Lovastatin      headaches     Pcn [Bicillin C-R,]        Reviewed and updated as needed this visit by clinical staff  Tobacco  Allergies  Meds  Problems       Reviewed and updated as needed this visit by Provider  Allergies  Meds  Problems         ROS:  Constitutional, HEENT, cardiovascular, pulmonary, gi and gu systems are negative, except as otherwise noted.    OBJECTIVE:     /80 (BP Location: Left arm, Patient Position: Chair, Cuff Size: Adult Regular)  Pulse 100  Temp 98.5  F (36.9  C) (Oral)  Wt 165 lb (74.8 kg)  SpO2 98%  BMI 27.46 kg/m2  Body mass index is 27.46 kg/(m^2).  GENERAL: healthy, alert and no distress  HENT: normal cephalic/atraumatic, ear canals and TM's normal, nose and mouth without ulcers or lesions, nasal mucosa edematous, pale, and boggy, rhinorrhea clear, oropharynx clear, oral mucous membranes moist, sinuses: not tender and +postpharyngeal cobblestoning  NECK: no adenopathy, no asymmetry, masses, or scars and thyroid normal to palpation  RESP: lungs clear to auscultation - no rales, rhonchi or wheezes  CV: regular rate and rhythm, normal S1 S2, no S3 or S4, no murmur, click or rub, no peripheral edema and peripheral pulses strong  ABDOMEN: soft, nontender, no hepatosplenomegaly, no masses and bowel sounds normal  MS: no gross musculoskeletal defects noted, no edema    Diagnostic Test Results:  none     ASSESSMENT/PLAN:     1. Acute seasonal allergic rhinitis, unspecified trigger  Appearance is allergic, not infectious.  Treatment as below.  Will avoid decongestants due to blood pressure.  - fluticasone (FLONASE) 50 MCG/ACT spray; Spray 2 sprays into both nostrils daily  Dispense: 1 Bottle; Refill: 11  - loratadine (CLARITIN) 10 MG tablet; Take 1 tablet (10 mg) by mouth daily  Dispense: 30 tablet;  Refill: 3  - guaiFENesin (MUCINEX) 600 MG 12 hr tablet; Take 1 tablet (600 mg) by mouth 2 times daily  Dispense: 30 tablet; Refill: 0    Patient Instructions     For your allergies:  -Take an antihistamine allegra (cetirizine) or loratadine (claritin) once daily  -Use a nasal steroid like Flonase (fluticasone) or Nasacort (triamcinolone) two sprays each side once daily  -Can use Mucinex (gauifenesin) 600-1200 mg twice a day for congestion (non-stimulating)  -Sudafed (pseudophedrine) can be purchased with your I.D. From the pharmacy without a prescription.  It is stimulating so try to avoid taking it at night or you may have trouble sleeping  -You can take Benadryl 12.5-50 mg(diphenhydramine) at night if allergies are severe.  Will also help with sleep.  It will make you very drowsy, however, so make sure you have at least 8 hours to sleep.  -Saline spray can be helpful as well to clear your nasal passages of irritating allergens  -I recommend avoiding nasal spray like Afrin as this can cause severe rebound congestion  -Please follow up if you have not noted improvement in 1-2 weeks.      At [facility], we strive to deliver an exceptional experience to you, every time we see you.  If you receive a survey in the mail, please send us back your thoughts. We really do value your feedback.    Based on your medical history, these are the current health maintenance/preventive care services that you are due for (some may have been done at this visit.)  Health Maintenance Due   Topic Date Due     HIV SCREEN (SYSTEM ASSIGNED)  06/20/1977     FIT Q1 YR  03/16/2015     PAP SCREENING Q3 YR (SYSTEM ASSIGNED)  03/13/2016     BMP Q6 MOS  05/07/2018       Suggested websites for health information:  Www.[place].[facility] : Up to date and easily searchable information on multiple topics.  Www.medlineplus.gov : medication info, interactive tutorials, watch real surgeries online  Www.familydoctor.org : good info from the  Academy [employer]  Www.cdc.gov : public health info, travel advisories, epidemics (H1N1)  Www.aap.org : children's health info, normal development, vaccinations  Www.health.[facility].[place].us : [place] dept of health, public health issues in [place], N1N1    Your care team:                            Family Medicine Internal Medicine   MD Edmund Crawford MD Shantel Branch-Fleming, MD Katya Georgiev PA-C Megan Hill, APRN CNP Nam Ho, MD Pediatrics   ANDRIA Mayer, MD Kellee Waters APRN MD Sara Abbasi MD Deborah Mielke, MD Kim Thein, APRN CNP      Clinic hours: Monday - Thursday 7 am-7 pm; Fridays 7 am-5 pm.   Urgent care: Monday - Friday 11 am-9 pm; Saturday and Sunday 9 am-5 pm.  Pharmacy : Monday -Thursday 8 am-8 pm; Friday 8 am-6 pm; Saturday and Sunday 9 am-5 pm.     Clinic: [phone]   Pharmacy: [phone]          SULMA Renee CNP  [facility]   1-2 drinks

## 2021-01-11 NOTE — TELEPHONE ENCOUNTER
This writer attempted to contact Di on 06/19/18      Reason for call triage symptoms from mychart message  and left message.      If patient calls back:   Patient contacted by a Registered Nurse. Inform patient that someone from the RN group will contact them, document that pt called and route to P DYAD 3 RN POOL [[id number]]        Natalee Ruiz RN    
I had addressed the thyroid labs on 6/6/18. Please see result note. I had wanted to recheck the labs, future orders are already in the system.  Thanks,  Jocelyn Madden MD MPH    
RN did not mean to route encounter to provider previously. Apologize for that.   Called and spoke with patient. Pt reporting symptoms below in mychart message, no other symptoms at this time. Experiencing less of an appetite, very tired all the time, having less BM's and smaller amounts when has one, and noticing her resting HR is now about 90. Pt states about 6 months ago resting HR was around 65. Denies any palpitations or chest pain. Advised pt to recheck level, per Dr. Madden. RN assisted in scheduling pt for this Friday, June 22 nd at 11:00 am for recheck of thyroid labs. No further questions or concerns at this time from pt.     Natalee Ruiz RN  [facility] [place] Triage    
Routing to RN pool marking high priority.  Juliet Lujan MA/  For Teams [place]    
Confirmed COVID-19

## 2022-06-22 NOTE — NURSING NOTE
[id number]: Study Visit Note   Subject name: Di Evans     Visit: C1    Did the study visit occur within the appropriate window allowed by the protocol? yes    Today is first day of treatment. Patient reporting baseline constipation. Otherwise, offers no complaints today. TSH was elevated, T4 normal, patient asymptomatic of hypothyroidism, thus grade 1.    Patient took po zofran at 1350. Patient took first dose CC-486 at 1420. Patient observed for half hour until 1450. Denied nausea.    I have personally interviewed Di Evans and reviewed her medical record for adverse events and concomitant medications and these have been recorded on the corresponding logs in Di Evans's research file.     Di Evans was given the opportunity to ask any trial related questions.  Please see provider progress note for physical exam and other clinical information. Labs were reviewed - any significant lab values were addressed and reviewed.    Asad Welsh RN     Pager: [phone]      [id number]: Medication Count/IDS Note      Di Evans is enrolled on the trial number listed above. The patient presented for her C1 day 1 visit.   IDS number: [id number]  Drug name: CC-486  Number of blister packs dispensed:1  Lot number(s): [id number]  Number of pills dispensed: 21    Drug diary returned? No, patient has not previously received drug.    Asad Welsh RN     Pager: [phone]    Form 504.00.01 (Version 1)     Effective date: 01JUL2018     Next Review Date: 01JUL2020   independent
